# Patient Record
Sex: MALE | Race: WHITE
[De-identification: names, ages, dates, MRNs, and addresses within clinical notes are randomized per-mention and may not be internally consistent; named-entity substitution may affect disease eponyms.]

---

## 2017-02-19 ENCOUNTER — HOSPITAL ENCOUNTER (EMERGENCY)
Dept: HOSPITAL 74 - JER | Age: 82
Discharge: HOME | End: 2017-02-19
Payer: COMMERCIAL

## 2017-02-19 VITALS — TEMPERATURE: 97.6 F

## 2017-02-19 VITALS — BODY MASS INDEX: 28.8 KG/M2

## 2017-02-19 VITALS — SYSTOLIC BLOOD PRESSURE: 149 MMHG | DIASTOLIC BLOOD PRESSURE: 70 MMHG | HEART RATE: 81 BPM

## 2017-02-19 DIAGNOSIS — Z85.528: ICD-10-CM

## 2017-02-19 DIAGNOSIS — R33.8: ICD-10-CM

## 2017-02-19 DIAGNOSIS — E78.00: ICD-10-CM

## 2017-02-19 DIAGNOSIS — E78.5: ICD-10-CM

## 2017-02-19 DIAGNOSIS — N40.1: Primary | ICD-10-CM

## 2017-02-19 DIAGNOSIS — I25.10: ICD-10-CM

## 2017-02-19 DIAGNOSIS — Z95.5: ICD-10-CM

## 2017-02-19 DIAGNOSIS — I10: ICD-10-CM

## 2017-02-19 LAB
ANION GAP SERPL CALC-SCNC: 11 MMOL/L (ref 8–16)
APPEARANCE UR: CLEAR
BILIRUB UR STRIP.AUTO-MCNC: NEGATIVE MG/DL
CALCIUM SERPL-MCNC: 8.7 MG/DL (ref 8.5–10.1)
CO2 SERPL-SCNC: 23 MMOL/L (ref 21–32)
COLOR UR: YELLOW
CREAT SERPL-MCNC: 1.6 MG/DL (ref 0.7–1.3)
GLUCOSE SERPL-MCNC: 96 MG/DL (ref 74–106)
KETONES UR QL STRIP: NEGATIVE
LEUKOCYTE ESTERASE UR QL STRIP.AUTO: (no result)
MUCOUS THREADS URNS QL MICRO: (no result)
NITRITE UR QL STRIP: NEGATIVE
PH UR: 5 [PH] (ref 5–8)
PROT UR QL STRIP: NEGATIVE
PROT UR QL STRIP: NEGATIVE
RBC # BLD AUTO: 45 /HPF (ref 0–3)
RBC # UR STRIP: (no result) /UL
SP GR UR: 1.02 (ref 1–1.03)
UROBILINOGEN UR STRIP-MCNC: NEGATIVE E.U./DL (ref 0.2–1)
WBC # UR AUTO: 10 /HPF (ref 3–5)

## 2017-02-19 PROCEDURE — 0T9B70Z DRAINAGE OF BLADDER WITH DRAINAGE DEVICE, VIA NATURAL OR ARTIFICIAL OPENING: ICD-10-PCS

## 2017-02-19 NOTE — PDOC
History of Present Illness





- History of Present Illness


Initial Comments: 





02/19/17 17:46


The patient is a 87 year old male, with a significant past medical history of 

hypertension, hyperlipidemia, CAD s/p stents 12 years ago, s/p renal cancer, 

who presents to the emergency department with inability to urinate today. He 

reports his last urination was sometime last night. He reports numerous 

episodes of feeling  immense pressure when trying to urinate, but denies 

urination today. He reports having an appointment with Dr. Marshall on 2/3/

17 where he believes he was dilated, but denies catheterization. 





He denies chest pain, shortness of breath, headache and dizziness. He denies 

fever, chills, nausea, vomit, diarrhea and constipation. He denies frequency 

and hematuria. 





Allergies: Penicillins


Past surgical history: aortic heart valve replacement (June 2016)


Social history: denies toxic habits


PCP - Dr. Brantley


Urologist: Dr. Marshall








<April Amaya - Last Filed: 02/19/17 17:46>





<Betsy Handley - Last Filed: 02/20/17 03:31>





- General


Chief Complaint: Urinary Problem


Stated Complaint: URINARY PROBLEM


Time Seen by Provider: 02/19/17 16:18





Past History





<April Amaya - Last Filed: 02/19/17 17:46>





- Past Medical History


Anemia: No


Asthma: No


Cancer: Yes (R.KIDNEY)


Cardiac Disorders: Yes (valve disease)


CVA: No


COPD: Yes


CHF: No


Dementia: No


Diabetes: No


GI Disorders: No


 Disorders: Yes (URINARY RETENTION,BPH)


HTN: Yes


Hypercholesterolemia: Yes


Liver Disease: No


Seizures: No


Thyroid Disease: No





- Surgical History


Abdominal Surgery: No


Appendectomy: No


Cardiac Surgery: Yes (Angioplasty with stent,AORTIC VALVE SX)


Cholecystectomy: No


Lung Surgery: No


Neurologic Surgery: No


Orthopedic Surgery: No





- Immunization History


Immunization Up to Date: Yes





- Psycho/Social/Smoking Cessation Hx


Anxiety: No


Suicidal Ideation: No


Smoking History: Never smoked


Have you smoked in the past 12 months: No


Information on smoking cessation initiated: No


Hx Alcohol Use: Yes


Drug/Substance Use Hx: No


Substance Use Type: Alcohol


Hx Substance Use Treatment: No





<Betsy Handley - Last Filed: 02/20/17 03:31>





- Past Medical History


Allergies/Adverse Reactions: 


 Allergies











Allergy/AdvReac Type Severity Reaction Status Date / Time


 


Penicillins Allergy   Verified 02/19/17 15:59











Home Medications: 


Ambulatory Orders





Tamsulosin HCl 0.4 mg PO DAILY 10/29/13 


Aspirin Coated [Ecotrin -] 81 mg PO DAILY 01/26/15 


Clopidogrel Bisulfate [Plavix -] 75 mg PO DAILY 09/09/16 


Montelukast Na [Singulair -] 10 mg PO HS 09/09/16 


Atorvastatin Ca [Lipitor] 40 mg PO HS 02/19/17 


Ciprofloxacin [Cipro -] 500 mg PO Q12H #10 tablet 02/19/17 


Finasteride 5 mg PO DAILY 02/19/17 











**Review of Systems





- Review of Systems


Able to Perform ROS?: Yes


Comments:: 





02/19/17 17:46





CONSTITUTIONAL: 


Absent: fever, chills, diaphoresis, generalized weakness, malaise, loss of 

appetite


HEENT: 


Absent: rhinorrhea, nasal congestion, throat pain, throat swelling, difficulty 

swallowing, mouth swelling, ear pain, eye pain, visual Changes


CARDIOVASCULAR: 


Absent: chest pain, syncope, palpitations, irregular heart rate, lightheadedness

, peripheral edema


RESPIRATORY: 


Absent: cough, shortness of breath, dyspnea with exertion, orthopnea, wheezing, 

stridor, hemoptysis


GASTROINTESTINAL:


Absent: abdominal pain, abdominal distension, nausea, vomiting, diarrhea, 

constipation, melena, hematochezia


GENITOURINARY: 


(+) suprapubic pressure and inability to urinate. Absent: dysuria, frequency, 

hematuria, flank pain, genital pain


MUSCULOSKELETAL: 


Absent: myalgia, arthralgia, joint swelling


SKIN: 


Absent: rash, itching, pallor


HEMATOLOGIC/IMMUNOLOGIC: 


Absent: easy bleeding, easy bruising, lymphadenopathy, frequent infections


ENDOCRINE:


Absent: unexplained weight gain, unexplained weight loss, heat intolerance, 

cold intolerance


NEUROLOGIC: 


Absent: headache, focal weakness or paresthesias, dizziness, unsteady gait, 

seizure, mental


status changes, bladder or bowel incontinence


PSYCHIATRIC: 


Absent: anxiety, depression, suicidal or homicidal ideation, hallucinations.





<April Amaya - Last Filed: 02/19/17 17:46>





*Physical Exam





- Vital Signs


 Last Vital Signs











Temp Pulse Resp BP Pulse Ox


 


 97.6 F   73   18   153/66   98 


 


 02/19/17 15:55  02/19/17 15:55  02/19/17 15:55  02/19/17 15:55  02/19/17 15:55














- Physical Exam


Comments: 





02/19/17 17:47





GENERAL:


Well developed, well nourished. Awake and alert. No acute distress.


HEENT:


Normocephalic, atraumatic. PERRLA, EOMI. No conjunctival pallor. Sclera are non-

icteric. Moist mucous membranes. Oropharynx is clear.


NECK: 


Supple. Full ROM. No JVD. Carotid pulses 2+ and symmetric, without bruits. No 

thyromegaly. No lymphadenopathy.


CARDIOVASCULAR:


(+) Holistic murmur. Regular rate and rhythm. No rubs, or gallops. Distal 

pulses are 2+ and symmetric. 


PULMONARY: 


No evidence of respiratory distress. Lungs clear to auscultation bilaterally. 

No wheezing, rales or rhonchi.


ABDOMINAL:


Soft. Non-tender. Non-distended. No rebound or guarding. No organomegaly. 

Normoactive bowel sounds. 


MUSCULOSKELETAL 


Normal range of motion at all joints. No bony deformities or tenderness. No CVA 

tenderness.


EXTREMITIES: 


(+) +1 bilateral lower extremity edema. No cyanosis. No clubbing. No calf 

tenderness.


SKIN: 


Warm and dry. Normal capillary refill. No rashes. No jaundice. 


NEUROLOGICAL: 


Alert, awake, appropriate. Cranial nerves 2-12 intact. Normoreflexic in the 

upper and lower extremities. Normal speech. Toes are down-going bilaterally. 

Gait is normal without ataxia.


PSYCHIATRIC: 


Cooperative. Good eye contact. Appropriate mood and affect.








<April Amaya - Last Filed: 02/19/17 17:46>





- Vital Signs


 Last Vital Signs











Temp Pulse Resp BP Pulse Ox


 


 97.6 F   73   18   153/66   98 


 


 02/19/17 15:55  02/19/17 15:55  02/19/17 15:55  02/19/17 15:55  02/19/17 15:55














<Betsy Handley - Last Filed: 02/20/17 03:31>





ED Treatment Course





- LABORATORY


CBC & Chemistry Diagram: 


 02/19/17 16:44





- ADDITIONAL ORDERS


Additional order review: 


 Laboratory  Results











  02/19/17 02/19/17





  16:44 16:40


 


Sodium  143 


 


Potassium  3.8 


 


Chloride  109 H 


 


Carbon Dioxide  23 


 


Anion Gap  11 


 


BUN  26 H 


 


Creatinine  1.6 H 


 


Random Glucose  96 


 


Calcium  8.7 


 


Urine Color   Yellow


 


Urine Appearance   Clear


 


Urine pH   5.0


 


Ur Specific Gravity   1.019


 


Urine Protein   Negative


 


Urine Glucose (UA)   Negative


 


Urine Ketones   Negative


 


Urine Blood   2+ H


 


Urine Nitrite   Negative


 


Urine Bilirubin   Negative


 


Urine Urobilinogen   Negative


 


Ur Leukocyte Esterase   1+ H


 


Urine RBC   45


 


Urine WBC   10


 


Ur Epithelial Cells   Rare


 


Urine Mucus   Rare














<April Amaya - Last Filed: 02/19/17 17:46>





- LABORATORY


CBC & Chemistry Diagram: 


 02/19/17 16:44





- ADDITIONAL ORDERS


Additional order review: 


 Laboratory  Results











  02/19/17 02/19/17





  16:44 16:40


 


Sodium  143 


 


Potassium  3.8 


 


Chloride  109 H 


 


Carbon Dioxide  23 


 


Anion Gap  11 


 


BUN  26 H 


 


Creatinine  1.6 H 


 


Random Glucose  96 


 


Calcium  8.7 


 


Urine Color   Yellow


 


Urine Appearance   Clear


 


Urine pH   5.0


 


Ur Specific Gravity   1.019


 


Urine Protein   Negative


 


Urine Glucose (UA)   Negative


 


Urine Ketones   Negative


 


Urine Blood   2+ H


 


Urine Nitrite   Negative


 


Urine Bilirubin   Negative


 


Urine Urobilinogen   Negative


 


Ur Leukocyte Esterase   1+ H


 


Urine RBC   45


 


Urine WBC   10


 


Ur Epithelial Cells   Rare


 


Urine Mucus   Rare














<Betsy Handley - Last Filed: 02/20/17 03:31>





Medical Decision Making





- Medical Decision Making





02/20/17 03:28


86 yo male w h/o BPH has had  urinary  retention today. He has had this problem 

before and his urologist is Dr Prado


-ware catheter  was placed and pt felt much better


-greater 400cc  of urine obtained 


-pt placed on cipro /discharged and will see urologist this week








<Betsy Handley - Last Filed: 02/20/17 03:31>





*DC/Admit/Observation/Transfer





- Attestations


Scribe Attestion: 





02/19/17 17:47





Documentation prepared by April Amaya, acting as medical scribe for Betsy Handley MD





<April Amaya - Last Filed: 02/19/17 17:46>





<Betsy Handley - Last Filed: 02/20/17 03:31>


Diagnosis at time of Disposition: 


 Urinary retention





- Discharge Dispostion


Disposition: HOME


Condition at time of disposition: Stable





- Prescriptions


Prescriptions: 


Ciprofloxacin [Cipro -] 500 mg PO Q12H #10 tablet





- Referrals


Referrals: 


Brandyn Mckay MD [Primary Care Provider] - 


Ender Marshall MD [Staff Physician] - 





- Patient Instructions


Printed Discharge Instructions:  [facility] for Urinary Retention in Men


Additional Instructions: 


please see your urologist as soon as possible


 the prescription for your antibiotics at your pharmacy

## 2017-05-15 ENCOUNTER — HOSPITAL ENCOUNTER (INPATIENT)
Dept: HOSPITAL 74 - JER | Age: 82
LOS: 29 days | Discharge: NURSING, REHAB OR LONG-TERM CARE | DRG: 417 | End: 2017-06-13
Attending: NURSE PRACTITIONER | Admitting: INTERNAL MEDICINE
Payer: COMMERCIAL

## 2017-05-15 VITALS — BODY MASS INDEX: 27.8 KG/M2

## 2017-05-15 DIAGNOSIS — I95.9: ICD-10-CM

## 2017-05-15 DIAGNOSIS — R74.0: ICD-10-CM

## 2017-05-15 DIAGNOSIS — I12.9: ICD-10-CM

## 2017-05-15 DIAGNOSIS — E87.2: ICD-10-CM

## 2017-05-15 DIAGNOSIS — N18.3: ICD-10-CM

## 2017-05-15 DIAGNOSIS — Z98.61: ICD-10-CM

## 2017-05-15 DIAGNOSIS — D72.829: ICD-10-CM

## 2017-05-15 DIAGNOSIS — K80.46: Primary | ICD-10-CM

## 2017-05-15 DIAGNOSIS — I25.10: ICD-10-CM

## 2017-05-15 DIAGNOSIS — J96.01: ICD-10-CM

## 2017-05-15 DIAGNOSIS — D69.6: ICD-10-CM

## 2017-05-15 DIAGNOSIS — I35.0: ICD-10-CM

## 2017-05-15 DIAGNOSIS — D62: ICD-10-CM

## 2017-05-15 DIAGNOSIS — N17.0: ICD-10-CM

## 2017-05-15 DIAGNOSIS — I21.4: ICD-10-CM

## 2017-05-15 DIAGNOSIS — I44.7: ICD-10-CM

## 2017-05-15 DIAGNOSIS — R65.21: ICD-10-CM

## 2017-05-15 DIAGNOSIS — A41.9: ICD-10-CM

## 2017-05-15 DIAGNOSIS — C64.1: ICD-10-CM

## 2017-05-15 DIAGNOSIS — L03.316: ICD-10-CM

## 2017-05-15 LAB
ALBUMIN SERPL-MCNC: 2.6 G/DL (ref 3.4–5)
ALP SERPL-CCNC: 1048 U/L (ref 45–117)
ALT SERPL-CCNC: 87 U/L (ref 12–78)
ANION GAP SERPL CALC-SCNC: 16 MMOL/L (ref 8–16)
AST SERPL-CCNC: 114 U/L (ref 15–37)
BASOPHILS # BLD: 0.5 % (ref 0–2)
BILIRUB SERPL-MCNC: 9.7 MG/DL (ref 0.2–1)
CALCIUM SERPL-MCNC: 8.5 MG/DL (ref 8.5–10.1)
CO2 SERPL-SCNC: 21 MMOL/L (ref 21–32)
COCKROFT - GAULT: 19.02
CREAT SERPL-MCNC: 3.3 MG/DL (ref 0.7–1.3)
DEPRECATED RDW RBC AUTO: 17.4 % (ref 11.9–15.9)
EOSINOPHIL # BLD: 0.8 % (ref 0–4.5)
GLUCOSE SERPL-MCNC: 94 MG/DL (ref 74–106)
MCH RBC QN AUTO: 29.2 PG (ref 25.7–33.7)
MCHC RBC AUTO-ENTMCNC: 33.2 G/DL (ref 32–35.9)
MCV RBC: 87.9 FL (ref 80–96)
NEUTROPHILS # BLD: 85.8 % (ref 42.8–82.8)
PLATELET # BLD AUTO: 135 K/MM3 (ref 134–434)
PMV BLD: 9.8 FL (ref 7.5–11.1)
PROT SERPL-MCNC: 5.9 G/DL (ref 6.4–8.2)
WBC # BLD AUTO: 11.9 K/MM3 (ref 4–10)

## 2017-05-15 PROCEDURE — G0480 DRUG TEST DEF 1-7 CLASSES: HCPCS

## 2017-05-15 PROCEDURE — P9034 PLATELETS, PHERESIS: HCPCS

## 2017-05-15 PROCEDURE — P9047 ALBUMIN (HUMAN), 25%, 50ML: HCPCS

## 2017-05-15 PROCEDURE — P9038 RBC IRRADIATED: HCPCS

## 2017-05-15 PROCEDURE — P9058 RBC, L/R, CMV-NEG, IRRAD: HCPCS

## 2017-05-15 NOTE — HP
CHIEF COMPLAINT: Abdominal Pain, Nausea/Vomiting, Periumbilical Discharge





PCP: Dr. Mckay





HISTORY OF PRESENT ILLNESS:


This is a 86 y/o male with a PMHx of: HTN, Aortic Stenosis, Porcine Valve 

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

, Urethral Strictures s/p Dilation. Who presents to the emergency department 

sent in by his PMD for an abnormal MRI. Patient underwent an MRI on 4/28 as 

part of a workup for pancreatic head density. An obstructing CBD stone 

measuring at least 1 cm with marked proximal CBD dilatation up to 1.4 cm and 

intrahepatic biliary ductal dilatation was incidentally noted.


Patient reports having chills, N/V, abdominal pain x 3-4 days, periumbilical 

malodorous discharge x 1 week. Patient denies fever, cough, SOB, CP, diarrhea, 

constipation, dysuria.


 


ER course was notable for:


(1) Gallbladder US showed- mild distended GB, no cholelithiasis, no acute 

cholecystitis. Moderate dilatation of biliary tree


(2) WBC 11.9


(3) T Bili 9.7, D Bili 8.0


(4) AST/ALT/Alk Phos 114/87/1048





Recent Travel: None





PAST MEDICAL HISTORY:


See HPI





PAST SURGICAL HISTORY:


See HPI





Social History:


Smoking: Never


Alcohol:  None


Drugs:    None


 lives with spouse, employed- 





Family History:


Non-Contributory





Allergies





Penicillins Allergy (Verified 05/15/17 15:20)


 








HOME MEDICATIONS:


 Home Medications











 Medication  Instructions  Recorded


 


Aspirin Coated [Ecotrin -] 81 mg PO DAILY 01/26/15


 


Montelukast Na [Singulair -] 10 mg PO HS 09/09/16


 


Atorvastatin Ca [Lipitor] 40 mg PO HS 02/19/17


 


Finasteride 5 mg PO DAILY 02/19/17








REVIEW OF SYSTEMS


CONSTITUTIONAL: chills


Absent:  fever, diaphoresis, generalized weakness, malaise, loss of appetite, 

weight change


HEENT: 


Absent:  rhinorrhea, nasal congestion, throat pain, throat swelling, difficulty 

swallowing, mouth swelling, ear pain, eye pain, visual changes


CARDIOVASCULAR: 


Absent: chest pain, syncope, palpitations, irregular heart rate, lightheadedness

, peripheral edema


RESPIRATORY: 


Absent: cough, shortness of breath, dyspnea with exertion, orthopnea, wheezing, 

stridor, hemoptysis


GASTROINTESTINAL:abdominal pain, nausea, vomiting


Absent: abdominal distension, diarrhea, constipation, melena, hematochezia


GENITOURINARY: 


Absent: dysuria, frequency, urgency, hesitancy, hematuria, flank pain, genital 

pain


MUSCULOSKELETAL: back pain


Absent: myalgia, arthralgia, joint swelling, neck pain


SKIN: erythema to periumbilical


Absent: rash, itching, pallor


HEMATOLOGIC/IMMUNOLOGIC: 


Absent: easy bleeding, easy bruising, lymphadenopathy, frequent infections


ENDOCRINE:


Absent: unexplained weight gain, unexplained weight loss, heat intolerance, 

cold intolerance


NEUROLOGIC: 


Absent: headache, focal weakness or paresthesias, dizziness, unsteady gait, 

seizure, mental status changes, bladder or bowel incontinence


PSYCHIATRIC: 


Absent: anxiety, depression, suicidal or homicidal ideation, hallucinations.








PHYSICAL EXAMINATION


 Vital Signs - 24 hr











  05/15/17





  15:18


 


Temperature 97.4 F L


 


Pulse Rate 87


 


Respiratory 17





Rate 


 


Blood Pressure 140/59


 


O2 Sat by Pulse 97





Oximetry (%) 











GENERAL: Awake, alert, and fully oriented, in no acute distress.


HEAD: Normal with no signs of trauma.


EYES: Sclera icteric Pupils equal, round and reactive to light, extraocular 

movements intact, conjunctiva clear. No lid lag.


EARS, NOSE, THROAT: Ears normal, nares patent, oropharynx clear without 

exudates. Moist mucous membranes.


NECK: Normal range of motion, supple without lymphadenopathy, JVD, or masses.


LUNGS: Breath sounds equal, clear to auscultation bilaterally. No wheezes, and 

no crackles. No accessory muscle use.


HEART: 2/6 Systolic murmur, Regular rate and rhythm, normal S1 and S2, rub or 

gallop.


ABDOMEN: Erythema, malodorous clear discharge to periumbilical region. Soft, 

nontender, not distended, normoactive bowel sounds, no guarding, no rebound, no 

masses.  No hepatomegaly or  splenomegaly. 


MUSCULOSKELETAL: Mid lumbar tenderness upon palpation. Normal range of motion 

at all joints. No bony deformities. No CVA tenderness.


UPPER EXTREMITIES: 2+ pulses, warm, well-perfused. No cyanosis. No clubbing. No 

peripheral edema.


LOWER EXTREMITIES:+1 bilateral pitting peripheral edema. 2+ pulses, warm, well-

perfused. No calf tenderness.


NEUROLOGICAL:  Cranial nerves II-XII intact. Normal speech. Gait not observed.


PSYCHIATRIC: Cooperative. Good eye contact. Appropriate mood and affect.


SKIN: Jaundice, warm, dry, normal turgor, no rashes or lesions noted, normal 

capillary refill. 








Laboratory Results - last 24 hr





 3





  05/15/17 05/15/17 05/15/17





  16:51 16:51 16:51


 


WBC  11.9 H D  


 


RBC  3.66 L  


 


Hgb  10.7 L  


 


Hct  32.1 L  


 


MCV  87.9  


 


MCHC  33.2  


 


RDW  17.4 H D  


 


Plt Count  135  


 


MPV  9.8  D  


 


Neutrophils %  85.8 H  


 


Lymphocytes %  5.8 L D  


 


Monocytes %  7.1  


 


Eosinophils %  0.8  


 


Basophils %  0.5  


 


Sodium   139 


 


Potassium   4.0 


 


Chloride   102 


 


Carbon Dioxide   21 


 


Anion Gap   16 


 


BUN   49 H D 


 


Creatinine   3.3 H D 


 


Creat Clearance w eGFR   17.82 


 


Random Glucose   94 


 


Calcium   8.5 


 


Total Bilirubin   9.7 H D 


 


Direct Bilirubin    8.0 H D


 


AST   114 H D 


 


ALT   87 H D 


 


Alkaline Phosphatase   1048 H D 


 


Total Protein   5.9 L 


 


Albumin   2.6 L 


 


Lipase   479 H 

















ASSESSMENT/PLAN:


This is a 86 y/o male with a PMHx of: HTN, CAD s/p stent, Aortic Stenosis, 

Porcine Valve Replacement (on Asa, Plavix, 6/2016), Urtheral Strictures s/p 

Dilatation, R- Nephrectomy ( secondary Kidney Ca). Admitted for 

Choledocholithiasis, Acute Transaminitis, Periumbilical Cellulitis, Abdominal 

Pain, Leukocytosis, for further evaluation of their emergent condition.





Plan:





1. Choledocholithiasis


- Likely secondary to calculus in CBD


- US gallbladder reviewed


- MRI of abdomen 4/28/17- reviewed


- GI following


- WBC 11.9 with Left shift


- T bili 9.7 Direct Bili 8.0


- Started on Levofloxacin in ED


- Will continue ABX


- Probable ERCP in am


- Clear Liquids tonight, then NPO after midnight


- Monitor CBC, BMP


- Monitor vitals





2. Abdominal Pain


- See Above





3. Acute Transaminitis


- Likely secondary to calculus in CBD


- Trend LFTs





4. Periumbilical Cellulitis


- Wound Culture-pending


- Appreciate ID Consult


- Levofloxacin started in ED


- Monitor vitals





5. Leukocytosis


- Blood Cultures-pending


- Continue ABX





6. HTN


- Controlled


- Monitor BP


- Continue home meds





7. FEN


- D5 1/2 NS@60cc/hr


- Replete lytes


- Clear Tonight, NPO after midnight





8. DVT/PPI


- OOB


- SCDs


- Heparin SQ





Code Status: Full Code














Problem List





- Problem


(1) Calculus of common duct with obstruction


Code(s): K80.51 - CALCULUS OF BILE DUCT W/O CHOLANGITIS OR CHOLECYST W OBST





(2) Choledocholithiasis


Code(s): K80.50 - CALCULUS OF BILE DUCT W/O CHOLANGITIS OR CHOLECYST W/O OBST





(3) Cellulitis, umbilical


Code(s): L03.316 - CELLULITIS OF UMBILICUS





(4) Hypertension


Code(s): I10 - ESSENTIAL (PRIMARY) HYPERTENSION   





(5) DVT prophylaxis


Code(s): VRM1846 - 








Visit type





- Emergency Visit


Emergency Visit: Yes


ED Registration Date: 05/15/17


Care time: The patient presented to the Emergency Department on the above date 

and was hospitalized for further evaluation of their emergent condition.





- New Patient


This patient is new to me today: Yes


Date on this admission: 05/15/17





- Critical Care


Critical Care patient: No

## 2017-05-15 NOTE — CON.GI
Consult


Consult Specialty:: GI


Referred by:: Dr. Brandyn Mckay


Reason for Consultation:: Choledocholithoasis





- History of Present Illness


Chief Complaint: Nausea, vomiting, chills


History of Present Illness: 


87M admitted through [facility] ER for eval of chills, nausea/vomiting.  The N/V 

occurred after meals on thursday and this past .  He had an MRI of the 

abdomen performed 17 revealing a large distal CBD stone with dilated CBD 

and intrahepatics as well as a ? 1.4cm density in the head of the pancreas of 

unclear etiology.  he also was admitted to [facility] in  for 

cholecystitis.  He was evaluated by Dr. Foster who advised conservative 

management.  Cholecystectomy was never performed.  He currently denies 

abdominal pain.  He also complains that his belly button has been leaking





- History Source


History Provided By: Patient, Family Member


Limitations to Obtaining History: No Limitations





- Past Medical History


Cardio/Vascular: Yes: Aortic Stenosis (with ? porcine AVR ), CAD (s/p 

cardiac stent ), HTN, Other (valvular heart disease: echo 9/15: NL LV 

function, severely dilated Left Atrium, Mild MR, Mod Aortic Stenosis, Mild AR)


Gastrointestinal: No: Irritable Bowel Disease


Renal/: Yes: Cancer (Renal CA ), Renal Calculi, Other (urethral stricture s/p 

cystoscopy)





- Past Surgical History


Past Surgical History: Yes: Nephrectomy, Stent





- Alcohol/Substance Use


Hx Alcohol Use: No





- Smoking History


Smoking history: Never smoked


Have you smoked in the past 12 months: No





- Social History


Usual Living Arrangement: With Spouse


ADL: Independent


Occupation: CPA: still working


History of Recent Travel: No





Home Medications





- Allergies


Allergies/Adverse Reactions: 


 Allergies











Allergy/AdvReac Type Severity Reaction Status Date / Time


 


Penicillins Allergy   Verified 05/15/17 15:20














- Home Medications


Home Medications: 


Ambulatory Orders





Aspirin Coated [Ecotrin -] 81 mg PO DAILY 01/26/15 


Montelukast Na [Singulair -] 10 mg PO HS 16 


Atorvastatin Ca [Lipitor] 40 mg PO HS 17 


Finasteride 5 mg PO DAILY 17 











Family Disease History





- Family Disease History


Family Disease History: Other: Father ( 80's unclear cause), Mother ( 80

's CVA/MI), Brother (1 brother  CHF, 1 brother  stomach cancer)





Review of Systems





- Review of Systems


Constitutional: reports: Chills


Cardiovascular: reports: Edema.  denies: Chest Pain


Respiratory: denies: SOB





Physical Exam-GI


Vital Signs: 


               Vital Signs











Temperature  97.4 F L  05/15/17 1800


 


Pulse Rate  76   05/15/17 1800


 


Respiratory Rate  17   05/15/17 1800


 


Blood Pressure  130/69   05/15/17 1800


 


O2 Sat by Pulse Oximetry (%)  97   05/15/17 1800











Constitutional: Yes: Calm


Eyes: Yes: Sclera Icterus


Cardiovascular: Yes: Regular Rate and Rhythm, Murmur (+ 2/6 systolic murmur)


Respiratory: Yes: CTA Bilaterally


Gastrointestinal Inspection: Yes: Other (erythema around umbilicus with foul 

smelling purulent fluid in umbilicus).  No: Distention


...Auscultate: Yes: Normoactive Bowel Sounds


...Palpate: No: Tenderness


...Percussion: No: Tympanitic


Edema: Yes


Edema: LLE: 1+, RLE: 1+


Neurological: Yes: Alert, Oriented


Labs: 


 CBC, BMP





 05/15/17 16:51 











Imaging





- Results


Ultrasound: Pending





Problem List





- Problems


(1) Choledocholithiasis


Assessment/Plan: 


Also with possible mass at the head of the pancreas.  Duscussed findings with 

patient, his wife and his nephew who were present bedside.  Discussed 

possibility of ERCP. Discussed risks of the procedure like but not limited to 

bleeding, perforation requiring surgery to repair, infection, sedation 

medication effects, pancreatitis, all of which could be potentially life 

threatening.  He has agreed to the procedure


Awaiting LFTS 


No flagyl available and patient with pcn allergy documented. ID consult for Abx 

management and eval of umbilical purulence


AM labs including coags


NPO after midnight, clear liquid diet tonight


Clarification of whether patient is on Plavix or not.  he does not recall being 

on it however it is on the medication list his wife provided.  His cardiologist 

is Dr. Sanabria @ [facility]. 








Code(s): K80.50 - CALCULUS OF BILE DUCT W/O CHOLANGITIS OR CHOLECYST W/O OBST

## 2017-05-16 LAB
ALBUMIN SERPL-MCNC: 2 G/DL (ref 3.4–5)
ALBUMIN SERPL-MCNC: 2 G/DL (ref 3.4–5)
ALP SERPL-CCNC: 1007 U/L (ref 45–117)
ALP SERPL-CCNC: 957 U/L (ref 45–117)
ALT SERPL-CCNC: 66 U/L (ref 12–78)
ALT SERPL-CCNC: 74 U/L (ref 12–78)
AMYLASE SERPL-CCNC: 76 U/L (ref 25–115)
ANION GAP SERPL CALC-SCNC: 16 MMOL/L (ref 8–16)
ANION GAP SERPL CALC-SCNC: 17 MMOL/L (ref 8–16)
ART PUNCT SITE: (no result)
ARTERIAL PATENCY WRIST A: POSITIVE
AST SERPL-CCNC: 131 U/L (ref 15–37)
AST SERPL-CCNC: 95 U/L (ref 15–37)
BASE EXCESS BLDA CALC-SCNC: -8.6 MEQ/L (ref -2–2)
BASOPHILS # BLD: 0.2 % (ref 0–2)
BASOPHILS # BLD: 1 % (ref 0–2)
BILIRUB CONJ SERPL-MCNC: 8.2 MG/DL (ref 0–0.2)
BILIRUB SERPL-MCNC: 8.9 MG/DL (ref 0.2–1)
BILIRUB SERPL-MCNC: 9.9 MG/DL (ref 0.2–1)
CALCIUM SERPL-MCNC: 7.4 MG/DL (ref 8.5–10.1)
CALCIUM SERPL-MCNC: 7.5 MG/DL (ref 8.5–10.1)
CO2 SERPL-SCNC: 18 MMOL/L (ref 21–32)
CO2 SERPL-SCNC: 19 MMOL/L (ref 21–32)
COCKROFT - GAULT: 14.28
COCKROFT - GAULT: 16.11
CREAT SERPL-MCNC: 3.9 MG/DL (ref 0.7–1.3)
CREAT SERPL-MCNC: 4.4 MG/DL (ref 0.7–1.3)
DEPRECATED RDW RBC AUTO: 16.9 % (ref 11.9–15.9)
DEPRECATED RDW RBC AUTO: 16.9 % (ref 11.9–15.9)
DEPRECATED RDW RBC AUTO: 17 % (ref 11.9–15.9)
EOSINOPHIL # BLD: 0.5 % (ref 0–4.5)
FIBRINOGEN PPP-MCNC: 336 MG/DL (ref 238–498)
GLUCOSE SERPL-MCNC: 122 MG/DL (ref 74–106)
GLUCOSE SERPL-MCNC: 54 MG/DL (ref 74–106)
HCO3 BLDA-SCNC: 16.8 MEQ/L (ref 22–26)
INR BLD: 1.11 (ref 0.82–1.09)
INR BLD: 1.19 (ref 0.82–1.09)
INR BLD: 1.26 (ref 0.82–1.09)
LPM/O2%: (no result)
MAGNESIUM SERPL-MCNC: 1.9 MG/DL (ref 1.8–2.4)
MCH RBC QN AUTO: 28.9 PG (ref 25.7–33.7)
MCH RBC QN AUTO: 29.4 PG (ref 25.7–33.7)
MCH RBC QN AUTO: 29.7 PG (ref 25.7–33.7)
MCHC RBC AUTO-ENTMCNC: 32.5 G/DL (ref 32–35.9)
MCHC RBC AUTO-ENTMCNC: 33.1 G/DL (ref 32–35.9)
MCHC RBC AUTO-ENTMCNC: 33.7 G/DL (ref 32–35.9)
MCV RBC: 88.1 FL (ref 80–96)
MCV RBC: 88.8 FL (ref 80–96)
MCV RBC: 89 FL (ref 80–96)
MECH. VENT.: YES
METAMYELOCYTES NFR BLD: 5 % (ref 0–2)
METAMYELOCYTES NFR BLD: 8 % (ref 0–2)
NEUTROPHILS # BLD: 43 % (ref 42.8–82.8)
NEUTROPHILS # BLD: 82 % (ref 42.8–82.8)
NEUTROPHILS # BLD: 97.3 % (ref 42.8–82.8)
PEEP SETTING VENT: 5 CMH2O
PHOSPHATE SERPL-MCNC: 3.3 MG/DL (ref 2.5–4.9)
PLATELET # BLD AUTO: 56 K/MM3 (ref 134–434)
PLATELET # BLD AUTO: 60 K/MM3 (ref 134–434)
PLATELET # BLD AUTO: 61 K/MM3 (ref 134–434)
PLATELET # BLD EST: (no result) 10*3/UL
PMV BLD: 10.9 FL (ref 7.5–11.1)
PMV BLD: 9.7 FL (ref 7.5–11.1)
PMV BLD: 9.9 FL (ref 7.5–11.1)
PO2 BLDA: 151 MMHG (ref 68–100)
PROT SERPL-MCNC: 4.6 G/DL (ref 6.4–8.2)
PROT SERPL-MCNC: 4.9 G/DL (ref 6.4–8.2)
PT PNL PPP: 12.2 SEC (ref 9.98–11.88)
PT PNL PPP: 13.1 SEC (ref 9.98–11.88)
PT PNL PPP: 13.9 SEC (ref 9.98–11.88)
PT. ON O2?: YES
SAO2 % BLDA: 99.4 % (ref 90–98.9)
TROPONIN I SERPL-MCNC: 0.52 NG/ML (ref 0–0.05)
TYPE OF O2: (no result)
VENT RATE: 12
VT/PRESS: 500
WBC # BLD AUTO: 29.3 K/MM3 (ref 4–10)
WBC # BLD AUTO: 5.7 K/MM3 (ref 4–10)
WBC # BLD AUTO: 8.3 K/MM3 (ref 4–10)

## 2017-05-16 RX ADMIN — DOPAMINE HYDROCHLORIDE SCH MLS/HR: 160 INJECTION, SOLUTION INTRAVENOUS at 17:02

## 2017-05-16 RX ADMIN — SODIUM CHLORIDE, SODIUM LACTATE, POTASSIUM CHLORIDE, CALCIUM CHLORIDE AND DEXTROSE MONOHYDRATE SCH MLS/HR: 5; 600; 310; 30; 20 INJECTION, SOLUTION INTRAVENOUS at 20:53

## 2017-05-16 RX ADMIN — MEROPENEM SCH MLS/HR: 500 INJECTION, POWDER, FOR SOLUTION INTRAVENOUS at 22:54

## 2017-05-16 RX ADMIN — FAMOTIDINE SCH MLS/HR: 20 INJECTION, SOLUTION INTRAVENOUS at 22:55

## 2017-05-16 NOTE — CON.CARD
Consult


Consult Specialty:: Cardiology


Referred by:: Hospitalist


Reason for Consultation:: Cardiac evaluation





- History of Present Illness


Chief Complaint: Abormal MRI of abdomen showing choledocholithiasis


History of Present Illness: 


Patient is an 87 year old male with underlying history of renal CA S/P right 

nephrectomy, HTN, CAD S/P PCI/stent () and S/P TAVR (at [facility] in  - followed by Dr. Clint Sanabria) presents with an abnormal MRI of 

abdomen showing density in the head of pancrease, large distal CBD stone and 

dilated CBD.  He complained of chills and vague abdominal discomfort earlier, 

but now discomfort appears to be absent.  No fever or chills at the moment. ID 

and GI was consulted and started on broad spectrum antibiotics and GI plans for 

possible ERCP.  He denies chest pain, shortness of breath or palpitations.  He 

denies paroxysmal nocturnal dyspnea or orthopnea.  He denies fever at the 

moment. Denies headache or lightheadedness.  Cardiology consultation was called 

for further evaluation. 





- History Source


History Provided By: Patient, Medical Record


Limitations to Obtaining History: No Limitations





- Past Medical History


Cardio/Vascular: Yes: Aortic Stenosis (Post TAVR at [facility] in ), CAD (s/p 

cardiac stent/ PCI ), HTN, Other


Hepatobiliary: Yes: Cholelithiasis


Renal/: Yes: Cancer (Renal CA ), Renal Calculi, Other (urethral stricture s/p 

cystoscopy)





- Past Surgical History


Past Surgical History: Yes: Nephrectomy, Stent, Valve Replacement (TAVR)





- Alcohol/Substance Use


Hx Alcohol Use: No


History of Substance Use: reports: None





- Smoking History


Smoking history: Never smoked


Have you smoked in the past 12 months: No





- Social History


Usual Living Arrangement: With Spouse


ADL: Independent


Occupation: CPA: still working


History of Recent Travel: No





Home Medications





- Allergies


Allergies/Adverse Reactions: 


 Allergies











Allergy/AdvReac Type Severity Reaction Status Date / Time


 


Penicillins Allergy   Verified 05/15/17 15:20














- Home Medications


Home Medications: 


Ambulatory Orders





Aspirin Coated [Ecotrin -] 81 mg PO DAILY 01/26/15 


Montelukast Na [Singulair -] 10 mg PO HS 16 


Atorvastatin Ca [Lipitor] 40 mg PO HS 17 


Finasteride 5 mg PO DAILY 17 


Omega-3 Fatty Acids [Omega-3] 1,500 mg PO 05/15/17 











Family Disease History





- Family Disease History


Family Disease History: Other: Father ( 80's unclear cause), Mother ( 80

's CVA/MI), Brother (1 brother  CHF, 1 brother  stomach cancer)





Review of Systems





- Review of Systems


Constitutional: reports: Chills.  denies: Fever


Cardiovascular: denies: Chest Pain, Palpitations, Shortness of Breath


Respiratory: denies: Cough, Hemoptysis, Orthopnea, PND


Gastrointestinal: denies: Abdominal Pain, Constipation, Diarrhea, Melena, Nausea

, Rectal Bleeding, Vomiting


Genitourinary: denies: Dysuria


Musculoskeletal: denies: Back Pain, Joint Pain


Neurological: denies: Dizziness, Headache, Seizure, Syncope


Vital Signs: 


 Vital Signs











Temperature  98.5 F   17 10:00


 


Pulse Rate  82   17 10:00


 


Respiratory Rate  20   17 10:00


 


Blood Pressure  101/52   17 10:00


 


O2 Sat by Pulse Oximetry (%)  95   17 01:34











Neck: Yes: Supple


Respiratory: Yes: Diminished


Gastrointestinal: Yes: Normal Bowel Sounds, Soft.  No: Tenderness


Cardiovascular: Yes: Regular Rate and Rhythm


JVD: No


Carotid Bruit: No


PMI: Non-Displaced


Heart Sounds: Yes: S1, S2





- Other Data


Labs, Other Data: 


 CBC, BMP





 17 09:00 





 17 06:00 





 INR, PTT











INR  1.19  (0.82-1.09)  H  17  06:00    


 


Fibrinogen  336.0 mg/dL (238-498)   17  11:20    














Imaging





- Results


Chest X-ray: Report Reviewed


Ultrasound: Report Reviewed


MRI: Report Reviewed (Abdominal as noted on HPI)





Problem List





- Problems


(1) Cellulitis, umbilical


Code(s): L03.316 - CELLULITIS OF UMBILICUS





(2) Choledocholithiasis


Code(s): K80.50 - CALCULUS OF BILE DUCT W/O CHOLANGITIS OR CHOLECYST W/O OBST





(3) Hypertension


Code(s): I10 - ESSENTIAL (PRIMARY) HYPERTENSION   





(4) CAD (coronary artery disease)


Code(s): I25.10 - ATHSCL HEART DISEASE OF NATIVE CORONARY ARTERY W/O ANG PCTRS 

  Qualifiers: 


        Qualified Code(s): I25.10 - Atherosclerotic heart disease of native 

coronary artery without angina pectoris  





(5) History of percutaneous coronary intervention


Code(s): Z98.890 - OTHER SPECIFIED POSTPROCEDURAL STATES





(6) Renal cancer


Code(s): C64.9 - MALIGNANT NEOPLASM OF UNSP KIDNEY, EXCEPT RENAL PELVIS   

Qualifiers: 


        Qualified Code(s): C64.1 - Malignant neoplasm of right kidney, except 

renal pelvis  








Assessment/Plan


1. Choledocholithiasis with CBD dilatation and stone


2. Post TAVR for severe AS


3. CAD, S/P PCI/stent


4. Hypertension


5. Renal CA S/P right nephrectomy


6. CKD


7. Thrombocytopenia





PLAN:


1. Patient may proceed with GI work up (ERCP) as planned by GI service.  There 

appears to be no absolute contraindication in proceeding with ERCP in view of 

absence of ischemic symptoms.  Patient currently is on Plavix unclear whether 

he took it yesterday, but certainly held in the hospital.  ERCP timing would be 

at the discretion of GI service.  ASA also held.


2. Continue antibiotics coverage as per ID. Blood cultures pending


3. Follow BP response 


4. Follow renal function and electrolytes


5. Follow CBC.





Cardiologist: Clint Sanabria MD ([facility])





Gordon Botello MD

## 2017-05-16 NOTE — CONSULT
Consult


Consult Specialty:: Surgery


Reason for Consultation:: Drainage from umbilicus





- History of Present Illness


History of Present Illness: 


87 male presents for shaking and chills


Found to have elevated bilirubin and large stone in CBD


S/P ERCP with stent by GI


Currently intubated


Consulted for drainage from umbilical wound that has been present for some time





- History Source


History Provided By: Medical Record


Limitations to Obtaining History: Intubated





- Past Medical History


Cardio/Vascular: Yes: Aortic Stenosis (Post TAVR at [facility] in ), CAD (s/p 

cardiac stent/ PCI ), HTN, Hyperlipdemia, Other


Gastrointestinal: No: Irritable Bowel Disease


Hepatobiliary: Yes: Cholelithiasis


Renal/: Yes: Renal Inusuff, Cancer (Renal CA ), Renal Calculi, Other (

urethral stricture s/p cystoscopy, nephrectomy for RCC)





- Past Surgical History


Past Surgical History: Yes: Nephrectomy, Stent, Valve Replacement (TAVR)





- Alcohol/Substance Use


Hx Alcohol Use: No


History of Substance Use: reports: None





- Smoking History


Smoking history: Never smoked


Have you smoked in the past 12 months: No





- Social History


Usual Living Arrangement: With Spouse


ADL: Independent


Occupation: CPA: still working


History of Recent Travel: No





Home Medications





- Allergies


Allergies/Adverse Reactions: 


 Allergies











Allergy/AdvReac Type Severity Reaction Status Date / Time


 


Penicillins Allergy   Verified 05/15/17 15:20














- Home Medications


Home Medications: 


Ambulatory Orders





Aspirin Coated [Ecotrin -] 81 mg PO DAILY 01/26/15 


Montelukast Na [Singulair -] 10 mg PO HS 16 


Atorvastatin Ca [Lipitor] 40 mg PO HS 17 


Finasteride 5 mg PO DAILY 17 


Omega-3 Fatty Acids [Omega-3] 1,500 mg PO 05/15/17 











Family Disease History





- Family Disease History


Family Disease History: Other: Father ( 80's unclear cause), Mother ( 80

's CVA/MI), Brother (1 brother  CHF, 1 brother  stomach cancer)





Review of Systems


Unable to obtain ROS, reason: Intubated





Physical Exam


Vital Signs: 


 Vital Signs











Temperature  98.5 F   17 10:00


 


Pulse Rate  82   17 10:00


 


Respiratory Rate  20   17 10:00


 


Blood Pressure  101/52   17 10:00


 


O2 Sat by Pulse Oximetry (%)  95   17 09:00











Cardiovascular: Yes: Regular Rate and Rhythm


Respiratory: Yes: Diminished


Gastrointestinal: Yes: Soft, Other (clear drainage from umbilicus with + foul 

odor. mild erythema and local skin excoriation)


Labs: 


 CBC, BMP





 17 09:00 





 17 06:00 











Problem List





- Problems


(1) Umbilical discharge


Code(s): R19.8 - OTH SYMPTOMS AND SIGNS INVOLVING THE DGSTV SYS AND ABDOMEN








Assessment/Plan


87 male with chronic umbilical discharge


Will likely need drainage and exploration at some point


However, currently septic appearing from likely cholangitis


Will need supportive care


Possible IR drainage of gallbladder


Antibiotics

## 2017-05-16 NOTE — EKG
Test Reason : 

Blood Pressure : ***/*** mmHG

Vent. Rate : 073 BPM     Atrial Rate : 073 BPM

   P-R Int : 160 ms          QRS Dur : 160 ms

    QT Int : 446 ms       P-R-T Axes : 003 053 202 degrees

   QTc Int : 491 ms

 

NORMAL SINUS RHYTHM

LEFT BUNDLE BRANCH BLOCK

ABNORMAL ECG

WHEN COMPARED WITH ECG OF 13-JAN-2015 03:04,

PREMATURE VENTRICULAR COMPLEXES ARE NO LONGER PRESENT

LEFT BUNDLE BRANCH BLOCK IS NOW PRESENT

Confirmed by DAVID ABRAHAM, MONET ([id number]) on 5/16/2017 5:11:42 PM

 

Referred By:             Confirmed By:MONET HERNANDEZ MD

## 2017-05-16 NOTE — CONS
DATE OF CONSULTATION:

 

REQUESTING PHYSICIAN:  Hospitalist service.

 

HISTORY:  This is an 87-year-old man who was sent to the emergency room yesterday for

follow up of an abnormal MRI.  He had an MRI as workup for a pancreatic head density

that showed an obstructive common CBD stone.  The patient received that he has had

abdominal pain intermittently twice in the past week was vomiting twice clear fluid. 

He has had chills though, and he reported having chills in the emergency room.  He

did not take his temperature at home.  He presents with these complaints.  In the

emergency room, he was noted to have periumbilical erythema and some purulent,

malodorous drainage from his umbilicus.  He as well was found to have a white count

of 11.9.  He had a BUN 49, creatinine 3.3 with a total bilirubin of 9.7 and an

alkaline phosphatase of 1048.  He was given Levaquin and Flagyl in the emergency

room.  I am asked to see him in follow up as the hospital has no parenteral Flagyl. 

He is currently n.p.o.  He was evaluated by GI who is recommending ERCP.  This

morning the patient is awake and alert.  He has severe arthritis pain and is

complaining of severe back pain, which he has had for years.

 

PAST MEDICAL HISTORY:  Notable for a history of aortic stenosis.  He had a TAVR in

2016.  He has a history of cardiac stent in .  He has a history of irritable

bowel disease.  He had an admission in  for cholecystitis that resolved with

medical management.  He has a history of renal cell cancer and is status post right

nephrectomy.  As well, he has a history of urethral stricture and gets dilated by Dr. Marshall.  He reports the last time he had a dilatation was in the office 2-3

weeks ago.

 

FAMILY HISTORY:  Notable for a brother who had heart failure, one with stomach

cancer, and a mother who  of a CVA and a heart attack.  

 

SOCIAL HISTORY:  There is no history of any cigarette use or substance use.  He is an

.  He lives with his spouse.  There has been no recent travel.  

 

ALLERGIES:  The chart reports an allergy to PENICILLIN, which the patient denies. 

Looking back through the computer, in  when he had cholecystitis he was given

ceftriaxone, which he tolerated.

 

HOME MEDICATIONS:  Include Ecotrin, Singulair, Lipitor, and finasteride.

 

REVIEW OF SYSTEMS:  Notable for drainage from his umbilicus, which he reports he has

had for 2 months.  He was seen by Dr. Swann for this.  He denies currently any

abdominal pain and currently any chills.

 

PHYSICAL EXAMINATION: 

General:  He is awake and alert.

Vital Signs:  Temperature 97.8, pulse 83, blood pressure 97/50.

HEENT:  He is normocephalic.  His eyes are icteric.

Neck:  Supple.

Lungs:  Diminished breath sounds at the bases.  

Heart:  Regular rate and rhythm.

Abdomen:  Soft.  Currently he has no abdominal tenderness.  He has minimal drainage

but very foul smelling from around his umbilicus.

Extremities:  Have 1+ edema.

 

LABORATORY DATA:  Notable for a white count of 8.3, white count on admission 11.9,

hemoglobin 10.7, platelets 60,000, INR 1.19.  BUN 49, creatinine 3.9 with a glucose

of 54, total bilirubin 8.9 with alkaline phosphatase 1007.  Urinalysis was not done

and will be ordered.  Wound culture has been sent.

 

In summary, this is an 87-year-old man who now has hyperbilirubinemia, alkaline

phosphatase 1048, and thrombocytopenia as well.  Concern would be for possible

biliary sepsis.  Would send blood cultures STAT.  Adjust his ertapenem for renal

failure.  Umbilical cellulitis.  Would cover broadly with vancomycin and ertapenem. 

Cultures have been sent.  CBD obstruction per GI.  The possibility of an ERCP is

being entertained.  Acute kidney injury.  Would obtain a renal sonogram and bladder

scan.  Has a history of urethral stricture.  Thrombocytopenia.  Would cover with

antibiotics broadly for sepsis.  Recent TAVR and a history of nephrectomy.  The case

was discussed with the hospitalist.

 

 

 

MARCEL VANCE M.D.

 

[id number]

DD: 2017 10:50

DT: 2017 11:14

Job #:  [id number]

## 2017-05-16 NOTE — PN
Physical Exam: 


SUBJECTIVE: Patient seen and examined.  


Verbalizes pain at the umbilicus.


Denies any chest pain or shortness of breath.








OBJECTIVE:


Generalized jaundice


manual BP taken by me: 90/55, platelets 130>60s


likely sepsis, ID consulted, blood culture ordered by ID, started on ertapenem


umbilicus with foul odor/sero sang drainage


 Vital Signs











 Period  Temp  Pulse  Resp  BP Sys/[place]  Pulse Ox


 


 Last 24 Hr  97.8 F-98.9 F  70-87  16-20  /44-55  95-97








GENERAL: Awake, alert, and fully oriented, very fatigued, lethargic


HEAD: Normal with no signs of trauma.


EYES: Sclera icteric Pupils equal, round and reactive to light


ENT: Ears normal, nares patent, oropharynx clear without exudates. Moist mucous 

membranes.


NECK: Normal range of motion, supple without lymphadenopathy, JVD, or masses.


LUNGS: Breath sounds equal, clear to auscultation bilaterally. No wheezes, and 

no crackles


HEART: Regular rate and rhythm, normal S1 and S2, rub or gallop.


ABDOMEN: +erythema, sero/sang malodorous drainage to periumbilical region. 


MUSCULOSKELETAL:  Normal range of motion at all joints. No bony deformities. No 

CVA tenderness.


UPPER EXTREMITIES: 2+ pulses, warm, well-perfused. No cyanosis. No clubbing. No 

peripheral edema.


LOWER EXTREMITIES:2+ pulses, warm, well-perfused


NEUROLOGICAL:   Normal speech. fall risk, gait unsteady


PSYCHIATRIC: Cooperative. Good eye contact. Appropriate mood and affect.


SKIN: generalized jaundice





                  Laboratory Results - last 24 hr











  05/15/17 05/15/17 05/16/17





  23:50 23:50 06:00


 


WBC   


 


RBC   


 


Hgb   


 


Hct   


 


MCV   


 


MCHC   


 


RDW   


 


Plt Count   


 


MPV   


 


Neutrophils %   


 


Lymphocytes %   


 


Monocytes %   


 


Eosinophils %   


 


Basophils %   


 


INR   1.11  1.19 H


 


PTT (Actin FS)  32.0  D  


 


Sodium   


 


Potassium   


 


Chloride   


 


Carbon Dioxide   


 


Anion Gap   


 


BUN   


 


Creatinine   


 


Creat Clearance w eGFR   


 


Random Glucose   


 


Calcium   


 


Total Bilirubin   


 


AST   


 


ALT   


 


Alkaline Phosphatase   


 


Total Protein   


 


Albumin   














  05/16/17 05/16/17 05/16/17





  06:00 06:00 09:00


 


WBC  5.7  D   8.3  D


 


RBC  3.45 L   3.65 L


 


Hgb  10.2 L   10.7 L


 


Hct  30.4 L   32.4 L


 


MCV  88.1   88.8


 


MCHC  33.7   33.1


 


RDW  16.9 H   16.9 H


 


Plt Count  61 L D   60 L


 


MPV  9.7   9.9


 


Neutrophils %  Y   97.3 H


 


Lymphocytes %  Y   1.1 L D


 


Monocytes %    0.9 L D


 


Eosinophils %    0.5


 


Basophils %    0.2


 


INR   


 


PTT (Actin FS)   


 


Sodium   140 


 


Potassium   4.0 


 


Chloride   105 


 


Carbon Dioxide   18 L 


 


Anion Gap   17 H 


 


BUN   49 H 


 


Creatinine   3.9 H 


 


Creat Clearance w eGFR   14.70 


 


Random Glucose   54 L D 


 


Calcium   7.4 L 


 


Total Bilirubin   8.9 H 


 


AST   95 H 


 


ALT   66  D 


 


Alkaline Phosphatase   1007 H 


 


Total Protein   4.6 L D 


 


Albumin   2.0 L D 








Active Medications











Generic Name Dose Route Start Last Admin





  Trade Name Freq  PRN Reason Stop Dose Admin


 


Dextrose/Sodium Chloride  1,000 mls @ 100 mls/hr 05/16/17 09:55  





  D5-1/2ns -  IV   





  ASDIR ALCIDES   


 


Ertapenem 0.5 gm/ Sodium  50 mls @ 100 mls/hr 05/16/17 11:00  





  Chloride  IVPB   





  DAILY [facility]   





  Protocol   


 


Vancomycin HCl  250 mls @ 166.667 mls/hr 05/16/17 11:00  





  Vancomycin (Pre-Docked)  IVPB 05/16/17 12:29  





  ONCE ONE   





  Protocol   


 


Morphine Sulfate  1 mg 05/16/17 08:45 05/16/17 09:43





  Morphine Injection -  IVPUSH   1 mg





  Q4H PRN   Administration





  PAIN   











ASSESSMENT/PLAN:





Patient is a 87 year old male with a significant past medical history of 

hypertension, CAD s/p stent placement, aortic stenosis, porcine valve 

replacement (takes both ASA 81mg and Plavix 75mg), urtheral strictures s/p 

dilatation and right nephrectomy. 





He was admitted on 5/15/2017 for abdominal pain, acute choledocholithiasis, 

acute transaminitis, jaundice and leukocytosis and shaking chills.  





GI:


Acute Choledocholithiasis/abdominal pain/generalized jaundice/acute 

transaminitis


Assessment/Plan: Abdominal pain likely secondary to calculus on common bile duct


Found to have elevated bilirubin and large stone in CBD


In ED started on Levofloxacin


Now on Enterpenem per ID, given one dose of Vancomycin


Blood cultures ordered 


Had ERCP done today with duct balloon spincteroplasty and stent


now intubated, on dopamine





:


Chronic Kidney Disease - acute on chronic


Renal cancer s/p right nephrectomy


Assessment/Plan: Creatinine 3.9, baseline ~ 1.7


Renal consulted


On IVF of D5 ns @100/cc/hr


Trend bun/creat.





ID:


Sepsis - likely secondary to periumbilicus cellulitis vs. cholangitis 


Periumbilical cellulitis - chronic


Assessment: Wound culture pending


Likely will need drainage from this site


foul odor with some sero sang/clear discharge


On Enterpenem


Surgical consult for likely drainage of abscess once pt more stable


blood cultures pending, ID following





Cardiology:


Hypertension - chronic


Assessment/Plan: hypotensive now, monitor BP





CAD s/p stent placement


On ASA 81mg and Plavix 75mg - on hold





Hematology:


Thrombocytopenia


Assessment/Plan:  likely secondary to sepsis


Monitor





F.E.N.


Fluids: d5 1/2 NS @100cc/hr


Electrolytes: monitor bmp


Nutrition: NPO for ERCP





Prophylaxis:


DVT: SCDs, Lovenox


GI: Protonix





Code Status:  Requires inpatient hospitalization.   Full Code














Visit type





- Emergency Visit


Emergency Visit: Yes


ED Registration Date: 05/15/17


Care time: The patient presented to the Emergency Department on the above date 

and was hospitalized for further evaluation of their emergent condition.





- New Patient


This patient is new to me today: Yes


Date on this admission: 05/16/17





- Critical Care


Critical Care patient: No





- Discharge Referral


Referred to [facility] No

## 2017-05-16 NOTE — CONSULT
Consult


Consult Specialty:: Nephrology


Reason for Consultation:: BO





- History of Present Illness


Chief Complaint: abdominal pain and vomiting


History of Present Illness: 


Pt is an 87 year old male with pmhx of CKD, CAD, RCC s/p right nephrectomy, and 

HTN who presents to the ER with abdominal pain. He is getting a workup for a 

pancreatic head density. He was found to have an obstructing CBD stone. I was 

called to evaluate him for BO. He has a baseline creatinine of about 1.7. He 

complains of decreased appetite. Pt also has developed jaundice. He has has 

several episodes of vomiting. He denies dysuria or hematuria. 





- History Source


History Provided By: Patient, Medical Record





- Past Medical History


Cardio/Vascular: Yes: Aortic Stenosis (Post TAVR at [facility] in ), CAD (s/p 

cardiac stent/ PCI ), HTN, Hyperlipdemia, Other


Gastrointestinal: No: Irritable Bowel Disease


Hepatobiliary: Yes: Cholelithiasis


Renal/: Yes: Renal Inusuff, Cancer (Renal CA ), Renal Calculi, Other (

urethral stricture s/p cystoscopy, nephrectomy for RCC)


Heme/Onc: Yes: Other (renal cell cancer)





- Past Surgical History


Past Surgical History: Yes: Nephrectomy, Stent, Valve Replacement (TAVR)





- Alcohol/Substance Use


Hx Alcohol Use: No


History of Substance Use: reports: None





- Smoking History


Smoking history: Never smoked


Have you smoked in the past 12 months: No





- Social History


Usual Living Arrangement: With Spouse


ADL: Independent


Occupation: CPA: still working


History of Recent Travel: No





Home Medications





- Allergies


Allergies/Adverse Reactions: 


 Allergies











Allergy/AdvReac Type Severity Reaction Status Date / Time


 


Penicillins Allergy   Verified 05/15/17 15:20














- Home Medications


Home Medications: 


Ambulatory Orders





Aspirin Coated [Ecotrin -] 81 mg PO DAILY 01/26/15 


Montelukast Na [Singulair -] 10 mg PO HS 16 


Atorvastatin Ca [Lipitor] 40 mg PO HS 17 


Finasteride 5 mg PO DAILY 17 


Omega-3 Fatty Acids [Omega-3] 1,500 mg PO 05/15/17 











Family Disease History





- Family Disease History


Family Disease History: Other: Father ( 80's unclear cause), Mother ( 80

's CVA/MI), Brother (1 brother  CHF, 1 brother  stomach cancer)





Review of Systems





- Review of Systems


Constitutional: reports: Malaise


Eyes: reports: No Symptoms


HENT: reports: No Symptoms


Neck: reports: No Symptoms


Cardiovascular: reports: No Symptoms


Respiratory: reports: SOB on Exertion


Gastrointestinal: reports: Abdominal Pain, Vomiting


Genitourinary: reports: No Symptoms


Musculoskeletal: reports: No Symptoms


Integumentary: reports: Change in Color


Neurological: reports: No Symptoms


Endocrine: reports: No Symptoms


Hematology/Lymphatic: reports: No Symptoms


Psychiatric: reports: No Symptoms





Physical Exam


Vital Signs: 


 Vital Signs











Temperature  98.5 F   17 10:00


 


Pulse Rate  82   17 10:00


 


Respiratory Rate  20   17 10:00


 


Blood Pressure  101/52   17 10:00


 


O2 Sat by Pulse Oximetry (%)  95   17 01:34











Constitutional: Yes: Calm


Eyes: Yes: Sclera Icterus


Neck: Yes: Supple


Cardiovascular: Yes: S1, S2


Respiratory: Yes: Diminished


Gastrointestinal: Yes: Soft


Renal/: Yes: WNL


Musculoskeletal: Yes: WNL


Edema: No


Integumentary: Yes: Jaundice


Neurological: Yes: Oriented


Psychiatric: Yes: Oriented


Labs: 


 CBC, BMP





 17 09:00 





 17 06:00 





 Laboratory Tests











  09/16/15 06/11/16 08/31/16





  07:30 08:01 07:00


 


WBC   


 


Hgb   


 


Plt Count   


 


Sodium   


 


Potassium   


 


Chloride   


 


Carbon Dioxide   


 


Anion Gap   


 


BUN   


 


Creatinine  1.6 H  1.5 H  1.7 H


 


Creat Clearance w eGFR   


 


Total Bilirubin   


 


AST   


 


ALT   


 


Alkaline Phosphatase   














  16





  09:48 16:44 16:50


 


WBC   


 


Hgb   


 


Plt Count   


 


Sodium   


 


Potassium   


 


Chloride   


 


Carbon Dioxide   


 


Anion Gap   


 


BUN   


 


Creatinine  1.6 H  1.6 H  1.6 H


 


Creat Clearance w eGFR   


 


Total Bilirubin   


 


AST   


 


ALT   


 


Alkaline Phosphatase   














  05/15/17 05/15/17 05/16/17





  16:51 16:51 06:00


 


WBC  11.9 H D   5.7  D


 


Hgb  10.7 L   10.2 L


 


Plt Count  135   61 L D


 


Sodium   


 


Potassium   


 


Chloride   


 


Carbon Dioxide   


 


Anion Gap   


 


BUN   


 


Creatinine   3.3 H D 


 


Creat Clearance w eGFR   


 


Total Bilirubin   9.7 H D 


 


AST   114 H D 


 


ALT   87 H D 


 


Alkaline Phosphatase   1048 H D 














  17





  06:00 09:00


 


WBC   8.3  D


 


Hgb   10.7 L


 


Plt Count   60 L


 


Sodium  140 


 


Potassium  4.0 


 


Chloride  105 


 


Carbon Dioxide  18 L 


 


Anion Gap  17 H 


 


BUN  49 H 


 


Creatinine  3.9 H 


 


Creat Clearance w eGFR  14.70 


 


Total Bilirubin  8.9 H 


 


AST  95 H 


 


ALT  66  D 


 


Alkaline Phosphatase  1007 H 














Imaging





- Results


Chest X-ray: Report Reviewed


Ultrasound: Report Reviewed





Problem List





- Problems


(1) CAD (coronary artery disease)


Code(s): I25.10 - ATHSCL HEART DISEASE OF NATIVE CORONARY ARTERY W/O ANG PCTRS 

  Qualifiers: 


     Coronary Disease-Associated Artery/Lesion type: unspecified vessel or 

lesion type     Native vs. transplanted heart: native heart     Associated 

angina: without angina        Qualified Code(s): I25.10 - Atherosclerotic heart 

disease of native coronary artery without angina pectoris  





(2) Calculus of common duct with obstruction


Code(s): K80.51 - CALCULUS OF BILE DUCT W/O CHOLANGITIS OR CHOLECYST W OBST





(3) Choledocholithiasis


Code(s): K80.50 - CALCULUS OF BILE DUCT W/O CHOLANGITIS OR CHOLECYST W/O OBST





(4) Renal cancer


Code(s): C64.9 - MALIGNANT NEOPLASM OF UNSP KIDNEY, EXCEPT RENAL PELVIS   

Qualifiers: 


     Laterality: right        Qualified Code(s): C64.1 - Malignant neoplasm of 

right kidney, except renal pelvis  





(5) BO (acute kidney injury)


Code(s): N17.9 - ACUTE KIDNEY FAILURE, UNSPECIFIED





(6) CKD (chronic kidney disease)


Code(s): N18.9 - CHRONIC KIDNEY DISEASE, UNSPECIFIED   








Assessment/Plan


 Current Medications











Generic Name Dose Route Start Last Admin





  Trade Name Freq  PRN Reason Stop Dose Admin


 


Dextrose/Sodium Chloride  1,000 mls @ 100 mls/hr 17 09:55 17 11:29





  D5-1/2ns -  IV   100 mls/hr





  ASDIR ALCIDES   Administration


 


Ertapenem 0.5 gm/ Sodium  50 mls @ 100 mls/hr 17 11:00 17 11:29





  Chloride  IVPB   100 mls/hr





  DAILY ALCIDES   Administration





  Protocol   


 


Morphine Sulfate  1 mg 17 08:45 17 09:43





  Morphine Injection -  IVPUSH   1 mg





  Q4H PRN   Administration





  PAIN   











Impression


1. CKD stage 3


2. BO


3. choledocholithiasis


4. CAD


5. hx of Renal Cell Cancer s/p nephroectomy


6. aortic stenosis





Plan


- cont with fluids


- repeat labs in am


- will order renal ultrasound to r/o obstruction


- send ua, urine lytes and urine creatinine


- pt going today for ERCP


Dr Rodriguez

## 2017-05-16 NOTE — CONSULT
Consult


Consult Specialty:: Pulmonary Critical Care


Reason for Consultation:: S/p ERCP with Failed Intra-op Extubation





- History of Present Illness


Chief Complaint: S/p ERCP with balloon sphincterotomy and stent placement with 

failed post-procedure extubation necessitating re-intubation for apnea


History of Present Illness: 


This is an 86 y/o male with pmhx significant for renal CA S/P right nephrectomy

, HTN, CAD S/P PCI/stent () and S/P TAVR (at [facility] in 

 - followed by Dr. Clint Sanabria) presents with an abnormal MRI of abdomen 

showing density in the head of pancreas, large distal CBD stone and dilated CBD 

and labs c/w cholangitis. Pt taken to the Endoscopy Suite today and had an ERCP 

with balloon sphinctertomy and stent placement.  Pt tolerated procedure well 

and was extubated however pt was apneic and re-intubated.  Pt then transferred 

to ICU for further medical care.





In ICU pt was given NS bolus of 500cc given he was requiring dopamine for 

pressor support and tachycardic upon arrival.  Labs sent and drawn. ABG 

obtained and revealed metabolic acidosis and severe A-a gradient calculated to 

be ~500. Peep increased to 5 and RR increased to 20 to help acidosis.





- History Source


History Provided By: Medical Record


Limitations to Obtaining History: Clinical Condition





- Past Medical History


Cardio/Vascular: Yes: Aortic Stenosis (Post TAVR at [facility] in ), CAD (s/p 

cardiac stent/ PCI ), HTN, Hyperlipdemia, Other


Gastrointestinal: No: Irritable Bowel Disease


Hepatobiliary: Yes: Cholelithiasis


Renal/: Yes: Renal Inusuff, Cancer (Renal CA ), Renal Calculi, Other (

urethral stricture s/p cystoscopy, nephrectomy for RCC)





- Past Surgical History


Past Surgical History: Yes: Nephrectomy, Stent, Valve Replacement (TAVR)





- Alcohol/Substance Use


Hx Alcohol Use: No


History of Substance Use: reports: None





- Smoking History


Smoking history: Never smoked


Have you smoked in the past 12 months: No





- Social History


Usual Living Arrangement: With Spouse


ADL: Independent


Occupation: CPA: still working


History of Recent Travel: No





Home Medications





- Allergies


Allergies/Adverse Reactions: 


 Allergies











Allergy/AdvReac Type Severity Reaction Status Date / Time


 


Penicillins Allergy   Verified 05/15/17 15:20














- Home Medications


Home Medications: 


Ambulatory Orders





Aspirin Coated [Ecotrin -] 81 mg PO DAILY 01/26/15 


Montelukast Na [Singulair -] 10 mg PO HS 16 


Atorvastatin Ca [Lipitor] 40 mg PO HS 17 


Finasteride 5 mg PO DAILY 17 


Omega-3 Fatty Acids [Omega-3] 1,500 mg PO 05/15/17 











Family Disease History





- Family Disease History


Family Disease History: Other: Father ( 80's unclear cause), Mother ( 80

's CVA/MI), Brother (1 brother  CHF, 1 brother  stomach cancer)





Physical Exam


Vital Signs: 


 Vital Signs











Temperature  99.4 F   17 19:44


 


Pulse Rate  112 H  17 19:44


 


Respiratory Rate  25 H  17 19:59


 


Blood Pressure  99/50   17 19:44


 


O2 Sat by Pulse Oximetry (%)  100   17 19:35











Constitutional: Yes: Well Nourished, No Distress


Eyes: Yes: Conjunctiva Clear, PERRL


HENT: Yes: WNL, Atraumatic, Normocephalic


Neck: Yes: Supple, Trachea Midline


Cardiovascular: Yes: Tachycardia, S1, S2


Respiratory: Yes: Diminished, Mechanically Ventilated


Gastrointestinal: Yes: Soft, Hypoactive Bowel Sounds, Other (erythema to 

umbilicus)


...Rectal Exam: Yes: Deferred


Renal/: Yes: Ware Present, Other (retracted penis)


Extremities: Yes: WNL


Edema: No


Peripheral Pulses WNL: Yes


Integumentary: Yes: Petechiae (B/L upper eye lids), Pressure Ulcer (Stage 1 to 

sacrum), Skin Tear (Sacrum)


Neurological: Yes: Other (Sedated)


Labs: 


 CBC, BMP





 17 09:00 





 17 06:00 





 CBCD











WBC  8.3 K/mm3 (4.0-10.0)  D 17  09:00    


 


RBC  3.65 M/mm3 (4.00-5.60)  L  17  09:00    


 


Hgb  10.7 GM/dL (11.7-16.9)  L  17  09:00    


 


Hct  32.4 % (35.4-49)  L  17  09:00    


 


MCV  88.8 fl (80-96)   17  09:00    


 


MCHC  33.1 g/dl (32.0-35.9)   17  09:00    


 


RDW  16.9 % (11.9-15.9)  H  17  09:00    


 


Plt Count  60 K/MM3 (134-434)  L  17  09:00    


 


MPV  9.9 fl (7.5-11.1)   17  09:00    








 CMP











Sodium  140 mmol/L (136-145)   17  06:00    


 


Potassium  4.0 mmol/L (3.5-5.1)   17  06:00    


 


 


Chloride  105 mmol/L ()   17  06:00    


 


Carbon Dioxide  18 mmol/L (21-32)  L  17  06:00    


 


Anion Gap  17  (8-16)  H  17  06:00    


 


BUN  49 mg/dL (7-18)  H  17  06:00    


 


Creatinine  3.9 mg/dL (0.7-1.3)  H  17  06:00    


 


Creat Clearance w eGFR  14.70  (>60)   17  06:00    


 


Random Glucose  54 mg/dL ()  L D 17  06:00    


 


Calcium  7.4 mg/dL (8.5-10.1)  L  17  06:00    


 


Total Bilirubin  8.9 mg/dL (0.2-1.0)  H  17  06:00    


 


AST  95 U/L (15-37)  H  17  06:00    


 


ALT  66 U/L (12-78)  D 17  06:00    


 


Alkaline Phosphatase  1007 U/L ()  H  17  06:00    


 


Total Protein  4.6 g/dl (6.4-8.2)  L D 17  06:00    


 


Albumin  2.0 g/dl (3.4-5.0)  L D 17  06:00    














Imaging





- Results


Chest X-ray: Image Reviewed (Poor inspiratory result; Mediastinal shift to right

; air bronchogram to L lingula; silhouette sign to RML)





Problem List





- Problems


(1) BO (acute kidney injury)


Code(s): N17.9 - ACUTE KIDNEY FAILURE, UNSPECIFIED





(2) CAD (coronary artery disease)


Code(s): I25.10 - ATHSCL HEART DISEASE OF NATIVE CORONARY ARTERY W/O ANG PCTRS 

  Qualifiers: 


     Coronary Disease-Associated Artery/Lesion type: unspecified vessel or 

lesion type     Native vs. transplanted heart: native heart     Associated 

angina: without angina        Qualified Code(s): I25.10 - Atherosclerotic heart 

disease of native coronary artery without angina pectoris  





(3) CKD (chronic kidney disease)


Code(s): N18.9 - CHRONIC KIDNEY DISEASE, UNSPECIFIED   





(4) Renal cancer


Code(s): C64.9 - MALIGNANT NEOPLASM OF UNSP KIDNEY, EXCEPT RENAL PELVIS   

Qualifiers: 


     Laterality: right        Qualified Code(s): C64.1 - Malignant neoplasm of 

right kidney, except renal pelvis  





(5) S/P ERCP


Code(s): Z98.890 - OTHER SPECIFIED POSTPROCEDURAL STATES





(6) Biliary sepsis


Code(s): K83.0 - CHOLANGITIS








Assessment/Plan


A: This is an 86 y/o male with a past medical history significant for renal CA S

/P right nephrectomy, HTN, CAD S/P PCI/stent () and S/P TAVR (at 

[facility] in 2016 - followed by Dr. Clint Sanabria) presents 

with an abnormal MRI of abdomen showing density in the head of pancrease, large 

distal CBD stone and dilated CBD. Pt underwent an ERCP with balloon 

sphincterotomy and stent placement with post-procedure failed extubation 

necessitating reintubation and transfer to ICU for further medical care.





P:


-Continue mechanical ventilation


-Repeat ABGx1 to assess for response to recent vent changes and adjust vent as 

necessary


-Vent bundle


-Sedation for goal RASS (-)2-(-)3


-Sedation holiday in am for Spontaneous Breathing trial to assess for extubation


-D5LR @ 150cc/hr


-NS 500cc bolus x1 and assess response; may need additional fluid; will trend 

serial lactate


-Cont dopa for pressor support; if increasing dosage despite fluid resuscitation

, will most likely need central line and norepi for biliary septic shock


-Cont antibiotics as per ID


-Cont to trend cbc as pt with thrombocytopenia


-Trend bun/creat


-ware to BSD and trend hourly urine output


-Pepcid for prophylaxis


-SCD for dvt prophylaxis








Thank you for this interesting consult.


Pt is critically ill.





CCT 45 mins not including procedures

## 2017-05-16 NOTE — PN
Progress Note (short form)





- Note


Progress Note: 


ID consult dictated





imp/reccd





87 year old man with TAVR June 2016, right nephrectomy 2011, urethral stricture-

last dilatation 2-3 weeks ago


sent to ED by PCP for abnormal MRI with dilated CBD with obstructing stone


patient reports chills


labs notable for wbc of 11K in ED with Bilirubin of 9.7, alk phos of 1048


he received levaquin and flagyl in ED





also he noted umbilical drainage for last 2 months - saw Dr Swann who did a 

culture- SCN, Diphtheroids, prevotella 4/24/17





no iv flagyl available





pen allergy per chart- patient is not aware, he received ceftriaxone in 2015 no 

s/e noted








now with thrombocytopenia as well as hypotension





r/o biliary sepsis- blood cultures stat, ertapenem adjusted for renal failure


umbilical cellulitis- cover broadly- vanco/ertapenem, culture sent





CBD obstruction- per GI


BO- renal sono, bladder scan, has history of urethral stricture- followed by 

Dr Marshall


thrombocytopenia- acute, cover for sepsis, further management per hospitalist 

service


recent TAVR


history of nephrectomy





d/w hospitilist 


cardiology consult


consider IVF

## 2017-05-16 NOTE — PN
GI Progress Note


Subjective: 


No acute events


Denies abdominal pain


Had discussion w/ Dr. Mckay today. After reviewing his records he confirmed 

that Mr. Marshall is definitely on plavix   





- Objective


Vital Signs: 


 Vital Signs











Temperature  98.5 F   05/16/17 10:00


 


Pulse Rate  82   05/16/17 10:00


 


Respiratory Rate  20   05/16/17 10:00


 


Blood Pressure  101/52   05/16/17 10:00


 


O2 Sat by Pulse Oximetry (%)  95   05/16/17 01:34











Constitutional: Calm


Eyes: Yes: Sclera Icterus


Cardiovascular: Yes: Regular Rate and Rhythm, Murmur


Respiratory: Yes: CTA Bilaterally


Gastrointestinal Inspection: No: Distention


...Auscultate: Yes: Normoactive Bowel Sounds


...Percussion: No: Tympanitic


Edema: No


Edema: LUE: Trace, RUE: Trace, LLE: 1+, RLE: 1+


Neurological: Yes: Alert, Oriented


Labs: 


 CBC, BMP





 05/16/17 09:00 





 05/16/17 06:00 





 INR, PTT











INR  1.19  (0.82-1.09)  H  05/16/17  06:00    


 


Fibrinogen  336.0 mg/dL (238-498)   05/16/17  11:20    








 Hepatic Panel











Total Bilirubin  8.9 mg/dL (0.2-1.0)  H  05/16/17  06:00    


 


Direct Bilirubin  8.0 mg/dL (0.0-0.2)  H D 05/15/17  16:51    


 


AST  95 U/L (15-37)  H  05/16/17  06:00    


 


ALT  66 U/L (12-78)  D 05/16/17  06:00    


 


Alkaline Phosphatase  1007 U/L ()  H  05/16/17  06:00    


 


Albumin  2.0 g/dl (3.4-5.0)  L D 05/16/17  06:00    














Problem List





- Problems


(1) Choledocholithiasis


Assessment/Plan: 


Jaundiced and symptomatic with post prandial vomiting. Dwindling platelet count 

could also reflect developing sepsis from cholangitis 


Given the above, plan today for emergent ERCP with balloon sphincteroplasty and 

stenting for decompression.  Given that it was confimred that he is on plavix, 

he would be high risk for bleed with an attempted sphincterotomy.  Also 

discussed with Dr. Burns re: percutaneous drainage. He would prefer attempt 

at endoscopic internalized drainage first as the patient is on dual 

antiplatelet therapy and is thrombocytopenic.





Abx have been adjusted per ID


Reviewed the procedure again with Carolann Rolando including risks and benefits as 

outlined in yesterday evening's consultation and consent was obtained


Discussed current clinical situation and plan with Dr. Mckay.





      


Code(s): K80.50 - CALCULUS OF BILE DUCT W/O CHOLANGITIS OR CHOLECYST W/O OBST

## 2017-05-17 LAB
ALBUMIN SERPL-MCNC: 1.8 G/DL (ref 3.4–5)
ALP SERPL-CCNC: 803 U/L (ref 45–117)
ALT SERPL-CCNC: 67 U/L (ref 12–78)
AMYLASE SERPL-CCNC: 49 U/L (ref 25–115)
ANION GAP SERPL CALC-SCNC: 18 MMOL/L (ref 8–16)
APPEARANCE UR: (no result)
ART PUNCT SITE: (no result)
ARTERIAL PATENCY WRIST A: POSITIVE
AST SERPL-CCNC: 121 U/L (ref 15–37)
BACTERIA #/AREA URNS HPF: (no result) /HPF
BASE EXCESS BLDA CALC-SCNC: -9.2 MEQ/L (ref -2–2)
BILIRUB CONJ SERPL-MCNC: 7.7 MG/DL (ref 0–0.2)
BILIRUB SERPL-MCNC: 9.3 MG/DL (ref 0.2–1)
BILIRUB UR STRIP.AUTO-MCNC: NEGATIVE MG/DL
CALCIUM SERPL-MCNC: 7 MG/DL (ref 8.5–10.1)
CHLORIDE,RANDOM URINE: 129 MMOL/L
CO2 SERPL-SCNC: 16 MMOL/L (ref 21–32)
COCKROFT - GAULT: 15
COLOR UR: (no result)
CREAT SERPL-MCNC: 4.4 MG/DL (ref 0.7–1.3)
CRP SERPL-MCNC: 21.5 MG/DL (ref 0–0.3)
DEPRECATED RDW RBC AUTO: 16.9 % (ref 11.9–15.9)
DOHLE BOD BLD QL SMEAR: (no result)
GLUCOSE SERPL-MCNC: 128 MG/DL (ref 74–106)
HCO3 BLDA-SCNC: 15.7 MEQ/L (ref 22–26)
HYPOCHROMIA BLD QL SMEAR: (no result)
INR BLD: 1.34 (ref 0.82–1.09)
KETONES UR QL STRIP: NEGATIVE
LEUKOCYTE ESTERASE UR QL STRIP.AUTO: (no result)
LPM/O2%: (no result)
MCH RBC QN AUTO: 28.9 PG (ref 25.7–33.7)
MCHC RBC AUTO-ENTMCNC: 32.4 G/DL (ref 32–35.9)
MCV RBC: 89 FL (ref 80–96)
MECH. VENT.: YES
NEUTROPHILS # BLD: 85 % (ref 42.8–82.8)
NITRITE UR QL STRIP: NEGATIVE
PEEP SETTING VENT: 5 CMH2O
PH UR: 5 [PH] (ref 5–8)
PLATELET # BLD AUTO: 48 K/MM3 (ref 134–434)
PLATELET # BLD EST: (no result) 10*3/UL
PMV BLD: 11.2 FL (ref 7.5–11.1)
PO2 BLDA: 178 MMHG (ref 68–100)
POLYCHROMASIA BLD QL SMEAR: (no result)
PROT SERPL-MCNC: 4.8 G/DL (ref 6.4–8.2)
PROT UR QL STRIP: (no result)
PROT UR QL STRIP: NEGATIVE
PT PNL PPP: 14.8 SEC (ref 9.98–11.88)
PT. ON O2?: YES
RBC # BLD AUTO: 594 /HPF (ref 0–3)
RBC # UR STRIP: (no result) /UL
SAO2 % BLDA: 99.4 % (ref 90–98.9)
SODIUM UR-SCNC: 127 MMOL/L
SP GR UR: 1.01 (ref 1–1.02)
TROPONIN I SERPL-MCNC: 1.41 NG/ML (ref 0–0.05)
TROPONIN I SERPL-MCNC: 2.46 NG/ML (ref 0–0.05)
TROPONIN I SERPL-MCNC: 6.31 NG/ML (ref 0–0.05)
TYPE OF O2: (no result)
UROBILINOGEN UR STRIP-MCNC: NEGATIVE E.U./DL (ref 0.2–1)
VENT RATE: 20
VT/PRESS: 500
WBC # BLD AUTO: 31.1 K/MM3 (ref 4–10)
WBC # UR AUTO: 20 /HPF (ref 3–5)

## 2017-05-17 PROCEDURE — 05HM33Z INSERTION OF INFUSION DEVICE INTO RIGHT INTERNAL JUGULAR VEIN, PERCUTANEOUS APPROACH: ICD-10-PCS

## 2017-05-17 RX ADMIN — MEROPENEM SCH MLS/HR: 500 INJECTION, POWDER, FOR SOLUTION INTRAVENOUS at 21:17

## 2017-05-17 RX ADMIN — SODIUM CHLORIDE, SODIUM LACTATE, POTASSIUM CHLORIDE, CALCIUM CHLORIDE AND DEXTROSE MONOHYDRATE SCH: 5; 600; 310; 30; 20 INJECTION, SOLUTION INTRAVENOUS at 01:37

## 2017-05-17 RX ADMIN — DEXTROSE AND SODIUM CHLORIDE SCH MLS/HR: 5; 900 INJECTION, SOLUTION INTRAVENOUS at 12:00

## 2017-05-17 RX ADMIN — FAMOTIDINE SCH MLS/HR: 20 INJECTION, SOLUTION INTRAVENOUS at 09:53

## 2017-05-17 RX ADMIN — NYSTATIN SCH APPLIC: 100000 POWDER TOPICAL at 21:11

## 2017-05-17 RX ADMIN — HEPARIN SODIUM SCH UNIT: 5000 INJECTION, SOLUTION INTRAVENOUS; SUBCUTANEOUS at 21:17

## 2017-05-17 RX ADMIN — DEXTROSE MONOHYDRATE SCH MLS/HR: 50 INJECTION, SOLUTION INTRAVENOUS at 07:07

## 2017-05-17 RX ADMIN — MEROPENEM SCH MLS/HR: 500 INJECTION, POWDER, FOR SOLUTION INTRAVENOUS at 09:53

## 2017-05-17 RX ADMIN — DOPAMINE HYDROCHLORIDE SCH: 160 INJECTION, SOLUTION INTRAVENOUS at 18:27

## 2017-05-17 NOTE — EKG
Test Reason : 

Blood Pressure : ***/*** mmHG

Vent. Rate : 106 BPM     Atrial Rate : 106 BPM

   P-R Int : 174 ms          QRS Dur : 154 ms

    QT Int : 388 ms       P-R-T Axes : 004 026 -13 degrees

   QTc Int : 515 ms

 

SINUS TACHYCARDIA

LEFT BUNDLE BRANCH BLOCK

ABNORMAL ECG

WHEN COMPARED WITH ECG OF 16-MAY-2017 20:03,

NONSPECIFIC T WAVE ABNORMALITY HAS REPLACED INVERTED T WAVES IN 

INFERIOR LEADS

Confirmed by RAIN ABRAHAM, STANISLAV ([id number]) on 5/17/2017 2:39:34 PM

 

Referred By:             Confirmed By:STANISLAV RODRIGEZ MD

## 2017-05-17 NOTE — PN
Progress Note (short form)





- Note


Progress Note: 


Intubated


In ICU


 Vital Signs











 Period  Temp  Pulse  Resp  BP Sys/Badillo  Pulse Ox


 


 Last 24 Hr  98 F-99.4 F    8-25  /34-65  








S/P ERCP with stent





CBC,CMP











WBC  31.1 K/mm3 (4.0-10.0)  H*  05/17/17  05:15    


 


RBC  3.56 M/mm3 (4.00-5.60)  L  05/17/17  05:15    


 


Hgb  10.3 GM/dL (11.7-16.9)  L  05/17/17  05:15    


 


Hct  31.7 % (35.4-49)  L  05/17/17  05:15    


 


MCV  89.0 fl (80-96)   05/17/17  05:15    


 


MCHC  32.4 g/dl (32.0-35.9)   05/17/17  05:15    


 


RDW  16.9 % (11.9-15.9)  H  05/17/17  05:15    


 


Plt Count  48 K/MM3 (134-434)  L  05/17/17  05:15    


 


MPV  11.2 fl (7.5-11.1)  H  05/17/17  05:15    


 


Neutrophils %  Y   05/17/17  05:15    


 


Lymphocytes %  Y   05/17/17  05:15    


 


Monocytes %  6.0 % (3.8-10.2)  D 05/16/17  20:30    


 


Eosinophils %  0.5 % (0-4.5)   05/16/17  09:00    


 


Basophils %  0.2 % (0-2.0)   05/16/17  09:00    


 


Band Neutrophils  33.0 % (0-10)  H D 05/16/17  20:30    


 


Metamyelocytes  5 % (0-2)  H D 05/16/17  20:30    


 


Myelocytes  9 % (0-2)  H D 05/16/17  20:30    


 


Differential Comment  Manual diff done   05/16/17  06:00    


 


Platelet Estimate  Decreased  (NORMAL)   05/16/17  06:00    


 


Sodium  137 mmol/L (136-145)   05/17/17  05:15    


 


Potassium  4.9 mmol/L (3.5-5.1)   05/17/17  05:15    


 


Chloride  103 mmol/L ()   05/17/17  05:15    


 


Carbon Dioxide  16 mmol/L (21-32)  L  05/17/17  05:15    


 


Anion Gap  18  (8-16)  H  05/17/17  05:15    


 


BUN  52 mg/dL (7-18)  H  05/17/17  05:15    


 


Creatinine  4.4 mg/dL (0.7-1.3)  H  05/17/17  05:15    


 


Creat Clearance w eGFR  12.79  (>60)   05/17/17  05:15    


 


Random Glucose  128 mg/dL ()  H  05/17/17  05:15    


 


Lactic Acid  2.275 mmol/L (0.4-2.0)  H*  05/17/17  06:00    


 


Calcium  7.0 mg/dL (8.5-10.1)  L  05/17/17  05:15    


 


Phosphorus  3.3 mg/dL (2.5-4.9)   05/16/17  20:30    


 


Magnesium  1.9 mg/dL (1.8-2.4)   05/16/17  20:30    


 


Total Bilirubin  9.3 mg/dL (0.2-1.0)  H  05/17/17  05:15    


 


Direct Bilirubin  7.7 mg/dL (0.0-0.2)  H  05/17/17  05:15    


 


AST  121 U/L (15-37)  H  05/17/17  05:15    


 


ALT  67 U/L (12-78)   05/17/17  05:15    


 


Alkaline Phosphatase  803 U/L ()  H  05/17/17  05:15    


 


Creatine Kinase  120 IU/L ()   05/17/17  05:15    


 


Troponin I  1.41 ng/ml (0.00-0.05)  H* D 05/17/17  02:00    


 


C-Reactive Protein  21.5 MG/DL (0.00-0.3)  H  05/17/17  05:15    


 


Total Protein  4.8 g/dl (6.4-8.2)  L  05/17/17  05:15    


 


Albumin  1.8 g/dl (3.4-5.0)  L  05/17/17  05:15    


 


Total Amylase  49 U/L ()  D 05/17/17  05:15    


 


Lipase  116 U/L ()   05/17/17  05:15    








In septic shock


Antibiotics


Pressors as needed 


ICU care


May need Interventional radiology for percutaneous cholecystostomy for proximal 

decompression


Would consult Dr Burns


Too high risk/sick for any operative intervention





Problem List





- Problems


(1) Umbilical discharge


Code(s): R19.8 - OTH SYMPTOMS AND SIGNS INVOLVING THE DGSTV SYS AND ABDOMEN

## 2017-05-17 NOTE — PN
Progress Note, Physician


History of Present Illness: 


Pt seen and examined at bedside. He is now in the ICU. Pt is intubated and on 

pressors. 





- Current Medication List


Current Medications: 


Active Medications





Aspirin (Asa -)  162 mg PO DAILY ALCIDES


   Last Admin: 05/17/17 09:54 Dose:  Not Given


Fentanyl (Sublimaze Injection -)  25 mcg IVPUSH [id number] PRN


   PRN Reason: PAIN


   Stop: 05/19/17 16:20


   Last Admin: 05/16/17 17:38 Dose:  25 mcg


Heparin Sodium (Porcine) (Heparin -)  1,000 unit IVPUSH PRN PRN


   PRN Reason: Heparin


Heparin Sodium (Porcine) (Heparin -)  5,000 unit IVPUSH PRN PRN


   PRN Reason: Heparin


Dextrose/Lactated Ringer's (D5-Lr -)  1,000 mls @ 125 mls/hr IV ASDIR ALCIDES


   Last Admin: 05/17/17 07:08 Dose:  125 mls/hr


Midazolam HCl 100 mg/ Sodium (Chloride)  100 mls @ 3 mls/hr IVPB TITR ALCIDES; 3 MG/

HR


   PRN Reason: Protocol


   Stop: 05/17/17 16:44


   Last Admin: 05/16/17 17:05 Dose:  3 mls/hr


Dopamine HCl/Dextrose (Dopamine 400 Mg/D5w -)  250 mls @ 16.01 mls/hr IVPB TITR 

ALCIDES; 5 MCG/KG/MIN


   PRN Reason: Protocol


   Last Titration: 05/17/17 04:40 Dose:  7 mcg/kg/min


Meropenem 500 mg/ Dextrose  100 mls @ 200 mls/hr IVPB BID ALCIDES


   Last Admin: 05/17/17 09:53 Dose:  200 mls/hr


Famotidine/Sodium Chloride (Pepcid 20 Mg Premixed Ivpb -)  50 mls @ 100 mls/hr 

IVPB DAILY ALCIDES


   Last Admin: 05/17/17 09:53 Dose:  100 mls/hr


Heparin Sodium (Porcine) 25, (000 unit/ Sodium Chloride)  500 mls @ 20 mls/hr 

IV TITR ALCIDES; 1,000 UNIT/HR


   PRN Reason: Protocol


   Last Admin: 05/17/17 04:45 Dose:  20 mls/hr


Amiodarone HCl 450 mg/ (Dextrose)  250 mls @ 16.66 mls/hr IVPB TITR ALCIDES; 0.5 MG/

MIN


   PRN Reason: Protocol


Norepinephrine Bitartrate 8, (000 mcg/ Dextrose)  500 mls @ 18.75 mls/hr IV 

TITR ALCIDES; 5 MCG/MIN


   PRN Reason: Protocol


   Last Admin: 05/17/17 07:07 Dose:  37.5 mls/hr


Morphine Sulfate (Morphine Injection -)  1 mg IVPUSH Q4H PRN


   PRN Reason: PAIN


   Last Admin: 05/16/17 09:43 Dose:  1 mg











- Objective


Vital Signs: 


 Vital Signs











Temperature  99.7 F H  05/17/17 10:00


 


Pulse Rate  91 H  05/17/17 10:00


 


Respiratory Rate  23   05/17/17 10:00


 


Blood Pressure  118/60   05/17/17 10:00


 


O2 Sat by Pulse Oximetry (%)  100   05/17/17 10:00











Constitutional: Yes: Calm


Eyes: Yes: Sclera Icterus


Cardiovascular: Yes: S1, S2


Respiratory: Yes: Mechanically Ventilated


Gastrointestinal: Yes: Soft


Genitourinary: Yes: [place] Present, Oliguria


Musculoskeletal: Yes: Muscle Weakness


Edema: No


Integumentary: Yes: Jaundice


Neurological: Yes: Lethargy


Labs: 


 CBC, BMP





 05/17/17 05:15 





 05/17/17 05:15 





 INR, PTT











INR  1.34  (0.82-1.09)  H  05/17/17  05:15    


 


Fibrinogen  336.0 mg/dL (238-498)   05/16/17  11:20    














- ....Imaging


Chest X-ray: Report Reviewed


Ultrasound: Report Reviewed (left kidney appears unremarkable)





Problem List





- Problems


(1) CAD (coronary artery disease)


Code(s): I25.10 - ATHSCL HEART DISEASE OF NATIVE CORONARY ARTERY W/O ANG PCTRS 

  Qualifiers: 


     Coronary Disease-Associated Artery/Lesion type: unspecified vessel or 

lesion type     Native vs. transplanted heart: native heart     Associated 

angina: without angina        Qualified Code(s): I25.10 - Atherosclerotic heart 

disease of native coronary artery without angina pectoris  





(2) Calculus of common duct with obstruction


Code(s): K80.51 - CALCULUS OF BILE DUCT W/O CHOLANGITIS OR CHOLECYST W OBST





(3) Choledocholithiasis


Code(s): K80.50 - CALCULUS OF BILE DUCT W/O CHOLANGITIS OR CHOLECYST W/O OBST





(4) Renal cancer


Code(s): C64.9 - MALIGNANT NEOPLASM OF UNSP KIDNEY, EXCEPT RENAL PELVIS   

Qualifiers: 


     Laterality: right        Qualified Code(s): C64.1 - Malignant neoplasm of 

right kidney, except renal pelvis  





(5) BO (acute kidney injury)


Code(s): N17.9 - ACUTE KIDNEY FAILURE, UNSPECIFIED





(6) CKD (chronic kidney disease)


Code(s): N18.9 - CHRONIC KIDNEY DISEASE, UNSPECIFIED   








Assessment/Plan


 Current Medications











Generic Name Dose Route Start Last Admin





  Trade Name Freq  PRN Reason Stop Dose Admin


 


Aspirin  162 mg 05/17/17 10:00 05/17/17 09:54





  Asa -  PO   Not Given





  DAILY ALCIDES   


 


Fentanyl  25 mcg 05/16/17 16:19 05/16/17 17:38





  Sublimaze Injection -  IVPUSH 05/19/17 16:20  25 mcg





  [id number] PRN   Administration





  PAIN   


 


Heparin Sodium (Porcine)  1,000 unit 05/17/17 04:19  





  Heparin -  IVPUSH   





  PRN PRN   





  Heparin   


 


Heparin Sodium (Porcine)  5,000 unit 05/17/17 04:19  





  Heparin -  IVPUSH   





  PRN PRN   





  Heparin   


 


Dextrose/Lactated Ringer's  1,000 mls @ 125 mls/hr 05/17/17 06:00 05/17/17 07:08





  D5-Lr -  IV   125 mls/hr





  ASDIR ALCIDES   Administration


 


Midazolam HCl 100 mg/ Sodium  100 mls @ 3 mls/hr 05/16/17 16:45 05/16/17 17:05





  Chloride  IVPB 05/17/17 16:44  3 mls/hr





  TITR ALCIDES   Administration





  Protocol   





  3 MG/HR   


 


Dopamine HCl/Dextrose  250 mls @ 16.01 mls/hr 05/16/17 17:15 05/17/17 04:40





  Dopamine 400 Mg/D5w -  IVPB   7 mcg/kg/min





  TITR ALCIDES   Titration





  Protocol   





  5 MCG/KG/MIN   


 


Meropenem 500 mg/ Dextrose  100 mls @ 200 mls/hr 05/16/17 22:00 05/17/17 09:53





  IVPB   200 mls/hr





  BID ALCIDES   Administration


 


Famotidine/Sodium Chloride  50 mls @ 100 mls/hr 05/16/17 22:30 05/17/17 09:53





  Pepcid 20 Mg Premixed Ivpb -  IVPB   100 mls/hr





  DAILY ALCIDES   Administration


 


Heparin Sodium (Porcine) 25,  500 mls @ 20 mls/hr 05/17/17 04:30 05/17/17 04:45





  000 unit/ Sodium Chloride  IV   20 mls/hr





  TITR ALCIDES   Administration





  Protocol   





  1,000 UNIT/HR   


 


Amiodarone HCl 450 mg/  250 mls @ 16.66 mls/hr 05/17/17 10:30  





  Dextrose  IVPB   





  TITR ALCIDES   





  Protocol   





  0.5 MG/MIN   


 


Norepinephrine Bitartrate 8,  500 mls @ 18.75 mls/hr 05/17/17 06:45 05/17/17 07:

07





  000 mcg/ Dextrose  IV   37.5 mls/hr





  TITR ALCIDES   Administration





  Protocol   





  5 MCG/MIN   


 


Morphine Sulfate  1 mg 05/16/17 08:45 05/16/17 09:43





  Morphine Injection -  IVPUSH   1 mg





  Q4H PRN   Administration





  PAIN   











Impression


1. CKD stage 3


2. BO


3. choledocholithiasis


4. CAD


5. hx of Renal Cell Cancer s/p nephroectomy


6. aortic stenosis


7. sepsis


8. acute respiratory failure requiring intubation


9. NSTEMI


10. bacteremia





Plan


- cont with pressors to a MAP of 65


- pt remains in renal failure and is now oliguric


- cont with fluids


- renal ultrasound reviewed, negative hydro


- follow up blood cultures and sensitivities


- renal dose meds


 surgery input appreciated, IR pending


- discussed with ICU team


- cont vent support


- likely BO is from ATN


- monitor in ICU








Dr Rodriguez

## 2017-05-17 NOTE — PN
Physical Exam: 


SUBJECTIVE: Patient seen and examined





sedated and intubated.


s/p ERCP.





amiodarone dc'd, heparin gtt dc'd - were started overnight due to possible Vtach





OBJECTIVE:





 Vital Signs











 Period  Temp  Pulse  Resp  BP Sys/Badillo  Pulse Ox


 


 Last 24 Hr  98 F-99.4 F    8-25  /34-65  








HEAD: Normal with no signs of trauma.


EYES: constricted pupils, sclera anicteric, conjunctiva clear. No ptosis. 


ENT:  nares patent, oropharynx with endotracheal tube and OG tube, moist mucous 

membranes.


NECK: Trachea midline


LUNGS: Breath sounds equal, clear to auscultation bilaterally, no wheezes, no 

crackles, no accessory muscle use.  


VENT: Fio2 70%


HEART: Regular rate and rhythm, S1, S2 without murmur, rub or gallop.


ABDOMEN: Soft, nondistended, normoactive bowel sounds, periumbilical erythema, 

demuted skin laterally along skin fold, scant serous discharge


EXTREMITIES: 2+ pulses in b/l radial and DP, warm, well-perfused, no edema. 


SKIN: Warm, jaundiced. 














 Laboratory Results - last 24 hr











  05/16/17 05/16/17 05/16/17





  06:00 06:00 06:00


 


WBC    5.7  D


 


RBC    3.45 L


 


Hgb    10.2 L


 


Hct    30.4 L


 


MCV    88.1


 


MCHC    33.7


 


RDW    16.9 H


 


Plt Count    61 L D


 


MPV    9.7


 


Neutrophils %    82.0


 


Lymphocytes %    2.0 L D


 


Monocytes %    2.0 L


 


Eosinophils %   


 


Basophils %    1.0


 


Band Neutrophils    4.0  D


 


Metamyelocytes    8 H


 


Myelocytes    1


 


Differential Comment    Manual diff done


 


Platelet Estimate    Decreased


 


INR  1.19 H  


 


PTT (Actin FS)   33.3 


 


Fibrinogen   


 


Fibrin Degrad Products   


 


Puncture Site   


 


ABG pH   


 


ABG pCO2 at Pt Temp   


 


ABG pO2 at Pt Temp   


 


ABG HCO3   


 


ABG O2 Sat (Measured)   


 


ABG O2 Content   


 


ABG Base Excess   


 


Shyam Test   


 


O2 Delivery Device   


 


Oxygen Flow Rate   


 


Vent Mode   


 


Vent Rate   


 


Mechanical Rate   


 


PEEP   


 


Pressure Support Vent   


 


Sodium   


 


Potassium   


 


Chloride   


 


Carbon Dioxide   


 


Anion Gap   


 


BUN   


 


Creatinine   


 


Creat Clearance w eGFR   


 


Random Glucose   


 


Lactic Acid   


 


Calcium   


 


Phosphorus   


 


Magnesium   


 


Total Bilirubin   


 


Direct Bilirubin   


 


AST   


 


ALT   


 


Alkaline Phosphatase   


 


Creatine Kinase   


 


Troponin I   


 


C-Reactive Protein   


 


Total Protein   


 


Albumin   


 


Total Amylase   


 


Lipase   


 


Urine Color   


 


Urine Appearance   


 


Urine pH   


 


Urine Protein   


 


Urine Glucose (UA)   


 


Urine Ketones   


 


Urine Blood   


 


Urine Nitrite   


 


Urine Bilirubin   


 


Urine Urobilinogen   


 


Ur Leukocyte Esterase   


 


Urine RBC   


 


Urine WBC   


 


Ur Epithelial Cells   


 


Urine Bacteria   


 


Ur Random Sodium   


 


Ur Random Potassium   


 


Ur Random Chloride   


 


Urine Creatinine   


 


Random Vancomycin   


 


Blood Type   


 


Antibody Screen   














  05/16/17 05/16/17 05/16/17





  06:00 09:00 11:20


 


WBC   8.3  D 


 


RBC   3.65 L 


 


Hgb   10.7 L 


 


Hct   32.4 L 


 


MCV   88.8 


 


MCHC   33.1 


 


RDW   16.9 H 


 


Plt Count   60 L 


 


MPV   9.9 


 


Neutrophils %   97.3 H 


 


Lymphocytes %   1.1 L D 


 


Monocytes %   0.9 L 


 


Eosinophils %   0.5 


 


Basophils %   0.2 


 


Band Neutrophils   


 


Metamyelocytes   


 


Myelocytes   


 


Differential Comment   


 


Platelet Estimate   


 


INR   


 


PTT (Actin FS)   


 


Fibrinogen    336.0


 


Fibrin Degrad Products    >10 & <40


 


Puncture Site   


 


ABG pH   


 


ABG pCO2 at Pt Temp   


 


ABG pO2 at Pt Temp   


 


ABG HCO3   


 


ABG O2 Sat (Measured)   


 


ABG O2 Content   


 


ABG Base Excess   


 


Shyam Test   


 


O2 Delivery Device   


 


Oxygen Flow Rate   


 


Vent Mode   


 


Vent Rate   


 


Mechanical Rate   


 


PEEP   


 


Pressure Support Vent   


 


Sodium  140  


 


Potassium  4.0  


 


Chloride  105  


 


Carbon Dioxide  18 L  


 


Anion Gap  17 H  


 


BUN  49 H  


 


Creatinine  3.9 H  


 


Creat Clearance w eGFR  14.70  


 


Random Glucose  54 L D  


 


Lactic Acid   


 


Calcium  7.4 L  


 


Phosphorus   


 


Magnesium   


 


Total Bilirubin  8.9 H  


 


Direct Bilirubin   


 


AST  95 H  


 


ALT  66  D  


 


Alkaline Phosphatase  1007 H  


 


Creatine Kinase   


 


Troponin I   


 


C-Reactive Protein   


 


Total Protein  4.6 L D  


 


Albumin  2.0 L D  


 


Total Amylase   


 


Lipase   


 


Urine Color   


 


Urine Appearance   


 


Urine pH   


 


Urine Protein   


 


Urine Glucose (UA)   


 


Urine Ketones   


 


Urine Blood   


 


Urine Nitrite   


 


Urine Bilirubin   


 


Urine Urobilinogen   


 


Ur Leukocyte Esterase   


 


Urine RBC   


 


Urine WBC   


 


Ur Epithelial Cells   


 


Urine Bacteria   


 


Ur Random Sodium   


 


Ur Random Potassium   


 


Ur Random Chloride   


 


Urine Creatinine   


 


Random Vancomycin   


 


Blood Type   


 


Antibody Screen   














  05/16/17 05/16/17 05/16/17





  20:00 20:20 20:30


 


WBC    29.3 H D


 


RBC    3.62 L


 


Hgb    10.5 L


 


Hct    32.2 L


 


MCV    89.0


 


MCHC    32.5


 


RDW    17.0 H


 


Plt Count    56 L


 


MPV    10.9  D


 


Neutrophils %    43.0  D


 


Lymphocytes %    4.0 L D


 


Monocytes %    6.0  D


 


Eosinophils %   


 


Basophils %   


 


Band Neutrophils    33.0 H D


 


Metamyelocytes    5 H D


 


Myelocytes    9 H D


 


Differential Comment   


 


Platelet Estimate   


 


INR  1.26 H  


 


PTT (Actin FS)   


 


Fibrinogen   


 


Fibrin Degrad Products   


 


Puncture Site   Left radial 


 


ABG pH   7.29 L 


 


ABG pCO2 at Pt Temp   36.2 


 


ABG pO2 at Pt Temp   151.0 H* 


 


ABG HCO3   16.8 L 


 


ABG O2 Sat (Measured)   99.4 H 


 


ABG O2 Content   14.3 L 


 


ABG Base Excess   -8.6 L 


 


Shyam Test   Positive 


 


O2 Delivery Device   Mech vent 


 


Oxygen Flow Rate   100% 


 


Vent Mode   A/c 


 


Vent Rate   12 


 


Mechanical Rate   Yes 


 


PEEP   5.0 


 


Pressure Support Vent   500 


 


Sodium   


 


Potassium   


 


Chloride   


 


Carbon Dioxide   


 


Anion Gap   


 


BUN   


 


Creatinine   


 


Creat Clearance w eGFR   


 


Random Glucose   


 


Lactic Acid   


 


Calcium   


 


Phosphorus   


 


Magnesium   


 


Total Bilirubin   


 


Direct Bilirubin   


 


AST   


 


ALT   


 


Alkaline Phosphatase   


 


Creatine Kinase   


 


Troponin I   


 


C-Reactive Protein   


 


Total Protein   


 


Albumin   


 


Total Amylase   


 


Lipase   


 


Urine Color   


 


Urine Appearance   


 


Urine pH   


 


Urine Protein   


 


Urine Glucose (UA)   


 


Urine Ketones   


 


Urine Blood   


 


Urine Nitrite   


 


Urine Bilirubin   


 


Urine Urobilinogen   


 


Ur Leukocyte Esterase   


 


Urine RBC   


 


Urine WBC   


 


Ur Epithelial Cells   


 


Urine Bacteria   


 


Ur Random Sodium   


 


Ur Random Potassium   


 


Ur Random Chloride   


 


Urine Creatinine   


 


Random Vancomycin   


 


Blood Type   


 


Antibody Screen   














  05/16/17 05/16/17 05/16/17





  20:30 20:30 20:30


 


WBC   


 


RBC   


 


Hgb   


 


Hct   


 


MCV   


 


MCHC   


 


RDW   


 


Plt Count   


 


MPV   


 


Neutrophils %   


 


Lymphocytes %   


 


Monocytes %   


 


Eosinophils %   


 


Basophils %   


 


Band Neutrophils   


 


Metamyelocytes   


 


Myelocytes   


 


Differential Comment   


 


Platelet Estimate   


 


INR   


 


PTT (Actin FS)   


 


Fibrinogen   


 


Fibrin Degrad Products   


 


Puncture Site   


 


ABG pH   


 


ABG pCO2 at Pt Temp   


 


ABG pO2 at Pt Temp   


 


ABG HCO3   


 


ABG O2 Sat (Measured)   


 


ABG O2 Content   


 


ABG Base Excess   


 


Shyam Test   


 


O2 Delivery Device   


 


Oxygen Flow Rate   


 


Vent Mode   


 


Vent Rate   


 


Mechanical Rate   


 


PEEP   


 


Pressure Support Vent   


 


Sodium  138  


 


Potassium  4.3  


 


Chloride  103  


 


Carbon Dioxide  19 L  


 


Anion Gap  16  


 


BUN  55 H  


 


Creatinine  4.4 H  


 


Creat Clearance w eGFR   


 


Random Glucose  122 H D  


 


Lactic Acid   2.779 H* 


 


Calcium  7.5 L  


 


Phosphorus  3.3  


 


Magnesium  1.9  


 


Total Bilirubin  9.9 H  


 


Direct Bilirubin  8.2 H  


 


AST  131 H D  


 


ALT  74  


 


Alkaline Phosphatase  957 H  


 


Creatine Kinase  95  


 


Troponin I  0.52 H D  


 


C-Reactive Protein   


 


Total Protein  4.9 L  


 


Albumin  2.0 L  


 


Total Amylase  76  


 


Lipase  354  


 


Urine Color   


 


Urine Appearance   


 


Urine pH   


 


Urine Protein   


 


Urine Glucose (UA)   


 


Urine Ketones   


 


Urine Blood   


 


Urine Nitrite   


 


Urine Bilirubin   


 


Urine Urobilinogen   


 


Ur Leukocyte Esterase   


 


Urine RBC   


 


Urine WBC   


 


Ur Epithelial Cells   


 


Urine Bacteria   


 


Ur Random Sodium   


 


Ur Random Potassium   


 


Ur Random Chloride   


 


Urine Creatinine   


 


Random Vancomycin   


 


Blood Type    A POSITIVE


 


Antibody Screen    Negative














  05/17/17 05/17/17 05/17/17





  00:22 02:00 02:00


 


WBC   


 


RBC   


 


Hgb   


 


Hct   


 


MCV   


 


MCHC   


 


RDW   


 


Plt Count   


 


MPV   


 


Neutrophils %   


 


Lymphocytes %   


 


Monocytes %   


 


Eosinophils %   


 


Basophils %   


 


Band Neutrophils   


 


Metamyelocytes   


 


Myelocytes   


 


Differential Comment   


 


Platelet Estimate   


 


INR   


 


PTT (Actin FS)   


 


Fibrinogen   


 


Fibrin Degrad Products   


 


Puncture Site  Left radial  


 


ABG pH  7.31 L  


 


ABG pCO2 at Pt Temp  32.4 L  


 


ABG pO2 at Pt Temp  178.0 H* D  


 


ABG HCO3  15.7 L  


 


ABG O2 Sat (Measured)  99.4 H  


 


ABG O2 Content  15.9  


 


ABG Base Excess  -9.2 L  


 


Shyam Test  Positive  


 


O2 Delivery Device  Mech vent  


 


Oxygen Flow Rate  70%  


 


Vent Mode  A/c  


 


Vent Rate  20  


 


Mechanical Rate  Yes  


 


PEEP  5.0  


 


Pressure Support Vent  500  


 


Sodium   


 


Potassium   


 


Chloride   


 


Carbon Dioxide   


 


Anion Gap   


 


BUN   


 


Creatinine   


 


Creat Clearance w eGFR   


 


Random Glucose   


 


Lactic Acid   


 


Calcium   


 


Phosphorus   


 


Magnesium   


 


Total Bilirubin   


 


Direct Bilirubin   


 


AST   


 


ALT   


 


Alkaline Phosphatase   


 


Creatine Kinase   102 


 


Troponin I   1.41 H* D 


 


C-Reactive Protein   


 


Total Protein   


 


Albumin   


 


Total Amylase   


 


Lipase   


 


Urine Color   


 


Urine Appearance   


 


Urine pH   


 


Urine Protein   


 


Urine Glucose (UA)   


 


Urine Ketones   


 


Urine Blood   


 


Urine Nitrite   


 


Urine Bilirubin   


 


Urine Urobilinogen   


 


Ur Leukocyte Esterase   


 


Urine RBC   


 


Urine WBC   


 


Ur Epithelial Cells   


 


Urine Bacteria   


 


Ur Random Sodium    127


 


Ur Random Potassium    12.4


 


Ur Random Chloride    129


 


Urine Creatinine   


 


Random Vancomycin   


 


Blood Type   


 


Antibody Screen   














  05/17/17 05/17/17 05/17/17





  02:00 02:00 02:00


 


WBC   


 


RBC   


 


Hgb   


 


Hct   


 


MCV   


 


MCHC   


 


RDW   


 


Plt Count   


 


MPV   


 


Neutrophils %   


 


Lymphocytes %   


 


Monocytes %   


 


Eosinophils %   


 


Basophils %   


 


Band Neutrophils   


 


Metamyelocytes   


 


Myelocytes   


 


Differential Comment   


 


Platelet Estimate   


 


INR   


 


PTT (Actin FS)   


 


Fibrinogen   


 


Fibrin Degrad Products   


 


Puncture Site   


 


ABG pH   


 


ABG pCO2 at Pt Temp   


 


ABG pO2 at Pt Temp   


 


ABG HCO3   


 


ABG O2 Sat (Measured)   


 


ABG O2 Content   


 


ABG Base Excess   


 


Shyam Test   


 


O2 Delivery Device   


 


Oxygen Flow Rate   


 


Vent Mode   


 


Vent Rate   


 


Mechanical Rate   


 


PEEP   


 


Pressure Support Vent   


 


Sodium   


 


Potassium   


 


Chloride   


 


Carbon Dioxide   


 


Anion Gap   


 


BUN   


 


Creatinine   


 


Creat Clearance w eGFR   


 


Random Glucose   


 


Lactic Acid    2.323 H*


 


Calcium   


 


Phosphorus   


 


Magnesium   


 


Total Bilirubin   


 


Direct Bilirubin   


 


AST   


 


ALT   


 


Alkaline Phosphatase   


 


Creatine Kinase   


 


Troponin I   


 


C-Reactive Protein   


 


Total Protein   


 


Albumin   


 


Total Amylase   


 


Lipase   


 


Urine Color  Tanya  


 


Urine Appearance  Cloudy  


 


Urine pH  5.0  


 


Urine Protein  1+ H  


 


Urine Glucose (UA)  Negative  


 


Urine Ketones  Negative  


 


Urine Blood  3+ H  


 


Urine Nitrite  Negative  


 


Urine Bilirubin  Negative  


 


Urine Urobilinogen  Negative  


 


Ur Leukocyte Esterase  Trace H  


 


Urine RBC  594  


 


Urine WBC  20  


 


Ur Epithelial Cells  Rare  


 


Urine Bacteria  Many  


 


Ur Random Sodium   


 


Ur Random Potassium   


 


Ur Random Chloride   


 


Urine Creatinine   30.4 


 


Random Vancomycin   


 


Blood Type   


 


Antibody Screen   














  05/17/17 05/17/17 05/17/17





  05:15 05:15 05:15


 


WBC   


 


RBC   


 


Hgb   


 


Hct   


 


MCV   


 


MCHC   


 


RDW   


 


Plt Count   


 


MPV   


 


Neutrophils %   


 


Lymphocytes %   


 


Monocytes %   


 


Eosinophils %   


 


Basophils %   


 


Band Neutrophils   


 


Metamyelocytes   


 


Myelocytes   


 


Differential Comment   


 


Platelet Estimate   


 


INR    1.34 H


 


PTT (Actin FS)   


 


Fibrinogen   


 


Fibrin Degrad Products   


 


Puncture Site   


 


ABG pH   


 


ABG pCO2 at Pt Temp   


 


ABG pO2 at Pt Temp   


 


ABG HCO3   


 


ABG O2 Sat (Measured)   


 


ABG O2 Content   


 


ABG Base Excess   


 


Shyam Test   


 


O2 Delivery Device   


 


Oxygen Flow Rate   


 


Vent Mode   


 


Vent Rate   


 


Mechanical Rate   


 


PEEP   


 


Pressure Support Vent   


 


Sodium  137  


 


Potassium  4.9  


 


Chloride  103  


 


Carbon Dioxide  16 L  


 


Anion Gap  18 H  


 


BUN  52 H  


 


Creatinine  4.4 H  


 


Creat Clearance w eGFR  12.79  


 


Random Glucose  128 H  


 


Lactic Acid   


 


Calcium  7.0 L  


 


Phosphorus   


 


Magnesium   


 


Total Bilirubin  9.3 H  


 


Direct Bilirubin   7.7 H 


 


AST  121 H  


 


ALT  67  


 


Alkaline Phosphatase  803 H  


 


Creatine Kinase   


 


Troponin I   


 


C-Reactive Protein   21.5 H 


 


Total Protein  4.8 L  


 


Albumin  1.8 L  


 


Total Amylase   49  D 


 


Lipase   116 


 


Urine Color   


 


Urine Appearance   


 


Urine pH   


 


Urine Protein   


 


Urine Glucose (UA)   


 


Urine Ketones   


 


Urine Blood   


 


Urine Nitrite   


 


Urine Bilirubin   


 


Urine Urobilinogen   


 


Ur Leukocyte Esterase   


 


Urine RBC   


 


Urine WBC   


 


Ur Epithelial Cells   


 


Urine Bacteria   


 


Ur Random Sodium   


 


Ur Random Potassium   


 


Ur Random Chloride   


 


Urine Creatinine   


 


Random Vancomycin  8.994  


 


Blood Type   


 


Antibody Screen   








Active Medications





Aspirin (Asa -)  162 mg PO DAILY ALCIDES


   Last Admin: 05/17/17 09:54 Dose:  Not Given


Fentanyl (Sublimaze Injection -)  25 mcg IVPUSH [id number] PRN


   PRN Reason: PAIN


   Stop: 05/19/17 16:20


   Last Admin: 05/16/17 17:38 Dose:  25 mcg


Heparin Sodium (Porcine) (Heparin -)  1,000 unit IVPUSH PRN PRN


   PRN Reason: Heparin


Midazolam HCl 100 mg/ Sodium (Chloride)  100 mls @ 3 mls/hr IVPB TITR ALCIDES; 3 MG/

HR


   PRN Reason: Protocol


   Stop: 05/17/17 16:44


   Last Admin: 05/16/17 17:05 Dose:  3 mls/hr


Dopamine HCl/Dextrose (Dopamine 400 Mg/D5w -)  250 mls @ 16.01 mls/hr IVPB TITR 

ALCIDES; 5 MCG/KG/MIN


   PRN Reason: Protocol


   Last Titration: 05/17/17 04:40 Dose:  7 mcg/kg/min


Meropenem 500 mg/ Dextrose  100 mls @ 200 mls/hr IVPB BID ALCIDES


   Last Admin: 05/17/17 09:53 Dose:  200 mls/hr


Famotidine/Sodium Chloride (Pepcid 20 Mg Premixed Ivpb -)  50 mls @ 100 mls/hr 

IVPB DAILY ALCIDES


   Last Admin: 05/17/17 09:53 Dose:  100 mls/hr


Norepinephrine Bitartrate 8, (000 mcg/ Dextrose)  500 mls @ 18.75 mls/hr IV 

TITR ALCIDES; 5 MCG/MIN


   PRN Reason: Protocol


   Last Admin: 05/17/17 07:07 Dose:  37.5 mls/hr


Dextrose/Sodium Chloride (D5-Ns -)  1,000 mls @ 75 mls/hr IV ASDIR ALCIDES


Morphine Sulfate (Morphine Injection -)  1 mg IVPUSH Q4H PRN


   PRN Reason: PAIN


   Last Admin: 05/16/17 09:43 Dose:  1 mg








ASSESSMENT/PLAN:


87 yr old man with HTN, aortic stenosis, CAD s/p stents, r-nephroctomy(renal ca)

, pancreatic head density found to have choleluithaisis with obstructing CBD, s/

p ERCP with stent placement(unable to undergo sphincterectomy due to 

anticoagulation) complicated by septic shock requring ICU admission.





Infectious Disease


Septic shock with multi-organ failure likely secondary to cholangitis and gram 

neg bactermia(likley gallbladder as primary source)


Abx tx: 


 meropenem 500mg IVPB BID


- Day 2


- topical antifungal cream to periumbical area


Fluids: avoid fluid overload


- D5-NS @75mls/hr


Pressure suport: weaned off dopamine, will attempt to wean off norepi


Goal MAP>65, CVP 8-12, current CVP 12.


Labs:


trend lactic acid, cbc


- bld cx pending


Consult: Dr. Dejesus





Cardiovascular


Hypotensive, troponins trending up, with echo showing hypokensis and decreased 

LV function - likely secondary to demand ischemia and septic shock induced 

cardiomyopathy


- trend troponins to establish peak, no indication for ASA or heparin drip as 

this is unlikely due to be ACS


- maintain Goal MAP>65, CVP 8-12


consult: dr. sandy





Renal


oliguric BO/ATN due to sepsis induced hypoperfusion, on CKD stage 3 


- IVF D5-NS @75ml/hr


- monitor I&O, ware in place


- renal dosing of medications


- metabolic acidosis with anion gap, base deficit likely due to lactic acidosis


consult: Dr. Rodriguez





Pulmonary


acute hypoxic respiratory failure


- on ventilatory support


taper FiO2 to keep Spo2 >90%





Gastrointestinal


Cholangitis s/p ERCP with biliary stent placement


trend LFT's


consult: Dr. Celestin





Hematological


normocytic anemia - chronic, trend h/h, monitor for bleeding


leucocytosis secondary to sepsis


thrombocytopenia - likely due to sepsis, r./o DIC; trend fibrinogen and fibrin 

degradation products


-- pt was on two anti-platelet agents, plavix and asa - currently held


maintain platelet >20,000





Dermatological


periumbical drainage


- topical antifungal


Dr Swann consulted for surgical eval; patient is high risk for operative 

intervention, recommend IR intervention for percutaneous drainage


Dr. Oleary consulted





Neurological


sedated





DVT: heparin 5000 units SQ BID


Diet: NPO












































Visit type





- Emergency Visit


Emergency Visit: No





- New Patient


This patient is new to me today: Yes


Date on this admission: 05/17/17





- Critical Care


Critical Care patient: Yes


Total Critical Care Time (in minutes): 45


Critical Care Statement: The care of this patient involved high complexity 

decision making to prevent further life threatening deterioration of the patient

's condition and/or to evalute & treat vital organ system(s) failure or risk of 

failure.

## 2017-05-17 NOTE — PN
Teaching Attending Note


Name of Resident: Devan Rodriguez


ATTENDING PHYSICIAN STATEMENT





I saw and evaluated the patient.


I reviewed the resident's note and discussed the case with the resident.


I agree with the resident's findings and plan as documented.








SUBJECTIVE:





Pt seen and examined in the ICU. Remains intubated on levophed and dopamine 

gtts. No fevers recorded but blood cultures growing gram negative bacilli.





OBJECTIVE:


 Last Vital Signs











Temp Pulse Resp BP Pulse Ox


 


 99.7 F H  91 H  23   118/60   100 


 


 05/17/17 10:00  05/17/17 10:00  05/17/17 10:00  05/17/17 10:00  05/17/17 10:00








 Intake & Output











 05/14/17 05/15/17 05/16/17 05/17/17





 23:59 23:59 23:59 23:59


 


Intake Total   4050 2533


 


Output Total   300 460


 


Balance   3750 2073


 


Weight  188 lb 188 lb 4 oz 203 lb 7.787 oz








Gen:  intubated, sedated, jaundiced


Heart:  RRR


Lung: decreased breath sounds at the bases


Abd: soft, nontender


Ext: no edema





 CBC, BMP





 05/17/17 05:15 





 05/17/17 05:15 





Active Medications





Aspirin (Asa -)  162 mg PO DAILY ALCIDES


   Last Admin: 05/17/17 09:54 Dose:  Not Given


Fentanyl (Sublimaze Injection -)  25 mcg IVPUSH [id number] PRN


   PRN Reason: PAIN


   Stop: 05/19/17 16:20


   Last Admin: 05/16/17 17:38 Dose:  25 mcg


Heparin Sodium (Porcine) (Heparin -)  1,000 unit IVPUSH PRN PRN


   PRN Reason: Heparin


Midazolam HCl 100 mg/ Sodium (Chloride)  100 mls @ 3 mls/hr IVPB TITR ALCIDES; 3 MG/

HR


   PRN Reason: Protocol


   Stop: 05/17/17 16:44


   Last Admin: 05/16/17 17:05 Dose:  3 mls/hr


Dopamine HCl/Dextrose (Dopamine 400 Mg/D5w -)  250 mls @ 16.01 mls/hr IVPB TITR 

ALCIDES; 5 MCG/KG/MIN


   PRN Reason: Protocol


   Last Titration: 05/17/17 04:40 Dose:  7 mcg/kg/min


Meropenem 500 mg/ Dextrose  100 mls @ 200 mls/hr IVPB BID ALCIDES


   Last Admin: 05/17/17 09:53 Dose:  200 mls/hr


Famotidine/Sodium Chloride (Pepcid 20 Mg Premixed Ivpb -)  50 mls @ 100 mls/hr 

IVPB DAILY ALCIDES


   Last Admin: 05/17/17 09:53 Dose:  100 mls/hr


Norepinephrine Bitartrate 8, (000 mcg/ Dextrose)  500 mls @ 18.75 mls/hr IV 

TITR ALCIDES; 5 MCG/MIN


   PRN Reason: Protocol


   Last Admin: 05/17/17 07:07 Dose:  37.5 mls/hr


Dextrose/Sodium Chloride (D5-Ns -)  1,000 mls @ 75 mls/hr IV ASDIR ALCIDES


Morphine Sulfate (Morphine Injection -)  1 mg IVPUSH Q4H PRN


   PRN Reason: PAIN


   Last Admin: 05/16/17 09:43 Dose:  1 mg











ASSESSMENT AND PLAN:


Acute Cholangitis


Gram Negative Bacteremia


s/p ERCP/sphincteroplasty/stent placement


Acute Hypoxic Respiratory Failure


Septic Shock


Acute on Chronic Renal Failure


Lactic Acidosis


CAD


+Troponins likely Demand Ischemia


s/p TAVR for severe Aortic Stenosis


Thrombocytopenia





-  continue antibiotics


-  f/u cultures


-  IVF to keep CVP 8-12


-  titrate pressors to maintain MAP >65


-  taper FiO2 to keep SpO2 >90%


-  d/c heparin and amiodarone gtts


-  monitor platelets/coags/fibrinogen levels


-  trend troponins to document peak


-  echocardiogram


-  monitor urine output, creatinine


-  trend lactate


-  NPO


-  DVT/GI prophylaxis


-  continue ICU monitoring











critical care time spent in reviewing chart, evaluating patient and formulating 

plan 40 min

## 2017-05-17 NOTE — PN
Progress Note, Physician


Chief Complaint: 


ID








87 year old admitted with obstructive jaundice and cholangitis.  Went to OR for 

ERCP yesterday. Operative note reviewed finding of biliary stone in CBD with 

stent placement.  At present he is intubated with the requirement for levophed 

and dopamine.  Meropenem added with the overnight the preliminary report from 

nursing staff that lab says gram negative rods in the blood.





- Current Medication List


Current Medications: 


Active Medications





Aspirin (Asa -)  162 mg PO DAILY ALCIDES


Fentanyl (Sublimaze Injection -)  25 mcg IVPUSH [id number] PRN


   PRN Reason: PAIN


   Stop: 05/19/17 16:20


   Last Admin: 05/16/17 17:38 Dose:  25 mcg


Heparin Sodium (Porcine) (Heparin -)  1,000 unit IVPUSH PRN PRN


   PRN Reason: Heparin


Heparin Sodium (Porcine) (Heparin -)  5,000 unit IVPUSH PRN PRN


   PRN Reason: Heparin


Dextrose/Lactated Ringer's (D5-Lr -)  1,000 mls @ 125 mls/hr IV ASDIR ALCIDES


   Last Admin: 05/17/17 07:08 Dose:  125 mls/hr


Midazolam HCl 100 mg/ Sodium (Chloride)  100 mls @ 3 mls/hr IVPB TITR ALCIDES; 3 MG/

HR


   PRN Reason: Protocol


   Stop: 05/17/17 16:44


   Last Admin: 05/16/17 17:05 Dose:  3 mls/hr


Dopamine HCl/Dextrose (Dopamine 400 Mg/D5w -)  250 mls @ 16.01 mls/hr IVPB TITR 

ALCIDES; 5 MCG/KG/MIN


   PRN Reason: Protocol


   Last Titration: 05/17/17 04:40 Dose:  7 mcg/kg/min


Meropenem 500 mg/ Dextrose  100 mls @ 200 mls/hr IVPB BID ALCIDES


   Last Admin: 05/16/17 22:54 Dose:  200 mls/hr


Famotidine/Sodium Chloride (Pepcid 20 Mg Premixed Ivpb -)  50 mls @ 100 mls/hr 

IVPB DAILY ALCIDES


   Last Admin: 05/16/17 22:55 Dose:  100 mls/hr


Amiodarone HCl 450 mg/ (Dextrose)  250 mls @ 33.33 mls/hr IVPB TITR ALCIDES; 1 MG/

MIN


   PRN Reason: Protocol


   Stop: 05/17/17 10:29


   Last Admin: 05/17/17 04:40 Dose:  33.33 mls/hr


Heparin Sodium (Porcine) 25, (000 unit/ Sodium Chloride)  500 mls @ 20 mls/hr 

IV TITR ALCIDES; 1,000 UNIT/HR


   PRN Reason: Protocol


   Last Admin: 05/17/17 04:45 Dose:  20 mls/hr


Amiodarone HCl 450 mg/ (Dextrose)  250 mls @ 16.66 mls/hr IVPB TITR ALCIDES; 0.5 MG/

MIN


   PRN Reason: Protocol


Norepinephrine Bitartrate 8, (000 mcg/ Dextrose)  500 mls @ 18.75 mls/hr IV 

TITR ALCIDES; 5 MCG/MIN


   PRN Reason: Protocol


   Last Admin: 05/17/17 07:07 Dose:  37.5 mls/hr


Morphine Sulfate (Morphine Injection -)  1 mg IVPUSH Q4H PRN


   PRN Reason: PAIN


   Last Admin: 05/16/17 09:43 Dose:  1 mg











- Objective


Vital Signs: 


 Vital Signs











Temperature  99.4 F   05/17/17 02:06


 


Pulse Rate  108 H  05/17/17 04:12


 


Respiratory Rate  24   05/17/17 06:54


 


Blood Pressure  91/63   05/17/17 04:12


 


O2 Sat by Pulse Oximetry (%)  100   05/16/17 21:00











Constitutional: Yes: Other (INtubated)


Neck: Yes: WNL, Supple


Cardiovascular: Yes: Regular Rate and Rhythm, S1, S2.  No: Murmur


Respiratory: Yes: WNL, Regular, CTA Bilaterally.  No: Rales, Rhonchi


Gastrointestinal: Yes: WNL, Normal Bowel Sounds, Soft.  No: Hepatomegaly, 

Splenomegaly, Tenderness, Tenderness, Rebound


Extremities: No: Cold, Cool, Cyanosis


Edema: No


Integumentary: Yes: Jaundice


Labs: 


 CBC, BMP





 05/17/17 05:15 





 INR, PTT











INR  1.34  (0.82-1.09)  H  05/17/17  05:15    


 


Fibrinogen  336.0 mg/dL (238-498)   05/16/17  11:20    














Problem List





- Problems


(1) Biliary sepsis


Code(s): K83.0 - CHOLANGITIS





(2) Bacteremia due to Gram-negative bacteria


Code(s): R78.81 - BACTEREMIA





(3) Thrombocytopenia


Code(s): D69.6 - THROMBOCYTOPENIA, UNSPECIFIED








Assessment/Plan


Microbiology





05/15/17 18:30   Abdomen   Gram Stain - Final





 Laboratory Tests











  05/16/17 05/16/17 05/17/17





  20:30 20:30 02:00


 


WBC  29.3 H D  


 


Hgb  10.5 L  


 


Plt Count  56 L  


 


Band Neutrophils  33.0 H D  


 


Metamyelocytes  5 H D  


 


Myelocytes  9 H D  


 


BUN   


 


Creatinine   


 


Creat Clearance w eGFR   


 


Lactic Acid   2.779 H* 


 


Total Bilirubin   


 


AST   


 


ALT   


 


Alkaline Phosphatase   


 


Troponin I    1.41 H* D


 


C-Reactive Protein   


 


Albumin   


 


Random Vancomycin   














  05/17/17 05/17/17 05/17/17





  02:00 05:15 05:15


 


WBC   


 


Hgb   


 


Plt Count   


 


Band Neutrophils   


 


Metamyelocytes   


 


Myelocytes   


 


BUN   52 H 


 


Creatinine   4.4 H 


 


Creat Clearance w eGFR   12.79 


 


Lactic Acid  2.323 H*  


 


Total Bilirubin   9.3 H 


 


AST   121 H 


 


ALT   67 


 


Alkaline Phosphatase   803 H 


 


Troponin I   


 


C-Reactive Protein    21.5 H


 


Albumin   1.8 L 


 


Random Vancomycin   8.994 














  05/17/17





  05:15


 


WBC  Pending


 


Hgb  Pending


 


Plt Count  Pending


 


Band Neutrophils 


 


Metamyelocytes 


 


Myelocytes 


 


BUN 


 


Creatinine 


 


Creat Clearance w eGFR 


 


Lactic Acid 


 


Total Bilirubin 


 


AST 


 


ALT 


 


Alkaline Phosphatase 


 


Troponin I 


 


C-Reactive Protein 


 


Albumin 


 


Random Vancomycin 








Assessment


Severe sepsis syndrome


Cholangitis


Preliminary report of GNB in blood


Respiratory failure


TNI elevation ?sepsis related


Low platlets from sepsis


Acute on chronic kidney failure


Fungal dermatitis of the umbilicus


Plan For now continue Meropenem pending final cultures





         Apply topical antifungal cream to umbilicus





         38 minutes spent providing critical care supervision








Deejay ABRAHAM

## 2017-05-17 NOTE — PN
Progress Note, Physician


History of Present Illness: 


Post ERCP, stone retrieval and stent placement, currently in gram negative 

septic shock on pressors. Sedated and maintained on mechanical ventilation.





- Current Medication List


Current Medications: 


Active Medications





Aspirin (Asa -)  162 mg PO DAILY ALCIDES


Fentanyl (Sublimaze Injection -)  25 mcg IVPUSH [id number] PRN


   PRN Reason: PAIN


   Stop: 05/19/17 16:20


   Last Admin: 05/16/17 17:38 Dose:  25 mcg


Heparin Sodium (Porcine) (Heparin -)  1,000 unit IVPUSH PRN PRN


   PRN Reason: Heparin


Heparin Sodium (Porcine) (Heparin -)  5,000 unit IVPUSH PRN PRN


   PRN Reason: Heparin


Dextrose/Lactated Ringer's (D5-Lr -)  1,000 mls @ 125 mls/hr IV ASDIR ALCIDES


   Last Admin: 05/17/17 07:08 Dose:  125 mls/hr


Midazolam HCl 100 mg/ Sodium (Chloride)  100 mls @ 3 mls/hr IVPB TITR ALCIDES; 3 MG/

HR


   PRN Reason: Protocol


   Stop: 05/17/17 16:44


   Last Admin: 05/16/17 17:05 Dose:  3 mls/hr


Dopamine HCl/Dextrose (Dopamine 400 Mg/D5w -)  250 mls @ 16.01 mls/hr IVPB TITR 

ALCIDES; 5 MCG/KG/MIN


   PRN Reason: Protocol


   Last Titration: 05/17/17 04:40 Dose:  7 mcg/kg/min


Meropenem 500 mg/ Dextrose  100 mls @ 200 mls/hr IVPB BID ALCIDES


   Last Admin: 05/16/17 22:54 Dose:  200 mls/hr


Famotidine/Sodium Chloride (Pepcid 20 Mg Premixed Ivpb -)  50 mls @ 100 mls/hr 

IVPB DAILY ALCIDES


   Last Admin: 05/16/17 22:55 Dose:  100 mls/hr


Amiodarone HCl 450 mg/ (Dextrose)  250 mls @ 33.33 mls/hr IVPB TITR ALCIDES; 1 MG/

MIN


   PRN Reason: Protocol


   Stop: 05/17/17 10:29


   Last Admin: 05/17/17 04:40 Dose:  33.33 mls/hr


Heparin Sodium (Porcine) 25, (000 unit/ Sodium Chloride)  500 mls @ 20 mls/hr 

IV TITR ALCIDES; 1,000 UNIT/HR


   PRN Reason: Protocol


   Last Admin: 05/17/17 04:45 Dose:  20 mls/hr


Amiodarone HCl 450 mg/ (Dextrose)  250 mls @ 16.66 mls/hr IVPB TITR ALCIDES; 0.5 MG/

MIN


   PRN Reason: Protocol


Norepinephrine Bitartrate 8, (000 mcg/ Dextrose)  500 mls @ 18.75 mls/hr IV 

TITR ALCIDES; 5 MCG/MIN


   PRN Reason: Protocol


   Last Admin: 05/17/17 07:07 Dose:  37.5 mls/hr


Morphine Sulfate (Morphine Injection -)  1 mg IVPUSH Q4H PRN


   PRN Reason: PAIN


   Last Admin: 05/16/17 09:43 Dose:  1 mg











- Objective


Vital Signs: 


 Vital Signs











Temperature  99.4 F   05/17/17 02:06


 


Pulse Rate  108 H  05/17/17 04:12


 


Respiratory Rate  24   05/17/17 06:54


 


Blood Pressure  91/63   05/17/17 04:12


 


O2 Sat by Pulse Oximetry (%)  100   05/16/17 21:00











Cardiovascular: Yes: Regular Rate and Rhythm


Respiratory: Yes: Intubated, Mechanically Ventilated, Rhonchi


Gastrointestinal: Yes: Soft, Hypoactive Bowel Sounds


Extremities: Yes: Other (Warm)


Edema: No


Labs: 


 CBC, BMP





 05/17/17 05:15 





 05/17/17 05:15 





 INR, PTT











INR  1.34  (0.82-1.09)  H  05/17/17  05:15    


 


Fibrinogen  336.0 mg/dL (238-498)   05/16/17  11:20    














- ....Imaging


Chest X-ray: Report Reviewed (Basilar ATX)


EKG: Report Reviewed (EKG: ST @ 106 LBBB)





Problem List





- Problems


(1) BO (acute kidney injury)


Code(s): N17.9 - ACUTE KIDNEY FAILURE, UNSPECIFIED





(2) Bacteremia due to Gram-negative bacteria


Code(s): R78.81 - BACTEREMIA





(3) Biliary sepsis


Code(s): K83.0 - CHOLANGITIS





(4) CAD (coronary artery disease)


Code(s): I25.10 - ATHSCL HEART DISEASE OF NATIVE CORONARY ARTERY W/O ANG PCTRS 

  Qualifiers: 


     Coronary Disease-Associated Artery/Lesion type: unspecified vessel or 

lesion type     Native vs. transplanted heart: native heart     Associated 

angina: without angina        Qualified Code(s): I25.10 - Atherosclerotic heart 

disease of native coronary artery without angina pectoris  





(5) CKD (chronic kidney disease)


Code(s): N18.9 - CHRONIC KIDNEY DISEASE, UNSPECIFIED   





(6) Calculus of common duct with obstruction


Code(s): K80.51 - CALCULUS OF BILE DUCT W/O CHOLANGITIS OR CHOLECYST W OBST





(7) History of percutaneous coronary intervention


Code(s): Z98.890 - OTHER SPECIFIED POSTPROCEDURAL STATES





(8) S/P ERCP


Code(s): Z98.890 - OTHER SPECIFIED POSTPROCEDURAL STATES





(9) Thrombocytopenia


Code(s): D69.6 - THROMBOCYTOPENIA, UNSPECIFIED





(10) Acute hypoxemic respiratory failure


Code(s): J96.01 - ACUTE RESPIRATORY FAILURE WITH HYPOXIA





(11) Gram negative septic shock


Code(s): A41.50 - GRAM-NEGATIVE SEPSIS, UNSPECIFIED


R65.21 - SEVERE SEPSIS WITH SEPTIC SHOCK





(12) Left bundle branch block


Code(s): I44.7 - LEFT BUNDLE-BRANCH BLOCK, UNSPECIFIED





(13) Demand ischemia


Code(s): I24.8 - OTHER FORMS OF ACUTE ISCHEMIC HEART DISEASE








Assessment/Plan


87 year old admitted with obstructive jaundice and cholangitis.  Went to OR for 

ERCP yesterday. Operative note reviewed finding of biliary stone in CBD with 

stent placement.  At present he is intubated with the requirement for levophed 

and dopamine.  Meropenem added with the overnight the preliminary report from 

nursing staff that lab says gram negative rods in the blood.





1. Acute hypoxemic respiratory failure on mechanical ventilation


2. Biliary septic shock, cholangitis post ERCP, choledocholithiasis retrieval 

and stent placement with leukocytosis and lactic acidosis


3. Post TAVR for severe AS


4. CAD, S/P PCI/stent with demand ischemic injury


5. H/o Hypertension currently hypotensive requiring pressors


6. Renal CA S/P right nephrectomy


7. Acute on CKD referable to sepsis


8. Thrombocytopenia due to sepsis





PLAN:


1. Wean Levophed then dopamine gtt to maintain MAP>65 mmHg. D/c amio gtt


2. Continue antibiotics coverage as per ID. Blood cultures pending


3. Trend trops, monitor renal recovery, wean vent per ABG


4. Heparin gtt with caution given thrombocytopenia, GI prophylaxis





Cardiologist: Clint Sanabria MD ([facility])

## 2017-05-17 NOTE — PROC
Procedure Note


Procedure: 


Right IJ TLC Placement





Central Line Insertion


Indication: Sepsis, Vasopressor


Risks and Benefits Explained: Yes


Consent on Chart: Yes (Consent obtained via telephone)


Central Line: Triple Lumen Catheter


Anesthesia: 1% Lidocaine


Sterile Technique: Yes


Ultrasound Guided Assistance: Yes


Position: Right Internal Jugular


Post Insertion: Yes: Bilateral  Breath Sounds, Bilateral Chest Expansion, Chest 

X-Ray Ordered


Sterile Dressing Applied: Yes


Remarks: 


TLC inserted to 16cm.

## 2017-05-17 NOTE — PROC
Procedure Note


Procedure: 


patient is S/P ERCP.  His is intubated and staff cannot pass a ware.  He has a 

history of urethral stricture.





under sterile conditions, an 18 Fr Jamul tip cath was placed over a wire and 

he was dilated with it.

## 2017-05-17 NOTE — PN
Progress Note (short form)





- Note


Progress Note: 


Subjective: The patient was seen and examined at the bedside, he remains 

sedated and intubated. 


Tmax 99.7


WBC 31.1


Blood culture prelim lactose fermenting neg bacilli





 Current Medications











Generic Name Dose Route Start Last Admin





  Trade Name Freq  PRN Reason Stop Dose Admin


 


Acetaminophen  1,000 mg 05/17/17 17:16  





  Ofirmev Injection -  IVPB 05/18/17 11:17  





  Q6H PRN   





  FEVER OR PAIN   


 


Fentanyl  25 mcg 05/16/17 16:19 05/16/17 17:38





  Sublimaze Injection -  IVPUSH 05/19/17 16:20  25 mcg





  [id number] PRN   Administration





  PAIN   


 


Heparin Sodium (Porcine)  1,000 unit 05/17/17 04:19  





  Heparin -  IVPUSH   





  PRN PRN   





  Heparin   


 


Dopamine HCl/Dextrose  250 mls @ 16.01 mls/hr 05/16/17 17:15 05/17/17 04:40





  Dopamine 400 Mg/D5w -  IVPB   7 mcg/kg/min





  TITR ALCIDES   Titration





  Protocol   





  5 MCG/KG/MIN   


 


Meropenem 500 mg/ Dextrose  100 mls @ 200 mls/hr 05/16/17 22:00 05/17/17 09:53





  IVPB   200 mls/hr





  BID ALCIDES   Administration


 


Famotidine/Sodium Chloride  50 mls @ 100 mls/hr 05/16/17 22:30 05/17/17 09:53





  Pepcid 20 Mg Premixed Ivpb -  IVPB   100 mls/hr





  DAILY ALCIDES   Administration


 


Norepinephrine Bitartrate 8,  500 mls @ 18.75 mls/hr 05/17/17 06:45 05/17/17 07:

07





  000 mcg/ Dextrose  IV   37.5 mls/hr





  TITR ALCIDES   Administration





  Protocol   





  5 MCG/MIN   


 


Dextrose/Sodium Chloride  1,000 mls @ 75 mls/hr 05/17/17 11:30  





  D5-Ns -  IV   





  ASDIR ALCIDES   


 


Morphine Sulfate  1 mg 05/16/17 08:45 05/16/17 09:43





  Morphine Injection -  IVPUSH   1 mg





  Q4H PRN   Administration





  PAIN   








Objective: 


 Vital Signs











 Period  Temp  Pulse  Resp  BP Sys/[place]illo  Pulse Ox


 


 Last 24 Hr  98 F-99.7 F    8-29  /43-84  








Physical Exam: 


General: Intubated, sedated


Heart: RRR, S1S2


Lungs: Rhonchi throughout


Abd: Soft, normoactive bowel sounds


Ext: Warm, no edema





 CBCD











WBC  31.1 K/mm3 (4.0-10.0)  H*  05/17/17  05:15    


 


RBC  3.56 M/mm3 (4.00-5.60)  L  05/17/17  05:15    


 


Hgb  10.3 GM/dL (11.7-16.9)  L  05/17/17  05:15    


 


Hct  31.7 % (35.4-49)  L  05/17/17  05:15    


 


MCV  89.0 fl (80-96)   05/17/17  05:15    


 


MCHC  32.4 g/dl (32.0-35.9)   05/17/17  05:15    


 


RDW  16.9 % (11.9-15.9)  H  05/17/17  05:15    


 


Plt Count  48 K/MM3 (134-434)  L  05/17/17  05:15    


 


MPV  11.2 fl (7.5-11.1)  H  05/17/17  05:15    








 CMP











Sodium  137 mmol/L (136-145)   05/17/17  05:15    


 


Potassium  4.9 mmol/L (3.5-5.1)   05/17/17  05:15    


 


Chloride  103 mmol/L ()   05/17/17  05:15    


 


Carbon Dioxide  16 mmol/L (21-32)  L  05/17/17  05:15    


 


Anion Gap  18  (8-16)  H  05/17/17  05:15    


 


BUN  52 mg/dL (7-18)  H  05/17/17  05:15    


 


Creatinine  4.4 mg/dL (0.7-1.3)  H  05/17/17  05:15    


 


Creat Clearance w eGFR  12.79  (>60)   05/17/17  05:15    


 


Random Glucose  128 mg/dL ()  H  05/17/17  05:15    


 


Calcium  7.0 mg/dL (8.5-10.1)  L  05/17/17  05:15    


 


Total Bilirubin  9.3 mg/dL (0.2-1.0)  H  05/17/17  05:15    


 


AST  121 U/L (15-37)  H  05/17/17  05:15    


 


ALT  67 U/L (12-78)   05/17/17  05:15    


 


Alkaline Phosphatase  803 U/L ()  H  05/17/17  05:15    


 


Total Protein  4.8 g/dl (6.4-8.2)  L  05/17/17  05:15    


 


Albumin  1.8 g/dl (3.4-5.0)  L  05/17/17  05:15    








 CARDIAC ENZYMES











Creatine Kinase  156 IU/L ()  D 05/17/17  13:20    


 


Troponin I  6.31 ng/ml (0.00-0.05)  H* D 05/17/17  13:20    











 Microbiology





05/15/17 18:30   Abdomen   Gram Stain - Final


05/15/17 18:30   Abdomen   Wound Culture - Preliminary


                            Staphylococcus Coagulase Neg


                            Diphtheroid/Corynebacterium


                            Streptococcus Viridans


05/16/17 10:40   Blood - Peripheral Venous   Blood Culture - Preliminary


                            Pending Organism


05/16/17 10:42   Blood - Peripheral Venous   Blood Culture - Preliminary


                            Lactose Fermenting Neg Bacilli








Assessment: This is an 87 year old male with PMHx of HTN, severe aortic 

stenosis s/p TAVR, CAD s/p PCI/stent, HTN, renal cancer s/p right nephrectomy, 

CKD who presented to the ED after being sent from his pcp with an MRI showing 

an obstructing CBD stone with marked proximal CBD dilatation. 





Plan:


1) Septic shock with multiorgan failure 2/2 acute cholangitis vs. gram negative 

bacteremia


- S/p ERCP with baloon sphincteropasty and stenting. No stone extraction 

performed as sphincterotomy high risk for bleeding due to dual antiplatelet 

therapy


- Remains on pressors


- Blood cultures with lactose fermenting negative bacilli


- Lactic acidosis resolves


- Continue IV fluids


- Continue Meropenem (5/16- )


- F/u IR consult for possible percutaneous cholecystostomy for proximal 

decompression


- Appreciate GI consult





2) Acute hypoxic respiratory failure


- Remains intubated


- Continue sedation


- Continue pressors


- IVF to keep CVP 8-12





3) Positive troponins, hx of CAD s/p PCI/stent


- Continues to trend up. Trend for peak


- Per cardiology and CC medicine, likely demand ischemia 2/2 shock


- ECHO: severely decreased LV fxn, mod-severe RV fxn, mod MR, mild TR, pleural 

effusion significantly worsened from previous study


- Was on Heparin gtt, however discontinued 2/2 thrombocytopenia


- Appreciate cardiology consult





4) BO on CKD stage 3


- Likely 2/2 hypoperfusion 2/2 septic shock


- Continue IV fluids


- Renal ultrasound negative for hydro


- Renally dose all medications





5) Hypotension 2/2 septic shock


- Continue pressors





6) Severe aortic stenosis s/p TAVR





7) F/E/N:


- NPO


- Monitor electrolytes





8) Prophylaxis: 


- Hold all anticogaulation 2/2 thrombocytopenia





9) Dispo: 


- Requires continued inpatient care





CODE STATUS: FULL CODE





Visit type





- Emergency Visit


Emergency Visit: Yes


ED Registration Date: 05/15/17


Care time: The patient presented to the Emergency Department on the above date 

and was hospitalized for further evaluation of their emergent condition.





- New Patient


This patient is new to me today: Yes


Date on this admission: 05/17/17





- Critical Care


Critical Care patient: Yes


Total Critical Care Time (in minutes): 55


Critical Care Statement: The care of this patient involved high complexity 

decision making to prevent further life threatening deterioration of the patient

's condition and/or to evalute & treat vital organ system(s) failure or risk of 

failure.

## 2017-05-17 NOTE — PN
GI Progress Note


Subjective: 


Remains intubated on pressors


g - bacteremia, oliguric renal failure


Made tropoinins, ? episode of NSVT 


S/P ERCP with balloon sphicteroplasty and stenting.  Stone extrction not 

performed as sphincterotomy high risk for bleeding in the setting of dual 

antiplatelet therapy 














- Objective


Vital Signs: 


 Vital Signs











Temperature  99.7 F H  05/17/17 10:00


 


Pulse Rate  87   05/17/17 12:00


 


Respiratory Rate  23   05/17/17 12:34


 


Blood Pressure  107/57   05/17/17 12:00


 


O2 Sat by Pulse Oximetry (%)  100   05/17/17 10:20











Constitutional: No Distress, Calm


Eyes: Yes: Sclera Icterus


Cardiovascular: Yes: Regular Rate and Rhythm


Respiratory: Yes: Diminished (at bases with poor insp effort)


Gastrointestinal Inspection: No: Distention


...Auscultate: Yes: Normoactive Bowel Sounds


...Palpate: No: Tenderness ( no grimacing upon palpation)


...Percussion: No: Tympanitic


Edema: Yes


Edema: LLE: 1+, RLE: 1+


Neurological: Yes: Other (Sedated)


Labs: 


 CBC, BMP





 05/17/17 05:15 





 05/17/17 05:15 





 INR, PTT











INR  1.34  (0.82-1.09)  H  05/17/17  05:15    


 


Fibrinogen  336.0 mg/dL (238-498)   05/16/17  11:20    








 Hepatic Panel











Total Bilirubin  9.3 mg/dL (0.2-1.0)  H  05/17/17  05:15    


 


Direct Bilirubin  7.7 mg/dL (0.0-0.2)  H  05/17/17  05:15    


 


AST  121 U/L (15-37)  H  05/17/17  05:15    


 


ALT  67 U/L (12-78)   05/17/17  05:15    


 


Alkaline Phosphatase  803 U/L ()  H  05/17/17  05:15    


 


Albumin  1.8 g/dl (3.4-5.0)  L  05/17/17  05:15    














Problem List





- Problems


(1) Choledocholithiasis


Assessment/Plan: 


With cholangitis s/p ERCP with biliary stent placement


+ Bacteremia multiorgan failure


Continuing supportive measures


IV abx per ID


Monitor LFTs





Patient's wife and niece present at bedside. Aware of the grave clinical 

situation





 


Code(s): K80.50 - CALCULUS OF BILE DUCT W/O CHOLANGITIS OR CHOLECYST W/O OBST

## 2017-05-18 LAB
ALBUMIN SERPL-MCNC: 1.7 G/DL (ref 3.4–5)
ALP SERPL-CCNC: 686 U/L (ref 45–117)
ALT SERPL-CCNC: 58 U/L (ref 12–78)
ANION GAP SERPL CALC-SCNC: 16 MMOL/L (ref 8–16)
ART PUNCT SITE: (no result)
ARTERIAL PATENCY WRIST A: POSITIVE
AST SERPL-CCNC: 107 U/L (ref 15–37)
BASE EXCESS BLDA CALC-SCNC: -10.5 MEQ/L (ref -2–2)
BILIRUB CONJ SERPL-MCNC: 6.1 MG/DL (ref 0–0.2)
BILIRUB SERPL-MCNC: 7.2 MG/DL (ref 0.2–1)
CALCIUM SERPL-MCNC: 7.6 MG/DL (ref 8.5–10.1)
CO2 SERPL-SCNC: 16 MMOL/L (ref 21–32)
COCKROFT - GAULT: 13
CREAT SERPL-MCNC: 5.2 MG/DL (ref 0.7–1.3)
DEPRECATED RDW RBC AUTO: 16.6 % (ref 11.9–15.9)
FIBRINOGEN PPP-MCNC: 372 MG/DL (ref 238–498)
GLUCOSE SERPL-MCNC: 90 MG/DL (ref 74–106)
HCO3 BLDA-SCNC: 13.8 MEQ/L (ref 22–26)
LPM/O2%: (no result)
MAGNESIUM SERPL-MCNC: 2.3 MG/DL (ref 1.8–2.4)
MCH RBC QN AUTO: 28.8 PG (ref 25.7–33.7)
MCHC RBC AUTO-ENTMCNC: 33 G/DL (ref 32–35.9)
MCV RBC: 87.4 FL (ref 80–96)
MECH. VENT.: (no result)
NEUTROPHILS # BLD: 96 % (ref 42.8–82.8)
PEEP SETTING VENT: 5 CMH2O
PHOSPHATE SERPL-MCNC: 3.4 MG/DL (ref 2.5–4.9)
PLATELET # BLD AUTO: 43 K/MM3 (ref 134–434)
PLATELET # BLD EST: (no result) 10*3/UL
PMV BLD: 10.8 FL (ref 7.5–11.1)
PO2 BLDA: 183 MMHG (ref 68–100)
PROT SERPL-MCNC: 4.4 G/DL (ref 6.4–8.2)
PT. ON O2?: YES
SAO2 % BLDA: 99.6 % (ref 90–98.9)
TROPONIN I SERPL-MCNC: 6.99 NG/ML (ref 0–0.05)
TYPE OF O2: (no result)
VENT RATE: 23
VT/PRESS: 500
WBC # BLD AUTO: 35.9 K/MM3 (ref 4–10)

## 2017-05-18 RX ADMIN — DEXTROSE MONOHYDRATE SCH MLS/HR: 50 INJECTION, SOLUTION INTRAVENOUS at 08:50

## 2017-05-18 RX ADMIN — HEPARIN SODIUM SCH UNIT: 5000 INJECTION, SOLUTION INTRAVENOUS; SUBCUTANEOUS at 23:34

## 2017-05-18 RX ADMIN — HEPARIN SODIUM SCH UNIT: 5000 INJECTION, SOLUTION INTRAVENOUS; SUBCUTANEOUS at 09:01

## 2017-05-18 RX ADMIN — DEXTROSE AND SODIUM CHLORIDE SCH MLS/HR: 5; 900 INJECTION, SOLUTION INTRAVENOUS at 13:34

## 2017-05-18 RX ADMIN — MEROPENEM SCH MLS/HR: 500 INJECTION, POWDER, FOR SOLUTION INTRAVENOUS at 23:34

## 2017-05-18 RX ADMIN — NYSTATIN SCH APPLIC: 100000 POWDER TOPICAL at 23:35

## 2017-05-18 RX ADMIN — FAMOTIDINE SCH MLS/HR: 20 INJECTION, SOLUTION INTRAVENOUS at 09:01

## 2017-05-18 RX ADMIN — MEROPENEM SCH MLS/HR: 500 INJECTION, POWDER, FOR SOLUTION INTRAVENOUS at 09:01

## 2017-05-18 RX ADMIN — NYSTATIN SCH APPLIC: 100000 POWDER TOPICAL at 09:01

## 2017-05-18 NOTE — PN
Progress Note, Physician


History of Present Illness: 


Pt seen and examined at bedside. He remains in the ICU. Pt remains intubated. 

Wife and niece are at bedside and care was discussed with them. 





- Current Medication List


Current Medications: 


Active Medications





Fentanyl (Sublimaze Injection -)  25 mcg IVPUSH [id number] PRN


   PRN Reason: PAIN


   Stop: 05/19/17 16:20


   Last Admin: 05/16/17 17:38 Dose:  25 mcg


Heparin Sodium (Porcine) (Heparin -)  5,000 unit SQ BID ALCIDES


   Last Admin: 05/18/17 09:01 Dose:  5,000 unit


Meropenem 500 mg/ Dextrose  100 mls @ 200 mls/hr IVPB BID ALCIDES


   Last Admin: 05/18/17 09:01 Dose:  200 mls/hr


Famotidine/Sodium Chloride (Pepcid 20 Mg Premixed Ivpb -)  50 mls @ 100 mls/hr 

IVPB DAILY [facility]


   Last Admin: 05/18/17 09:01 Dose:  100 mls/hr


Norepinephrine Bitartrate 8, (000 mcg/ Dextrose)  500 mls @ 18.75 mls/hr IV 

TITR ALCIDES; 5 MCG/MIN


   PRN Reason: Protocol


   Last Admin: 05/18/17 08:50 Dose:  37.5 mls/hr


Dextrose/Sodium Chloride (D5-Ns -)  1,000 mls @ 75 mls/hr IV ASDIR ALCIDES


   Last Admin: 05/18/17 13:34 Dose:  75 mls/hr


Morphine Sulfate (Morphine Injection -)  1 mg IVPUSH Q4H PRN


   PRN Reason: PAIN


   Last Admin: 05/16/17 09:43 Dose:  1 mg


Nystatin (Nystop Powder -)  1 applic TP BID [facility]


   Last Admin: 05/18/17 09:01 Dose:  1 applic











- Objective


Vital Signs: 


 Vital Signs











Temperature  99 F   05/18/17 15:03


 


Pulse Rate  72   05/18/17 15:03


 


Respiratory Rate  19   05/18/17 15:07


 


Blood Pressure  121/61   05/18/17 15:03


 


O2 Sat by Pulse Oximetry (%)  100   05/18/17 10:35











Constitutional: Yes: Calm


Eyes: Yes: Sclera Icterus


Cardiovascular: Yes: S1, S2


Respiratory: Yes: Mechanically Ventilated


Gastrointestinal: Yes: Soft


Genitourinary: Yes: Chadwick Present, Oliguria


Musculoskeletal: Yes: Muscle Weakness


Edema: Yes


Edema: LUE: 1+, RUE: 1+, LLE: 1+, RLE: 1+


Neurological: Yes: Lethargy


Labs: 


 CBC, BMP





 05/18/17 08:30 





 05/18/17 05:45 





 INR, PTT











INR  1.34  (0.82-1.09)  H  05/17/17  05:15    


 


Fibrinogen  372.0 mg/dL (238-498)   05/18/17  05:45    














- ....Imaging


Chest X-ray: Report Reviewed





Problem List





- Problems


(1) CAD (coronary artery disease)


Code(s): I25.10 - ATHSCL HEART DISEASE OF NATIVE CORONARY ARTERY W/O ANG PCTRS 

  Qualifiers: 


     Coronary Disease-Associated Artery/Lesion type: unspecified vessel or 

lesion type     Native vs. transplanted heart: native heart     Associated 

angina: without angina        Qualified Code(s): I25.10 - Atherosclerotic heart 

disease of native coronary artery without angina pectoris  





(2) Calculus of common duct with obstruction


Code(s): K80.51 - CALCULUS OF BILE DUCT W/O CHOLANGITIS OR CHOLECYST W OBST





(3) Choledocholithiasis


Code(s): K80.50 - CALCULUS OF BILE DUCT W/O CHOLANGITIS OR CHOLECYST W/O OBST





(4) Renal cancer


Code(s): C64.9 - MALIGNANT NEOPLASM OF UNSP KIDNEY, EXCEPT RENAL PELVIS   

Qualifiers: 


     Laterality: right        Qualified Code(s): C64.1 - Malignant neoplasm of 

right kidney, except renal pelvis  





(5) BO (acute kidney injury)


Code(s): N17.9 - ACUTE KIDNEY FAILURE, UNSPECIFIED





(6) CKD (chronic kidney disease)


Code(s): N18.9 - CHRONIC KIDNEY DISEASE, UNSPECIFIED   








Assessment/Plan


 Current Medications











Generic Name Dose Route Start Last Admin





  Trade Name Freq  PRN Reason Stop Dose Admin


 


Fentanyl  25 mcg 05/16/17 16:19 05/16/17 17:38





  Sublimaze Injection -  IVPUSH 05/19/17 16:20  25 mcg





  [id number] PRN   Administration





  PAIN   


 


Heparin Sodium (Porcine)  5,000 unit 05/17/17 22:00 05/18/17 09:01





  Heparin -  SQ   5,000 unit





  BID ALCIDES   Administration


 


Meropenem 500 mg/ Dextrose  100 mls @ 200 mls/hr 05/16/17 22:00 05/18/17 09:01





  IVPB   200 mls/hr





  BID ALCIDES   Administration


 


Famotidine/Sodium Chloride  50 mls @ 100 mls/hr 05/16/17 22:30 05/18/17 09:01





  Pepcid 20 Mg Premixed Ivpb -  IVPB   100 mls/hr





  DAILY ALCIDES   Administration


 


Norepinephrine Bitartrate 8,  500 mls @ 18.75 mls/hr 05/17/17 06:45 05/18/17 08:

50





  000 mcg/ Dextrose  IV   37.5 mls/hr





  TITR ALCIDES   Administration





  Protocol   





  5 MCG/MIN   


 


Dextrose/Sodium Chloride  1,000 mls @ 75 mls/hr 05/17/17 11:30 05/18/17 13:34





  D5-Ns -  IV   75 mls/hr





  ASDIR ALCIDES   Administration


 


Morphine Sulfate  1 mg 05/16/17 08:45 05/16/17 09:43





  Morphine Injection -  IVPUSH   1 mg





  Q4H PRN   Administration





  PAIN   


 


Nystatin  1 applic 05/17/17 22:00 05/18/17 09:01





  Nystop Powder -  TP   1 applic





  BID ALCIDES   Administration














Impression


1. CKD stage 3


2. BO


3. choledocholithiasis


4. CAD


5. hx of Renal Cell Cancer s/p nephroectomy


6. aortic stenosis


7. sepsis


8. acute respiratory failure requiring intubation


9. NSTEMI


10. bacteremia





Plan


- monitor urine output


- cont pressors


- likely atn from hypotension


- monitor blood pressure


- vent support


- discussed possibility of HD with family, will re-evaluate in am


- discussed with ICU team


- monitor in ICU








Dr Rodriguez

## 2017-05-18 NOTE — PN
GI Progress Note


Subjective: 


Remains intubated


Continued pressor support


Rising WBC


No acute events overnight








- Objective


Vital Signs: 


 Vital Signs











Temperature  98.9 F   05/18/17 10:00


 


Pulse Rate  76   05/18/17 10:00


 


Respiratory Rate  14   05/18/17 10:00


 


Blood Pressure  95/52   05/18/17 10:00


 


O2 Sat by Pulse Oximetry (%)  100   05/18/17 09:00











Constitutional: Calm


Eyes: Yes: Sclera Icterus


Cardiovascular: Yes: Regular Rate and Rhythm


Gastrointestinal Inspection: No: Distention


...Auscultate: Yes: Normoactive Bowel Sounds


...Palpate: No: Tenderness (No grimacing upon palpation)


...Percussion: No: Tympanitic


Edema: LUE: 1+, RUE: 1+, LLE: 2+, RLE: 2+


Neurological: Yes: Other (sedated)


Labs: 


               CBC, BMP





 05/18/17 08:30 





 05/18/17 05:45 





 INR, PTT











INR  1.34  (0.82-1.09)  H  05/17/17  05:15    


 


Fibrinogen  372.0 mg/dL (238-498)   05/18/17  05:45    








 Hepatic Panel











Total Bilirubin  7.2 mg/dL (0.2-1.0)  H D 05/18/17  05:45    


 


Direct Bilirubin  6.1 mg/dL (0.0-0.2)  H D 05/18/17  05:45    


 


AST  107 U/L (15-37)  H  05/18/17  05:45    


 


ALT  58 U/L (12-78)   05/18/17  05:45    


 


Alkaline Phosphatase  686 U/L ()  H  05/18/17  05:45    


 


Albumin  1.7 g/dl (3.4-5.0)  L  05/18/17  05:45    








 Laboratory Tests











  05/17/17 05/17/17 05/18/17





  05:15 05:15 05:45


 


Total Bilirubin  9.3 H   7.2 H D


 


Direct Bilirubin   7.7 H  6.1 H D


 


AST  121 H   107 H


 


ALT  67   58


 


Alkaline Phosphatase  803 H   686 H














Problem List





- Problems


(1) Choledocholithiasis


Assessment/Plan: 


Clinically 


Bcateremis, septick shoock


Slow imporvement of liver chemistries s/p ERCP with stent placement


Ordered repeat CT scan of the abdomen and pelvis with PO contrast to reassess 

given rising WBC





Poor prognosis





Code(s): K80.50 - CALCULUS OF BILE DUCT W/O CHOLANGITIS OR CHOLECYST W/O OBST

## 2017-05-18 NOTE — PN
Physical Exam: 


SUBJECTIVE: Patient seen and examined. 


intubated. 








OBJECTIVE:





 Vital Signs











 Period  Temp  Pulse  Resp  BP Sys/Badillo  Pulse Ox


 


 Last 24 Hr  98.6 F-100 F  72-91  20-29  /54-84  











EYES: constricted pupils, sclera anicteric, conjunctiva clear. No ptosis. 


ENT:  nares patent, oropharynx with endotracheal tube and OG tube, moist mucous 

membranes.


NECK: Trachea midline


LUNGS: Breath sounds equal, clear to auscultation bilaterally, no wheezes, no 

crackles, no accessory muscle use.  


HEART: Regular rate and rhythm, S1, S2 without murmur, rub or gallop.


ABDOMEN: Soft, nondistended, multiple diffuse palpable soft mobile nodularities

,  normoactive bowel sounds, periumbilical erythema with demuted skin laterally 

along skin fold, scant serous discharge covered with nystatin power


EXTREMITIES: 2+ pulses in b/l radial and DP, warm, well-perfused, no edema. scd'

s in place


SKIN: Warm, jaundiced. 


Ware in place











 Laboratory Results - last 24 hr











  05/17/17 05/17/17 05/17/17





  02:00 05:15 05:15


 


WBC   


 


RBC   


 


Hgb   


 


Hct   


 


MCV   


 


MCHC   


 


RDW   


 


Plt Count   


 


MPV   


 


Neutrophils %    85.0 H D


 


Lymphocytes %    2.0 L D


 


Monocytes %    2.0 L


 


Band Neutrophils    11.0 H D


 


Dohle Bodies    1+


 


Platelet Estimate    Decreased


 


Platelet Comment    No clumping noted


 


Polychromasia    Few


 


Hypochromic-Microcytic    Few


 


PTT (Actin FS)   


 


Sodium   


 


Potassium   


 


Chloride   


 


Carbon Dioxide   


 


Anion Gap   


 


BUN   


 


Creatinine   


 


Random Glucose   


 


Lactic Acid   


 


Calcium   


 


Phosphorus   


 


Magnesium   


 


Total Bilirubin   


 


Direct Bilirubin   


 


AST   


 


ALT   


 


Alkaline Phosphatase   


 


Creatine Kinase   120 


 


Creatine Kinase Index   


 


CK-MB (CK-2)   


 


CK-MB (CK-2) Rel Index   


 


Troponin I   2.46 H* D 


 


Total Protein   


 


Albumin   


 


Ur Specific Gravity  1.010  














  05/17/17 05/17/17 05/17/17





  05:15 13:20 13:20


 


WBC   


 


RBC   


 


Hgb   


 


Hct   


 


MCV   


 


MCHC   


 


RDW   


 


Plt Count   


 


MPV   


 


Neutrophils %   


 


Lymphocytes %   


 


Monocytes %   


 


Band Neutrophils   


 


Dohle Bodies   


 


Platelet Estimate   


 


Platelet Comment   


 


Polychromasia   


 


Hypochromic-Microcytic   


 


PTT (Actin FS)   


 


Sodium   


 


Potassium   


 


Chloride   


 


Carbon Dioxide   


 


Anion Gap   


 


BUN   


 


Creatinine   


 


Random Glucose   


 


Lactic Acid    1.684


 


Calcium   


 


Phosphorus   


 


Magnesium   


 


Total Bilirubin   


 


Direct Bilirubin   


 


AST   


 


ALT   


 


Alkaline Phosphatase   


 


Creatine Kinase  Cancelled  156  D 


 


Creatine Kinase Index   11.8 H* 


 


CK-MB (CK-2)   18.446 H 


 


CK-MB (CK-2) Rel Index   


 


Troponin I  Cancelled  6.31 H* D 


 


Total Protein   


 


Albumin   


 


Ur Specific Gravity   














  05/17/17 05/17/17 05/17/17





  13:20 13:20 18:30


 


WBC   


 


RBC   


 


Hgb   


 


Hct   


 


MCV   


 


MCHC   


 


RDW   


 


Plt Count   


 


MPV   


 


Neutrophils %   


 


Lymphocytes %   


 


Monocytes %   


 


Band Neutrophils   


 


Dohle Bodies   


 


Platelet Estimate   


 


Platelet Comment   


 


Polychromasia   


 


Hypochromic-Microcytic   


 


PTT (Actin FS)  80.3 H D  


 


Sodium   


 


Potassium   


 


Chloride   


 


Carbon Dioxide   


 


Anion Gap   


 


BUN   


 


Creatinine   


 


Random Glucose   


 


Lactic Acid   


 


Calcium   


 


Phosphorus   


 


Magnesium   


 


Total Bilirubin   


 


Direct Bilirubin   


 


AST   


 


ALT   


 


Alkaline Phosphatase   


 


Creatine Kinase   


 


Creatine Kinase Index   


 


CK-MB (CK-2)   


 


CK-MB (CK-2) Rel Index   Cancelled 


 


Troponin I    7.41 H*


 


Total Protein   


 


Albumin   


 


Ur Specific Gravity   














  05/18/17 05/18/17 05/18/17





  05:45 05:45 08:30


 


WBC    35.9 H*


 


RBC    3.48 L


 


Hgb    10.0 L


 


Hct    30.4 L


 


MCV    87.4


 


MCHC    33.0


 


RDW    16.6 H


 


Plt Count    43 L


 


MPV    10.8


 


Neutrophils %    Y


 


Lymphocytes %    Y


 


Monocytes %   


 


Band Neutrophils   


 


Dohle Bodies   


 


Platelet Estimate   


 


Platelet Comment   


 


Polychromasia   


 


Hypochromic-Microcytic   


 


PTT (Actin FS)   


 


Sodium  135 L  


 


Potassium  4.4  


 


Chloride  103  


 


Carbon Dioxide  16 L  


 


Anion Gap  16  


 


BUN  65 H D  


 


Creatinine  5.2 H  


 


Random Glucose  90  D  


 


Lactic Acid   


 


Calcium  7.6 L  


 


Phosphorus  3.4  


 


Magnesium  2.3  D  


 


Total Bilirubin  7.2 H D  


 


Direct Bilirubin  6.1 H D  


 


AST  107 H  


 


ALT  58  


 


Alkaline Phosphatase  686 H  


 


Creatine Kinase   158 


 


Creatine Kinase Index   


 


CK-MB (CK-2)   


 


CK-MB (CK-2) Rel Index   


 


Troponin I   6.99 H* 


 


Total Protein  4.4 L  


 


Albumin  1.7 L  


 


Ur Specific Gravity   








Active Medications











Generic Name Dose Route Start Last Admin





  Trade Name Freq  PRN Reason Stop Dose Admin


 


Acetaminophen  1,000 mg 05/17/17 17:16  





  Ofirmev Injection -  IVPB 05/18/17 11:17  





  Q6H PRN   





  FEVER OR PAIN   


 


Fentanyl  25 mcg 05/16/17 16:19 05/16/17 17:38





  Sublimaze Injection -  IVPUSH 05/19/17 16:20  25 mcg





  [id number] PRN   Administration





  PAIN   


 


Heparin Sodium (Porcine)  5,000 unit 05/17/17 22:00 05/18/17 09:01





  Heparin -  SQ   5,000 unit





  BID ALCIDES   Administration


 


Dopamine HCl/Dextrose  250 mls @ 16.01 mls/hr 05/16/17 17:15 05/17/17 18:27





  Dopamine 400 Mg/D5w -  IVPB   Not Given





  TITR ALCIDES   





  Protocol   





  5 MCG/KG/MIN   


 


Meropenem 500 mg/ Dextrose  100 mls @ 200 mls/hr 05/16/17 22:00 05/18/17 09:01





  IVPB   200 mls/hr





  BID ALCIDES   Administration


 


Famotidine/Sodium Chloride  50 mls @ 100 mls/hr 05/16/17 22:30 05/18/17 09:01





  Pepcid 20 Mg Premixed Ivpb -  IVPB   100 mls/hr





  DAILY ALCIDES   Administration


 


Norepinephrine Bitartrate 8,  500 mls @ 18.75 mls/hr 05/17/17 06:45 05/18/17 08:

50





  000 mcg/ Dextrose  IV   37.5 mls/hr





  TITR ALCIDES   Administration





  Protocol   





  5 MCG/MIN   


 


Dextrose/Sodium Chloride  1,000 mls @ 75 mls/hr 05/17/17 11:30 05/17/17 12:00





  D5-Ns -  IV   75 mls/hr





  ASDIR ALCIDES   Administration


 


Midazolam HCl 100 mg/ Sodium  100 mls @ 1 mls/hr 05/17/17 21:00 05/17/17 21:15





  Chloride  IVPB   3 mls/hr





  TITR ALCIDES   Administration





  Protocol   





  1 MG/HR   


 


Morphine Sulfate  1 mg 05/16/17 08:45 05/16/17 09:43





  Morphine Injection -  IVPUSH   1 mg





  Q4H PRN   Administration





  PAIN   


 


Nystatin  1 applic 05/17/17 22:00 05/18/17 09:01





  Nystop Powder -  TP   1 applic





  BID ALCIDES   Administration











ASSESSMENT/PLAN:


87 yr old man with HTN, aortic stenosis, CAD s/p stents, r-nephroctomy(renal ca)

, pancreatic head density found to have choleluithaisis with obstructing CBD, s/

p ERCP with stent placement(unable to undergo sphincterectomy due to 

anticoagulation) complicated by septic shock requiring ICU admission.


- abdominal CT with oral contrast pending today for further evaluation to 

reassess uptrending wbc


- palliative consulted, GOC to be discussed with family (wife and wife's nieces)

, prognosis is poor.





Infectious Disease


Septic shock with multi-organ failure likely secondary to cholangitis and gram 

neg bactermia(likley gallbladder as primary source)


- ofirmev  1gm IVpb q6hr for fever


Abx tx: 


 meropenem 500mg IVPB BID


- Day 3


- topical antifungal cream to periumbical area


Fluids: avoid fluid overload


- D5-NS @75mls/hr


Pressure suport: weaned off dopamine, will attempt to wean off norepi


Goal MAP>65, CVP 8-12, current CVP 12.


Labs:


- bld cx pending


Consult: Dr. Dejesus





Cardiovascular


Hypotensive, troponins trending up, with echo showing hypokensis and decreased 

LV function 


- trend troponins to establish peak, no indication for ASA or heparin drip as 

this is unlikely due to be ACS


- maintain Goal MAP>65, CVP 8-12


consult: dr. sandy





Renal


oliguric BO/ATN due to sepsis induced hypoperfusion, on CKD stage 3 


- IVF D5-NS @75ml/hr


- monitor I&O, ware in place, urine output 10cc/24hr, to consider HD with 

family as pt had only one kidney


- renal dosing of medications


- metabolic acidosis with anion gap, base deficit likely due to lactic acidosis


consult: Dr. Rodriguez





Pulmonary


acute hypoxic respiratory failure


- on ventilatory support


taper FiO2 to keep Spo2 >90%





Gastrointestinal


Cholangitis s/p ERCP with biliary stent placement


trend LFT's, trending down


Abdominal CT scan


consult: Dr. Celestin





Hematological


normocytic anemia - chronic, trend h/h, monitor for bleeding


leucocytosis secondary to sepsis


thrombocytopenia - likely due to sepsis, r./o DIC; trend fibrinogen and fibrin 

degradation products


maintain platelet >20,000





Dermatological


periumbical drainage


- topical antifungal


Dr Swann consulted for surgical eval; patient is high risk for operative 

intervention, recommend IR intervention for percutaneous drainage


Dr. Oleary consulted





Neurological


sedated, plan to dc sedation to assess mental status





DVT: heparin 5000 units SQ BID


Diet: NPO











Visit type





- Emergency Visit


Emergency Visit: No





- New Patient


This patient is new to me today: No





- Critical Care


Critical Care patient: Yes


Total Critical Care Time (in minutes): 45


Critical Care Statement: The care of this patient involved high complexity 

decision making to prevent further life threatening deterioration of the patient

's condition and/or to evalute & treat vital organ system(s) failure or risk of 

failure.

## 2017-05-18 NOTE — PN
Progress Note, Physician


Chief Complaint: 


ID








Icu follow up for this 87 year old with sepsis syndrome secondary to biliary 

source.  Presently remains intubated





Meropenem pending sensitivity





- Current Medication List


Current Medications: 


Active Medications





Acetaminophen (Ofirmev Injection -)  1,000 mg IVPB Q6H PRN


   PRN Reason: FEVER OR PAIN


   Stop: 05/18/17 11:17


Fentanyl (Sublimaze Injection -)  25 mcg IVPUSH [id number] PRN


   PRN Reason: PAIN


   Stop: 05/19/17 16:20


   Last Admin: 05/16/17 17:38 Dose:  25 mcg


Heparin Sodium (Porcine) (Heparin -)  5,000 unit SQ BID ALCIDES


   Last Admin: 05/17/17 21:17 Dose:  5,000 unit


Dopamine HCl/Dextrose (Dopamine 400 Mg/D5w -)  250 mls @ 16.01 mls/hr IVPB TITR 

ALCIDES; 5 MCG/KG/MIN


   PRN Reason: Protocol


   Last Admin: 05/17/17 18:27 Dose:  Not Given


Meropenem 500 mg/ Dextrose  100 mls @ 200 mls/hr IVPB BID ALCIDES


   Last Admin: 05/17/17 21:17 Dose:  200 mls/hr


Famotidine/Sodium Chloride (Pepcid 20 Mg Premixed Ivpb -)  50 mls @ 100 mls/hr 

IVPB DAILY ALCIDES


   Last Admin: 05/17/17 09:53 Dose:  100 mls/hr


Norepinephrine Bitartrate 8, (000 mcg/ Dextrose)  500 mls @ 18.75 mls/hr IV 

TITR ALCIDES; 5 MCG/MIN


   PRN Reason: Protocol


   Last Admin: 05/17/17 07:07 Dose:  37.5 mls/hr


Dextrose/Sodium Chloride (D5-Ns -)  1,000 mls @ 75 mls/hr IV ASDIR ALCIDES


   Last Admin: 05/17/17 12:00 Dose:  75 mls/hr


Midazolam HCl 100 mg/ Sodium (Chloride)  100 mls @ 1 mls/hr IVPB TITR ALCIDES; 1 MG/

HR


   PRN Reason: Protocol


   Last Admin: 05/17/17 21:15 Dose:  3 mls/hr


Morphine Sulfate (Morphine Injection -)  1 mg IVPUSH Q4H PRN


   PRN Reason: PAIN


   Last Admin: 05/16/17 09:43 Dose:  1 mg


Nystatin (Nystop Powder -)  1 applic TP BID ALCIDES


   Last Admin: 05/17/17 21:11 Dose:  1 applic











- Objective


Vital Signs: 


 Vital Signs











Temperature  98.7 F   05/18/17 06:00


 


Pulse Rate  77   05/18/17 06:00


 


Respiratory Rate  23   05/18/17 06:34


 


Blood Pressure  113/58   05/18/17 06:00


 


O2 Sat by Pulse Oximetry (%)  100   05/17/17 20:12











Constitutional: Yes: Well Nourished, No Distress, Other (INtubated\)


HENT: Yes: Other (Et tube)


Cardiovascular: Yes: Regular Rate and Rhythm, S1, S2


Respiratory: Yes: WNL, Regular, CTA Bilaterally


Gastrointestinal: Yes: WNL, Normal Bowel Sounds, Soft.  No: Tenderness, 

Tenderness, Rebound


Extremities: No: Cold, Cool, Cyanosis


Labs: 


 CBC, BMP





 05/17/17 05:15 





 05/18/17 05:45 





 INR, PTT











INR  1.34  (0.82-1.09)  H  05/17/17  05:15    


 


Fibrinogen  336.0 mg/dL (238-498)   05/16/17  11:20    














Problem List





- Problems


(1) Biliary sepsis


Code(s): K83.0 - CHOLANGITIS





(2) Bacteremia due to Gram-negative bacteria


Code(s): R78.81 - BACTEREMIA





(3) Thrombocytopenia


Code(s): D69.6 - THROMBOCYTOPENIA, UNSPECIFIED








Assessment/Plan


Microbiology





05/16/17 10:42   Blood - Peripheral Venous   Blood Culture - Preliminary


                              Lactose Fermenting Neg Bacilli


05/16/17 10:40   Blood - Peripheral Venous   Blood Culture - Preliminary


                              Pending Organism





 Laboratory Tests











  05/17/17 05/17/17 05/17/17





  05:15 05:15 18:30


 


WBC  31.1 H*  


 


Hgb  10.3 L  


 


Plt Count  48 L  


 


Neutrophils %  85.0 H D  


 


Lymphocytes %  2.0 L D  


 


Monocytes %  2.0 L  


 


Band Neutrophils  11.0 H D  


 


INR   1.34 H 


 


BUN   


 


Total Bilirubin   


 


Direct Bilirubin   


 


AST   


 


ALT   


 


Alkaline Phosphatase   


 


Troponin I    7.41 H*














  05/18/17 05/18/17





  05:45 05:45


 


WBC  


 


Hgb  


 


Plt Count  


 


Neutrophils %  


 


Lymphocytes %  


 


Monocytes %  


 


Band Neutrophils  


 


INR  


 


BUN  65 H D 


 


Total Bilirubin  7.2 H D 


 


Direct Bilirubin  6.1 H D 


 


AST  107 H 


 


ALT  58 


 


Alkaline Phosphatase  686 H 


 


Troponin I   6.99 H*








Microbiology





05/15/17 18:30   Abdomen   Gram Stain - Final


05/16/17 10:42   Blood - Peripheral Venous   Blood Culture - Preliminary


                              Lactose Fermenting Neg Bacilli


05/16/17 10:40   Blood - Peripheral Venous   Blood Culture - Preliminary


                              Pending Organism


05/15/17 18:30   Abdomen   Wound Culture - Preliminary


                              Staphylococcus Coagulase Neg


                              Diphtheroid/Corynebacterium


                              Streptococcus Viridans





Assessment  Gram negative reggie sepsis lactose 


                        Respiratory  failure


                        Post ERCP


                        Low platelets


                        Acute renal failure











Plan


Meropenem pending final culture report


Deejay ABRAHAM





Plan                Continue Me

## 2017-05-18 NOTE — PN
Progress Note (short form)





- Note


Progress Note: 


In ICU


Intubated


Hypotensive on pressors


In septic shock


 Vital Signs











 Period  Temp  Pulse  Resp  BP Sys/Badillo  Pulse Ox


 


 Last 24 Hr  98.6 F-100 F  72-87  14-29  /52-84  100-100








Abd soft





CBC,CMP











WBC  35.9 K/mm3 (4.0-10.0)  H*  05/18/17  08:30    


 


RBC  3.48 M/mm3 (4.00-5.60)  L  05/18/17  08:30    


 


Hgb  10.0 GM/dL (11.7-16.9)  L  05/18/17  08:30    


 


Hct  30.4 % (35.4-49)  L  05/18/17  08:30    


 


MCV  87.4 fl (80-96)   05/18/17  08:30    


 


MCHC  33.0 g/dl (32.0-35.9)   05/18/17  08:30    


 


RDW  16.6 % (11.9-15.9)  H  05/18/17  08:30    


 


Plt Count  43 K/MM3 (134-434)  L  05/18/17  08:30    


 


MPV  10.8 fl (7.5-11.1)   05/18/17  08:30    


 


Neutrophils %  Y   05/18/17  08:30    


 


Lymphocytes %  Y   05/18/17  08:30    


 


Monocytes %  2.0 % (3.8-10.2)  L  05/17/17  05:15    


 


Eosinophils %  0.5 % (0-4.5)   05/16/17  09:00    


 


Basophils %  0.2 % (0-2.0)   05/16/17  09:00    


 


Band Neutrophils  11.0 % (0-10)  H D 05/17/17  05:15    


 


Metamyelocytes  5 % (0-2)  H D 05/16/17  20:30    


 


Myelocytes  9 % (0-2)  H D 05/16/17  20:30    


 


Differential Comment  Manual diff done   05/16/17  06:00    


 


Dohle Bodies  1+   05/17/17  05:15    


 


Platelet Estimate  Decreased  (NORMAL)   05/17/17  05:15    


 


Platelet Comment  No clumping noted   05/17/17  05:15    


 


Polychromasia  Few   05/17/17  05:15    


 


Hypochromic-Microcytic  Few   05/17/17  05:15    


 


Sodium  135 mmol/L (136-145)  L  05/18/17  05:45    


 


Potassium  4.4 mmol/L (3.5-5.1)   05/18/17  05:45    


 


Chloride  103 mmol/L ()   05/18/17  05:45    


 


Carbon Dioxide  16 mmol/L (21-32)  L  05/18/17  05:45    


 


Anion Gap  16  (8-16)   05/18/17  05:45    


 


BUN  65 mg/dL (7-18)  H D 05/18/17  05:45    


 


Creatinine  5.2 mg/dL (0.7-1.3)  H  05/18/17  05:45    


 


Creat Clearance w eGFR  12.79  (>60)   05/17/17  05:15    


 


Random Glucose  90 mg/dL ()  D 05/18/17  05:45    


 


Lactic Acid  1.684 mmol/L (0.4-2.0)   05/17/17  13:20    


 


Calcium  7.6 mg/dL (8.5-10.1)  L  05/18/17  05:45    


 


Phosphorus  3.4 mg/dL (2.5-4.9)   05/18/17  05:45    


 


Magnesium  2.3 mg/dL (1.8-2.4)  D 05/18/17  05:45    


 


Total Bilirubin  7.2 mg/dL (0.2-1.0)  H D 05/18/17  05:45    


 


Direct Bilirubin  6.1 mg/dL (0.0-0.2)  H D 05/18/17  05:45    


 


AST  107 U/L (15-37)  H  05/18/17  05:45    


 


ALT  58 U/L (12-78)   05/18/17  05:45    


 


Alkaline Phosphatase  686 U/L ()  H  05/18/17  05:45    


 


Creatine Kinase  158 IU/L ()   05/18/17  05:45    


 


Creatine Kinase Index  11.8 % (0.0-5.0)  H*  05/17/17  13:20    


 


CK-MB (CK-2)  18.446 ng/ml (0.5-3.6)  H  05/17/17  13:20    


 


CK-MB (CK-2) Rel Index  Cancelled   05/17/17  13:20    


 


Troponin I  6.99 ng/ml (0.00-0.05)  H*  05/18/17  05:45    


 


C-Reactive Protein  21.5 MG/DL (0.00-0.3)  H  05/17/17  05:15    


 


Total Protein  4.4 g/dl (6.4-8.2)  L  05/18/17  05:45    


 


Albumin  1.7 g/dl (3.4-5.0)  L  05/18/17  05:45    


 


Total Amylase  49 U/L ()  D 05/17/17  05:15    


 


Lipase  116 U/L ()   05/17/17  05:15    














Continue ICU care


Antibiotics


Serial LFTs


If sepsis does not resolve, consider IR for percutaneous cholecystostomy


Discussed with IR yesterday- did not feel that he needed it at that time





Problem List





- Problems


(1) Umbilical discharge


Code(s): R19.8 - OTH SYMPTOMS AND SIGNS INVOLVING THE DGSTV SYS AND ABDOMEN

## 2017-05-18 NOTE — PN
Physical Exam: 


SUBJECTIVE: Patient seen and examined.  


He is intubated/sedated.








OBJECTIVE:


Worsening leukocytosis


s/p ERCP on 5/16/2017


Now in septic shock


 Vital Signs











 Period  Temp  Pulse  Resp  BP Sys/Badillo  Pulse Ox


 


 Last 24 Hr  98.6 F-99.7 F  72-80  14-24  /52-62  100-100











GENERAL: Intubated/sedated


HEAD: Normal with no signs of trauma.


NECK: Normal range of motion, supple without lymphadenopathy, JVD, or masses.


LUNGS: Breath sounds equal, clear to auscultation bilaterally. No wheezes, and 

no crackles


HEART: Regular rate and rhythm, normal S1 and S2, rub or gallop.


ABDOMEN: +erythema, sero/sang malodorous drainage to periumbilical region. 


MUSCULOSKELETAL:  Normal range of motion at all joints. No bony deformities. No 

CVA tenderness.


UPPER EXTREMITIES: 2+ pulses, warm, well-perfused. No cyanosis. No clubbing. No 

peripheral edema.


NEUROLOGICAL:   sedated


PSYCHIATRIC: sedated


SKIN: generalized jaundice

















 Laboratory Results - last 24 hr











  05/17/17 05/17/17 05/18/17





  05:15 18:30 05:45


 


WBC   


 


RBC   


 


Hgb   


 


Hct   


 


MCV   


 


MCHC   


 


RDW   


 


Plt Count   


 


MPV   


 


Neutrophils %  85.0 H D  


 


Lymphocytes %  2.0 L D  


 


Monocytes %  2.0 L  


 


Band Neutrophils  11.0 H D  


 


Differential Comment   


 


Dohle Bodies  1+  


 


Platelet Estimate  Decreased  


 


Platelet Comment  No clumping noted  


 


Polychromasia  Few  


 


Hypochromic-Microcytic  Few  


 


Fibrinogen   


 


Fibrin Degrad Products   


 


Puncture Site   


 


ABG pH   


 


ABG pCO2 at Pt Temp   


 


ABG pO2 at Pt Temp   


 


ABG HCO3   


 


ABG O2 Sat (Measured)   


 


ABG O2 Content   


 


ABG Base Excess   


 


Shyam Test   


 


O2 Delivery Device   


 


Oxygen Flow Rate   


 


Vent Mode   


 


Vent Rate   


 


Mechanical Rate   


 


PEEP   


 


Pressure Support Vent   


 


Sodium    135 L


 


Potassium    4.4


 


Chloride    103


 


Carbon Dioxide    16 L


 


Anion Gap    16


 


BUN    65 H D


 


Creatinine    5.2 H


 


Random Glucose    90  D


 


Calcium    7.6 L


 


Phosphorus    3.4


 


Magnesium    2.3  D


 


Total Bilirubin    7.2 H D


 


Direct Bilirubin    6.1 H D


 


AST    107 H


 


ALT    58


 


Alkaline Phosphatase    686 H


 


Creatine Kinase   


 


Troponin I   7.41 H* 


 


Total Protein    4.4 L


 


Albumin    1.7 L














  05/18/17 05/18/17 05/18/17





  05:45 05:45 08:30


 


WBC    35.9 H*


 


RBC    3.48 L


 


Hgb    10.0 L


 


Hct    30.4 L


 


MCV    87.4


 


MCHC    33.0


 


RDW    16.6 H


 


Plt Count    43 L


 


MPV    10.8


 


Neutrophils %    96.0 H


 


Lymphocytes %    3.0 L D


 


Monocytes %    1.0 L


 


Band Neutrophils   


 


Differential Comment    Manual diff done


 


Dohle Bodies   


 


Platelet Estimate    Decreased


 


Platelet Comment   


 


Polychromasia   


 


Hypochromic-Microcytic   


 


Fibrinogen  372.0  


 


Fibrin Degrad Products  >10 & <40  


 


Puncture Site   


 


ABG pH   


 


ABG pCO2 at Pt Temp   


 


ABG pO2 at Pt Temp   


 


ABG HCO3   


 


ABG O2 Sat (Measured)   


 


ABG O2 Content   


 


ABG Base Excess   


 


Shyam Test   


 


O2 Delivery Device   


 


Oxygen Flow Rate   


 


Vent Mode   


 


Vent Rate   


 


Mechanical Rate   


 


PEEP   


 


Pressure Support Vent   


 


Sodium   


 


Potassium   


 


Chloride   


 


Carbon Dioxide   


 


Anion Gap   


 


BUN   


 


Creatinine   


 


Random Glucose   


 


Calcium   


 


Phosphorus   


 


Magnesium   


 


Total Bilirubin   


 


Direct Bilirubin   


 


AST   


 


ALT   


 


Alkaline Phosphatase   


 


Creatine Kinase   158 


 


Troponin I   6.99 H* 


 


Total Protein   


 


Albumin   














  05/18/17 05/18/17





  11:45 13:30


 


WBC  


 


RBC  


 


Hgb  


 


Hct  


 


MCV  


 


MCHC  


 


RDW  


 


Plt Count  


 


MPV  


 


Neutrophils %  


 


Lymphocytes %  


 


Monocytes %  


 


Band Neutrophils  


 


Differential Comment  


 


Dohle Bodies  


 


Platelet Estimate  


 


Platelet Comment  


 


Polychromasia  


 


Hypochromic-Microcytic  


 


Fibrinogen  


 


Fibrin Degrad Products  


 


Puncture Site  Left radial 


 


ABG pH  7.34 L 


 


ABG pCO2 at Pt Temp  26.5 L 


 


ABG pO2 at Pt Temp  183.0 H* 


 


ABG HCO3  13.8 L* 


 


ABG O2 Sat (Measured)  99.6 H* 


 


ABG O2 Content  14.6 L 


 


ABG Base Excess  -10.5 L* 


 


Shyam Test  Positive 


 


O2 Delivery Device  Mec.vent 


 


Oxygen Flow Rate  50% 


 


Vent Mode  A/c 


 


Vent Rate  23 


 


Mechanical Rate  Esprit 


 


PEEP  5.0 


 


Pressure Support Vent  500 


 


Sodium  


 


Potassium  


 


Chloride  


 


Carbon Dioxide  


 


Anion Gap  


 


BUN  


 


Creatinine  


 


Random Glucose  


 


Calcium  


 


Phosphorus  


 


Magnesium  


 


Total Bilirubin  


 


Direct Bilirubin  


 


AST  


 


ALT  


 


Alkaline Phosphatase  


 


Creatine Kinase  


 


Troponin I   4.89 H* D


 


Total Protein  


 


Albumin  








Active Medications











Generic Name Dose Route Start Last Admin





  Trade Name Freq  PRN Reason Stop Dose Admin


 


Fentanyl  25 mcg 05/16/17 16:19 05/16/17 17:38





  Sublimaze Injection -  IVPUSH 05/19/17 16:20  25 mcg





  [id number] PRN   Administration





  PAIN   


 


Heparin Sodium (Porcine)  5,000 unit 05/17/17 22:00 05/18/17 09:01





  Heparin -  SQ   5,000 unit





  BID ALCIDES   Administration


 


Meropenem 500 mg/ Dextrose  100 mls @ 200 mls/hr 05/16/17 22:00 05/18/17 09:01





  IVPB   200 mls/hr





  BID ALCIDES   Administration


 


Famotidine/Sodium Chloride  50 mls @ 100 mls/hr 05/16/17 22:30 05/18/17 09:01





  Pepcid 20 Mg Premixed Ivpb -  IVPB   100 mls/hr





  DAILY ALCIDES   Administration


 


Norepinephrine Bitartrate 8,  500 mls @ 18.75 mls/hr 05/17/17 06:45 05/18/17 17:

00





  000 mcg/ Dextrose  IV   6 mcg/min





  TITR ALCIDES   Titration





  Protocol   





  5 MCG/MIN   


 


Dextrose/Sodium Chloride  1,000 mls @ 75 mls/hr 05/17/17 11:30 05/18/17 13:34





  D5-Ns -  IV   75 mls/hr





  ASDIR ALCIDES   Administration


 


Morphine Sulfate  1 mg 05/16/17 08:45 05/16/17 09:43





  Morphine Injection -  IVPUSH   1 mg





  Q4H PRN   Administration





  PAIN   


 


Nystatin  1 applic 05/17/17 22:00 05/18/17 09:01





  Nystop Powder -  TP   1 applic





  BID ALCIDES   Administration











ASSESSMENT/PLAN:





Patient is a 87 year old male with a significant past medical history of 

hypertension, CAD s/p stent placement, aortic stenosis, porcine valve 

replacement (takes both ASA 81mg and Plavix 75mg), urtheral strictures s/p 

dilatation and right nephrectomy. 





He was admitted on 5/15/2017 for abdominal pain, acute choledocholithiasis, 

acute transaminitis, jaundice and leukocytosis and shaking chills.  





ID:


Septic Shock likely secondary to periumbilicus cellulitis vs. cholangitis 


Periumbilical cellulitis - chronic


Assessment: Wound culture pending


Likely will need drainage from this site


foul odor with some sero sang/clear discharge


On Enterpenem since 5/16


Surgical consult for likely drainage of abscess once pt more stable


blood cultures pending, ID following


Now intubated/sedated/on pressors





GI:


Acute Choledocholithiasis/abdominal pain/generalized jaundice/acute 

transaminitis


Assessment/Plan: Cholangitis s/p ERCP with biliary stent placement on 5/16


Trend LFT's


On Enterpenem per ID since 5/16


Blood cultures ordered 


Intubated after procedure, on pressorrs





:


Chronic Kidney Disease - acute renal failure


Renal cancer s/p right nephrectomy


Assessment/Plan: Creatinine 5.2, baseline ~ 1.7


Renal consulted and following, pt may need dialysis if continues to trend up as 

per renal


On IVF, Trend bun/creat.





Cardiology:


Hypertension - chronic


Assessment/Plan: hypotensive now, on pressors





Elevated troponins - acute


Assessment/Plan: Troponins continue to trend up, peaked at 7.4


Likely secondary demand ischemia and sepsis


Continue to trend troponins


Cardiologist following





CAD s/p stent placement


On ASA 81mg and Plavix 75mg - on hold





Hematology:


Thrombocytopenia


Assessment/Plan:  likely secondary to sepsis


Monitor





F.E.N.


Fluids: d5 NS @75cc/hr


Electrolytes: monitor bmp


Nutrition: intubated/npo





Prophylaxis:


DVT: SCDs, Heparin BID


GI: Protonix





Code Status:  Requires inpatient hospitalization.   Full Code











Visit type





- Emergency Visit


Emergency Visit: Yes


ED Registration Date: 05/15/17


Care time: The patient presented to the Emergency Department on the above date 

and was hospitalized for further evaluation of their emergent condition.





- New Patient


This patient is new to me today: No





- Critical Care


Critical Care patient: Yes


Total Critical Care Time (in minutes): 45


Critical Care Statement: The care of this patient involved high complexity 

decision making to prevent further life threatening deterioration of the patient

's condition and/or to evalute & treat vital organ system(s) failure or risk of 

failure.





- Discharge Referral


Referred to [facility] No

## 2017-05-18 NOTE — PN
Progress Note, Physician


History of Present Illness: 


Post ERCP balloon sphincteroplasty and stenting, stone extrction not performed 

as sphincterotomy high risk for bleeding in the setting of dual antiplatelet 

therapy, currently in E. coli septic shock on pressors. Sedated and maintained 

on mechanical ventilation.











- Current Medication List


Current Medications: 


Active Medications





Fentanyl (Sublimaze Injection -)  25 mcg IVPUSH [id number] PRN


   PRN Reason: PAIN


   Stop: 05/19/17 16:20


   Last Admin: 05/16/17 17:38 Dose:  25 mcg


Heparin Sodium (Porcine) (Heparin -)  5,000 unit SQ BID [facility]


   Last Admin: 05/18/17 09:01 Dose:  5,000 unit


Dopamine HCl/Dextrose (Dopamine 400 Mg/D5w -)  250 mls @ 16.01 mls/hr IVPB TITR 

ALCIDES; 5 MCG/KG/MIN


   PRN Reason: Protocol


   Last Admin: 05/17/17 18:27 Dose:  Not Given


Meropenem 500 mg/ Dextrose  100 mls @ 200 mls/hr IVPB BID [facility]


   Last Admin: 05/18/17 09:01 Dose:  200 mls/hr


Famotidine/Sodium Chloride (Pepcid 20 Mg Premixed Ivpb -)  50 mls @ 100 mls/hr 

IVPB DAILY [facility]CID[facility]


   Last Admin: 05/18/17 09:01 Dose:  100 mls/hr


Norepinephrine Bitartrate 8, (000 mcg/ Dextrose)  500 mls @ 18.75 mls/hr IV 

TITR ALCIDES; 5 MCG/MIN


   PRN Reason: Protocol


   Last Admin: 05/18/17 08:50 Dose:  37.5 mls/hr


Dextrose/Sodium Chloride (D5-Ns -)  1,000 mls @ 75 mls/hr IV ASDIR ALCIDES


   Last Admin: 05/17/17 12:00 Dose:  75 mls/hr


Midazolam HCl 100 mg/ Sodium (Chloride)  100 mls @ 1 mls/hr IVPB TITR ALCIDES; 1 MG/

HR


   PRN Reason: Protocol


   Last Admin: 05/17/17 21:15 Dose:  3 mls/hr


Morphine Sulfate (Morphine Injection -)  1 mg IVPUSH Q4H PRN


   PRN Reason: PAIN


   Last Admin: 05/16/17 09:43 Dose:  1 mg


Nystatin (Nystop Powder -)  1 applic TP BID [facility]


   Last Admin: 05/18/17 09:01 Dose:  1 applic











- Objective


Vital Signs: 


 Vital Signs











Temperature  98.9 F   05/18/17 10:00


 


Pulse Rate  72   05/18/17 12:00


 


Respiratory Rate  23   05/18/17 12:00


 


Blood Pressure  112/55   05/18/17 12:00


 


O2 Sat by Pulse Oximetry (%)  100   05/18/17 09:00











Constitutional: Yes: No Distress, Calm


Neck: Yes: Supple


Cardiovascular: Yes: Regular Rate and Rhythm


Respiratory: Yes: Intubated, Mechanically Ventilated, Rhonchi


Gastrointestinal: Yes: Soft, Hypoactive Bowel Sounds


Edema: Yes


Edema: LLE: Trace, RLE: Trace


Labs: 


 CBC, BMP





 05/18/17 08:30 





 05/18/17 05:45 





 INR, PTT











INR  1.34  (0.82-1.09)  H  05/17/17  05:15    


 


Fibrinogen  372.0 mg/dL (238-498)   05/18/17  05:45    














Problem List





- Problems


(1) BO (acute kidney injury)


Code(s): N17.9 - ACUTE KIDNEY FAILURE, UNSPECIFIED





(2) Bacteremia due to Gram-negative bacteria


Code(s): R78.81 - BACTEREMIA





(3) Biliary sepsis


Code(s): K83.0 - CHOLANGITIS





(4) CAD (coronary artery disease)


Code(s): I25.10 - ATHSCL HEART DISEASE OF NATIVE CORONARY ARTERY W/O ANG PCTRS 

  Qualifiers: 


     Coronary Disease-Associated Artery/Lesion type: unspecified vessel or 

lesion type     Native vs. transplanted heart: native heart     Associated 

angina: without angina        Qualified Code(s): I25.10 - Atherosclerotic heart 

disease of native coronary artery without angina pectoris  





(5) CKD (chronic kidney disease)


Code(s): N18.9 - CHRONIC KIDNEY DISEASE, UNSPECIFIED   





(6) Calculus of common duct with obstruction


Code(s): K80.51 - CALCULUS OF BILE DUCT W/O CHOLANGITIS OR CHOLECYST W OBST





(7) History of percutaneous coronary intervention


Code(s): Z98.890 - OTHER SPECIFIED POSTPROCEDURAL STATES





(8) S/P ERCP


Code(s): Z98.890 - OTHER SPECIFIED POSTPROCEDURAL STATES





(9) Thrombocytopenia


Code(s): D69.6 - THROMBOCYTOPENIA, UNSPECIFIED





(10) Acute hypoxemic respiratory failure


Code(s): J96.01 - ACUTE RESPIRATORY FAILURE WITH HYPOXIA





(11) Gram negative septic shock


Code(s): A41.50 - GRAM-NEGATIVE SEPSIS, UNSPECIFIED


R65.21 - SEVERE SEPSIS WITH SEPTIC SHOCK





(12) Left bundle branch block


Code(s): I44.7 - LEFT BUNDLE-BRANCH BLOCK, UNSPECIFIED





(13) Demand ischemia


Code(s): I24.8 - OTHER FORMS OF ACUTE ISCHEMIC HEART DISEASE








Assessment/Plan


5/17/2017 Echo: Severely decreased LV fxn, mod-severe RV fxn, mod MR, mild TR, 

pleural effusion significantly worsened from previous study likely referable to 

severe sepsis





1. Acute hypoxemic respiratory failure on mechanical ventilation


2. Biliary septic shock (E. coli), cholangitis post ERCP, balloon 

sphincteroplasty and stent placement with leukocytosis and lactic acidosis


3. Post TAVR for severe AS


4. CAD, S/P PCI/stent with demand ischemic injury


5. Severe biventricular dysfunction referable to sepsis


6. H/o Hypertension currently hypotensive requiring pressors


7. Renal CA S/P right nephrectomy


8. Anuric acute on CKD referable to sepsis


9. Thrombocytopenia due to sepsis





PLAN:


1. Wean Levophed gtt to maintain MAP>65 mmHg.


2. Continue antibiotics coverage as per ID and C&S


3. Trops have peaked, monitor renal trajectory, may require sustained low 

efficiency dialysis, wean vent per ABG


4. DVT and GI prophylaxis





Cardiologist: Clint Sanabria MD ([facility])

## 2017-05-18 NOTE — PN
Teaching Attending Note


Name of Resident: Devan Rodriguez


ATTENDING PHYSICIAN STATEMENT





I saw and evaluated the patient.


I reviewed the resident's note and discussed the case with the resident.


I agree with the resident's findings and plan as documented.








SUBJECTIVE:





Pt seen and examined in the ICU. Remains intubated, sedated. On levophed gtt, 

off dopamine gtt.





OBJECTIVE:





 Last Vital Signs











Temp Pulse Resp BP Pulse Ox


 


 98.9 F   72   23   112/55   100 


 


 05/18/17 10:00  05/18/17 12:00  05/18/17 12:18  05/18/17 12:00  05/18/17 09:00








 Intake & Output











 05/15/17 05/16/17 05/17/17 05/18/17





 23:59 23:59 23:59 23:59


 


Intake Total  4050 4459 2486


 


Output Total  300 510 0


 


Balance  3750 3949 2486


 


Weight 188 lb 188 lb 4 oz 203 lb 7.787 oz 211 lb 6.773 oz








Gen:  intubated, sedated, jaundiced


Heart:  RRR


Lung: decreased breath sounds at the bases


Abd: soft, nontender


Ext: no edema





 CBC, BMP





 05/18/17 08:30 





 05/18/17 05:45 





 INR, PTT











INR  1.34  (0.82-1.09)  H  05/17/17  05:15    


 


Fibrinogen  372.0 mg/dL (238-498)   05/18/17  05:45    











Active Medications





Fentanyl (Sublimaze Injection -)  25 mcg IVPUSH [id number] PRN


   PRN Reason: PAIN


   Stop: 05/19/17 16:20


   Last Admin: 05/16/17 17:38 Dose:  25 mcg


Heparin Sodium (Porcine) (Heparin -)  5,000 unit SQ BID [facility]ID[facility]


   Last Admin: 05/18/17 09:01 Dose:  5,000 unit


Dopamine HCl/Dextrose (Dopamine 400 Mg/D5w -)  250 mls @ 16.01 mls/hr IVPB TITR 

ALCIDES; 5 MCG/KG/MIN


   PRN Reason: Protocol


   Last Admin: 05/17/17 18:27 Dose:  Not Given


Meropenem 500 mg/ Dextrose  100 mls @ 200 mls/hr IVPB BID [facility]


   Last Admin: 05/18/17 09:01 Dose:  200 mls/hr


Famotidine/Sodium Chloride (Pepcid 20 Mg Premixed Ivpb -)  50 mls @ 100 mls/hr 

IVPB DAILY [facility]


   Last Admin: 05/18/17 09:01 Dose:  100 mls/hr


Norepinephrine Bitartrate 8, (000 mcg/ Dextrose)  500 mls @ 18.75 mls/hr IV 

TITR ALCIDES; 5 MCG/MIN


   PRN Reason: Protocol


   Last Admin: 05/18/17 08:50 Dose:  37.5 mls/hr


Dextrose/Sodium Chloride (D5-Ns -)  1,000 mls @ 75 mls/hr IV ASDIR [facility]


   Last Admin: 05/18/17 13:34 Dose:  75 mls/hr


Morphine Sulfate (Morphine Injection -)  1 mg IVPUSH Q4H PRN


   PRN Reason: PAIN


   Last Admin: 05/16/17 09:43 Dose:  1 mg


Nystatin (Nystop Powder -)  1 applic TP BID [facility]


   Last Admin: 05/18/17 09:01 Dose:  1 applic








ASSESSMENT AND PLAN:


Acute Cholangitis


Gram Negative Bacteremia


s/p ERCP/sphincteroplasty/stent placement


Acute Hypoxic Respiratory Failure


Septic Shock


Acute on Chronic Renal Failure


Lactic Acidosis


CAD


+Troponins likely Demand Ischemia


s/p TAVR for severe Aortic Stenosis


Thrombocytopenia





-  continue antibiotics


-  f/u final cultures


-  repeat ERCP with sphincterotomy when plavix effect diminished


-  IVF to keep CVP 8-12


-  titrate pressors to maintain MAP >65


-  taper FiO2 to keep SpO2 >90%


-  monitor platelets/coags/fibrinogen levels


-  trend troponins to document peak


-  monitor urine output, creatinine


-  trend lactate


-  NPO


-  lighten sedation to assess mental status


-  spontaneous breathing trials when mental status improved


-  DVT/GI prophylaxis


-  continue ICU monitoring











critical care time spent in reviewing chart, evaluating patient and formulating 

plan 40 min

## 2017-05-19 LAB
ALBUMIN SERPL-MCNC: 1.4 G/DL (ref 3.4–5)
ALP SERPL-CCNC: 619 U/L (ref 45–117)
ALT SERPL-CCNC: 49 U/L (ref 12–78)
ANION GAP SERPL CALC-SCNC: 16 MMOL/L (ref 8–16)
ART PUNCT SITE: (no result)
ARTERIAL PATENCY WRIST A: POSITIVE
AST SERPL-CCNC: 93 U/L (ref 15–37)
BASE EXCESS BLDA CALC-SCNC: -11 MEQ/L (ref -2–2)
BASOPHILS # BLD: 0.2 % (ref 0–2)
BILIRUB SERPL-MCNC: 5.8 MG/DL (ref 0.2–1)
CALCIUM SERPL-MCNC: 7.4 MG/DL (ref 8.5–10.1)
CO2 SERPL-SCNC: 15 MMOL/L (ref 21–32)
COCKROFT - GAULT: 12
CREAT SERPL-MCNC: 5.8 MG/DL (ref 0.7–1.3)
DEPRECATED RDW RBC AUTO: 17 % (ref 11.9–15.9)
DEPRECATED RDW RBC AUTO: 17.2 % (ref 11.9–15.9)
EOSINOPHIL # BLD: 1.8 % (ref 0–4.5)
GLUCOSE SERPL-MCNC: 86 MG/DL (ref 74–106)
HCO3 BLDA-SCNC: 13.6 MEQ/L (ref 22–26)
LPM/O2%: (no result)
MAGNESIUM SERPL-MCNC: 2.1 MG/DL (ref 1.8–2.4)
MCH RBC QN AUTO: 29 PG (ref 25.7–33.7)
MCH RBC QN AUTO: 29 PG (ref 25.7–33.7)
MCHC RBC AUTO-ENTMCNC: 33.3 G/DL (ref 32–35.9)
MCHC RBC AUTO-ENTMCNC: 33.5 G/DL (ref 32–35.9)
MCV RBC: 86.8 FL (ref 80–96)
MCV RBC: 87.1 FL (ref 80–96)
MECH. VENT.: YES
NEUTROPHILS # BLD: 91.5 % (ref 42.8–82.8)
PEEP SETTING VENT: 5 CMH2O
PHOSPHATE SERPL-MCNC: 3.8 MG/DL (ref 2.5–4.9)
PLATELET # BLD AUTO: 38 K/MM3 (ref 134–434)
PLATELET # BLD AUTO: 55 K/MM3 (ref 134–434)
PMV BLD: 10.2 FL (ref 7.5–11.1)
PMV BLD: 10.5 FL (ref 7.5–11.1)
PO2 BLDA: 158 MMHG (ref 68–100)
PROT SERPL-MCNC: 3.8 G/DL (ref 6.4–8.2)
PT. ON O2?: YES
SAO2 % BLDA: 99.3 % (ref 90–98.9)
TROPONIN I SERPL-MCNC: 3.1 NG/ML (ref 0–0.05)
TYPE OF O2: (no result)
VENT RATE: 14
VT/PRESS: 500
WBC # BLD AUTO: 17.7 K/MM3 (ref 4–10)
WBC # BLD AUTO: 19 K/MM3 (ref 4–10)

## 2017-05-19 PROCEDURE — 30233R1 TRANSFUSION OF NONAUTOLOGOUS PLATELETS INTO PERIPHERAL VEIN, PERCUTANEOUS APPROACH: ICD-10-PCS | Performed by: NURSE PRACTITIONER

## 2017-05-19 RX ADMIN — DEXTROSE MONOHYDRATE SCH: 50 INJECTION, SOLUTION INTRAVENOUS at 06:45

## 2017-05-19 RX ADMIN — CHLORHEXIDINE GLUCONATE SCH APPLIC: 213 SOLUTION TOPICAL at 21:16

## 2017-05-19 RX ADMIN — HEPARIN SODIUM SCH UNIT: 5000 INJECTION, SOLUTION INTRAVENOUS; SUBCUTANEOUS at 11:53

## 2017-05-19 RX ADMIN — DEXTROSE AND SODIUM CHLORIDE SCH MLS/HR: 5; 900 INJECTION, SOLUTION INTRAVENOUS at 11:30

## 2017-05-19 RX ADMIN — HEPARIN SODIUM SCH UNIT: 5000 INJECTION, SOLUTION INTRAVENOUS; SUBCUTANEOUS at 21:15

## 2017-05-19 RX ADMIN — SODIUM BICARBONATE SCH MLS/HR: 84 INJECTION, SOLUTION INTRAVENOUS at 15:30

## 2017-05-19 RX ADMIN — DEXTROSE MONOHYDRATE SCH MLS/HR: 50 INJECTION, SOLUTION INTRAVENOUS at 23:35

## 2017-05-19 RX ADMIN — NYSTATIN SCH APPLIC: 100000 POWDER TOPICAL at 09:00

## 2017-05-19 RX ADMIN — DEXTROSE AND SODIUM CHLORIDE SCH: 5; 900 INJECTION, SOLUTION INTRAVENOUS at 11:30

## 2017-05-19 RX ADMIN — NYSTATIN SCH APPLIC: 100000 POWDER TOPICAL at 21:18

## 2017-05-19 RX ADMIN — CEFAZOLIN SCH MLS/HR: 1 INJECTION, POWDER, FOR SOLUTION INTRAVENOUS at 13:09

## 2017-05-19 RX ADMIN — FAMOTIDINE SCH MLS/HR: 20 INJECTION, SOLUTION INTRAVENOUS at 09:00

## 2017-05-19 RX ADMIN — URSODIOL SCH MG: 300 CAPSULE ORAL at 21:15

## 2017-05-19 RX ADMIN — CEFAZOLIN SCH MLS/HR: 1 INJECTION, POWDER, FOR SOLUTION INTRAVENOUS at 21:15

## 2017-05-19 RX ADMIN — CHLORHEXIDINE GLUCONATE 0.12% ORAL RINSE SCH ML: 1.2 LIQUID ORAL at 21:16

## 2017-05-19 NOTE — PN
Progress Note, Physician


History of Present Illness: 


Pt seen and examined at bedside. He remain in the ICU. He remains oliguric and 

on pressors. 





- Current Medication List


Current Medications: 


Active Medications





Fentanyl (Sublimaze Injection -)  25 mcg IVPUSH [id number] PRN


   PRN Reason: PAIN


   Stop: 05/19/17 16:20


   Last Admin: 05/16/17 17:38 Dose:  25 mcg


Heparin Sodium (Porcine) (Heparin -)  5,000 unit SQ BID [facility]


   Last Admin: 05/19/17 11:53 Dose:  5,000 unit


Famotidine/Sodium Chloride (Pepcid 20 Mg Premixed Ivpb -)  50 mls @ 100 mls/hr 

IVPB DAILY [facility]


   Last Admin: 05/19/17 09:00 Dose:  100 mls/hr


Norepinephrine Bitartrate 8, (000 mcg/ Dextrose)  500 mls @ 18.75 mls/hr IV 

TITR ALCIDES; 5 MCG/MIN


   PRN Reason: Protocol


   Last Admin: 05/19/17 06:45 Dose:  Not Given


Dextrose/Sodium Chloride (D5-Ns -)  1,000 mls @ 75 mls/hr IV ASDIR [facility]


   Last Admin: 05/19/17 11:30 Dose:  75 mls/hr


Cefazolin Sodium 0.5 gm/ (Dextrose)  50 mls @ 100 mls/hr IVPB BID [facility]


   Last Admin: 05/19/17 13:09 Dose:  100 mls/hr


Nystatin (Nystop Powder -)  1 applic TP BID [facility]


   Last Admin: 05/19/17 09:00 Dose:  1 applic


Ursodiol (Actigal -)  300 mg PO BID [facility]











- Objective


Vital Signs: 


 Vital Signs











Temperature  97.9 F   05/19/17 14:00


 


Pulse Rate  78   05/19/17 14:00


 


Respiratory Rate  20   05/19/17 14:15


 


Blood Pressure  101/61   05/19/17 14:00


 


O2 Sat by Pulse Oximetry (%)  100   05/19/17 08:00











Constitutional: Yes: Calm


Eyes: Yes: Sclera Icterus


HENT: Yes: Atraumatic


Neck: Yes: Supple


Cardiovascular: Yes: S1, S2


Respiratory: Yes: Mechanically Ventilated


Gastrointestinal: Yes: Soft


Genitourinary: Yes: Chadwick Present, Oliguria


Musculoskeletal: Yes: Muscle Weakness


Edema: Yes


Edema: LUE: 1+, RUE: 1+, LLE: 1+, RLE: 1+


Neurological: Yes: Lethargy


Labs: 


 CBC, BMP





 05/19/17 05:35 





 05/19/17 05:35 





 INR, PTT











INR  1.34  (0.82-1.09)  H  05/17/17  05:15    


 


Fibrinogen  372.0 mg/dL (238-498)   05/18/17  05:45    














- ....Imaging


Chest X-ray: Report Reviewed





Problem List





- Problems


(1) CAD (coronary artery disease)


Code(s): I25.10 - ATHSCL HEART DISEASE OF NATIVE CORONARY ARTERY W/O ANG PCTRS 

  Qualifiers: 


     Coronary Disease-Associated Artery/Lesion type: unspecified vessel or 

lesion type     Native vs. transplanted heart: native heart     Associated 

angina: without angina        Qualified Code(s): I25.10 - Atherosclerotic heart 

disease of native coronary artery without angina pectoris  





(2) Calculus of common duct with obstruction


Code(s): K80.51 - CALCULUS OF BILE DUCT W/O CHOLANGITIS OR CHOLECYST W OBST





(3) Choledocholithiasis


Code(s): K80.50 - CALCULUS OF BILE DUCT W/O CHOLANGITIS OR CHOLECYST W/O OBST





(4) Renal cancer


Code(s): C64.9 - MALIGNANT NEOPLASM OF UNSP KIDNEY, EXCEPT RENAL PELVIS   

Qualifiers: 


     Laterality: right        Qualified Code(s): C64.1 - Malignant neoplasm of 

right kidney, except renal pelvis  





(5) BO (acute kidney injury)


Code(s): N17.9 - ACUTE KIDNEY FAILURE, UNSPECIFIED





(6) CKD (chronic kidney disease)


Code(s): N18.9 - CHRONIC KIDNEY DISEASE, UNSPECIFIED   








Assessment/Plan


 Current Medications











Generic Name Dose Route Start Last Admin





  Trade Name Freq  PRN Reason Stop Dose Admin


 


Fentanyl  25 mcg 05/16/17 16:19 05/16/17 17:38





  Sublimaze Injection -  IVPUSH 05/19/17 16:20  25 mcg





  [id number] PRN   Administration





  PAIN   


 


Heparin Sodium (Porcine)  5,000 unit 05/17/17 22:00 05/19/17 11:53





  Heparin -  SQ   5,000 unit





  BID ALCIDES   Administration


 


Famotidine/Sodium Chloride  50 mls @ 100 mls/hr 05/16/17 22:30 05/19/17 09:00





  Pepcid 20 Mg Premixed Ivpb -  IVPB   100 mls/hr





  DAILY ALCIDES   Administration


 


Norepinephrine Bitartrate 8,  500 mls @ 18.75 mls/hr 05/17/17 06:45 05/19/17 06:

45





  000 mcg/ Dextrose  IV   Not Given





  TITR ALCIDES   





  Protocol   





  5 MCG/MIN   


 


Dextrose/Sodium Chloride  1,000 mls @ 75 mls/hr 05/17/17 11:30 05/19/17 11:30





  D5-Ns -  IV   75 mls/hr





  ASDIR ALCIDES   Administration


 


Cefazolin Sodium 0.5 gm/  50 mls @ 100 mls/hr 05/19/17 10:00 05/19/17 13:09





  Dextrose  IVPB   100 mls/hr





  BID ALCIDES   Administration


 


Nystatin  1 applic 05/17/17 22:00 05/19/17 09:00





  Nystop Powder -  TP   1 applic





  BID ALCIDES   Administration


 


Ursodiol  300 mg 05/19/17 22:00  





  Actigal -  PO   





  BID ALCIDES   














Impression


1. CKD stage 3


2. BO


3. choledocholithiasis


4. CAD


5. hx of Renal Cell Cancer s/p nephroectomy


6. aortic stenosis


7. sepsis


8. acute respiratory failure requiring intubation


9. NSTEMI


10. bacteremia





Plan


- urine output has picked up today, however he remains oliguic


- unable to reach family by phone


- discussed possibility of HD with family last night however they are not 

decided


- repeat labs in am


- cont pressor support


- will add bicarb to fluids


- feeds as per GI


- vent support


- discussed with ICU team


- monitor in ICU








Dr Rodriguez

## 2017-05-19 NOTE — PN
Teaching Attending Note


Name of Resident: Devan Rodriguez


ATTENDING PHYSICIAN STATEMENT





I saw and evaluated the patient.


I reviewed the resident's note and discussed the case with the resident.


I agree with the resident's findings and plan as documented.








SUBJECTIVE:


Patient seen and examined in the ICU.  Remains intubated on AC mode of vent.  

Poorly responsive. 


Remains on Levophed drip for hemodynamic support. 





CXR: Some mild improvement in basilar airspace disease. 





OBJECTIVE:





 Last Vital Signs











Temp Pulse Resp BP Pulse Ox


 


 98.9 F   72   23   112/55   100 


 


 05/18/17 10:00  05/18/17 12:00  05/18/17 12:18  05/18/17 12:00  05/18/17 09:00








 Intake & Output











 05/15/17 05/16/17 05/17/17 05/18/17





 23:59 23:59 23:59 23:59


 


Intake Total  4050 4459 2486


 


Output Total  300 510 0


 


Balance  3750 3949 2486


 


Weight 188 lb 188 lb 4 oz 203 lb 7.787 oz 211 lb 6.773 oz











Gen:  intubated, poorly responsive 


Heart:  RRR


Lung: decreased breath sounds at the bases


Abd: soft, nontender


Ext: no edema








 Laboratory Results - last 24 hr











  05/18/17 05/18/17 05/18/17





  05:45 08:30 11:45


 


WBC   


 


RBC   


 


Hgb   


 


Hct   


 


MCV   


 


MCHC   


 


RDW   


 


Plt Count   


 


MPV   


 


Neutrophils %   96.0 H 


 


Lymphocytes %   3.0 L D 


 


Monocytes %   1.0 L 


 


Differential Comment   Manual diff done 


 


Platelet Estimate   Decreased 


 


PTT (Actin FS)   


 


Puncture Site    Left radial


 


ABG pH    7.34 L


 


ABG pCO2 at Pt Temp    26.5 L


 


ABG pO2 at Pt Temp    183.0 H*


 


ABG HCO3    13.8 L*


 


ABG O2 Sat (Measured)    99.6 H*


 


ABG O2 Content    14.6 L


 


ABG Base Excess    -10.5 L*


 


Shyam Test    Positive


 


O2 Delivery Device    Mec.vent


 


Oxygen Flow Rate    50%


 


Vent Mode    A/c


 


Vent Rate    23


 


Mechanical Rate    Esprit


 


PEEP    5.0


 


Pressure Support Vent    500


 


Sodium   


 


Potassium   


 


Chloride   


 


Carbon Dioxide   


 


Anion Gap   


 


BUN   


 


Creatinine   


 


Creat Clearance w eGFR   


 


Random Glucose   


 


Calcium   


 


Phosphorus   


 


Magnesium   


 


Total Bilirubin   


 


AST   


 


ALT   


 


Alkaline Phosphatase   


 


CK-MB (CK-2) Rel Index  Cancelled  


 


Troponin I   


 


Total Protein   


 


Albumin   














  05/18/17 05/19/17 05/19/17





  13:30 05:35 05:35


 


WBC   19.0 H D 


 


RBC   3.09 L 


 


Hgb   9.0 L 


 


Hct   26.8 L 


 


MCV   86.8 


 


MCHC   33.5 


 


RDW   17.2 H 


 


Plt Count   38 L 


 


MPV   10.5 


 


Neutrophils %   


 


Lymphocytes %   


 


Monocytes %   


 


Differential Comment   


 


Platelet Estimate   


 


PTT (Actin FS)    33.6  D


 


Puncture Site   


 


ABG pH   


 


ABG pCO2 at Pt Temp   


 


ABG pO2 at Pt Temp   


 


ABG HCO3   


 


ABG O2 Sat (Measured)   


 


ABG O2 Content   


 


ABG Base Excess   


 


Shyam Test   


 


O2 Delivery Device   


 


Oxygen Flow Rate   


 


Vent Mode   


 


Vent Rate   


 


Mechanical Rate   


 


PEEP   


 


Pressure Support Vent   


 


Sodium   


 


Potassium   


 


Chloride   


 


Carbon Dioxide   


 


Anion Gap   


 


BUN   


 


Creatinine   


 


Creat Clearance w eGFR   


 


Random Glucose   


 


Calcium   


 


Phosphorus   


 


Magnesium   


 


Total Bilirubin   


 


AST   


 


ALT   


 


Alkaline Phosphatase   


 


CK-MB (CK-2) Rel Index   


 


Troponin I  4.89 H* D  


 


Total Protein   


 


Albumin   














  05/19/17 05/19/17 05/19/17





  05:35 05:35 07:15


 


WBC   


 


RBC   


 


Hgb   


 


Hct   


 


MCV   


 


MCHC   


 


RDW   


 


Plt Count   


 


MPV   


 


Neutrophils %   


 


Lymphocytes %   


 


Monocytes %   


 


Differential Comment   


 


Platelet Estimate   


 


PTT (Actin FS)   


 


Puncture Site    Right radial


 


ABG pH    7.32 L


 


ABG pCO2 at Pt Temp    27.4 L


 


ABG pO2 at Pt Temp    158.0 H* D


 


ABG HCO3    13.6 L*


 


ABG O2 Sat (Measured)    99.3 H


 


ABG O2 Content    15.3


 


ABG Base Excess    -11.0 L*


 


Shyam Test    Positive


 


O2 Delivery Device    Vent


 


Oxygen Flow Rate    40%


 


Vent Mode    A/c


 


Vent Rate    14


 


Mechanical Rate    Yes


 


PEEP    5.0


 


Pressure Support Vent    500


 


Sodium  134 L  


 


Potassium  4.4  


 


Chloride  103  


 


Carbon Dioxide  15 L  


 


Anion Gap  16  


 


BUN  67 H  


 


Creatinine  5.8 H  


 


Creat Clearance w eGFR  9.30  


 


Random Glucose  86  


 


Calcium  7.4 L  


 


Phosphorus   3.8 


 


Magnesium   2.1 


 


Total Bilirubin  5.8 H  


 


AST  93 H  


 


ALT  49  


 


Alkaline Phosphatase  619 H  


 


CK-MB (CK-2) Rel Index   


 


Troponin I   3.10 H* D 


 


Total Protein  3.8 L  


 


Albumin  1.4 L  











ASSESSMENT AND PLAN:


Acute Cholangitis


Gram Negative Bacteremia


s/p ERCP/sphincteroplasty/stent placement


Acute Hypoxic Respiratory Failure


Septic Shock


Acute on Chronic Renal Failure


Lactic Acidosis


CAD


+Troponins likely Demand Ischemia


s/p TAVR for severe Aortic Stenosis


Thrombocytopenia











-  ABX per ID 


-  Repeat ERCP with sphincterotomy when plavix effect diminished


-  IVF to keep CVP 8-12


-  titrate pressors to maintain MAP >65


-  taper FiO2 to keep SpO2 >90%


-  monitor platelets/coags/fibrinogen levels


-  monitor urine output, creatinine


-  Monitor off sedation to assess mental status


-  spontaneous breathing trials when mental status improved


-  DVT/GI prophylaxis


-  continue ICU monitoring








Dr Sidhu 


critical care time spent in reviewing chart, evaluating patient and formulating 

plan 40 min

## 2017-05-19 NOTE — PN
Physical Exam: 


SUBJECTIVE: Patient seen and examined





pt off sedation. no response to verbal or noxious stimuli





family meeting to discuss GOC





OBJECTIVE:





 Vital Signs











 Period  Temp  Pulse  Resp  BP Sys/[place]illo  Pulse Ox


 


 Last 24 Hr  97.5 F-99.2 F  64-78  18-23  /53-70  100-100











EYES: sluggish pupilary response, conjunctiva clear. No ptosis. 


ENT:  oropharynx with endotracheal tube and OG tube, moist mucous membranes.


LUNGS: Breath sounds equal, clear to auscultation bilaterally, dminished breath 

sounds at bases 


HEART: Regular rate and rhythm, S1, S2 without murmur, rub or gallop.


ABDOMEN: Soft, nondistended, multiple diffuse palpable soft mobile nodularities

,  normoactive bowel sounds, periumbilical erythema with demuted skin laterally 

along skin fold, scant serous discharge covered with nystatin power


EXTREMITIES: 2+ pulses in b/l radial and DP, warm, well-perfused, no edema. scd'

s in place


SKIN: Warm, jaundiced. 


Ware in place














 Laboratory Results - last 24 hr











  05/18/17 05/19/17 05/19/17





  05:45 05:35 05:35


 


WBC   19.0 H D 


 


RBC   3.09 L 


 


Hgb   9.0 L 


 


Hct   26.8 L 


 


MCV   86.8 


 


MCHC   33.5 


 


RDW   17.2 H 


 


Plt Count   38 L 


 


MPV   10.5 


 


PTT (Actin FS)    33.6  D


 


Puncture Site   


 


ABG pH   


 


ABG pCO2 at Pt Temp   


 


ABG pO2 at Pt Temp   


 


ABG HCO3   


 


ABG O2 Sat (Measured)   


 


ABG O2 Content   


 


ABG Base Excess   


 


Shyam Test   


 


O2 Delivery Device   


 


Oxygen Flow Rate   


 


Vent Mode   


 


Vent Rate   


 


Mechanical Rate   


 


PEEP   


 


Pressure Support Vent   


 


Sodium   


 


Potassium   


 


Chloride   


 


Carbon Dioxide   


 


Anion Gap   


 


BUN   


 


Creatinine   


 


Creat Clearance w eGFR   


 


Random Glucose   


 


Calcium   


 


Phosphorus   


 


Magnesium   


 


Total Bilirubin   


 


AST   


 


ALT   


 


Alkaline Phosphatase   


 


CK-MB (CK-2) Rel Index  Cancelled  


 


Troponin I   


 


Total Protein   


 


Albumin   














  05/19/17 05/19/17 05/19/17





  05:35 05:35 07:15


 


WBC   


 


RBC   


 


Hgb   


 


Hct   


 


MCV   


 


MCHC   


 


RDW   


 


Plt Count   


 


MPV   


 


PTT (Actin FS)   


 


Puncture Site    Right radial


 


ABG pH    7.32 L


 


ABG pCO2 at Pt Temp    27.4 L


 


ABG pO2 at Pt Temp    158.0 H* D


 


ABG HCO3    13.6 L*


 


ABG O2 Sat (Measured)    99.3 H


 


ABG O2 Content    15.3


 


ABG Base Excess    -11.0 L*


 


Shyam Test    Positive


 


O2 Delivery Device    Vent


 


Oxygen Flow Rate    40%


 


Vent Mode    A/c


 


Vent Rate    14


 


Mechanical Rate    Yes


 


PEEP    5.0


 


Pressure Support Vent    500


 


Sodium  134 L  


 


Potassium  4.4  


 


Chloride  103  


 


Carbon Dioxide  15 L  


 


Anion Gap  16  


 


BUN  67 H  


 


Creatinine  5.8 H  


 


Creat Clearance w eGFR  9.30  


 


Random Glucose  86  


 


Calcium  7.4 L  


 


Phosphorus   3.8 


 


Magnesium   2.1 


 


Total Bilirubin  5.8 H  


 


AST  93 H  


 


ALT  49  


 


Alkaline Phosphatase  619 H  


 


CK-MB (CK-2) Rel Index   


 


Troponin I   3.10 H* D 


 


Total Protein  3.8 L  


 


Albumin  1.4 L  








Active Medications











Generic Name Dose Route Start Last Admin





  Trade Name Freq  PRN Reason Stop Dose Admin


 


Fentanyl  25 mcg 05/16/17 16:19 05/16/17 17:38





  Sublimaze Injection -  IVPUSH 05/19/17 16:20  25 mcg





  [id number] PRN   Administration





  PAIN   


 


Heparin Sodium (Porcine)  5,000 unit 05/17/17 22:00 05/19/17 11:53





  Heparin -  SQ   5,000 unit





  BID ALCIDES   Administration


 


Famotidine/Sodium Chloride  50 mls @ 100 mls/hr 05/16/17 22:30 05/19/17 09:00





  Pepcid 20 Mg Premixed Ivpb -  IVPB   100 mls/hr





  DAILY ALCIDES   Administration


 


Norepinephrine Bitartrate 8,  500 mls @ 18.75 mls/hr 05/17/17 06:45 05/19/17 06:

45





  000 mcg/ Dextrose  IV   Not Given





  TITR ALCIDES   





  Protocol   





  5 MCG/MIN   


 


Cefazolin Sodium 0.5 gm/  50 mls @ 100 mls/hr 05/19/17 10:00 05/19/17 13:09





  Dextrose  IVPB   100 mls/hr





  BID ALCIDES   Administration


 


Sodium Bicarbonate 75 meq/  1,075 mls @ 75 mls/hr 05/19/17 15:30  





  Dextrose/Sodium Chloride  IVPB   





  Q14H ALCIDES   


 


Nystatin  1 applic 05/17/17 22:00 05/19/17 09:00





  Nystop Powder -  TP   1 applic





  BID ALCIDES   Administration


 


Ursodiol  300 mg 05/19/17 22:00  





  Actigal -  PO   





  BID ALCIDES   











ASSESSMENT/PLAN:


87 yr old man with HTN, aortic stenosis, CAD s/p stents, r-nephroctomy(renal ca)

, pancreatic head density found to have choleluithaisis with obstructing CBD, s/

p ERCP with stent placement(unable to undergo sphincterectomy due to 

anticoagulation) complicated by septic shock requiring ICU admission.


GOC discussed with family (wife(health care proxy) and wife's nieces), pt has 

advanced directives in setting of terminal condition. DNR decided as per his 

wishes, signed by wife. 


they will think about dialysis, uncertain at the moment. aware that prognosis 

is tenuous.





Infectious Disease


Septic shock with multi-organ failure likely secondary to cholangitis and 

e.coli bactermia(likley gallbladder as primary source)


- ofirmev  1gm IVpb q6hr for fever


Abx tx: 


Cefazolin (renally dosed) start 5/19


- day 1


 meropenem 500mg IVPB BID  Day 3, dc'd to initiate cefazolin


- topical antifungal cream to periumbical area


Fluids: avoid fluid overload


- D5-NS @75mls/hr + bicarbonate


Pressure suport: attempt to wean off norepi


Goal MAP>65, CVP 8-12, current CVP 12.


Labs:


- bld cx pending


Consult: Dr. Dejesus





Cardiovascular


Hypotensive, troponins trending up, with echo showing hypokensis and decreased 

LV function 


- trend troponins to establish peak, no indication for ASA or heparin drip as 

this is unlikely due to be ACS


- tropinins now trending down, continue to trend


- maintain Goal MAP>65, CVP 8-12


consult: dr. sandy





Renal


oliguric BO/ATN due to sepsis induced hypoperfusion, on CKD stage 3 


- add bicarb as abg with worsening acidosis


- monitor I&O, ware in place, urine output 190cc/24hr, which is improved from 

yesterday, to consider HD with family as pt had only one kidney


- renal dosing of medications


- metabolic acidosis with anion gap


consult: Dr. Rodriguez





Pulmonary


acute hypoxic respiratory failure


- on ventilatory support


taper FiO2 to keep Spo2 >90%





Gastrointestinal


Cholangitis s/p ERCP with biliary stent placement


initiate tube feedings with Jevity


actigal to soften gall stones


trend LFT's, trending down


Abdominal CT scan without abscess or free abdominal air, no SBO. stranding of 

mesentery in lower abdomen suggestive of edema, diverticulosis coli with mild 

stranding in jxn of the distal descending and poximal sigmoid colon, small 

amout of free fluid in pelvis


consult: Dr. Celestin





Hematological


normocytic anemia - chronic, trend h/h, monitor for bleeding


leucocytosis secondary to sepsis


thrombocytopenia - likely due to sepsis, r./o DIC; trend fibrinogen and fibrin 

degradation products


maintain platelet >20,000





Dermatological


periumbical drainage


- topical antifungal


Dr Swann consulted for surgical eval; patient is high risk for operative 

intervention, recommend IR intervention for percutaneous drainage


Dr. Oleary consulted





Neurological


sedation dc'ed however pt is not following commands with no response to verbal 

or noxious stimuli





DVT: heparin 5000 units SQ BID


Diet: initiate tube feeding with Jevity volume based








Visit type





- Emergency Visit


Emergency Visit: No





- New Patient


This patient is new to me today: No





- Critical Care


Critical Care patient: Yes


Total Critical Care Time (in minutes): 45


Critical Care Statement: The care of this patient involved high complexity 

decision making to prevent further life threatening deterioration of the patient

's condition and/or to evalute & treat vital organ system(s) failure or risk of 

failure.

## 2017-05-19 NOTE — PN
Progress Note, Physician


Chief Complaint: 


ID








Remains intubated in the ICU   Requiring Levophed though dose coming down.








He is on Meropenem this given pending the result of final culture now 

identitifed as a sensitive


E Coli.  Source is the biliary tract





- Current Medication List


Current Medications: 


Active Medications





Fentanyl (Sublimaze Injection -)  25 mcg IVPUSH [id number] PRN


   PRN Reason: PAIN


   Stop: 05/19/17 16:20


   Last Admin: 05/16/17 17:38 Dose:  25 mcg


Heparin Sodium (Porcine) (Heparin -)  5,000 unit SQ BID Replaced by [facility]


   Last Admin: 05/18/17 23:34 Dose:  5,000 unit


Meropenem 500 mg/ Dextrose  100 mls @ 200 mls/hr IVPB BID Replaced by [facility]


   Last Admin: 05/18/17 23:34 Dose:  200 mls/hr


Famotidine/Sodium Chloride (Pepcid 20 Mg Premixed Ivpb -)  50 mls @ 100 mls/hr 

IVPB DAILY Replaced by [facility]


   Last Admin: 05/18/17 09:01 Dose:  100 mls/hr


Norepinephrine Bitartrate 8, (000 mcg/ Dextrose)  500 mls @ 18.75 mls/hr IV 

TITR ALCIDES; 5 MCG/MIN


   PRN Reason: Protocol


   Last Titration: 05/19/17 02:30 Dose:  4 mcg/min


Dextrose/Sodium Chloride (D5-Ns -)  1,000 mls @ 75 mls/hr IV ASDIR Replaced by [facility]


   Last Admin: 05/18/17 13:34 Dose:  75 mls/hr


Morphine Sulfate (Morphine Injection -)  1 mg IVPUSH Q4H PRN


   PRN Reason: PAIN


   Last Admin: 05/16/17 09:43 Dose:  1 mg


Nystatin (Nystop Powder -)  1 applic TP BID Replaced by [facility]


   Last Admin: 05/18/17 23:35 Dose:  1 applic











- Objective


Vital Signs: 


 Vital Signs











Temperature  98.3 F   05/19/17 06:00


 


Pulse Rate  64   05/19/17 06:00


 


Respiratory Rate  18   05/19/17 06:07


 


Blood Pressure  98/56   05/19/17 06:00


 


O2 Sat by Pulse Oximetry (%)  100   05/18/17 10:35











Constitutional: Yes: Other (Intubated)


HENT: Yes: Other (Et tube)


Neck: Yes: WNL, Supple


Cardiovascular: Yes: Regular Rate and Rhythm, S1, S2.  No: Murmur


Respiratory: Yes: WNL, Regular, CTA Bilaterally.  No: Rales, Rhonchi


Gastrointestinal: Yes: WNL, Normal Bowel Sounds, Soft, Other (Umbilicus has a 

fungal rash evident  improving).  No: Tenderness, Epigastrium, Tenderness, 

Rebound


Extremities: No: Cold, Cool, Cyanosis


Edema: No


Labs: 


 INR, PTT











INR  1.34  (0.82-1.09)  H  05/17/17  05:15    


 


Fibrinogen  372.0 mg/dL (238-498)   05/18/17  05:45    














Problem List





- Problems


(1) Biliary sepsis


Code(s): K83.0 - CHOLANGITIS





(2) Bacteremia due to Gram-negative bacteria


Code(s): R78.81 - BACTEREMIA





(3) Thrombocytopenia


Code(s): D69.6 - THROMBOCYTOPENIA, UNSPECIFIED








Assessment/Plan


Microbiology





05/16/17 10:42   Blood - Peripheral Venous   Blood Culture - Final


                              Escherichia Coli


05/16/17 10:40   Blood - Peripheral Venous   Blood Culture - Final


                              Escherichia Coli


05/15/17 18:30   Abdomen   Gram Stain - Final


05/15/17 18:30   Abdomen   Wound Culture - Final


                              Staphylococcus Coagulase Neg


                              Diphtheroid/Corynebacterium


                              Streptococcus Viridans





 Laboratory Tests











  05/17/17 05/18/17 05/18/17





  05:15 05:45 05:45


 


WBC  31.1 H*  


 


Hgb   


 


Hct   


 


Plt Count   


 


ABG pH   


 


Oxygen Flow Rate   


 


BUN   65 H D 


 


Creatinine   5.2 H 


 


ALT   58 


 


Alkaline Phosphatase   686 H 


 


Troponin I    6.99 H*














  05/18/17 05/18/17 05/18/17





  08:30 11:45 13:30


 


WBC  35.9 H*  


 


Hgb  10.0 L  


 


Hct  30.4 L  


 


Plt Count  43 L  


 


ABG pH   7.34 L 


 


Oxygen Flow Rate   50% 


 


BUN   


 


Creatinine   


 


ALT   


 


Alkaline Phosphatase   


 


Troponin I    4.89 H* D














  05/19/17





  05:35


 


WBC 


 


Hgb 


 


Hct 


 


Plt Count 


 


ABG pH 


 


Oxygen Flow Rate 


 


BUN  Pending


 


Creatinine  Pending


 


ALT 


 


Alkaline Phosphatase 


 


Troponin I 








Assessment


Severe sepsis syndrome biliary source


Post ERCP with stent placement


Elevated TNI secondary demand ischemia sepsis


Respiratory failure


E Coli bacteremia


Leukomoid response


History TAVR


Acute renal failure





Plan


Do not need carbepenem at this point


Cefazolin adjusted for renal failure


CT scan ordered report pending doubt abscess but reasonable to check in light 

of WBC elevation


ESR CRP


GI follow up


Critical time spent 38 minutes





Deejay ABRAHAM

## 2017-05-19 NOTE — PN
Progress Note (short form)





- Note


Progress Note: 


Intubated


In ICU


 Vital Signs











 Period  Temp  Pulse  Resp  BP Sys/Badillo  Pulse Ox


 


 Last 24 Hr  97.5 F-99.2 F  64-78  18-23  /53-70  100-100








Abd soft





CBC,CMP











WBC  19.0 K/mm3 (4.0-10.0)  H D 05/19/17  05:35    


 


RBC  3.09 M/mm3 (4.00-5.60)  L  05/19/17  05:35    


 


Hgb  9.0 GM/dL (11.7-16.9)  L  05/19/17  05:35    


 


Hct  26.8 % (35.4-49)  L  05/19/17  05:35    


 


MCV  86.8 fl (80-96)   05/19/17  05:35    


 


MCHC  33.5 g/dl (32.0-35.9)   05/19/17  05:35    


 


RDW  17.2 % (11.9-15.9)  H  05/19/17  05:35    


 


Plt Count  38 K/MM3 (134-434)  L  05/19/17  05:35    


 


MPV  10.5 fl (7.5-11.1)   05/19/17  05:35    


 


Neutrophils %  96.0 % (42.8-82.8)  H  05/18/17  08:30    


 


Lymphocytes %  3.0 % (8-40)  L D 05/18/17  08:30    


 


Monocytes %  1.0 % (3.8-10.2)  L  05/18/17  08:30    


 


Eosinophils %  0.5 % (0-4.5)   05/16/17  09:00    


 


Basophils %  0.2 % (0-2.0)   05/16/17  09:00    


 


Band Neutrophils  11.0 % (0-10)  H D 05/17/17  05:15    


 


Metamyelocytes  5 % (0-2)  H D 05/16/17  20:30    


 


Myelocytes  9 % (0-2)  H D 05/16/17  20:30    


 


Differential Comment  Manual diff done   05/18/17  08:30    


 


Dohle Bodies  1+   05/17/17  05:15    


 


Platelet Estimate  Decreased  (NORMAL)   05/18/17  08:30    


 


Platelet Comment  No clumping noted   05/17/17  05:15    


 


Polychromasia  Few   05/17/17  05:15    


 


Hypochromic-Microcytic  Few   05/17/17  05:15    


 


Sodium  134 mmol/L (136-145)  L  05/19/17  05:35    


 


Potassium  4.4 mmol/L (3.5-5.1)   05/19/17  05:35    


 


Chloride  103 mmol/L ()   05/19/17  05:35    


 


Carbon Dioxide  15 mmol/L (21-32)  L  05/19/17  05:35    


 


Anion Gap  16  (8-16)   05/19/17  05:35    


 


BUN  67 mg/dL (7-18)  H  05/19/17  05:35    


 


Creatinine  5.8 mg/dL (0.7-1.3)  H  05/19/17  05:35    


 


Creat Clearance w eGFR  9.30  (>60)   05/19/17  05:35    


 


Random Glucose  86 mg/dL ()   05/19/17  05:35    


 


Lactic Acid  1.684 mmol/L (0.4-2.0)   05/17/17  13:20    


 


Calcium  7.4 mg/dL (8.5-10.1)  L  05/19/17  05:35    


 


Phosphorus  3.8 mg/dL (2.5-4.9)   05/19/17  05:35    


 


Magnesium  2.1 mg/dL (1.8-2.4)   05/19/17  05:35    


 


Total Bilirubin  5.8 mg/dL (0.2-1.0)  H  05/19/17  05:35    


 


Direct Bilirubin  6.1 mg/dL (0.0-0.2)  H D 05/18/17  05:45    


 


AST  93 U/L (15-37)  H  05/19/17  05:35    


 


ALT  49 U/L (12-78)   05/19/17  05:35    


 


Alkaline Phosphatase  619 U/L ()  H  05/19/17  05:35    


 


Creatine Kinase  158 IU/L ()   05/18/17  05:45    


 


Creatine Kinase Index  11.8 % (0.0-5.0)  H*  05/17/17  13:20    


 


CK-MB (CK-2)  18.446 ng/ml (0.5-3.6)  H  05/17/17  13:20    


 


CK-MB (CK-2) Rel Index  Cancelled   05/17/17  13:20    


 


Troponin I  3.10 ng/ml (0.00-0.05)  H* D 05/19/17  05:35    


 


C-Reactive Protein  21.5 MG/DL (0.00-0.3)  H  05/17/17  05:15    


 


Total Protein  3.8 g/dl (6.4-8.2)  L  05/19/17  05:35    


 


Albumin  1.4 g/dl (3.4-5.0)  L  05/19/17  05:35    


 


Total Amylase  49 U/L ()  D 05/17/17  05:15    


 


Lipase  116 U/L ()   05/17/17  05:15    














WBC and bilirubin decreasing


Continue antibiotics and ICU care


IV fluids





Problem List





- Problems


(1) Umbilical discharge


Code(s): R19.8 - OT SYMPTOMS AND SIGNS INVOLVING THE DGSTV SYS AND ABDOMEN

## 2017-05-19 NOTE — PN
GI Progress Note


Subjective: 


GI NOte: Remains sedated although sedation was stopped this AM. His drug 

metabolism is inhibited by combined renal and hepatic malfunctions. Bilirubin 

is dropping and indicates that the stent is draining his bile duct





- Objective


Vital Signs: 


 Vital Signs











Temperature  97.9 F   05/19/17 14:00


 


Pulse Rate  78   05/19/17 14:00


 


Respiratory Rate  20   05/19/17 14:15


 


Blood Pressure  101/61   05/19/17 14:00


 


O2 Sat by Pulse Oximetry (%)  100   05/19/17 08:00











Constitutional: Other (Sedated)


...Auscultate: Yes: Hypoactive Bowel Sounds


...Palpate: Yes: Soft, Other (nontender)


Labs: 


 CBC, BMP





 05/19/17 05:35 





 05/19/17 05:35 





 INR, PTT











INR  1.34  (0.82-1.09)  H  05/17/17  05:15    


 


Fibrinogen  372.0 mg/dL (238-498)   05/18/17  05:45    








 Laboratory Tests











  05/15/17 05/16/17 05/18/17





  16:51 20:30 05:45


 


WBC   


 


Hgb   


 


Total Bilirubin   9.9 H  7.2 H D


 


AST    107 H


 


ALT    58


 


Alkaline Phosphatase  1048 H D   686 H














  05/18/17 05/19/17 05/19/17





  08:30 05:35 05:35


 


WBC  35.9 H*  19.0 H D 


 


Hgb   9.0 L 


 


Total Bilirubin    5.8 H


 


AST   


 


ALT   


 


Alkaline Phosphatase   














Assessment/Plan


Slowly resolving cholangitis. Remains ventilator dependent but will hopefully 

awaken soon. Checked for gastric residual and there is none so will start 

feedings and actigall.

## 2017-05-19 NOTE — PN
Physical Exam: 


SUBJECTIVE: Patient seen and examined.  Remains intubated.








OBJECTIVE:


Platelets 38, ordered 1 unit of platelets


Platelets now 55


 Vital Signs











 Period  Temp  Pulse  Resp  BP Sys/Badillo  Pulse Ox


 


 Last 24 Hr  97.5 F-98.9 F  64-78  18-23  /53-70  100-100











GENERAL: Intubated/lethargic/wrist restraints to protect airway


HEAD: Normal with no signs of trauma.


NECK: Normal range of motion, supple without lymphadenopathy, JVD, or masses.


LUNGS: Breath sounds equal, clear to auscultation bilaterally. No wheezes, and 

no crackles


HEART: Regular rate and rhythm, normal S1 and S2, rub or gallop.


ABDOMEN: +erythema, sero/sang malodorous drainage to periumbilical region. 


UPPER EXTREMITIES: Non pitting edema of bilateral hands


NEUROLOGICAL:  lethargic


SKIN: generalized jaundice

















 Laboratory Results - last 24 hr











  05/18/17 05/19/17 05/19/17





  05:45 05:35 05:35


 


WBC   19.0 H D 


 


RBC   3.09 L 


 


Hgb   9.0 L 


 


Hct   26.8 L 


 


MCV   86.8 


 


MCHC   33.5 


 


RDW   17.2 H 


 


Plt Count   38 L 


 


MPV   10.5 


 


Neutrophils %   


 


Lymphocytes %   


 


Monocytes %   


 


Eosinophils %   


 


Basophils %   


 


PTT (Actin FS)    33.6  D


 


Puncture Site   


 


ABG pH   


 


ABG pCO2 at Pt Temp   


 


ABG pO2 at Pt Temp   


 


ABG HCO3   


 


ABG O2 Sat (Measured)   


 


ABG O2 Content   


 


ABG Base Excess   


 


Shyam Test   


 


O2 Delivery Device   


 


Oxygen Flow Rate   


 


Vent Mode   


 


Vent Rate   


 


Mechanical Rate   


 


PEEP   


 


Pressure Support Vent   


 


Sodium   


 


Potassium   


 


Chloride   


 


Carbon Dioxide   


 


Anion Gap   


 


BUN   


 


Creatinine   


 


Creat Clearance w eGFR   


 


Random Glucose   


 


Calcium   


 


Phosphorus   


 


Magnesium   


 


Total Bilirubin   


 


AST   


 


ALT   


 


Alkaline Phosphatase   


 


CK-MB (CK-2) Rel Index  Cancelled  


 


Troponin I   


 


Total Protein   


 


Albumin   














  05/19/17 05/19/17 05/19/17





  05:35 05:35 07:15


 


WBC   


 


RBC   


 


Hgb   


 


Hct   


 


MCV   


 


MCHC   


 


RDW   


 


Plt Count   


 


MPV   


 


Neutrophils %   


 


Lymphocytes %   


 


Monocytes %   


 


Eosinophils %   


 


Basophils %   


 


PTT (Actin FS)   


 


Puncture Site    Right radial


 


ABG pH    7.32 L


 


ABG pCO2 at Pt Temp    27.4 L


 


ABG pO2 at Pt Temp    158.0 H* D


 


ABG HCO3    13.6 L*


 


ABG O2 Sat (Measured)    99.3 H


 


ABG O2 Content    15.3


 


ABG Base Excess    -11.0 L*


 


Shyam Test    Positive


 


O2 Delivery Device    Vent


 


Oxygen Flow Rate    40%


 


Vent Mode    A/c


 


Vent Rate    14


 


Mechanical Rate    Yes


 


PEEP    5.0


 


Pressure Support Vent    500


 


Sodium  134 L  


 


Potassium  4.4  


 


Chloride  103  


 


Carbon Dioxide  15 L  


 


Anion Gap  16  


 


BUN  67 H  


 


Creatinine  5.8 H  


 


Creat Clearance w eGFR  9.30  


 


Random Glucose  86  


 


Calcium  7.4 L  


 


Phosphorus   3.8 


 


Magnesium   2.1 


 


Total Bilirubin  5.8 H  


 


AST  93 H  


 


ALT  49  


 


Alkaline Phosphatase  619 H  


 


CK-MB (CK-2) Rel Index   


 


Troponin I   3.10 H* D 


 


Total Protein  3.8 L  


 


Albumin  1.4 L  














  05/19/17





  17:30


 


WBC  17.7 H


 


RBC  3.32 L


 


Hgb  9.6 L


 


Hct  28.9 L


 


MCV  87.1


 


MCHC  33.3


 


RDW  17.0 H


 


Plt Count  55 L D


 


MPV  10.2


 


Neutrophils %  91.5 H


 


Lymphocytes %  4.4 L D


 


Monocytes %  2.1 L D


 


Eosinophils %  1.8  D


 


Basophils %  0.2


 


PTT (Actin FS) 


 


Puncture Site 


 


ABG pH 


 


ABG pCO2 at Pt Temp 


 


ABG pO2 at Pt Temp 


 


ABG HCO3 


 


ABG O2 Sat (Measured) 


 


ABG O2 Content 


 


ABG Base Excess 


 


Shyam Test 


 


O2 Delivery Device 


 


Oxygen Flow Rate 


 


Vent Mode 


 


Vent Rate 


 


Mechanical Rate 


 


PEEP 


 


Pressure Support Vent 


 


Sodium 


 


Potassium 


 


Chloride 


 


Carbon Dioxide 


 


Anion Gap 


 


BUN 


 


Creatinine 


 


Creat Clearance w eGFR 


 


Random Glucose 


 


Calcium 


 


Phosphorus 


 


Magnesium 


 


Total Bilirubin 


 


AST 


 


ALT 


 


Alkaline Phosphatase 


 


CK-MB (CK-2) Rel Index 


 


Troponin I 


 


Total Protein 


 


Albumin 








Active Medications











Generic Name Dose Route Start Last Admin





  Trade Name Freq  PRN Reason Stop Dose Admin


 


Heparin Sodium (Porcine)  5,000 unit 05/17/17 22:00 05/19/17 11:53





  Heparin -  SQ   5,000 unit





  BID ALCIDES   Administration


 


Famotidine/Sodium Chloride  50 mls @ 100 mls/hr 05/16/17 22:30 05/19/17 09:00





  Pepcid 20 Mg Premixed Ivpb -  IVPB   100 mls/hr





  DAILY ALCIDES   Administration


 


Norepinephrine Bitartrate 8,  500 mls @ 18.75 mls/hr 05/17/17 06:45 05/19/17 06:

45





  000 mcg/ Dextrose  IV   Not Given





  TITR ALCIDES   





  Protocol   





  5 MCG/MIN   


 


Cefazolin Sodium 0.5 gm/  50 mls @ 100 mls/hr 05/19/17 10:00 05/19/17 13:09





  Dextrose  IVPB   100 mls/hr





  BID ALCIDES   Administration


 


Sodium Bicarbonate 75 meq/  1,075 mls @ 75 mls/hr 05/19/17 15:30 05/19/17 15:30





  Dextrose/Sodium Chloride  IVPB   75 mls/hr





  Q14H ALCIDES   Administration


 


Nystatin  1 applic 05/17/17 22:00 05/19/17 09:00





  Nystop Powder -  TP   1 applic





  BID ALCIDES   Administration


 


Ursodiol  300 mg 05/19/17 22:00  





  Actigal -  PO   





  BID ALCIDES   











ASSESSMENT/PLAN:





Patient is a 87 year old male with a significant past medical history of 

hypertension, CAD s/p stent placement, aortic stenosis, porcine valve 

replacement (takes both ASA 81mg and Plavix 75mg), urtheral strictures s/p 

dilatation and right nephrectomy. 





He was admitted on 5/15/2017 for abdominal pain, acute choledocholithiasis, 

acute transaminitis, jaundice and leukocytosis and shaking chills.  





ID:


Septic Shock likely secondary to periumbilicus cellulitis vs. cholangitis 


Periumbilical cellulitis - chronic


Assessment: Wound culture +staphylococcus coag, +blood cultures with ecoli


Will repeat blood cultures


Now on Cefazolin


Surgical consult for likely drainage of abscess once pt more stable


blood cultures pending, ID following


Now intubated/sedated/on pressors





GI:


Acute Choledocholithiasis/abdominal pain/generalized jaundice/acute 

transaminitis


Assessment/Plan: Cholangitis s/p ERCP with biliary stent placement on 5/16


Trend LFT's


On Enterpenem per ID since 5/16


Blood cultures ordered 


Intubated after procedure, on pressorrs





:


Chronic Kidney Disease - acute renal failure


Renal cancer s/p right nephrectomy


Assessment/Plan: Creatinine 5.2, baseline ~ 1.7


Renal consulted and following, pt may need dialysis if continues to trend up as 

per renal


On IVF, Trend bun/creat.





Cardiology:


Hypertension - chronic


Assessment/Plan: hypotensive now, on pressors





Elevated troponins - acute


Assessment/Plan: Troponins continue to trend up, peaked at 7.4


Likely secondary demand ischemia and sepsis


Continue to trend troponins


Cardiologist following





CAD s/p stent placement


On ASA 81mg and Plavix 75mg - on hold





Hematology:


Thrombocytopenia


Assessment/Plan:  likely secondary to sepsis


Monitor





F.E.N.


Fluids: d5 NS @75cc/hr


Electrolytes: monitor bmp


Nutrition: intubated/npo





Prophylaxis:


DVT: SCDs, Heparin BID


GI: Protonix





Code Status:  Requires inpatient hospitalization.   DNR

## 2017-05-19 NOTE — PN
Progress Note, Physician


History of Present Illness: 


Post ERCP balloon sphincteroplasty and stenting, stone extraction not performed 

as sphincterotomy high risk for bleeding in the setting of dual antiplatelet 

therapy, currently in E. coli septic shock on pressors. Sedated and maintained 

on mechanical ventilation.











- Current Medication List


Current Medications: 


Active Medications





Fentanyl (Sublimaze Injection -)  25 mcg IVPUSH [id number] PRN


   PRN Reason: PAIN


   Stop: 05/19/17 16:20


   Last Admin: 05/16/17 17:38 Dose:  25 mcg


Heparin Sodium (Porcine) (Heparin -)  5,000 unit SQ BID [facility]


   Last Admin: 05/19/17 11:53 Dose:  5,000 unit


Famotidine/Sodium Chloride (Pepcid 20 Mg Premixed Ivpb -)  50 mls @ 100 mls/hr 

IVPB DAILY [facility]


   Last Admin: 05/19/17 09:00 Dose:  100 mls/hr


Norepinephrine Bitartrate 8, (000 mcg/ Dextrose)  500 mls @ 18.75 mls/hr IV 

TITR ALCIDES; 5 MCG/MIN


   PRN Reason: Protocol


   Last Admin: 05/19/17 06:45 Dose:  Not Given


Dextrose/Sodium Chloride (D5-Ns -)  1,000 mls @ 75 mls/hr IV ASDIR [facility]


   Last Admin: 05/19/17 11:30 Dose:  75 mls/hr


Cefazolin Sodium 0.5 gm/ (Dextrose)  50 mls @ 100 mls/hr IVPB BID [facility]


   Last Admin: 05/19/17 13:09 Dose:  100 mls/hr


Nystatin (Nystop Powder -)  1 applic TP BID [facility]


   Last Admin: 05/19/17 09:00 Dose:  1 applic











- Objective


Vital Signs: 


 Vital Signs











Temperature  97.9 F   05/19/17 14:00


 


Pulse Rate  78   05/19/17 14:00


 


Respiratory Rate  20   05/19/17 14:00


 


Blood Pressure  101/61   05/19/17 14:00


 


O2 Sat by Pulse Oximetry (%)  100   05/19/17 08:00











Constitutional: Yes: No Distress, Calm


Neck: Yes: Supple


Cardiovascular: Yes: Regular Rate and Rhythm


Respiratory: Yes: Intubated, Mechanically Ventilated, Rhonchi


Gastrointestinal: Yes: Soft, Hypoactive Bowel Sounds


Edema: Yes


Edema: LLE: 1+, RLE: 1+


Labs: 


 CBC, BMP





 05/19/17 05:35 





 05/19/17 05:35 





 INR, PTT











INR  1.34  (0.82-1.09)  H  05/17/17  05:15    


 


Fibrinogen  372.0 mg/dL (238-498)   05/18/17  05:45    














- ....Imaging


Chest X-ray: Report Reviewed (Improved aeration at bases)


Cat Scan: Report Reviewed (5/18/2017 Abd/pelvic CT-No abscess)





Problem List





- Problems


(1) BO (acute kidney injury)


Code(s): N17.9 - ACUTE KIDNEY FAILURE, UNSPECIFIED





(2) Bacteremia due to Gram-negative bacteria


Code(s): R78.81 - BACTEREMIA





(3) Biliary sepsis


Code(s): K83.0 - CHOLANGITIS





(4) CAD (coronary artery disease)


Code(s): I25.10 - ATHSCL HEART DISEASE OF NATIVE CORONARY ARTERY W/O ANG PCTRS 

  Qualifiers: 


     Coronary Disease-Associated Artery/Lesion type: unspecified vessel or 

lesion type     Native vs. transplanted heart: native heart     Associated 

angina: without angina        Qualified Code(s): I25.10 - Atherosclerotic heart 

disease of native coronary artery without angina pectoris  





(5) CKD (chronic kidney disease)


Code(s): N18.9 - CHRONIC KIDNEY DISEASE, UNSPECIFIED   





(6) Calculus of common duct with obstruction


Code(s): K80.51 - CALCULUS OF BILE DUCT W/O CHOLANGITIS OR CHOLECYST W OBST





(7) History of percutaneous coronary intervention


Code(s): Z98.890 - OTHER SPECIFIED POSTPROCEDURAL STATES





(8) S/P ERCP


Code(s): Z98.890 - OTHER SPECIFIED POSTPROCEDURAL STATES





(9) Thrombocytopenia


Code(s): D69.6 - THROMBOCYTOPENIA, UNSPECIFIED





(10) Acute hypoxemic respiratory failure


Code(s): J96.01 - ACUTE RESPIRATORY FAILURE WITH HYPOXIA





(11) Gram negative septic shock


Code(s): A41.50 - GRAM-NEGATIVE SEPSIS, UNSPECIFIED


R65.21 - SEVERE SEPSIS WITH SEPTIC SHOCK





(12) Left bundle branch block


Code(s): I44.7 - LEFT BUNDLE-BRANCH BLOCK, UNSPECIFIED





(13) Demand ischemia


Code(s): I24.8 - OTHER FORMS OF ACUTE ISCHEMIC HEART DISEASE








Assessment/Plan


5/17/2017 Echo: Severely decreased LV fxn, mod-severe RV fxn, mod MR, mild TR, 

pleural effusion significantly worsened from previous study likely referable to 

severe sepsis





1. Acute hypoxemic respiratory failure on mechanical ventilation


2. Biliary septic shock (E. coli), acute cholangitis post ERCP, balloon 

sphincteroplasty and stent placement with leukocytosis and lactic acidosis


3. Post TAVR for severe AS


4. CAD, S/P PCI/stent with demand ischemic injury


5. Severe biventricular dysfunction referable to sepsis


6. H/o Hypertension currently hypotensive requiring pressors


7. Renal CA S/P right nephrectomy


8. Anuric acute on CKD referable to septic shock and ATN


9. Thrombocytopenia due to sepsis





PLAN:


1. Wean Levophed gtt to maintain MAP>65 mmHg.


2. Antibiotics coverage changed to Ancef as per ID and C&S


3. Trops have peaked, monitor renal trajectory, may require sustained low 

efficiency dialysis, wean vent per ABG, wean sedation


4. DVT and GI prophylaxis





Cardiologist: Clint Sanabria MD ([facility])

## 2017-05-20 LAB
ALBUMIN SERPL-MCNC: 1.3 G/DL (ref 3.4–5)
ALP SERPL-CCNC: 602 U/L (ref 45–117)
ALT SERPL-CCNC: 37 U/L (ref 12–78)
ANION GAP SERPL CALC-SCNC: 15 MMOL/L (ref 8–16)
ART PUNCT SITE: (no result)
ARTERIAL PATENCY WRIST A: POSITIVE
AST SERPL-CCNC: 68 U/L (ref 15–37)
BASE EXCESS BLDA CALC-SCNC: -9.6 MEQ/L (ref -2–2)
BASOPHILS # BLD: 0.3 % (ref 0–2)
BILIRUB SERPL-MCNC: 4 MG/DL (ref 0.2–1)
CALCIUM SERPL-MCNC: 7.2 MG/DL (ref 8.5–10.1)
CO2 SERPL-SCNC: 17 MMOL/L (ref 21–32)
COCKROFT - GAULT: 11
CREAT SERPL-MCNC: 6.3 MG/DL (ref 0.7–1.3)
DEPRECATED RDW RBC AUTO: 16.9 % (ref 11.9–15.9)
EOSINOPHIL # BLD: 4.3 % (ref 0–4.5)
GLUCOSE SERPL-MCNC: 100 MG/DL (ref 74–106)
HCO3 BLDA-SCNC: 14.8 MEQ/L (ref 22–26)
LPM/O2%: (no result)
MAGNESIUM SERPL-MCNC: 2.2 MG/DL (ref 1.8–2.4)
MCH RBC QN AUTO: 29.7 PG (ref 25.7–33.7)
MCHC RBC AUTO-ENTMCNC: 34.2 G/DL (ref 32–35.9)
MCV RBC: 86.9 FL (ref 80–96)
MECH. VENT.: YES
NEUTROPHILS # BLD: 87.3 % (ref 42.8–82.8)
PEEP SETTING VENT: 5 CMH2O
PHOSPHATE SERPL-MCNC: 4.6 MG/DL (ref 2.5–4.9)
PLATELET # BLD AUTO: 43 K/MM3 (ref 134–434)
PMV BLD: 10.3 FL (ref 7.5–11.1)
PO2 BLDA: 120 MMHG (ref 68–100)
PROT SERPL-MCNC: 3.8 G/DL (ref 6.4–8.2)
PT. ON O2?: YES
SAO2 % BLDA: 98.7 % (ref 90–98.9)
TROPONIN I SERPL-MCNC: 2.2 NG/ML (ref 0–0.05)
TYPE OF O2: (no result)
VENT RATE: 14
VT/PRESS: 500
WBC # BLD AUTO: 11.5 K/MM3 (ref 4–10)

## 2017-05-20 RX ADMIN — Medication SCH MG: at 12:30

## 2017-05-20 RX ADMIN — HEPARIN SODIUM SCH UNIT: 5000 INJECTION, SOLUTION INTRAVENOUS; SUBCUTANEOUS at 09:47

## 2017-05-20 RX ADMIN — NYSTATIN SCH APPLIC: 100000 POWDER TOPICAL at 23:49

## 2017-05-20 RX ADMIN — CHLORHEXIDINE GLUCONATE 0.12% ORAL RINSE SCH ML: 1.2 LIQUID ORAL at 22:00

## 2017-05-20 RX ADMIN — Medication SCH MG: at 22:00

## 2017-05-20 RX ADMIN — CHLORHEXIDINE GLUCONATE SCH APPLIC: 213 SOLUTION TOPICAL at 22:00

## 2017-05-20 RX ADMIN — URSODIOL SCH MG: 300 CAPSULE ORAL at 22:00

## 2017-05-20 RX ADMIN — FAMOTIDINE SCH MLS/HR: 20 INJECTION, SOLUTION INTRAVENOUS at 09:13

## 2017-05-20 RX ADMIN — SODIUM BICARBONATE SCH MLS/HR: 84 INJECTION, SOLUTION INTRAVENOUS at 07:22

## 2017-05-20 RX ADMIN — CEFAZOLIN SCH MLS/HR: 1 INJECTION, POWDER, FOR SOLUTION INTRAVENOUS at 22:00

## 2017-05-20 RX ADMIN — HEPARIN SODIUM SCH UNIT: 5000 INJECTION, SOLUTION INTRAVENOUS; SUBCUTANEOUS at 22:00

## 2017-05-20 RX ADMIN — CHLORHEXIDINE GLUCONATE 0.12% ORAL RINSE SCH ML: 1.2 LIQUID ORAL at 09:13

## 2017-05-20 RX ADMIN — DEXTROSE MONOHYDRATE SCH MLS/HR: 50 INJECTION, SOLUTION INTRAVENOUS at 07:23

## 2017-05-20 RX ADMIN — NYSTATIN SCH APPLIC: 100000 POWDER TOPICAL at 09:13

## 2017-05-20 RX ADMIN — URSODIOL SCH MG: 300 CAPSULE ORAL at 09:13

## 2017-05-20 RX ADMIN — CEFAZOLIN SCH MLS/HR: 1 INJECTION, POWDER, FOR SOLUTION INTRAVENOUS at 09:44

## 2017-05-20 NOTE — PN
Progress Note (short form)





- Note


Progress Note: 


PULM/CCM


SUBJECTIVE:


Patient seen and examined in the ICU.  





-off levophed


-more awake


-bilis,transaminitis, and alk phos all down trending slowly, trops downtrending


-remains oliguric and volume overloaded


-nephrology following disucssing RRT








CXR: worsening airspace disease, effusion. overloaded. ETT and TLC ok position





OBJECTIVE:


 Vital Signs











Temp  97.2 F L  05/20/17 10:00


 


Pulse  75   05/20/17 11:00


 


Resp  19   05/20/17 10:30


 


BP  127/59   05/20/17 11:00


 


Pulse Ox  91 L  05/20/17 10:30








 Intake & Output











 05/19/17 05/20/17 05/20/17





 23:59 11:59 23:59


 


Intake Total 2241.0 1141 


 


Output Total 110 140 


 


Balance 2131.0 1001 


 


Weight  99.881 kg 


 


Intake:   


 


  IV 1651.0 621 


 


    Levophed - 8,000 Mcg In 451 96 





    D5w - 492 ml @ 5 MCG/MIN   





    18.75 mls/hr IV TITR ALCIDES   





    Rx#:[id number]   


 


    D5-Ns - 1,000 ml @ 75 mls 637.5  





    /hr IV ASDIR ALCIDES Rx#:   





    [id number]   


 


    D5-1/2Ns - 1,000 ml @ 75 562.5 525 





    mls/hr IVPB Q14H ALCIDES with   





    Sodium Bicarbonate 8.4%   





    - 75 Meq Rx#:[id number]   


 


  IVPB 200 100 


 


  Oral 0  


 


  Tube Feeding 180 210 


 


  Tube Irrigant 210 210 


 


Output:   


 


  Urine 110 140 


 


    [place] 110 140 


 


Other:   


 


  Voiding Method Indwelling Catheter Indwelling Catheter 


 


  Bowel Movement  No 


 


  Weight Measurement Method  [facility] 














Gen:  intubated, poorly responsive but improved since yesterda


Heart:  RRR


Lung: decreased breath sounds bilaterally, crackles. 


Abd: soft, nontender


Ext: dependent edema


Neuro: awakens to painful stimuli





 CBCD











WBC  11.5 K/mm3 (4.0-10.0)  H D 05/20/17  05:15    


 


RBC  3.07 M/mm3 (4.00-5.60)  L  05/20/17  05:15    


 


Hgb  9.1 GM/dL (11.7-16.9)  L  05/20/17  05:15    


 


Hct  26.7 % (35.4-49)  L  05/20/17  05:15    


 


MCV  86.9 fl (80-96)   05/20/17  05:15    


 


MCHC  34.2 g/dl (32.0-35.9)   05/20/17  05:15    


 


RDW  16.9 % (11.9-15.9)  H  05/20/17  05:15    


 


Plt Count  43 K/MM3 (134-434)  L D 05/20/17  05:15    


 


MPV  10.3 fl (7.5-11.1)   05/20/17  05:15    








 CMP











Sodium  134 mmol/L (136-145)  L  05/20/17  05:15    


 


Potassium  4.5 mmol/L (3.5-5.1)   05/20/17  05:15    


 


Chloride  102 mmol/L ()   05/20/17  05:15    


 


Carbon Dioxide  17 mmol/L (21-32)  L  05/20/17  05:15    


 


Anion Gap  15  (8-16)   05/20/17  05:15    


 


BUN  74 mg/dL (7-18)  H  05/20/17  05:15    


 


Creatinine  6.3 mg/dL (0.7-1.3)  H  05/20/17  05:15    


 


Creat Clearance w eGFR  8.45  (>60)   05/20/17  05:15    


 


Calcium  7.2 mg/dL (8.5-10.1)  L  05/20/17  05:15    


 


Total Bilirubin  4.0 mg/dL (0.2-1.0)  H D 05/20/17  05:15    


 


AST  68 U/L (15-37)  H D 05/20/17  05:15    


 


ALT  37 U/L (12-78)  D 05/20/17  05:15    


 


Alkaline Phosphatase  602 U/L ()  H  05/20/17  05:15    


 


Total Protein  3.8 g/dl (6.4-8.2)  L  05/20/17  05:15    


 


Albumin  1.3 g/dl (3.4-5.0)  L  05/20/17  05:15    














ASSESSMENT AND PLAN:


Acute Cholangitis


Gram Negative Bacteremia


s/p ERCP/sphincteroplasty/stent placement


Acute Hypoxic Respiratory Failure


Septic Shock


Acute on Chronic Renal Failure


Lactic Acidosis


CAD


+Troponins likely Demand Ischemia


s/p TAVR for severe Aortic Stenosis


Thrombocytopenia








-  ABX per ID 


-  Repeat ERCP with sphincterotomy when plavix effect diminished


-  decrease IVF


-  taper FiO2 to keep SpO2 >90%


-  monitor platelets/coags/fibrinogen levels


-  monitor urine output, creatinine. May need RRT, but as now off pressors 

would try dose of lasix. 


-  Monitor off sedation to assess mental status


-  spontaneous breathing trials when mental status improved


-  DVT/GI prophylaxis


-  continue ICU monitoring








Rhys Cintron ACNP


[id number]


[id number]

## 2017-05-20 NOTE — PN
GI Progress Note


Subjective: 


GI NOte: Opens eyes. Weaning pressors. Bilirubin decreasing.





- Objective


Vital Signs: 


 Vital Signs











Temperature  97.2 F L  05/20/17 10:00


 


Pulse Rate  71   05/20/17 10:00


 


Respiratory Rate  19   05/20/17 10:00


 


Blood Pressure  111/62   05/20/17 10:00


 


O2 Sat by Pulse Oximetry (%)  100   05/20/17 08:00











Constitutional: Other (Still deeply lethargic but opens eyes to verbal command)


...Auscultate: Yes: Hypoactive Bowel Sounds


...Palpate: Yes: Soft


Labs: 


 CBC, BMP





 05/20/17 05:15 





 05/20/17 05:15 





 INR, PTT











INR  1.34  (0.82-1.09)  H  05/17/17  05:15    


 


Fibrinogen  372.0 mg/dL (238-498)   05/18/17  05:45    














Assessment/Plan


Resolving e. coli sepsis due to ascending cholangitis. Tolerating feedings 

which have been advanced. Will ultimately need ERCP with sphincterotomy and 

stone extractions .

## 2017-05-20 NOTE — PN
Progress Note, Physician


Chief Complaint: 


Events noted


Remains in ICU on mechanical ventilation


History of Present Illness: 


Patient was seen and examined in ICU. Intubated. Chart was reviewed


Off pressor.





- Current Medication List


Current Medications: 


Active Medications





Chlorhexidine Gluconate (Peridex -)  15 ml MM BID [facility]


   Last Admin: 05/20/17 09:13 Dose:  15 ml


Chlorhexidine Gluconate (Hibiclens For Decolonization -)  1 applic TP HS [facility]


   Last Admin: 05/19/17 21:16 Dose:  1 applic


Heparin Sodium (Porcine) (Heparin -)  5,000 unit SQ BID [facility]


   Last Admin: 05/20/17 09:47 Dose:  5,000 unit


Norepinephrine Bitartrate 8, (000 mcg/ Dextrose)  500 mls @ 18.75 mls/hr IV 

TITR ALCIDES; 5 MCG/MIN


   PRN Reason: Protocol


   Last Titration: 05/20/17 11:00 Dose:  0 mcg/min


Cefazolin Sodium 0.5 gm/ (Dextrose)  50 mls @ 100 mls/hr IVPB BID [facility]


   Last Admin: 05/20/17 09:44 Dose:  100 mls/hr


Nystatin (Nystop Powder -)  1 applic TP BID [facility]


   Last Admin: 05/20/17 09:13 Dose:  1 applic


Pantoprazole Sodium (Protonix Packets For Oral Suspension -)  40 mg NGT DAILY 

[facility]


Sodium Bicarbonate (Sodium Bicarbonate -)  1,300 mg NGT BID [facility]


   Last Admin: 05/20/17 12:30 Dose:  1,300 mg


Ursodiol (Actigal -)  300 mg PO BID [facility]


   Last Admin: 05/20/17 09:13 Dose:  300 mg











- Objective


Vital Signs: 


 Vital Signs











Temperature  97.9 F   05/20/17 16:00


 


Pulse Rate  71   05/20/17 16:00


 


Respiratory Rate  18   05/20/17 17:09


 


Blood Pressure  97/50   05/20/17 16:00


 


O2 Sat by Pulse Oximetry (%)  91 L  05/20/17 10:30











Cardiovascular: Yes: Regular Rate and Rhythm, S1, S2


Respiratory: Yes: Intubated, Mechanically Ventilated


Gastrointestinal: Yes: Normal Bowel Sounds, Soft.  No: Tenderness


Edema: Yes


Edema: LLE: Trace, RLE: Trace


Labs: 


 CBC, BMP





 05/20/17 05:15 





 05/20/17 05:15 





 INR, PTT











INR  1.34  (0.82-1.09)  H  05/17/17  05:15    


 


Fibrinogen  372.0 mg/dL (238-498)   05/18/17  05:45    














Problem List





- Problems


(1) Cellulitis, umbilical


Code(s): L03.316 - CELLULITIS OF UMBILICUS





(2) Choledocholithiasis


Code(s): K80.50 - CALCULUS OF BILE DUCT W/O CHOLANGITIS OR CHOLECYST W/O OBST





(3) Hypertension


Code(s): I10 - ESSENTIAL (PRIMARY) HYPERTENSION   Qualifiers: 


     Hypertension type: essential hypertension        Qualified Code(s): I10 - 

Essential (primary) hypertension  





(4) CAD (coronary artery disease)


Code(s): I25.10 - ATHSCL HEART DISEASE OF NATIVE CORONARY ARTERY W/O ANG PCTRS 

  Qualifiers: 


     Coronary Disease-Associated Artery/Lesion type: unspecified vessel or 

lesion type     Native vs. transplanted heart: native heart     Associated 

angina: without angina        Qualified Code(s): I25.10 - Atherosclerotic heart 

disease of native coronary artery without angina pectoris  





(5) History of percutaneous coronary intervention


Code(s): Z98.890 - OTHER SPECIFIED POSTPROCEDURAL STATES





(6) Renal cancer


Code(s): C64.9 - MALIGNANT NEOPLASM OF UNSP KIDNEY, EXCEPT RENAL PELVIS   

Qualifiers: 


     Laterality: right        Qualified Code(s): C64.1 - Malignant neoplasm of 

right kidney, except renal pelvis  








Assessment/Plan


1. Acute hypoxemic respiratory failure on mechanical ventilation


2. Biliary septic shock (E. coli), acute cholangitis post ERCP, balloon 

sphincteroplasty and stent placement with leukocytosis and lactic acidosis


3. Post TAVR for severe AS


4. CAD, S/P PCI/stent with demand ischemic injury


5. Severe biventricular dysfunction referable to sepsis


6. H/O Hypertension, but hypotensive requiring pressors - now taken off


7. Renal CA S/P right nephrectomy


8. Anuric acute on CKD referable to septic shock and ATN


9. Thrombocytopenia due to sepsis





PLAN:


1. Continue vent management


2. Monitor renal function - check urine output with diuretic. HD if clinically 

indicated and if there is no improvementas 


3. Antibiotics coverage 


4. DVT and GI prophylaxis





Prognosis: guarded


Gordon Botello MD

## 2017-05-20 NOTE — PN
Physical Exam: 


SUBJECTIVE: Patient seen and examined.  Remains intubated, lethargic but 

responds minimally to verbal and tactile stimuli.








OBJECTIVE:


Briefly opened his eyes


Responding to verbal and tactile stimuli


Remains intubated/titrating off pressors


WBC 35>11.5


Troponins trending down


Creatinine rising


Platelets 43 - s/p 1 unit of platelets  yesterday


Patient is a DNR only Vital Signs











 Period  Temp  Pulse  Resp  BP Sys/Badillo  Pulse Ox


 


 Last 24 Hr  96.9 F-99 F  64-78  14-24  100-144/53-70  100-100














GENERAL: Intubated/lethargic/wrist restraints to protect airway


HEAD: Normal with no signs of trauma.


NECK: Normal range of motion, supple without lymphadenopathy, JVD, or masses.


LUNGS: Breath sounds equal, scattered rhonchi on posterior lungs


HEART: Regular rate and rhythm, normal S1 and S2, rub or gallop.


ABDOMEN: +erythema, sero/sang malodorous drainage to periumbilical region. 


UPPER EXTREMITIES: Non pitting edema of bilateral hands


NEUROLOGICAL:  lethargic


SKIN: generalized jaundice, periumbilicus drainage/abscess/no odor








 Laboratory Results - last 24 hr











  05/19/17 05/19/17 05/20/17





  17:30 17:30 00:00


 


WBC   17.7 H 


 


RBC   3.32 L 


 


Hgb   9.6 L 


 


Hct   28.9 L 


 


MCV   87.1 


 


MCHC   33.3 


 


RDW   17.0 H 


 


Plt Count   55 L D 


 


MPV   10.2 


 


Neutrophils %   91.5 H 


 


Lymphocytes %   4.4 L D 


 


Monocytes %   2.1 L D 


 


Eosinophils %   1.8  D 


 


Basophils %   0.2 


 


PTT (Actin FS)   


 


Puncture Site   


 


ABG pH   


 


ABG pCO2 at Pt Temp   


 


ABG pO2 at Pt Temp   


 


ABG HCO3   


 


ABG O2 Sat (Measured)   


 


ABG O2 Content   


 


ABG Base Excess   


 


Shyam Test   


 


O2 Delivery Device   


 


Oxygen Flow Rate   


 


Vent Mode   


 


Vent Rate   


 


Mechanical Rate   


 


PEEP   


 


Pressure Support Vent   


 


Sodium   


 


Potassium   


 


Chloride   


 


Carbon Dioxide   


 


Anion Gap   


 


BUN   


 


Creatinine   


 


Creat Clearance w eGFR   


 


Random Glucose   


 


Calcium   


 


Phosphorus   


 


Magnesium   


 


Total Bilirubin   


 


AST   


 


ALT   


 


Alkaline Phosphatase   


 


Troponin I  2.35 H*   2.20 H*


 


Total Protein   


 


Albumin   














  05/20/17 05/20/17 05/20/17





  05:15 05:15 05:15


 


WBC    11.5 H D


 


RBC    3.07 L


 


Hgb    9.1 L


 


Hct    26.7 L


 


MCV    86.9


 


MCHC    34.2


 


RDW    16.9 H


 


Plt Count    43 L D


 


MPV    10.3


 


Neutrophils %    87.3 H


 


Lymphocytes %    5.2 L


 


Monocytes %    2.9 L


 


Eosinophils %    4.3  D


 


Basophils %    0.3


 


PTT (Actin FS)  33.9  


 


Puncture Site   


 


ABG pH   


 


ABG pCO2 at Pt Temp   


 


ABG pO2 at Pt Temp   


 


ABG HCO3   


 


ABG O2 Sat (Measured)   


 


ABG O2 Content   


 


ABG Base Excess   


 


Shyam Test   


 


O2 Delivery Device   


 


Oxygen Flow Rate   


 


Vent Mode   


 


Vent Rate   


 


Mechanical Rate   


 


PEEP   


 


Pressure Support Vent   


 


Sodium   134 L 


 


Potassium   4.5 


 


Chloride   102 


 


Carbon Dioxide   17 L 


 


Anion Gap   15 


 


BUN   74 H 


 


Creatinine   6.3 H 


 


Creat Clearance w eGFR   8.45 


 


Random Glucose   100 


 


Calcium   7.2 L 


 


Phosphorus   4.6  D 


 


Magnesium   2.2 


 


Total Bilirubin   4.0 H D 


 


AST   68 H D 


 


ALT   37  D 


 


Alkaline Phosphatase   602 H 


 


Troponin I   


 


Total Protein   3.8 L 


 


Albumin   1.3 L 














  05/20/17 05/20/17





  05:15 07:10


 


WBC  


 


RBC  


 


Hgb  


 


Hct  


 


MCV  


 


MCHC  


 


RDW  


 


Plt Count  


 


MPV  


 


Neutrophils %  


 


Lymphocytes %  


 


Monocytes %  


 


Eosinophils %  


 


Basophils %  


 


PTT (Actin FS)  


 


Puncture Site   Right radial


 


ABG pH   7.33 L


 


ABG pCO2 at Pt Temp   28.6 L


 


ABG pO2 at Pt Temp   120.0 H D


 


ABG HCO3   14.8 L*


 


ABG O2 Sat (Measured)   98.7


 


ABG O2 Content   13.7 L


 


ABG Base Excess   -9.6 L


 


Shyam Test   Positive


 


O2 Delivery Device   Vent


 


Oxygen Flow Rate   40%


 


Vent Mode   A/c


 


Vent Rate   14


 


Mechanical Rate   Yes


 


PEEP   5.0


 


Pressure Support Vent   500


 


Sodium  


 


Potassium  


 


Chloride  


 


Carbon Dioxide  


 


Anion Gap  


 


BUN  


 


Creatinine  


 


Creat Clearance w eGFR  


 


Random Glucose  


 


Calcium  


 


Phosphorus  


 


Magnesium  


 


Total Bilirubin  


 


AST  


 


ALT  


 


Alkaline Phosphatase  


 


Troponin I  1.73 H* 


 


Total Protein  


 


Albumin  








Active Medications











Generic Name Dose Route Start Last Admin





  Trade Name Freq  PRN Reason Stop Dose Admin


 


Chlorhexidine Gluconate  15 ml 05/19/17 22:00 05/20/17 09:13





  Peridex -  MM   15 ml





  BID ALCIDES   Administration


 


Chlorhexidine Gluconate  1 applic 05/19/17 22:00 05/19/17 21:16





  Hibiclens For Decolonization -  TP   1 applic





  HS ALCIDES   Administration


 


Heparin Sodium (Porcine)  5,000 unit 05/17/17 22:00 05/19/17 21:15





  Heparin -  SQ   5,000 unit





  BID [facility]   Administration


 


Famotidine/Sodium Chloride  50 mls @ 100 mls/hr 05/16/17 22:30 05/20/17 09:13





  Pepcid 20 Mg Premixed Ivpb -  IVPB   100 mls/hr





  DAILY ALCIDES   Administration


 


Norepinephrine Bitartrate 8,  500 mls @ 18.75 mls/hr 05/17/17 06:45 05/20/17 07:

23





  000 mcg/ Dextrose  IV   7.5 mls/hr





  TITR ALCIDES   Administration





  Protocol   





  5 MCG/MIN   


 


Cefazolin Sodium 0.5 gm/  50 mls @ 100 mls/hr 05/19/17 10:00 05/19/17 21:15





  Dextrose  IVPB   100 mls/hr





  BID [facility]CIDES   Administration


 


Sodium Bicarbonate 75 meq/  1,075 mls @ 75 mls/hr 05/19/17 15:30 05/20/17 07:22





  Dextrose/Sodium Chloride  IVPB   75 mls/hr





  Q14H ALCIDES   Administration


 


Nystatin  1 applic 05/17/17 22:00 05/20/17 09:13





  Nystop Powder -  TP   1 applic





  BID [facility]ES   Administration


 


Ursodiol  300 mg 05/19/17 22:00 05/20/17 09:13





  Actigal -  PO   300 mg





  BID [facility]IDES   Administration











ASSESSMENT/PLAN:





Patient is a 87 year old male with a significant past medical history of 

hypertension, CAD s/p stent placement, aortic stenosis, porcine valve 

replacement (takes both ASA 81mg and Plavix 75mg), urtheral strictures s/p 

dilatation and right nephrectomy. 





He was admitted on 5/15/2017 for abdominal pain, acute choledocholithiasis, 

acute transaminitis, jaundice and leukocytosis with shaking chills.  





ID:


Septic Shock likely secondary to periumbilicus cellulitis vs. cholangitis - 

improving


Periumbilical cellulitis - chronic


Assessment: Wound culture +staphylococcus coag, +blood cultures with ecoli


Repeat blood cultures pending


Now on Cefazolin as per ID


Surgical consult for likely drainage of abscess once pt more stable


Still intubated, titration off pressors as tolerated





GI:


Acute Choledocholithiasis/abdominal pain/generalized jaundice/acute 

transaminitis


Assessment/Plan: Cholangitis s/p ERCP with biliary stent placement on 5/16


Trend LFT's


On Enterpenem per ID since 5/16


Blood cultures ordered 


Intubated after procedure, weaning off pressorrs


On Ursodiol





:


Chronic Kidney Disease - acute renal failure


Renal cancer s/p right nephrectomy


Assessment/Plan: Creatinine 6.3, baseline ~ 1.7


Renal consulted and following, pt may need dialysis if continues to trend up as 

per renal


On IVF, Trend bun/creat.





Cardiology:


Hypertension - chronic


Assessment/Plan: hypotension improving, but still on pressors





Elevated troponins - acute


Assessment/Plan: Troponins continue to trend up, peaked at 7.4


Likely secondary demand ischemia and sepsis


Continue to trend troponins


Cardiologist following





CAD s/p stent placement


On ASA 81mg and Plavix 75mg - on hold





Hematology:


Thrombocytopenia


Assessment/Plan:  s/p 1 unit of platelets yesterday


platelets @ 43, monitor for now


HIT panel ordered





F.E.N.


Fluids: sodium bicarb drip


Electrolytes: monitor bmp


Nutrition: on tube feeds





Prophylaxis:


DVT: SCDs, Heparin BID


GI: Protonix





Code Status:  Requires inpatient hospitalization.   DNR








Visit type





- Emergency Visit


Emergency Visit: Yes


ED Registration Date: 05/15/17


Care time: The patient presented to the Emergency Department on the above date 

and was hospitalized for further evaluation of their emergent condition.





- New Patient


This patient is new to me today: No





- Critical Care


Critical Care patient: Yes


Total Critical Care Time (in minutes): 45


Critical Care Statement: The care of this patient involved high complexity 

decision making to prevent further life threatening deterioration of the patient

's condition and/or to evalute & treat vital organ system(s) failure or risk of 

failure.

## 2017-05-20 NOTE — PN
Progress Note, Physician


History of Present Illness: 


Pt seen and examined at bedside. He remains in the ICU intubated. 





- Current Medication List


Current Medications: 


Active Medications





Chlorhexidine Gluconate (Peridex -)  15 ml MM BID [facility]


   Last Admin: 05/20/17 09:13 Dose:  15 ml


Chlorhexidine Gluconate (Hibiclens For Decolonization -)  1 applic TP HS [facility]


   Last Admin: 05/19/17 21:16 Dose:  1 applic


Heparin Sodium (Porcine) (Heparin -)  5,000 unit SQ BID [facility]


   Last Admin: 05/20/17 09:47 Dose:  5,000 unit


Norepinephrine Bitartrate 8, (000 mcg/ Dextrose)  500 mls @ 18.75 mls/hr IV 

TITR ALCIDES; 5 MCG/MIN


   PRN Reason: Protocol


   Last Titration: 05/20/17 11:00 Dose:  0 mcg/min


Cefazolin Sodium 0.5 gm/ (Dextrose)  50 mls @ 100 mls/hr IVPB BID [facility]


   Last Admin: 05/20/17 09:44 Dose:  100 mls/hr


Nystatin (Nystop Powder -)  1 applic TP BID [facility]


   Last Admin: 05/20/17 09:13 Dose:  1 applic


Pantoprazole Sodium (Protonix Packets For Oral Suspension -)  40 mg NGT DAILY 

[facility]


Sodium Bicarbonate (Sodium Bicarbonate -)  1,300 mg NGT BID [facility]


   Last Admin: 05/20/17 12:30 Dose:  1,300 mg


Ursodiol (Actigal -)  300 mg PO BID [facility]


   Last Admin: 05/20/17 09:13 Dose:  300 mg











- Objective


Vital Signs: 


 Vital Signs











Temperature  97.9 F   05/20/17 16:00


 


Pulse Rate  71   05/20/17 16:00


 


Respiratory Rate  18   05/20/17 17:09


 


Blood Pressure  97/50   05/20/17 16:00


 


O2 Sat by Pulse Oximetry (%)  91 L  05/20/17 10:30











Constitutional: Yes: Calm


Eyes: Yes: Conjunctiva Clear


Cardiovascular: Yes: S1, S2


Respiratory: Yes: Mechanically Ventilated


Genitourinary: Yes: Chadwick Present, Oliguria


Musculoskeletal: Yes: Muscle Weakness


Edema: Yes


Edema: LUE: 1+, RUE: 1+, LLE: 2+, RLE: 2+


Neurological: Yes: Lethargy


Labs: 


 CBC, BMP





 05/20/17 05:15 





 05/20/17 05:15 





 INR, PTT











INR  1.34  (0.82-1.09)  H  05/17/17  05:15    


 


Fibrinogen  372.0 mg/dL (238-498)   05/18/17  05:45    














- ....Imaging


Chest X-ray: Report Reviewed (worsening)





Problem List





- Problems


(1) CAD (coronary artery disease)


Code(s): I25.10 - ATHSCL HEART DISEASE OF NATIVE CORONARY ARTERY W/O ANG PCTRS 

  Qualifiers: 


     Coronary Disease-Associated Artery/Lesion type: unspecified vessel or 

lesion type     Native vs. transplanted heart: native heart     Associated 

angina: without angina        Qualified Code(s): I25.10 - Atherosclerotic heart 

disease of native coronary artery without angina pectoris  





(2) Calculus of common duct with obstruction


Code(s): K80.51 - CALCULUS OF BILE DUCT W/O CHOLANGITIS OR CHOLECYST W OBST





(3) Choledocholithiasis


Code(s): K80.50 - CALCULUS OF BILE DUCT W/O CHOLANGITIS OR CHOLECYST W/O OBST





(4) Renal cancer


Code(s): C64.9 - MALIGNANT NEOPLASM OF UNSP KIDNEY, EXCEPT RENAL PELVIS   

Qualifiers: 


     Laterality: right        Qualified Code(s): C64.1 - Malignant neoplasm of 

right kidney, except renal pelvis  





(5) BO (acute kidney injury)


Code(s): N17.9 - ACUTE KIDNEY FAILURE, UNSPECIFIED





(6) CKD (chronic kidney disease)


Code(s): N18.9 - CHRONIC KIDNEY DISEASE, UNSPECIFIED   








Assessment/Plan


 Current Medications











Generic Name Dose Route Start Last Admin





  Trade Name Mohan  PRN Reason Stop Dose Admin


 


Chlorhexidine Gluconate  15 ml 05/19/17 22:00 05/20/17 09:13





  Peridex -  MM   15 ml





  BID [facility]   Administration


 


Chlorhexidine Gluconate  1 applic 05/19/17 22:00 05/19/17 21:16





  Hibiclens For Decolonization -  TP   1 applic





  HS ALCIDES   Administration


 


Heparin Sodium (Porcine)  5,000 unit 05/17/17 22:00 05/20/17 09:47





  Heparin -  SQ   5,000 unit





  BID [facility]IDES   Administration


 


Norepinephrine Bitartrate 8,  500 mls @ 18.75 mls/hr 05/17/17 06:45 05/20/17 11:

00





  000 mcg/ Dextrose  IV   0 mcg/min





  TITR ALCIDES   Titration





  Protocol   





  5 MCG/MIN   


 


Cefazolin Sodium 0.5 gm/  50 mls @ 100 mls/hr 05/19/17 10:00 05/20/17 09:44





  Dextrose  IVPB   100 mls/hr





  BID [facility]IDES   Administration


 


Nystatin  1 applic 05/17/17 22:00 05/20/17 09:13





  Nystop Powder -  TP   1 applic





  BID ALCIDES   Administration


 


Pantoprazole Sodium  40 mg 05/21/17 10:00  





  Protonix Packets For Oral Suspension -  NGT   





  DAILY ALCIDES   


 


Sodium Bicarbonate  1,300 mg 05/20/17 12:30 05/20/17 12:30





  Sodium Bicarbonate -  NGT   1,300 mg





  BID ALCIDES   Administration


 


Ursodiol  300 mg 05/19/17 22:00 05/20/17 09:13





  Actigal -  PO   300 mg





  BID AL[facility]IDES   Administration














Impression


1. CKD stage 3


2. BO


3. choledocholithiasis


4. CAD


5. hx of Renal Cell Cancer s/p nephroectomy


6. aortic stenosis


7. sepsis


8. acute respiratory failure requiring intubation


9. NSTEMI


10. bacteremia





Plan


- hold fluids


- discussed with ICU team, will give dose of lasix and monitor response


- may need HD if renal function does not improve and if family agrees


- monitor urine output


- can give PO bicarb


- pt now is off of pressors


- maintain a MAP of 65


- change feeds to nepro


- vent support


- monitor in ICU








Dr Rodriguez

## 2017-05-20 NOTE — PN
Progress Note (short form)





- Note


Progress Note: 


more awake


opens eyes to voice





levophed being tapered





 Vital Signs











 Period  Temp  Pulse  Resp  BP Sys/Badillo  Pulse Ox


 


 Last 24 Hr  96.9 F-99 F  64-78  14-24  100-144/53-70  100-100








cor-rrr


lungs decreased bs at bases


abd soft,nt


ext no edema


ware 


 CBC, BMP





 05/20/17 05:15 





 05/20/17 05:15 





 Microbiology





05/15/17 18:30   Abdomen   Gram Stain - Final


05/15/17 18:30   Abdomen   Wound Culture - Final


                            Staphylococcus Coagulase Neg


                            Diphtheroid/Corynebacterium


                            Streptococcus Viridans


05/16/17 10:42   Blood - Peripheral Venous   Blood Culture - Final


                            Escherichia Coli


05/16/17 10:40   Blood - Peripheral Venous   Blood Culture - Final


                            Escherichia Coli











imp/reccd





ecoli bacteremia


biliary sepsis/resp failure/anupama (one kidney)


s/p cbd stent 


hx TAVR


+troponins








continue cefazolin


clinically improving

## 2017-05-21 LAB
ALBUMIN SERPL-MCNC: 1.4 G/DL (ref 3.4–5)
ALP SERPL-CCNC: 601 U/L (ref 45–117)
ALT SERPL-CCNC: 28 U/L (ref 12–78)
ANION GAP SERPL CALC-SCNC: 16 MMOL/L (ref 8–16)
ART PUNCT SITE: (no result)
ARTERIAL PATENCY WRIST A: POSITIVE
AST SERPL-CCNC: 52 U/L (ref 15–37)
BASE EXCESS BLDA CALC-SCNC: -8.7 MEQ/L (ref -2–2)
BASOPHILS # BLD: 0.3 % (ref 0–2)
BILIRUB SERPL-MCNC: 2.9 MG/DL (ref 0.2–1)
CALCIUM SERPL-MCNC: 7.5 MG/DL (ref 8.5–10.1)
CO2 SERPL-SCNC: 17 MMOL/L (ref 21–32)
COCKROFT - GAULT: 11.21
CREAT SERPL-MCNC: 6.7 MG/DL (ref 0.7–1.3)
DEPRECATED RDW RBC AUTO: 16.9 % (ref 11.9–15.9)
EOSINOPHIL # BLD: 4.2 % (ref 0–4.5)
GLUCOSE SERPL-MCNC: 94 MG/DL (ref 74–106)
HCO3 BLDA-SCNC: 15.1 MEQ/L (ref 22–26)
LPM/O2%: (no result)
MAGNESIUM SERPL-MCNC: 2.1 MG/DL (ref 1.8–2.4)
MCH RBC QN AUTO: 29.5 PG (ref 25.7–33.7)
MCHC RBC AUTO-ENTMCNC: 34 G/DL (ref 32–35.9)
MCV RBC: 86.9 FL (ref 80–96)
MECH. VENT.: YES
NEUTROPHILS # BLD: 82.3 % (ref 42.8–82.8)
PEEP SETTING VENT: 5 CMH2O
PHOSPHATE SERPL-MCNC: 5.3 MG/DL (ref 2.5–4.9)
PLATELET # BLD AUTO: 42 K/MM3 (ref 134–434)
PMV BLD: 11.5 FL (ref 7.5–11.1)
PO2 BLDA: 93.6 MMHG (ref 68–100)
PROT SERPL-MCNC: 4 G/DL (ref 6.4–8.2)
PT. ON O2?: YES
SAO2 % BLDA: 97.3 % (ref 90–98.9)
TYPE OF O2: (no result)
VENT RATE: 14
VT/PRESS: 500
WBC # BLD AUTO: 9.7 K/MM3 (ref 4–10)

## 2017-05-21 RX ADMIN — Medication SCH MG: at 10:37

## 2017-05-21 RX ADMIN — FUROSEMIDE SCH MG: 10 INJECTION, SOLUTION INTRAVENOUS at 15:11

## 2017-05-21 RX ADMIN — CHLORHEXIDINE GLUCONATE 0.12% ORAL RINSE SCH ML: 1.2 LIQUID ORAL at 10:37

## 2017-05-21 RX ADMIN — CHLORHEXIDINE GLUCONATE SCH APPLIC: 213 SOLUTION TOPICAL at 22:17

## 2017-05-21 RX ADMIN — CEFAZOLIN SCH MLS/HR: 1 INJECTION, POWDER, FOR SOLUTION INTRAVENOUS at 10:37

## 2017-05-21 RX ADMIN — Medication SCH MG: at 22:21

## 2017-05-21 RX ADMIN — CEFAZOLIN SCH MLS/HR: 1 INJECTION, POWDER, FOR SOLUTION INTRAVENOUS at 22:17

## 2017-05-21 RX ADMIN — CHLORHEXIDINE GLUCONATE 0.12% ORAL RINSE SCH ML: 1.2 LIQUID ORAL at 22:17

## 2017-05-21 RX ADMIN — URSODIOL SCH MG: 300 CAPSULE ORAL at 22:16

## 2017-05-21 RX ADMIN — URSODIOL SCH MG: 300 CAPSULE ORAL at 10:36

## 2017-05-21 RX ADMIN — DEXTROSE MONOHYDRATE SCH: 50 INJECTION, SOLUTION INTRAVENOUS at 06:51

## 2017-05-21 RX ADMIN — PANTOPRAZOLE SODIUM SCH MG: 40 GRANULE, DELAYED RELEASE ORAL at 10:37

## 2017-05-21 RX ADMIN — FUROSEMIDE SCH MG: 10 INJECTION, SOLUTION INTRAVENOUS at 06:24

## 2017-05-21 RX ADMIN — NYSTATIN SCH APPLIC: 100000 POWDER TOPICAL at 10:38

## 2017-05-21 RX ADMIN — NYSTATIN SCH APPLIC: 100000 POWDER TOPICAL at 22:17

## 2017-05-21 NOTE — PN
Progress Note, Physician


Chief Complaint: 


Events noted


Remains in ICU on mechanical ventilation


History of Present Illness: 


Patient was seen and examined in ICU. Intubated. Chart was reviewed


Still off pressor. Renal function remains with elevated creatinine and reduced 

urine output





- Current Medication List


Current Medications: 


Active Medications





Chlorhexidine Gluconate (Peridex -)  15 ml MM BID [facility]


   Last Admin: 05/20/17 22:00 Dose:  15 ml


Chlorhexidine Gluconate (Hibiclens For Decolonization -)  1 applic TP HS [facility]


   Last Admin: 05/20/17 22:00 Dose:  1 applic


Furosemide (Lasix Injection -)  100 mg IVPB BID@0600,1400 [facility]


   Last Admin: 05/21/17 06:24 Dose:  100 mg


Norepinephrine Bitartrate 8, (000 mcg/ Dextrose)  500 mls @ 18.75 mls/hr IV 

TITR ALCIDES; 5 MCG/MIN


   PRN Reason: Protocol


   Last Admin: 05/21/17 06:51 Dose:  Not Given


Cefazolin Sodium 0.5 gm/ (Dextrose)  50 mls @ 100 mls/hr IVPB BID [facility]


   Last Admin: 05/20/17 22:00 Dose:  100 mls/hr


Nystatin (Nystop Powder -)  1 applic TP BID [facility]


   Last Admin: 05/20/17 23:49 Dose:  1 applic


Pantoprazole Sodium (Protonix Packets For Oral Suspension -)  40 mg NGT DAILY 

[facility]


Sodium Bicarbonate (Sodium Bicarbonate -)  1,300 mg NGT BID [facility]


   Last Admin: 05/20/17 22:00 Dose:  1,300 mg


Ursodiol (Actigal -)  300 mg PO BID [facility]


   Last Admin: 05/20/17 22:00 Dose:  300 mg











- Objective


Vital Signs: 


 Vital Signs











Temperature  97.2 F L  05/21/17 08:00


 


Pulse Rate  82   05/21/17 08:00


 


Respiratory Rate  24   05/21/17 08:40


 


Blood Pressure  96/53   05/21/17 08:00


 


O2 Sat by Pulse Oximetry (%)  99   05/20/17 22:00











Cardiovascular: Yes: Regular Rate and Rhythm, Murmur (Soft SM), S1, S2


Respiratory: Yes: Diminished, Intubated, Mechanically Ventilated


Edema: Yes


Edema: LLE: Trace, RLE: Trace


Labs: 


 CBC, BMP





 05/21/17 05:40 





 05/21/17 05:40 





 








Problem List





- Problems


(1) Cellulitis, umbilical


Code(s): L03.316 - CELLULITIS OF UMBILICUS





(2) Choledocholithiasis


Code(s): K80.50 - CALCULUS OF BILE DUCT W/O CHOLANGITIS OR CHOLECYST W/O OBST





(3) Hypertension


Code(s): I10 - ESSENTIAL (PRIMARY) HYPERTENSION   Qualifiers: 


     Hypertension type: essential hypertension        Qualified Code(s): I10 - 

Essential (primary) hypertension  





(4) CAD (coronary artery disease)


Code(s): I25.10 - ATHSCL HEART DISEASE OF NATIVE CORONARY ARTERY W/O ANG PCTRS 

  Qualifiers: 


     Coronary Disease-Associated Artery/Lesion type: unspecified vessel or 

lesion type     Native vs. transplanted heart: native heart     Associated 

angina: without angina        Qualified Code(s): I25.10 - Atherosclerotic heart 

disease of native coronary artery without angina pectoris  





(5) History of percutaneous coronary intervention


Code(s): Z98.890 - OTHER SPECIFIED POSTPROCEDURAL STATES





(6) Renal cancer


Code(s): C64.9 - MALIGNANT NEOPLASM OF UNSP KIDNEY, EXCEPT RENAL PELVIS   

Qualifiers: 


     Laterality: right        Qualified Code(s): C64.1 - Malignant neoplasm of 

right kidney, except renal pelvis  








Assessment/Plan


1. Acute hypoxemic respiratory failure on mechanical ventilation


2. Biliary septic shock (E. coli), acute cholangitis post ERCP, balloon 

sphincteroplasty and stent placement with leukocytosis and lactic acidosis


3. Post TAVR for severe AS


4. CAD, S/P PCI/stent with demand ischemic injury


5. Severe biventricular dysfunction referable to sepsis


6. H/O Hypertension, but hypotensive requiring pressors - now taken off


7. Renal CA S/P right nephrectomy


8. Anuric acute on CKD referable to septic shock and ATN


9. Thrombocytopenia due to sepsis





PLAN:


1. Continue vent management


2. Monitor renal function - follow urine output with diuretic. HD if clinically 

indicated and if there is no improvement


3. Antibiotics coverage 


4. DVT and GI prophylaxis





Prognosis: guarded


Gordon Botello MD

## 2017-05-21 NOTE — PN
Physical Exam: 


SUBJECTIVE: Patient seen and examined. 


Remains intubated, off pressors





OBJECTIVE:


WBC trending down


Creatinine continues to rise


Platelets are @ 42, will stop heparin, HIT panel 0.5


Hematology consulted for thrombocytopenia and anticoagulation


SCDs to bilateral lower ext.


Patient opens eyes, not following commands


 Vital Signs











 Period  Temp  Pulse  Resp  BP Sys/Badillo  Pulse Ox


 


 Last 24 Hr  97.0 F-98.1 F  70-83  18-24  /45-62  91-99








GENERAL: Intubated/lethargic/wrist restraints to protect airway


HEAD: Normal with no signs of trauma.


NECK: Normal range of motion, supple without lymphadenopathy, JVD, or masses.


LUNGS: Breath sounds equal, scattered rhonchi on posterior lungs


HEART: Regular rate and rhythm


ABDOMEN: +erythema, redness to periumbilical region. 


UPPER EXTREMITIES: Non pitting edema of bilateral hands


NEUROLOGICAL:  lethargic, intubated, minimally responsive


SKIN: generalized jaundice, periumbilicus drainage/abscess/no odor





 Laboratory Results - last 24 hr











  05/19/17 05/20/17 05/21/17





  09:30 00:00 05:40


 


WBC   


 


RBC   


 


Hgb   


 


Hct   


 


MCV   


 


MCHC   


 


RDW   


 


Plt Count   


 


MPV   


 


Neutrophils %   


 


Lymphocytes %   


 


Monocytes %   


 


Eosinophils %   


 


Basophils %   


 


PTT (Actin FS)    34.4


 


Puncture Site   


 


ABG pH   


 


ABG pCO2 at Pt Temp   


 


ABG pO2 at Pt Temp   


 


ABG HCO3   


 


ABG O2 Sat (Measured)   


 


ABG O2 Content   


 


ABG Base Excess   


 


Shyam Test   


 


O2 Delivery Device   


 


Oxygen Flow Rate   


 


Vent Mode   


 


Vent Rate   


 


Mechanical Rate   


 


PEEP   


 


Pressure Support Vent   


 


Sodium   


 


Potassium   


 


Chloride   


 


Carbon Dioxide   


 


Anion Gap   


 


BUN   


 


Creatinine   


 


Creat Clearance w eGFR   


 


Random Glucose   


 


Calcium   


 


Phosphorus   


 


Magnesium   


 


Total Bilirubin   


 


AST   


 


ALT   


 


Alkaline Phosphatase   


 


Creatine Kinase   60 


 


Total Protein   


 


Albumin   


 


Hep-Induced Plt Ab Rapid  0.545 H  














  05/21/17 05/21/17 05/21/17





  05:40 05:40 07:10


 


WBC   9.7 


 


RBC   3.31 L 


 


Hgb   9.8 L 


 


Hct   28.7 L 


 


MCV   86.9 


 


MCHC   34.0 


 


RDW   16.9 H 


 


Plt Count   42 L 


 


MPV   11.5 H D 


 


Neutrophils %   82.3 


 


Lymphocytes %   6.7 L D 


 


Monocytes %   6.5  D 


 


Eosinophils %   4.2 


 


Basophils %   0.3 


 


PTT (Actin FS)   


 


Puncture Site    Right radial


 


ABG pH    7.36


 


ABG pCO2 at Pt Temp    27.2 L


 


ABG pO2 at Pt Temp    93.6  D


 


ABG HCO3    15.1 L


 


ABG O2 Sat (Measured)    97.3


 


ABG O2 Content    13.6 L


 


ABG Base Excess    -8.7 L


 


Shyam Test    Positive


 


O2 Delivery Device    Vent


 


Oxygen Flow Rate    40%


 


Vent Mode    A/c


 


Vent Rate    14


 


Mechanical Rate    Yes


 


PEEP    5.0


 


Pressure Support Vent    500


 


Sodium  133 L  


 


Potassium  4.6  


 


Chloride  100  


 


Carbon Dioxide  17 L  


 


Anion Gap  16  


 


BUN  86 H  


 


Creatinine  6.7 H  


 


Creat Clearance w eGFR  7.87  


 


Random Glucose  94  


 


Calcium  7.5 L  


 


Phosphorus  5.3 H  


 


Magnesium  2.1  


 


Total Bilirubin  2.9 H D  


 


AST  52 H D  


 


ALT  28  D  


 


Alkaline Phosphatase  601 H  


 


Creatine Kinase   


 


Total Protein  4.0 L  


 


Albumin  1.4 L  


 


Hep-Induced Plt Ab Rapid   








Active Medications











Generic Name Dose Route Start Last Admin





  Trade Name Freq  PRN Reason Stop Dose Admin


 


Chlorhexidine Gluconate  15 ml 05/19/17 22:00 05/20/17 22:00





  Peridex -  MM   15 ml





  BID ALCIDES   Administration


 


Chlorhexidine Gluconate  1 applic 05/19/17 22:00 05/20/17 22:00





  Hibiclens For Decolonization -  TP   1 applic





  HS ALCIDES   Administration


 


Furosemide  100 mg 05/21/17 06:00 05/21/17 06:24





  Lasix Injection -  IVPB   100 mg





  BID@0600,1400 ALCIDES   Administration


 


Norepinephrine Bitartrate 8,  500 mls @ 18.75 mls/hr 05/17/17 06:45 05/21/17 06:

51





  000 mcg/ Dextrose  IV   Not Given





  TITR ALCIDES   





  Protocol   





  5 MCG/MIN   


 


Cefazolin Sodium 0.5 gm/  50 mls @ 100 mls/hr 05/19/17 10:00 05/20/17 22:00





  Dextrose  IVPB   100 mls/hr





  BID ALCIDES   Administration


 


Nystatin  1 applic 05/17/17 22:00 05/20/17 23:49





  Nystop Powder -  TP   1 applic





  BID ALCIDES   Administration


 


Pantoprazole Sodium  40 mg 05/21/17 10:00  





  Protonix Packets For Oral Suspension -  NGT   





  DAILY ALCIDES   


 


Sodium Bicarbonate  1,300 mg 05/20/17 12:30 05/20/17 22:00





  Sodium Bicarbonate -  NGT   1,300 mg





  BID ALCIDES   Administration


 


Ursodiol  300 mg 05/19/17 22:00 05/20/17 22:00





  Actigal -  PO   300 mg





  BID ALCIDES   Administration











ASSESSMENT/PLAN:





Patient is a 87 year old male with a significant past medical history of 

hypertension, CAD s/p stent placement, aortic stenosis, porcine valve 

replacement (takes both ASA 81mg and Plavix 75mg), urtheral strictures s/p 

dilatation and right nephrectomy. 





He was admitted on 5/15/2017 for abdominal pain, acute choledocholithiasis, 

acute transaminitis, jaundice and leukocytosis with shaking chills.  





ID:


Septic Shock likely secondary to periumbilicus cellulitis vs. cholangitis - 

improving


Periumbilical cellulitis - chronic


Assessment: Wound culture +staphylococcus coag, +blood cultures with ecoli


Repeat blood cultures pending


WBC trending down


Now on Cefazolin as per ID


Surgical consult for likely drainage of abscess once pt more stable


Still intubated, off pressors 





GI:


Acute Choledocholithiasis/abdominal pain/generalized jaundice/acute 

transaminitis


Assessment/Plan: Cholangitis s/p ERCP with biliary stent placement on 5/16


Trend LFT's


On Ursodiol





:


Chronic Kidney Disease - acute renal failure


Renal cancer s/p right nephrectomy


Assessment/Plan: Creatinine 6.7, baseline ~ 1.7


Renal consulted and following, pt may need dialysis if continues to trend up as 

per renal


On IVF, Trend bun/creat.





Cardiology:


Hypertension - chronic


Assessment/Plan: hypotension improving, off pressors





Elevated troponins - acute


Assessment/Plan: Troponins continue to trend up, peaked at 7.4, now trending 

down


Likely secondary demand ischemia and sepsis


Cardiologist following





CAD s/p stent placement


On ASA 81mg and Plavix 75mg - on hold





Hematology:


Thrombocytopenia


Assessment/Plan:  s/p 1 unit of platelets


platelets @ 42 continue to drop, stopped heparin


HIT panel, hematology consulted 





F.E.N.


Fluids: sodium bicarb drip


Electrolytes: monitor bmp


Nutrition: on tube feeds





Prophylaxis:


DVT:  SCDs


GI: Protonix





Code Status:  Requires inpatient hospitalization.   DNR





Visit type





- Emergency Visit


Emergency Visit: Yes


ED Registration Date: 05/15/17


Care time: The patient presented to the Emergency Department on the above date 

and was hospitalized for further evaluation of their emergent condition.





- New Patient


This patient is new to me today: No





- Critical Care


Critical Care patient: Yes


Total Critical Care Time (in minutes): 45


Critical Care Statement: The care of this patient involved high complexity 

decision making to prevent further life threatening deterioration of the patient

's condition and/or to evalute & treat vital organ system(s) failure or risk of 

failure.





- Discharge Referral


Referred to [facility] No

## 2017-05-21 NOTE — PN
Progress Note (short form)





- Note


Progress Note: 


PULM/CCM


SUBJECTIVE:


Patient seen and examined in the ICU.  





-Remains off pressors


-Awakens and tracks, not following commands yet


-Does well on SBT 5/5 50%, has a cough and gag, but mental status precludes 

extubation today 














OBJECTIVE:


 Current Medications





Chlorhexidine Gluconate (Peridex -)  15 ml MM BID [facility]


   Last Admin: 05/20/17 22:00 Dose:  15 ml


Chlorhexidine Gluconate (Hibiclens For Decolonization -)  1 applic TP HS [facility]


   Last Admin: 05/20/17 22:00 Dose:  1 applic


Furosemide (Lasix Injection -)  100 mg IVPB BID@0600,1400 [facility]


   Last Admin: 05/21/17 06:24 Dose:  100 mg


Norepinephrine Bitartrate 8, (000 mcg/ Dextrose)  500 mls @ 18.75 mls/hr IV 

TITR ALCIDES; 5 MCG/MIN


   PRN Reason: Protocol


   Last Admin: 05/21/17 06:51 Dose:  Not Given


Cefazolin Sodium 0.5 gm/ (Dextrose)  50 mls @ 100 mls/hr IVPB BID [facility]


   Last Admin: 05/20/17 22:00 Dose:  100 mls/hr


Nystatin (Nystop Powder -)  1 applic TP BID [facility]


   Last Admin: 05/20/17 23:49 Dose:  1 applic


Pantoprazole Sodium (Protonix Packets For Oral Suspension -)  40 mg NGT DAILY 

[facility]


Sodium Bicarbonate (Sodium Bicarbonate -)  1,300 mg NGT BID [facility]


   Last Admin: 05/20/17 22:00 Dose:  1,300 mg


Ursodiol (Actigal -)  300 mg PO BID [facility]


   Last Admin: 05/20/17 22:00 Dose:  300 mg





 Vital Signs











Temp  97.2 F L  05/21/17 08:00


 


Pulse  82   05/21/17 08:00


 


Resp  24   05/21/17 08:40


 


BP  96/53   05/21/17 08:00


 


Pulse Ox  99   05/20/17 22:00








 Intake & Output











 05/20/17 05/21/17 05/21/17





 18:59 06:59 18:59


 


Intake Total 1397.7 1040 


 


Output Total 350 700 


 


Balance 1047.7 340 


 


Weight  102.058 kg 


 


Intake:   


 


  .7 0 


 


    Levophed - 8,000 Mcg In 15.2 0 





    D5w - 492 ml @ 5 MCG/MIN   





    18.75 mls/hr IV TITR ALCIDES   





    Rx#:[id number]   


 


    D5-1/2Ns - 1,000 ml @ 75 412.5  





    mls/hr IVPB Q14H ALCIDES with   





    Sodium Bicarbonate 8.4%   





    - 75 Meq Rx#:[id number]   


 


  IVPB 150 150 


 


  Oral 0  


 


  Tube Feeding 460 440 


 


  Tube Irrigant 360 450 


 


Output:   


 


  Urine 350 700 


 


    [place] 350 700 


 


Other:   


 


  Voiding Method Indwelling Catheter Indwelling Catheter Indwelling Catheter


 


  Weight Measurement Method  Built in [facility] 











EXAM:


neuro: awake, tracks, does not follow


HEENT: icteric sclera, PERRL


Lungs: clear


Heart: RRR


Abd: soft


Ext: edema, hot


Skin: hot, dry








 CBC, BMP





 05/21/17 05:40 





 05/21/17 05:40 











ASSESSMENT AND PLAN:


Acute Cholangitis


Gram Negative Bacteremia


s/p ERCP/sphincteroplasty/stent placement


Acute Hypoxic Respiratory Failure


Septic Shock


Acute on Chronic Renal Failure


Lactic Acidosis


CAD


+Troponins likely Demand Ischemia


s/p TAVR for severe Aortic Stenosis


Thrombocytopenia








-  ABX per ID 


-  Repeat ERCP with sphincterotomy when plavix effect diminished


-  decrease IVF


-  taper FiO2 to keep SpO2 >90%


-  monitor platelets/coags/fibrinogen levels


-  monitor urine output, creatinine. May need RRT, more lasix.


-  Monitor off sedation to assess mental status - he may be uremic


-  Hold feeds tomorrow morning for possible extubation 


-  DVT/GI prophylaxis


-  continue ICU monitoring








Bhavesh Guerrero


Pulm/Critical Care NP


[id number]


[id number]

## 2017-05-21 NOTE — PN
Progress Note, Physician


History of Present Illness: 


Pt seen and examined at bedside. He responded to lasix and has better urine 

output. He remains intubated. 





- Current Medication List


Current Medications: 


Active Medications





Chlorhexidine Gluconate (Peridex -)  15 ml MM BID [facility]


   Last Admin: 05/21/17 10:37 Dose:  15 ml


Chlorhexidine Gluconate (Hibiclens For Decolonization -)  1 applic TP HS [facility]


   Last Admin: 05/20/17 22:00 Dose:  1 applic


Furosemide (Lasix Injection -)  100 mg IVPB BID@0600,1400 [facility]


   Last Admin: 05/21/17 15:11 Dose:  100 mg


Cefazolin Sodium 0.5 gm/ (Dextrose)  50 mls @ 100 mls/hr IVPB BID [facility]


   Last Admin: 05/21/17 10:37 Dose:  100 mls/hr


Nystatin (Nystop Powder -)  1 applic TP BID [facility]


   Last Admin: 05/21/17 10:38 Dose:  1 applic


Pantoprazole Sodium (Protonix Packets For Oral Suspension -)  40 mg NGT DAILY 

[facility]


   Last Admin: 05/21/17 10:37 Dose:  40 mg


Sodium Bicarbonate (Sodium Bicarbonate -)  1,300 mg NGT BID [facility]


   Last Admin: 05/21/17 10:37 Dose:  1,300 mg


Ursodiol (Actigal -)  300 mg PO BID [facility]


   Last Admin: 05/21/17 10:36 Dose:  300 mg











- Objective


Vital Signs: 


 Vital Signs











Temperature  97.6 F   05/21/17 12:00


 


Pulse Rate  97 H  05/21/17 14:00


 


Respiratory Rate  25 H  05/21/17 14:28


 


Blood Pressure  97/57   05/21/17 14:00


 


O2 Sat by Pulse Oximetry (%)  99   05/21/17 10:33











Constitutional: Yes: Calm


Eyes: Yes: Conjunctiva Clear


HENT: Yes: Atraumatic


Cardiovascular: Yes: S1, S2


Respiratory: Yes: Mechanically Ventilated


Gastrointestinal: Yes: Soft


Genitourinary: Yes: Chadwick Present


Musculoskeletal: Yes: Muscle Weakness


Edema: Yes


Edema: LUE: 1+, RUE: 1+, LLE: 1+, RLE: 1+


Neurological: Yes: Lethargy


Labs: 


 CBC, BMP





 05/21/17 05:40 





 05/21/17 05:40 





 INR, PTT











INR  1.34  (0.82-1.09)  H  05/17/17  05:15    


 


Fibrinogen  372.0 mg/dL (238-498)   05/18/17  05:45    














- ....Imaging


Chest X-ray: Report Reviewed





Problem List





- Problems


(1) CAD (coronary artery disease)


Code(s): I25.10 - ATHSCL HEART DISEASE OF NATIVE CORONARY ARTERY W/O ANG PCTRS 

  Qualifiers: 


     Coronary Disease-Associated Artery/Lesion type: unspecified vessel or 

lesion type     Native vs. transplanted heart: native heart     Associated 

angina: without angina        Qualified Code(s): I25.10 - Atherosclerotic heart 

disease of native coronary artery without angina pectoris  





(2) Calculus of common duct with obstruction


Code(s): K80.51 - CALCULUS OF BILE DUCT W/O CHOLANGITIS OR CHOLECYST W OBST





(3) Choledocholithiasis


Code(s): K80.50 - CALCULUS OF BILE DUCT W/O CHOLANGITIS OR CHOLECYST W/O OBST





(4) Renal cancer


Code(s): C64.9 - MALIGNANT NEOPLASM OF UNSP KIDNEY, EXCEPT RENAL PELVIS   

Qualifiers: 


     Laterality: right        Qualified Code(s): C64.1 - Malignant neoplasm of 

right kidney, except renal pelvis  





(5) BO (acute kidney injury)


Code(s): N17.9 - ACUTE KIDNEY FAILURE, UNSPECIFIED





(6) CKD (chronic kidney disease)


Code(s): N18.9 - CHRONIC KIDNEY DISEASE, UNSPECIFIED   








Assessment/Plan


 Current Medications











Generic Name Dose Route Start Last Admin





  Trade Name Freq  PRN Reason Stop Dose Admin


 


Chlorhexidine Gluconate  15 ml 05/19/17 22:00 05/21/17 10:37





  Peridex -  MM   15 ml





  BID ALCIDES   Administration


 


Chlorhexidine Gluconate  1 applic 05/19/17 22:00 05/20/17 22:00





  Hibiclens For Decolonization -  TP   1 applic





  HS ALCIDES   Administration


 


Furosemide  100 mg 05/21/17 06:00 05/21/17 15:11





  Lasix Injection -  IVPB   100 mg





  BID@0600,1400 ALCIDES   Administration


 


Cefazolin Sodium 0.5 gm/  50 mls @ 100 mls/hr 05/19/17 10:00 05/21/17 10:37





  Dextrose  IVPB   100 mls/hr





  BID ALCIDES   Administration


 


Nystatin  1 applic 05/17/17 22:00 05/21/17 10:38





  Nystop Powder -  TP   1 applic





  BID ALCIDES   Administration


 


Pantoprazole Sodium  40 mg 05/21/17 10:00 05/21/17 10:37





  Protonix Packets For Oral Suspension -  NGT   40 mg





  DAILY ALCIDES   Administration


 


Sodium Bicarbonate  1,300 mg 05/20/17 12:30 05/21/17 10:37





  Sodium Bicarbonate -  NGT   1,300 mg





  BID ALCIDES   Administration


 


Ursodiol  300 mg 05/19/17 22:00 05/21/17 10:36





  Actigal -  PO   300 mg





  BID ALCIDES   Administration











Impression


1. CKD stage 3


2. BO


3. choledocholithiasis


4. CAD


5. hx of Renal Cell Cancer s/p nephroectomy


6. aortic stenosis


7. sepsis


8. acute respiratory failure requiring intubation


9. NSTEMI


10. bacteremia





Plan


- will give another dose of lasix today


- spoke to family at length, including wife and both of his nieces. I explained 

the risks of dialysis. Pts renal function is worsening. They agree to dialysis


if does not show improvement by tomorrow. Explained risks of vascular access 

and of HD


- repeat labs in am


- monitor urine output


- pulmonary follow up


- GI input appreciated


- pt is maintaining his blood pressure off of pressors


- do not restart fluids


- maintain a MAP of 65


- vent support


- monitor in ICU








Dr Rodriguez

## 2017-05-21 NOTE — CONSULT
Consult - text type





- Consultation


Consultation Note: 


This is an 86 y/o male with pmhx significant for renal cell CA S/P right 

nephrectomy, HTN, CAD S/P PCI/stent () and S/P TAVR (at [facility] in  - followed by Dr. Clint Sanabria) presents with an abnormal MRI of 

abdomen showing prominent head of pancreas, large distal CBD stone and dilated 

CBD and labs c/w cholangitis. Pt had an ERCP with balloon sphinctertomy and 

stent placement.  Pt tolerated procedure well and was extubated however pt was 

apneic and re-intubated.  


In ICU pt was given NS bolus of 500cc given he was requiring dopamine for 

pressor support and tachycardic upon arrival.  


He is being treated for sepsis/cholangitis





Patient currently intubated/sedated





- History Source


History Provided By: Medical Record


Limitations to Obtaining History: Clinical Condition





- Past Medical History


Cardio/Vascular: Yes: Aortic Stenosis (Post TAVR at [facility] in ), CAD (s/p 

cardiac stent/ PCI ), HTN, Hyperlipdemia, Other


Gastrointestinal: No: Irritable Bowel Disease


Hepatobiliary: Yes: Cholelithiasis


Renal/: Yes: Renal Inusuff, Cancer (Renal CA ), Renal Calculi, Other (

urethral stricture s/p cystoscopy, nephrectomy for RCC)





- Past Surgical History


Past Surgical History: Yes: Nephrectomy, Stent, Valve Replacement (TAVR)





- Smoking History


Smoking history: Never smoked





- Social History


Usual Living Arrangement: With Spouse








- Allergies


Allergies/Adverse Reactions: 


 Allergies











Allergy/AdvReac Type Severity Reaction Status Date / Time


 


Penicillins Allergy   Verified 05/15/17 15:20














- Home Medications


Home Medications: 


Ambulatory Orders





Aspirin Coated [Ecotrin -] 81 mg PO DAILY 01/26/15 


Montelukast Na [Singulair -] 10 mg PO HS 16 


Atorvastatin Ca [Lipitor] 40 mg PO HS 17 


Finasteride 5 mg PO DAILY 17 


Omega-3 Fatty Acids [Omega-3] 1,500 mg PO 05/15/17 





Active Medications





Chlorhexidine Gluconate (Peridex -)  15 ml MM BID [facility]


   Last Admin: 17 10:37 Dose:  15 ml


Chlorhexidine Gluconate (Hibiclens For Decolonization -)  1 applic TP HS [facility]


   Last Admin: 17 22:00 Dose:  1 applic


Furosemide (Lasix Injection -)  100 mg IVPB BID@0600,1400 [facility]


   Last Admin: 17 15:11 Dose:  100 mg


Cefazolin Sodium 0.5 gm/ (Dextrose)  50 mls @ 100 mls/hr IVPB BID [facility]


   Last Admin: 17 10:37 Dose:  100 mls/hr


Nystatin (Nystop Powder -)  1 applic TP BID [facility]


   Last Admin: 17 10:38 Dose:  1 applic


Pantoprazole Sodium (Protonix Packets For Oral Suspension -)  40 mg NGT DAILY 

[facility]


   Last Admin: 17 10:37 Dose:  40 mg


Sodium Bicarbonate (Sodium Bicarbonate -)  1,300 mg NGT BID [facility]


   Last Admin: 17 10:37 Dose:  1,300 mg


Ursodiol (Actigal -)  300 mg PO BID [facility]


   Last Admin: 17 10:36 Dose:  300 mg











Family Disease History





- Family Disease History


Family Disease History: Other: Father ( 80's unclear cause), Mother ( 80

's CVA/MI), Brother (1 brother  CHF, 1 brother  stomach cancer)





Physical Exam


Vital Signs: 


 


 Last Vital Signs











Temp Pulse Resp BP Pulse Ox


 


 98.6 F   92 H  24   102/51   99 


 


 17 20:00  17 20:00  17 20:00  17 20:00  17 10:33








intubated


Cor: RSR, No murmurs, No gallops


Lungs: Clear to P&A


Abd: Soft, Normal bowel sounds, No organomegaly


Ext:No significant edema


Skin: No rashes, Integument intact








Assessment/Plan


This is an 86 y/o male with a past medical history significant for renal CA S/P 

right nephrectomy, HTN, CAD S/P PCI/stent () and S/P TAVR (at [facility] in  - followed by Dr. Clint Sanabria) presents with an 

abnormal MRI of abdomen showing prominent head of pancrease, large distal CBD 

stone and dilated CBD. Pt underwent an ERCP with balloon sphincterotomy and 

stent placement with post-procedure failed extubation necessitating 

reintubation and transfer to ICU for further medical care.





biliary sepsis/BO/resp. failure





thrombocytopenia---presumable due to bacteremia/cholangitits/sepsis


expect it to improve


timing not c/ww hit


borderline hit ab most likely false +


check Serotonin release assy


monitor coags/CBC

## 2017-05-21 NOTE — PN
GI Progress Note


Subjective: 


GI Note: Surjit opens his eyes briefly and intermittently. Bili is down to 2.9 

but renal function is not improving. Wife and nieces are at the bedside. I 

again reviewed the ERCP findings and the need for a repeat ERCP to make a 

sphincterotomy and attempt to remove the stones. I explained that the stent is 

temporarily, would need to be changed every 4-6 months and could fall out 

leaving Surjit at risk of recurrent cholangitis and sepsis. I explained that 

the risks of ERCP include perforation and hemorrhage and also ERCP induced 

pancreatitis that could lead to multiorgan failure and death. I explained that 

it would also involve repeat GET. They recognize that Surjit cannot make a 

decision at this point. They are anticipating the need for dialysis. 





- Objective


Vital Signs: 


 Vital Signs











Temperature  97.6 F   05/21/17 12:00


 


Pulse Rate  83   05/21/17 10:33


 


Respiratory Rate  25 H  05/21/17 14:28


 


Blood Pressure  106/58   05/21/17 12:00


 


O2 Sat by Pulse Oximetry (%)  99   05/21/17 10:33








CBC,CMP











WBC  9.7 K/mm3 (4.0-10.0)   05/21/17  05:40    


 


RBC  3.31 M/mm3 (4.00-5.60)  L  05/21/17  05:40    


 


Hgb  9.8 GM/dL (11.7-16.9)  L  05/21/17  05:40    


 


Hct  28.7 % (35.4-49)  L  05/21/17  05:40    


 


MCV  86.9 fl (80-96)   05/21/17  05:40    


 


MCHC  34.0 g/dl (32.0-35.9)   05/21/17  05:40    


 


RDW  16.9 % (11.9-15.9)  H  05/21/17  05:40    


 


Plt Count  42 K/MM3 (134-434)  L  05/21/17  05:40    


 


MPV  11.5 fl (7.5-11.1)  H D 05/21/17  05:40    


 


Neutrophils %  82.3 % (42.8-82.8)   05/21/17  05:40    


 


Lymphocytes %  6.7 % (8-40)  L D 05/21/17  05:40    


 


Monocytes %  6.5 % (3.8-10.2)  D 05/21/17  05:40    


 


Eosinophils %  4.2 % (0-4.5)   05/21/17  05:40    


 


Basophils %  0.3 % (0-2.0)   05/21/17  05:40    


 


Band Neutrophils  11.0 % (0-10)  H D 05/17/17  05:15    


 


Metamyelocytes  5 % (0-2)  H D 05/16/17  20:30    


 


Myelocytes  9 % (0-2)  H D 05/16/17  20:30    


 


Differential Comment  Manual diff done   05/18/17  08:30    


 


Dohle Bodies  1+   05/17/17  05:15    


 


Platelet Estimate  Decreased  (NORMAL)   05/18/17  08:30    


 


Platelet Comment  No clumping noted   05/17/17  05:15    


 


Polychromasia  Few   05/17/17  05:15    


 


Hypochromic-Microcytic  Few   05/17/17  05:15    


 


Sodium  133 mmol/L (136-145)  L  05/21/17  05:40    


 


Potassium  4.6 mmol/L (3.5-5.1)   05/21/17  05:40    


 


Chloride  100 mmol/L ()   05/21/17  05:40    


 


Carbon Dioxide  17 mmol/L (21-32)  L  05/21/17  05:40    


 


Anion Gap  16  (8-16)   05/21/17  05:40    


 


BUN  86 mg/dL (7-18)  H  05/21/17  05:40    


 


Creatinine  6.7 mg/dL (0.7-1.3)  H  05/21/17  05:40    


 


Creat Clearance w eGFR  7.87  (>60)   05/21/17  05:40    


 


Random Glucose  94 mg/dL ()   05/21/17  05:40    


 


Lactic Acid  1.684 mmol/L (0.4-2.0)   05/17/17  13:20    


 


Calcium  7.5 mg/dL (8.5-10.1)  L  05/21/17  05:40    


 


Phosphorus  5.3 mg/dL (2.5-4.9)  H  05/21/17  05:40    


 


Magnesium  2.1 mg/dL (1.8-2.4)   05/21/17  05:40    


 


Total Bilirubin  2.9 mg/dL (0.2-1.0)  H D 05/21/17  05:40    


 


Direct Bilirubin  6.1 mg/dL (0.0-0.2)  H D 05/18/17  05:45    


 


AST  52 U/L (15-37)  H D 05/21/17  05:40    


 


ALT  28 U/L (12-78)  D 05/21/17  05:40    


 


Alkaline Phosphatase  601 U/L ()  H  05/21/17  05:40    


 


Creatine Kinase  60 IU/L ()   05/20/17  00:00    


 


Creatine Kinase Index  11.8 % (0.0-5.0)  H*  05/17/17  13:20    


 


CK-MB (CK-2)  18.446 ng/ml (0.5-3.6)  H  05/17/17  13:20    


 


CK-MB (CK-2) Rel Index  Cancelled   05/17/17  13:20    


 


Troponin I  1.73 ng/ml (0.00-0.05)  H*  05/20/17  05:15    


 


C-Reactive Protein  21.5 MG/DL (0.00-0.3)  H  05/17/17  05:15    


 


Total Protein  4.0 g/dl (6.4-8.2)  L  05/21/17  05:40    


 


Albumin  1.4 g/dl (3.4-5.0)  L  05/21/17  05:40    


 


Total Amylase  49 U/L ()  D 05/17/17  05:15    


 


Lipase  116 U/L ()   05/17/17  05:15    











Constitutional: Other (Intubated but opening eyes slightly)


...Auscultate: Yes: Normoactive Bowel Sounds


...Palpate: Yes: Soft, Other (nontender)


Labs: 


 CBC, BMP





 05/21/17 05:40 





 05/21/17 05:40 





 INR, PTT











INR  1.34  (0.82-1.09)  H  05/17/17  05:15    


 


Fibrinogen  372.0 mg/dL (238-498)   05/18/17  05:45    














Assessment/Plan


Resolving e. coli sepsis due to ascending cholangitis. Tolerating feedings. 

Will ultimately need ERCP with sphincterotomy and stone extractions when 

condition permits. Family is aware of the the risks with and without the 

procedure.

## 2017-05-21 NOTE — PN
Progress Note (short form)





- Note


Progress Note: 


more awake


opens eyes to voice





off pressors





 Vital Signs











 Period  Temp  Pulse  Resp  BP Sys/Badillo  Pulse Ox


 


 Last 24 Hr  97.0 F-98.1 F  70-83  18-24  /45-59  98-99





remains intubated


cor-rrr


lungs decreased bs at bases


abd soft,nt


ext trace edema


+ware





 CBC, BMP





 05/21/17 05:40 





 05/21/17 05:40 





 Microbiology





05/19/17 19:20   Blood - Peripheral Venous   Blood Culture - Preliminary


                            NO GROWTH OBTAINED AFTER 24 HOURS, INCUBATION TO 

CONTINUE


                            FOR 4 DAYS.


05/19/17 18:50   Blood - Peripheral Venous   Blood Culture - Preliminary


                            NO GROWTH OBTAINED AFTER 24 HOURS, INCUBATION TO 

CONTINUE


                            FOR 4 DAYS.


05/15/17 18:30   Abdomen   Gram Stain - Final


05/15/17 18:30   Abdomen   Wound Culture - Final


                            Staphylococcus Coagulase Neg


                            Diphtheroid/Corynebacterium


                            Streptococcus Viridans


05/16/17 10:42   Blood - Peripheral Venous   Blood Culture - Final


                            Escherichia Coli


05/16/17 10:40   Blood - Peripheral Venous   Blood Culture - Final


                            Escherichia Coli








 Current Medications





Chlorhexidine Gluconate (Peridex -)  15 ml MM BID [facility]


   Last Admin: 05/21/17 10:37 Dose:  15 ml


Chlorhexidine Gluconate (Hibiclens For Decolonization -)  1 applic TP HS [facility]


   Last Admin: 05/20/17 22:00 Dose:  1 applic


Furosemide (Lasix Injection -)  100 mg IVPB BID@0600,1400 [facility]


   Last Admin: 05/21/17 06:24 Dose:  100 mg





Cefazolin Sodium 0.5 gm/ (Dextrose)  50 mls @ 100 mls/hr IVPB BID [facility]


   Last Admin: 05/21/17 10:37 Dose:  100 mls/hr


Nystatin (Nystop Powder -)  1 applic TP BID [facility]


   Last Admin: 05/21/17 10:38 Dose:  1 applic


Pantoprazole Sodium (Protonix Packets For Oral Suspension -)  40 mg NGT DAILY 

[facility]


   Last Admin: 05/21/17 10:37 Dose:  40 mg


Sodium Bicarbonate (Sodium Bicarbonate -)  1,300 mg NGT BID [facility]


   Last Admin: 05/21/17 10:37 Dose:  1,300 mg


Ursodiol (Actigal -)  300 mg PO BID [facility]


   Last Admin: 05/21/17 10:36 Dose:  300 mg











imp/reccd





ecoli bacteremia-day #5 antibiotics 


biliary sepsis/resp failure/bo (one kidney)


s/p cbd stent 


hx TAVR


+troponins


BO





continue cefazolin


renal f/u worsening azotemia

## 2017-05-22 LAB
ALBUMIN SERPL-MCNC: 1.4 G/DL (ref 3.4–5)
ALP SERPL-CCNC: 548 U/L (ref 45–117)
ALT SERPL-CCNC: 18 U/L (ref 12–78)
ANION GAP SERPL CALC-SCNC: 17 MMOL/L (ref 8–16)
ART PUNCT SITE: (no result)
ARTERIAL PATENCY WRIST A: POSITIVE
AST SERPL-CCNC: 45 U/L (ref 15–37)
BASE EXCESS BLDA CALC-SCNC: -9.2 MEQ/L (ref -2–2)
BASOPHILS # BLD: 0.3 % (ref 0–2)
BILIRUB SERPL-MCNC: 2.2 MG/DL (ref 0.2–1)
CALCIUM SERPL-MCNC: 7.4 MG/DL (ref 8.5–10.1)
CO2 SERPL-SCNC: 17 MMOL/L (ref 21–32)
COCKROFT - GAULT: 10.42
CREAT SERPL-MCNC: 7.2 MG/DL (ref 0.7–1.3)
DEPRECATED RDW RBC AUTO: 16.3 % (ref 11.9–15.9)
EOSINOPHIL # BLD: 3.9 % (ref 0–4.5)
FIBRINOGEN PPP-MCNC: 463 MG/DL (ref 238–498)
GLUCOSE SERPL-MCNC: 91 MG/DL (ref 74–106)
HCO3 BLDA-SCNC: 15.1 MEQ/L (ref 22–26)
INR BLD: 1.07 (ref 0.82–1.09)
LPM/O2%: (no result)
MAGNESIUM SERPL-MCNC: 2.2 MG/DL (ref 1.8–2.4)
MCH RBC QN AUTO: 29.5 PG (ref 25.7–33.7)
MCHC RBC AUTO-ENTMCNC: 33.9 G/DL (ref 32–35.9)
MCV RBC: 86.9 FL (ref 80–96)
MECH. VENT.: (no result)
NEUTROPHILS # BLD: 77.6 % (ref 42.8–82.8)
PEEP SETTING VENT: 7 CMH2O
PHOSPHATE SERPL-MCNC: 6.3 MG/DL (ref 2.5–4.9)
PLATELET # BLD AUTO: 45 K/MM3 (ref 134–434)
PMV BLD: 11.5 FL (ref 7.5–11.1)
PO2 BLDA: 141 MMHG (ref 68–100)
PROT SERPL-MCNC: 4.2 G/DL (ref 6.4–8.2)
PT PNL PPP: 11.8 SEC (ref 9.98–11.88)
PT. ON O2?: YES
SAO2 % BLDA: 99.2 % (ref 90–98.9)
TYPE OF O2: (no result)
VENT RATE: 14
VT/PRESS: 500
WBC # BLD AUTO: 8.4 K/MM3 (ref 4–10)

## 2017-05-22 PROCEDURE — 05HN33Z INSERTION OF INFUSION DEVICE INTO LEFT INTERNAL JUGULAR VEIN, PERCUTANEOUS APPROACH: ICD-10-PCS | Performed by: INTERNAL MEDICINE

## 2017-05-22 RX ADMIN — PROPOFOL SCH MLS/HR: 10 INJECTION, EMULSION INTRAVENOUS at 16:50

## 2017-05-22 RX ADMIN — ALBUMIN (HUMAN) SCH: 0.25 INJECTION, SOLUTION INTRAVENOUS at 19:00

## 2017-05-22 RX ADMIN — Medication SCH MG: at 22:24

## 2017-05-22 RX ADMIN — ALBUMIN (HUMAN) SCH GM: 0.25 INJECTION, SOLUTION INTRAVENOUS at 18:30

## 2017-05-22 RX ADMIN — PANTOPRAZOLE SODIUM SCH MG: 40 GRANULE, DELAYED RELEASE ORAL at 09:37

## 2017-05-22 RX ADMIN — CEFAZOLIN SCH MLS/HR: 1 INJECTION, POWDER, FOR SOLUTION INTRAVENOUS at 09:37

## 2017-05-22 RX ADMIN — FUROSEMIDE SCH: 10 INJECTION, SOLUTION INTRAVENOUS at 17:57

## 2017-05-22 RX ADMIN — CHLORHEXIDINE GLUCONATE 0.12% ORAL RINSE SCH ML: 1.2 LIQUID ORAL at 09:37

## 2017-05-22 RX ADMIN — FUROSEMIDE SCH MG: 10 INJECTION, SOLUTION INTRAVENOUS at 05:09

## 2017-05-22 RX ADMIN — URSODIOL SCH MG: 300 CAPSULE ORAL at 09:37

## 2017-05-22 RX ADMIN — NYSTATIN SCH APPLIC: 100000 POWDER TOPICAL at 09:38

## 2017-05-22 RX ADMIN — CHLORHEXIDINE GLUCONATE 0.12% ORAL RINSE SCH ML: 1.2 LIQUID ORAL at 22:25

## 2017-05-22 RX ADMIN — NYSTATIN SCH APPLIC: 100000 POWDER TOPICAL at 22:25

## 2017-05-22 RX ADMIN — CHLORHEXIDINE GLUCONATE SCH APPLIC: 213 SOLUTION TOPICAL at 22:25

## 2017-05-22 RX ADMIN — Medication SCH MG: at 09:36

## 2017-05-22 RX ADMIN — URSODIOL SCH MG: 300 CAPSULE ORAL at 22:24

## 2017-05-22 RX ADMIN — PROPOFOL SCH: 10 INJECTION, EMULSION INTRAVENOUS at 17:33

## 2017-05-22 RX ADMIN — ALBUMIN (HUMAN) SCH GM: 0.25 INJECTION, SOLUTION INTRAVENOUS at 16:00

## 2017-05-22 RX ADMIN — CEFAZOLIN SCH MLS/HR: 1 INJECTION, POWDER, FOR SOLUTION INTRAVENOUS at 22:24

## 2017-05-22 RX ADMIN — ALBUMIN (HUMAN) SCH GM: 0.25 INJECTION, SOLUTION INTRAVENOUS at 16:30

## 2017-05-22 NOTE — PN
GI Progress Note


Subjective: 


GI NOte: Having catheter inserted for dialysis. Remains on respirator. 

Discussed case with Dr Sidhu. 





- Objective


Vital Signs: 


 Vital Signs











Temperature  97.8 F   05/22/17 09:59


 


Pulse Rate  81   05/22/17 10:08


 


Respiratory Rate  21   05/22/17 11:48


 


Blood Pressure  103/57   05/22/17 09:59


 


O2 Sat by Pulse Oximetry (%)  97   05/22/17 10:08








 Laboratory Tests











  05/18/17 05/21/17 05/22/17





  08:30 05:40 05:35


 


WBC  35.9 H*  


 


Hgb   


 


Plt Count   


 


INR   


 


BUN   86 H  94 H


 


Creatinine   6.7 H  7.2 H


 


Total Bilirubin   2.9 H D  2.2 H D


 


AST    45 H


 


ALT    18  D


 


Alkaline Phosphatase   601 H  548 H


 


Albumin   1.4 L 














  05/22/17 05/22/17





  05:35 05:35


 


WBC  8.4 


 


Hgb  9.0 L 


 


Plt Count  45 L 


 


INR   1.07


 


BUN  


 


Creatinine  


 


Total Bilirubin  


 


AST  


 


ALT  


 


Alkaline Phosphatase  


 


Albumin  











Labs: 


 CBC, BMP





 05/22/17 05:35 





 05/22/17 05:35 





 INR, PTT











INR  1.07  (0.82-1.09)   05/22/17  05:35    


 


Fibrinogen  463.0 mg/dL (238-498)  D 05/22/17  05:35    














Assessment/Plan


Resolving e. coli sepsis due to ascending cholangitis. Tolerating feedings. 

Hoping to schedule ERCP with sphincterotomy and stone extractions at the end of 

the week if condition permits. Family is aware of the the risks with and 

without the procedure. His thrombocytopenia precludes sphincterotomy at this 

time. DIC was felt to be etiology so anticipate recovery now that sepsis is 

controlled. Hematology input appreciated.

## 2017-05-22 NOTE — PROC
Central Line Insertion


Indication: Other (HD)


Risks and Benefits Explained: Yes


Consent on Chart: Yes


Central Line: Dialysis Cath, Dual Lumen


Anesthesia: 1% Lidocaine


Sterile Technique: Yes


Ultrasound Guided Assistance: Yes


Position: Left Internal Jugular


Post Insertion: Yes: Bilateral  Breath Sounds, Bilateral Chest Expansion, Chest 

X-Ray Ordered


Sterile Dressing Applied: Yes

## 2017-05-22 NOTE — PN
Progress Note (short form)





- Note


Progress Note: 


OAtient seen and examined





No change in clinical status











 Last Vital Signs











Temp Pulse Resp BP Pulse Ox


 


 97.9 F   79   19   100/46   100 


 


 05/22/17 20:00  05/22/17 22:00  05/22/17 22:00  05/22/17 22:00  05/22/17 19:54








Cor: RSR, No murmurs, No gallops


Lungs: Clear to P&A


Abd: Soft, Normal bowel sounds, No organomegaly


Ext:1+ edema





 Abnormal Lab Results











  05/22/17 05/22/17 05/22/17





  05:35 05:35 07:15


 


RBC   3.06 L 


 


Hgb   9.0 L 


 


Hct   26.6 L 


 


RDW   16.3 H 


 


Plt Count   45 L 


 


MPV   11.5 H 


 


ABG pCO2 at Pt Temp    28.2 L


 


ABG pO2 at Pt Temp    141.0 H D


 


ABG HCO3    15.1 L


 


ABG O2 Sat (Measured)    99.2 H


 


ABG O2 Content    12.9 L


 


ABG Base Excess    -9.2 L


 


Sodium  134 L  


 


Carbon Dioxide  17 L  


 


Anion Gap  17 H  


 


BUN  94 H  


 


Creatinine  7.2 H  


 


Calcium  7.4 L  


 


Phosphorus  6.3 H  


 


Total Bilirubin  2.2 H D  


 


AST  45 H  


 


Alkaline Phosphatase  548 H  


 


Total Protein  4.2 L  


 


Albumin  1.4 L  








Active Medications





Chlorhexidine Gluconate (Peridex -)  15 ml MM BID [facility]


   Last Admin: 05/22/17 22:25 Dose:  15 ml


Chlorhexidine Gluconate (Hibiclens For Decolonization -)  1 applic TP HS [facility]


   Last Admin: 05/22/17 22:25 Dose:  1 applic


Cefazolin Sodium 0.5 gm/ (Dextrose)  50 mls @ 100 mls/hr IVPB BID [facility]


   Last Admin: 05/22/17 22:24 Dose:  100 mls/hr


Propofol (Diprivan -)  100 mls @ 3.06 mls/hr IVPB TITR [facility]; 5 MCG/KG/MIN


   PRN Reason: Protocol


   Last Titration: 05/22/17 17:59 Dose:  2.5 mcg/kg/min


Nystatin (Nystop Powder -)  1 applic TP BID [facility]


   Last Admin: 05/22/17 22:25 Dose:  1 applic


Ranitidine HCl (Zantac Oral Solution -)  150 mg PO DAILY [facility]


Sodium Bicarbonate (Sodium Bicarbonate -)  1,300 mg NGT BID [facility]


   Last Admin: 05/22/17 22:24 Dose:  1,300 mg


Ursodiol (Actigal -)  300 mg PO BID [facility]


   Last Admin: 05/22/17 22:24 Dose:  300 mg





A/P


This is an 88 y/o male with a past medical history significant for renal CA S/P 

right nephrectomy, HTN, CAD S/P PCI/stent (1996) and S/P TAVR (at [facility] in 2016 - followed by Dr. Clint Sanabria) presents with an 

abnormal MRI of abdomen showing prominent head of pancrease, large distal CBD 

stone and dilated CBD. Pt underwent an ERCP with balloon sphincterotomy and 

stent placement with post-procedure failed extubation necessitating 

reintubation and transfer to ICU for further medical care.





biliary sepsis/BO/resp. failure





thrombocytopenia---presumable due to bacteremia/cholangitits/sepsis


suspect ongoing cholangitis


timing not c/w hit


borderline hit ab most likely false +


f/u Serotonin release assy


monitor coags/CBC





DVT prophy when platelets above 50,000

## 2017-05-22 NOTE — PN
Progress Note, Physician


History of Present Illness: 


Pt seen and examined at bedside. He remains in the ICU intubated. His renal 

function continues to worsen. Family are at bedside. They have signed consent 

for HD yesterday. 





- Current Medication List


Current Medications: 


Active Medications





Chlorhexidine Gluconate (Peridex -)  15 ml MM BID [facility]


   Last Admin: 05/22/17 09:37 Dose:  15 ml


Chlorhexidine Gluconate (Hibiclens For Decolonization -)  1 applic TP HS [facility]


   Last Admin: 05/21/17 22:17 Dose:  1 applic


Furosemide (Lasix Injection -)  100 mg IVPB BID@0600,1400 [facility]


   Last Admin: 05/22/17 05:09 Dose:  100 mg


Cefazolin Sodium 0.5 gm/ (Dextrose)  50 mls @ 100 mls/hr IVPB BID [facility]


   Last Admin: 05/22/17 09:37 Dose:  100 mls/hr


Nystatin (Nystop Powder -)  1 applic TP BID [facility]


   Last Admin: 05/22/17 09:38 Dose:  1 applic


Pantoprazole Sodium (Protonix Packets For Oral Suspension -)  40 mg NGT DAILY 

[facility]


   Last Admin: 05/22/17 09:37 Dose:  40 mg


Sodium Bicarbonate (Sodium Bicarbonate -)  1,300 mg NGT BID [facility]


   Last Admin: 05/22/17 09:36 Dose:  1,300 mg


Ursodiol (Actigal -)  300 mg PO BID [facility]


   Last Admin: 05/22/17 09:37 Dose:  300 mg











- Objective


Vital Signs: 


 Vital Signs











Temperature  97.8 F   05/22/17 09:59


 


Pulse Rate  81   05/22/17 10:08


 


Respiratory Rate  21   05/22/17 11:48


 


Blood Pressure  103/57   05/22/17 09:59


 


O2 Sat by Pulse Oximetry (%)  97   05/22/17 10:08











Constitutional: Yes: Anxious


Cardiovascular: Yes: S1, S2


Respiratory: Yes: Mechanically Ventilated


Gastrointestinal: Yes: Soft


Genitourinary: Yes: [place] Present


Musculoskeletal: Yes: Muscle Weakness


Edema: Yes


Edema: LUE: 1+, RUE: 1+, LLE: 2+, RLE: 2+


Neurological: Yes: Lethargy


Labs: 


 CBC, BMP





 05/22/17 05:35 





 05/22/17 05:35 





 INR, PTT











INR  1.07  (0.82-1.09)   05/22/17  05:35    


 


Fibrinogen  463.0 mg/dL (238-498)  D 05/22/17  05:35    














- ....Imaging


Chest X-ray: Report Reviewed





Problem List





- Problems


(1) CAD (coronary artery disease)


Code(s): I25.10 - ATHSCL HEART DISEASE OF NATIVE CORONARY ARTERY W/O ANG PCTRS 

  Qualifiers: 


     Coronary Disease-Associated Artery/Lesion type: unspecified vessel or 

lesion type     Native vs. transplanted heart: native heart     Associated 

angina: without angina        Qualified Code(s): I25.10 - Atherosclerotic heart 

disease of native coronary artery without angina pectoris  





(2) Calculus of common duct with obstruction


Code(s): K80.51 - CALCULUS OF BILE DUCT W/O CHOLANGITIS OR CHOLECYST W OBST





(3) Choledocholithiasis


Code(s): K80.50 - CALCULUS OF BILE DUCT W/O CHOLANGITIS OR CHOLECYST W/O OBST





(4) Renal cancer


Code(s): C64.9 - MALIGNANT NEOPLASM OF UNSP KIDNEY, EXCEPT RENAL PELVIS   

Qualifiers: 


     Laterality: right        Qualified Code(s): C64.1 - Malignant neoplasm of 

right kidney, except renal pelvis  





(5) BO (acute kidney injury)


Code(s): N17.9 - ACUTE KIDNEY FAILURE, UNSPECIFIED





(6) CKD (chronic kidney disease)


Code(s): N18.9 - CHRONIC KIDNEY DISEASE, UNSPECIFIED   








Assessment/Plan


 Current Medications











Generic Name Dose Route Start Last Admin





  Trade Name Freq  PRN Reason Stop Dose Admin


 


Chlorhexidine Gluconate  15 ml 05/19/17 22:00 05/21/17 10:37





  Peridex -  MM   15 ml





  BID [facility]   Administration


 


Chlorhexidine Gluconate  1 applic 05/19/17 22:00 05/20/17 22:00





  Hibiclens For Decolonization -  TP   1 applic





  HS [facility]   Administration


 


Furosemide  100 mg 05/21/17 06:00 05/21/17 15:11





  Lasix Injection -  IVPB   100 mg





  BID@0600,1400 [facility]   Administration


 


Cefazolin Sodium 0.5 gm/  50 mls @ 100 mls/hr 05/19/17 10:00 05/21/17 10:37





  Dextrose  IVPB   100 mls/hr





  BID [facility]   Administration


 


Nystatin  1 applic 05/17/17 22:00 05/21/17 10:38





  Nystop Powder -  TP   1 applic





  BID [facility]   Administration


 


Pantoprazole Sodium  40 mg 05/21/17 10:00 05/21/17 10:37





  Protonix Packets For Oral Suspension -  NGT   40 mg





  DAILY [facility]   Administration


 


Sodium Bicarbonate  1,300 mg 05/20/17 12:30 05/21/17 10:37





  Sodium Bicarbonate -  NGT   1,300 mg





  BID [facility]   Administration


 


Ursodiol  300 mg 05/19/17 22:00 05/21/17 10:36





  Actigal -  PO   300 mg





  BID [facility]   Administration











Impression


1. CKD stage 3


2. BO


3. choledocholithiasis


4. CAD


5. hx of Renal Cell Cancer s/p nephroectomy


6. aortic stenosis


7. sepsis


8. acute respiratory failure requiring intubation


9. NSTEMI


10. bacteremia





Plan


- will arrange for HD today


- will likely dialyze again tomorrow


- hold off lasix for now


- repeat labs in am


- access is in place


- pulmonary follow up


- GI input appreciated


- maintain a MAP of 65


- vent support


- monitor in ICU








Dr Rodriguez

## 2017-05-22 NOTE — PN
Progress Note (short form)





- Note


Progress Note: 


lethargic


opens eyes to voice





off pressors





 


 Vital Signs











 Period  Temp  Pulse  Resp  BP Sys/Badillo  Pulse Ox


 


 Last 24 Hr  97.4 F-99.5 F  70-98  18-26  /48-57  96-98








cor-rrr


lungs decreased bs at bases


abd soft, nt


ext no edema





 CBC, BMP





 05/22/17 05:35 





 05/22/17 05:35 





 Microbiology





05/19/17 19:20   Blood - Peripheral Venous   Blood Culture - Preliminary


                            NO GROWTH OBTAINED AFTER 48 HOURS, INCUBATION TO 

CONTINUE


                            FOR 3 DAYS.


05/19/17 18:50   Blood - Peripheral Venous   Blood Culture - Preliminary


                            NO GROWTH OBTAINED AFTER 48 HOURS, INCUBATION TO 

CONTINUE


                            FOR 3 DAYS.


05/15/17 18:30   Abdomen   Gram Stain - Final


05/15/17 18:30   Abdomen   Wound Culture - Final


                            Staphylococcus Coagulase Neg


                            Diphtheroid/Corynebacterium


                            Streptococcus Viridans


05/16/17 10:42   Blood - Peripheral Venous   Blood Culture - Final


                            Escherichia Coli


05/16/17 10:40   Blood - Peripheral Venous   Blood Culture - Final


                            Escherichia Coli








 Current Medications





Albumin Human (Albumin Human 25%)  12.5 gm IVPB Q30M [facility]


   Stop: 05/22/17 15:31


Chlorhexidine Gluconate (Peridex -)  15 ml MM BID [facility]


   Last Admin: 05/22/17 09:37 Dose:  15 ml


Chlorhexidine Gluconate (Hibiclens For Decolonization -)  1 applic TP HS [facility]


   Last Admin: 05/21/17 22:17 Dose:  1 applic


Furosemide (Lasix Injection -)  100 mg IVPB BID@0600,1400 [facility]


   Last Admin: 05/22/17 05:09 Dose:  100 mg


Cefazolin Sodium 0.5 gm/ (Dextrose)  50 mls @ 100 mls/hr IVPB BID [facility]


   Last Admin: 05/22/17 09:37 Dose:  100 mls/hr


Nystatin (Nystop Powder -)  1 applic TP BID [facility]


   Last Admin: 05/22/17 09:38 Dose:  1 applic


Pantoprazole Sodium (Protonix Packets For Oral Suspension -)  40 mg NGT DAILY 

[facility]


   Last Admin: 05/22/17 09:37 Dose:  40 mg


Sodium Bicarbonate (Sodium Bicarbonate -)  1,300 mg NGT BID [facility]


   Last Admin: 05/22/17 09:36 Dose:  1,300 mg


Ursodiol (Actigal -)  300 mg PO BID [facility]


   Last Admin: 05/22/17 09:37 Dose:  300 mg








imp/reccd





ecoli bacteremia-day #6 antibiotics 


biliary sepsis/resp failure/bo (one kidney)


s/p cbd stent 


hx TAVR


+troponins


BO





continue cefazolin


to start HD today

## 2017-05-22 NOTE — PN
Progress Note, Physician


History of Present Illness: 


Arousable on mechanical ventilation, weaned off pressors.











- Current Medication List


Current Medications: 


Active Medications





Chlorhexidine Gluconate (Peridex -)  15 ml MM BID [facility]


   Last Admin: 05/22/17 09:37 Dose:  15 ml


Chlorhexidine Gluconate (Hibiclens For Decolonization -)  1 applic TP HS [facility]


   Last Admin: 05/21/17 22:17 Dose:  1 applic


Furosemide (Lasix Injection -)  100 mg IVPB BID@0600,1400 [facility]


   Last Admin: 05/22/17 05:09 Dose:  100 mg


Cefazolin Sodium 0.5 gm/ (Dextrose)  50 mls @ 100 mls/hr IVPB BID [facility]


   Last Admin: 05/22/17 09:37 Dose:  100 mls/hr


Nystatin (Nystop Powder -)  1 applic TP BID [facility]


   Last Admin: 05/22/17 09:38 Dose:  1 applic


Pantoprazole Sodium (Protonix Packets For Oral Suspension -)  40 mg NGT DAILY 

[facility]


   Last Admin: 05/22/17 09:37 Dose:  40 mg


Sodium Bicarbonate (Sodium Bicarbonate -)  1,300 mg NGT BID [facility]


   Last Admin: 05/22/17 09:36 Dose:  1,300 mg


Ursodiol (Actigal -)  300 mg PO BID [facility]


   Last Admin: 05/22/17 09:37 Dose:  300 mg











- Objective


Vital Signs: 


 Vital Signs











Temperature  97.8 F   05/22/17 09:59


 


Pulse Rate  81   05/22/17 10:08


 


Respiratory Rate  21   05/22/17 09:59


 


Blood Pressure  103/57   05/22/17 09:59


 


O2 Sat by Pulse Oximetry (%)  97   05/22/17 10:08











Constitutional: Yes: No Distress, Calm


Neck: Yes: Supple


Cardiovascular: Yes: Regular Rate and Rhythm, Murmur (2/6 SM)


Respiratory: Yes: Intubated, Mechanically Ventilated, Rhonchi


Gastrointestinal: Yes: Normal Bowel Sounds, Soft


Edema: Yes


Edema: LLE: Trace, RLE: Trace


Labs: 


 CBC, BMP





 05/22/17 05:35 





 05/22/17 05:35 





 INR, PTT











INR  1.07  (0.82-1.09)   05/22/17  05:35    


 


Fibrinogen  463.0 mg/dL (238-498)  D 05/22/17  05:35    














- ....Imaging


Chest X-ray: Report Reviewed (Left essnecu3v, left ATX)





Problem List





- Problems


(1) BO (acute kidney injury)


Code(s): N17.9 - ACUTE KIDNEY FAILURE, UNSPECIFIED





(2) Bacteremia due to Gram-negative bacteria


Code(s): R78.81 - BACTEREMIA





(3) Biliary sepsis


Code(s): K83.0 - CHOLANGITIS





(4) CAD (coronary artery disease)


Code(s): I25.10 - ATHSCL HEART DISEASE OF NATIVE CORONARY ARTERY W/O ANG PCTRS 

  Qualifiers: 


     Coronary Disease-Associated Artery/Lesion type: unspecified vessel or 

lesion type     Native vs. transplanted heart: native heart     Associated 

angina: without angina        Qualified Code(s): I25.10 - Atherosclerotic heart 

disease of native coronary artery without angina pectoris  





(5) CKD (chronic kidney disease)


Code(s): N18.9 - CHRONIC KIDNEY DISEASE, UNSPECIFIED   





(6) Calculus of common duct with obstruction


Code(s): K80.51 - CALCULUS OF BILE DUCT W/O CHOLANGITIS OR CHOLECYST W OBST





(7) History of percutaneous coronary intervention


Code(s): Z98.890 - OTHER SPECIFIED POSTPROCEDURAL STATES





(8) S/P ERCP


Code(s): Z98.890 - OTHER SPECIFIED POSTPROCEDURAL STATES





(9) Thrombocytopenia


Code(s): D69.6 - THROMBOCYTOPENIA, UNSPECIFIED





(10) Acute hypoxemic respiratory failure


Code(s): J96.01 - ACUTE RESPIRATORY FAILURE WITH HYPOXIA





(11) Gram negative septic shock


Code(s): A41.50 - GRAM-NEGATIVE SEPSIS, UNSPECIFIED


R65.21 - SEVERE SEPSIS WITH SEPTIC SHOCK





(12) Left bundle branch block


Code(s): I44.7 - LEFT BUNDLE-BRANCH BLOCK, UNSPECIFIED





(13) Demand ischemia


Code(s): I24.8 - OTHER FORMS OF ACUTE ISCHEMIC HEART DISEASE








Assessment/Plan


1. Acute hypoxemic respiratory failure on mechanical ventilation


2. Post biliary septic shock (E. coli), acute cholangitis post ERCP, balloon 

sphincteroplasty and stent placement with leukocytosis and lactic acidosis 

resolving


3. Post TAVR for severe AS


4. CAD, S/P PCI/stent with demand ischemic injury


5. Severe biventricular dysfunction referable to sepsis


6. H/O Hypertension, but hypotensive requiring pressors - now weaned off


7. Renal CA S/P right nephrectomy


8. Oliguric acute on CKD referable to septic shock and ATN


9. Thrombocytopenia due to sepsis





PLAN:


1. Continue vent management with wean per ABG


2. Monitor renal function - follow urine output with Lasix 100 IV bid. Likely 

plan for HD after vascular access


3. Antibiotics coverage Ancef per C&S


4. DVT and GI prophylaxis

## 2017-05-22 NOTE — PN
Physical Exam: 


SUBJECTIVE: Patient seen and examined


off sedation/off pressors. opens eyes but otherwise does not follow commands.





wife consented over the weekend for catheter to be place for dialysis.





OBJECTIVE:





 Vital Signs











 Period  Temp  Pulse  Resp  BP Sys/Badillo  Pulse Ox


 


 Last 24 Hr  97.4 F-99.5 F  70-98  18-28  /48-58  96-98











EYES: sluggish pupilary response, conjunctiva clear. No ptosis. icteric sclera, 

scleral edema in lower part of eye.


ENT:  oropharynx with endotracheal tube and OG tube, dry mucous membranes.


LUNGS: Breath sounds equal, clear at apices, diminished breath sounds at bases


HEART: Regular rate and rhythm, S1, S2 without murmur, rub or gallop.


ABDOMEN: Soft, nondistended, multiple diffuse palpable soft mobile nodularities

,  normoactive bowel sounds, periumbilical erythema with demuted skin laterally 

along skin fold, scant serous discharge in umbilicus, covered with nystatin 

power


EXTREMITIES: 2+ pulses in b/l radial and DP, warm, well-perfused, no edema. scd'

s in place.


SKIN: Warm, improved jaundiced


Ware in place.


left IJ site CDI











 Laboratory Results - last 24 hr











  05/22/17 05/22/17 05/22/17





  05:35 05:35 05:35


 


WBC    8.4


 


RBC    3.06 L


 


Hgb    9.0 L


 


Hct    26.6 L


 


MCV    86.9


 


MCHC    33.9


 


RDW    16.3 H


 


Plt Count    45 L


 


MPV    11.5 H


 


Neutrophils %    77.6


 


Lymphocytes %    8.6  D


 


Monocytes %    9.6


 


Eosinophils %    3.9


 


Basophils %    0.3


 


INR   


 


PTT (Actin FS)  32.6  


 


Fibrinogen   


 


Puncture Site   


 


ABG pH   


 


ABG pCO2 at Pt Temp   


 


ABG pO2 at Pt Temp   


 


ABG HCO3   


 


ABG O2 Sat (Measured)   


 


ABG O2 Content   


 


ABG Base Excess   


 


Shyam Test   


 


O2 Delivery Device   


 


Oxygen Flow Rate   


 


Vent Mode   


 


Vent Rate   


 


Mechanical Rate   


 


PEEP   


 


Pressure Support Vent   


 


Sodium   134 L 


 


Potassium   4.6 


 


Chloride   100 


 


Carbon Dioxide   17 L 


 


Anion Gap   17 H 


 


BUN   94 H 


 


Creatinine   7.2 H 


 


Creat Clearance w eGFR   7.24 


 


Random Glucose   91 


 


Calcium   7.4 L 


 


Phosphorus   6.3 H 


 


Magnesium   2.2 


 


Total Bilirubin   2.2 H D 


 


AST   45 H 


 


ALT   18  D 


 


Alkaline Phosphatase   548 H 


 


Total Protein   4.2 L 


 


Albumin   1.4 L 














  05/22/17 05/22/17





  05:35 07:15


 


WBC  


 


RBC  


 


Hgb  


 


Hct  


 


MCV  


 


MCHC  


 


RDW  


 


Plt Count  


 


MPV  


 


Neutrophils %  


 


Lymphocytes %  


 


Monocytes %  


 


Eosinophils %  


 


Basophils %  


 


INR  1.07 


 


PTT (Actin FS)  


 


Fibrinogen  463.0  D 


 


Puncture Site   Right radial


 


ABG pH   7.35


 


ABG pCO2 at Pt Temp   28.2 L


 


ABG pO2 at Pt Temp   141.0 H D


 


ABG HCO3   15.1 L


 


ABG O2 Sat (Measured)   99.2 H


 


ABG O2 Content   12.9 L


 


ABG Base Excess   -9.2 L


 


Shyam Test   Positive


 


O2 Delivery Device   Mec.vent


 


Oxygen Flow Rate   40%


 


Vent Mode   A/c


 


Vent Rate   14


 


Mechanical Rate   Esprit


 


PEEP   7.0


 


Pressure Support Vent   500


 


Sodium  


 


Potassium  


 


Chloride  


 


Carbon Dioxide  


 


Anion Gap  


 


BUN  


 


Creatinine  


 


Creat Clearance w eGFR  


 


Random Glucose  


 


Calcium  


 


Phosphorus  


 


Magnesium  


 


Total Bilirubin  


 


AST  


 


ALT  


 


Alkaline Phosphatase  


 


Total Protein  


 


Albumin  








Active Medications











Generic Name Dose Route Start Last Admin





  Trade Name Freq  PRN Reason Stop Dose Admin


 


Chlorhexidine Gluconate  15 ml 05/19/17 22:00 05/22/17 09:37





  Peridex -  MM   15 ml





  BID ALCIDES   Administration


 


Chlorhexidine Gluconate  1 applic 05/19/17 22:00 05/21/17 22:17





  Hibiclens For Decolonization -  TP   1 applic





  HS ALCIDES   Administration


 


Furosemide  100 mg 05/21/17 06:00 05/22/17 05:09





  Lasix Injection -  IVPB   100 mg





  BID@0600,1400 ALCIDES   Administration


 


Cefazolin Sodium 0.5 gm/  50 mls @ 100 mls/hr 05/19/17 10:00 05/22/17 09:37





  Dextrose  IVPB   100 mls/hr





  BID ALCIDES   Administration


 


Nystatin  1 applic 05/17/17 22:00 05/22/17 09:38





  Nystop Powder -  TP   1 applic





  BID ALCIDES   Administration


 


Pantoprazole Sodium  40 mg 05/21/17 10:00 05/22/17 09:37





  Protonix Packets For Oral Suspension -  NGT   40 mg





  DAILY ALCIDES   Administration


 


Sodium Bicarbonate  1,300 mg 05/20/17 12:30 05/22/17 09:36





  Sodium Bicarbonate -  NGT   1,300 mg





  BID ALCIDES   Administration


 


Ursodiol  300 mg 05/19/17 22:00 05/22/17 09:37





  Actigal -  PO   300 mg





  BID ALCIDES   Administration








 Intake & Output











 05/19/17 05/20/17 05/21/17 05/22/17





 23:59 23:59 23:59 23:59


 


Intake Total 3455.0 2848.7 1710 460


 


Output Total  800


 


Balance 3265.0 2058.7 660 -340


 


Weight 218 lb 14.704 oz 220 lb 3.2 oz 225 lb 224 lb 14.4 oz











ASSESSMENT/PLAN:


87 yr old man with HTN, aortic stenosis, CAD s/p stents, r-nephroctomy(renal ca)

, pancreatic head density found to have choleluithaisis with obstructing CBD, s/

p ERCP with stent placement(unable to undergo sphincterectomy due to 

anticoagulation) complicated by septic shock requiring ICU admission.





Renal


oliguric BO/ATN due to sepsis induced hypoperfusion, on CKD stage 3 


- add bicarb as abg with acidosis, limited fluids(changed free water with feeds 

from 30cc to 10cc)


- monitor I&O, ware in place, urine output 1050cc/24hr, 


- Left IJ dialsyis cather placed today 5/22


-  HD today


- renal dosing of medications


consult: Dr. Rodriguez





Infectious Disease


Septic shock with multi-organ failure likely secondary to cholangitis and 

e.coli bactermia(likely gallbladder as primary source)


- ofirmev  1gm IVpb q6hr for fever


Abx tx: 


Cefazolin (renally dosed) start 5/19


- day 4


- topical antifungal cream to periumbical area


Fluids: avoid fluid overload


- sodium bicarb 1300 NGT BID


Pressure suport: off pressors today


Goal MAP>65, CVP 8-12, current CVP 5


Consult: Dr. Dejesus





Cardiovascular


Normotensive, CVP 5


- tropinins trended down


- maintain Goal: MAP>65, CVP 8-12


consult: dr. sandy





Pulmonary


acute hypoxic respiratory failure


- on ventilatory support


taper FiO2 to keep Spo2 >90%





Gastrointestinal


Cholangitis s/p ERCP with biliary stent placement


 tube feedings with Jevity


actigal to soften gall stones


trend LFT's, trending down


Abdominal CT scan without abscess or free abdominal air, no SBO. stranding of 

mesentery in lower abdomen suggestive of edema, diverticulosis coli with mild 

stranding in jxn of the distal descending and poximal sigmoid colon, small 

amount of free fluid in pelvis


prophylaxis with zantac daily


consult: Dr. Celestin





Hematological


normocytic anemia - chronic, trend h/h, monitor for bleeding


leucocytosis improved


thrombocytopenia - likely due to sepsis, r./o DIC; trend fibrinogen and fibrin 

degradation products


maintain platelet >20,000





Dermatological


periumbical discharge


- topical antifungal


Dr Swann consulted for surgical eval; patient is high risk for operative 

intervention, recommend IR intervention for percutaneous drainage - no 

collection noted on CT scan.


Dr. Oleary consulted





Neurological


improved mentation, opening eyes, makes eye contact, does not track, does not 

follow commands.





DVT: heparin 5000 units SQ BID


Diet: initiate tube feeding with Jevity volume based





Visit type





- Emergency Visit


Emergency Visit: No





- New Patient


This patient is new to me today: No





- Critical Care


Critical Care patient: Yes


Total Critical Care Time (in minutes): 39


Critical Care Statement: The care of this patient involved high complexity 

decision making to prevent further life threatening deterioration of the patient

's condition and/or to evalute & treat vital organ system(s) failure or risk of 

failure.

## 2017-05-22 NOTE — PN
Physical Exam: 


SUBJECTIVE: Patient seen and examined in the ICU.


Remains intubated.





OBJECTIVE:


WBC continues to trend down


Creatinine continues to rise


Platelets are @ 45, Heparin on hold until platelets recover


SCDs to bilateral lower ext.


Patient opens eyes, not following commands, remains intubated.





 Vital Signs











 Period  Temp  Pulse  Resp  BP Sys/Badillo  Pulse Ox


 


 Last 24 Hr  97.4 F-99.5 F  70-98  18-26  /48-57  96-98








GENERAL: Intubated/lethargic/wrist restraints to protect airway


HEAD: Normal with no signs of trauma.


NECK: Normal range of motion, supple without lymphadenopathy, JVD, or masses.


LUNGS: Breath sounds equal, scattered rhonchi on posterior lungs


HEART: Regular rate and rhythm


ABDOMEN: +erythema, redness to periumbilical region. 


UPPER EXTREMITIES: Non pitting edema of bilateral hands


NEUROLOGICAL:  lethargic, intubated, minimally responsive


SKIN: generalized jaundice, periumbilicus drainage/abscess/no odor





 Laboratory Results - last 24 hr











  05/22/17 05/22/17 05/22/17





  05:35 05:35 05:35


 


WBC    8.4


 


RBC    3.06 L


 


Hgb    9.0 L


 


Hct    26.6 L


 


MCV    86.9


 


MCHC    33.9


 


RDW    16.3 H


 


Plt Count    45 L


 


MPV    11.5 H


 


Neutrophils %    77.6


 


Lymphocytes %    8.6  D


 


Monocytes %    9.6


 


Eosinophils %    3.9


 


Basophils %    0.3


 


INR   


 


PTT (Actin FS)  32.6  


 


Fibrinogen   


 


Puncture Site   


 


ABG pH   


 


ABG pCO2 at Pt Temp   


 


ABG pO2 at Pt Temp   


 


ABG HCO3   


 


ABG O2 Sat (Measured)   


 


ABG O2 Content   


 


ABG Base Excess   


 


Shyam Test   


 


O2 Delivery Device   


 


Oxygen Flow Rate   


 


Vent Mode   


 


Vent Rate   


 


Mechanical Rate   


 


PEEP   


 


Pressure Support Vent   


 


Sodium   134 L 


 


Potassium   4.6 


 


Chloride   100 


 


Carbon Dioxide   17 L 


 


Anion Gap   17 H 


 


BUN   94 H 


 


Creatinine   7.2 H 


 


Creat Clearance w eGFR   7.24 


 


Random Glucose   91 


 


Calcium   7.4 L 


 


Phosphorus   6.3 H 


 


Magnesium   2.2 


 


Total Bilirubin   2.2 H D 


 


AST   45 H 


 


ALT   18  D 


 


Alkaline Phosphatase   548 H 


 


Total Protein   4.2 L 


 


Albumin   1.4 L 














  05/22/17 05/22/17





  05:35 07:15


 


WBC  


 


RBC  


 


Hgb  


 


Hct  


 


MCV  


 


MCHC  


 


RDW  


 


Plt Count  


 


MPV  


 


Neutrophils %  


 


Lymphocytes %  


 


Monocytes %  


 


Eosinophils %  


 


Basophils %  


 


INR  1.07 


 


PTT (Actin FS)  


 


Fibrinogen  463.0  D 


 


Puncture Site   Right radial


 


ABG pH   7.35


 


ABG pCO2 at Pt Temp   28.2 L


 


ABG pO2 at Pt Temp   141.0 H D


 


ABG HCO3   15.1 L


 


ABG O2 Sat (Measured)   99.2 H


 


ABG O2 Content   12.9 L


 


ABG Base Excess   -9.2 L


 


Shyam Test   Positive


 


O2 Delivery Device   Mec.vent


 


Oxygen Flow Rate   40%


 


Vent Mode   A/c


 


Vent Rate   14


 


Mechanical Rate   Esprit


 


PEEP   7.0


 


Pressure Support Vent   500


 


Sodium  


 


Potassium  


 


Chloride  


 


Carbon Dioxide  


 


Anion Gap  


 


BUN  


 


Creatinine  


 


Creat Clearance w eGFR  


 


Random Glucose  


 


Calcium  


 


Phosphorus  


 


Magnesium  


 


Total Bilirubin  


 


AST  


 


ALT  


 


Alkaline Phosphatase  


 


Total Protein  


 


Albumin  








Active Medications











Generic Name Dose Route Start Last Admin





  Trade Name Freq  PRN Reason Stop Dose Admin


 


Albumin Human  12.5 gm 05/22/17 14:00  





  Albumin Human 25%  IVPB 05/22/17 15:31  





  Q30M ALCIDES   


 


Chlorhexidine Gluconate  15 ml 05/19/17 22:00 05/22/17 09:37





  Peridex -  MM   15 ml





  BID ALCIDES   Administration


 


Chlorhexidine Gluconate  1 applic 05/19/17 22:00 05/21/17 22:17





  Hibiclens For Decolonization -  TP   1 applic





  HS ALCIDES   Administration


 


Furosemide  100 mg 05/21/17 06:00 05/22/17 05:09





  Lasix Injection -  IVPB   100 mg





  BID@0600,1400 ALCIDES   Administration


 


Cefazolin Sodium 0.5 gm/  50 mls @ 100 mls/hr 05/19/17 10:00 05/22/17 09:37





  Dextrose  IVPB   100 mls/hr





  BID ALCIDES   Administration


 


Nystatin  1 applic 05/17/17 22:00 05/22/17 09:38





  Nystop Powder -  TP   1 applic





  BID ALCIDES   Administration


 


Pantoprazole Sodium  40 mg 05/21/17 10:00 05/22/17 09:37





  Protonix Packets For Oral Suspension -  NGT   40 mg





  DAILY ALCIDES   Administration


 


Sodium Bicarbonate  1,300 mg 05/20/17 12:30 05/22/17 09:36





  Sodium Bicarbonate -  NGT   1,300 mg





  BID ALCIDES   Administration


 


Ursodiol  300 mg 05/19/17 22:00 05/22/17 09:37





  Actigal -  PO   300 mg





  BID ALCIDES   Administration











ASSESSMENT/PLAN:





Patient is a 87 year old male with a significant past medical history of 

hypertension, CAD s/p stent placement, aortic stenosis, porcine valve 

replacement (takes both ASA 81mg and Plavix 75mg), urtheral strictures s/p 

dilatation and right nephrectomy. 





He was admitted on 5/15/2017 for abdominal pain, acute choledocholithiasis, 

acute transaminitis, jaundice and leukocytosis with shaking chills.  





ID:


Septic Shock likely secondary to periumbilicus cellulitis vs. cholangitis 


Periumbilical cellulitis - chronic


Assessment: Wound culture +staphylococcus coag, +blood cultures with ecoli


Repeat blood cultures with no growth x 48 hours


WBC trending down


Now on Cefazolin as per ID


Surgical consult for likely drainage of abscess once pt more stable


Still intubated, off pressors 





GI:


Acute Choledocholithiasis/abdominal pain/generalized jaundice/acute 

transaminitis


Assessment/Plan: Cholangitis s/p ERCP with biliary stent placement on 5/16


Trend LFT's


On Ursodiol





:


Chronic Kidney Disease - acute renal failure


Renal cancer s/p right nephrectomy


Assessment/Plan: Creatinine 7.2, baseline ~ 1.7


Dialysis as per renal





Cardiology:


Hypertension - chronic


Assessment/Plan: hypotension improving, off pressors





Elevated troponins - acute


Assessment/Plan: Troponins continue to trend up, peaked at 7.4


Likely secondary demand ischemia and sepsis


Cardiologist following





CAD s/p stent placement


On ASA 81mg and Plavix 75mg - on hold





Hematology:


Thrombocytopenia - acute


Assessment/Plan:  s/p 1 unit of platelets


Thrombocytopenia likely secondary to sepsis


Platelets @ 45, Heparin on hold until platelets recover





F.E.N.


Fluids: sodium bicarb drip


Electrolytes: monitor bmp


Nutrition: on tube feeds





Prophylaxis:


DVT:  SCDs


GI: Protonix





Code Status:  Requires inpatient hospitalization.   DNR





Visit type





- Emergency Visit


Emergency Visit: Yes


ED Registration Date: 05/15/17


Care time: The patient presented to the Emergency Department on the above date 

and was hospitalized for further evaluation of their emergent condition.





- New Patient


This patient is new to me today: No





- Critical Care


Critical Care patient: Yes


Total Critical Care Time (in minutes): 45


Critical Care Statement: The care of this patient involved high complexity 

decision making to prevent further life threatening deterioration of the patient

's condition and/or to evalute & treat vital organ system(s) failure or risk of 

failure.





- Discharge Referral


Referred to [facility] No

## 2017-05-22 NOTE — PN
Teaching Attending Note


Name of Resident: Devan Rodriguez


ATTENDING PHYSICIAN STATEMENT





I saw and evaluated the patient.


I reviewed the resident's note and discussed the case with the resident.


I agree with the resident's findings and plan as documented.








SUBJECTIVE:


Patient seen and examined in the ICU.  Remains intubated on AC mode of vent.  

Sedated. 


Off pressors.  








CXR: No acute changes.  





OBJECTIVE:








Gen:  intubated, sedated  


Heart:  RRR


Lung: decreased breath sounds at the bases


Abd: soft, nontender


Ext: no edema


 Intake & Output











 05/19/17 05/20/17 05/21/17 05/22/17





 23:59 23:59 23:59 23:59


 


Intake Total 3455.0 2848.7 1710 460


 


Output Total  300


 


Balance 3265.0 2058.7 660 160


 


Weight 218 lb 14.704 oz 220 lb 3.2 oz 225 lb 224 lb 14.4 oz








 Last Vital Signs











Temp Pulse Resp BP Pulse Ox


 


 97.8 F   81   23   103/57   97 


 


 05/22/17 09:59  05/22/17 10:08  05/22/17 14:02  05/22/17 09:59  05/22/17 10:08








Active Medications





Albumin Human (Albumin Human 25%)  12.5 gm IVPB Q30M ALCIDES


   Stop: 05/22/17 15:31


Chlorhexidine Gluconate (Peridex -)  15 ml MM BID [facility]


   Last Admin: 05/22/17 09:37 Dose:  15 ml


Chlorhexidine Gluconate (Hibiclens For Decolonization -)  1 applic TP HS [facility]


   Last Admin: 05/21/17 22:17 Dose:  1 applic


Furosemide (Lasix Injection -)  100 mg IVPB BID@0600,1400 [facility]


   Last Admin: 05/22/17 05:09 Dose:  100 mg


Cefazolin Sodium 0.5 gm/ (Dextrose)  50 mls @ 100 mls/hr IVPB BID [facility]


   Last Admin: 05/22/17 09:37 Dose:  100 mls/hr


Nystatin (Nystop Powder -)  1 applic TP BID [facility]


   Last Admin: 05/22/17 09:38 Dose:  1 applic


Pantoprazole Sodium (Protonix Packets For Oral Suspension -)  40 mg NGT DAILY 

[facility]


   Last Admin: 05/22/17 09:37 Dose:  40 mg


Sodium Bicarbonate (Sodium Bicarbonate -)  1,300 mg NGT BID [facility]


   Last Admin: 05/22/17 09:36 Dose:  1,300 mg


Ursodiol (Actigal -)  300 mg PO BID [facility]


   Last Admin: 05/22/17 09:37 Dose:  300 mg











ASSESSMENT AND PLAN:


Acute Cholangitis


Gram Negative Bacteremia


s/p ERCP/sphincteroplasty/stent placement


Acute Hypoxic Respiratory Failure


Septic Shock


Acute on Chronic Renal Failure


Lactic Acidosis


CAD


+Troponins likely Demand Ischemia


s/p TAVR for severe Aortic Stenosis


Thrombocytopenia





 Laboratory Results - last 24 hr











  05/22/17 05/22/17 05/22/17





  05:35 05:35 05:35


 


WBC    8.4


 


RBC    3.06 L


 


Hgb    9.0 L


 


Hct    26.6 L


 


MCV    86.9


 


MCHC    33.9


 


RDW    16.3 H


 


Plt Count    45 L


 


MPV    11.5 H


 


Neutrophils %    77.6


 


Lymphocytes %    8.6  D


 


Monocytes %    9.6


 


Eosinophils %    3.9


 


Basophils %    0.3


 


INR   


 


PTT (Actin FS)  32.6  


 


Fibrinogen   


 


Puncture Site   


 


ABG pH   


 


ABG pCO2 at Pt Temp   


 


ABG pO2 at Pt Temp   


 


ABG HCO3   


 


ABG O2 Sat (Measured)   


 


ABG O2 Content   


 


ABG Base Excess   


 


Shyam Test   


 


O2 Delivery Device   


 


Oxygen Flow Rate   


 


Vent Mode   


 


Vent Rate   


 


Mechanical Rate   


 


PEEP   


 


Pressure Support Vent   


 


Sodium   134 L 


 


Potassium   4.6 


 


Chloride   100 


 


Carbon Dioxide   17 L 


 


Anion Gap   17 H 


 


BUN   94 H 


 


Creatinine   7.2 H 


 


Creat Clearance w eGFR   7.24 


 


Random Glucose   91 


 


Calcium   7.4 L 


 


Phosphorus   6.3 H 


 


Magnesium   2.2 


 


Total Bilirubin   2.2 H D 


 


AST   45 H 


 


ALT   18  D 


 


Alkaline Phosphatase   548 H 


 


Total Protein   4.2 L 


 


Albumin   1.4 L 














  05/22/17 05/22/17





  05:35 07:15


 


WBC  


 


RBC  


 


Hgb  


 


Hct  


 


MCV  


 


MCHC  


 


RDW  


 


Plt Count  


 


MPV  


 


Neutrophils %  


 


Lymphocytes %  


 


Monocytes %  


 


Eosinophils %  


 


Basophils %  


 


INR  1.07 


 


PTT (Actin FS)  


 


Fibrinogen  463.0  D 


 


Puncture Site   Right radial


 


ABG pH   7.35


 


ABG pCO2 at Pt Temp   28.2 L


 


ABG pO2 at Pt Temp   141.0 H D


 


ABG HCO3   15.1 L


 


ABG O2 Sat (Measured)   99.2 H


 


ABG O2 Content   12.9 L


 


ABG Base Excess   -9.2 L


 


Shyam Test   Positive


 


O2 Delivery Device   Mec.vent


 


Oxygen Flow Rate   40%


 


Vent Mode   A/c


 


Vent Rate   14


 


Mechanical Rate   Esprit


 


PEEP   7.0


 


Pressure Support Vent   500


 


Sodium  


 


Potassium  


 


Chloride  


 


Carbon Dioxide  


 


Anion Gap  


 


BUN  


 


Creatinine  


 


Creat Clearance w eGFR  


 


Random Glucose  


 


Calcium  


 


Phosphorus  


 


Magnesium  


 


Total Bilirubin  


 


AST  


 


ALT  


 


Alkaline Phosphatase  


 


Total Protein  


 


Albumin  














-  ABX per ID 


-  IVF 


-  Will be started on HD 


-  taper FiO2 to keep SpO2 >90%


-  monitor platelets/coags/fibrinogen levels


-  monitor urine output, creatinine


-  Monitor off sedation to assess mental status


-  spontaneous breathing trials when mental status improved


-  DVT/GI prophylaxis


-  continue ICU monitoring








Dr Sidhu 


critical care time spent in reviewing chart, evaluating patient and formulating 

plan 40 min

## 2017-05-23 LAB
ALBUMIN SERPL-MCNC: 1.9 G/DL (ref 3.4–5)
ALP SERPL-CCNC: 452 U/L (ref 45–117)
ALT SERPL-CCNC: 15 U/L (ref 12–78)
ANION GAP SERPL CALC-SCNC: 11 MMOL/L (ref 8–16)
ART PUNCT SITE: (no result)
ARTERIAL PATENCY WRIST A: POSITIVE
AST SERPL-CCNC: 45 U/L (ref 15–37)
BASE EXCESS BLDA CALC-SCNC: -2.5 MEQ/L (ref -2–2)
BASOPHILS # BLD: 0.5 % (ref 0–2)
BILIRUB CONJ SERPL-MCNC: 1.4 MG/DL (ref 0–0.2)
BILIRUB SERPL-MCNC: 1.9 MG/DL (ref 0.2–1)
CALCIUM SERPL-MCNC: 7.6 MG/DL (ref 8.5–10.1)
CO2 SERPL-SCNC: 23 MMOL/L (ref 21–32)
COCKROFT - GAULT: 12.88
CREAT SERPL-MCNC: 5.8 MG/DL (ref 0.7–1.3)
DEPRECATED RDW RBC AUTO: 15.9 % (ref 11.9–15.9)
EOSINOPHIL # BLD: 8.2 % (ref 0–4.5)
GLUCOSE SERPL-MCNC: 95 MG/DL (ref 74–106)
HCO3 BLDA-SCNC: 21 MEQ/L (ref 22–26)
LPM/O2%: 40
MAGNESIUM SERPL-MCNC: 2 MG/DL (ref 1.8–2.4)
MCH RBC QN AUTO: 29.3 PG (ref 25.7–33.7)
MCHC RBC AUTO-ENTMCNC: 33.9 G/DL (ref 32–35.9)
MCV RBC: 86.6 FL (ref 80–96)
MECH. VENT.: YES
NEUTROPHILS # BLD: 75.9 % (ref 42.8–82.8)
PEEP SETTING VENT: 5 CMH2O
PHOSPHATE SERPL-MCNC: 4.4 MG/DL (ref 2.5–4.9)
PLATELET # BLD AUTO: 40 K/MM3 (ref 134–434)
PMV BLD: 11.4 FL (ref 7.5–11.1)
PO2 BLDA: 169 MMHG (ref 68–100)
PROT SERPL-MCNC: 4.3 G/DL (ref 6.4–8.2)
PT. ON O2?: YES
SAO2 % BLDA: 99.8 % (ref 90–98.9)
TYPE OF O2: (no result)
VENT RATE: 14
VT/PRESS: 500
WBC # BLD AUTO: 8.1 K/MM3 (ref 4–10)

## 2017-05-23 RX ADMIN — CHLORHEXIDINE GLUCONATE 0.12% ORAL RINSE SCH ML: 1.2 LIQUID ORAL at 21:39

## 2017-05-23 RX ADMIN — CHLORHEXIDINE GLUCONATE SCH APPLIC: 213 SOLUTION TOPICAL at 21:39

## 2017-05-23 RX ADMIN — NYSTATIN SCH APPLIC: 100000 POWDER TOPICAL at 09:06

## 2017-05-23 RX ADMIN — CEFAZOLIN SCH MLS/HR: 1 INJECTION, POWDER, FOR SOLUTION INTRAVENOUS at 21:38

## 2017-05-23 RX ADMIN — Medication SCH MG: at 09:06

## 2017-05-23 RX ADMIN — NYSTATIN SCH APPLIC: 100000 POWDER TOPICAL at 21:39

## 2017-05-23 RX ADMIN — CHLORHEXIDINE GLUCONATE 0.12% ORAL RINSE SCH ML: 1.2 LIQUID ORAL at 09:06

## 2017-05-23 RX ADMIN — CEFAZOLIN SCH MLS/HR: 1 INJECTION, POWDER, FOR SOLUTION INTRAVENOUS at 09:05

## 2017-05-23 RX ADMIN — URSODIOL SCH: 300 CAPSULE ORAL at 21:38

## 2017-05-23 RX ADMIN — RANITIDINE HYDROCHLORIDE SCH MG: 150 SOLUTION ORAL at 09:12

## 2017-05-23 RX ADMIN — Medication SCH: at 21:38

## 2017-05-23 RX ADMIN — URSODIOL SCH MG: 300 CAPSULE ORAL at 09:12

## 2017-05-23 NOTE — PN
Progress Note, Physician


History of Present Illness: 


Pt seen and examined at bedside. He tolerated HD last night. He is now 

extubated. 





- Current Medication List


Current Medications: 


Active Medications





Albuterol/Ipratropium (Duoneb -)  1 amp NEB Q6H PRN


   PRN Reason: SHORTNESS OF BREATH


Chlorhexidine Gluconate (Peridex -)  15 ml MM BID [facility]


   Last Admin: 05/23/17 09:06 Dose:  15 ml


Chlorhexidine Gluconate (Hibiclens For Decolonization -)  1 applic TP HS [facility]


   Last Admin: 05/22/17 22:25 Dose:  1 applic


Cefazolin Sodium 0.5 gm/ (Dextrose)  50 mls @ 100 mls/hr IVPB BID [facility]


   Last Admin: 05/23/17 09:05 Dose:  100 mls/hr


Nystatin (Nystop Powder -)  1 applic TP BID [facility]


   Last Admin: 05/23/17 09:06 Dose:  1 applic


Ranitidine HCl (Zantac Oral Solution -)  150 mg PO DAILY [facility]


   Last Admin: 05/23/17 09:12 Dose:  150 mg


Sodium Bicarbonate (Sodium Bicarbonate -)  1,300 mg NGT BID [facility]


   Last Admin: 05/23/17 09:06 Dose:  1,300 mg


Ursodiol (Actigal -)  300 mg PO BID [facility]


   Last Admin: 05/23/17 09:12 Dose:  300 mg











- Objective


Vital Signs: 


 Vital Signs











Temperature  98.5 F   05/23/17 14:00


 


Pulse Rate  86   05/23/17 14:00


 


Respiratory Rate  22   05/23/17 14:00


 


Blood Pressure  98/46   05/23/17 14:00


 


O2 Sat by Pulse Oximetry (%)  98   05/23/17 09:57











Constitutional: Yes: Calm


Eyes: Yes: Conjunctiva Clear


HENT: Yes: Atraumatic


Cardiovascular: Yes: S1, S2


Respiratory: Yes: Rhonchi


Gastrointestinal: Yes: Soft


Genitourinary: Yes: Chadwick Present


Musculoskeletal: Yes: Muscle Weakness


Edema: Yes


Edema: LUE: 1+, RUE: 1+, LLE: 1+, RLE: 1+


Neurological: Yes: Other (awake)


Labs: 


 CBC, BMP





 05/23/17 05:30 





 05/23/17 05:30 





 INR, PTT











INR  1.07  (0.82-1.09)   05/22/17  05:35    


 


Fibrinogen  463.0 mg/dL (238-498)  D 05/22/17  05:35    














- ....Imaging


Chest X-ray: Report Reviewed





Problem List





- Problems


(1) CAD (coronary artery disease)


Code(s): I25.10 - ATHSCL HEART DISEASE OF NATIVE CORONARY ARTERY W/O ANG PCTRS 

  Qualifiers: 


     Coronary Disease-Associated Artery/Lesion type: unspecified vessel or 

lesion type     Native vs. transplanted heart: native heart     Associated 

angina: without angina        Qualified Code(s): I25.10 - Atherosclerotic heart 

disease of native coronary artery without angina pectoris  





(2) Calculus of common duct with obstruction


Code(s): K80.51 - CALCULUS OF BILE DUCT W/O CHOLANGITIS OR CHOLECYST W OBST





(3) Choledocholithiasis


Code(s): K80.50 - CALCULUS OF BILE DUCT W/O CHOLANGITIS OR CHOLECYST W/O OBST





(4) Renal cancer


Code(s): C64.9 - MALIGNANT NEOPLASM OF UNSP KIDNEY, EXCEPT RENAL PELVIS   

Qualifiers: 


     Laterality: right        Qualified Code(s): C64.1 - Malignant neoplasm of 

right kidney, except renal pelvis  





(5) BO (acute kidney injury)


Code(s): N17.9 - ACUTE KIDNEY FAILURE, UNSPECIFIED





(6) CKD (chronic kidney disease)


Code(s): N18.9 - CHRONIC KIDNEY DISEASE, UNSPECIFIED   








Assessment/Plan


 Current Medications











Generic Name Dose Route Start Last Admin





  Trade Name Freq  PRN Reason Stop Dose Admin


 


Albuterol/Ipratropium  1 amp 05/23/17 14:30  





  Duoneb -  NEB   





  Q6H PRN   





  SHORTNESS OF BREATH   


 


Chlorhexidine Gluconate  15 ml 05/19/17 22:00 05/23/17 09:06





  Peridex -  MM   15 ml





  BID ALCIDES   Administration


 


Chlorhexidine Gluconate  1 applic 05/19/17 22:00 05/22/17 22:25





  Hibiclens For Decolonization -  TP   1 applic





  HS ALCIDES   Administration


 


Cefazolin Sodium 0.5 gm/  50 mls @ 100 mls/hr 05/19/17 10:00 05/23/17 09:05





  Dextrose  IVPB   100 mls/hr





  BID ALCIDES   Administration


 


Nystatin  1 applic 05/17/17 22:00 05/23/17 09:06





  Nystop Powder -  TP   1 applic





  BID ALCIDES   Administration


 


Ranitidine HCl  150 mg 05/23/17 10:00 05/23/17 09:12





  Zantac Oral Solution -  PO   150 mg





  DAILY ALCIDES   Administration


 


Sodium Bicarbonate  1,300 mg 05/20/17 12:30 05/23/17 09:06





  Sodium Bicarbonate -  NGT   1,300 mg





  BID ALCIDES   Administration


 


Ursodiol  300 mg 05/19/17 22:00 05/23/17 09:12





  Actigal -  PO   300 mg





  BID ALCIDES   Administration











Impression


1. CKD stage 3


2. BO


3. choledocholithiasis


4. CAD


5. hx of Renal Cell Cancer s/p nephroectomy


6. aortic stenosis


7. sepsis


8. acute respiratory failure requiring intubation


9. NSTEMI


10. bacteremia





Plan


- pt tolerated HD yesterday


- he is making urine


- will give a dose of lasix


- repeat labs in am


- will assess for HD in am


- decrease dose of bicarb 


- discussed with ICU team


- monitor in ICU








Dr Rodriguez

## 2017-05-23 NOTE — PN
Progress Note (short form)





- Note


Progress Note: 


Patient seen and examined 


Extubated


Weak 


Not very communicative 


 Last Vital Signs











Temp Pulse Resp BP Pulse Ox


 


 98.5 F   77   22   95/43   98 


 


 05/23/17 16:00  05/23/17 16:00  05/23/17 16:00  05/23/17 16:00  05/23/17 09:57








No icterus


Poor inspiratory effort 


Cor- RSR


Abd- soft 


LE edema -SCD


 CBC, BMP





 05/23/17 05:30 





 05/23/17 05:30 








 INR, PTT











INR  1.07  (0.82-1.09)   05/22/17  05:35    


 


Fibrinogen  463.0 mg/dL (238-498)  D 05/22/17  05:35    








 Current Medications











Generic Name Dose Route Start Last Admin





  Trade Name Freq  PRN Reason Stop Dose Admin


 


Albuterol/Ipratropium  1 amp 05/23/17 14:30  





  Duoneb -  NEB   





  Q6H PRN   





  SHORTNESS OF BREATH   


 


Chlorhexidine Gluconate  15 ml 05/19/17 22:00 05/23/17 09:06





  Peridex -  MM   15 ml





  BID ALCIDES   Administration


 


Chlorhexidine Gluconate  1 applic 05/19/17 22:00 05/22/17 22:25





  Hibiclens For Decolonization -  TP   1 applic





  HS ALCIDES   Administration


 


Cefazolin Sodium 0.5 gm/  50 mls @ 100 mls/hr 05/19/17 10:00 05/23/17 09:05





  Dextrose  IVPB   100 mls/hr





  BID ALCIDES   Administration


 


Nystatin  1 applic 05/17/17 22:00 05/23/17 09:06





  Nystop Powder -  TP   1 applic





  BID ALCIDES   Administration


 


Ranitidine HCl  150 mg 05/23/17 10:00 05/23/17 09:12





  Zantac Oral Solution -  PO   150 mg





  DAILY ALCIDES   Administration


 


Sodium Bicarbonate  650 mg 05/23/17 15:24  





  Sodium Bicarbonate -  NGT   





  BID ALCIDES   


 


Ursodiol  300 mg 05/19/17 22:00 05/23/17 09:12





  Actigal -  PO   300 mg





  BID ALCIDES   Administration








Impression:


S/P ERCP 


CBD stone


S/P intubation/extubation


Sepsis 


Renal Insufficiency 


Anemia- chronic disease


Thrombocytopenia 


+ HIT Ab





Plan: Continue  with antibiotics per I.D. 


Would transfuse one unit of Packed cells at dialysis


Check Serotonin releasing  Antibody.

## 2017-05-23 NOTE — PN
Teaching Attending Note


Name of Resident: Devan Rodriguez


ATTENDING PHYSICIAN STATEMENT





I saw and evaluated the patient.


I reviewed the resident's note and discussed the case with the resident.


I agree with the resident's findings and plan as documented.








SUBJECTIVE:


Patient seen and examined in the ICU.  Remains intubated on CPAP mode of vent.  

Awake and able to follow commands.  


RSBI 70.  


No pressors.  





CXR: No acute changes.  





OBJECTIVE:


 Intake & Output











 05/20/17 05/21/17 05/22/17 05/23/17





 23:59 23:59 23:59 23:59


 


Intake Total 2848.7 1710 830 760


 


Output Total 790 1050 1300 350


 


Balance 2058.7 660 -470 410


 


Weight 220 lb 3.2 oz 225 lb 224 lb 14.4 oz 223 lb 14.4 oz








 Last Vital Signs











Temp Pulse Resp BP Pulse Ox


 


 98.5 F   74   17   93/52   98 


 


 05/23/17 10:00  05/23/17 10:00  05/23/17 10:00  05/23/17 10:00  05/23/17 09:57








Active Medications





Chlorhexidine Gluconate (Peridex -)  15 ml MM BID [facility]


   Last Admin: 05/23/17 09:06 Dose:  15 ml


Chlorhexidine Gluconate (Hibiclens For Decolonization -)  1 applic TP HS [facility]


   Last Admin: 05/22/17 22:25 Dose:  1 applic


Cefazolin Sodium 0.5 gm/ (Dextrose)  50 mls @ 100 mls/hr IVPB BID [facility]


   Last Admin: 05/23/17 09:05 Dose:  100 mls/hr


Propofol (Diprivan -)  100 mls @ 3.06 mls/hr IVPB TITR ALCIDES; 5 MCG/KG/MIN


   PRN Reason: Protocol


   Last Titration: 05/22/17 17:59 Dose:  2.5 mcg/kg/min


Nystatin (Nystop Powder -)  1 applic TP BID [facility]


   Last Admin: 05/23/17 09:06 Dose:  1 applic


Ranitidine HCl (Zantac Oral Solution -)  150 mg PO DAILY [facility]


   Last Admin: 05/23/17 09:12 Dose:  150 mg


Sodium Bicarbonate (Sodium Bicarbonate -)  1,300 mg NGT BID [facility]


   Last Admin: 05/23/17 09:06 Dose:  1,300 mg


Ursodiol (Actigal -)  300 mg PO BID [facility]


   Last Admin: 05/23/17 09:12 Dose:  300 mg











Gen:  intubated, awake and able to follow commands  


Heart:  RRR


Lung: decreased breath sounds at the bases


Abd: soft, nontender


Ext: no edema


 


 Laboratory Results - last 24 hr











  05/23/17 05/23/17 05/23/17





  05:30 05:30 05:30


 


WBC   


 


RBC   


 


Hgb   


 


Hct   


 


MCV   


 


MCHC   


 


RDW   


 


Plt Count   


 


MPV   


 


Neutrophils %   


 


Lymphocytes %   


 


Monocytes %   


 


Eosinophils %   


 


Basophils %   


 


PTT (Actin FS)  32.8  


 


Puncture Site   


 


ABG pH   


 


ABG pCO2 at Pt Temp   


 


ABG pO2 at Pt Temp   


 


ABG HCO3   


 


ABG O2 Sat (Measured)   


 


ABG O2 Content   


 


ABG Base Excess   


 


Shyam Test   


 


O2 Delivery Device   


 


Oxygen Flow Rate   


 


Vent Mode   


 


Vent Rate   


 


Mechanical Rate   


 


PEEP   


 


Pressure Support Vent   


 


Sodium    134 L


 


Potassium    3.8


 


Chloride    100


 


Carbon Dioxide    23  D


 


Anion Gap    11


 


BUN    82 H


 


Creatinine    5.8 H


 


Creat Clearance w eGFR    9.30


 


Random Glucose    95


 


Calcium    7.6 L


 


Phosphorus   4.4  D 


 


Magnesium   2.0 


 


Total Bilirubin    1.9 H


 


Direct Bilirubin    1.4 H D


 


AST    45 H


 


ALT    15


 


Alkaline Phosphatase    452 H


 


Total Protein    4.3 L


 


Albumin    1.9 L D














  05/23/17 05/23/17





  05:30 07:15


 


WBC  8.1 


 


RBC  2.58 L 


 


Hgb  7.6 L D 


 


Hct  22.4 L D 


 


MCV  86.6 


 


MCHC  33.9 


 


RDW  15.9 


 


Plt Count  40 L 


 


MPV  11.4 H 


 


Neutrophils %  75.9 


 


Lymphocytes %  7.3 L 


 


Monocytes %  8.1 


 


Eosinophils %  8.2 H D 


 


Basophils %  0.5 


 


PTT (Actin FS)  


 


Puncture Site   Right radial


 


ABG pH   7.43


 


ABG pCO2 at Pt Temp   32.6 L


 


ABG pO2 at Pt Temp   169.0 H* D


 


ABG HCO3   21.0 L


 


ABG O2 Sat (Measured)   99.8 H*


 


ABG O2 Content   10.7 L


 


ABG Base Excess   -2.5 L


 


Shyam Test   Positive


 


O2 Delivery Device   Vent


 


Oxygen Flow Rate   40


 


Vent Mode   A/c


 


Vent Rate   14


 


Mechanical Rate   Yes


 


PEEP   5.0


 


Pressure Support Vent   500


 


Sodium  


 


Potassium  


 


Chloride  


 


Carbon Dioxide  


 


Anion Gap  


 


BUN  


 


Creatinine  


 


Creat Clearance w eGFR  


 


Random Glucose  


 


Calcium  


 


Phosphorus  


 


Magnesium  


 


Total Bilirubin  


 


Direct Bilirubin  


 


AST  


 


ALT  


 


Alkaline Phosphatase  


 


Total Protein  


 


Albumin  











ASSESSMENT AND PLAN:


Acute Cholangitis


Gram Negative Bacteremia


s/p ERCP/sphincteroplasty/stent placement


Acute Hypoxic Respiratory Failure


Septic Shock


Acute on Chronic Renal Failure


Lactic Acidosis


CAD


+Troponins likely Demand Ischemia


s/p TAVR for severe Aortic Stenosis


Thrombocytopenia


 





-  ABX per ID 


-  HD per Renal 


-  SBTs as tolerated with hopes of extubation 


-  monitor platelets/coags 


-  monitor urine output, creatinine


-  DVT/GI prophylaxis


-  Noted drop in H&H.  No obvious site of bleeding/blood loss.  Currently 

stable.   Normal transfusion thresholds. 


-  continue ICU monitoring








Dr Sidhu 


critical care time spent in reviewing chart, evaluating patient and formulating 

plan 40 min

## 2017-05-23 NOTE — PN
Physical Exam: 


SUBJECTIVE: Patient seen and examined


 extubated today, OG tube removed on venti mask 8L. tolerating well.





OBJECTIVE:





 Vital Signs











 Period  Temp  Pulse  Resp  BP Sys/Badillo  Pulse Ox


 


 Last 24 Hr  97.8 F-99.5 F  67-82  16-23  /42-57  











EYES: sluggish pupilary response with PERRLA and EOMI, conjunctiva clear. No 

ptosis. anicteric sclera,


ENT:  oropharynx with endotracheal tube and OG tube, moist, mucous membranes. 

lateral line of demuted skin on top lip from ET strap


LUNGS: Breath sounds equal, clear at apices, diminished breath sounds at bases


HEART: Regular rate and rhythm, S1, S2 without murmur, rub or gallop.


ABDOMEN: Soft, nondistended, multiple diffuse palpable soft mobile nodularities

,  normoactive bowel sounds, periumbilical erythema with demuted skin laterally 

along skin fold and within umbilicus, scant serous discharge in umbilicus, 

covered with nystatin power


EXTREMITIES: 2+ pulses in b/l radial and DP, warm, 2+ edema in b/l upper 

extremities well-perfused, trace edema in b/l lower extremities upto knees. scd'

s in place.


Neuro: facial symmetry, 2/5 strength at shoulder extension and elbow extension, 

3/5 hand  b/l. 4/5 dorsi/plantaer flexion, 3/5 hip extension b/l.


SKIN: Warm, improved jaundice


Ware in place.


left IJ site CDI














 Laboratory Results - last 24 hr











  05/22/17 05/23/17 05/23/17





  05:35 05:30 05:30


 


WBC   


 


RBC   


 


Hgb   


 


Hct   


 


MCV   


 


MCHC   


 


RDW   


 


Plt Count   


 


MPV   


 


Neutrophils %   


 


Lymphocytes %   


 


Monocytes %   


 


Eosinophils %   


 


Basophils %   


 


PTT (Actin FS)   32.8 


 


Fibrinogen  463.0  D  


 


Puncture Site   


 


ABG pH   


 


ABG pCO2 at Pt Temp   


 


ABG pO2 at Pt Temp   


 


ABG HCO3   


 


ABG O2 Sat (Measured)   


 


ABG O2 Content   


 


ABG Base Excess   


 


Shyam Test   


 


O2 Delivery Device   


 


Oxygen Flow Rate   


 


Vent Mode   


 


Vent Rate   


 


Mechanical Rate   


 


PEEP   


 


Pressure Support Vent   


 


Sodium   


 


Potassium   


 


Chloride   


 


Carbon Dioxide   


 


Anion Gap   


 


BUN   


 


Creatinine   


 


Creat Clearance w eGFR   


 


Random Glucose   


 


Calcium   


 


Phosphorus    4.4  D


 


Magnesium    2.0


 


Total Bilirubin   


 


Direct Bilirubin   


 


AST   


 


ALT   


 


Alkaline Phosphatase   


 


Total Protein   


 


Albumin   














  05/23/17 05/23/17 05/23/17





  05:30 05:30 07:15


 


WBC   8.1 


 


RBC   2.58 L 


 


Hgb   7.6 L D 


 


Hct   22.4 L D 


 


MCV   86.6 


 


MCHC   33.9 


 


RDW   15.9 


 


Plt Count   40 L 


 


MPV   11.4 H 


 


Neutrophils %   75.9 


 


Lymphocytes %   7.3 L 


 


Monocytes %   8.1 


 


Eosinophils %   8.2 H D 


 


Basophils %   0.5 


 


PTT (Actin FS)   


 


Fibrinogen   


 


Puncture Site    Right radial


 


ABG pH    7.43


 


ABG pCO2 at Pt Temp    32.6 L


 


ABG pO2 at Pt Temp    169.0 H* D


 


ABG HCO3    21.0 L


 


ABG O2 Sat (Measured)    99.8 H*


 


ABG O2 Content    10.7 L


 


ABG Base Excess    -2.5 L


 


Shyam Test    Positive


 


O2 Delivery Device    Vent


 


Oxygen Flow Rate    40


 


Vent Mode    A/c


 


Vent Rate    14


 


Mechanical Rate    Yes


 


PEEP    5.0


 


Pressure Support Vent    500


 


Sodium  134 L  


 


Potassium  3.8  


 


Chloride  100  


 


Carbon Dioxide  23  D  


 


Anion Gap  11  


 


BUN  82 H  


 


Creatinine  5.8 H  


 


Creat Clearance w eGFR  9.30  


 


Random Glucose  95  


 


Calcium  7.6 L  


 


Phosphorus   


 


Magnesium   


 


Total Bilirubin  1.9 H  


 


Direct Bilirubin  1.4 H D  


 


AST  45 H  


 


ALT  15  


 


Alkaline Phosphatase  452 H  


 


Total Protein  4.3 L  


 


Albumin  1.9 L D  








Active Medications











Generic Name Dose Route Start Last Admin





  Trade Name Freq  PRN Reason Stop Dose Admin


 


Chlorhexidine Gluconate  15 ml 05/19/17 22:00 05/22/17 22:25





  Peridex -  MM   15 ml





  BID [facility]IDES   Administration


 


Chlorhexidine Gluconate  1 applic 05/19/17 22:00 05/22/17 22:25





  Hibiclens For Decolonization -  TP   1 applic





  HS ALCIDES   Administration


 


Cefazolin Sodium 0.5 gm/  50 mls @ 100 mls/hr 05/19/17 10:00 05/22/17 22:24





  Dextrose  IVPB   100 mls/hr





  BID ALCIDES   Administration


 


Propofol  100 mls @ 3.06 mls/hr 05/22/17 17:45 05/22/17 17:59





  Diprivan -  IVPB   2.5 mcg/kg/min





  TITR ALCIDES   Titration





  Protocol   





  5 MCG/KG/MIN   


 


Nystatin  1 applic 05/17/17 22:00 05/22/17 22:25





  Nystop Powder -  TP   1 applic





  BID [facility]CIDES   Administration


 


Ranitidine HCl  150 mg 05/23/17 10:00  





  Zantac Oral Solution -  PO   





  DAILY ALCIDES   


 


Sodium Bicarbonate  1,300 mg 05/20/17 12:30 05/22/17 22:24





  Sodium Bicarbonate -  NGT   1,300 mg





  BID ALCIDES   Administration


 


Ursodiol  300 mg 05/19/17 22:00 05/22/17 22:24





  Actigal -  PO   300 mg





  BID ALCIDES   Administration











ASSESSMENT/PLAN:


87 yr old man with HTN, aortic stenosis, CAD s/p stents, r-nephroctomy(renal ca)

, pancreatic head density found to have choleluithaisis with obstructing CBD, s/

p ERCP with stent placement(unable to undergo sphincterectomy due to 

anticoagulation) complicated by septic shock requiring ICU admission.


- speech and swallow consult for further assesment, on my bedside ICU 

assessment he held the water in his mouth but did not swallow, possibly needs 

more time to recover from extubation, will start with clear liquids.





Renal


oliguric BO/ATN due to sepsis induced hypoperfusion, on CKD stage 3 


- dialyzed yesterday with improvement of bun/Cr, to be re-assessed tomorrow for 

dialysis needs. 


- monitor I&O, ware in place, urine output 1300cc/24hr, 


- Left IJ dialsyis cather placed 5/22


- renal dosing of medications


consult: Dr. Rodriguez





Infectious Disease


Septic shock with multi-organ failure likely secondary to cholangitis and 

e.coli bactermia(likely gallbladder as primary source) - improving


- ofirmev  1gm IVpb q6hr for fever


Abx tx: 


Cefazolin (renally dosed) start 5/19


- day 5


- topical antifungal cream to periumbical area


Fluids: avoid fluid overload


- sodium bicarb 1300 NGT BID


Consult: Dr. Dejesus





Cardiovascular


Normotensive


- maintain Goal: MAP>65, CVP 8-12


consult: dr. sandy





Pulmonary


acute hypoxic respiratory failure - resolved, extubated today on venti mask, 

sat 100%





Gastrointestinal


Cholangitis s/p ERCP with biliary stent placement


actigal to soften gall stones


trend LFT's, trending down


Abdominal CT scan without abscess or free abdominal air, no SBO. stranding of 

mesentery in lower abdomen suggestive of edema, diverticulosis coli with mild 

stranding in jxn of the distal descending and poximal sigmoid colon, small 

amount of free fluid in pelvis


prophylaxis with zantac daily


consult: Dr. Celestin





Hematological


normocytic anemia - chronic, trend h/h, monitor for bleeding. plan to transfuse 

1 unit of prbc's during dialysis if dialysing tomorrow. 


leucocytosis resolved


thrombocytopenia - likely due to sepsis, r./o DIC; trend fibrinogen and fibrin 

degradation products: HIT AB POSITIVE, serotonin realeasing assay for 

confirmation pending. continue heparin for now.


maintain platelet >20,000





Dermatological


periumbical discharge


- topical antifungal


Dr Swann consulted for surgical eval; patient is high risk for operative 

intervention, recommend IR intervention for percutaneous drainage - no 

collection noted on CT scan.


Dr. Oleary consulted





Neurological


improved mentation, opening eyes, makes eye contact, follows commands, has a 

hoarse throat with quiet speech post-intubation, improved throughout the day.





DVT: heparin 5000 units SQ BID


Diet: clear liquid diet








Visit type





- Emergency Visit


Emergency Visit: No





- New Patient


This patient is new to me today: No





- Critical Care


Critical Care patient: Yes


Total Critical Care Time (in minutes): 40


Critical Care Statement: The care of this patient involved high complexity 

decision making to prevent further life threatening deterioration of the patient

's condition and/or to evalute & treat vital organ system(s) failure or risk of 

failure.

## 2017-05-23 NOTE — PN
Progress Note, Physician


Chief Complaint: 


Events noted


Remains in ICU on mechanical ventilation - await extubation


History of Present Illness: 


Patient was seen and examined in ICU. Intubated. Chart was reviewed


Await weaning off vent and post HD





- Current Medication List


Current Medications: 


Active Medications





Chlorhexidine Gluconate (Peridex -)  15 ml MM BID [facility]


   Last Admin: 05/23/17 09:06 Dose:  15 ml


Chlorhexidine Gluconate (Hibiclens For Decolonization -)  1 applic TP HS [facility]


   Last Admin: 05/22/17 22:25 Dose:  1 applic


Cefazolin Sodium 0.5 gm/ (Dextrose)  50 mls @ 100 mls/hr IVPB BID [facility]


   Last Admin: 05/23/17 09:05 Dose:  100 mls/hr


Nystatin (Nystop Powder -)  1 applic TP BID [facility]


   Last Admin: 05/23/17 09:06 Dose:  1 applic


Ranitidine HCl (Zantac Oral Solution -)  150 mg PO DAILY [facility]


   Last Admin: 05/23/17 09:12 Dose:  150 mg


Sodium Bicarbonate (Sodium Bicarbonate -)  1,300 mg NGT BID [facility]


   Last Admin: 05/23/17 09:06 Dose:  1,300 mg


Ursodiol (Actigal -)  300 mg PO BID [facility]


   Last Admin: 05/23/17 09:12 Dose:  300 mg











- Objective


Vital Signs: 


 Vital Signs











Temperature  98.5 F   05/23/17 10:00


 


Pulse Rate  86   05/23/17 12:00


 


Respiratory Rate  25 H  05/23/17 12:00


 


Blood Pressure  106/50   05/23/17 12:00


 


O2 Sat by Pulse Oximetry (%)  98   05/23/17 09:57











Cardiovascular: Yes: Regular Rate and Rhythm, Murmur (Soft SM), S1, S2


Respiratory: Yes: Diminished, Mechanically Ventilated


Gastrointestinal: Yes: Normal Bowel Sounds, Soft.  No: Tenderness


Edema: Yes


Edema: LLE: 1+, RLE: 1+


Labs: 


 CBC, BMP





 05/23/17 05:30 





 05/23/17 05:30 





 INR, PTT











INR  1.07  (0.82-1.09)   05/22/17  05:35    


 


Fibrinogen  463.0 mg/dL (238-498)  D 05/22/17  05:35    














Problem List





- Problems


(1) Cellulitis, umbilical


Code(s): L03.316 - CELLULITIS OF UMBILICUS





(2) Choledocholithiasis


Code(s): K80.50 - CALCULUS OF BILE DUCT W/O CHOLANGITIS OR CHOLECYST W/O OBST





(3) Hypertension


Code(s): I10 - ESSENTIAL (PRIMARY) HYPERTENSION   Qualifiers: 


     Hypertension type: essential hypertension        Qualified Code(s): I10 - 

Essential (primary) hypertension  





(4) CAD (coronary artery disease)


Code(s): I25.10 - ATHSCL HEART DISEASE OF NATIVE CORONARY ARTERY W/O ANG PCTRS 

  Qualifiers: 


     Coronary Disease-Associated Artery/Lesion type: unspecified vessel or 

lesion type     Native vs. transplanted heart: native heart     Associated 

angina: without angina        Qualified Code(s): I25.10 - Atherosclerotic heart 

disease of native coronary artery without angina pectoris  





(5) History of percutaneous coronary intervention


Code(s): Z98.890 - OTHER SPECIFIED POSTPROCEDURAL STATES





(6) Renal cancer


Code(s): C64.9 - MALIGNANT NEOPLASM OF UNSP KIDNEY, EXCEPT RENAL PELVIS   

Qualifiers: 


     Laterality: right        Qualified Code(s): C64.1 - Malignant neoplasm of 

right kidney, except renal pelvis  








Assessment/Plan


1. Acute hypoxemic respiratory failure on mechanical ventilation


2. Biliary septic shock (E. coli), acute cholangitis post ERCP, balloon 

sphincteroplasty and stent placement with leukocytosis and lactic acidosis


3. Post TAVR for severe AS


4. CAD, S/P PCI/stent with demand ischemic injury


5. Severe biventricular dysfunction referable to sepsis


6. H/O Hypertension, but hypotensive requiring pressors - now taken off


7. Renal CA S/P right nephrectomy


8. Anuric acute on CKD referable to septic shock and ATN - currently on HD


9. Thrombocytopenia due to sepsis





PLAN:


1. Continue vent management - wean off vent


2. Monitor renal function - HD as per Renal


3. Antibiotics coverage 


4. DVT and GI prophylaxis





Further plans are to follow


Gordon Botello MD

## 2017-05-23 NOTE — PN
GI Progress Note


Subjective: 


No acute events


Remains off pressors


Plan for possible extubation today


No melena








- Objective


Vital Signs: 


 Vital Signs











Temperature  98.7 F   05/23/17 08:00


 


Pulse Rate  79   05/23/17 08:00


 


Respiratory Rate  19   05/23/17 08:00


 


Blood Pressure  124/57   05/23/17 08:00


 


O2 Sat by Pulse Oximetry (%)  100   05/23/17 08:00











Constitutional: Calm


Eyes: Yes: Sclera Icterus


Cardiovascular: Yes: Regular Rate and Rhythm


Respiratory: Yes: Diminished (at bases b/l)


Gastrointestinal Inspection: Yes: Distention


...Auscultate: Yes: Normoactive Bowel Sounds


...Palpate: No: Tenderness


...Percussion: No: Tympanitic


Edema: Yes


Edema: LLE: Trace, RLE: Trace


Neurological: Yes: Other (Remains intubated)


Labs: 


 CBC, BMP





 05/23/17 05:30 





 05/23/17 05:30 





 INR, PTT











INR  1.07  (0.82-1.09)   05/22/17  05:35    


 


Fibrinogen  463.0 mg/dL (238-498)  D 05/22/17  05:35    














Problem List





- Problems


(1) Choledocholithiasis


Assessment/Plan: 


Bilirubin continues to trend down


Normalized WBC


Continue to monitor for now


Monitor H/H and for signs of over GI bleeding


GI prophylaxis





Code(s): K80.50 - CALCULUS OF BILE DUCT W/O CHOLANGITIS OR CHOLECYST W/O OBST

## 2017-05-23 NOTE — PN
Progress Note (short form)





- Note


Progress Note: 


s/p HD yesterday, more alert


opens eyes to voice











 


 


 Vital Signs











 Period  Temp  Pulse  Resp  BP Sys/Badillo  Pulse Ox


 


 Last 24 Hr  97.8 F-98.7 F  67-82  16-23  /42-57  








cor-rrr


lungs decreased bs at bases


abd soft,nt


ext trace edema





 CBC, BMP





 05/23/17 05:30 





 05/23/17 05:30 





 


 Microbiology





05/19/17 19:20   Blood - Peripheral Venous   Blood Culture - Preliminary


                            NO GROWTH OBTAINED AFTER 72 HOURS, INCUBATION TO 

CONTINUE


                            FOR 2 DAYS.


05/19/17 18:50   Blood - Peripheral Venous   Blood Culture - Preliminary


                            NO GROWTH OBTAINED AFTER 72 HOURS, INCUBATION TO 

CONTINUE


                            FOR 2 DAYS.


05/15/17 18:30   Abdomen   Gram Stain - Final


05/15/17 18:30   Abdomen   Wound Culture - Final


                            Staphylococcus Coagulase Neg


                            Diphtheroid/Corynebacterium


                            Streptococcus Viridans


05/16/17 10:42   Blood - Peripheral Venous   Blood Culture - Final


                            Escherichia Coli


05/16/17 10:40   Blood - Peripheral Venous   Blood Culture - Final


                            Escherichia Coli








 


 Current Medications





Chlorhexidine Gluconate (Peridex -)  15 ml MM BID [facility]


   Last Admin: 05/23/17 09:06 Dose:  15 ml


Chlorhexidine Gluconate (Hibiclens For Decolonization -)  1 applic TP HS [facility]


   Last Admin: 05/22/17 22:25 Dose:  1 applic


Cefazolin Sodium 0.5 gm/ (Dextrose)  50 mls @ 100 mls/hr IVPB BID [facility]


   Last Admin: 05/23/17 09:05 Dose:  100 mls/hr


Propofol (Diprivan -)  100 mls @ 3.06 mls/hr IVPB TITR [facility]; 5 MCG/KG/MIN


   PRN Reason: Protocol


   Last Titration: 05/22/17 17:59 Dose:  2.5 mcg/kg/min


Nystatin (Nystop Powder -)  1 applic TP BID [facility]


   Last Admin: 05/23/17 09:06 Dose:  1 applic


Ranitidine HCl (Zantac Oral Solution -)  150 mg PO DAILY [facility]


   Last Admin: 05/23/17 09:12 Dose:  150 mg


Sodium Bicarbonate (Sodium Bicarbonate -)  1,300 mg NGT BID [facility]


   Last Admin: 05/23/17 09:06 Dose:  1,300 mg


Ursodiol (Actigal -)  300 mg PO BID [facility]


   Last Admin: 05/23/17 09:12 Dose:  300 mg














imp/reccd





ecoli bacteremia-day #7 antibiotics 


biliary sepsis/resp failure/bo (one kidney)


s/p cbd stent 


hx TAVR


+troponins


BO





continue cefazolin


now on dialysis

## 2017-05-23 NOTE — PN
Physical Exam: 


SUBJECTIVE: Patient seen and examined in the ICU.  Extubated, appears 

comfortable.





OBJECTIVE:


Now extubated, off pressors


Wheezing on anterior lung fields


Neb treatments ordered 


Swallow evaluation ordered


                  Vital Signs











 Period  Temp  Pulse  Resp  BP Sys/Badillo  Pulse Ox


 


 Last 24 Hr  97.9 F-98.7 F  67-86  14-25  /42-57  








GENERAL: Intubated/lethargic/wrist restraints to protect airway


HEAD: Normal with no signs of trauma.


NECK: Normal range of motion, supple without lymphadenopathy, JVD, or masses.


LUNGS: expiratory wheezing on bilateral anterior lobes


HEART: Regular rate and rhythm


ABDOMEN: +erythema, redness to periumbilical region. 


UPPER EXTREMITIES: Non pitting edema of bilateral hands


NEUROLOGICAL:  lethargic, intubated, minimally responsive


SKIN: generalized jaundice, periumbilicus drainage/abscess/no odor





 Laboratory Results - last 24 hr











  05/22/17 05/23/17 05/23/17





  16:56 05:30 05:30


 


WBC   


 


RBC   


 


Hgb   


 


Hct   


 


MCV   


 


MCHC   


 


RDW   


 


Plt Count   


 


MPV   


 


Neutrophils %   


 


Lymphocytes %   


 


Monocytes %   


 


Eosinophils %   


 


Basophils %   


 


PTT (Actin FS)   32.8 


 


Puncture Site   


 


ABG pH   


 


ABG pCO2 at Pt Temp   


 


ABG pO2 at Pt Temp   


 


ABG HCO3   


 


ABG O2 Sat (Measured)   


 


ABG O2 Content   


 


ABG Base Excess   


 


Shyam Test   


 


O2 Delivery Device   


 


Oxygen Flow Rate   


 


Vent Mode   


 


Vent Rate   


 


Mechanical Rate   


 


PEEP   


 


Pressure Support Vent   


 


Sodium   


 


Potassium   


 


Chloride   


 


Carbon Dioxide   


 


Anion Gap   


 


BUN   


 


Creatinine   


 


Creat Clearance w eGFR   


 


Random Glucose   


 


Calcium   


 


Phosphorus    4.4  D


 


Magnesium    2.0


 


Total Bilirubin   


 


Direct Bilirubin   


 


AST   


 


ALT   


 


Alkaline Phosphatase   


 


Total Protein   


 


Albumin   


 


Hepatitis C Antibody  Cancelled  














  05/23/17 05/23/17 05/23/17





  05:30 05:30 07:15


 


WBC   8.1 


 


RBC   2.58 L 


 


Hgb   7.6 L D 


 


Hct   22.4 L D 


 


MCV   86.6 


 


MCHC   33.9 


 


RDW   15.9 


 


Plt Count   40 L 


 


MPV   11.4 H 


 


Neutrophils %   75.9 


 


Lymphocytes %   7.3 L 


 


Monocytes %   8.1 


 


Eosinophils %   8.2 H D 


 


Basophils %   0.5 


 


PTT (Actin FS)   


 


Puncture Site    Right radial


 


ABG pH    7.43


 


ABG pCO2 at Pt Temp    32.6 L


 


ABG pO2 at Pt Temp    169.0 H* D


 


ABG HCO3    21.0 L


 


ABG O2 Sat (Measured)    99.8 H*


 


ABG O2 Content    10.7 L


 


ABG Base Excess    -2.5 L


 


Shyam Test    Positive


 


O2 Delivery Device    Vent


 


Oxygen Flow Rate    40


 


Vent Mode    A/c


 


Vent Rate    14


 


Mechanical Rate    Yes


 


PEEP    5.0


 


Pressure Support Vent    500


 


Sodium  134 L  


 


Potassium  3.8  


 


Chloride  100  


 


Carbon Dioxide  23  D  


 


Anion Gap  11  


 


BUN  82 H  


 


Creatinine  5.8 H  


 


Creat Clearance w eGFR  9.30  


 


Random Glucose  95  


 


Calcium  7.6 L  


 


Phosphorus   


 


Magnesium   


 


Total Bilirubin  1.9 H  


 


Direct Bilirubin  1.4 H D  


 


AST  45 H  


 


ALT  15  


 


Alkaline Phosphatase  452 H  


 


Total Protein  4.3 L  


 


Albumin  1.9 L D  


 


Hepatitis C Antibody   








Active Medications











Generic Name Dose Route Start Last Admin





  Trade Name Freq  PRN Reason Stop Dose Admin


 


Albuterol/Ipratropium  1 amp 05/23/17 14:30  





  Duoneb -  NEB   





  Q6H PRN   





  SHORTNESS OF BREATH   


 


Chlorhexidine Gluconate  15 ml 05/19/17 22:00 05/23/17 09:06





  Peridex -  MM   15 ml





  BID [facility]   Administration


 


Chlorhexidine Gluconate  1 applic 05/19/17 22:00 05/22/17 22:25





  Hibiclens For Decolonization -  TP   1 applic





  HS [facility]   Administration


 


Cefazolin Sodium 0.5 gm/  50 mls @ 100 mls/hr 05/19/17 10:00 05/23/17 09:05





  Dextrose  IVPB   100 mls/hr





  BID [facility]   Administration


 


Nystatin  1 applic 05/17/17 22:00 05/23/17 09:06





  Nystop Powder -  TP   1 applic





  BID [facility]   Administration


 


Ranitidine HCl  150 mg 05/23/17 10:00 05/23/17 09:12





  Zantac Oral Solution -  PO   150 mg





  DAILY [facility]   Administration


 


Sodium Bicarbonate  1,300 mg 05/20/17 12:30 05/23/17 09:06





  Sodium Bicarbonate -  NGT   1,300 mg





  BID [facility]   Administration


 


Ursodiol  300 mg 05/19/17 22:00 05/23/17 09:12





  Actigal -  PO   300 mg





  BID [facility]   Administration











ASSESSMENT/PLAN:


Patient is a 87 year old male with a significant past medical history of 

hypertension, CAD s/p stent placement, aortic stenosis, porcine valve 

replacement (takes both ASA 81mg and Plavix 75mg), urtheral strictures s/p 

dilatation and right nephrectomy. 





He was admitted on 5/15/2017 for abdominal pain, acute choledocholithiasis, 

acute transaminitis, jaundice and leukocytosis with shaking chills.  





ID:


Septic Shock likely secondary to cholangitis - acute


Assessment/Plan: Also has periumbilical cellulitis which is chronic


Wound culture +staphylococcus coag, +blood cultures with ecoli


Repeat blood cultures with no growth x 48 hours


WBC trending down, Now on Cefazolin since 5/19


Surgical consult for likely drainage of abscess once pt more stable





GI:


Acute Choledocholithiasis/abdominal pain/generalized jaundice/acute 

transaminitis


Assessment/Plan: Cholangitis s/p ERCP with biliary stent placement on 5/16


Trend LFT's


On Ursodiol





:


Chronic Kidney Disease - acute renal failure/has solitary kidney


due to renal cancer with s/p right nephrectomy


Assessment/Plan: Received dialysis yesterday


Renal following





Cardiology:


Hypertension - chronic


Assessment/Plan: hypotension improving, monitor off pressors





Elevated troponins - acute


Assessment/Plan: Troponins continue to trend up, peaked at 7.4


Likely secondary demand ischemia and sepsis


Cardiologist following





CAD s/p stent placement


On ASA 81mg and Plavix 75mg - on hold





Hematology:


Thrombocytopenia - acute


Assessment/Plan:  s/p 1 unit of platelets


Thrombocytopenia likely secondary to sepsis


HIT panel slightly elevated


Heparin on hold until platelets recover (goal >50)


Hematology following





F.E.N.


Fluids: sodium bicarb drip


Electrolytes: monitor bmp


Nutrition:  swallow evaluation ordered





Prophylaxis:


DVT:  SCDs


GI: Protonix





Code Status:  Requires inpatient hospitalization.   DNR only














Visit type





- Emergency Visit


Emergency Visit: Yes


ED Registration Date: 05/15/17


Care time: The patient presented to the Emergency Department on the above date 

and was hospitalized for further evaluation of their emergent condition.





- New Patient


This patient is new to me today: No





- Critical Care


Critical Care patient: Yes


Total Critical Care Time (in minutes): 45


Critical Care Statement: The care of this patient involved high complexity 

decision making to prevent further life threatening deterioration of the patient

's condition and/or to evalute & treat vital organ system(s) failure or risk of 

failure.

## 2017-05-24 LAB
ALBUMIN SERPL-MCNC: 1.9 G/DL (ref 3.4–5)
ALP SERPL-CCNC: 422 U/L (ref 45–117)
ALT SERPL-CCNC: 11 U/L (ref 12–78)
ANION GAP SERPL CALC-SCNC: 11 MMOL/L (ref 8–16)
AST SERPL-CCNC: 47 U/L (ref 15–37)
BASOPHILS # BLD: 0.5 % (ref 0–2)
BILIRUB SERPL-MCNC: 2 MG/DL (ref 0.2–1)
CALCIUM SERPL-MCNC: 8.1 MG/DL (ref 8.5–10.1)
CO2 SERPL-SCNC: 24 MMOL/L (ref 21–32)
COCKROFT - GAULT: 12.25
CREAT SERPL-MCNC: 6.1 MG/DL (ref 0.7–1.3)
DEPRECATED RDW RBC AUTO: 15.6 % (ref 11.9–15.9)
EOSINOPHIL # BLD: 6.7 % (ref 0–4.5)
GLUCOSE SERPL-MCNC: 79 MG/DL (ref 74–106)
MCH RBC QN AUTO: 29.3 PG (ref 25.7–33.7)
MCHC RBC AUTO-ENTMCNC: 33.8 G/DL (ref 32–35.9)
MCV RBC: 86.5 FL (ref 80–96)
NEUTROPHILS # BLD: 78.8 % (ref 42.8–82.8)
PLATELET # BLD AUTO: 69 K/MM3 (ref 134–434)
PMV BLD: 11.6 FL (ref 7.5–11.1)
PROT SERPL-MCNC: 4.3 G/DL (ref 6.4–8.2)
WBC # BLD AUTO: 10.5 K/MM3 (ref 4–10)

## 2017-05-24 PROCEDURE — 30233N1 TRANSFUSION OF NONAUTOLOGOUS RED BLOOD CELLS INTO PERIPHERAL VEIN, PERCUTANEOUS APPROACH: ICD-10-PCS | Performed by: NURSE PRACTITIONER

## 2017-05-24 RX ADMIN — URSODIOL SCH: 300 CAPSULE ORAL at 21:55

## 2017-05-24 RX ADMIN — RANITIDINE HYDROCHLORIDE SCH: 150 SOLUTION ORAL at 10:00

## 2017-05-24 RX ADMIN — FAMOTIDINE SCH MLS/HR: 20 INJECTION, SOLUTION INTRAVENOUS at 23:13

## 2017-05-24 RX ADMIN — CEFAZOLIN SCH MLS/HR: 1 INJECTION, POWDER, FOR SOLUTION INTRAVENOUS at 23:13

## 2017-05-24 RX ADMIN — CEFAZOLIN SCH MLS/HR: 1 INJECTION, POWDER, FOR SOLUTION INTRAVENOUS at 10:00

## 2017-05-24 RX ADMIN — NYSTATIN SCH APPLIC: 100000 POWDER TOPICAL at 10:00

## 2017-05-24 RX ADMIN — URSODIOL SCH: 300 CAPSULE ORAL at 10:00

## 2017-05-24 RX ADMIN — Medication SCH: at 10:00

## 2017-05-24 RX ADMIN — CHLORHEXIDINE GLUCONATE 0.12% ORAL RINSE SCH ML: 1.2 LIQUID ORAL at 10:00

## 2017-05-24 NOTE — PN
GI Progress Note


Subjective: 


GI NOte: Extubated and now requiring dialysis. Surjit is sleepy but arousable. 

I explained the situation of his stones in the bile duct and the need for a 

repeat ERCP to extract them.  I explained that he is still too weak to consider 

it and needs an improvement in platelet count





- Objective


Vital Signs: 


 Vital Signs











Temperature  97.6 F   05/24/17 10:00


 


Pulse Rate  68   05/24/17 10:00


 


Respiratory Rate  21   05/24/17 10:00


 


Blood Pressure  107/50   05/24/17 10:00


 


O2 Sat by Pulse Oximetry (%)  100   05/24/17 08:00











Constitutional: Other (lethargic but conversant)


...Auscultate: Yes: Normoactive Bowel Sounds


...Palpate: Yes: Soft, Other (nontender)


Labs: 


 CBC, BMP





 05/24/17 05:25 





 05/24/17 05:25 





 INR, PTT











INR  1.07  (0.82-1.09)   05/22/17  05:35    


 


Fibrinogen  463.0 mg/dL (238-498)  D 05/22/17  05:35    








 Laboratory Tests











  05/24/17 05/24/17





  05:25 05:25


 


Hgb  7.6 L 


 


Hct  22.4 L 


 


Plt Count  69 L D 


 


Total Bilirubin   2.0 H


 


AST   47 H


 


ALT   11 L D


 


Alkaline Phosphatase   422 H


 


Albumin   1.9 L














Assessment/Plan


Recovering slowly from cholangitis. Will need ERCP to extract residual stones 

when his clinical condition permits.

## 2017-05-24 NOTE — CONSULT
Admitting History and Physical





- Primary Care Physician


PCP: Floresita Ball





- Admission


History of Present Illness: 


This is an 86 y/o male with pmhx significant for renal cell CA S/P right 

nephrectomy, HTN, CAD S/P PCI/stent (1996) and S/P TAVR .


Abnormal MRI of abdomen showing prominent head of pancreas, large distal CBD 

stone and dilated CBD and labs c/w cholangitis. 


 ERCP with balloon sphinctertomy and stent placement.  


Pt tolerated procedure well and was extubated however pt was apneic and re-

intubated.  


In ICU, extubated yesterday. Swallowing evaluation and clear liquids ordered 

but not given.


History Source: Medical Record


Limitations to Obtaining History: Clinical Condition





- Past Medical History


Cardiovascular: Yes: Aortic Stenosis (Post TAVR at [facility] in 2016), CAD (s/p 

cardiac stent/ PCI 1996), HTN, Hyperlipdemia, Other


Gastrointestinal: No: Irritable Bowel Disease


Hepatobiliary: Yes: Cholelithiasis


Renal/: Yes: Renal Inusuff, Cancer (Renal CA ), Renal Calculi, Other (

urethral stricture s/p cystoscopy, nephrectomy for RCC)


Heme/Onc: Yes: Other (renal cell cancer)





- Past Surgical History


Past Surgical History: Yes: Nephrectomy, Stent, Valve Replacement (TAVR)





- Smoking History


Smoking history: Never smoked


Have you smoked in the past 12 months: No





- Alcohol/Substance Use


Hx Alcohol Use: No


History of Substance Use: reports: None





- Social History


ADL: Independent


Occupation: CPA: still working


History of Recent Travel: No





History





- Admission


Reason For Visit: CALCULUS OF COMMON DUCT WITH OBSTRUCTION





- General


Mental Status: Awake and Alert, Able to Follow Commands, Lethargic (arousable)


Ability to Follow Directions: Fair





- Hearing


Hearing: Impaired, Both


Hearing Aide: Yes


With Patient: Yes





Speech Evaluation





- Communication


Primary Language: ENGLISH


Communication: Yes: Simple Responses


Oral Expression Ability: Yes: Moderate Impairment





- Speech Production


Able to Make Needs Known: Yes: Moderately Impaired


Intelligibility: Yes: Moderately Impaired





- Speech Characteristics


Voice Loudness: Normal


Voice Pitch: Yes: Pitch Breaks


Voice Phonatory-based Quality: Yes: Dysphonia


Nasal Resonance: Hypernasal


Articulation: Yes: Imprecise





- Swallow Evaluation/Bedside Assessment


Facial Symmetry at Rest: Symmetrical (Lips/tongue swollen secondary to 

intubation. Extubated yesterday.)


Facial Symmetry on Retraction: Symmetrical (bilaterally restricted due to 

swelling)


Jaw Position: Open at Rest


Lingual Movement: Symmetric (swollen), Reduced Tip Depression, Reduced Tip 

Elevation, Reduced Lt Lateralization, Reduced Protrusion


Lingual Speed of Movement: Reduced


Lingual Movement Strgth Against Opposition: Reduced


Velopharyngeal Movement: Hypernasality (likely sec swelling from intubation)


Laryngeal Movement: Able to Palpate


Timing of Swallow: Delayed


Coughing/Throat Clear: No





Recommendations





- Speech Evaluation, Impression/Plan


Impression: Lips/tongue swollen secondary to intubation. Extubated yesterday. 

Swallow seems brisk. Presently high risk of aspiration sec lethargy and oral 

swelling sec intubation. Likely to improve significantly in a day or two.





- Dysphagia Impressions/Plan


Dysphagia Impressions: Risk of Aspiration


*Silent aspiration: cannot be R/O at bedside


Recommendations: Other (to reassess as swelling improves.)





- Recommendations


Diet Consistency: NPO


Liquids: NPO

## 2017-05-24 NOTE — PN
Teaching Attending Note


Name of Resident: Devan Rodriguez


ATTENDING PHYSICIAN STATEMENT





I saw and evaluated the patient.


I reviewed the resident's note and discussed the case with the resident.


I agree with the resident's findings and plan as documented.








SUBJECTIVE:





Pt seen and examined in the ICU. Remains extubated but somnolent. No fevers 

recorded.





OBJECTIVE:





 Last Vital Signs











Temp Pulse Resp BP Pulse Ox


 


 97.6 F   68   21   107/50   100 


 


 05/24/17 10:00  05/24/17 10:00  05/24/17 10:00  05/24/17 10:00  05/24/17 08:00








 Intake & Output











 05/21/17 05/22/17 05/23/17 05/24/17





 23:59 23:59 23:59 23:59


 


Intake Total 1509 330 5021 


 


Output Total 1050 1300 1350 1100


 


Balance 660 -470 -190 -1100


 


Weight 225 lb 224 lb 14.4 oz 223 lb 14.4 oz 215 lb 2.738 oz








Gen:  breathing nonlabored


Heart:  RRR


Lung: decreased breath sounds at the bases


Abd: soft, nontender


Ext: no edema





 CBC, BMP





 05/24/17 05:25 





 05/24/17 05:25 





Active Medications





Albuterol/Ipratropium (Duoneb -)  1 amp NEB Q6H PRN


   PRN Reason: SHORTNESS OF BREATH


Chlorhexidine Gluconate (Peridex -)  15 ml MM BID [facility]


   Last Admin: 05/23/17 21:39 Dose:  15 ml


Chlorhexidine Gluconate (Hibiclens For Decolonization -)  1 applic TP HS [facility]


   Last Admin: 05/23/17 21:39 Dose:  1 applic


Cefazolin Sodium 0.5 gm/ (Dextrose)  50 mls @ 100 mls/hr IVPB BID [facility]


   Last Admin: 05/24/17 10:00 Dose:  100 mls/hr


Nystatin (Nystop Powder -)  1 applic TP BID [facility]


   Last Admin: 05/24/17 10:00 Dose:  1 applic


Ranitidine HCl (Zantac Oral Solution -)  150 mg PO DAILY [facility]


   Last Admin: 05/24/17 10:00 Dose:  Not Given


Sodium Bicarbonate (Sodium Bicarbonate -)  650 mg NGT BID [facility]


   Last Admin: 05/24/17 10:00 Dose:  Not Given


Ursodiol (Actigal -)  300 mg PO BID [facility]


   Last Admin: 05/24/17 10:00 Dose:  Not Given








ASSESSMENT AND PLAN:


Acute Cholangitis


Gram Negative Bacteremia


s/p ERCP/sphincteroplasty/stent placement


s/p Acute Hypoxic Respiratory Failure


Septic Shock resolving


Acute on Chronic Renal Failure requiring HD


Lactic Acidosis resolved


CAD


+Troponins likely Demand Ischemia


s/p TAVR for severe Aortic Stenosis


Thrombocytopenia





-  continue antibiotics


-  monitor CBC


-  HD per renal


-  monitor urine output, creatinine


-  PO per GI


-  DVT/GI prophylaxis


-  can monitor on floor











critical care time spent in reviewing chart, evaluating patient and formulating 

plan 35 min

## 2017-05-24 NOTE — PN
Progress Note (short form)





- Note


Progress Note: 


In ICU


Extubated


Awake


 Vital Signs











 Period  Temp  Pulse  Resp  BP Sys/Badillo  Pulse Ox


 


 Last 24 Hr  96.9 F-98.1 F  63-80  16-22  /  








Abd soft, ND





CBC,CMP











WBC  10.5 K/mm3 (4.0-10.0)  H  05/24/17  05:25    


 


RBC  2.59 M/mm3 (4.00-5.60)  L  05/24/17  05:25    


 


Hgb  7.6 GM/dL (11.7-16.9)  L  05/24/17  05:25    


 


Hct  22.4 % (35.4-49)  L  05/24/17  05:25    


 


MCV  86.5 fl (80-96)   05/24/17  05:25    


 


MCHC  33.8 g/dl (32.0-35.9)   05/24/17  05:25    


 


RDW  15.6 % (11.9-15.9)   05/24/17  05:25    


 


Plt Count  69 K/MM3 (134-434)  L D 05/24/17  05:25    


 


MPV  11.6 fl (7.5-11.1)  H  05/24/17  05:25    


 


Neutrophils %  78.8 % (42.8-82.8)   05/24/17  05:25    


 


Lymphocytes %  6.6 % (8-40)  L  05/24/17  05:25    


 


Monocytes %  7.4 % (3.8-10.2)   05/24/17  05:25    


 


Eosinophils %  6.7 % (0-4.5)  H  05/24/17  05:25    


 


Basophils %  0.5 % (0-2.0)   05/24/17  05:25    


 


Band Neutrophils  11.0 % (0-10)  H D 05/17/17  05:15    


 


Metamyelocytes  5 % (0-2)  H D 05/16/17  20:30    


 


Myelocytes  9 % (0-2)  H D 05/16/17  20:30    


 


Differential Comment  Manual diff done   05/18/17  08:30    


 


Dohle Bodies  1+   05/17/17  05:15    


 


Platelet Estimate  Decreased  (NORMAL)   05/18/17  08:30    


 


Platelet Comment  No clumping noted   05/17/17  05:15    


 


Polychromasia  Few   05/17/17  05:15    


 


Hypochromic-Microcytic  Few   05/17/17  05:15    


 


Sodium  136 mmol/L (136-145)   05/24/17  05:25    


 


Potassium  3.9 mmol/L (3.5-5.1)   05/24/17  05:25    


 


Chloride  101 mmol/L ()   05/24/17  05:25    


 


Carbon Dioxide  24 mmol/L (21-32)   05/24/17  05:25    


 


Anion Gap  11  (8-16)   05/24/17  05:25    


 


BUN  93 mg/dL (7-18)  H  05/24/17  05:25    


 


Creatinine  6.1 mg/dL (0.7-1.3)  H  05/24/17  05:25    


 


Creat Clearance w eGFR  8.77  (>60)   05/24/17  05:25    


 


Random Glucose  79 mg/dL ()   05/24/17  05:25    


 


Lactic Acid  1.684 mmol/L (0.4-2.0)   05/17/17  13:20    


 


Calcium  8.1 mg/dL (8.5-10.1)  L  05/24/17  05:25    


 


Phosphorus  4.4 mg/dL (2.5-4.9)  D 05/23/17  05:30    


 


Magnesium  2.0 mg/dL (1.8-2.4)   05/23/17  05:30    


 


Total Bilirubin  2.0 mg/dL (0.2-1.0)  H  05/24/17  05:25    


 


Direct Bilirubin  1.4 mg/dL (0.0-0.2)  H D 05/23/17  05:30    


 


AST  47 U/L (15-37)  H  05/24/17  05:25    


 


ALT  11 U/L (12-78)  L D 05/24/17  05:25    


 


Alkaline Phosphatase  422 U/L ()  H  05/24/17  05:25    


 


Creatine Kinase  60 IU/L ()   05/20/17  00:00    


 


Creatine Kinase Index  11.8 % (0.0-5.0)  H*  05/17/17  13:20    


 


CK-MB (CK-2)  18.446 ng/ml (0.5-3.6)  H  05/17/17  13:20    


 


CK-MB (CK-2) Rel Index  Cancelled   05/17/17  13:20    


 


Troponin I  1.73 ng/ml (0.00-0.05)  H*  05/20/17  05:15    


 


C-Reactive Protein  21.5 MG/DL (0.00-0.3)  H  05/17/17  05:15    


 


Total Protein  4.3 g/dl (6.4-8.2)  L  05/24/17  05:25    


 


Albumin  1.9 g/dl (3.4-5.0)  L  05/24/17  05:25    


 


Total Amylase  49 U/L ()  D 05/17/17  05:15    


 


Lipase  116 U/L ()   05/17/17  05:15    














WBC and bilirubin improving


Antibiotics


Awaiting repeat ERCP to remove CBD stone





Problem List





- Problems


(1) Umbilical discharge


Code(s): R19.8 - OT SYMPTOMS AND SIGNS INVOLVING THE DGSTV SYS AND ABDOMEN

## 2017-05-24 NOTE — PN
Progress Note, Physician


Chief Complaint: 


ID





Now in Posey restraint.  Arousable but speech garbles Unable to answer 

questions  








Cefazolin day 8 antibiotics





- Current Medication List


Current Medications: 


Active Medications





Albuterol/Ipratropium (Duoneb -)  1 amp NEB Q6H PRN


   PRN Reason: SHORTNESS OF BREATH


Chlorhexidine Gluconate (Peridex -)  15 ml MM BID [facility]


   Last Admin: 05/23/17 21:39 Dose:  15 ml


Chlorhexidine Gluconate (Hibiclens For Decolonization -)  1 applic TP HS [facility]


   Last Admin: 05/23/17 21:39 Dose:  1 applic


Cefazolin Sodium 0.5 gm/ (Dextrose)  50 mls @ 100 mls/hr IVPB BID [facility]


   Last Admin: 05/23/17 21:38 Dose:  100 mls/hr


Nystatin (Nystop Powder -)  1 applic TP BID [facility]


   Last Admin: 05/23/17 21:39 Dose:  1 applic


Ranitidine HCl (Zantac Oral Solution -)  150 mg PO DAILY [facility]


   Last Admin: 05/23/17 09:12 Dose:  150 mg


Sodium Bicarbonate (Sodium Bicarbonate -)  650 mg NGT BID [facility]


   Last Admin: 05/23/17 21:38 Dose:  Not Given


Ursodiol (Actigal -)  300 mg PO BID [facility]


   Last Admin: 05/23/17 21:38 Dose:  Not Given











- Objective


Vital Signs: 


 Vital Signs











Temperature  97.8 F   05/24/17 06:00


 


Pulse Rate  68   05/24/17 06:00


 


Respiratory Rate  18   05/24/17 06:00


 


Blood Pressure  109/52   05/24/17 06:00


 


O2 Sat by Pulse Oximetry (%)  97   05/23/17 20:29











Constitutional: Yes: Well Nourished


Cardiovascular: Yes: S1, S2.  No: Murmur


Respiratory: Yes: WNL, Regular, CTA Bilaterally


Gastrointestinal: Yes: WNL, Normal Bowel Sounds, Soft.  No: Tenderness, 

Tenderness, Rebound


Extremities: No: Cold, Cool, Cyanosis


Edema: Yes


Labs: 


 CBC, BMP





 05/24/17 05:25 





 05/24/17 05:25 





 INR, PTT











INR  1.07  (0.82-1.09)   05/22/17  05:35    


 


Fibrinogen  463.0 mg/dL (238-498)  D 05/22/17  05:35    














Problem List





- Problems


(1) Biliary sepsis


Code(s): K83.0 - CHOLANGITIS





(2) Bacteremia due to Gram-negative bacteria


Code(s): R78.81 - BACTEREMIA





(3) Thrombocytopenia


Code(s): D69.6 - THROMBOCYTOPENIA, UNSPECIFIED








Assessment/Plan


Microbiology





05/16/17 10:42   Blood - Peripheral Venous   Blood Culture - Final


                              Escherichia Coli


05/16/17 10:40   Blood - Peripheral Venous   Blood Culture - Final


                              Escherichia Coli





 Laboratory Tests











  05/24/17 05/24/17





  05:25 05:25


 


WBC  10.5 H 


 


Hgb  7.6 L 


 


Hct  22.4 L 


 


Plt Count  69 L D 


 


Creatinine   6.1 H


 


Creat Clearance w eGFR   8.77


 


AST   47 H


 


ALT   11 L D


 


Alkaline Phosphatase   422 H








Assessment


Sepsis syndrome resolved


E Coli bacteremia day 8 antibiotics


Post ERCP with stent placement


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


Acute kidney failure





Plan Continue Cefazolin as ordered to complete total 10 days now day 8





35 minutes spent providing ICU critical care








Deejay ABRAHAM

## 2017-05-24 NOTE — PN
Physical Exam: 


SUBJECTIVE: Patient seen and examined


sleeping comfortable in bed, NAD. 





on reassessment, speaking clearly, asking to go home, attempting to swing legs 

off bed, but remains calm and cooperative when I oriented him, not combative.


OBJECTIVE:





 Vital Signs











 Period  Temp  Pulse  Resp  BP Sys/Badillo  Pulse Ox


 


 Last 24 Hr  97.2 F-98.5 F  63-86  14-25  /43-57  











EYES: sluggish pupilary response with PERRLA and EOMI, conjunctiva clear. No 

ptosis. anicteric sclera,


ENT:  oropharynx scant dried dark mucous, dry mucous membranes, no erythema,  

lateral line of demuted skin without discharge on top lip from ET strap - 

improving. edematous upper lip 


LUNGS: Breath sounds equal, clear at apices, diminished breath sounds at bases


HEART: Regular rate and rhythm, S1, S2 without murmur, rub or gallop.


ABDOMEN: Soft, nondistended, multiple diffuse palpable soft mobile nodularities

,  normoactive bowel sounds, periumbilical erythema with demuted skin laterally 

along skin fold and within umbilicus, scant serous discharge in umbilicus, 

covered with nystatin power


EXTREMITIES: 2+ pulses in b/l radial and DP, warm, 1+ edema in b/l forearms and 

arms, trace edema in b/l hands, well-perfused, trace edema in left foot. scd's 

in place.


Neuro: facial symmetry.


SKIN: Warm, improved jaundice


Ware in place.


left IJ site CDI, dialysis access on left.














 Laboratory Results - last 24 hr











  05/22/17 05/22/17 05/24/17





  16:56 16:56 05:25


 


WBC    10.5 H


 


RBC    2.59 L


 


Hgb    7.6 L


 


Hct    22.4 L


 


MCV    86.5


 


MCHC    33.8


 


RDW    15.6


 


Plt Count    69 L D


 


MPV    11.6 H


 


Neutrophils %    78.8


 


Lymphocytes %    6.6 L


 


Monocytes %    7.4


 


Eosinophils %    6.7 H


 


Basophils %    0.5


 


Sodium   


 


Potassium   


 


Chloride   


 


Carbon Dioxide   


 


Anion Gap   


 


BUN   


 


Creatinine   


 


Creat Clearance w eGFR   


 


Random Glucose   


 


Calcium   


 


Total Bilirubin   


 


AST   


 


ALT   


 


Alkaline Phosphatase   


 


Total Protein   


 


Albumin   


 


Hepatitis C Antibody  <0.1  Cancelled 














  05/24/17





  05:25


 


WBC 


 


RBC 


 


Hgb 


 


Hct 


 


MCV 


 


MCHC 


 


RDW 


 


Plt Count 


 


MPV 


 


Neutrophils % 


 


Lymphocytes % 


 


Monocytes % 


 


Eosinophils % 


 


Basophils % 


 


Sodium  136


 


Potassium  3.9


 


Chloride  101


 


Carbon Dioxide  24


 


Anion Gap  11


 


BUN  93 H


 


Creatinine  6.1 H


 


Creat Clearance w eGFR  8.77


 


Random Glucose  79


 


Calcium  8.1 L


 


Total Bilirubin  2.0 H


 


AST  47 H


 


ALT  11 L D


 


Alkaline Phosphatase  422 H


 


Total Protein  4.3 L


 


Albumin  1.9 L


 


Hepatitis C Antibody 








Active Medications











Generic Name Dose Route Start Last Admin





  Trade Name Freq  PRN Reason Stop Dose Admin


 


Albuterol/Ipratropium  1 amp 05/23/17 14:30  





  Duoneb -  NEB   





  Q6H PRN   





  SHORTNESS OF BREATH   


 


Chlorhexidine Gluconate  15 ml 05/19/17 22:00 05/23/17 21:39





  Peridex -  MM   15 ml





  BID ALCIDES   Administration


 


Chlorhexidine Gluconate  1 applic 05/19/17 22:00 05/23/17 21:39





  Hibiclens For Decolonization -  TP   1 applic





  HS ALCIDES   Administration


 


Cefazolin Sodium 0.5 gm/  50 mls @ 100 mls/hr 05/19/17 10:00 05/23/17 21:38





  Dextrose  IVPB   100 mls/hr





  BID ALCIDES   Administration


 


Nystatin  1 applic 05/17/17 22:00 05/23/17 21:39





  Nystop Powder -  TP   1 applic





  BID ALCIDES   Administration


 


Ranitidine HCl  150 mg 05/23/17 10:00 05/23/17 09:12





  Zantac Oral Solution -  PO   150 mg





  DAILY ALCIDES   Administration


 


Sodium Bicarbonate  650 mg 05/23/17 15:24 05/23/17 21:38





  Sodium Bicarbonate -  NGT   Not Given





  BID ALCIDES   


 


Ursodiol  300 mg 05/19/17 22:00 05/23/17 21:38





  Actigal -  PO   Not Given





  BID ALCIDES   











ASSESSMENT/PLAN:


87 yr old man with HTN, aortic stenosis, CAD s/p stents, r-nephroctomy(renal ca)

, pancreatic head density found to have choleluithaisis with obstructing CBD, s/

p ERCP with stent placement(unable to undergo sphincterectomy due to 

anticoagulation) complicated by septic shock requiring ICU admission.


- speech and swallow consult for further assesment, on my bedside ICU 

assessment he held the water in his mouth but did not swallow, possibly needs 

more time to recover from extubation, will start with clear liquids.





Renal


oliguric BO/ATN due to sepsis induced hypoperfusion, on CKD stage 3 


- dialysis today  


- monitor I&O, ware in place, urine output 1350cc/24hr, given 1 dose lasix 

yesterday


- Left IJ dialsyis cather placed 5/22


- renal dosing of medications


consult: Dr. Rodriguez





Infectious Disease


Septic shock with multi-organ failure likely secondary to cholangitis and 

e.coli bactermia(likely gallbladder as primary source) - improving


- ofirmev  1gm IVpb q6hr for fever


Abx tx: 


Cefazolin (renally dosed) start 5/19


- day 6


- topical antifungal cream to periumbical area


Consult: Dr. Dejesus





Cardiovascular


Normotensive


- maintain Goal: MAP>65


consult: dr. sandy





Pulmonary


breathing comfortably, sat 100%





Gastrointestinal


Cholangitis s/p ERCP with biliary stent placement


actigal to soften gall stones


trend LFT's, trending down


as per GI will need ERCP for stone extraction 


prophylaxis with zantac daily


Speech and Swallow eval - likely to improve in 1-2 days post-extubation


when patient is more alert will start with clear liquids, reassess and advance 

as tolerated.


consult: Dr. Celestin





Hematological


normocytic anemia - chronic, trend h/h, monitor for bleeding. 


- transfuse 2 units of prbc's during dialysis today 


leucocytosis mildly elevated after normalizing yesterday- possibly reactive to 

extubation trauma, pt has been afebrile


thrombocytopenia - likely due to sepsis, r./o DIC; trend fibrinogen and fibrin 

degradation products: HIT AB POSITIVE, serotonin realeasing assay for 

confirmation pending. continue heparin for now.


maintain platelet >20,000


consult: Dr. Patiño





Dermatological


periumbical discharge


- topical antifungal


Dr Swann consulted for surgical eval; patient is high risk for operative 

intervention, recommend IR intervention for percutaneous drainage - no 

collection noted on CT scan.


Dr. Oleary consulted





Neurological


improved mentation. calmly sleeping.





DVT: heparin 5000 units SQ BID


Diet: NPO, aspiration risk, pending speech and swallow re-evaluation


Dispo: stable to be monitored on Med-Surg floor





Visit type





- Emergency Visit


Emergency Visit: No





- New Patient


This patient is new to me today: No





- Critical Care


Critical Care patient: Yes


Total Critical Care Time (in minutes): 45


Critical Care Statement: The care of this patient involved high complexity 

decision making to prevent further life threatening deterioration of the patient

's condition and/or to evalute & treat vital organ system(s) failure or risk of 

failure.

## 2017-05-24 NOTE — PN
Progress Note, Physician


History of Present Illness: 


Extubated on 100% NRB.











- Current Medication List


Current Medications: 


Active Medications





Albuterol/Ipratropium (Duoneb -)  1 amp NEB Q6H PRN


   PRN Reason: SHORTNESS OF BREATH


Chlorhexidine Gluconate (Peridex -)  15 ml MM BID [facility]


   Last Admin: 05/23/17 21:39 Dose:  15 ml


Chlorhexidine Gluconate (Hibiclens For Decolonization -)  1 applic TP HS [facility]


   Last Admin: 05/23/17 21:39 Dose:  1 applic


Cefazolin Sodium 0.5 gm/ (Dextrose)  50 mls @ 100 mls/hr IVPB BID [facility]


   Last Admin: 05/24/17 10:00 Dose:  100 mls/hr


Nystatin (Nystop Powder -)  1 applic TP BID [facility]


   Last Admin: 05/24/17 10:00 Dose:  1 applic


Ranitidine HCl (Zantac Oral Solution -)  150 mg PO DAILY [facility]


   Last Admin: 05/24/17 10:00 Dose:  Not Given


Sodium Bicarbonate (Sodium Bicarbonate -)  650 mg NGT BID [facility]


   Last Admin: 05/24/17 10:00 Dose:  Not Given


Ursodiol (Actigal -)  300 mg PO BID [facility]


   Last Admin: 05/24/17 10:00 Dose:  Not Given











- Objective


Vital Signs: 


 Vital Signs











Temperature  97.6 F   05/24/17 10:00


 


Pulse Rate  68   05/24/17 10:00


 


Respiratory Rate  21   05/24/17 10:00


 


Blood Pressure  107/50   05/24/17 10:00


 


O2 Sat by Pulse Oximetry (%)  100   05/24/17 08:00











Constitutional: Yes: No Distress


Neck: Yes: Supple


Cardiovascular: Yes: Regular Rate and Rhythm


Respiratory: Yes: Regular, Diminished, On Venti-Mask, Rhonchi


Gastrointestinal: Yes: Normal Bowel Sounds, Soft


Edema: No


Labs: 


 CBC, BMP





 05/24/17 05:25 





 05/24/17 05:25 





 INR, PTT











INR  1.07  (0.82-1.09)   05/22/17  05:35    


 


Fibrinogen  463.0 mg/dL (238-498)  D 05/22/17  05:35    














Problem List





- Problems


(1) BO (acute kidney injury)


Code(s): N17.9 - ACUTE KIDNEY FAILURE, UNSPECIFIED





(2) Bacteremia due to Gram-negative bacteria


Code(s): R78.81 - BACTEREMIA





(3) Biliary sepsis


Code(s): K83.0 - CHOLANGITIS





(4) CAD (coronary artery disease)


Code(s): I25.10 - ATHSCL HEART DISEASE OF NATIVE CORONARY ARTERY W/O ANG PCTRS 

  Qualifiers: 


     Coronary Disease-Associated Artery/Lesion type: unspecified vessel or 

lesion type     Native vs. transplanted heart: native heart     Associated 

angina: without angina        Qualified Code(s): I25.10 - Atherosclerotic heart 

disease of native coronary artery without angina pectoris  





(5) CKD (chronic kidney disease)


Code(s): N18.9 - CHRONIC KIDNEY DISEASE, UNSPECIFIED   





(6) Calculus of common duct with obstruction


Code(s): K80.51 - CALCULUS OF BILE DUCT W/O CHOLANGITIS OR CHOLECYST W OBST





(7) History of percutaneous coronary intervention


Code(s): Z98.890 - OTHER SPECIFIED POSTPROCEDURAL STATES





(8) S/P ERCP


Code(s): Z98.890 - OTHER SPECIFIED POSTPROCEDURAL STATES





(9) Thrombocytopenia


Code(s): D69.6 - THROMBOCYTOPENIA, UNSPECIFIED





(10) Acute hypoxemic respiratory failure


Code(s): J96.01 - ACUTE RESPIRATORY FAILURE WITH HYPOXIA





(11) Gram negative septic shock


Code(s): A41.50 - GRAM-NEGATIVE SEPSIS, UNSPECIFIED


R65.21 - SEVERE SEPSIS WITH SEPTIC SHOCK





(12) Left bundle branch block


Code(s): I44.7 - LEFT BUNDLE-BRANCH BLOCK, UNSPECIFIED





(13) Demand ischemia


Code(s): I24.8 - OTHER FORMS OF ACUTE ISCHEMIC HEART DISEASE





(14) Anemia


Code(s): D64.9 - ANEMIA, UNSPECIFIED   Qualifiers: 


     Anemia type: unspecified type        Qualified Code(s): D64.9 - Anemia, 

unspecified  








Assessment/Plan


1. s/p hypoxemic respiratory failure


2. Biliary septic shock (E. coli), acute cholangitis post ERCP, balloon 

sphincteroplasty and stent placement with leukocytosis and lactic acidosis 

resolved


3. Post TAVR for severe AS


4. CAD, S/P PCI/stent with demand ischemic injury


5. Severe biventricular dysfunction referable to sepsis


6. H/O Hypertension, but hypotensive requiring pressors - since weaned off


7. Renal CA S/P right nephrectomy


8. Acute on CKD referable to septic shock and ATN - currently on HD


9. Thrombocytopenia due to sepsis


10. Anemia of chronic disease





PLAN:


1. Wean FIO2 to maintain saO2


2. Monitor renal function - Diuretics and HD as per Renal


3. Complete antibiotics coverage, plan for eventual stone removal once more 

stable 


4. DVT and GI prophylaxis

## 2017-05-24 NOTE — PN
Progress Note, Physician


History of Present Illness: 


Pt seen and examined at bedside. He remains in the ICU. Pt is extubated. 





- Current Medication List


Current Medications: 


Active Medications





Albuterol/Ipratropium (Duoneb -)  1 amp NEB Q6H PRN


   PRN Reason: SHORTNESS OF BREATH


Chlorhexidine Gluconate (Peridex -)  15 ml MM BID [facility]


   Last Admin: 05/23/17 21:39 Dose:  15 ml


Chlorhexidine Gluconate (Hibiclens For Decolonization -)  1 applic TP HS [facility]


   Last Admin: 05/23/17 21:39 Dose:  1 applic


Cefazolin Sodium 0.5 gm/ (Dextrose)  50 mls @ 100 mls/hr IVPB BID [facility]


   Last Admin: 05/24/17 10:00 Dose:  100 mls/hr


Nystatin (Nystop Powder -)  1 applic TP BID [facility]


   Last Admin: 05/24/17 10:00 Dose:  1 applic


Ranitidine HCl (Zantac Oral Solution -)  150 mg PO DAILY [facility]


   Last Admin: 05/24/17 10:00 Dose:  Not Given


Sodium Bicarbonate (Sodium Bicarbonate -)  650 mg NGT BID [facility]


   Last Admin: 05/24/17 10:00 Dose:  Not Given


Ursodiol (Actigal -)  300 mg PO BID [facility]


   Last Admin: 05/24/17 10:00 Dose:  Not Given











- Objective


Vital Signs: 


 Vital Signs











Temperature  97.0 F L  05/24/17 12:00


 


Pulse Rate  71   05/24/17 12:00


 


Respiratory Rate  18   05/24/17 12:00


 


Blood Pressure  93/43   05/24/17 12:00


 


O2 Sat by Pulse Oximetry (%)  100   05/24/17 08:00











Constitutional: Yes: Calm


Neck: Yes: Supple


Cardiovascular: Yes: S1, S2


Respiratory: Yes: On Venti-Mask, Rhonchi


Gastrointestinal: Yes: Soft


Genitourinary: Yes: [place] Present


Edema: Yes


Edema: LUE: 1+, RUE: 1+, LLE: 1+, RLE: 1+


Neurological: Yes: Other (awake)


Labs: 


 CBC, BMP





 05/24/17 05:25 





 05/24/17 05:25 





 INR, PTT











INR  1.07  (0.82-1.09)   05/22/17  05:35    


 


Fibrinogen  463.0 mg/dL (238-498)  D 05/22/17  05:35    














Problem List





- Problems


(1) CAD (coronary artery disease)


Code(s): I25.10 - ATHSCL HEART DISEASE OF NATIVE CORONARY ARTERY W/O ANG PCTRS 

  Qualifiers: 


     Coronary Disease-Associated Artery/Lesion type: unspecified vessel or 

lesion type     Native vs. transplanted heart: native heart     Associated 

angina: without angina        Qualified Code(s): I25.10 - Atherosclerotic heart 

disease of native coronary artery without angina pectoris  





(2) Calculus of common duct with obstruction


Code(s): K80.51 - CALCULUS OF BILE DUCT W/O CHOLANGITIS OR CHOLECYST W OBST





(3) Choledocholithiasis


Code(s): K80.50 - CALCULUS OF BILE DUCT W/O CHOLANGITIS OR CHOLECYST W/O OBST





(4) Renal cancer


Code(s): C64.9 - MALIGNANT NEOPLASM OF UNSP KIDNEY, EXCEPT RENAL PELVIS   

Qualifiers: 


     Laterality: right        Qualified Code(s): C64.1 - Malignant neoplasm of 

right kidney, except renal pelvis  





(5) BO (acute kidney injury)


Code(s): N17.9 - ACUTE KIDNEY FAILURE, UNSPECIFIED





(6) CKD (chronic kidney disease)


Code(s): N18.9 - CHRONIC KIDNEY DISEASE, UNSPECIFIED   








Assessment/Plan


 Current Medications











Generic Name Dose Route Start Last Admin





  Trade Name Freq  PRN Reason Stop Dose Admin


 


Albuterol/Ipratropium  1 amp 05/23/17 14:30  





  Duoneb -  NEB   





  Q6H PRN   





  SHORTNESS OF BREATH   


 


Chlorhexidine Gluconate  15 ml 05/19/17 22:00 05/23/17 21:39





  Peridex -  MM   15 ml





  BID ALCIDES   Administration


 


Chlorhexidine Gluconate  1 applic 05/19/17 22:00 05/23/17 21:39





  Hibiclens For Decolonization -  TP   1 applic





  HS ALCIDES   Administration


 


Cefazolin Sodium 0.5 gm/  50 mls @ 100 mls/hr 05/19/17 10:00 05/24/17 10:00





  Dextrose  IVPB   100 mls/hr





  BID ALCIDES   Administration


 


Nystatin  1 applic 05/17/17 22:00 05/24/17 10:00





  Nystop Powder -  TP   1 applic





  BID ALCIDES   Administration


 


Ranitidine HCl  150 mg 05/23/17 10:00 05/24/17 10:00





  Zantac Oral Solution -  PO   Not Given





  DAILY ALCIDES   


 


Sodium Bicarbonate  650 mg 05/23/17 15:24 05/24/17 10:00





  Sodium Bicarbonate -  NGT   Not Given





  BID ALCIDES   


 


Ursodiol  300 mg 05/19/17 22:00 05/24/17 10:00





  Actigal -  PO   Not Given





  BID ALCIDES   











Impression


1. CKD stage 3


2. BO


3. choledocholithiasis


4. CAD


5. hx of Renal Cell Cancer s/p nephroectomy


6. aortic stenosis


7. sepsis


8. acute respiratory failure requiring intubation


9. NSTEMI


10. bacteremia





Plan


- will arrange for HD today


- discussed with medical team, can receive PRBC on HD


- hold off lasix


- monitor urine output, which is improving


- can stop PO bicarb for now


- repeat labs in am


- discussed with ICU team


- monitor in ICU








Dr Rodriguez

## 2017-05-25 LAB
ALBUMIN SERPL-MCNC: 1.9 G/DL (ref 3.4–5)
ALP SERPL-CCNC: 427 U/L (ref 45–117)
ALT SERPL-CCNC: 15 U/L (ref 12–78)
ANION GAP SERPL CALC-SCNC: 11 MMOL/L (ref 8–16)
AST SERPL-CCNC: 60 U/L (ref 15–37)
BASOPHILS # BLD: 0.8 % (ref 0–2)
BILIRUB SERPL-MCNC: 2.1 MG/DL (ref 0.2–1)
CALCIUM SERPL-MCNC: 8.1 MG/DL (ref 8.5–10.1)
CO2 SERPL-SCNC: 27 MMOL/L (ref 21–32)
COCKROFT - GAULT: 16
CREAT SERPL-MCNC: 4.3 MG/DL (ref 0.7–1.3)
DEPRECATED RDW RBC AUTO: 16.7 % (ref 11.9–15.9)
EOSINOPHIL # BLD: 5.7 % (ref 0–4.5)
GLUCOSE SERPL-MCNC: 62 MG/DL (ref 74–106)
Lab: 1 % (ref 0–20)
MCH RBC QN AUTO: 29.8 PG (ref 25.7–33.7)
MCHC RBC AUTO-ENTMCNC: 34.4 G/DL (ref 32–35.9)
MCV RBC: 86.8 FL (ref 80–96)
NEUTROPHILS # BLD: 79.4 % (ref 42.8–82.8)
PLATELET # BLD AUTO: 72 K/MM3 (ref 134–434)
PMV BLD: 10.4 FL (ref 7.5–11.1)
PROT SERPL-MCNC: 4.5 G/DL (ref 6.4–8.2)
SRA .2 IU/ML UFH SER-ACNC: < 1 % (ref 0–20)
WBC # BLD AUTO: 9.1 K/MM3 (ref 4–10)

## 2017-05-25 RX ADMIN — URSODIOL SCH MG: 300 CAPSULE ORAL at 10:44

## 2017-05-25 RX ADMIN — URSODIOL SCH MG: 300 CAPSULE ORAL at 22:05

## 2017-05-25 RX ADMIN — CARVEDILOL SCH MG: 3.12 TABLET, FILM COATED ORAL at 22:04

## 2017-05-25 RX ADMIN — CEFAZOLIN SCH MLS/HR: 1 INJECTION, POWDER, FOR SOLUTION INTRAVENOUS at 22:04

## 2017-05-25 RX ADMIN — FAMOTIDINE SCH MLS/HR: 20 INJECTION, SOLUTION INTRAVENOUS at 10:44

## 2017-05-25 RX ADMIN — ATORVASTATIN CALCIUM SCH MG: 20 TABLET, FILM COATED ORAL at 22:04

## 2017-05-25 RX ADMIN — FAMOTIDINE SCH MLS/HR: 20 INJECTION, SOLUTION INTRAVENOUS at 22:02

## 2017-05-25 RX ADMIN — CEFAZOLIN SCH MLS/HR: 1 INJECTION, POWDER, FOR SOLUTION INTRAVENOUS at 10:00

## 2017-05-25 RX ADMIN — CARVEDILOL SCH: 3.12 TABLET, FILM COATED ORAL at 14:40

## 2017-05-25 NOTE — PN
Physical Exam: 


SUBJECTIVE: Patient seen and examined. He has no acute complaints, per RN PT 

unable to stand pt, weak, pt however stated they didn't try. 








OBJECTIVE:





 Vital Signs











 Period  Temp  Pulse  Resp  BP Sys/Badillo  Pulse Ox


 


 Last 24 Hr  96.9 F-98.7 F  61-75  16-20  /43-63  100








PE


Neuro: lethargy improved, responds to voice, able to ask who PT is, oriented to 

person 


Pulm: course bases, + NC


CV: s1 s2 3/6 systolic murmur 


Abd: RUQ soft mass, non tender, non distended 


Ext: + 1 edema, warm, RLE redness








 CBCD











WBC  9.1 K/mm3 (4.0-10.0)   05/25/17  07:00    


 


RBC  3.07 M/mm3 (4.00-5.60)  L  05/25/17  07:00    


 


Hgb  9.2 GM/dL (11.7-16.9)  L D 05/25/17  07:00    


 


Hct  26.7 % (35.4-49)  L D 05/25/17  07:00    


 


MCV  86.8 fl (80-96)   05/25/17  07:00    


 


MCHC  34.4 g/dl (32.0-35.9)   05/25/17  07:00    


 


RDW  16.7 % (11.9-15.9)  H  05/25/17  07:00    


 


Plt Count  72 K/MM3 (134-434)  L  05/25/17  07:00    


 


MPV  10.4 fl (7.5-11.1)  D 05/25/17  07:00    








 CMP











Sodium  140 mmol/L (136-145)   05/25/17  07:00    


 


Potassium  3.7 mmol/L (3.5-5.1)   05/25/17  07:00    


 


Chloride  102 mmol/L ()   05/25/17  07:00    


 


Carbon Dioxide  27 mmol/L (21-32)   05/25/17  07:00    


 


Anion Gap  11  (8-16)   05/25/17  07:00    


 


BUN  62 mg/dL (7-18)  H D 05/25/17  07:00    


 


Creatinine  4.3 mg/dL (0.7-1.3)  H D 05/25/17  07:00    


 


Creat Clearance w eGFR  13.13  (>60)   05/25/17  07:00    


 


Calcium  8.1 mg/dL (8.5-10.1)  L  05/25/17  07:00    


 


Total Bilirubin  2.1 mg/dL (0.2-1.0)  H  05/25/17  07:00    


 


AST  60 U/L (15-37)  H D 05/25/17  07:00    


 


ALT  15 U/L (12-78)  D 05/25/17  07:00    


 


Alkaline Phosphatase  427 U/L ()  H  05/25/17  07:00    


 


Total Protein  4.5 g/dl (6.4-8.2)  L  05/25/17  07:00    


 


Albumin  1.9 g/dl (3.4-5.0)  L  05/25/17  07:00    











Active Medications











Generic Name Dose Route Start Last Admin





  Trade Name Freq  PRN Reason Stop Dose Admin


 


Albuterol/Ipratropium  1 amp 05/24/17 17:51  





  Duoneb -  NEB   





  Q6H PRN   





  SHORTNESS OF BREATH   


 


Famotidine/Sodium Chloride  50 mls @ 100 mls/hr 05/24/17 22:00 05/25/17 10:44





  Pepcid 20 Mg Premixed Ivpb -  IVPB   100 mls/hr





  BID ALCIDES   Administration


 


Cefazolin Sodium 0.5 gm/  50 mls @ 100 mls/hr 05/24/17 22:00 05/25/17 10:00





  Dextrose  IVPB   100 mls/hr





  BID ALCIDES   Administration


 


Ursodiol  300 mg 05/24/17 22:00 05/25/17 10:44





  Actigal -  PO   300 mg





  BID ALCIDES   Administration








Assessment: 87 year old male with HTN, CAD s/p stent placement, aortic stenosis

, porcine valve replacement (ASA 81mg and Plavix 75mg), urtheral strictures s/p 

dilatation and right nephrectomy admitted with abdominal pain, acute 

transaminitis, jaundice, found to have sepsis due to acute cholangitis. 





Plan: 


1. Septic shock 2/2 acute cholangitis 


- ERCP s/p balloon sphincteroplasty and biliary stent placement 5/16


- continue cefazolin 1 more days (9/10)





2. Acute Choledocholithiasis with acute cholangitis 


- s/p ERCP w/ biliary stent 


- Will need repeat ERCP to remove remaining stones once stable 


- Continue Ursodiol BID 


- Surgery following 





3. 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 


- HD yesterday


- Will hold on HD for today, if improved will pull shiley tomorrow 


- Stop bicarb


- Discussed with renal 





4. Thrombocytopenia 


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


- Platelets improving 


- 1Uplts 5/23


- Hold chemical AC





5. CAD s/p stent placement and demand ischemia, s/p TAVR for AS 


- Hold ASA/plavix for above 





6. Acute respiratory failure 


- s/p extubation 5/23 


- Stable on NC 





7. Lactic acidosis 


- Resolved 





8. Hypotension 


- BP controlled 


- Hold antihypertensives for now 





9. Nutrition 


- Per speech and swallow advance to dysphagia pureed with thickened liquids 





10. Acute blood loss anemia


- Hgb stable, appropriate rise  


- Transfuse 2UPRBC 5/24


- Trend hgb





Visit type





- Emergency Visit


Emergency Visit: Yes


ED Registration Date: 05/15/17


Care time: The patient presented to the Emergency Department on the above date 

and was hospitalized for further evaluation of their emergent condition.





- New Patient


This patient is new to me today: No





- Critical Care


Critical Care patient: No

## 2017-05-25 NOTE — PN
Progress Note, Physician


History of Present Illness: 


Pt seen and examined at bedside. He is more awake and alert today. He is now in 

the medical [facility]el. 





- Current Medication List


Current Medications: 


Active Medications





Albuterol/Ipratropium (Duoneb -)  1 amp NEB Q6H PRN


   PRN Reason: SHORTNESS OF BREATH


Atorvastatin Calcium (Lipitor -)  20 mg PO HS [facility]


Carvedilol (Coreg -)  3.125 mg PO BID [facility]


Famotidine/Sodium Chloride (Pepcid 20 Mg Premixed Ivpb -)  50 mls @ 100 mls/hr 

IVPB BID [facility]


   Last Admin: 05/25/17 10:44 Dose:  100 mls/hr


Cefazolin Sodium 0.5 gm/ (Dextrose)  50 mls @ 100 mls/hr IVPB BID [facility]


   Stop: 05/26/17 10:30


   Last Admin: 05/25/17 10:00 Dose:  100 mls/hr


Ursodiol (Actigal -)  300 mg PO BID [facility]


   Last Admin: 05/25/17 10:44 Dose:  300 mg











- Objective


Vital Signs: 


 Vital Signs











Temperature  98.4 F   05/25/17 14:31


 


Pulse Rate  69   05/25/17 14:31


 


Respiratory Rate  16   05/25/17 14:31


 


Blood Pressure  119/60   05/25/17 14:31


 


O2 Sat by Pulse Oximetry (%)  100   05/24/17 21:00











Constitutional: Yes: Calm


Eyes: Yes: Conjunctiva Clear


HENT: Yes: Atraumatic


Neck: Yes: Supple


Cardiovascular: Yes: S1, S2


Respiratory: Yes: CTA Bilaterally


Gastrointestinal: Yes: Normal Bowel Sounds, Soft


Genitourinary: Yes: Chadwick Present


Musculoskeletal: Yes: WNL


Edema: No


Neurological: Yes: Oriented


Labs: 


 CBC, BMP





 05/25/17 07:00 





 05/25/17 07:00 





 INR, PTT











INR  1.07  (0.82-1.09)   05/22/17  05:35    


 


Fibrinogen  463.0 mg/dL (238-498)  D 05/22/17  05:35    














Problem List





- Problems


(1) CAD (coronary artery disease)


Code(s): I25.10 - ATHSCL HEART DISEASE OF NATIVE CORONARY ARTERY W/O ANG PCTRS 

  Qualifiers: 


     Coronary Disease-Associated Artery/Lesion type: unspecified vessel or 

lesion type     Native vs. transplanted heart: native heart     Associated 

angina: without angina        Qualified Code(s): I25.10 - Atherosclerotic heart 

disease of native coronary artery without angina pectoris  





(2) Calculus of common duct with obstruction


Code(s): K80.51 - CALCULUS OF BILE DUCT W/O CHOLANGITIS OR CHOLECYST W OBST





(3) Choledocholithiasis


Code(s): K80.50 - CALCULUS OF BILE DUCT W/O CHOLANGITIS OR CHOLECYST W/O OBST





(4) Renal cancer


Code(s): C64.9 - MALIGNANT NEOPLASM OF UNSP KIDNEY, EXCEPT RENAL PELVIS   

Qualifiers: 


     Laterality: right        Qualified Code(s): C64.1 - Malignant neoplasm of 

right kidney, except renal pelvis  





(5) BO (acute kidney injury)


Code(s): N17.9 - ACUTE KIDNEY FAILURE, UNSPECIFIED





(6) CKD (chronic kidney disease)


Code(s): N18.9 - CHRONIC KIDNEY DISEASE, UNSPECIFIED   








Assessment/Plan


 Current Medications











Generic Name Dose Route Start Last Admin





  Trade Name Freq  PRN Reason Stop Dose Admin


 


Albuterol/Ipratropium  1 amp 05/24/17 17:51  





  Duoneb -  NEB   





  Q6H PRN   





  SHORTNESS OF BREATH   


 


Atorvastatin Calcium  20 mg 05/25/17 22:00  





  Lipitor -  PO   





  HS ALCIDES   


 


Carvedilol  3.125 mg 05/25/17 14:00  





  Coreg -  PO   





  BID [facility]   


 


Famotidine/Sodium Chloride  50 mls @ 100 mls/hr 05/24/17 22:00 05/25/17 10:44





  Pepcid 20 Mg Premixed Ivpb -  IVPB   100 mls/hr





  BID [facility]IDES   Administration


 


Cefazolin Sodium 0.5 gm/  50 mls @ 100 mls/hr 05/24/17 22:00 05/25/17 10:00





  Dextrose  IVPB 05/26/17 10:30  100 mls/hr





  BID ALCIDES   Administration


 


Ursodiol  300 mg 05/24/17 22:00 05/25/17 10:44





  Actigal -  PO   300 mg





  BID [facility]   Administration











Impression


1. CKD stage 3


2. BO


3. choledocholithiasis


4. CAD


5. hx of Renal Cell Cancer s/p nephroectomy


6. aortic stenosis


7. sepsis


8. acute respiratory failure requiring intubation


9. NSTEMI


10. bacteremia





Plan


- pt tolerated HD yesterday


- monitor urine output


- hg is improved


- repeat bmp in am


- will evaluate for HD in am


- hold of lasix today


- discussed with medical team








Dr Rodriguez

## 2017-05-25 NOTE — PN
Progress Note (short form)





- Note


Progress Note: 


PULMONARY





More alert today. Denies shortness of breath or chest pain. No fevers or 

chills. No abdominal pain.





 Last Vital Signs











Temp Pulse Resp BP Pulse Ox


 


 97.6 F   61   16   118/52   100 


 


 05/25/17 09:00  05/25/17 09:00  05/25/17 09:00  05/25/17 09:00  05/24/17 21:00








Gen:  NAD at rest


Heart:  RRR


Lung: decreased breath sounds at the bases


Abd: soft, nontender


Ext: no edema





 CBC, BMP





 05/25/17 07:00 





 05/25/17 07:00 





Active Medications





Albuterol/Ipratropium (Duoneb -)  1 amp NEB Q6H PRN


   PRN Reason: SHORTNESS OF BREATH


Famotidine/Sodium Chloride (Pepcid 20 Mg Premixed Ivpb -)  50 mls @ 100 mls/hr 

IVPB BID [facility]


   Last Admin: 05/24/17 23:13 Dose:  100 mls/hr


Cefazolin Sodium 0.5 gm/ (Dextrose)  50 mls @ 100 mls/hr IVPB BID [facility]


   Last Admin: 05/24/17 23:13 Dose:  100 mls/hr


Ursodiol (Actigal -)  300 mg PO BID [facility]


   Last Admin: 05/24/17 21:55 Dose:  Not Given





A/P


Acute Cholangitis


Gram Negative Bacteremia


s/p ERCP/sphincteroplasty/stent placement


s/p Acute Hypoxic Respiratory Failure


Septic Shock resolving


Acute on Chronic Renal Failure requiring HD


Lactic Acidosis resolved


CAD


+Troponins likely Demand Ischemia


s/p TAVR for severe Aortic Stenosis


Thrombocytopenia





-  continue antibiotics


-  monitor CBC


-  HD per renal


-  monitor urine output, creatinine


-  PO per GI


-  DVT/GI prophylaxis

## 2017-05-25 NOTE — PN
Progress Note, Physician


History of Present Illness: 


No abd pain, afebrile.











- Current Medication List


Current Medications: 


Active Medications





Albuterol/Ipratropium (Duoneb -)  1 amp NEB Q6H PRN


   PRN Reason: SHORTNESS OF BREATH


Famotidine/Sodium Chloride (Pepcid 20 Mg Premixed Ivpb -)  50 mls @ 100 mls/hr 

IVPB BID [facility]


   Last Admin: 05/25/17 10:44 Dose:  100 mls/hr


Cefazolin Sodium 0.5 gm/ (Dextrose)  50 mls @ 100 mls/hr IVPB BID [facility]


   Stop: 05/26/17 10:30


   Last Admin: 05/25/17 10:00 Dose:  100 mls/hr


Ursodiol (Actigal -)  300 mg PO BID [facility]


   Last Admin: 05/25/17 10:44 Dose:  300 mg











- Objective


Vital Signs: 


 Vital Signs











Temperature  97.6 F   05/25/17 09:00


 


Pulse Rate  61   05/25/17 09:00


 


Respiratory Rate  16   05/25/17 09:00


 


Blood Pressure  118/52   05/25/17 09:00


 


O2 Sat by Pulse Oximetry (%)  100   05/24/17 21:00











Constitutional: Yes: No Distress, Calm


Neck: Yes: Supple


Cardiovascular: Yes: Regular Rate and Rhythm


Respiratory: Yes: Regular, Diminished


Gastrointestinal: Yes: Normal Bowel Sounds, Soft


Edema: Yes


Edema: LLE: Trace, RLE: Trace


Labs: 


 CBC, BMP





 05/25/17 07:00 





 05/25/17 07:00 





 INR, PTT











INR  1.07  (0.82-1.09)   05/22/17  05:35    


 


Fibrinogen  463.0 mg/dL (238-498)  D 05/22/17  05:35    














Problem List





- Problems


(1) BO (acute kidney injury)


Code(s): N17.9 - ACUTE KIDNEY FAILURE, UNSPECIFIED





(2) Bacteremia due to Gram-negative bacteria


Code(s): R78.81 - BACTEREMIA





(3) Biliary sepsis


Code(s): K83.0 - CHOLANGITIS





(4) CAD (coronary artery disease)


Code(s): I25.10 - ATHSCL HEART DISEASE OF NATIVE CORONARY ARTERY W/O ANG PCTRS 

  Qualifiers: 


     Coronary Disease-Associated Artery/Lesion type: unspecified vessel or 

lesion type     Native vs. transplanted heart: native heart     Associated 

angina: without angina        Qualified Code(s): I25.10 - Atherosclerotic heart 

disease of native coronary artery without angina pectoris  





(5) CKD (chronic kidney disease)


Code(s): N18.9 - CHRONIC KIDNEY DISEASE, UNSPECIFIED   





(6) Calculus of common duct with obstruction


Code(s): K80.51 - CALCULUS OF BILE DUCT W/O CHOLANGITIS OR CHOLECYST W OBST





(7) History of percutaneous coronary intervention


Code(s): Z98.890 - OTHER SPECIFIED POSTPROCEDURAL STATES





(8) S/P ERCP


Code(s): Z98.890 - OTHER SPECIFIED POSTPROCEDURAL STATES





(9) Thrombocytopenia


Code(s): D69.6 - THROMBOCYTOPENIA, UNSPECIFIED





(10) Acute hypoxemic respiratory failure


Code(s): J96.01 - ACUTE RESPIRATORY FAILURE WITH HYPOXIA





(11) Gram negative septic shock


Code(s): A41.50 - GRAM-NEGATIVE SEPSIS, UNSPECIFIED


R65.21 - SEVERE SEPSIS WITH SEPTIC SHOCK





(12) Left bundle branch block


Code(s): I44.7 - LEFT BUNDLE-BRANCH BLOCK, UNSPECIFIED





(13) Demand ischemia


Code(s): I24.8 - OTHER FORMS OF ACUTE ISCHEMIC HEART DISEASE





(14) Anemia


Code(s): D64.9 - ANEMIA, UNSPECIFIED   Qualifiers: 


     Anemia type: unspecified type        Qualified Code(s): D64.9 - Anemia, 

unspecified  








Assessment/Plan


1. s/p hypoxemic respiratory failure


2. Biliary septic shock (E. coli), acute cholangitis post ERCP, balloon 

sphincteroplasty and stent placement with leukocytosis and lactic acidosis 

resolved


3. Post TAVR for severe AS


4. CAD, S/P PCI/stent with demand ischemic injury


5. Severe biventricular dysfunction referable to sepsis


6. H/O Hypertension, but hypotensive requiring pressors - since weaned off


7. Renal CA S/P right nephrectomy


8. Acute on CKD referable to septic shock and ATN - currently on HD


9. Thrombocytopenia due to sepsis


10. Anemia of chronic disease





PLAN:


1. Wean FIO2 to maintain saO2


2. Monitor renal function - Diuretics and HD as per Renal


3. Complete antibiotics course, plan for eventual stone removal once more 

stable 


4. Resume carvedilol 3.125 bid and Lipitor 20 qhs, resume ASA once Plt count 

recovers


5. DVT and GI prophylaxis

## 2017-05-25 NOTE — PN
Progress Note, SLP





- Note


Progress Note: 


Pt now on 8 west. Facial swelling improving, more so on left side of lip than 

right.


Cheesh-Na.Oriented to time, different hospital. reoriented and able to retain [facility].





Swallow is delayed in onset. Pt needed cues with each pureed trial to swallow. 

No overt cough. Slight drooling on right side, sec swelling.





Rec: trial of Dys puree, yogurt,pudding, soups, Nectar thixck liquid, Ensure 

compact, Magic cup.

## 2017-05-25 NOTE — PN
Progress Note (short form)





- Note


Progress Note: 


No acute events


Awake


 Vital Signs











 Period  Temp  Pulse  Resp  BP Sys/Badillo  Pulse Ox


 


 Last 24 Hr  96.9 F-98.7 F  61-75  16-20  101-129/43-63  100








Abd soft





CBC,CMP











WBC  9.1 K/mm3 (4.0-10.0)   05/25/17  07:00    


 


RBC  3.07 M/mm3 (4.00-5.60)  L  05/25/17  07:00    


 


Hgb  9.2 GM/dL (11.7-16.9)  L D 05/25/17  07:00    


 


Hct  26.7 % (35.4-49)  L D 05/25/17  07:00    


 


MCV  86.8 fl (80-96)   05/25/17  07:00    


 


MCHC  34.4 g/dl (32.0-35.9)   05/25/17  07:00    


 


RDW  16.7 % (11.9-15.9)  H  05/25/17  07:00    


 


Plt Count  72 K/MM3 (134-434)  L  05/25/17  07:00    


 


MPV  10.4 fl (7.5-11.1)  D 05/25/17  07:00    


 


Neutrophils %  79.4 % (42.8-82.8)   05/25/17  07:00    


 


Lymphocytes %  7.2 % (8-40)  L  05/25/17  07:00    


 


Monocytes %  6.9 % (3.8-10.2)   05/25/17  07:00    


 


Eosinophils %  5.7 % (0-4.5)  H  05/25/17  07:00    


 


Basophils %  0.8 % (0-2.0)   05/25/17  07:00    


 


Band Neutrophils  11.0 % (0-10)  H D 05/17/17  05:15    


 


Metamyelocytes  5 % (0-2)  H D 05/16/17  20:30    


 


Myelocytes  9 % (0-2)  H D 05/16/17  20:30    


 


Differential Comment  Manual diff done   05/18/17  08:30    


 


Dohle Bodies  1+   05/17/17  05:15    


 


Platelet Estimate  Decreased  (NORMAL)   05/18/17  08:30    


 


Platelet Comment  No clumping noted   05/17/17  05:15    


 


Polychromasia  Few   05/17/17  05:15    


 


Hypochromic-Microcytic  Few   05/17/17  05:15    


 


Sodium  140 mmol/L (136-145)   05/25/17  07:00    


 


Potassium  3.7 mmol/L (3.5-5.1)   05/25/17  07:00    


 


Chloride  102 mmol/L ()   05/25/17  07:00    


 


Carbon Dioxide  27 mmol/L (21-32)   05/25/17  07:00    


 


Anion Gap  11  (8-16)   05/25/17  07:00    


 


BUN  62 mg/dL (7-18)  H D 05/25/17  07:00    


 


Creatinine  4.3 mg/dL (0.7-1.3)  H D 05/25/17  07:00    


 


Creat Clearance w eGFR  13.13  (>60)   05/25/17  07:00    


 


Random Glucose  62 mg/dL ()  L D 05/25/17  07:00    


 


Lactic Acid  1.684 mmol/L (0.4-2.0)   05/17/17  13:20    


 


Calcium  8.1 mg/dL (8.5-10.1)  L  05/25/17  07:00    


 


Phosphorus  4.4 mg/dL (2.5-4.9)  D 05/23/17  05:30    


 


Magnesium  2.0 mg/dL (1.8-2.4)   05/23/17  05:30    


 


Total Bilirubin  2.1 mg/dL (0.2-1.0)  H  05/25/17  07:00    


 


Direct Bilirubin  1.4 mg/dL (0.0-0.2)  H D 05/23/17  05:30    


 


AST  60 U/L (15-37)  H D 05/25/17  07:00    


 


ALT  15 U/L (12-78)  D 05/25/17  07:00    


 


Alkaline Phosphatase  427 U/L ()  H  05/25/17  07:00    


 


Creatine Kinase  60 IU/L ()   05/20/17  00:00    


 


Creatine Kinase Index  11.8 % (0.0-5.0)  H*  05/17/17  13:20    


 


CK-MB (CK-2)  18.446 ng/ml (0.5-3.6)  H  05/17/17  13:20    


 


CK-MB (CK-2) Rel Index  Cancelled   05/17/17  13:20    


 


Troponin I  1.73 ng/ml (0.00-0.05)  H*  05/20/17  05:15    


 


C-Reactive Protein  21.5 MG/DL (0.00-0.3)  H  05/17/17  05:15    


 


Total Protein  4.5 g/dl (6.4-8.2)  L  05/25/17  07:00    


 


Albumin  1.9 g/dl (3.4-5.0)  L  05/25/17  07:00    


 


Total Amylase  49 U/L ()  D 05/17/17  05:15    


 


Lipase  116 U/L ()   05/17/17  05:15    














Antibiotics


Await repeat ERCP





Problem List





- Problems


(1) Umbilical discharge


Code(s): R19.8 - [facility] SYMPTOMS AND SIGNS INVOLVING THE DGSTV SYS AND ABDOMEN

## 2017-05-25 NOTE — PN
Progress Note, Physician


Chief Complaint: 


ID





Day 10 antibiotics





Cefazolin





Alert in NAD and offers no complaints





- Current Medication List


Current Medications: 


Active Medications





Albuterol/Ipratropium (Duoneb -)  1 amp NEB Q6H PRN


   PRN Reason: SHORTNESS OF BREATH


Famotidine/Sodium Chloride (Pepcid 20 Mg Premixed Ivpb -)  50 mls @ 100 mls/hr 

IVPB BID [facility]


   Last Admin: 05/25/17 10:44 Dose:  100 mls/hr


Cefazolin Sodium 0.5 gm/ (Dextrose)  50 mls @ 100 mls/hr IVPB BID [facility]


   Last Admin: 05/25/17 10:00 Dose:  100 mls/hr


Ursodiol (Actigal -)  300 mg PO BID [facility]


   Last Admin: 05/25/17 10:44 Dose:  300 mg











- Objective


Vital Signs: 


 Vital Signs











Temperature  97.6 F   05/25/17 09:00


 


Pulse Rate  61   05/25/17 09:00


 


Respiratory Rate  16   05/25/17 09:00


 


Blood Pressure  118/52   05/25/17 09:00


 


O2 Sat by Pulse Oximetry (%)  100   05/24/17 21:00











Constitutional: Yes: Well Nourished, No Distress


Eyes: Yes: WNL, Conjunctiva Clear


HENT: Yes: WNL, Atraumatic


Neck: Yes: WNL, Supple


Cardiovascular: Yes: Regular Rate and Rhythm, S1, S2


Respiratory: Yes: WNL, Regular, CTA Bilaterally


Gastrointestinal: Yes: WNL, Normal Bowel Sounds, Soft.  No: Tenderness


Edema: Yes


Labs: 


 CBC, BMP





 05/25/17 07:00 





 05/25/17 07:00 





 INR, PTT











INR  1.07  (0.82-1.09)   05/22/17  05:35    


 


Fibrinogen  463.0 mg/dL (238-498)  D 05/22/17  05:35    














Problem List





- Problems


(1) Biliary sepsis


Code(s): K83.0 - CHOLANGITIS





(2) Bacteremia due to Gram-negative bacteria


Code(s): R78.81 - BACTEREMIA





(3) Thrombocytopenia


Code(s): D69.6 - THROMBOCYTOPENIA, UNSPECIFIED








Assessment/Plan


Microbiology





05/19/17 19:20   Blood - Peripheral Venous   Blood Culture - Final


                              NO GROWTH AFTER 5 DAYS INCUBATION


05/19/17 18:50   Blood - Peripheral Venous   Blood Culture - Final


                              NO GROWTH AFTER 5 DAYS INCUBATION


05/16/17 10:42   Blood - Peripheral Venous   Blood Culture - Final


                              Escherichia Coli


05/16/17 10:40   Blood - Peripheral Venous   Blood Culture - Final


                              Escherichia Coli


05/15/17 18:30   Abdomen   Gram Stain - Final


05/15/17 18:30   Abdomen   Wound Culture - Final


                              Staphylococcus Coagulase Neg


                              Diphtheroid/Corynebacterium


                              Streptococcus Viridans





 Laboratory Tests











  05/25/17 05/25/17





  07:00 07:00


 


WBC  9.1 


 


Hgb  9.2 L D 


 


Hct  26.7 L D 


 


Plt Count  72 L 


 


BUN   62 H D


 


Creat Clearance w eGFR   13.13








ASSESSMENT  GRAM NEGATIVE BACTEREMIA BILIARY SOURCE DAY 10








PLAN STOP ANTIBIOTIC TOMORROW


EB ABRAHAM

## 2017-05-26 LAB
ANION GAP SERPL CALC-SCNC: 10 MMOL/L (ref 8–16)
BASOPHILS # BLD: 1.3 % (ref 0–2)
CALCIUM SERPL-MCNC: 8.2 MG/DL (ref 8.5–10.1)
CO2 SERPL-SCNC: 28 MMOL/L (ref 21–32)
COCKROFT - GAULT: 15.35
CREAT SERPL-MCNC: 4.5 MG/DL (ref 0.7–1.3)
DEPRECATED RDW RBC AUTO: 16.8 % (ref 11.9–15.9)
EOSINOPHIL # BLD: 6.2 % (ref 0–4.5)
GLUCOSE SERPL-MCNC: 72 MG/DL (ref 74–106)
MCH RBC QN AUTO: 29.6 PG (ref 25.7–33.7)
MCHC RBC AUTO-ENTMCNC: 33.7 G/DL (ref 32–35.9)
MCV RBC: 87.7 FL (ref 80–96)
NEUTROPHILS # BLD: 75.6 % (ref 42.8–82.8)
PLATELET # BLD AUTO: 91 K/MM3 (ref 134–434)
PMV BLD: 9.9 FL (ref 7.5–11.1)
WBC # BLD AUTO: 8 K/MM3 (ref 4–10)

## 2017-05-26 RX ADMIN — URSODIOL SCH MG: 300 CAPSULE ORAL at 10:01

## 2017-05-26 RX ADMIN — ATORVASTATIN CALCIUM SCH MG: 20 TABLET, FILM COATED ORAL at 22:25

## 2017-05-26 RX ADMIN — CARVEDILOL SCH MG: 3.12 TABLET, FILM COATED ORAL at 10:01

## 2017-05-26 RX ADMIN — IPRATROPIUM BROMIDE AND ALBUTEROL SULFATE PRN AMP: .5; 3 SOLUTION RESPIRATORY (INHALATION) at 08:40

## 2017-05-26 RX ADMIN — FAMOTIDINE SCH MLS/HR: 20 INJECTION, SOLUTION INTRAVENOUS at 10:01

## 2017-05-26 RX ADMIN — CEFAZOLIN SCH MLS/HR: 1 INJECTION, POWDER, FOR SOLUTION INTRAVENOUS at 10:01

## 2017-05-26 RX ADMIN — URSODIOL SCH MG: 300 CAPSULE ORAL at 22:25

## 2017-05-26 RX ADMIN — CARVEDILOL SCH MG: 6.25 TABLET, FILM COATED ORAL at 22:25

## 2017-05-26 RX ADMIN — FAMOTIDINE SCH MLS/HR: 20 INJECTION, SOLUTION INTRAVENOUS at 22:19

## 2017-05-26 NOTE — PN
Progress Note, Physician


History of Present Illness: 


Pt seen and examined at bedside. He is more awake and alert today. He complains 

of weakness. 





- Current Medication List


Current Medications: 


Active Medications





Albuterol/Ipratropium (Duoneb -)  1 amp NEB Q6H PRN


   PRN Reason: SHORTNESS OF BREATH


   Last Admin: 05/26/17 08:40 Dose:  1 amp


Atorvastatin Calcium (Lipitor -)  20 mg PO HS [facility]


   Last Admin: 05/25/17 22:04 Dose:  20 mg


Carvedilol (Coreg -)  3.125 mg PO BID [facility]


   Last Admin: 05/26/17 10:01 Dose:  3.125 mg


Famotidine/Sodium Chloride (Pepcid 20 Mg Premixed Ivpb -)  50 mls @ 100 mls/hr 

IVPB BID [facility]


   Last Admin: 05/26/17 10:01 Dose:  100 mls/hr


Ursodiol (Actigal -)  300 mg PO BID [facility]


   Last Admin: 05/26/17 10:01 Dose:  300 mg











- Objective


Vital Signs: 


 Vital Signs











Temperature  97.7 F   05/26/17 10:00


 


Pulse Rate  57 L  05/26/17 10:00


 


Respiratory Rate  18   05/26/17 10:00


 


Blood Pressure  123/47   05/26/17 10:00


 


O2 Sat by Pulse Oximetry (%)  100   05/25/17 21:00











Constitutional: Yes: Calm


Eyes: Yes: Conjunctiva Clear


HENT: Yes: Atraumatic


Neck: Yes: Supple


Cardiovascular: Yes: S1, S2


Respiratory: Yes: On Nasal O2


Gastrointestinal: Yes: Soft


Genitourinary: Yes: Chadwick Present


Musculoskeletal: Yes: Muscle Weakness


Edema: Yes


Edema: LUE: 1+, RUE: 1+, LLE: 1+, RLE: 1+


Neurological: Yes: Oriented


Labs: 


 CBC, BMP





 05/26/17 06:30 





 05/26/17 06:30 





 INR, PTT











INR  1.07  (0.82-1.09)   05/22/17  05:35    


 


Fibrinogen  463.0 mg/dL (238-498)  D 05/22/17  05:35    














Problem List





- Problems


(1) CAD (coronary artery disease)


Code(s): I25.10 - ATHSCL HEART DISEASE OF NATIVE CORONARY ARTERY W/O ANG PCTRS 

  Qualifiers: 


     Coronary Disease-Associated Artery/Lesion type: unspecified vessel or 

lesion type     Native vs. transplanted heart: native heart     Associated 

angina: without angina        Qualified Code(s): I25.10 - Atherosclerotic heart 

disease of native coronary artery without angina pectoris  





(2) Calculus of common duct with obstruction


Code(s): K80.51 - CALCULUS OF BILE DUCT W/O CHOLANGITIS OR CHOLECYST W OBST





(3) Choledocholithiasis


Code(s): K80.50 - CALCULUS OF BILE DUCT W/O CHOLANGITIS OR CHOLECYST W/O OBST





(4) Renal cancer


Code(s): C64.9 - MALIGNANT NEOPLASM OF UNSP KIDNEY, EXCEPT RENAL PELVIS   

Qualifiers: 


     Laterality: right        Qualified Code(s): C64.1 - Malignant neoplasm of 

right kidney, except renal pelvis  





(5) BO (acute kidney injury)


Code(s): N17.9 - ACUTE KIDNEY FAILURE, UNSPECIFIED





(6) CKD (chronic kidney disease)


Code(s): N18.9 - CHRONIC KIDNEY DISEASE, UNSPECIFIED   








Assessment/Plan


 Current Medications











Generic Name Dose Route Start Last Admin





  Trade Name Freq  PRN Reason Stop Dose Admin


 


Albuterol/Ipratropium  1 amp 05/24/17 17:51 05/26/17 08:40





  Duoneb -  NEB   1 amp





  Q6H PRN   Administration





  SHORTNESS OF BREATH   


 


Atorvastatin Calcium  20 mg 05/25/17 22:00 05/25/17 22:04





  Lipitor -  PO   20 mg





  HS ALCIDES   Administration


 


Carvedilol  3.125 mg 05/25/17 14:00 05/26/17 10:01





  Coreg -  PO   3.125 mg





  BID ALCIDES   Administration


 


Famotidine/Sodium Chloride  50 mls @ 100 mls/hr 05/24/17 22:00 05/26/17 10:01





  Pepcid 20 Mg Premixed Ivpb -  IVPB   100 mls/hr





  BID ALCIDES   Administration


 


Ursodiol  300 mg 05/24/17 22:00 05/26/17 10:01





  Actigal -  PO   300 mg





  BID ALCIDES   Administration











Impression


1. CKD stage 3


2. BO


3. choledocholithiasis


4. CAD


5. hx of Renal Cell Cancer s/p nephroectomy


6. aortic stenosis


7. sepsis


8. acute respiratory failure requiring intubation


9. NSTEMI


10. bacteremia





Plan


- will arrange for HD today


- can remove shiley after HD


- observe renal function over the next few day=


- monitor urine output


- repeat bmp in am


- hold of lasix today





Dr Rodriguez

## 2017-05-26 NOTE — PN
Progress Note, Physician


History of Present Illness: 


No abd pain, afebrile, feels debilitated.











- Current Medication List


Current Medications: 


Active Medications





Albuterol/Ipratropium (Duoneb -)  1 amp NEB Q6H PRN


   PRN Reason: SHORTNESS OF BREATH


   Last Admin: 05/26/17 08:40 Dose:  1 amp


Atorvastatin Calcium (Lipitor -)  20 mg PO HS [facility]


   Last Admin: 05/25/17 22:04 Dose:  20 mg


Carvedilol (Coreg -)  3.125 mg PO BID [facility]


   Last Admin: 05/26/17 10:01 Dose:  3.125 mg


Famotidine/Sodium Chloride (Pepcid 20 Mg Premixed Ivpb -)  50 mls @ 100 mls/hr 

IVPB BID [facility]


   Last Admin: 05/26/17 10:01 Dose:  100 mls/hr


Ursodiol (Actigal -)  300 mg PO BID [facility]


   Last Admin: 05/26/17 10:01 Dose:  300 mg











- Objective


Vital Signs: 


 Vital Signs











Temperature  97.7 F   05/26/17 10:00


 


Pulse Rate  57 L  05/26/17 10:00


 


Respiratory Rate  18   05/26/17 10:00


 


Blood Pressure  123/47   05/26/17 10:00


 


O2 Sat by Pulse Oximetry (%)  100   05/25/17 21:00











Constitutional: Yes: No Distress, Calm


Neck: Yes: Supple


Cardiovascular: Yes: Regular Rate and Rhythm


Respiratory: Yes: Regular, Diminished, On Nasal O2


Gastrointestinal: Yes: Normal Bowel Sounds, Soft


Edema: No


Labs: 


 CBC, BMP





 05/26/17 06:30 





 05/26/17 06:30 





 INR, PTT











INR  1.07  (0.82-1.09)   05/22/17  05:35    


 


Fibrinogen  463.0 mg/dL (238-498)  D 05/22/17  05:35    














Problem List





- Problems


(1) BO (acute kidney injury)


Code(s): N17.9 - ACUTE KIDNEY FAILURE, UNSPECIFIED





(2) Bacteremia due to Gram-negative bacteria


Code(s): R78.81 - BACTEREMIA





(3) Biliary sepsis


Code(s): K83.0 - CHOLANGITIS





(4) CAD (coronary artery disease)


Code(s): I25.10 - ATHSCL HEART DISEASE OF NATIVE CORONARY ARTERY W/O ANG PCTRS 

  Qualifiers: 


        Qualified Code(s): I25.10 - Atherosclerotic heart disease of native 

coronary artery without angina pectoris  





(5) CKD (chronic kidney disease)


Code(s): N18.9 - CHRONIC KIDNEY DISEASE, UNSPECIFIED   





(6) Calculus of common duct with obstruction


Code(s): K80.51 - CALCULUS OF BILE DUCT W/O CHOLANGITIS OR CHOLECYST W OBST





(7) History of percutaneous coronary intervention


Code(s): Z98.890 - OTHER SPECIFIED POSTPROCEDURAL STATES





(8) S/P ERCP


Code(s): Z98.890 - OTHER SPECIFIED POSTPROCEDURAL STATES





(9) Thrombocytopenia


Code(s): D69.6 - THROMBOCYTOPENIA, UNSPECIFIED





(10) Acute hypoxemic respiratory failure


Code(s): J96.01 - ACUTE RESPIRATORY FAILURE WITH HYPOXIA





(11) Gram negative septic shock


Code(s): A41.50 - GRAM-NEGATIVE SEPSIS, UNSPECIFIED


R65.21 - SEVERE SEPSIS WITH SEPTIC SHOCK





(12) Left bundle branch block


Code(s): I44.7 - LEFT BUNDLE-BRANCH BLOCK, UNSPECIFIED





(13) Demand ischemia


Code(s): I24.8 - OTHER FORMS OF ACUTE ISCHEMIC HEART DISEASE





(14) Anemia


Code(s): D64.9 - ANEMIA, UNSPECIFIED   Qualifiers: 


        Qualified Code(s): D64.9 - Anemia, unspecified  








Assessment/Plan


1. s/p hypoxemic respiratory failure


2. Biliary septic shock (E. coli), acute cholangitis post ERCP, balloon 

sphincteroplasty and stent placement with leukocytosis and lactic acidosis 

resolved


3. Post TAVR for severe AS


4. CAD, S/P PCI/stent with demand ischemic injury


5. Severe biventricular dysfunction referable to sepsis


6. H/O Hypertension, but hypotensive requiring pressors - since weaned off


7. Renal CA S/P right nephrectomy


8. Acute on CKD referable to septic shock and ATN - currently on HD


9. 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


10. Anemia of chronic disease





PLAN:


1. Wean FIO2 to maintain saO2, BD as needed


2. Monitor renal function - Diuretics and HD as per Renal


3. Completed antibiotics course, plan for eventual stone removal once more 

stable 


4. Increase carvedilol 6.25 bid and Lipitor 20 qhs, resume ASA once Plt count 

recovers


5. DVT and GI prophylaxis

## 2017-05-26 NOTE — PN
Physical Exam: 


SUBJECTIVE: Patient seen and examined. He wishes he could go home. He has no 

acute complaints. 








OBJECTIVE:





 Vital Signs











 Period  Temp  Pulse  Resp  BP Sys/Badillo  Pulse Ox


 


 Last 24 Hr  97.7 F-98.4 F  57-69  16-20  119-140/47-61  100








PE


Neuro:alert, awake, response time improving  


Pulm: diminished scattered rhonchi, + NC


CV: s1 s2 3/6 systolic murmur 


Abd: RUQ soft mass, non tender, non distended 


: ware


Ext: + 1 edema, warm 








 Laboratory Results - last 24 hr











  05/26/17 05/26/17





  06:30 06:30


 


WBC   8.0


 


RBC   3.07 L


 


Hgb   9.1 L


 


Hct   27.0 L


 


MCV   87.7


 


MCHC   33.7


 


RDW   16.8 H


 


Plt Count   91 L D


 


MPV   9.9


 


Neutrophils %   75.6


 


Lymphocytes %   9.1  D


 


Monocytes %   7.8


 


Eosinophils %   6.2 H


 


Basophils %   1.3


 


Sodium  141 


 


Potassium  4.0 


 


Chloride  103 


 


Carbon Dioxide  28 


 


Anion Gap  10 


 


BUN  67 H 


 


Creatinine  4.5 H 


 


Random Glucose  72 L 


 


Calcium  8.2 L 








Active Medications











Generic Name Dose Route Start Last Admin





  Trade Name Freq  PRN Reason Stop Dose Admin


 


Albuterol/Ipratropium  1 amp 05/24/17 17:51 05/26/17 08:40





  Duoneb -  NEB   1 amp





  Q6H PRN   Administration





  SHORTNESS OF BREATH   


 


Atorvastatin Calcium  20 mg 05/25/17 22:00 05/25/17 22:04





  Lipitor -  PO   20 mg





  HS ALCIDES   Administration


 


Carvedilol  3.125 mg 05/25/17 14:00 05/26/17 10:01





  Coreg -  PO   3.125 mg





  BID ALCIDES   Administration


 


Famotidine/Sodium Chloride  50 mls @ 100 mls/hr 05/24/17 22:00 05/26/17 10:01





  Pepcid 20 Mg Premixed Ivpb -  IVPB   100 mls/hr





  BID ALCIDES   Administration


 


Ursodiol  300 mg 05/24/17 22:00 05/26/17 10:01





  Actigal -  PO   300 mg





  BID ALCIDES   Administration











Assessment: 87 year old male with HTN, CAD s/p stent placement, aortic stenosis

, porcine valve replacement (ASA 81mg and Plavix 75mg), urtheral strictures s/p 

dilatation and right nephrectomy admitted with abdominal pain, acute 

transaminitis, jaundice, found to have sepsis due to acute cholangitis. 





Plan: 


1. Septic shock 2/2 acute cholangitis 


- ERCP s/p balloon sphincteroplasty and biliary stent placement 5/16


- Completed cefazolin 10 days today





2. Acute Choledocholithiasis with acute cholangitis 


- s/p ERCP w/ biliary stent 


- Will need repeat ERCP to remove remaining stones once stable 


- Continue Ursodiol BID 


- Surgery following 





3. 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 


- HD today


- Remove shiley following HD 


- Hold lasix 





4. Thrombocytopenia 


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


- Platelets improving 


- 1Uplts 5/23


- Hold chemical AC





5. CAD s/p stent placement and demand ischemia, s/p TAVR for AS 


- Will restart once plates stable ASA/plavix





6. Acute respiratory failure 


- s/p extubation 5/23 


- Stable on NC 





7. Lactic acidosis 


- Resolved 





8. Hypotension 


- Coreg 6.25mg BID 





9. Nutrition 


- Per speech and swallow advance to dysphagia pureed with thickened liquids 





10. Acute blood loss anemia


- Hgb stable, appropriate rise  


- Transfuse 2UPRBC 5/24


- Trend hgb





Visit type





- Emergency Visit


Emergency Visit: Yes


ED Registration Date: 05/15/17


Care time: The patient presented to the Emergency Department on the above date 

and was hospitalized for further evaluation of their emergent condition.





- New Patient


This patient is new to me today: No





- Critical Care


Critical Care patient: No

## 2017-05-27 LAB
ALBUMIN SERPL-MCNC: 2 G/DL (ref 3.4–5)
ALP SERPL-CCNC: 404 U/L (ref 45–117)
ALT SERPL-CCNC: 11 U/L (ref 12–78)
ANION GAP SERPL CALC-SCNC: 8 MMOL/L (ref 8–16)
AST SERPL-CCNC: 51 U/L (ref 15–37)
BASOPHILS # BLD: 0.7 % (ref 0–2)
BILIRUB SERPL-MCNC: 2 MG/DL (ref 0.2–1)
CALCIUM SERPL-MCNC: 8.1 MG/DL (ref 8.5–10.1)
CO2 SERPL-SCNC: 29 MMOL/L (ref 21–32)
COCKROFT - GAULT: 22.41
CREAT SERPL-MCNC: 3.1 MG/DL (ref 0.7–1.3)
DEPRECATED RDW RBC AUTO: 17 % (ref 11.9–15.9)
EOSINOPHIL # BLD: 5.5 % (ref 0–4.5)
GLUCOSE SERPL-MCNC: 80 MG/DL (ref 74–106)
MCH RBC QN AUTO: 29.7 PG (ref 25.7–33.7)
MCHC RBC AUTO-ENTMCNC: 33.7 G/DL (ref 32–35.9)
MCV RBC: 88.1 FL (ref 80–96)
NEUTROPHILS # BLD: 71.7 % (ref 42.8–82.8)
PLATELET # BLD AUTO: 99 K/MM3 (ref 134–434)
PMV BLD: 10 FL (ref 7.5–11.1)
PROT SERPL-MCNC: 4.8 G/DL (ref 6.4–8.2)
WBC # BLD AUTO: 6.2 K/MM3 (ref 4–10)

## 2017-05-27 PROCEDURE — 0BH17EZ INSERTION OF ENDOTRACHEAL AIRWAY INTO TRACHEA, VIA NATURAL OR ARTIFICIAL OPENING: ICD-10-PCS | Performed by: NURSE PRACTITIONER

## 2017-05-27 PROCEDURE — 5A1955Z RESPIRATORY VENTILATION, GREATER THAN 96 CONSECUTIVE HOURS: ICD-10-PCS | Performed by: NURSE PRACTITIONER

## 2017-05-27 RX ADMIN — FAMOTIDINE SCH MLS/HR: 20 INJECTION, SOLUTION INTRAVENOUS at 21:45

## 2017-05-27 RX ADMIN — URSODIOL SCH MG: 300 CAPSULE ORAL at 21:45

## 2017-05-27 RX ADMIN — ATORVASTATIN CALCIUM SCH MG: 20 TABLET, FILM COATED ORAL at 21:45

## 2017-05-27 RX ADMIN — URSODIOL SCH MG: 300 CAPSULE ORAL at 10:14

## 2017-05-27 RX ADMIN — CARVEDILOL SCH MG: 6.25 TABLET, FILM COATED ORAL at 21:45

## 2017-05-27 RX ADMIN — FAMOTIDINE SCH MLS/HR: 20 INJECTION, SOLUTION INTRAVENOUS at 10:13

## 2017-05-27 RX ADMIN — CARVEDILOL SCH MG: 6.25 TABLET, FILM COATED ORAL at 10:13

## 2017-05-27 NOTE — PN
Physical Exam: 


SUBJECTIVE: Patient seen and examined. His mental status is improving he is 

following direction. Family at bedside, he is tolerating PO





Events: 


- Shiley removed 





OBJECTIVE:





 Vital Signs











 Period  Temp  Pulse  Resp  BP Sys/Badillo  Pulse Ox


 


 Last 24 Hr  97.9 F-98.3 F  58-74  18-20  122-153/50-73  98











PE


Neuro: alert, awake, response time improving, however still confused at times   


HEENT: Left shiley site dressing CDI 


Pulm: diminished scattered rhonchi, + NC


CV: s1 s2 rrr 


Abd: s nt nd + bs 


: ware


Ext: warm, no le edema 








 Laboratory Results - last 24 hr











  05/24/17 05/27/17 05/27/17





  12:25 06:00 06:00


 


WBC   6.2 


 


RBC   3.03 L 


 


Hgb   9.0 L 


 


Hct   26.7 L 


 


MCV   88.1 


 


MCHC   33.7 


 


RDW   17.0 H 


 


Plt Count   99 L 


 


MPV   10.0 


 


Neutrophils %   71.7 


 


Lymphocytes %   13.1  D 


 


Monocytes %   9.0 


 


Eosinophils %   5.5 H 


 


Basophils %   0.7 


 


Sodium    144


 


Potassium    3.8


 


Chloride    107


 


Carbon Dioxide    29


 


Anion Gap    8


 


BUN    38 H D


 


Creatinine    3.1 H D


 


Creat Clearance w eGFR    19.15


 


Random Glucose    80


 


Calcium    8.1 L


 


Total Bilirubin    2.0 H


 


AST    51 H


 


ALT    11 L D


 


Alkaline Phosphatase    404 H


 


Total Protein    4.8 L


 


Albumin    2.0 L


 


Blood Type  A POSITIVE  


 


Antibody Screen  Negative  


 


Crossmatch  See Detail  








Active Medications











Generic Name Dose Route Start Last Admin





  Trade Name Freq  PRN Reason Stop Dose Admin


 


Albuterol/Ipratropium  1 amp 05/24/17 17:51 05/26/17 08:40





  Duoneb -  NEB   1 amp





  Q6H PRN   Administration





  SHORTNESS OF BREATH   


 


Atorvastatin Calcium  20 mg 05/25/17 22:00 05/26/17 22:25





  Lipitor -  PO   20 mg





  HS ALCIDES   Administration


 


Carvedilol  6.25 mg 05/26/17 12:21 05/27/17 10:13





  Coreg -  PO   6.25 mg





  BID ALCIDES   Administration


 


Famotidine/Sodium Chloride  50 mls @ 100 mls/hr 05/24/17 22:00 05/27/17 10:13





  Pepcid 20 Mg Premixed Ivpb -  IVPB   100 mls/hr





  BID ALCIDES   Administration


 


Ursodiol  300 mg 05/24/17 22:00 05/27/17 10:14





  Actigal -  PO   300 mg





  BID ALCIDES   Administration











Assessment: 87 year old male with HTN, CAD s/p stent placement, aortic stenosis

, porcine valve replacement (ASA 81mg and Plavix 75mg), urtheral strictures s/p 

dilatation and right nephrectomy admitted with abdominal pain, acute 

transaminitis, jaundice, found to have sepsis due to acute cholangitis. 





Plan: 


1. Septic shock 2/2 acute cholangitis 


- ERCP s/p balloon sphincteroplasty and biliary stent placement 5/16


- Completed x10 days Cefazolin





2. Acute Choledocholithiasis with acute cholangitis 


- s/p ERCP w/ biliary stent 


- Will need repeat ERCP to remove remaining stones once stable 


- Continue Ursodiol BID 


- Surgery following 





3. 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 


- HD yesterday 


- Shiley removed today


- Monitor renal fxn 





4. Thrombocytopenia 


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


- Platelets up trending 


- 1Uplts 5/23


- Hold chemical AC





5. CAD s/p stent placement and demand ischemia, s/p TAVR for AS 


- Will restart once plates stable ASA/plavix





6. Acute respiratory failure 


- s/p extubation 5/23 


- Stable on NC 





7. Lactic acidosis 


- Resolved 





8. Hypotension 


- Coreg 6.25mg BID 





9. Nutrition 


- Per speech and swallow advance to dysphagia pureed with thickened liquids 





10. Acute blood loss anemia  


- Transfused 2UPRBC 5/24 


- Hgb stable  





Visit type





- Emergency Visit


Emergency Visit: Yes


ED Registration Date: 05/15/17


Care time: The patient presented to the Emergency Department on the above date 

and was hospitalized for further evaluation of their emergent condition.





- New Patient


This patient is new to me today: No





- Critical Care


Critical Care patient: No

## 2017-05-27 NOTE — PN
Progress Note (short form)





- Note


Progress Note: 


RENAL


Pt is awake and alert


appears comfortable


confused


 Last Vital Signs











Temp Pulse Resp BP Pulse Ox


 


 97.9 F   61   20   122/50   98 


 


 05/27/17 06:18  05/27/17 06:18  05/27/17 06:18  05/27/17 06:18  05/26/17 21:00





HEENT has a shiley catheter on left side of neck


lungs clear anteriorly


cvs s1s2 rr +ERNESTO


abd soft


ext +edema


neuro awake





 CBC, BMP





 05/27/17 06:00 





 05/27/17 06:00 





 Current Medications











Generic Name Dose Route Start Last Admin





  Trade Name Freq  PRN Reason Stop Dose Admin


 


Albuterol/Ipratropium  1 amp 05/24/17 17:51 05/26/17 08:40





  Duoneb -  NEB   1 amp





  Q6H PRN   Administration





  SHORTNESS OF BREATH   


 


Atorvastatin Calcium  20 mg 05/25/17 22:00 05/26/17 22:25





  Lipitor -  PO   20 mg





  HS ALCIDES   Administration


 


Carvedilol  6.25 mg 05/26/17 12:21 05/26/17 22:25





  Coreg -  PO   6.25 mg





  BID ALCIDES   Administration


 


Famotidine/Sodium Chloride  50 mls @ 100 mls/hr 05/24/17 22:00 05/26/17 22:19





  Pepcid 20 Mg Premixed Ivpb -  IVPB   100 mls/hr





  BID ALCIDES   Administration


 


Ursodiol  300 mg 05/24/17 22:00 05/26/17 22:25





  Actigal -  PO   300 mg





  BID ALCIDES   Administration








Impression


1. CKD stage 3


2. BO- with normal appearing solitary kidney


3. choledocholithiasis


4. CAD


5. hx of Renal Cell Cancer s/p nephroectomy


6. aortic stenosis


7. sepsis


8. acute respiratory failure requiring intubation


9. NSTEMI


10. bacteremia





Plan


- 


- please remove shiley 


- observe renal function over the next few day=


- monitor urine output


- repeat bmp in am


- repeat chest xray





MV

## 2017-05-27 NOTE — PN
Progress Note (short form)





- Note


Progress Note: 


Resting in NAD.  No acute events overnight. 


No CP or SOB.


Mildly confused. 


 Intake & Output











 05/24/17 05/25/17 05/26/17 05/27/17





 23:59 23:59 23:59 23:59


 


Intake Total 150 568 100 218


 


Output Total 1950 1300 800 800


 


Balance -1800 -732 -700 -582


 


Weight 215 lb 2.738 oz  207 lb 208 lb 2 oz








 Last Vital Signs











Temp Pulse Resp BP Pulse Ox


 


 97.9 F   61   20   122/50   98 


 


 05/27/17 06:18  05/27/17 06:18  05/27/17 06:18  05/27/17 06:18  05/26/17 21:00








Active Medications





Albuterol/Ipratropium (Duoneb -)  1 amp NEB Q6H PRN


   PRN Reason: SHORTNESS OF BREATH


   Last Admin: 05/26/17 08:40 Dose:  1 amp


Atorvastatin Calcium (Lipitor -)  20 mg PO HS [facility]


   Last Admin: 05/26/17 22:25 Dose:  20 mg


Carvedilol (Coreg -)  6.25 mg PO BID [facility]


   Last Admin: 05/27/17 10:13 Dose:  6.25 mg


Famotidine/Sodium Chloride (Pepcid 20 Mg Premixed Ivpb -)  50 mls @ 100 mls/hr 

IVPB BID [facility]


   Last Admin: 05/27/17 10:13 Dose:  100 mls/hr


Ursodiol (Actigal -)  300 mg PO BID [facility]


   Last Admin: 05/27/17 10:14 Dose:  300 mg











Gen: awake and interactive, NAD   


Heart:  RRR


Lung: decreased breath sounds at the bases


Abd: soft, nontender


Ext: no edema


 


 


 Laboratory Results - last 24 hr











  05/27/17 05/27/17





  06:00 06:00


 


WBC  6.2 


 


RBC  3.03 L 


 


Hgb  9.0 L 


 


Hct  26.7 L 


 


MCV  88.1 


 


MCHC  33.7 


 


RDW  17.0 H 


 


Plt Count  99 L 


 


MPV  10.0 


 


Neutrophils %  71.7 


 


Lymphocytes %  13.1  D 


 


Monocytes %  9.0 


 


Eosinophils %  5.5 H 


 


Basophils %  0.7 


 


Sodium   144


 


Potassium   3.8


 


Chloride   107


 


Carbon Dioxide   29


 


Anion Gap   8


 


BUN   38 H D


 


Creatinine   3.1 H D


 


Creat Clearance w eGFR   19.15


 


Random Glucose   80


 


Calcium   8.1 L


 


Total Bilirubin   2.0 H


 


AST   51 H


 


ALT   11 L D


 


Alkaline Phosphatase   404 H


 


Total Protein   4.8 L


 


Albumin   2.0 L














ASSESSMENT AND PLAN:


Acute Cholangitis


Gram Negative Bacteremia


s/p ERCP/sphincteroplasty/stent placement


Acute Hypoxic Respiratory Failure


Septic Shock


Acute on Chronic Renal Failure


Lactic Acidosis


CAD


+Troponins likely Demand Ischemia


s/p TAVR for severe Aortic Stenosis


Thrombocytopenia


 





-  Left IJ access was removed without incident 


-  ABX per ID 


-  DVT/GI prophylaxis


-  Normal transfusion thresholds. 


-  Aspiration precautions 








Dr Sidhu

## 2017-05-28 RX ADMIN — FUROSEMIDE SCH MG: 40 TABLET ORAL at 15:40

## 2017-05-28 RX ADMIN — FAMOTIDINE SCH MLS/HR: 20 INJECTION, SOLUTION INTRAVENOUS at 09:35

## 2017-05-28 RX ADMIN — CARVEDILOL SCH MG: 6.25 TABLET, FILM COATED ORAL at 21:29

## 2017-05-28 RX ADMIN — CARVEDILOL SCH MG: 6.25 TABLET, FILM COATED ORAL at 09:35

## 2017-05-28 RX ADMIN — IPRATROPIUM BROMIDE AND ALBUTEROL SULFATE PRN AMP: .5; 3 SOLUTION RESPIRATORY (INHALATION) at 06:00

## 2017-05-28 RX ADMIN — URSODIOL SCH MG: 300 CAPSULE ORAL at 09:35

## 2017-05-28 RX ADMIN — URSODIOL SCH MG: 300 CAPSULE ORAL at 21:29

## 2017-05-28 RX ADMIN — FAMOTIDINE SCH MLS/HR: 20 INJECTION, SOLUTION INTRAVENOUS at 21:29

## 2017-05-28 RX ADMIN — ATORVASTATIN CALCIUM SCH MG: 20 TABLET, FILM COATED ORAL at 21:29

## 2017-05-28 NOTE — PN
Progress Note (short form)





- Note


Progress Note: 


No acute events


Awake


Pain controlled


 Vital Signs











 Period  Temp  Pulse  Resp  BP Sys/Badillo  Pulse Ox


 


 Last 24 Hr  98.0 F-98.9 F  53-59  18-20  116-140/56-60  97








Abd soft, NT





CBC,CMP











WBC  6.2 K/mm3 (4.0-10.0)   05/27/17  06:00    


 


RBC  3.03 M/mm3 (4.00-5.60)  L  05/27/17  06:00    


 


Hgb  9.0 GM/dL (11.7-16.9)  L  05/27/17  06:00    


 


Hct  26.7 % (35.4-49)  L  05/27/17  06:00    


 


MCV  88.1 fl (80-96)   05/27/17  06:00    


 


MCHC  33.7 g/dl (32.0-35.9)   05/27/17  06:00    


 


RDW  17.0 % (11.9-15.9)  H  05/27/17  06:00    


 


Plt Count  99 K/MM3 (134-434)  L  05/27/17  06:00    


 


MPV  10.0 fl (7.5-11.1)   05/27/17  06:00    


 


Neutrophils %  71.7 % (42.8-82.8)   05/27/17  06:00    


 


Lymphocytes %  13.1 % (8-40)  D 05/27/17  06:00    


 


Monocytes %  9.0 % (3.8-10.2)   05/27/17  06:00    


 


Eosinophils %  5.5 % (0-4.5)  H  05/27/17  06:00    


 


Basophils %  0.7 % (0-2.0)   05/27/17  06:00    


 


Band Neutrophils  11.0 % (0-10)  H D 05/17/17  05:15    


 


Metamyelocytes  5 % (0-2)  H D 05/16/17  20:30    


 


Myelocytes  9 % (0-2)  H D 05/16/17  20:30    


 


Differential Comment  Manual diff done   05/18/17  08:30    


 


Dohle Bodies  1+   05/17/17  05:15    


 


Platelet Estimate  Decreased  (NORMAL)   05/18/17  08:30    


 


Platelet Comment  No clumping noted   05/17/17  05:15    


 


Polychromasia  Few   05/17/17  05:15    


 


Hypochromic-Microcytic  Few   05/17/17  05:15    


 


Sodium  144 mmol/L (136-145)   05/27/17  06:00    


 


Potassium  3.8 mmol/L (3.5-5.1)   05/27/17  06:00    


 


Chloride  107 mmol/L ()   05/27/17  06:00    


 


Carbon Dioxide  29 mmol/L (21-32)   05/27/17  06:00    


 


Anion Gap  8  (8-16)   05/27/17  06:00    


 


BUN  38 mg/dL (7-18)  H D 05/27/17  06:00    


 


Creatinine  3.1 mg/dL (0.7-1.3)  H D 05/27/17  06:00    


 


Creat Clearance w eGFR  19.15  (>60)   05/27/17  06:00    


 


Random Glucose  80 mg/dL ()   05/27/17  06:00    


 


Lactic Acid  1.684 mmol/L (0.4-2.0)   05/17/17  13:20    


 


Calcium  8.1 mg/dL (8.5-10.1)  L  05/27/17  06:00    


 


Phosphorus  4.4 mg/dL (2.5-4.9)  D 05/23/17  05:30    


 


Magnesium  2.0 mg/dL (1.8-2.4)   05/23/17  05:30    


 


Total Bilirubin  2.0 mg/dL (0.2-1.0)  H  05/27/17  06:00    


 


Direct Bilirubin  1.4 mg/dL (0.0-0.2)  H D 05/23/17  05:30    


 


AST  51 U/L (15-37)  H  05/27/17  06:00    


 


ALT  11 U/L (12-78)  L D 05/27/17  06:00    


 


Alkaline Phosphatase  404 U/L ()  H  05/27/17  06:00    


 


Creatine Kinase  60 IU/L ()   05/20/17  00:00    


 


Creatine Kinase Index  11.8 % (0.0-5.0)  H*  05/17/17  13:20    


 


CK-MB (CK-2)  18.446 ng/ml (0.5-3.6)  H  05/17/17  13:20    


 


CK-MB (CK-2) Rel Index  Cancelled   05/17/17  13:20    


 


Troponin I  1.73 ng/ml (0.00-0.05)  H*  05/20/17  05:15    


 


C-Reactive Protein  21.5 MG/DL (0.00-0.3)  H  05/17/17  05:15    


 


Total Protein  4.8 g/dl (6.4-8.2)  L  05/27/17  06:00    


 


Albumin  2.0 g/dl (3.4-5.0)  L  05/27/17  06:00    


 


Total Amylase  49 U/L ()  D 05/17/17  05:15    


 


Lipase  116 U/L ()   05/17/17  05:15    














Antibiotics


Await repeat ERCP for CBD stone removal





Problem List





- Problems


(1) Umbilical discharge


Code(s): R19.8 - [facility] SYMPTOMS AND SIGNS INVOLVING THE DGSTV SYS AND ABDOMEN

## 2017-05-28 NOTE — PN
Physical Exam: 


SUBJECTIVE: Patient seen and examined. He is improving, ingratiating 

conversation more. His wife and him have been together for 65 years, he says








OBJECTIVE:





 Vital Signs











 Period  Temp  Pulse  Resp  BP Sys/Badillo  Pulse Ox


 


 Last 24 Hr  98.0 F-98.9 F  53-59  18-20  116-140/56-60  97








PE


Neuro: alert, awake, cn 2-12 intact  


HEENT: Left shiley site dressing CDI 


Pulm: clear anteriorly, +nc 


CV: s1 s2 rrr 


Abd: s nt nd + bs 


: ware


Ext: warm, trace r pedal edema, + DP pulses 








 Laboratory Results - last 24 hr











  05/24/17





  12:25


 


Blood Type  A POSITIVE


 


Antibody Screen  Negative


 


Crossmatch  See Detail








Active Medications











Generic Name Dose Route Start Last Admin





  Trade Name Freq  PRN Reason Stop Dose Admin


 


Albuterol/Ipratropium  1 amp 05/24/17 17:51 05/28/17 06:00





  Duoneb -  NEB   1 amp





  Q6H PRN   Administration





  SHORTNESS OF BREATH   


 


Atorvastatin Calcium  20 mg 05/25/17 22:00 05/27/17 21:45





  Lipitor -  PO   20 mg





  HS ALCIDES   Administration


 


Carvedilol  6.25 mg 05/26/17 12:21 05/28/17 09:35





  Coreg -  PO   6.25 mg





  BID ALCIDES   Administration


 


Famotidine/Sodium Chloride  50 mls @ 100 mls/hr 05/24/17 22:00 05/28/17 09:35





  Pepcid 20 Mg Premixed Ivpb -  IVPB   100 mls/hr





  BID ALCIDES   Administration


 


Ursodiol  300 mg 05/24/17 22:00 05/28/17 09:35





  Actigal -  PO   300 mg





  BID ALCIDES   Administration





Antibiotics


- Cefazolin 10 days 





Assessment: 87 year old male with HTN, CAD s/p stent placement, aortic stenosis

, porcine valve replacement (ASA 81mg and Plavix 75mg), urtheral strictures s/p 

dilatation and right nephrectomy admitted with abdominal pain, acute 

transaminitis, jaundice, found to have sepsis due to acute cholangitis. 





Plan: 


1. Septic shock 2/2 acute cholangitis 


- ERCP s/p balloon sphincteroplasty and biliary stent placement 5/16


- Stable off antibiotics





2. Acute Choledocholithiasis with acute cholangitis 


- ERCP w/ biliary stent 5/15


- Will need repeat ERCP to remove remaining stones once stable per GI 


- Continue Ursodiol BID 





3. 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 


- Shiley removed 5/27


- Maintain ware 





4. Thrombocytopenia 


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


- 1Uplts 5/23


- Hold chemical AC





5. CAD s/p stent placement and demand ischemia, s/p TAVR for AS 


- Will restart once plates stable ASA/plavix


- Coreg 6.25 BID 





6. Acute respiratory failure 


- Resolved; extubated 5/23 


- Stable on NC 





7. Lactic acidosis 


- Resolved 





8. Acute blood loss anemia  


- Transfused 2UPRBC 5/24 





9. Hypotension 


- Resolved 





10. Physical Therapy 


- Daily OOB to chair 





Visit type





- Emergency Visit


Emergency Visit: Yes


ED Registration Date: 05/15/17


Care time: The patient presented to the Emergency Department on the above date 

and was hospitalized for further evaluation of their emergent condition.





- New Patient


This patient is new to me today: No





- Critical Care


Critical Care patient: No

## 2017-05-28 NOTE — PN
Progress Note (short form)





- Note


Progress Note: 


RENAL


Pt is awake and alert


appears comfortable


confused


 


 Last Vital Signs











Temp Pulse Resp BP Pulse Ox


 


 98.0 F   58 L  18   116/56   97 


 


 05/28/17 06:23  05/28/17 06:23  05/28/17 06:23  05/28/17 06:23  05/27/17 21:00














HEENT has a shiley catheter on left side of neck


lungs clear anteriorly


cvs s1s2 rr +ERNESTO


abd soft


ext +edema


neuro awake, hearing impaired





 


 Current Medications











Generic Name Dose Route Start Last Admin





  Trade Name Freq  PRN Reason Stop Dose Admin


 


Albuterol/Ipratropium  1 amp 05/24/17 17:51 05/26/17 08:40





  Duoneb -  NEB   1 amp





  Q6H PRN   Administration





  SHORTNESS OF BREATH   


 


Atorvastatin Calcium  20 mg 05/25/17 22:00 05/26/17 22:25





  Lipitor -  PO   20 mg








   HS ALCIDES   Administration


 


Carvedilol  6.25 mg 05/26/17 12:21 05/26/17 22:25





  Coreg -  PO   6.25 mg





  BID ALCIDES   Administration


 


Famotidine/Sodium Chloride  50 mls @ 100 mls/hr 05/24/17 22:00 05/26/17 22:19





  Pepcid 20 Mg Premixed Ivpb -  IVPB   100 mls/hr





  BID ALCIDES   Administration


 


Ursodiol  300 mg 05/24/17 22:00 05/26/17 22:25





  Actigal -  PO   300 mg





  BID ALCIDES   Administration








 CBC, BMP





 05/27/17 06:00 





 05/27/17 06:00 








Impression


1. CKD stage 3


2. BO- with normal appearing solitary kidney


3. choledocholithiasis


4. CAD, new congestive changes


5. hx of Renal Cell Cancer s/p nephrectomy


6. aortic stenosis


7. sepsis


8. acute respiratory failure requiring intubation


9. NSTEMI


10. bacteremia





Plan


- oob to chair


- monitor urine output


- repeat bmp in am


- repeat chest xray





MV

## 2017-05-28 NOTE — PN
Progress Note, Physician


History of Present Illness: 


No abd pain, afebrile, tolerating diet.











- Current Medication List


Current Medications: 


Active Medications





Albuterol/Ipratropium (Duoneb -)  1 amp NEB Q6H PRN


   PRN Reason: SHORTNESS OF BREATH


   Last Admin: 05/28/17 06:00 Dose:  1 amp


Atorvastatin Calcium (Lipitor -)  20 mg PO HS [facility]


   Last Admin: 05/27/17 21:45 Dose:  20 mg


Carvedilol (Coreg -)  6.25 mg PO BID [facility]


   Last Admin: 05/28/17 09:35 Dose:  6.25 mg


Furosemide (Lasix -)  60 mg PO DAILY [facility]


Famotidine/Sodium Chloride (Pepcid 20 Mg Premixed Ivpb -)  50 mls @ 100 mls/hr 

IVPB BID [facility]


   Last Admin: 05/28/17 09:35 Dose:  100 mls/hr


Ursodiol (Actigal -)  300 mg PO BID [facility]


   Last Admin: 05/28/17 09:35 Dose:  300 mg











- Objective


Vital Signs: 


 Vital Signs











Temperature  98.0 F   05/28/17 06:23


 


Pulse Rate  58 L  05/28/17 06:23


 


Respiratory Rate  18   05/28/17 06:23


 


Blood Pressure  116/56   05/28/17 06:23


 


O2 Sat by Pulse Oximetry (%)  97   05/27/17 21:00











Constitutional: Yes: No Distress, Calm


Neck: Yes: Supple


Cardiovascular: Yes: Regular Rate and Rhythm


Respiratory: Yes: Regular, Diminished, On Nasal O2


Gastrointestinal: Yes: Normal Bowel Sounds, Soft


Edema: No


Labs: 


 CBC, BMP





 05/27/17 06:00 





 05/27/17 06:00 





 INR, PTT











INR  1.07  (0.82-1.09)   05/22/17  05:35    


 


Fibrinogen  463.0 mg/dL (238-498)  D 05/22/17  05:35    














- ....Imaging


Chest X-ray: Report Reviewed (New congestion, bilateral effusion)





Problem List





- Problems


(1) BO (acute kidney injury)


Code(s): N17.9 - ACUTE KIDNEY FAILURE, UNSPECIFIED





(2) Bacteremia due to Gram-negative bacteria


Code(s): R78.81 - BACTEREMIA





(3) Biliary sepsis


Code(s): K83.0 - CHOLANGITIS





(4) CAD (coronary artery disease)


Code(s): I25.10 - ATHSCL HEART DISEASE OF NATIVE CORONARY ARTERY W/O ANG PCTRS 

  Qualifiers: 


     Coronary Disease-Associated Artery/Lesion type: unspecified vessel or 

lesion type     Native vs. transplanted heart: native heart     Associated 

angina: without angina        Qualified Code(s): I25.10 - Atherosclerotic heart 

disease of native coronary artery without angina pectoris  





(5) CKD (chronic kidney disease)


Code(s): N18.9 - CHRONIC KIDNEY DISEASE, UNSPECIFIED   





(6) Calculus of common duct with obstruction


Code(s): K80.51 - CALCULUS OF BILE DUCT W/O CHOLANGITIS OR CHOLECYST W OBST





(7) History of percutaneous coronary intervention


Code(s): Z98.890 - OTHER SPECIFIED POSTPROCEDURAL STATES





(8) S/P ERCP


Code(s): Z98.890 - OTHER SPECIFIED POSTPROCEDURAL STATES





(9) Thrombocytopenia


Code(s): D69.6 - THROMBOCYTOPENIA, UNSPECIFIED





(10) Acute hypoxemic respiratory failure


Code(s): J96.01 - ACUTE RESPIRATORY FAILURE WITH HYPOXIA





(11) Gram negative septic shock


Code(s): A41.50 - GRAM-NEGATIVE SEPSIS, UNSPECIFIED


R65.21 - SEVERE SEPSIS WITH SEPTIC SHOCK





(12) Left bundle branch block


Code(s): I44.7 - LEFT BUNDLE-BRANCH BLOCK, UNSPECIFIED





(13) Demand ischemia


Code(s): I24.8 - OTHER FORMS OF ACUTE ISCHEMIC HEART DISEASE





(14) Anemia


Code(s): D64.9 - ANEMIA, UNSPECIFIED   Qualifiers: 


     Anemia type: unspecified type        Qualified Code(s): D64.9 - Anemia, 

unspecified  








Assessment/Plan


1. s/p hypoxemic respiratory failure


2. Biliary septic shock (E. coli), acute cholangitis post ERCP, balloon 

sphincteroplasty and stent placement with leukocytosis and lactic acidosis 

resolved


3. Post TAVR for severe AS


4. CAD, S/P PCI/stent with demand ischemic injury


5. Severe biventricular failure with pleural effusions


6. Hypertension


7. Renal CA S/P right nephrectomy


8. Acute on CKD referable to septic shock and ATN - recovering off HD


9. 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


10. Anemia of chronic disease





PLAN:


1. Wean FIO2 to maintain saO2, BD as needed


2. Monitor renal recovery - Diuretic regimen and d/sheila HD as per Renal


3. Completed antibiotics course, plan for eventual stone removal once more 

stable 


4. Continue carvedilol 6.25 bid and Lipitor 20 qhs, resume ASA once Plt count 

recovers


5. DVT and GI prophylaxis

## 2017-05-28 NOTE — PN
Progress Note (short form)





- Note


Progress Note: 


Resting in NAD.  No acute events overnight. 


No CP or SOB.


Mildly confused. 


 


CXR : increasing vascular congestion 





 Intake & Output











 05/25/17 05/26/17 05/27/17 05/28/17





 23:59 23:59 23:59 23:59


 


Intake Total 568 100 438 


 


Output Total 8502 655 9376 300


 


Balance -732 -700 -1062 -300


 


Weight  207 lb 208 lb 2 oz 209 lb 4.8 oz








 Last Vital Signs











Temp Pulse Resp BP Pulse Ox


 


 98.0 F   58 L  18   116/56   97 


 


 05/28/17 06:23  05/28/17 06:23  05/28/17 06:23  05/28/17 06:23  05/27/17 21:00








Active Medications





Albuterol/Ipratropium (Duoneb -)  1 amp NEB Q6H PRN


   PRN Reason: SHORTNESS OF BREATH


   Last Admin: 05/28/17 06:00 Dose:  1 amp


Atorvastatin Calcium (Lipitor -)  20 mg PO [facility]


   Last Admin: 05/27/17 21:45 Dose:  20 mg


Carvedilol (Coreg -)  6.25 mg PO BID [facility]


   Last Admin: 05/28/17 09:35 Dose:  6.25 mg


Famotidine/Sodium Chloride (Pepcid 20 Mg Premixed Ivpb -)  50 mls @ 100 mls/hr 

IVPB BID [facility]


   Last Admin: 05/28/17 09:35 Dose:  100 mls/hr


Ursodiol (Actigal -)  300 mg PO BID [facility]


   Last Admin: 05/28/17 09:35 Dose:  300 mg














Gen: awake and interactive, NAD   


Heart:  RRR


Lung: decreased breath sounds at the bases


Abd: soft, nontender


Ext: no edema


 


 


 


 Laboratory Results - last 24 hr











  05/24/17





  12:25


 


Blood Type  A POSITIVE


 


Antibody Screen  Negative


 


Crossmatch  See Detail











ASSESSMENT AND PLAN:


Acute Cholangitis


Gram Negative Bacteremia


s/p ERCP/sphincteroplasty/stent placement


Acute Hypoxic Respiratory Failure


Septic Shock


Acute on Chronic Renal Failure


Lactic Acidosis


CAD


+Troponins likely Demand Ischemia


s/p TAVR for severe Aortic Stenosis


Thrombocytopenia


 





-  Start PO ABX 


-  ABX per ID 


-  DVT/GI prophylaxis


-  Normal transfusion thresholds. 


-  Aspiration precautions 








Dr Sidhu

## 2017-05-29 LAB
ALBUMIN SERPL-MCNC: 2.1 G/DL (ref 3.4–5)
ALP SERPL-CCNC: 370 U/L (ref 45–117)
ALT SERPL-CCNC: 11 U/L (ref 12–78)
ANION GAP SERPL CALC-SCNC: 10 MMOL/L (ref 8–16)
AST SERPL-CCNC: 33 U/L (ref 15–37)
BILIRUB SERPL-MCNC: 1.8 MG/DL (ref 0.2–1)
CALCIUM SERPL-MCNC: 8.2 MG/DL (ref 8.5–10.1)
CO2 SERPL-SCNC: 29 MMOL/L (ref 21–32)
COCKROFT - GAULT: 19.78
CREAT SERPL-MCNC: 3.6 MG/DL (ref 0.7–1.3)
DEPRECATED RDW RBC AUTO: 16.7 % (ref 11.9–15.9)
GLUCOSE SERPL-MCNC: 89 MG/DL (ref 74–106)
MCH RBC QN AUTO: 29.8 PG (ref 25.7–33.7)
MCHC RBC AUTO-ENTMCNC: 33.3 G/DL (ref 32–35.9)
MCV RBC: 89.5 FL (ref 80–96)
PLATELET # BLD AUTO: 121 K/MM3 (ref 134–434)
PMV BLD: 9.9 FL (ref 7.5–11.1)
PROT SERPL-MCNC: 4.9 G/DL (ref 6.4–8.2)
WBC # BLD AUTO: 5.3 K/MM3 (ref 4–10)

## 2017-05-29 RX ADMIN — ATORVASTATIN CALCIUM SCH MG: 20 TABLET, FILM COATED ORAL at 21:20

## 2017-05-29 RX ADMIN — FUROSEMIDE SCH MG: 40 TABLET ORAL at 10:47

## 2017-05-29 RX ADMIN — FAMOTIDINE SCH MLS/HR: 20 INJECTION, SOLUTION INTRAVENOUS at 21:20

## 2017-05-29 RX ADMIN — CARVEDILOL SCH MG: 6.25 TABLET, FILM COATED ORAL at 21:20

## 2017-05-29 RX ADMIN — URSODIOL SCH MG: 300 CAPSULE ORAL at 21:20

## 2017-05-29 RX ADMIN — CARVEDILOL SCH MG: 6.25 TABLET, FILM COATED ORAL at 10:44

## 2017-05-29 RX ADMIN — URSODIOL SCH MG: 300 CAPSULE ORAL at 10:43

## 2017-05-29 RX ADMIN — FAMOTIDINE SCH MLS/HR: 20 INJECTION, SOLUTION INTRAVENOUS at 10:44

## 2017-05-29 NOTE — PN
Progress Note (short form)





- Note


Progress Note: 


PULMONARY





RESTING COMFORTABLY





NO OVERALL CLINICAL CHANGE IN EXAM





LABS/MEDS/NOTES/MICRO/IMAGING REVIEWED








Acute Cholangitis


Gram Negative Bacteremia


s/p ERCP/sphincteroplasty/stent placement


Acute Hypoxic Respiratory Failure


Septic Shock


Acute on Chronic Renal Failure


Lactic Acidosis


CAD


+Troponins likely Demand Ischemia


s/p TAVR for severe Aortic Stenosis


Thrombocytopenia


 





-  ABX per ID 


-  DVT/GI prophylaxis


-  Normal transfusion thresholds. 


-  Aspiration precautions 





CASTRO BLANC MD

## 2017-05-29 NOTE — PN
Physical Exam: 


SUBJECTIVE: Patient seen and examined. He is oob to chair. No acute issues. 








OBJECTIVE:





 Vital Signs











 Period  Temp  Pulse  Resp  BP Sys/Badillo  Pulse Ox


 


 Last 24 Hr  98.1 F-98.6 F  58-64  16-20  116-142/47-65  93











PE


Neuro: alert, awake, cn 2-12 intact  


HEENT: Left shiley site dressing CDI 


Pulm: r base crackles, +nc  


CV: s1 s2 rrr 


Abd: s nt nd + bs 


: ware


Ext: no le edema, warm





 Laboratory Results - last 24 hr











  05/29/17 05/29/17





  06:30 06:30


 


WBC  5.3 


 


RBC  3.15 L 


 


Hgb  9.4 L 


 


Hct  28.2 L 


 


MCV  89.5 


 


MCHC  33.3 


 


RDW  16.7 H 


 


Plt Count  121 L D 


 


MPV  9.9 


 


Sodium   147 H


 


Potassium   3.5


 


Chloride   108 H


 


Carbon Dioxide   29


 


Anion Gap   10


 


BUN   39 H


 


Creatinine   3.6 H


 


Creat Clearance w eGFR   16.12


 


Random Glucose   89


 


Calcium   8.2 L


 


Total Bilirubin   1.8 H


 


AST   33  D


 


ALT   11 L


 


Alkaline Phosphatase   370 H


 


Total Protein   4.9 L


 


Albumin   2.1 L








Active Medications











Generic Name Dose Route Start Last Admin





  Trade Name Freq  PRN Reason Stop Dose Admin


 


Albuterol/Ipratropium  1 amp 05/24/17 17:51 05/28/17 06:00





  Duoneb -  NEB   1 amp





  Q6H PRN   Administration





  SHORTNESS OF BREATH   


 


Atorvastatin Calcium  20 mg 05/25/17 22:00 05/28/17 21:29





  Lipitor -  PO   20 mg





  HS ALCIDES   Administration


 


Carvedilol  6.25 mg 05/26/17 12:21 05/29/17 10:44





  Coreg -  PO   6.25 mg





  BID ALCIDES   Administration


 


Furosemide  40 mg 05/29/17 10:25  





  Lasix -  PO   





  DAILY ALCIDES   


 


Famotidine/Sodium Chloride  50 mls @ 100 mls/hr 05/24/17 22:00 05/29/17 10:44





  Pepcid 20 Mg Premixed Ivpb -  IVPB   100 mls/hr





  BID ALCIDES   Administration


 


Ursodiol  300 mg 05/24/17 22:00 05/29/17 10:43





  Actigal -  PO   300 mg





  BID ALCIDES   Administration











Antibiotics


- Cefazolin 10 days 





Assessment: 87 year old male with HTN, CAD s/p stent placement, aortic stenosis

, porcine valve replacement (ASA 81mg and Plavix 75mg), urtheral strictures s/p 

dilatation and right nephrectomy admitted with abdominal pain, acute 

transaminitis, jaundice, found to have sepsis due to acute cholangitis. 





Plan: 





1. Acute Choledocholithiasis with acute cholangitis, ERCP w/ biliary stent 5/15


- Will need repeat ERCP to remove remaining stones once stable per GI 


- Continue Ursodiol BID


- Surgery/GI to decide day 





2. 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  


- Shiley removed 5/27


- Maintain ware 


- Lasix 40mg po day 





3. Septic shock 2/2 acute cholangitis 


- Resolving 


- ERCP s/p balloon sphincteroplasty and biliary stent placement 5/16


- Surgery and GI to decide date





4. Thrombocytopenia 


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


- 1Uplts 5/23


- Hold chemical AC





5. CAD s/p stent placement and demand ischemia, s/p TAVR for AS 


- Will restart once plates stable ASA/plavix


- Coreg 6.25 BID 





6. Acute respiratory failure 


- Resolved; extubated 5/23 


- Stable on NC 





7. Lactic acidosis 


- Resolved 





8. Acute blood loss anemia  


- Transfused 2UPRBC 5/24 





9. Hypotension 


- Resolved 





10. Physical Therapy 


- Daily OOB to chair 





Visit type





- Emergency Visit


Emergency Visit: Yes


ED Registration Date: 05/15/17


Care time: The patient presented to the Emergency Department on the above date 

and was hospitalized for further evaluation of their emergent condition.





- New Patient


This patient is new to me today: No





- Critical Care


Critical Care patient: No

## 2017-05-29 NOTE — PN
Progress Note, Physician


History of Present Illness: 


No abd pain, afebrile, tolerating diet.











- Current Medication List


Current Medications: 


Active Medications





Albuterol/Ipratropium (Duoneb -)  1 amp NEB Q6H PRN


   PRN Reason: SHORTNESS OF BREATH


   Last Admin: 05/28/17 06:00 Dose:  1 amp


Atorvastatin Calcium (Lipitor -)  20 mg PO [facility]


   Last Admin: 05/28/17 21:29 Dose:  20 mg


Carvedilol (Coreg -)  6.25 mg PO BID [facility]


   Last Admin: 05/29/17 10:44 Dose:  6.25 mg


Furosemide (Lasix -)  40 mg PO DAILY [facility]


Famotidine/Sodium Chloride (Pepcid 20 Mg Premixed Ivpb -)  50 mls @ 100 mls/hr 

IVPB BID [facility]


   Last Admin: 05/29/17 10:44 Dose:  100 mls/hr


Ursodiol (Actigal -)  300 mg PO BID [facility]


   Last Admin: 05/29/17 10:43 Dose:  300 mg











- Objective


Vital Signs: 


 Vital Signs











Temperature  97.6 F   05/29/17 10:00


 


Pulse Rate  59 L  05/29/17 10:00


 


Respiratory Rate  18   05/29/17 10:00


 


Blood Pressure  118/52   05/29/17 10:00


 


O2 Sat by Pulse Oximetry (%)  93 L  05/28/17 13:25











Constitutional: Yes: No Distress, Calm


Neck: Yes: Supple


Cardiovascular: Yes: Regular Rate and Rhythm


Respiratory: Yes: Regular, Diminished, On Nasal O2


Gastrointestinal: Yes: Normal Bowel Sounds, Soft


Edema: Yes


Edema: LLE: Trace, RLE: Trace


Labs: 


 CBC, BMP





 05/29/17 06:30 





 05/29/17 06:30 





 INR, PTT











INR  1.07  (0.82-1.09)   05/22/17  05:35    


 


Fibrinogen  463.0 mg/dL (238-498)  D 05/22/17  05:35    














Problem List





- Problems


(1) BO (acute kidney injury)


Code(s): N17.9 - ACUTE KIDNEY FAILURE, UNSPECIFIED





(2) CAD (coronary artery disease)


Code(s): I25.10 - ATHSCL HEART DISEASE OF NATIVE CORONARY ARTERY W/O ANG PCTRS 

  Qualifiers: 


     Coronary Disease-Associated Artery/Lesion type: unspecified vessel or 

lesion type     Native vs. transplanted heart: native heart     Associated 

angina: without angina        Qualified Code(s): I25.10 - Atherosclerotic heart 

disease of native coronary artery without angina pectoris  





(3) CKD (chronic kidney disease)


Code(s): N18.9 - CHRONIC KIDNEY DISEASE, UNSPECIFIED   





(4) Calculus of common duct with obstruction


Code(s): K80.51 - CALCULUS OF BILE DUCT W/O CHOLANGITIS OR CHOLECYST W OBST





(5) History of percutaneous coronary intervention


Code(s): Z98.890 - OTHER SPECIFIED POSTPROCEDURAL STATES





(6) S/P ERCP


Code(s): Z98.890 - OTHER SPECIFIED POSTPROCEDURAL STATES





(7) Thrombocytopenia


Code(s): D69.6 - THROMBOCYTOPENIA, UNSPECIFIED





(8) Left bundle branch block


Code(s): I44.7 - LEFT BUNDLE-BRANCH BLOCK, UNSPECIFIED





(9) Demand ischemia


Code(s): I24.8 - OTHER FORMS OF ACUTE ISCHEMIC HEART DISEASE





(10) Anemia


Code(s): D64.9 - ANEMIA, UNSPECIFIED   Qualifiers: 


     Anemia type: unspecified type        Qualified Code(s): D64.9 - Anemia, 

unspecified  








Assessment/Plan


1. s/p hypoxemic respiratory failure


2. Biliary septic shock (E. coli), acute cholangitis post ERCP, balloon 

sphincteroplasty and stent placement with leukocytosis and lactic acidosis 

resolved


3. Post TAVR for severe AS


4. CAD, S/P PCI/stent with demand ischemic injury


5. Severe biventricular failure with pleural effusions


6. Hypertension


7. Renal CA S/P right nephrectomy


8. Acute on CKD referable to septic shock and ATN - recovering off HD


9. 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


10. Anemia of chronic disease





PLAN:


1. Wean FIO2 to maintain saO2, BD as needed


2. Monitor renal recovery - Lasix 40 qd per Renal


3. Completed antibiotics course, plan for eventual stone removal once more 

stable 


4. Continue carvedilol 6.25 bid and Lipitor 20 qhs, resume ASA once Plt count 

recovers


5. DVT and GI prophylaxis

## 2017-05-29 NOTE — PN
Progress Note (short form)





- Note


Progress Note: 


RENAL


Pt is awake and alert


appears comfortable


sitting up having breakfast


 


 


 Last Vital Signs











Temp Pulse Resp BP Pulse Ox


 


 98.1 F   61   20   135/59   93 L


 


 05/29/17 06:00  05/29/17 06:00  05/29/17 06:00  05/29/17 06:00  05/28/17 13:25























HEENT has a shiley catheter on left side of neck


lungs clear anteriorly, right basilar crackles


cvs s1s2 rr +ERNESTO


abd soft


ext +edema reduced


neuro awake, hearing impaired





 


 


 CBC, BMP





 05/29/17 06:30 





 05/29/17 06:30 

















 Current Medications











Generic Name Dose Route Start Last Admin





  Trade Name Freq  PRN Reason Stop Dose Admin


 


Albuterol/Ipratropium  1 amp 05/24/17 17:51 05/28/17 06:00





  Duoneb -  NEB   1 amp





  Q6H PRN   Administration





  SHORTNESS OF BREATH   


 


Atorvastatin Calcium  20 mg 05/25/17 22:00 05/28/17 21:29





  Lipitor -  PO   20 mg





  HS ALCIDES   Administration


 


Carvedilol  6.25 mg 05/26/17 12:21 05/28/17 21:29





  Coreg -  PO   6.25 mg





  BID ALCIDES   Administration


 


Furosemide  60 mg 05/28/17 12:00 05/28/17 15:40





  Lasix -  PO   60 mg





  DAILY ALCIDES   Administration


 


Famotidine/Sodium Chloride  50 mls @ 100 mls/hr 05/24/17 22:00 05/28/17 21:29





  Pepcid 20 Mg Premixed Ivpb -  IVPB   100 mls/hr





  BID ALCIDES   Administration


 


Ursodiol  300 mg 05/24/17 22:00 05/28/17 21:29





  Actigal -  PO   300 mg





  BID ALCIDES   Administration








 





Impression


1. CKD stage 3


2. BO- with normal appearing solitary kidney


3. choledocholithiasis


4. CAD, new congestive changes


5. hx of Renal Cell Cancer s/p nephrectomy


6. aortic stenosis


7. sepsis


8. acute respiratory failure requiring intubation


9. NSTEMI


10. bacteremia





Plan


has no acute indication  for hd


would continue to monitor


avoid nephrotoxins and hypotension


If pt will need surgery, his kidney function will likely worsen





MV

## 2017-05-30 LAB
ANION GAP SERPL CALC-SCNC: 7 MMOL/L (ref 8–16)
CALCIUM SERPL-MCNC: 8.5 MG/DL (ref 8.5–10.1)
CO2 SERPL-SCNC: 32 MMOL/L (ref 21–32)
COCKROFT - GAULT: 17.45
CREAT SERPL-MCNC: 3.7 MG/DL (ref 0.7–1.3)
GLUCOSE SERPL-MCNC: 99 MG/DL (ref 74–106)

## 2017-05-30 RX ADMIN — FUROSEMIDE SCH MG: 40 TABLET ORAL at 10:09

## 2017-05-30 RX ADMIN — URSODIOL SCH MG: 300 CAPSULE ORAL at 21:27

## 2017-05-30 RX ADMIN — CARVEDILOL SCH MG: 6.25 TABLET, FILM COATED ORAL at 10:09

## 2017-05-30 RX ADMIN — URSODIOL SCH MG: 300 CAPSULE ORAL at 10:09

## 2017-05-30 RX ADMIN — ATORVASTATIN CALCIUM SCH MG: 20 TABLET, FILM COATED ORAL at 21:26

## 2017-05-30 RX ADMIN — CARVEDILOL SCH MG: 6.25 TABLET, FILM COATED ORAL at 21:26

## 2017-05-30 NOTE — PN
Physical Exam: 


SUBJECTIVE: Patient seen and examined. He wants to eat real food. Still 

slightly confused. 








OBJECTIVE:





 Vital Signs











 Period  Temp  Pulse  Resp  BP Sys/Badillo  Pulse Ox


 


 Last 24 Hr  97.7 F-98.3 F  57-65  18-20  117-137/56-66  97








PE


Neuro: alert, awake, cn 2-12 intact  


Pulm: r base crackles, +nc  


CV: s1 s2 rrr no mrg 


Abd: s nt nd + bs 


: ware


Ext: trace le edema R>L, warm 


 Laboratory Results - last 24 hr











  05/30/17





  10:20


 


Sodium  145


 


Potassium  3.5


 


Chloride  106


 


Carbon Dioxide  32


 


Anion Gap  7 L


 


BUN  42 H


 


Creatinine  3.7 H


 


Random Glucose  99


 


Calcium  8.5








Active Medications











Generic Name Dose Route Start Last Admin





  Trade Name Freq  PRN Reason Stop Dose Admin


 


Atorvastatin Calcium  20 mg 05/25/17 22:00 05/29/17 21:20





  Lipitor -  PO   20 mg





  HS ALCIDES   Administration


 


Carvedilol  6.25 mg 05/26/17 12:21 05/30/17 10:09





  Coreg -  PO   6.25 mg





  BID ALCIDES   Administration


 


Furosemide  40 mg 05/29/17 10:25 05/30/17 10:09





  Lasix -  PO   40 mg





  DAILY ALCIDES   Administration


 


Ursodiol  300 mg 05/24/17 22:00 05/30/17 10:09





  Actigal -  PO   300 mg





  BID ALCIDES   Administration








Antibiotics


- Cefazolin 10 days 





Assessment: 87 year old male with HTN, CAD s/p stent placement, aortic stenosis

, porcine valve replacement (ASA 81mg and Plavix 75mg), urtheral strictures s/p 

dilatation and right nephrectomy admitted with abdominal pain, acute 

transaminitis, jaundice, found to have sepsis due to acute cholangitis. 





Plan: 





1. Acute Choledocholithiasis with acute cholangitis, ERCP w/ biliary stent 5/15


- D/w Dr. Terrazas will discuss with pt and family tomorrow AM re: repeat ERCP 

and consent 


- Continue Ursodiol BID





2. 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 


- Advance renal diet 


- Stop lasix 


- Will repeat CXR today 


- Shiley removed 5/27


- Maintain ware 





3. Septic shock 2/2 acute cholangitis 


- Resolving 


- ERCP s/p balloon sphincteroplasty and biliary stent placement 5/16





4. Thrombocytopenia 


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


- 1Uplts 5/23


- Hold chemical AC





5. CAD s/p stent placement and demand ischemia, s/p TAVR for AS 


- Will restart once plates stable ASA/plavix


- Coreg 6.25 BID 





6. Acute respiratory failure 


- Resolved; extubated 5/23 





7. Lactic acidosis 


- Resolved 





8. Acute blood loss anemia  


- Transfused 2UPRBC 5/24 





9. Hypotension 


- Resolved 





10. Physical Therapy 


- Daily OOB to chair 





Visit type





- Emergency Visit


Emergency Visit: Yes


ED Registration Date: 05/15/17


Care time: The patient presented to the Emergency Department on the above date 

and was hospitalized for further evaluation of their emergent condition.





- New Patient


This patient is new to me today: No





- Critical Care


Critical Care patient: No

## 2017-05-30 NOTE — PN
Progress Note, Physician


History of Present Illness: 


Pt seen and examined at bedside. He is awake and alert. He appears comfortable. 





- Current Medication List


Current Medications: 


Active Medications





Atorvastatin Calcium (Lipitor -)  20 mg PO HS [facility]


   Last Admin: 05/29/17 21:20 Dose:  20 mg


Carvedilol (Coreg -)  6.25 mg PO BID [facility]


   Last Admin: 05/30/17 10:09 Dose:  6.25 mg


Furosemide (Lasix -)  40 mg PO DAILY [facility]


   Last Admin: 05/30/17 10:09 Dose:  40 mg


Ursodiol (Actigal -)  300 mg PO BID [facility]


   Last Admin: 05/30/17 10:09 Dose:  300 mg











- Objective


Vital Signs: 


 Vital Signs











Temperature  97.7 F   05/30/17 10:00


 


Pulse Rate  61   05/30/17 10:00


 


Respiratory Rate  18   05/30/17 10:00


 


Blood Pressure  128/62   05/30/17 10:00


 


O2 Sat by Pulse Oximetry (%)  97   05/29/17 21:00











Constitutional: Yes: Calm


Eyes: Yes: Conjunctiva Clear


Neck: Yes: Supple


Cardiovascular: Yes: S1, S2


Respiratory: Yes: Diminished, On Nasal O2


Gastrointestinal: Yes: Soft


Genitourinary: Yes: Chadwick Present


Musculoskeletal: Yes: Muscle Weakness


Edema: Yes


Edema: LLE: Trace, RLE: Trace


Neurological: Yes: Oriented


Labs: 


 CBC, BMP





 05/29/17 06:30 





 05/30/17 10:20 





 INR, PTT











INR  1.07  (0.82-1.09)   05/22/17  05:35    


 


Fibrinogen  463.0 mg/dL (238-498)  D 05/22/17  05:35    














Problem List





- Problems


(1) CAD (coronary artery disease)


Code(s): I25.10 - ATHSCL HEART DISEASE OF NATIVE CORONARY ARTERY W/O ANG PCTRS 

  Qualifiers: 


        Qualified Code(s): I25.10 - Atherosclerotic heart disease of native 

coronary artery without angina pectoris  





(2) Calculus of common duct with obstruction


Code(s): K80.51 - CALCULUS OF BILE DUCT W/O CHOLANGITIS OR CHOLECYST W OBST





(3) Choledocholithiasis


Code(s): K80.50 - CALCULUS OF BILE DUCT W/O CHOLANGITIS OR CHOLECYST W/O OBST





(4) Renal cancer


Code(s): C64.9 - MALIGNANT NEOPLASM OF UNSP KIDNEY, EXCEPT RENAL PELVIS   

Qualifiers: 


        Qualified Code(s): C64.1 - Malignant neoplasm of right kidney, except 

renal pelvis  





(5) BO (acute kidney injury)


Code(s): N17.9 - ACUTE KIDNEY FAILURE, UNSPECIFIED





(6) CKD (chronic kidney disease)


Code(s): N18.9 - CHRONIC KIDNEY DISEASE, UNSPECIFIED   








Assessment/Plan


 Current Medications











Generic Name Dose Route Start Last Admin





  Trade Name Freq  PRN Reason Stop Dose Admin


 


Atorvastatin Calcium  20 mg 05/25/17 22:00 05/29/17 21:20





  Lipitor -  PO   20 mg





  HS ALCIDES   Administration


 


Carvedilol  6.25 mg 05/26/17 12:21 05/30/17 10:09





  Coreg -  PO   6.25 mg





  BID ALCIDES   Administration


 


Furosemide  40 mg 05/29/17 10:25 05/30/17 10:09





  Lasix -  PO   40 mg





  DAILY ALCIDES   Administration


 


Ursodiol  300 mg 05/24/17 22:00 05/30/17 10:09





  Actigal -  PO   300 mg





  BID ALCIDES   Administration











Impression


1. CKD stage 3


2. BO


3. choledocholithiasis


4. CAD


5. hx of Renal Cell Cancer s/p nephroectomy


6. aortic stenosis


7. sepsis


8. acute respiratory failure requiring intubation


9. NSTEMI


10. bacteremia





Plan


- HD catheter removed


- hold lasix for now


- will asses on a daily basis and give doses PRN


- GI follow up


- monitor urine output


- repeat cxr








Dr Rodriguez

## 2017-05-30 NOTE — PN
Progress Note (short form)





- Note


Progress Note: 


Resting in NAD.  No acute events overnight. 


No CP or SOB.


Mildly confused. 


 Intake & Output











 05/27/17 05/28/17 05/29/17 05/30/17





 23:59 23:59 23:59 23:59


 


Intake Total 438 540 740 150


 


Output Total 1500 1700 2400 


 


Balance -1062 1160 -1660 150


 


Weight 208 lb 2 oz 209 lb 4.8 oz 213 lb 5 oz 193 lb 7 oz








 Last Vital Signs











Temp Pulse Resp BP Pulse Ox


 


 97.7 F   61   18   128/62   97 


 


 05/30/17 10:00  05/30/17 10:00  05/30/17 10:00  05/30/17 10:00  05/29/17 21:00








Active Medications





Atorvastatin Calcium (Lipitor -)  20 mg PO [facility]


   Last Admin: 05/29/17 21:20 Dose:  20 mg


Carvedilol (Coreg -)  6.25 mg PO BID [facility]


   Last Admin: 05/30/17 10:09 Dose:  6.25 mg


Furosemide (Lasix -)  40 mg PO DAILY [facility]


   Last Admin: 05/30/17 10:09 Dose:  40 mg


Ursodiol (Actigal -)  300 mg PO BID [facility]


   Last Admin: 05/30/17 10:09 Dose:  300 mg








Gen: awake and interactive, NAD   


Heart:  RRR


Lung: decreased breath sounds at the bases


Abd: soft, nontender


Ext: no edema


 


 


 


 Laboratory Results - last 24 hr











  05/30/17





  10:20


 


Sodium  145


 


Potassium  3.5


 


Chloride  106


 


Carbon Dioxide  32


 


Anion Gap  7 L


 


BUN  42 H


 


Creatinine  3.7 H


 


Random Glucose  99


 


Calcium  8.5











ASSESSMENT AND PLAN:


Acute Cholangitis


Gram Negative Bacteremia


s/p ERCP/sphincteroplasty/stent placement


Acute Hypoxic Respiratory Failure


Septic Shock


Acute on Chronic Renal Failure


Lactic Acidosis


CAD


+Troponins likely Demand Ischemia


s/p TAVR for severe Aortic Stenosis


Thrombocytopenia


 





-  DVT/GI prophylaxis


-  O2 as needed  


-  Aspiration precautions 








Dr Sidhu

## 2017-05-31 LAB
ANION GAP SERPL CALC-SCNC: 7 MMOL/L (ref 8–16)
CALCIUM SERPL-MCNC: 8.1 MG/DL (ref 8.5–10.1)
CO2 SERPL-SCNC: 32 MMOL/L (ref 21–32)
COCKROFT - GAULT: 17.45
CREAT SERPL-MCNC: 3.7 MG/DL (ref 0.7–1.3)
DEPRECATED RDW RBC AUTO: 17.2 % (ref 11.9–15.9)
GLUCOSE SERPL-MCNC: 80 MG/DL (ref 74–106)
MCH RBC QN AUTO: 29.9 PG (ref 25.7–33.7)
MCHC RBC AUTO-ENTMCNC: 33.5 G/DL (ref 32–35.9)
MCV RBC: 89.1 FL (ref 80–96)
PLATELET # BLD AUTO: 126 K/MM3 (ref 134–434)
PMV BLD: 9.7 FL (ref 7.5–11.1)
WBC # BLD AUTO: 4.7 K/MM3 (ref 4–10)

## 2017-05-31 RX ADMIN — URSODIOL SCH MG: 300 CAPSULE ORAL at 22:02

## 2017-05-31 RX ADMIN — URSODIOL SCH MG: 300 CAPSULE ORAL at 09:45

## 2017-05-31 RX ADMIN — ATORVASTATIN CALCIUM SCH MG: 20 TABLET, FILM COATED ORAL at 22:02

## 2017-05-31 RX ADMIN — CARVEDILOL SCH MG: 6.25 TABLET, FILM COATED ORAL at 09:45

## 2017-05-31 RX ADMIN — FUROSEMIDE SCH MG: 40 TABLET ORAL at 09:45

## 2017-05-31 RX ADMIN — CARVEDILOL SCH MG: 6.25 TABLET, FILM COATED ORAL at 22:03

## 2017-05-31 NOTE — PN
Progress Note, Physician


History of Present Illness: 


No abd pain, afebrile, tolerating diet.











- Current Medication List


Current Medications: 


Active Medications





Atorvastatin Calcium (Lipitor -)  20 mg PO [facility]


   Last Admin: 05/30/17 21:26 Dose:  20 mg


Carvedilol (Coreg -)  6.25 mg PO BID [facility]


   Last Admin: 05/31/17 09:45 Dose:  6.25 mg


Ursodiol (Actigal -)  300 mg PO BID [facility]


   Last Admin: 05/31/17 09:45 Dose:  300 mg











- Objective


Vital Signs: 


 Vital Signs











Temperature  98.7 F   05/31/17 15:07


 


Pulse Rate  58 L  05/31/17 15:07


 


Respiratory Rate  18   05/31/17 15:07


 


Blood Pressure  129/56   05/31/17 15:07


 


O2 Sat by Pulse Oximetry (%)  98   05/30/17 21:00











Constitutional: Yes: No Distress, Calm


Neck: Yes: Supple


Cardiovascular: Yes: Regular Rate and Rhythm


Respiratory: Yes: Regular, Diminished, On Nasal O2


Gastrointestinal: Yes: Normal Bowel Sounds, Soft


Edema: No


Labs: 


 CBC, BMP





 05/31/17 06:30 





 05/31/17 06:30 





 INR, PTT











INR  1.07  (0.82-1.09)   05/22/17  05:35    


 


Fibrinogen  463.0 mg/dL (238-498)  D 05/22/17  05:35    














Problem List





- Problems


(1) BO (acute kidney injury)


Code(s): N17.9 - ACUTE KIDNEY FAILURE, UNSPECIFIED





(2) CAD (coronary artery disease)


Code(s): I25.10 - ATHSCL HEART DISEASE OF NATIVE CORONARY ARTERY W/O ANG PCTRS 

  Qualifiers: 


     Coronary Disease-Associated Artery/Lesion type: unspecified vessel or 

lesion type     Native vs. transplanted heart: native heart     Associated 

angina: without angina        Qualified Code(s): I25.10 - Atherosclerotic heart 

disease of native coronary artery without angina pectoris  





(3) CKD (chronic kidney disease)


Code(s): N18.9 - CHRONIC KIDNEY DISEASE, UNSPECIFIED   





(4) Calculus of common duct with obstruction


Code(s): K80.51 - CALCULUS OF BILE DUCT W/O CHOLANGITIS OR CHOLECYST W OBST





(5) History of percutaneous coronary intervention


Code(s): Z98.890 - OTHER SPECIFIED POSTPROCEDURAL STATES





(6) S/P ERCP


Code(s): Z98.890 - OTHER SPECIFIED POSTPROCEDURAL STATES





(7) Thrombocytopenia


Code(s): D69.6 - THROMBOCYTOPENIA, UNSPECIFIED





(8) Left bundle branch block


Code(s): I44.7 - LEFT BUNDLE-BRANCH BLOCK, UNSPECIFIED





(9) Demand ischemia


Code(s): I24.8 - OTHER FORMS OF ACUTE ISCHEMIC HEART DISEASE





(10) Anemia


Code(s): D64.9 - ANEMIA, UNSPECIFIED   Qualifiers: 


     Anemia type: unspecified type        Qualified Code(s): D64.9 - Anemia, 

unspecified  








Assessment/Plan


1. s/p hypoxemic respiratory failure


2. Biliary septic shock (E. coli), acute cholangitis post ERCP, balloon 

sphincteroplasty and stent placement with leukocytosis and lactic acidosis 

resolved


3. Post TAVR for severe AS


4. CAD, S/P PCI/stent with demand ischemic injury


5. Severe biventricular failure with pleural effusions


6. Hypertension


7. Renal CA S/P right nephrectomy


8. Acute on CKD referable to septic shock and ATN - recovering off HD


9. 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


10. Anemia of chronic disease





PLAN:


1. Wean FIO2 to maintain saO2, BD as needed


2. Monitor renal recovery - Lasix 40 as needed per Renal


3. Completed antibiotics course, plan for repeat 6/2 ERCP, sphincterotomy,stone 

removal and cholecystectomy now that he has stabilized 


4. Continue carvedilol 6.25 bid and Lipitor 20 qhs, resume ASA post-procedure 

now that Plt count recovers


5. DVT and GI prophylaxis

## 2017-05-31 NOTE — PN
Progress Note, Physician


History of Present Illness: 


Pt seen and examined at bedside. He is more awake and alert today. He denies 

shortness of breath. 





- Current Medication List


Current Medications: 


Active Medications





Atorvastatin Calcium (Lipitor -)  20 mg PO [facility]


   Last Admin: 05/30/17 21:26 Dose:  20 mg


Carvedilol (Coreg -)  6.25 mg PO BID [facility]


   Last Admin: 05/31/17 09:45 Dose:  6.25 mg


Ursodiol (Actigal -)  300 mg PO BID [facility]


   Last Admin: 05/31/17 09:45 Dose:  300 mg











- Objective


Vital Signs: 


 Vital Signs











Temperature  99.4 F   05/31/17 06:40


 


Pulse Rate  62   05/31/17 06:40


 


Respiratory Rate  20   05/31/17 06:40


 


Blood Pressure  155/59   05/31/17 06:40


 


O2 Sat by Pulse Oximetry (%)  98   05/30/17 21:00











Constitutional: Yes: Calm


Eyes: Yes: Conjunctiva Clear


HENT: Yes: Atraumatic


Cardiovascular: Yes: S1, S2


Respiratory: Yes: CTA Bilaterally


Gastrointestinal: Yes: Normal Bowel Sounds, Soft


Genitourinary: Yes: Chadwick Present


Musculoskeletal: Yes: WNL


Edema: Yes


Edema: LLE: Trace, RLE: Trace


Neurological: Yes: Oriented


Psychiatric: Yes: Oriented


Labs: 


 CBC, BMP





 05/31/17 06:30 





 05/31/17 06:30 





 INR, PTT











INR  1.07  (0.82-1.09)   05/22/17  05:35    


 


Fibrinogen  463.0 mg/dL (238-498)  D 05/22/17  05:35    














Problem List





- Problems


(1) CAD (coronary artery disease)


Code(s): I25.10 - ATHSCL HEART DISEASE OF NATIVE CORONARY ARTERY W/O ANG PCTRS 

  Qualifiers: 


     Coronary Disease-Associated Artery/Lesion type: unspecified vessel or 

lesion type     Native vs. transplanted heart: native heart     Associated 

angina: without angina        Qualified Code(s): I25.10 - Atherosclerotic heart 

disease of native coronary artery without angina pectoris  





(2) Calculus of common duct with obstruction


Code(s): K80.51 - CALCULUS OF BILE DUCT W/O CHOLANGITIS OR CHOLECYST W OBST





(3) Choledocholithiasis


Code(s): K80.50 - CALCULUS OF BILE DUCT W/O CHOLANGITIS OR CHOLECYST W/O OBST





(4) Renal cancer


Code(s): C64.9 - MALIGNANT NEOPLASM OF UNSP KIDNEY, EXCEPT RENAL PELVIS   

Qualifiers: 


     Laterality: right        Qualified Code(s): C64.1 - Malignant neoplasm of 

right kidney, except renal pelvis  





(5) BO (acute kidney injury)


Code(s): N17.9 - ACUTE KIDNEY FAILURE, UNSPECIFIED





(6) CKD (chronic kidney disease)


Code(s): N18.9 - CHRONIC KIDNEY DISEASE, UNSPECIFIED   








Assessment/Plan


 Current Medications











Generic Name Dose Route Start Last Admin





  Trade Name Freq  PRN Reason Stop Dose Admin


 


Atorvastatin Calcium  20 mg 05/25/17 22:00 05/30/17 21:26





  Lipitor -  PO   20 mg





  HS ALCIDES   Administration


 


Carvedilol  6.25 mg 05/26/17 12:21 05/31/17 09:45





  Coreg -  PO   6.25 mg





  BID ALCIDES   Administration


 


Ursodiol  300 mg 05/24/17 22:00 05/31/17 09:45





  Actigal -  PO   300 mg





  BID ALCIDES   Administration











Impression


1. CKD stage 3


2. BO


3. choledocholithiasis


4. CAD


5. hx of Renal Cell Cancer s/p nephroectomy


6. aortic stenosis


7. sepsis


8. acute respiratory failure requiring intubation


9. NSTEMI


10. bacteremia





Plan


- renal function is stabilizing


- discussed with GI, pt at risk for BO if he developed pancreatitis


- replace potassium


- hold standing dose of lasix and give PRN


- will follow closely 











Dr Rodriguez

## 2017-05-31 NOTE — PN
GI Progress Note


Subjective: 


GI NOte: Surjit is more alert today. I discussed the need for a repeat ERCP to 

perform and sphincterotomy to extract his residual CBD stones and for a 

cholecystectomy as well. His wife and other family members were present. I 

informed everyone of the risks of perforation, hemorrhage and ERCP associated 

pancreatitis that can lead to renal and pulmonary as well as other organ 

failures. Surjit appeared to comprehend parts of this discussion so I asked 

that his wife mutually consent with him which she did.  They understand that 

the DNR will be reversed for this procedure. Platelets are up to 126K. 





- Objective


Vital Signs: 


 Vital Signs











Temperature  99.4 F   05/31/17 06:40


 


Pulse Rate  62   05/31/17 06:40


 


Respiratory Rate  20   05/31/17 06:40


 


Blood Pressure  155/59   05/31/17 06:40


 


O2 Sat by Pulse Oximetry (%)  98   05/30/17 21:00








CBC,CMP











WBC  4.7 K/mm3 (4.0-10.0)   05/31/17  06:30    


 


RBC  2.95 M/mm3 (4.00-5.60)  L  05/31/17  06:30    


 


Hgb  8.8 GM/dL (11.7-16.9)  L  05/31/17  06:30    


 


Hct  26.2 % (35.4-49)  L  05/31/17  06:30    


 


MCV  89.1 fl (80-96)   05/31/17  06:30    


 


MCHC  33.5 g/dl (32.0-35.9)   05/31/17  06:30    


 


RDW  17.2 % (11.9-15.9)  H  05/31/17  06:30    


 


Plt Count  126 K/MM3 (134-434)  L  05/31/17  06:30    


 


MPV  9.7 fl (7.5-11.1)   05/31/17  06:30    


 


Neutrophils %  71.7 % (42.8-82.8)   05/27/17  06:00    


 


Lymphocytes %  13.1 % (8-40)  D 05/27/17  06:00    


 


Monocytes %  9.0 % (3.8-10.2)   05/27/17  06:00    


 


Eosinophils %  5.5 % (0-4.5)  H  05/27/17  06:00    


 


Basophils %  0.7 % (0-2.0)   05/27/17  06:00    


 


Band Neutrophils  11.0 % (0-10)  H D 05/17/17  05:15    


 


Metamyelocytes  5 % (0-2)  H D 05/16/17  20:30    


 


Myelocytes  9 % (0-2)  H D 05/16/17  20:30    


 


Differential Comment  Manual diff done   05/18/17  08:30    


 


Dohle Bodies  1+   05/17/17  05:15    


 


Platelet Estimate  Decreased  (NORMAL)   05/18/17  08:30    


 


Platelet Comment  No clumping noted   05/17/17  05:15    


 


Polychromasia  Few   05/17/17  05:15    


 


Hypochromic-Microcytic  Few   05/17/17  05:15    


 


Sodium  145 mmol/L (136-145)   05/31/17  06:30    


 


Potassium  3.2 mmol/L (3.5-5.1)  L  05/31/17  06:30    


 


Chloride  106 mmol/L ()   05/31/17  06:30    


 


Carbon Dioxide  32 mmol/L (21-32)   05/31/17  06:30    


 


Anion Gap  7  (8-16)  L  05/31/17  06:30    


 


BUN  38 mg/dL (7-18)  H  05/31/17  06:30    


 


Creatinine  3.7 mg/dL (0.7-1.3)  H  05/31/17  06:30    


 


Creat Clearance w eGFR  16.12  (>60)   05/29/17  06:30    


 


Random Glucose  80 mg/dL ()   05/31/17  06:30    


 


Lactic Acid  1.684 mmol/L (0.4-2.0)   05/17/17  13:20    


 


Calcium  8.1 mg/dL (8.5-10.1)  L  05/31/17  06:30    


 


Phosphorus  4.4 mg/dL (2.5-4.9)  D 05/23/17  05:30    


 


Magnesium  2.0 mg/dL (1.8-2.4)   05/23/17  05:30    


 


Total Bilirubin  1.8 mg/dL (0.2-1.0)  H  05/29/17  06:30    


 


Direct Bilirubin  1.4 mg/dL (0.0-0.2)  H D 05/23/17  05:30    


 


AST  33 U/L (15-37)  D 05/29/17  06:30    


 


ALT  11 U/L (12-78)  L  05/29/17  06:30    


 


Alkaline Phosphatase  370 U/L ()  H  05/29/17  06:30    


 


Creatine Kinase  60 IU/L ()   05/20/17  00:00    


 


Creatine Kinase Index  11.8 % (0.0-5.0)  H*  05/17/17  13:20    


 


CK-MB (CK-2)  18.446 ng/ml (0.5-3.6)  H  05/17/17  13:20    


 


CK-MB (CK-2) Rel Index  Cancelled   05/17/17  13:20    


 


Troponin I  1.73 ng/ml (0.00-0.05)  H*  05/20/17  05:15    


 


C-Reactive Protein  21.5 MG/DL (0.00-0.3)  H  05/17/17  05:15    


 


Total Protein  4.9 g/dl (6.4-8.2)  L  05/29/17  06:30    


 


Albumin  2.1 g/dl (3.4-5.0)  L  05/29/17  06:30    


 


Total Amylase  49 U/L ()  D 05/17/17  05:15    


 


Lipase  116 U/L ()   05/17/17  05:15    











Constitutional: No Distress


Eyes: Yes: Conjunctiva Clear


HENT: Yes: Atraumatic


Cardiovascular: Yes: Regular Rate and Rhythm


Respiratory: Yes: CTA Bilaterally


...Auscultate: Yes: Normoactive Bowel Sounds


...Palpate: Yes: Soft, Other (nontender)


Labs: 


 CBC, BMP





 05/31/17 06:30 





 05/31/17 06:30 





 INR, PTT











INR  1.07  (0.82-1.09)   05/22/17  05:35    


 


Fibrinogen  463.0 mg/dL (238-498)  D 05/22/17  05:35    








 Laboratory Tests











  05/22/17





  05:35


 


INR  1.07














Assessment/Plan


Recovered from cholangitis with improving renal function and rising platelet 

count. Will schedule ERCP for 6/2 to extract residual stones. Discussed with Dr Morgan, hospitalist.

## 2017-05-31 NOTE — PN
Physical Exam: 


SUBJECTIVE: Patient seen and examined at bedside.  Family present during exam. 

Patient reports eating a full breakfast this morning. Denies badominal pain, 

nausea or vomiting.








OBJECTIVE:





 Vital Signs





 3





 Period  Temp  Pulse  Resp  BP Sys/Badillo  Pulse Ox


 


 Last 24 Hr  97.9 F-99.4 F  59-62  20-20  124-155/54-62  98











GENERAL: The patient is awake, alert, and fully oriented, in no acute distress.


HEAD: Normal with no signs of trauma. Hearing aid in place.


EYES: PERRL, extraocular movements intact, sclera anicteric, conjunctiva pale. 

No ptosis. 


ENT: Ears normal, nares patent, oropharynx clear without exudates, moist mucous 


membranes.


NECK: Trachea midline, full range of motion, supple. 


LUNGS: Breath sounds equal, clear to auscultation bilaterally, no wheezes, no 

crackles, no 


accessory muscle use. 


HEART: Regular rate and rhythm, S1, S2 without murmur, rub or gallop.


ABDOMEN: Soft, nontender, nondistended, normoactive bowel sounds, no guarding, 

no 


rebound, no hepatosplenomegaly, no masses. Small ecchymotic areas present.


EXTREMITIES: 2+ pulses, warm, well-perfused, no edema. 


NEUROLOGICAL: Cranial nerves II through XII grossly intact. Normal speech, gait 

not 


observed.


PSYCH: Normal mood, normal affect.


SKIN: Warm, dry, normal turgor, no rashes or lesions noted














 Laboratory Results - last 24 hr





 3





  05/30/17 05/31/17 05/31/17





  10:20 06:30 06:30


 


WBC   4.7 


 


RBC   2.95 L 


 


Hgb   8.8 L 


 


Hct   26.2 L 


 


MCV   89.1 


 


MCHC   33.5 


 


RDW   17.2 H 


 


Plt Count   126 L 


 


MPV   9.7 


 


Sodium  145   145


 


Potassium  3.5   3.2 L


 


Chloride  106   106


 


Carbon Dioxide  32   32


 


Anion Gap  7 L   7 L


 


BUN  42 H   38 H


 


Creatinine  3.7 H   3.7 H


 


Random Glucose  99   80


 


Calcium  8.5   8.1 L








Active Medications





 3





Generic Name Dose Route Start Last Admin





  Trade Name Freq  PRN Reason Stop Dose Admin


 


Atorvastatin Calcium  20 mg 05/25/17 22:00 05/30/17 21:26





  Lipitor -  PO   20 mg





  HS ALCIDES   Administration


 


Carvedilol  6.25 mg 05/26/17 12:21 05/31/17 09:45





  Coreg -  PO   6.25 mg





  BID ALCIDES   Administration


 


Furosemide  40 mg 05/29/17 10:25 05/31/17 09:45





  Lasix -  PO   40 mg





  DAILY ALCIDES   Administration


 


Ursodiol  300 mg 05/24/17 22:00 05/31/17 09:45





  Actigal -  PO   300 mg





  BID ALCIDES   Administration











ASSESSMENT/PLAN:





A:


This is a 87 year old male with HTN, CAD s/p stent placement, aortic stenosis, 

porcine valve replacement (ASA 81mg and Plavix 75mg), urtheral strictures s/p 

dilatation and right nephrectomy admitted with abdominal pain, acute 

transaminitis, jaundice, found to have sepsis due to acute cholangitis.





P:


1. Acute Choledocholithiasis with acute cholangitis, ERCP w/ biliary stent 5/15


- case discussed with Dr. Terrazas who discussed with pt and family regarding 

repeat ERCP scheduled for 6/2


- consented for ERCP by Mk


- Continue Ursodiol BID





2. 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 


- Stop lasix 


- Shiley removed 5/27


- monitor K


- Maintain ware 





3. Septic shock 2/2 acute cholangitis 


- Resolving 


- ERCP s/p balloon sphincteroplasty and biliary stent placement 5/16


- repeat ERCP with sphincterotomy scheduled for 6/2





4. Thrombocytopenia 


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


- 1Uplts 5/23


- Hold chemical AC


- Plt trending up (38 on 5/19-> 126 today)





5. CAD s/p stent placement and demand ischemia, s/p TAVR for AS 


- Will restart once plates stable ASA/plavix


- Coreg 6.25 BID 





6. Acute respiratory failure 


- Resolved; extubated 5/23 





7. Lactic acidosis 


- Resolved 





8. Acute blood loss anemia  


- Transfused 2UPRBC 5/24 





9. Hypotension 


- Resolved 





10. F/E/N


- renal diet


- NPO after MN 6/2


- K-3.3  hold lasix and replete per Renal





11. PPX


- Daily OOB to chair 


- PT daily


- Hold AC therapy 2/2 thrombocytopenia and pending ERCP








Dispo: This patient requires continued inpatient services at this time.





Code Status: DNR/DNI





Visit type





- Emergency Visit


Emergency Visit: Yes


ED Registration Date: 05/15/17


Care time: The patient presented to the Emergency Department on the above date 

and was hospitalized for further evaluation of their emergent condition.





- New Patient


This patient is new to me today: Yes


Date on this admission: 05/31/17





- Critical Care


Critical Care patient: No

## 2017-05-31 NOTE — PN
Progress Note (short form)





- Note


Progress Note: 


No acute events


No pain


On diet


CBC,CMP











WBC  4.7 K/mm3 (4.0-10.0)   05/31/17  06:30    


 


RBC  2.95 M/mm3 (4.00-5.60)  L  05/31/17  06:30    


 


Hgb  8.8 GM/dL (11.7-16.9)  L  05/31/17  06:30    


 


Hct  26.2 % (35.4-49)  L  05/31/17  06:30    


 


MCV  89.1 fl (80-96)   05/31/17  06:30    


 


MCHC  33.5 g/dl (32.0-35.9)   05/31/17  06:30    


 


RDW  17.2 % (11.9-15.9)  H  05/31/17  06:30    


 


Plt Count  126 K/MM3 (134-434)  L  05/31/17  06:30    


 


MPV  9.7 fl (7.5-11.1)   05/31/17  06:30    


 


Neutrophils %  71.7 % (42.8-82.8)   05/27/17  06:00    


 


Lymphocytes %  13.1 % (8-40)  D 05/27/17  06:00    


 


Monocytes %  9.0 % (3.8-10.2)   05/27/17  06:00    


 


Eosinophils %  5.5 % (0-4.5)  H  05/27/17  06:00    


 


Basophils %  0.7 % (0-2.0)   05/27/17  06:00    


 


Band Neutrophils  11.0 % (0-10)  H D 05/17/17  05:15    


 


Metamyelocytes  5 % (0-2)  H D 05/16/17  20:30    


 


Myelocytes  9 % (0-2)  H D 05/16/17  20:30    


 


Differential Comment  Manual diff done   05/18/17  08:30    


 


Dohle Bodies  1+   05/17/17  05:15    


 


Platelet Estimate  Decreased  (NORMAL)   05/18/17  08:30    


 


Platelet Comment  No clumping noted   05/17/17  05:15    


 


Polychromasia  Few   05/17/17  05:15    


 


Hypochromic-Microcytic  Few   05/17/17  05:15    


 


Sodium  145 mmol/L (136-145)   05/31/17  06:30    


 


Potassium  3.2 mmol/L (3.5-5.1)  L  05/31/17  06:30    


 


Chloride  106 mmol/L ()   05/31/17  06:30    


 


Carbon Dioxide  32 mmol/L (21-32)   05/31/17  06:30    


 


Anion Gap  7  (8-16)  L  05/31/17  06:30    


 


BUN  38 mg/dL (7-18)  H  05/31/17  06:30    


 


Creatinine  3.7 mg/dL (0.7-1.3)  H  05/31/17  06:30    


 


Creat Clearance w eGFR  16.12  (>60)   05/29/17  06:30    


 


Random Glucose  80 mg/dL ()   05/31/17  06:30    


 


Lactic Acid  1.684 mmol/L (0.4-2.0)   05/17/17  13:20    


 


Calcium  8.1 mg/dL (8.5-10.1)  L  05/31/17  06:30    


 


Phosphorus  4.4 mg/dL (2.5-4.9)  D 05/23/17  05:30    


 


Magnesium  2.0 mg/dL (1.8-2.4)   05/23/17  05:30    


 


Total Bilirubin  1.8 mg/dL (0.2-1.0)  H  05/29/17  06:30    


 


Direct Bilirubin  1.4 mg/dL (0.0-0.2)  H D 05/23/17  05:30    


 


AST  33 U/L (15-37)  D 05/29/17  06:30    


 


ALT  11 U/L (12-78)  L  05/29/17  06:30    


 


Alkaline Phosphatase  370 U/L ()  H  05/29/17  06:30    


 


Creatine Kinase  60 IU/L ()   05/20/17  00:00    


 


Creatine Kinase Index  11.8 % (0.0-5.0)  H*  05/17/17  13:20    


 


CK-MB (CK-2)  18.446 ng/ml (0.5-3.6)  H  05/17/17  13:20    


 


CK-MB (CK-2) Rel Index  Cancelled   05/17/17  13:20    


 


Troponin I  1.73 ng/ml (0.00-0.05)  H*  05/20/17  05:15    


 


C-Reactive Protein  21.5 MG/DL (0.00-0.3)  H  05/17/17  05:15    


 


Total Protein  4.9 g/dl (6.4-8.2)  L  05/29/17  06:30    


 


Albumin  2.1 g/dl (3.4-5.0)  L  05/29/17  06:30    


 


Total Amylase  49 U/L ()  D 05/17/17  05:15    


 


Lipase  116 U/L ()   05/17/17  05:15    














Antibiotics


OOB


F/U with GI for repeat ERCP





Problem List





- Problems


(1) Umbilical discharge


Code(s): R19.8 - OTH SYMPTOMS AND SIGNS INVOLVING THE DGSTV SYS AND ABDOMEN

## 2017-06-01 LAB
ALBUMIN SERPL-MCNC: 2.3 G/DL (ref 3.4–5)
ALP SERPL-CCNC: 325 U/L (ref 45–117)
ALT SERPL-CCNC: 11 U/L (ref 12–78)
ANION GAP SERPL CALC-SCNC: 7 MMOL/L (ref 8–16)
AST SERPL-CCNC: 27 U/L (ref 15–37)
BASOPHILS # BLD: 2 % (ref 0–2)
BILIRUB SERPL-MCNC: 1.7 MG/DL (ref 0.2–1)
CALCIUM SERPL-MCNC: 8 MG/DL (ref 8.5–10.1)
CO2 SERPL-SCNC: 33 MMOL/L (ref 21–32)
COCKROFT - GAULT: 19.05
CREAT SERPL-MCNC: 3.4 MG/DL (ref 0.7–1.3)
DEPRECATED RDW RBC AUTO: 17 % (ref 11.9–15.9)
EOSINOPHIL # BLD: 2 % (ref 0–4.5)
GLUCOSE SERPL-MCNC: 85 MG/DL (ref 74–106)
MAGNESIUM SERPL-MCNC: 1.7 MG/DL (ref 1.8–2.4)
MCH RBC QN AUTO: 29.7 PG (ref 25.7–33.7)
MCHC RBC AUTO-ENTMCNC: 33.5 G/DL (ref 32–35.9)
MCV RBC: 88.8 FL (ref 80–96)
NEUTROPHILS # BLD: 67 % (ref 42.8–82.8)
PLATELET # BLD AUTO: 117 K/MM3 (ref 134–434)
PLATELET # BLD EST: (no result) 10*3/UL
PMV BLD: 10.1 FL (ref 7.5–11.1)
PROT SERPL-MCNC: 5.1 G/DL (ref 6.4–8.2)
WBC # BLD AUTO: 4.7 K/MM3 (ref 4–10)

## 2017-06-01 RX ADMIN — URSODIOL SCH MG: 300 CAPSULE ORAL at 09:42

## 2017-06-01 RX ADMIN — SODIUM CHLORIDE, POTASSIUM CHLORIDE, SODIUM LACTATE AND CALCIUM CHLORIDE SCH MLS/HR: 600; 310; 30; 20 INJECTION, SOLUTION INTRAVENOUS at 18:13

## 2017-06-01 RX ADMIN — CARVEDILOL SCH MG: 6.25 TABLET, FILM COATED ORAL at 21:12

## 2017-06-01 RX ADMIN — CARVEDILOL SCH MG: 6.25 TABLET, FILM COATED ORAL at 09:42

## 2017-06-01 RX ADMIN — URSODIOL SCH MG: 300 CAPSULE ORAL at 21:12

## 2017-06-01 RX ADMIN — ATORVASTATIN CALCIUM SCH MG: 20 TABLET, FILM COATED ORAL at 21:12

## 2017-06-01 NOTE — PN
Progress Note (short form)





- Note


Progress Note: 


Resting in NAD.  No acute events overnight. 


No CP or SOB.


 Intake & Output











 05/29/17 05/30/17 05/31/17 06/01/17





 23:59 23:59 23:59 23:59


 


Intake Total  250


 


Output Total 2400 600 1050 300


 


Balance -1660 90 120 -50


 


Weight 213 lb 5 oz 193 lb 7 oz  194 lb








 Last Vital Signs











Temp Pulse Resp BP Pulse Ox


 


 98.6 F   60   20   144/58   97 


 


 06/01/17 06:10  06/01/17 06:10  06/01/17 06:10  06/01/17 06:10  05/31/17 21:00








Active Medications





Atorvastatin Calcium (Lipitor -)  20 mg PO [facility]


   Last Admin: 05/31/17 22:02 Dose:  20 mg


Carvedilol (Coreg -)  6.25 mg PO BID [facility]


   Last Admin: 05/31/17 22:03 Dose:  6.25 mg


Ursodiol (Actigal -)  300 mg PO BID [facility]


   Last Admin: 05/31/17 22:02 Dose:  300 mg








Gen: awake and alert, NAD   


Heart:  RRR


Lung: decreased breath sounds at the bases


Abd: soft, nontender


Ext: no edema


 


 


 


 


 Laboratory Results - last 24 hr











  06/01/17 06/01/17 06/01/17





  06:30 06:30 07:00


 


WBC  4.7  


 


RBC  2.95 L  


 


Hgb  8.8 L  


 


Hct  26.2 L  


 


MCV  88.8  


 


MCHC  33.5  


 


RDW  17.0 H  


 


Plt Count  117 L  


 


MPV  10.1  


 


Neutrophils %  Y  


 


Lymphocytes %  Y  


 


Sodium   145 


 


Potassium   3.9  D 


 


Chloride   105 


 


Carbon Dioxide   33 H 


 


Anion Gap   7 L 


 


BUN   36 H 


 


Creatinine   3.4 H 


 


Creat Clearance w eGFR   17.22 


 


Random Glucose   85 


 


Calcium   8.0 L 


 


Magnesium   1.7 L 


 


Total Bilirubin   1.7 H 


 


AST   27 


 


ALT   11 L 


 


Alkaline Phosphatase   325 H 


 


Total Protein   5.1 L 


 


Albumin   2.3 L 


 


Blood Type    A POSITIVE


 


Antibody Screen    Negative











ASSESSMENT AND PLAN:


Acute Cholangitis


Gram Negative Bacteremia


s/p ERCP/sphincteroplasty/stent placement


Acute Hypoxic Respiratory Failure


Septic Shock


Acute on Chronic Renal Failure


Lactic Acidosis


CAD


+Troponins likely Demand Ischemia


s/p TAVR for severe Aortic Stenosis


Thrombocytopenia


 





-  DVT/GI prophylaxis


-  O2 as needed  


-  Aspiration precautions 


-  Actigal 


-  D/C planning 





Dr Sidhu

## 2017-06-01 NOTE — PN
Progress Note, Physician


History of Present Illness: 


Pt seen and examined at bedside. He is awake and alert. He denies abdominal 

pain. He does complain of some discomfort breathing. 





- Current Medication List


Current Medications: 


Active Medications





Atorvastatin Calcium (Lipitor -)  20 mg PO [facility]


   Last Admin: 05/31/17 22:02 Dose:  20 mg


Carvedilol (Coreg -)  6.25 mg PO BID [facility]


   Last Admin: 06/01/17 09:42 Dose:  6.25 mg


Ursodiol (Actigal -)  300 mg PO BID [facility]


   Last Admin: 06/01/17 09:42 Dose:  300 mg











- Objective


Vital Signs: 


 Vital Signs











Temperature  98.6 F   06/01/17 06:10


 


Pulse Rate  60   06/01/17 06:10


 


Respiratory Rate  20   06/01/17 06:10


 


Blood Pressure  144/58   06/01/17 06:10


 


O2 Sat by Pulse Oximetry (%)  97   05/31/17 21:00











Constitutional: Yes: Calm


Eyes: Yes: Conjunctiva Clear


HENT: Yes: Atraumatic


Neck: Yes: Supple


Cardiovascular: Yes: S1, S2


Respiratory: Yes: Diminished, On Nasal O2


Genitourinary: Yes: Chadwick Present


Musculoskeletal: Yes: Muscle Weakness


Edema: Yes


Edema: LLE: 1+, RLE: 1+


Neurological: Yes: Oriented


Psychiatric: Yes: Oriented


Labs: 


 CBC, BMP





 06/01/17 06:30 





 06/01/17 06:30 





 INR, PTT











INR  1.07  (0.82-1.09)   05/22/17  05:35    


 


Fibrinogen  463.0 mg/dL (238-498)  D 05/22/17  05:35    














Problem List





- Problems


(1) CAD (coronary artery disease)


Code(s): I25.10 - ATHSCL HEART DISEASE OF NATIVE CORONARY ARTERY W/O ANG PCTRS 

  Qualifiers: 


     Coronary Disease-Associated Artery/Lesion type: unspecified vessel or 

lesion type     Native vs. transplanted heart: native heart     Associated 

angina: without angina        Qualified Code(s): I25.10 - Atherosclerotic heart 

disease of native coronary artery without angina pectoris  





(2) Calculus of common duct with obstruction


Code(s): K80.51 - CALCULUS OF BILE DUCT W/O CHOLANGITIS OR CHOLECYST W OBST





(3) Choledocholithiasis


Code(s): K80.50 - CALCULUS OF BILE DUCT W/O CHOLANGITIS OR CHOLECYST W/O OBST





(4) Renal cancer


Code(s): C64.9 - MALIGNANT NEOPLASM OF UNSP KIDNEY, EXCEPT RENAL PELVIS   

Qualifiers: 


     Laterality: right        Qualified Code(s): C64.1 - Malignant neoplasm of 

right kidney, except renal pelvis  





(5) BO (acute kidney injury)


Code(s): N17.9 - ACUTE KIDNEY FAILURE, UNSPECIFIED





(6) CKD (chronic kidney disease)


Code(s): N18.9 - CHRONIC KIDNEY DISEASE, UNSPECIFIED   








Assessment/Plan


 Current Medications











Generic Name Dose Route Start Last Admin





  Trade Name Freq  PRN Reason Stop Dose Admin


 


Atorvastatin Calcium  20 mg 05/25/17 22:00 05/31/17 22:02





  Lipitor -  PO   20 mg





  [facility] ALCIDES   Administration


 


Carvedilol  6.25 mg 05/26/17 12:21 06/01/17 09:42





  Coreg -  PO   6.25 mg





  [facility] ALCIDES   Administration


 


Ursodiol  300 mg 05/24/17 22:00 06/01/17 09:42





  Actigal -  PO   300 mg





  BID ALCIDES   Administration














Impression


1. CKD stage 3


2. BO


3. choledocholithiasis


4. CAD


5. hx of Renal Cell Cancer s/p nephroectomy


6. aortic stenosis


7. sepsis


8. acute respiratory failure requiring intubation


9. NSTEMI


10. bacteremia





Plan


- will give dose of lasix today as he sound congested and has edema


- repeat labs in am


- ercp in am


- will follow pt


- ercp is risk for pancreatitis which if occurs cause BO 


- procedure planned for am








Dr Rodriguez

## 2017-06-01 NOTE — PN
Progress Note (short form)





- Note


Progress Note: 


No acute events


On diet


No abdominal pain


 Vital Signs











 Period  Temp  Pulse  Resp  BP Sys/Badillo  Pulse Ox


 


 Last 24 Hr  97.5 F-98.7 F  57-60  18-20  122-144/56-65  97








Abd soft, NT





CBC,CMP











WBC  4.7 K/mm3 (4.0-10.0)   06/01/17  06:30    


 


RBC  2.95 M/mm3 (4.00-5.60)  L  06/01/17  06:30    


 


Hgb  8.8 GM/dL (11.7-16.9)  L  06/01/17  06:30    


 


Hct  26.2 % (35.4-49)  L  06/01/17  06:30    


 


MCV  88.8 fl (80-96)   06/01/17  06:30    


 


MCHC  33.5 g/dl (32.0-35.9)   06/01/17  06:30    


 


RDW  17.0 % (11.9-15.9)  H  06/01/17  06:30    


 


Plt Count  117 K/MM3 (134-434)  L  06/01/17  06:30    


 


MPV  10.1 fl (7.5-11.1)   06/01/17  06:30    


 


Neutrophils %  67.0 % (42.8-82.8)   06/01/17  06:30    


 


Lymphocytes %  23.0 % (8-40)  D 06/01/17  06:30    


 


Monocytes %  6.0 % (3.8-10.2)   06/01/17  06:30    


 


Eosinophils %  2.0 % (0-4.5)   06/01/17  06:30    


 


Basophils %  2.0 % (0-2.0)   06/01/17  06:30    


 


Band Neutrophils  11.0 % (0-10)  H D 05/17/17  05:15    


 


Metamyelocytes  5 % (0-2)  H D 05/16/17  20:30    


 


Myelocytes  9 % (0-2)  H D 05/16/17  20:30    


 


Differential Comment  Manual diff done   06/01/17  06:30    


 


Dohle Bodies  1+   05/17/17  05:15    


 


Platelet Estimate  Decreased  (NORMAL)   06/01/17  06:30    


 


Platelet Comment  No clumping noted   05/17/17  05:15    


 


Polychromasia  Few   05/17/17  05:15    


 


Hypochromic-Microcytic  Few   05/17/17  05:15    


 


Sodium  145 mmol/L (136-145)   06/01/17  06:30    


 


Potassium  3.9 mmol/L (3.5-5.1)  D 06/01/17  06:30    


 


Chloride  105 mmol/L ()   06/01/17  06:30    


 


Carbon Dioxide  33 mmol/L (21-32)  H  06/01/17  06:30    


 


Anion Gap  7  (8-16)  L  06/01/17  06:30    


 


BUN  36 mg/dL (7-18)  H  06/01/17  06:30    


 


Creatinine  3.4 mg/dL (0.7-1.3)  H  06/01/17  06:30    


 


Creat Clearance w eGFR  17.22  (>60)   06/01/17  06:30    


 


Random Glucose  85 mg/dL ()   06/01/17  06:30    


 


Lactic Acid  1.684 mmol/L (0.4-2.0)   05/17/17  13:20    


 


Calcium  8.0 mg/dL (8.5-10.1)  L  06/01/17  06:30    


 


Phosphorus  4.4 mg/dL (2.5-4.9)  D 05/23/17  05:30    


 


Magnesium  1.7 mg/dL (1.8-2.4)  L  06/01/17  06:30    


 


Total Bilirubin  1.7 mg/dL (0.2-1.0)  H  06/01/17  06:30    


 


Direct Bilirubin  1.4 mg/dL (0.0-0.2)  H D 05/23/17  05:30    


 


AST  27 U/L (15-37)   06/01/17  06:30    


 


ALT  11 U/L (12-78)  L  06/01/17  06:30    


 


Alkaline Phosphatase  325 U/L ()  H  06/01/17  06:30    


 


Creatine Kinase  60 IU/L ()   05/20/17  00:00    


 


Creatine Kinase Index  11.8 % (0.0-5.0)  H*  05/17/17  13:20    


 


CK-MB (CK-2)  18.446 ng/ml (0.5-3.6)  H  05/17/17  13:20    


 


CK-MB (CK-2) Rel Index  Cancelled   05/17/17  13:20    


 


Troponin I  1.73 ng/ml (0.00-0.05)  H*  05/20/17  05:15    


 


C-Reactive Protein  21.5 MG/DL (0.00-0.3)  H  05/17/17  05:15    


 


Total Protein  5.1 g/dl (6.4-8.2)  L  06/01/17  06:30    


 


Albumin  2.3 g/dl (3.4-5.0)  L  06/01/17  06:30    


 


Total Amylase  49 U/L ()  D 05/17/17  05:15    


 


Lipase  116 U/L ()   05/17/17  05:15    








Awaiting repeat ERCP





Problem List





- Problems


(1) Umbilical discharge


Code(s): R19.8 - [facility] SYMPTOMS AND SIGNS INVOLVING THE DGSTV SYS AND ABDOMEN

## 2017-06-02 LAB
ALBUMIN SERPL-MCNC: 2.1 G/DL (ref 3.4–5)
ALP SERPL-CCNC: 286 U/L (ref 45–117)
ALT SERPL-CCNC: 10 U/L (ref 12–78)
ANION GAP SERPL CALC-SCNC: 6 MMOL/L (ref 8–16)
AST SERPL-CCNC: 24 U/L (ref 15–37)
BASOPHILS # BLD: 3.2 % (ref 0–2)
BILIRUB SERPL-MCNC: 1.6 MG/DL (ref 0.2–1)
CALCIUM SERPL-MCNC: 7.7 MG/DL (ref 8.5–10.1)
CO2 SERPL-SCNC: 33 MMOL/L (ref 21–32)
COCKROFT - GAULT: 20.92
CREAT SERPL-MCNC: 3.1 MG/DL (ref 0.7–1.3)
DEPRECATED RDW RBC AUTO: 17.1 % (ref 11.9–15.9)
EOSINOPHIL # BLD: 4.2 % (ref 0–4.5)
GLUCOSE SERPL-MCNC: 76 MG/DL (ref 74–106)
MCH RBC QN AUTO: 29.8 PG (ref 25.7–33.7)
MCHC RBC AUTO-ENTMCNC: 33.3 G/DL (ref 32–35.9)
MCV RBC: 89.4 FL (ref 80–96)
NEUTROPHILS # BLD: 55.6 % (ref 42.8–82.8)
PLATELET # BLD AUTO: 101 K/MM3 (ref 134–434)
PMV BLD: 10.1 FL (ref 7.5–11.1)
PROT SERPL-MCNC: 4.8 G/DL (ref 6.4–8.2)
WBC # BLD AUTO: 4.2 K/MM3 (ref 4–10)

## 2017-06-02 PROCEDURE — 0FC98ZZ EXTIRPATION OF MATTER FROM COMMON BILE DUCT, VIA NATURAL OR ARTIFICIAL OPENING ENDOSCOPIC: ICD-10-PCS | Performed by: INTERNAL MEDICINE

## 2017-06-02 PROCEDURE — 0FPD8DZ REMOVAL OF INTRALUMINAL DEVICE FROM PANCREATIC DUCT, VIA NATURAL OR ARTIFICIAL OPENING ENDOSCOPIC: ICD-10-PCS | Performed by: INTERNAL MEDICINE

## 2017-06-02 PROCEDURE — BF10YZZ FLUOROSCOPY OF BILE DUCTS USING OTHER CONTRAST: ICD-10-PCS | Performed by: INTERNAL MEDICINE

## 2017-06-02 PROCEDURE — 0F798DZ DILATION OF COMMON BILE DUCT WITH INTRALUMINAL DEVICE, VIA NATURAL OR ARTIFICIAL OPENING ENDOSCOPIC: ICD-10-PCS | Performed by: INTERNAL MEDICINE

## 2017-06-02 RX ADMIN — ATORVASTATIN CALCIUM SCH MG: 20 TABLET, FILM COATED ORAL at 23:03

## 2017-06-02 RX ADMIN — CARVEDILOL SCH: 6.25 TABLET, FILM COATED ORAL at 09:43

## 2017-06-02 RX ADMIN — SODIUM CHLORIDE, POTASSIUM CHLORIDE, SODIUM LACTATE AND CALCIUM CHLORIDE SCH: 600; 310; 30; 20 INJECTION, SOLUTION INTRAVENOUS at 17:28

## 2017-06-02 RX ADMIN — URSODIOL SCH MG: 300 CAPSULE ORAL at 23:03

## 2017-06-02 RX ADMIN — CARVEDILOL SCH MG: 6.25 TABLET, FILM COATED ORAL at 23:03

## 2017-06-02 RX ADMIN — URSODIOL SCH MG: 300 CAPSULE ORAL at 09:30

## 2017-06-02 NOTE — PN
Progress Note, Physician


History of Present Illness: 


Pt seen and examined at bedside. He is awake and alert. He just had the ERCP 

done. 





- Current Medication List


Current Medications: 


Active Medications





Atorvastatin Calcium (Lipitor -)  20 mg PO HS [facility]


   Last Admin: 06/01/17 21:12 Dose:  20 mg


Carvedilol (Coreg -)  6.25 mg PO BID [facility]


   Last Admin: 06/02/17 09:43 Dose:  Not Given


Lactated Ringer's (Lactated Ringers Solution)  1,000 mls @ 125 mls/hr IV ASDIR 

ALCIDES


   Stop: 06/02/17 21:15


Lactated Ringer's (Lactated Ringers Solution)  1,000 mls @ 100 mls/hr IV ASDIR 

ALCIDES


   Stop: 06/03/17 03:15


Lactated Ringer's (Lactated Ringers Solution)  1,000 mls @ 75 mls/hr IV ASDIR 

ALCIDES


   Stop: 06/03/17 09:15


Levofloxacin (Levaquin 500 Mg Premixed Ivpb -)  100 mls @ 100 mls/hr IVPB DAILY 

[facility]


Ursodiol (Actigal -)  300 mg PO BID [facility]


   Last Admin: 06/02/17 09:30 Dose:  300 mg











- Objective


Vital Signs: 


 Vital Signs











Temperature  97.8 F   06/02/17 15:00


 


Pulse Rate  65   06/02/17 16:02


 


Respiratory Rate  18   06/02/17 16:02


 


Blood Pressure  147/46   06/02/17 16:02


 


O2 Sat by Pulse Oximetry (%)  99   06/02/17 16:02











Constitutional: Yes: Calm


Eyes: Yes: Conjunctiva Clear


HENT: Yes: Atraumatic


Neck: Yes: Supple


Cardiovascular: Yes: S1, S2


Respiratory: Yes: CTA Bilaterally


Genitourinary: Yes: Chadwick Present


Musculoskeletal: Yes: Muscle Weakness


Neurological: Yes: Oriented


Psychiatric: Yes: Oriented


Labs: 


 CBC, BMP





 06/02/17 06:30 





 06/02/17 06:30 





 INR, PTT











INR  1.07  (0.82-1.09)   05/22/17  05:35    


 


Fibrinogen  463.0 mg/dL (238-498)  D 05/22/17  05:35    














Problem List





- Problems


(1) CAD (coronary artery disease)


Code(s): I25.10 - ATHSCL HEART DISEASE OF NATIVE CORONARY ARTERY W/O ANG PCTRS 

  Qualifiers: 


     Coronary Disease-Associated Artery/Lesion type: unspecified vessel or 

lesion type     Native vs. transplanted heart: native heart     Associated 

angina: without angina        Qualified Code(s): I25.10 - Atherosclerotic heart 

disease of native coronary artery without angina pectoris  





(2) Calculus of common duct with obstruction


Code(s): K80.51 - CALCULUS OF BILE DUCT W/O CHOLANGITIS OR CHOLECYST W OBST





(3) Choledocholithiasis


Code(s): K80.50 - CALCULUS OF BILE DUCT W/O CHOLANGITIS OR CHOLECYST W/O OBST





(4) Renal cancer


Code(s): C64.9 - MALIGNANT NEOPLASM OF UNSP KIDNEY, EXCEPT RENAL PELVIS   

Qualifiers: 


     Laterality: right        Qualified Code(s): C64.1 - Malignant neoplasm of 

right kidney, except renal pelvis  





(5) BO (acute kidney injury)


Code(s): N17.9 - ACUTE KIDNEY FAILURE, UNSPECIFIED





(6) CKD (chronic kidney disease)


Code(s): N18.9 - CHRONIC KIDNEY DISEASE, UNSPECIFIED   








Assessment/Plan


 Current Medications











Generic Name Dose Route Start Last Admin





  Trade Name Freq  PRN Reason Stop Dose Admin


 


Atorvastatin Calcium  20 mg 05/25/17 22:00 06/01/17 21:12





  Lipitor -  PO   20 mg





  HS ALCIDES   Administration


 


Carvedilol  6.25 mg 05/26/17 12:21 06/02/17 09:43





  Coreg -  PO   Not Given





  BID ALCIDES   


 


Lactated Ringer's  1,000 mls @ 125 mls/hr 06/02/17 15:27  





  Lactated Ringers Solution  IV 06/02/17 21:15  





  ASDIR ALCIDES   


 


Lactated Ringer's  1,000 mls @ 100 mls/hr 06/02/17 21:15  





  Lactated Ringers Solution  IV 06/03/17 03:15  





  ASDIR ALCIDES   


 


Lactated Ringer's  1,000 mls @ 75 mls/hr 06/03/17 03:15  





  Lactated Ringers Solution  IV 06/03/17 09:15  





  ASDIR ALCIDES   


 


Levofloxacin  100 mls @ 100 mls/hr 06/02/17 15:30  





  Levaquin 500 Mg Premixed Ivpb -  IVPB   





  DAILY ALCIDES   


 


Ursodiol  300 mg 05/24/17 22:00 06/02/17 09:30





  Actigal -  PO   300 mg





  BID ALCIDES   Administration














Impression


1. CKD stage 3


2. BO


3. choledocholithiasis


4. CAD


5. hx of Renal Cell Cancer s/p nephroectomy


6. aortic stenosis


7. sepsis


8. acute respiratory failure requiring intubation


9. NSTEMI


10. bacteremia





Plan


- hold off lasix today as he just had ERCP


- renal function is stabilizing


- repeat labs in am


- monitor closely


- GI follow up


- discussed with family at bedside








Dr Rodriguez

## 2017-06-02 NOTE — PN
Progress Note (short form)





- Note


Progress Note: 


GI Procedure NOte: Please see scanned ERCP report. A large proximal CBD stone 

was found and splintered during balloon extraction. The sphincterotomy swelled 

before they could all be extracted so a 7Fr x 4 cm stent was inserted. 

Discussed with patient and family.

## 2017-06-02 NOTE — PN
Progress Note (short form)





- Note


Progress Note: 


PULMONARY





RESTING COMFORTABLY





NO OVERALL CLINICAL CHANGE IN EXAM





LABS/MEDS/NOTES/MICRO/IMAGING REVIEWED








Acute Cholangitis


Gram Negative Bacteremia


s/p ERCP/sphincteroplasty/stent placement


Acute Hypoxic Respiratory Failure


Septic Shock


Acute on Chronic Renal Failure


Lactic Acidosis


CAD


+Troponins likely Demand Ischemia


s/p TAVR for severe Aortic Stenosis


Thrombocytopenia


 





-  ERCP TODAY


-  DVT/GI prophylaxis


-  Normal transfusion thresholds. 


-  Aspiration precautions 





CASTRO BLANC MD

## 2017-06-02 NOTE — PN
Physical Exam: 


SUBJECTIVE: Patient seen and examined at the bedside.


Denies any discomfort.








OBJECTIVE:


For ERCP today for residual CBD stone and cholecystectomy


 Vital Signs











 Period  Temp  Pulse  Resp  BP Sys/Badillo  Pulse Ox


 


 Last 24 Hr  98.1 F-98.5 F  51-61  20-20  128-143/57-60  94-96








GENERAL: Patient is awake, alert and in no acute distress - on 2 liters of 

nasal cannula


HEAD: Normal with no signs of trauma.


NECK: Normal range of motion, supple without lymphadenopathy, JVD, or masses.


LUNGS: anterior/posterior lung sounds diminished, on 2 liters of nasal cannula


HEART: Regular rate and rhythm


ABDOMEN: very mild redness to periumbilical region. 


UPPER EXTREMITIES: lower ext with non pitting edema


NEUROLOGICAL:  Awake, alert





 Laboratory Results - last 24 hr











  06/02/17 06/02/17





  06:30 06:30


 


WBC  4.2 


 


RBC  2.77 L 


 


Hgb  8.3 L 


 


Hct  24.8 L 


 


MCV  89.4 


 


MCHC  33.3 


 


RDW  17.1 H 


 


Plt Count  101 L 


 


MPV  10.1 


 


Neutrophils %  55.6 


 


Lymphocytes %  25.1 


 


Monocytes %  11.9 H D 


 


Eosinophils %  4.2  D 


 


Basophils %  3.2 H 


 


Sodium   144


 


Potassium   3.5


 


Chloride   105


 


Carbon Dioxide   33 H


 


Anion Gap   6 L


 


BUN   35 H


 


Creatinine   3.1 H


 


Creat Clearance w eGFR   19.15


 


Random Glucose   76


 


Calcium   7.7 L


 


Total Bilirubin   1.6 H


 


AST   24


 


ALT   10 L


 


Alkaline Phosphatase   286 H


 


Total Protein   4.8 L


 


Albumin   2.1 L








Active Medications











Generic Name Dose Route Start Last Admin





  Trade Name Freq  PRN Reason Stop Dose Admin


 


Atorvastatin Calcium  20 mg 05/25/17 22:00 06/01/17 21:12





  Lipitor -  PO   20 mg





  HS ALCIDES   Administration


 


Carvedilol  6.25 mg 05/26/17 12:21 06/02/17 09:43





  Coreg -  PO   Not Given





  BID ALCIDES   


 


Lactated Ringer's  1,000 mls @ 75 mls/hr 06/01/17 15:15 06/01/17 18:13





  Lactated Ringers Solution  IV   75 mls/hr





  ASDIR ALCIDES   Administration


 


Ursodiol  300 mg 05/24/17 22:00 06/02/17 09:30





  Actigal -  PO   300 mg





  BID ALCIDES   Administration











ASSESSMENT/PLAN:





Patient is a 87 year old male with a significant past medical history of 

hypertension, CAD s/p stent placement, aortic stenosis, porcine valve 

replacement (takes both ASA 81mg and Plavix 75mg), urtheral strictures s/p 

dilatation and right nephrectomy. 





He was admitted on 5/15/2017 for abdominal pain, acute choledocholithiasis, 

acute transaminitis, jaundice and leukocytosis with shaking chills.  





ID:


Septic Shock likely secondary to cholangitis with respiratory failure - resolved


Assessment/Plan: Extubated 5/23, lactic acidosis resolved


Acute Choledocholithiasis with acute cholangitis, ERCP w/ biliary stent on 5/15


Patient for a repeat ERCP today for residual CBD stone and cholecystectomy


On Ursodiol 300mg BID





GI:


Acute Choledocholithiasis/abdominal pain/generalized jaundice/acute 

transaminitis 


Assessment/Plan: Cholangitis s/p ERCP with biliary stent placement on 5/16


Trend LFT's


On Ursodiol





:


Chronic Kidney Disease - acute renal failure/has solitary kidney 


due to renal cancer with s/p right nephrectomy


Assessment/Plan: Shijanina removed on 5/27


Has ware catheter


Renal following





Cardiology:


Hypertension - chronic


Assessment/Plan: On Coreq 6.25ng BID


ASA/Plavix on hold





CAD s/p stent placement


On ASA 81mg and Plavix 75mg - on hold





Hematology:


Thrombocytopenia - resolved


Assessment/Plan:  s/p 1 unit of platelets on 5/23


Thrombocytopenia likely secondary to sepsis


HIT panel slightly elevated





F.E.N.


Fluids: Lactated ringers @ 75cc/hr


Electrolytes: within normal limits


Nutrition: Low sodium





Prophylaxis:


DVT:  SCDs


GI: Protonix





Code Status:  Requires inpatient hospitalization.   DNR/DNI.





Visit type





- Emergency Visit


Emergency Visit: Yes


ED Registration Date: 05/15/17


Care time: The patient presented to the Emergency Department on the above date 

and was hospitalized for further evaluation of their emergent condition.





- New Patient


This patient is new to me today: No





- Critical Care


Critical Care patient: No





- Discharge Referral


Referred to [facility] No

## 2017-06-03 LAB
ALBUMIN SERPL-MCNC: 2.1 G/DL (ref 3.4–5)
ALP SERPL-CCNC: 273 U/L (ref 45–117)
ALT SERPL-CCNC: 10 U/L (ref 12–78)
AMYLASE SERPL-CCNC: 83 U/L (ref 25–115)
ANION GAP SERPL CALC-SCNC: 7 MMOL/L (ref 8–16)
AST SERPL-CCNC: 21 U/L (ref 15–37)
BASOPHILS # BLD: 2.4 % (ref 0–2)
BILIRUB CONJ SERPL-MCNC: 1.1 MG/DL (ref 0–0.2)
BILIRUB SERPL-MCNC: 1.5 MG/DL (ref 0.2–1)
CALCIUM SERPL-MCNC: 7.9 MG/DL (ref 8.5–10.1)
CO2 SERPL-SCNC: 32 MMOL/L (ref 21–32)
COCKROFT - GAULT: 23.9
CREAT SERPL-MCNC: 2.8 MG/DL (ref 0.7–1.3)
CRP SERPL-MCNC: 3.1 MG/DL (ref 0–0.3)
DEPRECATED RDW RBC AUTO: 17 % (ref 11.9–15.9)
EOSINOPHIL # BLD: 4.7 % (ref 0–4.5)
GLUCOSE SERPL-MCNC: 64 MG/DL (ref 74–106)
MCH RBC QN AUTO: 30 PG (ref 25.7–33.7)
MCHC RBC AUTO-ENTMCNC: 33.5 G/DL (ref 32–35.9)
MCV RBC: 89.4 FL (ref 80–96)
NEUTROPHILS # BLD: 62.7 % (ref 42.8–82.8)
PLATELET # BLD AUTO: 94 K/MM3 (ref 134–434)
PMV BLD: 10.1 FL (ref 7.5–11.1)
PROT SERPL-MCNC: 4.8 G/DL (ref 6.4–8.2)
WBC # BLD AUTO: 5 K/MM3 (ref 4–10)

## 2017-06-03 RX ADMIN — ATORVASTATIN CALCIUM SCH MG: 20 TABLET, FILM COATED ORAL at 21:59

## 2017-06-03 RX ADMIN — CARVEDILOL SCH MG: 6.25 TABLET, FILM COATED ORAL at 21:59

## 2017-06-03 RX ADMIN — CARVEDILOL SCH MG: 6.25 TABLET, FILM COATED ORAL at 10:37

## 2017-06-03 RX ADMIN — SODIUM CHLORIDE, POTASSIUM CHLORIDE, SODIUM LACTATE AND CALCIUM CHLORIDE SCH MLS/HR: 600; 310; 30; 20 INJECTION, SOLUTION INTRAVENOUS at 02:14

## 2017-06-03 RX ADMIN — LEVOFLOXACIN SCH MLS/HR: 5 INJECTION, SOLUTION INTRAVENOUS at 10:38

## 2017-06-03 RX ADMIN — URSODIOL SCH MG: 300 CAPSULE ORAL at 10:37

## 2017-06-03 RX ADMIN — SODIUM CHLORIDE, POTASSIUM CHLORIDE, SODIUM LACTATE AND CALCIUM CHLORIDE SCH: 600; 310; 30; 20 INJECTION, SOLUTION INTRAVENOUS at 00:00

## 2017-06-03 RX ADMIN — URSODIOL SCH MG: 300 CAPSULE ORAL at 21:59

## 2017-06-03 NOTE — PN
GI Progress Note


Subjective: 


NO C/O


FEELS FINE


NO N/V/F/C/S


PO WITHOUTPROBLEMS


S/P ERCP AND STENTING YESTERDAY








- Objective


Vital Signs: 


 Vital Signs











Temperature  98.1 F   06/03/17 06:00


 


Pulse Rate  61   06/03/17 06:00


 


Respiratory Rate  20   06/03/17 06:00


 


Blood Pressure  152/90   06/03/17 06:00


 


O2 Sat by Pulse Oximetry (%)  97   06/02/17 22:00











Constitutional: Well Nourished, No Distress, Calm


Eyes: Yes: WNL (+BS/ SOFT/ NT)


Labs: 


 CBC, BMP





 06/03/17 06:30 





 06/03/17 06:30 





 INR, PTT











INR  1.07  (0.82-1.09)   05/22/17  05:35    


 


Fibrinogen  463.0 mg/dL (238-498)  D 05/22/17  05:35    














Assessment/Plan


S/P ERCP AND PARTIAL STONE EXTRACTION AND STENT PLACEMENT


NO ADVERSE SEQ. OF PROCEDURE


LABS STABLE AND IMPROVING





WOULD ADVANCE DIET AS TOLERATED


PT WILL NEED OUTPATIENT STENT REMOVAL IN A FEW WEEKS





OFC F/U WITH DR SALLY CISNEROS MD

## 2017-06-03 NOTE — PN
Physical Exam: 


SUBJECTIVE: Patient seen and examined. 


He was sitting in the chair today, asking to go home.


States he feels well.  Wants to walk with PT





OBJECTIVE:


Appears comfortable


Had ERCP yesterday, POD#1


Will order PT


Some congestion of lungs noted, Incentive spirometer ordered


OOB to chair


 Vital Signs











 Period  Temp  Pulse  Resp  BP Sys/Badillo  Pulse Ox


 


 Last 24 Hr  97.4 F-98.9 F  54-69  16-20  139-161/46-90  95-99











GENERAL: Patient is awake, alert and in no acute distress - on 2 liters of 

nasal cannula


HEAD: Normal with no signs of trauma.


NECK: Normal range of motion, supple without lymphadenopathy, JVD, or masses.


LUNGS: anterior/posterior lung sounds diminished, on 2 liters of nasal cannula


HEART: Regular rate and rhythm


ABDOMEN: very mild redness to periumbilical region. 


UPPER EXTREMITIES: lower ext with non pitting edema


NEUROLOGICAL:  Awake, alert, [place]





 Laboratory Results - last 24 hr











  06/03/17 06/03/17 06/03/17





  06:30 06:30 06:30


 


WBC  5.0  


 


RBC  2.91 L  


 


Hgb  8.7 L  


 


Hct  26.0 L  


 


MCV  89.4  


 


MCHC  33.5  


 


RDW  17.0 H  


 


Plt Count  94 L  


 


MPV  10.1  


 


Neutrophils %  62.7  


 


Lymphocytes %  20.7  


 


Monocytes %  9.5  


 


Eosinophils %  4.7 H  


 


Basophils %  2.4 H  


 


Sodium   144 


 


Potassium   3.8 


 


Chloride   105 


 


Carbon Dioxide   32 


 


Anion Gap   7 L 


 


BUN   32 H 


 


Creatinine   2.8 H 


 


Creat Clearance w eGFR   21.54 


 


Random Glucose   64 L 


 


Calcium   7.9 L 


 


Total Bilirubin   1.5 H 


 


Direct Bilirubin    1.1 H D


 


AST   21 


 


ALT   10 L 


 


Alkaline Phosphatase   273 H 


 


C-Reactive Protein    3.1 H D


 


Total Protein   4.8 L 


 


Albumin   2.1 L 


 


Total Amylase    83  D


 


Lipase    138








Active Medications











Generic Name Dose Route Start Last Admin





  Trade Name Freq  PRN Reason Stop Dose Admin


 


Atorvastatin Calcium  20 mg 05/25/17 22:00 06/02/17 23:03





  Lipitor -  PO   20 mg





  HS ALCIDES   Administration


 


Carvedilol  6.25 mg 05/26/17 12:21 06/03/17 10:37





  Coreg -  PO   6.25 mg





  BID ALCIDES   Administration


 


Levofloxacin  50 mls @ 50 mls/hr 06/03/17 10:00 06/03/17 10:38





  Levaquin 250 Mg Premixed Ivpb -  IVPB   50 mls/hr





  DAILY ALCIDES   Administration


 


Ursodiol  300 mg 05/24/17 22:00 06/03/17 10:37





  Actigal -  PO   300 mg





  BID ALCIDES   Administration











ASSESSMENT/PLAN:





Patient is a 87 year old male with a significant past medical history of 

hypertension, CAD s/p stent placement, aortic stenosis, porcine valve 

replacement (takes both ASA 81mg and Plavix 75mg), urtheral strictures s/p 

dilatation and right nephrectomy. 





He was admitted on 5/15/2017 for abdominal pain, acute choledocholithiasis, 

acute transaminitis, jaundice and leukocytosis with shaking chills.  





ID:


Septic Shock likely secondary to cholangitis with respiratory failure - resolved


Assessment/Plan: Extubated 5/23, lactic acidosis resolved


Acute Choledocholithiasis with acute cholangitis, ERCP w/ biliary stent on 5/15


S/p ERCP and partial stone extraction with stent placement on on 6/2


Tolerated procedure well


On Levaquin 250mg daily after procedure


Monitor labs


Advance diet as per GI


Note reviewed, pt will need outpatient stent removal in a few weeks


GI follow up on discharge


On Ursodiol 300mg BID





GI:


Acute Choledocholithiasis/abdominal pain/generalized jaundice/acute 

transaminitis 


Assessment/Plan: Cholangitis s/p ERCP with biliary stent placement on 5/16


He is s/p ERCP and partial stone extraction with stent placement on on 6/2


Trend LFT's





:


Chronic Kidney Disease - acute renal failure/has solitary kidney 


due to renal cancer with s/p right nephrectomy


Assessment/Plan: S/P emergent dialysis during hospitalization


Shiley removed on 5/27


Has ware catheter


Bun/Creat: 32/2.8


Renal following





Cardiology:


Hypertension - chronic


Assessment/Plan: On Coreq 6.25ng BID


ASA/Plavix on hold


BP slightly elevated, monitor and titrate meds as needed


Monitor heart rate (currently high 50s, low 60s)





CAD s/p stent placement


On ASA 81mg and Plavix 75mg - on hold


Restart ASA and Plavix as per cardiology





Hematology:


Thrombocytopenia - resolved


Assessment/Plan:  s/p 1 unit of platelets on 5/23


Thrombocytopenia likely secondary to sepsis


HIT panel slightly elevated


SCDs stockings, no heparin


Monitor platelets





F.E.N.


Fluids: tolerating PO


Electrolytes: within normal limits


Nutrition:advance per GI, currently on full liquids





Prophylaxis:


DVT:  SCDs


GI: Protonix





Code Status:  Requires inpatient hospitalization.   DNR/DNI.





Visit type





- Emergency Visit


Emergency Visit: Yes


ED Registration Date: 05/15/17


Care time: The patient presented to the Emergency Department on the above date 

and was hospitalized for further evaluation of their emergent condition.





- New Patient


This patient is new to me today: No





- Critical Care


Critical Care patient: No





- Discharge Referral


Referred to [facility] No

## 2017-06-03 NOTE — PN
Progress Note, Physician


Chief Complaint: 


Events noted


Seen on 8W. Awake and alert. Not in distress


History of Present Illness: 


Patient was seen and examined. Chart was reviewed


Post ERCP with CBD stones and stent placement


Denies chest pain, SOB or palpitations





- Current Medication List


Current Medications: 


Active Medications





Atorvastatin Calcium (Lipitor -)  20 mg PO HS [facility]


   Last Admin: 06/02/17 23:03 Dose:  20 mg


Carvedilol (Coreg -)  6.25 mg PO BID [facility]


   Last Admin: 06/03/17 10:37 Dose:  6.25 mg


Levofloxacin (Levaquin 250 Mg Premixed Ivpb -)  50 mls @ 50 mls/hr IVPB DAILY 

[facility]


   Last Admin: 06/03/17 10:38 Dose:  50 mls/hr


Ursodiol (Actigal -)  300 mg PO BID [facility]


   Last Admin: 06/03/17 10:37 Dose:  300 mg











- Objective


Vital Signs: 


 Vital Signs











Temperature  97.2 F L  06/03/17 17:08


 


Pulse Rate  56 L  06/03/17 17:08


 


Respiratory Rate  20   06/03/17 17:08


 


Blood Pressure  148/67   06/03/17 17:08


 


O2 Sat by Pulse Oximetry (%)  97   06/03/17 09:00











Neck: Yes: Supple


Cardiovascular: Yes: Regular Rate and Rhythm, S1, S2


Respiratory: Yes: Diminished


Gastrointestinal: Yes: Normal Bowel Sounds, Soft.  No: Tenderness


Edema: No


Labs: 


 CBC, BMP





 06/03/17 06:30 





 06/03/17 06:30 














Problem List





- Problems


(1) Cellulitis, umbilical


Code(s): L03.316 - CELLULITIS OF UMBILICUS





(2) Choledocholithiasis


Code(s): K80.50 - CALCULUS OF BILE DUCT W/O CHOLANGITIS OR CHOLECYST W/O OBST





(3) Hypertension


Code(s): I10 - ESSENTIAL (PRIMARY) HYPERTENSION   Qualifiers: 


     Hypertension type: essential hypertension        Qualified Code(s): I10 - 

Essential (primary) hypertension  





(4) CAD (coronary artery disease)


Code(s): I25.10 - ATHSCL HEART DISEASE OF NATIVE CORONARY ARTERY W/O ANG PCTRS 

  Qualifiers: 


     Coronary Disease-Associated Artery/Lesion type: unspecified vessel or 

lesion type     Native vs. transplanted heart: native heart     Associated 

angina: without angina        Qualified Code(s): I25.10 - Atherosclerotic heart 

disease of native coronary artery without angina pectoris  





(5) History of percutaneous coronary intervention


Code(s): Z98.890 - OTHER SPECIFIED POSTPROCEDURAL STATES





(6) Renal cancer


Code(s): C64.9 - MALIGNANT NEOPLASM OF UNSP KIDNEY, EXCEPT RENAL PELVIS   

Qualifiers: 


     Laterality: right        Qualified Code(s): C64.1 - Malignant neoplasm of 

right kidney, except renal pelvis  








Assessment/Plan


1. Post hypoxemic respiratory failure


2. Biliary septic shock (E. coli), acute cholangitis post ERCP, balloon 

sphincteroplasty and stent placement - post leukocytosis and lactic acidosis 


3. Post TAVR for severe AS


4. CAD, S/P PCI/stent with demand ischemic injury


5. Severe biventricular failure with pleural effusions


6. Hypertension


7. Renal CA S/P right nephrectomy


8. Acute on CKD referable to septic shock and ATN - recovering off HD


9. 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


10. Anemia of chronic disease





PLAN:


1. GI input noted


2. Diuretic as needed and monitor renal function and electrolytes


3. Antibiotics


4. Continue Carvedilol and Lipitor. Resume ASA when cleared


5. DVT and GI prophylaxis





Further plans are to follow


Gordon Botello MD

## 2017-06-03 NOTE — PN
Progress Note, Physician


History of Present Illness: 


Pt seen and examined at bedside. He is awake and alert. He is out of bed to 

chair. He denies shortness of breath. 





- Current Medication List


Current Medications: 


Active Medications





Atorvastatin Calcium (Lipitor -)  20 mg PO HS [facility]


   Last Admin: 06/02/17 23:03 Dose:  20 mg


Carvedilol (Coreg -)  6.25 mg PO BID [facility]


   Last Admin: 06/03/17 10:37 Dose:  6.25 mg


Levofloxacin (Levaquin 250 Mg Premixed Ivpb -)  50 mls @ 50 mls/hr IVPB DAILY 

[facility]


   Last Admin: 06/03/17 10:38 Dose:  50 mls/hr


Ursodiol (Actigal -)  300 mg PO BID [facility]


   Last Admin: 06/03/17 10:37 Dose:  300 mg











- Objective


Vital Signs: 


 Vital Signs











Temperature  98.1 F   06/03/17 06:00


 


Pulse Rate  61   06/03/17 06:00


 


Respiratory Rate  20   06/03/17 06:00


 


Blood Pressure  152/90   06/03/17 06:00


 


O2 Sat by Pulse Oximetry (%)  97   06/02/17 22:00











Constitutional: Yes: Calm


Eyes: Yes: Conjunctiva Clear


HENT: Yes: Atraumatic


Cardiovascular: Yes: S1, S2


Respiratory: Yes: On Nasal O2


Gastrointestinal: Yes: Soft.  No: Tenderness


Genitourinary: Yes: Chadwick Present


Musculoskeletal: Yes: WNL


Edema: Yes


Edema: LLE: Trace, RLE: Trace


Neurological: Yes: Oriented


Psychiatric: Yes: Oriented


Labs: 


 CBC, BMP





 06/03/17 06:30 





 06/03/17 06:30 





 INR, PTT











INR  1.07  (0.82-1.09)   05/22/17  05:35    


 


Fibrinogen  463.0 mg/dL (238-498)  D 05/22/17  05:35    














Problem List





- Problems


(1) CAD (coronary artery disease)


Code(s): I25.10 - ATHSCL HEART DISEASE OF NATIVE CORONARY ARTERY W/O ANG PCTRS 

  Qualifiers: 


     Coronary Disease-Associated Artery/Lesion type: unspecified vessel or 

lesion type     Native vs. transplanted heart: native heart     Associated 

angina: without angina        Qualified Code(s): I25.10 - Atherosclerotic heart 

disease of native coronary artery without angina pectoris  





(2) Calculus of common duct with obstruction


Code(s): K80.51 - CALCULUS OF BILE DUCT W/O CHOLANGITIS OR CHOLECYST W OBST





(3) Choledocholithiasis


Code(s): K80.50 - CALCULUS OF BILE DUCT W/O CHOLANGITIS OR CHOLECYST W/O OBST





(4) Renal cancer


Code(s): C64.9 - MALIGNANT NEOPLASM OF UNSP KIDNEY, EXCEPT RENAL PELVIS   

Qualifiers: 


     Laterality: right        Qualified Code(s): C64.1 - Malignant neoplasm of 

right kidney, except renal pelvis  





(5) BO (acute kidney injury)


Code(s): N17.9 - ACUTE KIDNEY FAILURE, UNSPECIFIED





(6) CKD (chronic kidney disease)


Code(s): N18.9 - CHRONIC KIDNEY DISEASE, UNSPECIFIED   








Assessment/Plan


 Current Medications











Generic Name Dose Route Start Last Admin





  Trade Name Freq  PRN Reason Stop Dose Admin


 


Atorvastatin Calcium  20 mg 05/25/17 22:00 06/02/17 23:03





  Lipitor -  PO   20 mg





  HS ALCIDES   Administration


 


Carvedilol  6.25 mg 05/26/17 12:21 06/03/17 10:37





  Coreg -  PO   6.25 mg





  BID ALCIDES   Administration


 


Levofloxacin  50 mls @ 50 mls/hr 06/03/17 10:00 06/03/17 10:38





  Levaquin 250 Mg Premixed Ivpb -  IVPB   50 mls/hr





  DAILY ALCIDES   Administration


 


Ursodiol  300 mg 05/24/17 22:00 06/03/17 10:37





  Actigal -  PO   300 mg





  BID ALCIDES   Administration














Impression


1. CKD stage 3


2. BO


3. choledocholithiasis


4. CAD


5. hx of Renal Cell Cancer s/p nephroectomy


6. aortic stenosis


7. sepsis


8. acute respiratory failure requiring intubation


9. NSTEMI


10. bacteremia





Plan


- renal function is stabilizing


- can stop fluids as pt is tolerating diet


- pt denies abdominal pain


- repeat labs in am


- hold off lasix today


- will evaluate volume status tomorrow


- monitor closely


- GI follow up


- PT/rehab





Dr Rodriguez

## 2017-06-03 NOTE — PN
Progress Note (short form)





- Note


Progress Note: 


Pot op day#1.S/P ERCP with sphinctrotomy,blloon swaping of CBD and stent 

placement under GA uneventful.Patient stable.No any anesthesia related 

problem.Patient DC from the anesthesia care.

## 2017-06-04 LAB
ALBUMIN SERPL-MCNC: 2 G/DL (ref 3.4–5)
ALP SERPL-CCNC: 261 U/L (ref 45–117)
ALT SERPL-CCNC: 11 U/L (ref 12–78)
AMYLASE SERPL-CCNC: 55 U/L (ref 25–115)
ANION GAP SERPL CALC-SCNC: 11 MMOL/L (ref 8–16)
AST SERPL-CCNC: 20 U/L (ref 15–37)
BASOPHILS # BLD: 1.8 % (ref 0–2)
BILIRUB SERPL-MCNC: 1.6 MG/DL (ref 0.2–1)
CALCIUM SERPL-MCNC: 7.9 MG/DL (ref 8.5–10.1)
CO2 SERPL-SCNC: 29 MMOL/L (ref 21–32)
COCKROFT - GAULT: 26.77
CREAT SERPL-MCNC: 2.5 MG/DL (ref 0.7–1.3)
DEPRECATED RDW RBC AUTO: 17.3 % (ref 11.9–15.9)
EOSINOPHIL # BLD: 6.7 % (ref 0–4.5)
GLUCOSE SERPL-MCNC: 83 MG/DL (ref 74–106)
MCH RBC QN AUTO: 30.2 PG (ref 25.7–33.7)
MCHC RBC AUTO-ENTMCNC: 33.6 G/DL (ref 32–35.9)
MCV RBC: 89.7 FL (ref 80–96)
NEUTROPHILS # BLD: 60.3 % (ref 42.8–82.8)
PLATELET # BLD AUTO: 89 K/MM3 (ref 134–434)
PMV BLD: 10.3 FL (ref 7.5–11.1)
PROT SERPL-MCNC: 4.9 G/DL (ref 6.4–8.2)
WBC # BLD AUTO: 4.6 K/MM3 (ref 4–10)

## 2017-06-04 RX ADMIN — CARVEDILOL SCH MG: 6.25 TABLET, FILM COATED ORAL at 22:11

## 2017-06-04 RX ADMIN — LEVOFLOXACIN SCH MLS/HR: 5 INJECTION, SOLUTION INTRAVENOUS at 09:25

## 2017-06-04 RX ADMIN — URSODIOL SCH MG: 300 CAPSULE ORAL at 22:11

## 2017-06-04 RX ADMIN — CARVEDILOL SCH MG: 6.25 TABLET, FILM COATED ORAL at 09:25

## 2017-06-04 RX ADMIN — URSODIOL SCH MG: 300 CAPSULE ORAL at 09:25

## 2017-06-04 RX ADMIN — ATORVASTATIN CALCIUM SCH MG: 20 TABLET, FILM COATED ORAL at 22:11

## 2017-06-04 NOTE — PN
Progress Note, Physician


History of Present Illness: 


Renal F/U


Pt tolerating PO diet 


Chadwick in place and draining urine 


No  c/o N/V or diarrhea 


RL infusing at 75 cc/hr 





- Current Medication List


Current Medications: 


Active Medications





Atorvastatin Calcium (Lipitor -)  20 mg PO HS [facility]


   Last Admin: 06/03/17 21:59 Dose:  20 mg


Carvedilol (Coreg -)  6.25 mg PO BID [facility]


   Last Admin: 06/04/17 09:25 Dose:  6.25 mg


Levofloxacin (Levaquin 250 Mg Premixed Ivpb -)  50 mls @ 50 mls/hr IVPB DAILY 

[facility]


   Last Admin: 06/04/17 09:25 Dose:  50 mls/hr


Ursodiol (Actigal -)  300 mg PO BID [facility]


   Last Admin: 06/04/17 09:25 Dose:  300 mg











- Objective


Vital Signs: 


 Vital Signs











Temperature  97.9 F   06/04/17 06:00


 


Pulse Rate  51 L  06/04/17 11:00


 


Respiratory Rate  18   06/04/17 06:00


 


Blood Pressure  153/62   06/04/17 06:00


 


O2 Sat by Pulse Oximetry (%)  94 L  06/04/17 11:00











Constitutional: Yes: No Distress


Cardiovascular: Yes: S1, S2


Respiratory: Yes: CTA Bilaterally


Gastrointestinal: Yes: Soft.  No: Tenderness, Rebound


Edema: Yes


Labs: 


 CBC, BMP





 06/04/17 06:30 





 06/04/17 06:30 





 INR, PTT











INR  1.07  (0.82-1.09)   05/22/17  05:35    


 


Fibrinogen  463.0 mg/dL (238-498)  D 05/22/17  05:35    














Assessment/Plan


Impression


1. Acute on CKD improved 


2. S/P ERCP and stenting 


3. choledocholithiasis


4. CAD


5. hx of Renal Cell Cancer s/p nephrectomy


6. aortic stenosis


7. sepsis


8. acute respiratory failure requiring intubation


9. NSTEMI


10. bacteremia





Plan


D/C the RL


Rpt labs in am 


Dr Harris

## 2017-06-04 NOTE — PN
GI Progress Note


Subjective: 


GI F/U





NO C/O 


FEELS GREAT AND READY TO GO HOME


NO N/V/F/C/S


NO PAIN


NO JAUNDICE OR PRURITUS


PO WIOTHOUT DIFFICULTY OR PAIN





- Objective


Vital Signs: 


 Vital Signs











Temperature  97.9 F   06/04/17 06:00


 


Pulse Rate  51 L  06/04/17 11:00


 


Respiratory Rate  18   06/04/17 06:00


 


Blood Pressure  153/62   06/04/17 06:00


 


O2 Sat by Pulse Oximetry (%)  94 L  06/04/17 11:00











Constitutional: Well Nourished, No Distress, Calm


Eyes: Yes: WNL (+ BS, VERY SOFT AND NT NO M/R/G)


Labs: 


 CBC, BMP





 06/04/17 06:30 





 06/04/17 06:30 





 INR, PTT











INR  1.07  (0.82-1.09)   05/22/17  05:35    


 


Fibrinogen  463.0 mg/dL (238-498)  D 05/22/17  05:35    














Assessment/Plan


S/P ERCP AND PARTIAL STONE EXTRACTION AND STENT PLACEMENT ON 6/2


TOLERATED PROCEDURE WELL


NO ADVERSE SEQ. OF PROCEDURE


LABS STABLE AND IMPROVING LFT'S





SOLID DIET


PT WILL NEED OUTPATIENT STENT REMOVAL IN A FEW WEEKS





OFC F/U WITH DR ZAVALA

## 2017-06-04 NOTE — PN
Physical Exam: 


SUBJECTIVE: Patient seen and examined. 


He denies chest pain or shortness of breath.





OBJECTIVE:


Appears comfortable, asking to go home


Had ERCP yesterday, POD#2


Will order PT for tomorrow, will also need pre and post prior to d/c


Some congestion of lungs noted, Incentive spirometer ordered


OOB to chair





 Vital Signs











 Period  Temp  Pulse  Resp  BP Sys/Badillo  Pulse Ox


 


 Last 24 Hr  97.2 F-98.4 F  51-62  18-20  124-153/51-67  94-97











GENERAL: Patient is awake, alert and in no acute distress - on 2 liters of 

nasal cannula


HEAD: Normal with no signs of trauma.


NECK: Normal range of motion, supple without lymphadenopathy, JVD, or masses.


LUNGS: anterior/posterior lung sounds diminished, on 2 liters of nasal cannula


HEART: Regular rate and rhythm


ABDOMEN: very mild redness to periumbilical region. 


UPPER EXTREMITIES: lower ext with non pitting edema


NEUROLOGICAL:  Awake, alert, [place]





 Laboratory Results - last 24 hr











  06/04/17 06/04/17 06/04/17





  06:30 06:30 06:30


 


WBC  4.6  


 


RBC  3.00 L  


 


Hgb  9.0 L  


 


Hct  26.9 L  


 


MCV  89.7  


 


MCHC  33.6  


 


RDW  17.3 H  


 


Plt Count  89 L  


 


MPV  10.3  


 


Neutrophils %  60.3  


 


Lymphocytes %  20.1  


 


Monocytes %  11.1 H  


 


Eosinophils %  6.7 H  


 


Basophils %  1.8  


 


Sodium   143 


 


Potassium   3.8 


 


Chloride   103 


 


Carbon Dioxide   29 


 


Anion Gap   11 


 


BUN   29 H 


 


Creatinine   2.5 H 


 


Creat Clearance w eGFR   24.55 


 


Random Glucose   83  D 


 


Calcium   7.9 L 


 


Total Bilirubin   1.6 H 


 


AST   20 


 


ALT   11 L 


 


Alkaline Phosphatase   261 H 


 


Total Protein   4.9 L 


 


Albumin   2.0 L 


 


Total Amylase    55  D


 


Lipase    170








Active Medications











Generic Name Dose Route Start Last Admin





  Trade Name Freq  PRN Reason Stop Dose Admin


 


Atorvastatin Calcium  20 mg 05/25/17 22:00 06/03/17 21:59





  Lipitor -  PO   20 mg





  HS ALCIDES   Administration


 


Carvedilol  6.25 mg 05/26/17 12:21 06/04/17 09:25





  Coreg -  PO   6.25 mg





  BID ALCIDES   Administration


 


Levofloxacin  50 mls @ 50 mls/hr 06/03/17 10:00 06/04/17 09:25





  Levaquin 250 Mg Premixed Ivpb -  IVPB   50 mls/hr





  DAILY ALCIDES   Administration


 


Ursodiol  300 mg 05/24/17 22:00 06/04/17 09:25





  Actigal -  PO   300 mg





  BID ALCIDES   Administration











ASSESSMENT/PLAN:





Patient is a 87 year old male with a significant past medical history of 

hypertension, CAD s/p stent placement, aortic stenosis, porcine valve 

replacement (takes both ASA 81mg and Plavix 75mg), urtheral strictures s/p 

dilatation and right nephrectomy. 





He was admitted on 5/15/2017 for abdominal pain, acute choledocholithiasis, 

acute transaminitis, jaundice and leukocytosis with shaking chills.  





ID:


Septic Shock likely secondary to cholangitis with respiratory failure - resolved


Assessment/Plan: Extubated 5/23, lactic acidosis resolved


Acute Choledocholithiasis with acute cholangitis, ERCP w/ biliary stent on 5/15


S/p ERCP and partial stone extraction with stent placement on on 6/2


Tolerated procedure well


On Levaquin 250mg daily after procedure


Monitor labs


Advance diet as per GI


Note reviewed, pt will need outpatient stent removal in a few weeks


GI follow up on discharge


On Ursodiol 300mg BID





GI:


Acute Choledocholithiasis/abdominal pain/generalized jaundice/acute 

transaminitis 


Assessment/Plan: Cholangitis s/p ERCP with biliary stent placement on 5/16


He is s/p ERCP and partial stone extraction with stent placement on on 6/2


Trend LFT's





:


Chronic Kidney Disease - acute renal failure/has solitary kidney 


due to renal cancer with s/p right nephrectomy


Assessment/Plan: S/P emergent dialysis during hospitalization


Shiley removed on 5/27


Has ware catheter


Bun/Creat: 32/2.8


Renal following





Cardiology:


Hypertension - chronic


Assessment/Plan: On Coreq 6.25ng BID


ASA/Plavix on hold


BP slightly elevated, monitor and titrate meds as needed


Monitor heart rate (currently high 50s, low 60s)





CAD s/p stent placement


On ASA 81mg and Plavix 75mg - on hold


Restart ASA and Plavix as per cardiology





Hematology:


Thrombocytopenia - resolving


Assessment/Plan:  s/p 1 unit of platelets on 5/23


Thrombocytopenia likely secondary to sepsis


HIT panel slightly elevated


SCDs stockings, no heparin


Monitor platelets





F.E.N.


Fluids: tolerating PO


Electrolytes: within normal limits


Nutrition:advance per GI, currently on full liquids





Prophylaxis:


DVT:  SCDs


GI: Protonix





Code Status:  Requires inpatient hospitalization.   DNR/DNI.





Visit type





- Emergency Visit


Emergency Visit: Yes


ED Registration Date: 05/15/17


Care time: The patient presented to the Emergency Department on the above date 

and was hospitalized for further evaluation of their emergent condition.





- New Patient


This patient is new to me today: No





- Critical Care


Critical Care patient: No





- Discharge Referral


Referred to [facility] No

## 2017-06-05 LAB
ALBUMIN SERPL-MCNC: 2.1 G/DL (ref 3.4–5)
ALP SERPL-CCNC: 260 U/L (ref 45–117)
ALT SERPL-CCNC: 10 U/L (ref 12–78)
ANION GAP SERPL CALC-SCNC: 8 MMOL/L (ref 8–16)
AST SERPL-CCNC: 20 U/L (ref 15–37)
BASOPHILS # BLD: 2.2 % (ref 0–2)
BILIRUB SERPL-MCNC: 1.3 MG/DL (ref 0.2–1)
CALCIUM SERPL-MCNC: 7.9 MG/DL (ref 8.5–10.1)
CO2 SERPL-SCNC: 31 MMOL/L (ref 21–32)
COCKROFT - GAULT: 30.05
CREAT SERPL-MCNC: 2.3 MG/DL (ref 0.7–1.3)
DEPRECATED RDW RBC AUTO: 16.9 % (ref 11.9–15.9)
EOSINOPHIL # BLD: 8 % (ref 0–4.5)
GLUCOSE SERPL-MCNC: 85 MG/DL (ref 74–106)
MCH RBC QN AUTO: 30.1 PG (ref 25.7–33.7)
MCHC RBC AUTO-ENTMCNC: 33.9 G/DL (ref 32–35.9)
MCV RBC: 88.7 FL (ref 80–96)
NEUTROPHILS # BLD: 59.1 % (ref 42.8–82.8)
PLATELET # BLD AUTO: 91 K/MM3 (ref 134–434)
PMV BLD: 10.3 FL (ref 7.5–11.1)
PROT SERPL-MCNC: 4.8 G/DL (ref 6.4–8.2)
WBC # BLD AUTO: 4.8 K/MM3 (ref 4–10)

## 2017-06-05 RX ADMIN — LEVOFLOXACIN SCH MLS/HR: 5 INJECTION, SOLUTION INTRAVENOUS at 09:54

## 2017-06-05 RX ADMIN — CARVEDILOL SCH MG: 6.25 TABLET, FILM COATED ORAL at 09:55

## 2017-06-05 RX ADMIN — CARVEDILOL SCH MG: 6.25 TABLET, FILM COATED ORAL at 21:51

## 2017-06-05 RX ADMIN — HEPARIN SODIUM SCH UNIT: 5000 INJECTION, SOLUTION INTRAVENOUS; SUBCUTANEOUS at 21:51

## 2017-06-05 RX ADMIN — ATORVASTATIN CALCIUM SCH MG: 20 TABLET, FILM COATED ORAL at 21:51

## 2017-06-05 RX ADMIN — URSODIOL SCH MG: 300 CAPSULE ORAL at 09:55

## 2017-06-05 RX ADMIN — URSODIOL SCH MG: 300 CAPSULE ORAL at 21:51

## 2017-06-05 RX ADMIN — HEPARIN SODIUM SCH: 5000 INJECTION, SOLUTION INTRAVENOUS; SUBCUTANEOUS at 13:50

## 2017-06-05 RX ADMIN — ASPIRIN SCH: 81 TABLET, COATED ORAL at 13:50

## 2017-06-05 NOTE — PN
Physical Exam: 


SUBJECTIVE: Patient seen and examined at bedside. Voices no complaints.








OBJECTIVE:





 Vital Signs











 Period  Temp  Pulse  Resp  BP Sys/Badillo  Pulse Ox


 


 Last 24 Hr  97.6 F-98.2 F  56-59  18-18  148-168/63-70  94











GENERAL: The patient is awake, alert, and fully oriented, in no acute distress.


HEAD: Normal with no signs of trauma.


EYES: PERRL, extraocular movements intact, sclera anicteric, conjunctiva clear. 

No ptosis. 


LUNGS: Breath sounds equal, clear to auscultation bilaterally, no wheezes, no 

crackles, no 


accessory muscle use. 


HEART: Regular rate and rhythm, S1, S2 without murmur, rub or gallop.


ABDOMEN: Soft, nontender, nondistended, normoactive bowel sounds, no guarding, 

no 


rebound, no hepatosplenomegaly, no masses.


EXTREMITIES: 2+ pulses, warm, well-perfused, 2+ edema on RLE,  3+ on LLE


NEUROLOGICAL: Cranial nerves II through XII grossly intact. Normal speech, gait 

not observed.


PSYCH: Normal mood, normal affect.











 Laboratory Results - last 24 hr











  06/05/17 06/05/17





  06:30 06:30


 


WBC  4.8 


 


RBC  3.02 L 


 


Hgb  9.1 L 


 


Hct  26.8 L 


 


MCV  88.7 


 


MCHC  33.9 


 


RDW  16.9 H 


 


Plt Count  91 L 


 


MPV  10.3 


 


Neutrophils %  59.1 


 


Lymphocytes %  20.0 


 


Monocytes %  10.7 H 


 


Eosinophils %  8.0 H 


 


Basophils %  2.2 H 


 


Sodium   144


 


Potassium   3.7


 


Chloride   105


 


Carbon Dioxide   31


 


Anion Gap   8


 


BUN   24 H


 


Creatinine   2.3 H


 


Creat Clearance w eGFR   27.03


 


Random Glucose   85


 


Calcium   7.9 L


 


Total Bilirubin   1.3 H


 


AST   20


 


ALT   10 L


 


Alkaline Phosphatase   260 H


 


Total Protein   4.8 L


 


Albumin   2.1 L








                  Active Medications











Generic Name Dose Route Start Last Admin





  Trade Name Freq  PRN Reason Stop Dose Admin


 


Albuterol/Ipratropium  1 amp 06/05/17 10:44  





  Duoneb -  NEB   





  Q6H PRN   





  SHORTNESS OF BREATH   


 


Aspirin  81 mg 06/05/17 12:15  





  Ecotrin -  PO   





  DAILY ALCIDES   


 


Atorvastatin Calcium  20 mg 05/25/17 22:00 06/04/17 22:11





  Lipitor -  PO   20 mg





  HS ALCIDES   Administration


 


Carvedilol  6.25 mg 05/26/17 12:21 06/05/17 09:55





  Coreg -  PO   6.25 mg





  BID ALCIDES   Administration


 


Heparin Sodium (Porcine)  5,000 unit 06/05/17 12:15  





  Heparin -  SQ   





  BID ALCIDES   


 


Levofloxacin  50 mls @ 50 mls/hr 06/03/17 10:00 06/05/17 09:54





  Levaquin 250 Mg Premixed Ivpb -  IVPB   50 mls/hr





  DAILY ALCIDES   Administration


 


Ursodiol  300 mg 05/24/17 22:00 06/05/17 09:55





  Actigal -  PO   300 mg





  BID ALCIDES   Administration











ASSESSMENT/PLAN


87 year-old male with a significant PMH of HTN, CAD s/p stent placement, aortic 

stenosis s/p TAVR, ureteral strictures s/p dilatation, and renal cell cancer s/

p right nephrectomy. Admitted for septic shock secondary to acute 

choledocholithiasis and E. coli bacteremia. 





Septic shock secondary to acute choledocholilithiasis and cholangitis


E.coli bacteremia


   --afebrile, WBC wnl


   --completed 10 day course of antibiotics 


   --s/p partial stone extraction and biliary stent placement 5/16


   --s/p ERCP and stone extraction 6/2; started on levofloxacin post-procedure; 

request ID re-consult


   --now candidate for cholecystectomy, Dr. Swann will see patient tomorrow


   --continue ursodial





Acute on chronic kidney injury


   --peak Cr 7.2, trending down now 2.3 (baseline 1.6)


   --off dialysis, shiley removed 5/27


   --maintain ware


   


Thrombocytopenia


   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


   --last transfused platelets 5/23


   --anne-marie 38k on 5/19, now 91k





Severe biventricular heart failure


   --5/17 Echo: LV severely reduced, EF 23% significantly reduced since 09/2015

; septal akinesis, apical akinesis, anterolateeral wall severe hypokinesis, 

anterior wall akinesis, moderate global hypokinesis; RV moderate to severely 

reduced; BLAE; moderate MR, mild TR; no AI; AS cannot be ruled out; trace PI; 

pleural effusion


   --6/5 CXR mild congestion unchanged from 5/30


   --Lasix PRN





CAD s/p stent


   --hold ASA pending further surgery


   --continue Lipitor





Aortic stenosis s/p tissue AVR


   --echo does not rule out aortic stenosis


   --hold Plavix pending further surgery





Renal cell cancer s/p nephrectomy


   --stable





Acute respiratory failure, resolved


   --extubated 5/23





Hypertension


   --continue carvedilol








F/E/N


   Fluids: PO intake adequate


   Electrolytes: replete as indicated


   Nutrition: low sodium; NPO after midnight for possible cholecystectomy 

tomorrow





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





Physical therapy





Dispo: continues to require inpatient care. DNR/DNI.











Visit type





- Emergency Visit


Emergency Visit: Yes


ED Registration Date: 05/15/17


Care time: The patient presented to the Emergency Department on the above date 

and was hospitalized for further evaluation of their emergent condition.





- New Patient


This patient is new to me today: Yes


Date on this admission: 06/05/17





- Critical Care


Critical Care patient: No

## 2017-06-05 NOTE — PN
GI Progress Note


Subjective: 


GI NOte: Pain free. NO adverse reactions to ERCP and stone extraction. Has 

stent in place. I have again explained to Surjit that he now needs to have a 

cholecystectomy to prevent recurrent cholangitis. I will remove the stent after 

cholecystectomy. Renal function continues to improve. 





- Objective


Vital Signs: 


 Vital Signs











Temperature  98.2 F   06/05/17 06:00


 


Pulse Rate  59 L  06/05/17 06:00


 


Respiratory Rate  18   06/05/17 06:00


 


Blood Pressure  168/67   06/05/17 06:00


 


O2 Sat by Pulse Oximetry (%)  94 L  06/04/17 21:00











Constitutional: Calm


...Auscultate: Yes: Normoactive Bowel Sounds


...Palpate: Yes: Soft, Other (nontender)


Labs: 


 CBC, BMP





 06/05/17 06:30 





 06/05/17 06:30 





 INR, PTT











INR  1.07  (0.82-1.09)   05/22/17  05:35    


 


Fibrinogen  463.0 mg/dL (238-498)  D 05/22/17  05:35    








 Laboratory Tests











  06/05/17





  06:30


 


BUN  24 H


 


Creatinine  2.3 H


 


Total Bilirubin  1.3 H


 


AST  20


 


ALT  10 L


 


Alkaline Phosphatase  260 H














Assessment/Plan


S/P ERCP and stone extraction with stent placed as had residual stone 

splinters. Discussed plan with VLAD Velásquez NP who will notify Dr. Swann to proceed 

with surgery.

## 2017-06-05 NOTE — PN
Progress Note, Physician


History of Present Illness: 


Pt seen and examined at bedside. He is awake and alert. He appears comfortable. 

He complains of shortness of breath with exertion. He does have lower extremity 

edema. 





- Current Medication List


Current Medications: 


Active Medications





Albuterol/Ipratropium (Duoneb -)  1 amp NEB Q6H PRN


   PRN Reason: SHORTNESS OF BREATH


   Last Admin: 06/05/17 11:45 Dose:  1 amp


Aspirin (Ecotrin -)  81 mg PO DAILY [facility]


   Last Admin: 06/05/17 13:50 Dose:  Not Given


Atorvastatin Calcium (Lipitor -)  20 mg PO HS [facility]


   Last Admin: 06/04/17 22:11 Dose:  20 mg


Carvedilol (Coreg -)  6.25 mg PO BID [facility]


   Last Admin: 06/05/17 09:55 Dose:  6.25 mg


Heparin Sodium (Porcine) (Heparin -)  5,000 unit SQ BID [facility]


   Last Admin: 06/05/17 13:50 Dose:  Not Given


Levofloxacin (Levaquin 250 Mg Premixed Ivpb -)  50 mls @ 50 mls/hr IVPB DAILY 

[facility]


   Last Admin: 06/05/17 09:54 Dose:  50 mls/hr


Ursodiol (Actigal -)  300 mg PO BID [facility]


   Last Admin: 06/05/17 09:55 Dose:  300 mg











- Objective


Vital Signs: 


 Vital Signs











Temperature  98.2 F   06/05/17 06:00


 


Pulse Rate  54 L  06/05/17 11:25


 


Respiratory Rate  18   06/05/17 06:00


 


Blood Pressure  168/67   06/05/17 06:00


 


O2 Sat by Pulse Oximetry (%)  98   06/05/17 11:25











Constitutional: Yes: Calm


Eyes: Yes: Conjunctiva Clear


HENT: Yes: Atraumatic


Neck: Yes: Supple


Cardiovascular: Yes: S1, S2


Respiratory: Yes: Diminished, On Nasal O2


Gastrointestinal: Yes: Soft


Genitourinary: Yes: Chadwick Present


Musculoskeletal: Yes: Muscle Weakness


Edema: Yes


Edema: LLE: 1+, RLE: 1+


Neurological: Yes: Oriented


Psychiatric: Yes: Oriented


Labs: 


 CBC, BMP





 06/05/17 06:30 





 06/05/17 06:30 





 INR, PTT











INR  1.07  (0.82-1.09)   05/22/17  05:35    


 


Fibrinogen  463.0 mg/dL (238-498)  D 05/22/17  05:35    














Problem List





- Problems


(1) CAD (coronary artery disease)


Code(s): I25.10 - ATHSCL HEART DISEASE OF NATIVE CORONARY ARTERY W/O ANG PCTRS 

  Qualifiers: 


     Coronary Disease-Associated Artery/Lesion type: unspecified vessel or 

lesion type     Native vs. transplanted heart: native heart     Associated 

angina: without angina        Qualified Code(s): I25.10 - Atherosclerotic heart 

disease of native coronary artery without angina pectoris  





(2) Calculus of common duct with obstruction


Code(s): K80.51 - CALCULUS OF BILE DUCT W/O CHOLANGITIS OR CHOLECYST W OBST





(3) Choledocholithiasis


Code(s): K80.50 - CALCULUS OF BILE DUCT W/O CHOLANGITIS OR CHOLECYST W/O OBST





(4) Renal cancer


Code(s): C64.9 - MALIGNANT NEOPLASM OF UNSP KIDNEY, EXCEPT RENAL PELVIS   

Qualifiers: 


     Laterality: right        Qualified Code(s): C64.1 - Malignant neoplasm of 

right kidney, except renal pelvis  





(5) BO (acute kidney injury)


Code(s): N17.9 - ACUTE KIDNEY FAILURE, UNSPECIFIED





(6) CKD (chronic kidney disease)


Code(s): N18.9 - CHRONIC KIDNEY DISEASE, UNSPECIFIED   








Assessment/Plan


 Current Medications











Generic Name Dose Route Start Last Admin





  Trade Name Freq  PRN Reason Stop Dose Admin


 


Albuterol/Ipratropium  1 amp 06/05/17 10:44 06/05/17 11:45





  Duoneb -  NEB   1 amp





  Q6H PRN   Administration





  SHORTNESS OF BREATH   


 


Aspirin  81 mg 06/05/17 12:15 06/05/17 13:50





  Ecotrin -  PO   Not Given





  DAILY ALCIDES   


 


Atorvastatin Calcium  20 mg 05/25/17 22:00 06/04/17 22:11





  Lipitor -  PO   20 mg





  HS ALCIDES   Administration


 


Carvedilol  6.25 mg 05/26/17 12:21 06/05/17 09:55





  Coreg -  PO   6.25 mg





  BID ALCIDES   Administration


 


Heparin Sodium (Porcine)  5,000 unit 06/05/17 12:15 06/05/17 13:50





  Heparin -  SQ   Not Given





  BID ALCIDES   


 


Levofloxacin  50 mls @ 50 mls/hr 06/03/17 10:00 06/05/17 09:54





  Levaquin 250 Mg Premixed Ivpb -  IVPB   50 mls/hr





  DAILY ALCIDES   Administration


 


Ursodiol  300 mg 05/24/17 22:00 06/05/17 09:55





  Actigal -  PO   300 mg





  BID ALCIDES   Administration














Impression


1. CKD stage 3


2. BO which required several HD sessions


3. choledocholithiasis


4. CAD


5. hx of Renal Cell Cancer s/p nephroectomy


6. aortic stenosis


7. sepsis


8. acute respiratory failure requiring intubation


9. NSTEMI


10. bacteremia





Plan


- renal function is improved today


- agree with dose of lasix


- repeat labs in am


- surgery follow up


- pt may go for cholecystectomy today, can d/c jeanie in am 


- monitor closely


- GI follow up


- PT/rehab


- discussed plan with pt and his family





Dr Rodriguez

## 2017-06-05 NOTE — PN
Progress Note, Physician


History of Present Illness: 


No abd pain, afebrile, tolerating diet, reports dyspnea, CXR shows mild 

congestion.











- Current Medication List


Current Medications: 


Active Medications





Albuterol/Ipratropium (Duoneb -)  1 amp NEB Q6H PRN


   PRN Reason: SHORTNESS OF BREATH


Atorvastatin Calcium (Lipitor -)  20 mg PO HS [facility]


   Last Admin: 06/04/17 22:11 Dose:  20 mg


Carvedilol (Coreg -)  6.25 mg PO BID [facility]


   Last Admin: 06/05/17 09:55 Dose:  6.25 mg


Levofloxacin (Levaquin 250 Mg Premixed Ivpb -)  50 mls @ 50 mls/hr IVPB DAILY 

[facility]


   Last Admin: 06/05/17 09:54 Dose:  50 mls/hr


Ursodiol (Actigal -)  300 mg PO BID [facility]


   Last Admin: 06/05/17 09:55 Dose:  300 mg











- Objective


Vital Signs: 


 Vital Signs











Temperature  98.2 F   06/05/17 06:00


 


Pulse Rate  59 L  06/05/17 06:00


 


Respiratory Rate  18   06/05/17 06:00


 


Blood Pressure  168/67   06/05/17 06:00


 


O2 Sat by Pulse Oximetry (%)  94 L  06/04/17 21:00











Constitutional: Yes: No Distress, Calm


Neck: Yes: Supple


Cardiovascular: Yes: Regular Rate and Rhythm, Murmur (2/6 SM)


Respiratory: Yes: Regular, Diminished, On Nasal O2


Gastrointestinal: Yes: Normal Bowel Sounds, Soft


Edema: Yes


Edema: LLE: Trace, RLE: Trace


Labs: 


 CBC, BMP





 06/05/17 06:30 





 06/05/17 06:30 





 INR, PTT











INR  1.07  (0.82-1.09)   05/22/17  05:35    


 


Fibrinogen  463.0 mg/dL (238-498)  D 05/22/17  05:35    














- ....Imaging


Chest X-ray: Report Reviewed (Mild congestion)





Problem List





- Problems


(1) BO (acute kidney injury)


Code(s): N17.9 - ACUTE KIDNEY FAILURE, UNSPECIFIED





(2) CAD (coronary artery disease)


Code(s): I25.10 - ATHSCL HEART DISEASE OF NATIVE CORONARY ARTERY W/O ANG PCTRS 

  Qualifiers: 


     Coronary Disease-Associated Artery/Lesion type: unspecified vessel or 

lesion type     Native vs. transplanted heart: native heart     Associated 

angina: without angina        Qualified Code(s): I25.10 - Atherosclerotic heart 

disease of native coronary artery without angina pectoris  





(3) CKD (chronic kidney disease)


Code(s): N18.9 - CHRONIC KIDNEY DISEASE, UNSPECIFIED   





(4) Calculus of common duct with obstruction


Code(s): K80.51 - CALCULUS OF BILE DUCT W/O CHOLANGITIS OR CHOLECYST W OBST





(5) History of percutaneous coronary intervention


Code(s): Z98.890 - OTHER SPECIFIED POSTPROCEDURAL STATES





(6) S/P ERCP


Code(s): Z98.890 - OTHER SPECIFIED POSTPROCEDURAL STATES





(7) Thrombocytopenia


Code(s): D69.6 - THROMBOCYTOPENIA, UNSPECIFIED





(8) Left bundle branch block


Code(s): I44.7 - LEFT BUNDLE-BRANCH BLOCK, UNSPECIFIED





(9) Demand ischemia


Code(s): I24.8 - OTHER FORMS OF ACUTE ISCHEMIC HEART DISEASE





(10) Anemia


Code(s): D64.9 - ANEMIA, UNSPECIFIED   Qualifiers: 


     Anemia type: unspecified type        Qualified Code(s): D64.9 - Anemia, 

unspecified  








Assessment/Plan


1. s/p hypoxemic respiratory failure


2. Biliary septic shock (E. coli), acute cholangitis post ERCP, partial stone 

extraction and stent placement 6/2 with leukocytosis and lactic acidosis 

resolved


3. Post TAVR for severe AS


4. CAD, S/P PCI/stent with demand ischemic injury


5. Severe biventricular failure with pleural effusions


6. Hypertension


7. Renal CA S/P right nephrectomy


8. Acute on CKD referable to septic shock and ATN - recovering off HD


9. 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


10. Anemia of chronic disease





PLAN:


1. Wean FIO2 to maintain saO2, BD as needed


2. Monitor renal recovery - Lasix 40 as needed per Renal


3. Complete antibiotics course


4. Continue carvedilol 6.25 bid and Lipitor 20 qhs, resume ASA post-procedure 

now that Plt count recovers


5. DVT and GI prophylaxis, stent removal as outpatient

## 2017-06-05 NOTE — PN
Progress Note (short form)





- Note


Progress Note: 


PULMONARY





Reports increased shortness of breath today. IVF d/c'd yesterday. No cough or 

wheezing.


 Last Vital Signs











Temp Pulse Resp BP Pulse Ox


 


 98.2 F   59 L  18   168/67   94 L


 


 06/05/17 06:00  06/05/17 06:00  06/05/17 06:00  06/05/17 06:00  06/04/17 21:00











Gen:  mildly tachypneic at rest


Heart:  RRR


Lung: scattered rhonchi


Abd: soft, nontender


Ext: trace edema





 CBC, BMP





 06/05/17 06:30 





 06/05/17 06:30 





Active Medications





Atorvastatin Calcium (Lipitor -)  20 mg PO HS [facility]


   Last Admin: 06/04/17 22:11 Dose:  20 mg


Carvedilol (Coreg -)  6.25 mg PO BID [facility]


   Last Admin: 06/05/17 09:55 Dose:  6.25 mg


Levofloxacin (Levaquin 250 Mg Premixed Ivpb -)  50 mls @ 50 mls/hr IVPB DAILY 

[facility]


   Last Admin: 06/05/17 09:54 Dose:  50 mls/hr


Ursodiol (Actigal -)  300 mg PO BID [facility]


   Last Admin: 06/05/17 09:55 Dose:  300 mg








A/P


Acute Cholangitis


Gram Negative Bacteremia


s/p ERCP/sphincteroplasty/stent placement


s/p Acute Hypoxic Respiratory Failure


Septic Shock resolving


Acute on Chronic Renal Failure requiring HD


Lactic Acidosis resolved


CAD


+Troponins likely Demand Ischemia


s/p TAVR for severe Aortic Stenosis


Thrombocytopenia





-  will order CXR, may need lasix today


-  continue antibiotics


-  monitor urine output, creatinine


-  DVT prophylaxis

## 2017-06-06 LAB
ALBUMIN SERPL-MCNC: 2.1 G/DL (ref 3.4–5)
ALP SERPL-CCNC: 243 U/L (ref 45–117)
ALT SERPL-CCNC: 9 U/L (ref 12–78)
ANION GAP SERPL CALC-SCNC: 8 MMOL/L (ref 8–16)
AST SERPL-CCNC: 18 U/L (ref 15–37)
BASOPHILS # BLD: 2.1 % (ref 0–2)
BILIRUB SERPL-MCNC: 1.3 MG/DL (ref 0.2–1)
CALCIUM SERPL-MCNC: 8.2 MG/DL (ref 8.5–10.1)
CO2 SERPL-SCNC: 31 MMOL/L (ref 21–32)
COCKROFT - GAULT: 28.79
CREAT SERPL-MCNC: 2.4 MG/DL (ref 0.7–1.3)
DEPRECATED RDW RBC AUTO: 16.8 % (ref 11.9–15.9)
EOSINOPHIL # BLD: 7.8 % (ref 0–4.5)
GLUCOSE SERPL-MCNC: 81 MG/DL (ref 74–106)
MAGNESIUM SERPL-MCNC: 1.7 MG/DL (ref 1.8–2.4)
MCH RBC QN AUTO: 30 PG (ref 25.7–33.7)
MCHC RBC AUTO-ENTMCNC: 33.7 G/DL (ref 32–35.9)
MCV RBC: 89.2 FL (ref 80–96)
NEUTROPHILS # BLD: 55.6 % (ref 42.8–82.8)
PHOSPHATE SERPL-MCNC: 3.2 MG/DL (ref 2.5–4.9)
PLATELET # BLD AUTO: 88 K/MM3 (ref 134–434)
PMV BLD: 10.2 FL (ref 7.5–11.1)
PROT SERPL-MCNC: 4.8 G/DL (ref 6.4–8.2)
WBC # BLD AUTO: 4.2 K/MM3 (ref 4–10)

## 2017-06-06 RX ADMIN — CARVEDILOL SCH MG: 6.25 TABLET, FILM COATED ORAL at 10:06

## 2017-06-06 RX ADMIN — URSODIOL SCH MG: 300 CAPSULE ORAL at 10:06

## 2017-06-06 RX ADMIN — ASPIRIN SCH MG: 81 TABLET, COATED ORAL at 10:06

## 2017-06-06 RX ADMIN — URSODIOL SCH MG: 300 CAPSULE ORAL at 21:42

## 2017-06-06 RX ADMIN — CARVEDILOL SCH MG: 6.25 TABLET, FILM COATED ORAL at 21:42

## 2017-06-06 RX ADMIN — ASPIRIN SCH: 81 TABLET, COATED ORAL at 12:32

## 2017-06-06 RX ADMIN — HEPARIN SODIUM SCH UNIT: 5000 INJECTION, SOLUTION INTRAVENOUS; SUBCUTANEOUS at 10:07

## 2017-06-06 RX ADMIN — ATORVASTATIN CALCIUM SCH MG: 20 TABLET, FILM COATED ORAL at 21:42

## 2017-06-06 RX ADMIN — LEVOFLOXACIN SCH MLS/HR: 5 INJECTION, SOLUTION INTRAVENOUS at 10:07

## 2017-06-06 NOTE — PN
Progress Note, Physician


History of Present Illness: 


Pt seen and examined at bedside. He is awake and alert. He feels that his 

breathing is improved. 





- Current Medication List


Current Medications: 


Active Medications





Albuterol/Ipratropium (Duoneb -)  1 amp NEB Q6H PRN


   PRN Reason: SHORTNESS OF BREATH


   Last Admin: 06/05/17 11:45 Dose:  1 amp


Aspirin (Ecotrin -)  81 mg PO DAILY [facility]


   Last Admin: 06/06/17 12:32 Dose:  Not Given


Atorvastatin Calcium (Lipitor -)  20 mg PO HS [facility]


   Last Admin: 06/05/17 21:51 Dose:  20 mg


Carvedilol (Coreg -)  6.25 mg PO BID [facility]


   Last Admin: 06/06/17 10:06 Dose:  6.25 mg


Heparin Sodium (Porcine) (Heparin -)  5,000 unit SQ BID [facility]


   Last Admin: 06/06/17 10:07 Dose:  5,000 unit


Ursodiol (Actigal -)  300 mg PO BID [facility]


   Last Admin: 06/06/17 10:06 Dose:  300 mg











- Objective


Vital Signs: 


 Vital Signs











Temperature  98 F   06/06/17 09:59


 


Pulse Rate  56 L  06/06/17 09:59


 


Respiratory Rate  20   06/06/17 09:59


 


Blood Pressure  152/62   06/06/17 09:59


 


O2 Sat by Pulse Oximetry (%)  96   06/06/17 13:29











Constitutional: Yes: Calm


Eyes: Yes: Conjunctiva Clear


HENT: Yes: Atraumatic


Neck: Yes: Supple


Cardiovascular: Yes: S1, S2


Respiratory: Yes: On Nasal O2


Gastrointestinal: Yes: Soft


Genitourinary: Yes: Ware Present


Musculoskeletal: Yes: Muscle Weakness


Edema: Yes


Neurological: Yes: Oriented


Psychiatric: Yes: Oriented


Labs: 


 CBC, BMP





 06/06/17 06:30 





 06/06/17 06:30 





 INR, PTT











INR  1.07  (0.82-1.09)   05/22/17  05:35    


 


Fibrinogen  463.0 mg/dL (238-498)  D 05/22/17  05:35    














Problem List





- Problems


(1) CAD (coronary artery disease)


Code(s): I25.10 - ATHSCL HEART DISEASE OF NATIVE CORONARY ARTERY W/O ANG PCTRS 

  Qualifiers: 


     Coronary Disease-Associated Artery/Lesion type: unspecified vessel or 

lesion type     Native vs. transplanted heart: native heart     Associated 

angina: without angina        Qualified Code(s): I25.10 - Atherosclerotic heart 

disease of native coronary artery without angina pectoris  





(2) Calculus of common duct with obstruction


Code(s): K80.51 - CALCULUS OF BILE DUCT W/O CHOLANGITIS OR CHOLECYST W OBST





(3) Choledocholithiasis


Code(s): K80.50 - CALCULUS OF BILE DUCT W/O CHOLANGITIS OR CHOLECYST W/O OBST





(4) Renal cancer


Code(s): C64.9 - MALIGNANT NEOPLASM OF UNSP KIDNEY, EXCEPT RENAL PELVIS   

Qualifiers: 


     Laterality: right        Qualified Code(s): C64.1 - Malignant neoplasm of 

right kidney, except renal pelvis  





(5) BO (acute kidney injury)


Code(s): N17.9 - ACUTE KIDNEY FAILURE, UNSPECIFIED





(6) CKD (chronic kidney disease)


Code(s): N18.9 - CHRONIC KIDNEY DISEASE, UNSPECIFIED   








Assessment/Plan


 Current Medications











Generic Name Dose Route Start Last Admin





  Trade Name Freq  PRN Reason Stop Dose Admin


 


Albuterol/Ipratropium  1 amp 06/05/17 10:44 06/05/17 11:45





  Duoneb -  NEB   1 amp





  Q6H PRN   Administration





  SHORTNESS OF BREATH   


 


Aspirin  81 mg 06/05/17 12:15 06/06/17 12:32





  Ecotrin -  PO   Not Given





  DAILY ALCIDES   


 


Atorvastatin Calcium  20 mg 05/25/17 22:00 06/05/17 21:51





  Lipitor -  PO   20 mg





  HS ALCIDES   Administration


 


Carvedilol  6.25 mg 05/26/17 12:21 06/06/17 10:06





  Coreg -  PO   6.25 mg





  BID ALCIDES   Administration


 


Heparin Sodium (Porcine)  5,000 unit 06/05/17 12:15 06/06/17 10:07





  Heparin -  SQ   5,000 unit





  BID ALCIDES   Administration


 


Ursodiol  300 mg 05/24/17 22:00 06/06/17 10:06





  Actigal -  PO   300 mg





  BID ALCIDES   Administration














Impression


1. CKD stage 3


2. BO which required several HD sessions


3. choledocholithiasis


4. CAD


5. hx of Renal Cell Cancer s/p nephroectomy


6. aortic stenosis


7. sepsis


8. acute respiratory failure requiring intubation


9. NSTEMI


10. bacteremia





Plan


- will give another dose of PO lasix


- repeat labs in am


- monitor urine output


- d/c ware in am with voiding trial 


- monitor closely


- PT/rehab








Dr Rodriguez

## 2017-06-06 NOTE — PN
Progress Note, Physician


Chief Complaint: 


Events noted


Awake and alert. Not in distress


Await cholecystectomy on Thursday


History of Present Illness: 


Patient was seen and examined. Chart was reviewed


Post ERCP with CBD stones and stent placement - now awaits cholecystectomy


Denies chest pain, SOB or palpitations





- Current Medication List


Current Medications: 


Active Medications





Albuterol/Ipratropium (Duoneb -)  1 amp NEB Q6H PRN


   PRN Reason: SHORTNESS OF BREATH


   Last Admin: 06/05/17 11:45 Dose:  1 amp


Aspirin (Ecotrin -)  81 mg PO DAILY [facility]


   Last Admin: 06/06/17 12:32 Dose:  Not Given


Atorvastatin Calcium (Lipitor -)  20 mg PO HS [facility]


   Last Admin: 06/05/17 21:51 Dose:  20 mg


Carvedilol (Coreg -)  6.25 mg PO BID [facility]


   Last Admin: 06/06/17 10:06 Dose:  6.25 mg


Heparin Sodium (Porcine) (Heparin -)  5,000 unit SQ BID [facility]


   Last Admin: 06/06/17 10:07 Dose:  5,000 unit


Ursodiol (Actigal -)  300 mg PO BID [facility]


   Last Admin: 06/06/17 10:06 Dose:  300 mg











- Objective


Vital Signs: 


 Vital Signs











Temperature  98.4 F   06/06/17 16:08


 


Pulse Rate  70   06/06/17 16:08


 


Respiratory Rate  16   06/06/17 16:08


 


Blood Pressure  119/56   06/06/17 16:08


 


O2 Sat by Pulse Oximetry (%)  96   06/06/17 13:29











Neck: Yes: Supple


Cardiovascular: Yes: Regular Rate and Rhythm, Murmur (Soft SM), S1, S2


Respiratory: Yes: Diminished


Gastrointestinal: Yes: Normal Bowel Sounds, Soft.  No: Tenderness


Edema: Yes


Edema: LLE: Trace, RLE: Trace


Additional Findings/Remarks: 








- Review of Systems


Constitutional: denies: Chills, Fever


Cardiovascular: denies: Shortness of Breath.  denies: Chest Pain, Palpitations


Respiratory: denies: SOB.  denies: Cough, Hemoptysis, Orthopnea, PND, SOB on 

Exertion, Wheezing


Gastrointestinal: denies: Abdominal Pain, Constipation, Diarrhea, Melena, Nausea

, Rectal Bleeding, Vomiting


Musculoskeletal: denies: Joint Pain


Neurological: denies: Dizziness, Headache, Numbness, Seizure, Syncope, Unsteady 

Gait, Weakness











Labs: 


 CBC, BMP





 06/06/17 06:30 





 06/06/17 06:30 





 








Problem List





- Problems


(1) Cellulitis, umbilical


Code(s): L03.316 - CELLULITIS OF UMBILICUS





(2) Choledocholithiasis


Code(s): K80.50 - CALCULUS OF BILE DUCT W/O CHOLANGITIS OR CHOLECYST W/O OBST





(3) Hypertension


Code(s): I10 - ESSENTIAL (PRIMARY) HYPERTENSION   Qualifiers: 


     Hypertension type: essential hypertension        Qualified Code(s): I10 - 

Essential (primary) hypertension  





(4) CAD (coronary artery disease)


Code(s): I25.10 - ATHSCL HEART DISEASE OF NATIVE CORONARY ARTERY W/O ANG PCTRS 

  Qualifiers: 


     Coronary Disease-Associated Artery/Lesion type: unspecified vessel or 

lesion type     Native vs. transplanted heart: native heart     Associated 

angina: without angina        Qualified Code(s): I25.10 - Atherosclerotic heart 

disease of native coronary artery without angina pectoris  





(5) History of percutaneous coronary intervention


Code(s): Z98.890 - OTHER SPECIFIED POSTPROCEDURAL STATES





(6) Renal cancer


Code(s): C64.9 - MALIGNANT NEOPLASM OF UNSP KIDNEY, EXCEPT RENAL PELVIS   

Qualifiers: 


     Laterality: right        Qualified Code(s): C64.1 - Malignant neoplasm of 

right kidney, except renal pelvis  








Assessment/Plan


1. Post hypoxemic respiratory failure


2. Biliary septic shock (E. coli), acute cholangitis post ERCP, balloon 

sphincteroplasty and stent placement - post leukocytosis and lactic acidosis 


3. Post TAVR for severe AS


4. CAD, S/P PCI/stent with demand ischemic injury


5. Severe biventricular failure with pleural effusions


6. Hypertension


7. Renal CA S/P right nephrectomy


8. Acute on CKD referable to septic shock and ATN - recovering off HD


9. 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


10. Anemia of chronic disease





PLAN:


1. There is no absolute contraindication in proceeding with cholecystectomy in 

view of absence of ischemic symptoms, decompensated congestive heart failure or 

malignant arrhythmias although with understanding increased cardiovascular 

risk.  Post operative ECG and cardiac enzyme recommended


2. Diuretic as needed and monitor renal function and electrolytes


3. Antibiotics


4. Continue Carvedilol and Lipitor. Consider discontinuing ASA for now and then 

restart after surgery.


5. DVT and GI prophylaxis





Further plans are to follow


Gordon Botello MD

## 2017-06-06 NOTE — PN
Progress Note (short form)





- Note


Progress Note: 


No acute events


S/P ERCP 6/2/17 with stone extraction, sphincterotomy, stent placement


No pain


On diet


 Vital Signs











 Period  Temp  Pulse  Resp  BP Sys/Badillo  Pulse Ox


 


 Last 24 Hr  98 F-98.9 F  56-70  18-20  119-152/56-65  96-96











Abd soft, NT, ND





CBC,CMP











WBC  4.2 K/mm3 (4.0-10.0)   06/06/17  06:30    


 


RBC  3.10 M/mm3 (4.00-5.60)  L  06/06/17  06:30    


 


Hgb  9.3 GM/dL (11.7-16.9)  L  06/06/17  06:30    


 


Hct  27.6 % (35.4-49)  L  06/06/17  06:30    


 


MCV  89.2 fl (80-96)   06/06/17  06:30    


 


MCHC  33.7 g/dl (32.0-35.9)   06/06/17  06:30    


 


RDW  16.8 % (11.9-15.9)  H  06/06/17  06:30    


 


Plt Count  88 K/MM3 (134-434)  L  06/06/17  06:30    


 


MPV  10.2 fl (7.5-11.1)   06/06/17  06:30    


 


Neutrophils %  55.6 % (42.8-82.8)   06/06/17  06:30    


 


Lymphocytes %  23.9 % (8-40)   06/06/17  06:30    


 


Monocytes %  10.6 % (3.8-10.2)  H  06/06/17  06:30    


 


Eosinophils %  7.8 % (0-4.5)  H  06/06/17  06:30    


 


Basophils %  2.1 % (0-2.0)  H  06/06/17  06:30    


 


Band Neutrophils  11.0 % (0-10)  H D 05/17/17  05:15    


 


Metamyelocytes  5 % (0-2)  H D 05/16/17  20:30    


 


Myelocytes  9 % (0-2)  H D 05/16/17  20:30    


 


Differential Comment  Manual diff done   06/01/17  06:30    


 


Dohle Bodies  1+   05/17/17  05:15    


 


Platelet Estimate  Decreased  (NORMAL)   06/01/17  06:30    


 


Platelet Comment  No clumping noted   05/17/17  05:15    


 


Polychromasia  Few   05/17/17  05:15    


 


Hypochromic-Microcytic  Few   05/17/17  05:15    


 


Sodium  144 mmol/L (136-145)   06/06/17  06:30    


 


Potassium  3.7 mmol/L (3.5-5.1)   06/06/17  06:30    


 


Chloride  105 mmol/L ()   06/06/17  06:30    


 


Carbon Dioxide  31 mmol/L (21-32)   06/06/17  06:30    


 


Anion Gap  8  (8-16)   06/06/17  06:30    


 


BUN  23 mg/dL (7-18)  H  06/06/17  06:30    


 


Creatinine  2.4 mg/dL (0.7-1.3)  H  06/06/17  06:30    


 


Creat Clearance w eGFR  25.74  (>60)   06/06/17  06:30    


 


Random Glucose  81 mg/dL ()   06/06/17  06:30    


 


Lactic Acid  1.684 mmol/L (0.4-2.0)   05/17/17  13:20    


 


Calcium  8.2 mg/dL (8.5-10.1)  L  06/06/17  06:30    


 


Phosphorus  3.2 mg/dL (2.5-4.9)  D 06/06/17  06:30    


 


Magnesium  1.7 mg/dL (1.8-2.4)  L  06/06/17  06:30    


 


Total Bilirubin  1.3 mg/dL (0.2-1.0)  H  06/06/17  06:30    


 


Direct Bilirubin  1.1 mg/dL (0.0-0.2)  H D 06/03/17  06:30    


 


AST  18 U/L (15-37)   06/06/17  06:30    


 


ALT  9 U/L (12-78)  L  06/06/17  06:30    


 


Alkaline Phosphatase  243 U/L ()  H  06/06/17  06:30    


 


Creatine Kinase  60 IU/L ()   05/20/17  00:00    


 


Creatine Kinase Index  11.8 % (0.0-5.0)  H*  05/17/17  13:20    


 


CK-MB (CK-2)  18.446 ng/ml (0.5-3.6)  H  05/17/17  13:20    


 


CK-MB (CK-2) Rel Index  Cancelled   05/17/17  13:20    


 


Troponin I  1.73 ng/ml (0.00-0.05)  H*  05/20/17  05:15    


 


C-Reactive Protein  3.1 MG/DL (0.00-0.3)  H D 06/03/17  06:30    


 


Total Protein  4.8 g/dl (6.4-8.2)  L  06/06/17  06:30    


 


Albumin  2.1 g/dl (3.4-5.0)  L  06/06/17  06:30    


 


Total Amylase  55 U/L ()  D 06/04/17  06:30    


 


Lipase  170 U/L ()   06/04/17  06:30    














LFTs improving


Cardiology for clearance


Hold ASA


Plan for cholecystectomy 6/8/17





Problem List





- Problems


(1) Umbilical discharge


Code(s): R19.8 - OTH SYMPTOMS AND SIGNS INVOLVING THE DGSTV SYS AND ABDOMEN

## 2017-06-06 NOTE — PN
Physical Exam: 


SUBJECTIVE: Patient seen and examined at bedside.








OBJECTIVE:





 Vital Signs











 Period  Temp  Pulse  Resp  BP Sys/Badillo  Pulse Ox


 


 Last 24 Hr  98 F-98.9 F  56-70  16-20  119-152/56-65  96-96











GENERAL: The patient is awake, alert, and fully oriented, in no acute distress.


HEAD: Normal with no signs of trauma.


EYES: PERRL, extraocular movements intact, sclera anicteric, conjunctiva clear. 

No ptosis. 


LUNGS: Breath sounds equal, clear to auscultation bilaterally, no wheezes, no 

crackles, no 


accessory muscle use. 


HEART: Regular rate and rhythm, S1, S2 without murmur, rub or gallop.


ABDOMEN: Soft, nontender, nondistended, normoactive bowel sounds, no guarding, 

no 


rebound, no hepatosplenomegaly, no masses.


EXTREMITIES: 2+ pulses, warm, well-perfused, bilateral edema improved, 1-2+ 

bilaterally


NEUROLOGICAL: Cranial nerves II through XII grossly intact. Normal speech, gait 

not observed.


PSYCH: Normal mood, normal affect.














 Laboratory Results - last 24 hr











  06/06/17 06/06/17





  06:30 06:30


 


WBC   4.2


 


RBC   3.10 L


 


Hgb   9.3 L


 


Hct   27.6 L


 


MCV   89.2


 


MCHC   33.7


 


RDW   16.8 H


 


Plt Count   88 L


 


MPV   10.2


 


Neutrophils %   55.6


 


Lymphocytes %   23.9


 


Monocytes %   10.6 H


 


Eosinophils %   7.8 H


 


Basophils %   2.1 H


 


Sodium  144 


 


Potassium  3.7 


 


Chloride  105 


 


Carbon Dioxide  31 


 


Anion Gap  8 


 


BUN  23 H 


 


Creatinine  2.4 H 


 


Creat Clearance w eGFR  25.74 


 


Random Glucose  81 


 


Calcium  8.2 L 


 


Phosphorus  3.2  D 


 


Magnesium  1.7 L 


 


Total Bilirubin  1.3 H 


 


AST  18 


 


ALT  9 L 


 


Alkaline Phosphatase  243 H 


 


Total Protein  4.8 L 


 


Albumin  2.1 L 








                  Active Medications











Generic Name Dose Route Start Last Admin





  Trade Name Freq  PRN Reason Stop Dose Admin


 


Albuterol/Ipratropium  1 amp 06/05/17 10:44 06/05/17 11:45





  Duoneb -  NEB   1 amp





  Q6H PRN   Administration





  SHORTNESS OF BREATH   


 


Aspirin  81 mg 06/05/17 12:15 06/06/17 12:32





  Ecotrin -  PO   Not Given





  DAILY ALCIDES   


 


Atorvastatin Calcium  20 mg 05/25/17 22:00 06/05/17 21:51





  Lipitor -  PO   20 mg





  HS ALCIDES   Administration


 


Carvedilol  6.25 mg 05/26/17 12:21 06/06/17 10:06





  Coreg -  PO   6.25 mg





  BID ALCIDES   Administration


 


Heparin Sodium (Porcine)  5,000 unit 06/05/17 12:15 06/06/17 10:07





  Heparin -  SQ   5,000 unit





  BID ALCIDES   Administration


 


Ursodiol  300 mg 05/24/17 22:00 06/06/17 10:06





  Actigal -  PO   300 mg





  BID ALCIDES   Administration











ASSESSMENT/PLAN


87 year-old male with a significant PMH of HTN, CAD s/p stent placement, aortic 

stenosis s/p TAVR, ureteral strictures s/p dilatation, and renal cell cancer s/

p right nephrectomy. Admitted for septic shock secondary to acute 

choledocholithiasis and E. coli bacteremia. 





Septic shock secondary to acute choledocholilithiasis and cholangitis


E.coli bacteremia


   --afebrile, WBC wnl


   --completed 10 day course of antibiotics 


   --s/p partial stone extraction and biliary stent placement 5/16


   --s/p ERCP and stone extraction 6/2; started on levofloxacin post-procedure; 

request ID re-consult


   --cholecystectomy scheduled for 6/8, cardiac screening done   


   --continue ursodial





Acute on chronic kidney injury


   --peak Cr 7.2, 2.4 today (baseline 1.6)


   --off dialysis, shiley removed 5/27


   --maintain ware


   


Thrombocytopenia


   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


   --last transfused platelets 5/23


   --anne-marie 38k on 5/19, now 88k





Severe biventricular heart failure


   --5/17 Echo: LV severely reduced, EF 23% significantly reduced since 09/2015

; septal akinesis, apical akinesis, anterolateeral wall severe hypokinesis, 

anterior wall akinesis, moderate global hypokinesis; RV moderate to severely 

reduced; BLAE; moderate MR, mild TR; no AI; AS cannot be ruled out; trace PI; 

pleural effusion


   --6/5 CXR mild congestion unchanged from 5/30


   --Lasix PRN


   --repeat CXR in am





CAD s/p stent


   --hold ASA pending further surgery


   --continue Lipitor





Aortic stenosis s/p tissue AVR


   --echo does not rule out aortic stenosis


   --hold Plavix pending further surgery





Renal cell cancer s/p nephrectomy


   --stable





Acute respiratory failure, resolved


   --extubated 5/23





Hypertension


   --continue carvedilol








F/E/N


   Fluids: PO intake adequate


   Electrolytes: replete as indicated


   Nutrition: low sodium; NPO Wednesday midnight





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





Physical therapy





Dispo: continues to require inpatient care. DNR/DNI.





Visit type





- Emergency Visit


Emergency Visit: Yes


ED Registration Date: 05/15/17


Care time: The patient presented to the Emergency Department on the above date 

and was hospitalized for further evaluation of their emergent condition.





- New Patient


This patient is new to me today: No





- Critical Care


Critical Care patient: No

## 2017-06-06 NOTE — PN
Physical Exam: 


SUBJECTIVE: Patient seen and examined








OBJECTIVE:





 Vital Signs











 Period  Temp  Pulse  Resp  BP Sys/Badillo  Pulse Ox


 


 Last 24 Hr  98 F-98.9 F  55-70  18-20  119-152/54-65  96











GENERAL: The patient is awake, alert, and fully oriented, in no acute distress.


HEAD: Normal with no signs of trauma.


EYES: PERRL, extraocular movements intact, sclera anicteric, conjunctiva clear. 

No ptosis. 


ENT: Ears normal, nares patent, oropharynx clear without exudates, moist mucous 


membranes.


NECK: Trachea midline, full range of motion, supple. 


LUNGS: Breath sounds equal, clear to auscultation bilaterally, no wheezes, no 

crackles, no 


accessory muscle use. 


HEART: Regular rate and rhythm, S1, S2 without murmur, rub or gallop.


ABDOMEN: Soft, nontender, nondistended, normoactive bowel sounds, no guarding, 

no 


rebound, no hepatosplenomegaly, no masses.


EXTREMITIES: 2+ pulses, warm, well-perfused, no edema. 


NEUROLOGICAL: Cranial nerves II through XII grossly intact. Normal speech, gait 

not 


observed.


PSYCH: Normal mood, normal affect.


SKIN: Warm, dry, normal turgor, no rashes or lesions noted














 Laboratory Results - last 24 hr











  06/06/17 06/06/17





  06:30 06:30


 


WBC   4.2


 


RBC   3.10 L


 


Hgb   9.3 L


 


Hct   27.6 L


 


MCV   89.2


 


MCHC   33.7


 


RDW   16.8 H


 


Plt Count   88 L


 


MPV   10.2


 


Neutrophils %   55.6


 


Lymphocytes %   23.9


 


Monocytes %   10.6 H


 


Eosinophils %   7.8 H


 


Basophils %   2.1 H


 


Sodium  144 


 


Potassium  3.7 


 


Chloride  105 


 


Carbon Dioxide  31 


 


Anion Gap  8 


 


BUN  23 H 


 


Creatinine  2.4 H 


 


Creat Clearance w eGFR  25.74 


 


Random Glucose  81 


 


Calcium  8.2 L 


 


Phosphorus  3.2  D 


 


Magnesium  1.7 L 


 


Total Bilirubin  1.3 H 


 


AST  18 


 


ALT  9 L 


 


Alkaline Phosphatase  243 H 


 


Total Protein  4.8 L 


 


Albumin  2.1 L 








Active Medications











Generic Name Dose Route Start Last Admin





  Trade Name Freq  PRN Reason Stop Dose Admin


 


Albuterol/Ipratropium  1 amp 06/05/17 10:44 06/05/17 11:45





  Duoneb -  NEB   1 amp





  Q6H PRN   Administration





  SHORTNESS OF BREATH   


 


Aspirin  81 mg 06/05/17 12:15 06/06/17 10:06





  Ecotrin -  PO   81 mg





  DAILY ALCIDES   Administration


 


Atorvastatin Calcium  20 mg 05/25/17 22:00 06/05/17 21:51





  Lipitor -  PO   20 mg





  HS ALCIDES   Administration


 


Carvedilol  6.25 mg 05/26/17 12:21 06/06/17 10:06





  Coreg -  PO   6.25 mg





  BID ALCIDES   Administration


 


Heparin Sodium (Porcine)  5,000 unit 06/05/17 12:15 06/06/17 10:07





  Heparin -  SQ   5,000 unit





  BID ALCIDES   Administration


 


Ursodiol  300 mg 05/24/17 22:00 06/06/17 10:06





  Actigal -  PO   300 mg





  BID ALCIDES   Administration











ASSESSMENT/PLAN:


Continue to hold ASA, Plavix





Hold subq heparin Wednesday morning





NPO Wednesday midnight

## 2017-06-06 NOTE — PN
Progress Note (short form)





- Note


Progress Note: 


PULMONARY





Diuresed well after lasix dose yesterday with improvement in dyspnea.





 Last Vital Signs











Temp Pulse Resp BP Pulse Ox


 


 98 F   56 L  20   152/62   96 


 


 06/06/17 09:59  06/06/17 09:59  06/06/17 09:59  06/06/17 09:59  06/05/17 21:00











 Intake & Output











 06/03/17 06/04/17 06/05/17 06/06/17





 23:59 23:59 23:59 23:59


 


Intake Total 2140 820 810 


 


Output Total 1000 1000 3300 900


 


Balance 1140 -180 -2490 -900


 


Weight 200 lb 7 oz 200 lb 7 oz 207 lb 











Gen:  less tachypneic 


Heart:  RRR


Lung: rare rhonchi


Abd: soft, nontender


Ext: less edema





 CBC, BMP





 06/06/17 06:30 





 06/06/17 06:30 





Active Medications





Albuterol/Ipratropium (Duoneb -)  1 amp NEB Q6H PRN


   PRN Reason: SHORTNESS OF BREATH


   Last Admin: 06/05/17 11:45 Dose:  1 amp


Aspirin (Ecotrin -)  81 mg PO DAILY [facility]


   Last Admin: 06/06/17 10:06 Dose:  81 mg


Atorvastatin Calcium (Lipitor -)  20 mg PO HS [facility]


   Last Admin: 06/05/17 21:51 Dose:  20 mg


Carvedilol (Coreg -)  6.25 mg PO BID [facility]


   Last Admin: 06/06/17 10:06 Dose:  6.25 mg


Heparin Sodium (Porcine) (Heparin -)  5,000 unit SQ BID [facility]


   Last Admin: 06/06/17 10:07 Dose:  5,000 unit


Levofloxacin (Levaquin 250 Mg Premixed Ivpb -)  50 mls @ 50 mls/hr IVPB DAILY 

[facility]


   Last Admin: 06/06/17 10:07 Dose:  50 mls/hr


Ursodiol (Actigal -)  300 mg PO BID [facility]


   Last Admin: 06/06/17 10:06 Dose:  300 mg








A/P


Acute Cholangitis


Gram Negative Bacteremia


s/p ERCP/sphincteroplasty/stent placement


s/p Acute Hypoxic Respiratory Failure


Septic Shock resolving


Acute on Chronic Renal Failure improving


Lactic Acidosis resolved


CAD


+Troponins likely Demand Ischemia


s/p TAVR for severe Aortic Stenosis


Thrombocytopenia





-  daily assessment of lasix


-  continue antibiotics


-  monitor urine output, creatinine


-  DVT prophylaxis


-  can proceed with cholecystectomy from pulmonary standpoint

## 2017-06-06 NOTE — PN
Progress Note (short form)





- Note


Progress Note: 


doing well


no fevers


no abdominal pain


s/p ercp with stent 6/2, has been on levaquin post procedure





 Vital Signs











 Period  Temp  Pulse  Resp  BP Sys/Badillo  Pulse Ox


 


 Last 24 Hr  98 F-98.9 F  55-70  18-20  119-152/54-65  96








cor-rrr


lungs clear


abd soft,nt


ext no edema





 CBC, BMP





 06/06/17 06:30 





 06/06/17 06:30 





lfts improving





 Microbiology





05/19/17 19:20   Blood - Peripheral Venous   Blood Culture - Final


                            NO GROWTH AFTER 5 DAYS INCUBATION


05/19/17 18:50   Blood - Peripheral Venous   Blood Culture - Final


                            NO GROWTH AFTER 5 DAYS INCUBATION


05/15/17 18:30   Abdomen   Gram Stain - Final


05/15/17 18:30   Abdomen   Wound Culture - Final


                            Staphylococcus Coagulase Neg


                            Diphtheroid/Corynebacterium


                            Streptococcus Viridans


05/16/17 10:42   Blood - Peripheral Venous   Blood Culture - Final


                            Escherichia Coli


05/16/17 10:40   Blood - Peripheral Venous   Blood Culture - Final


                            Escherichia Coli








 


 Current Medications





Albuterol/Ipratropium (Duoneb -)  1 amp NEB Q6H PRN


   PRN Reason: SHORTNESS OF BREATH


   Last Admin: 06/05/17 11:45 Dose:  1 amp


Aspirin (Ecotrin -)  81 mg PO DAILY [facility]


   Last Admin: 06/06/17 10:06 Dose:  81 mg


Atorvastatin Calcium (Lipitor -)  20 mg PO HS [facility]


   Last Admin: 06/05/17 21:51 Dose:  20 mg


Carvedilol (Coreg -)  6.25 mg PO BID [facility]


   Last Admin: 06/06/17 10:06 Dose:  6.25 mg


Heparin Sodium (Porcine) (Heparin -)  5,000 unit SQ BID [facility]


   Last Admin: 06/06/17 10:07 Dose:  5,000 unit


Levofloxacin (Levaquin 250 Mg Premixed Ivpb -)  50 mls @ 50 mls/hr IVPB DAILY 

[facility]


   Last Admin: 06/06/17 10:07 Dose:  50 mls/hr


Ursodiol (Actigal -)  300 mg PO BID [facility]


   Last Admin: 06/06/17 10:06 Dose:  300 mg




















imp/reccd





s/p sepsis with ecoli bacteremia-resolved





biliary sepsis/resp failure/anupama (one kidney)- resolved


s/p cbd stent 


hx TAVR


+troponins


CKD





d/c antibiotics


f/u with surgery

## 2017-06-06 NOTE — PATH
Surgical Pathology Report



Patient Name:  NAEL ALDANA

Accession #:  [id number]

Med. Rec. #:  [id number]                                                        

   /Age/Gender:  1929 (Age: 87) / M

Account:  [id number]                                                          

             Location: [facility] MED/SURG

Taken:  2017

Received:  2017

Reported:  2017

Physicians:  Raul Terrazas M.D.

  



Specimen(s) Received

 FOREIGN BODY(STENT) 





Clinical History

Cholelithiasis







Final Diagnosis

FOREIGN BODY (STENT), REMOVAL:

STENT (GROSS EXAM).





***Electronically Signed***

Alexander Finkelstein, M.D.





Gross Description

Received fresh labeled "biopsy foreign body (stent)" is a 20 cm in length blue,

coiled portion of tubing, consistent with a stent. No soft tissue is present. No

sections are submitted, gross only.





[facility]

## 2017-06-07 LAB
ALBUMIN SERPL-MCNC: 2.2 G/DL (ref 3.4–5)
ALP SERPL-CCNC: 245 U/L (ref 45–117)
ALT SERPL-CCNC: 9 U/L (ref 12–78)
ANION GAP SERPL CALC-SCNC: 7 MMOL/L (ref 8–16)
AST SERPL-CCNC: 18 U/L (ref 15–37)
BASOPHILS # BLD: 2 % (ref 0–2)
BILIRUB CONJ SERPL-MCNC: 0.9 MG/DL (ref 0–0.2)
BILIRUB SERPL-MCNC: 1.3 MG/DL (ref 0.2–1)
CALCIUM SERPL-MCNC: 8.5 MG/DL (ref 8.5–10.1)
CO2 SERPL-SCNC: 33 MMOL/L (ref 21–32)
COCKROFT - GAULT: 28.79
CREAT SERPL-MCNC: 2.4 MG/DL (ref 0.7–1.3)
DEPRECATED RDW RBC AUTO: 16.7 % (ref 11.9–15.9)
EOSINOPHIL # BLD: 8.9 % (ref 0–4.5)
GLUCOSE SERPL-MCNC: 79 MG/DL (ref 74–106)
INR BLD: 1.14 (ref 0.82–1.09)
MAGNESIUM SERPL-MCNC: 1.8 MG/DL (ref 1.8–2.4)
MCH RBC QN AUTO: 29.8 PG (ref 25.7–33.7)
MCHC RBC AUTO-ENTMCNC: 33.4 G/DL (ref 32–35.9)
MCV RBC: 89.1 FL (ref 80–96)
NEUTROPHILS # BLD: 56.9 % (ref 42.8–82.8)
PHOSPHATE SERPL-MCNC: 3.2 MG/DL (ref 2.5–4.9)
PLATELET # BLD AUTO: 94 K/MM3 (ref 134–434)
PMV BLD: 10.3 FL (ref 7.5–11.1)
PROT SERPL-MCNC: 5.1 G/DL (ref 6.4–8.2)
PT PNL PPP: 12.6 SEC (ref 9.98–11.88)
WBC # BLD AUTO: 5.2 K/MM3 (ref 4–10)

## 2017-06-07 RX ADMIN — URSODIOL SCH MG: 300 CAPSULE ORAL at 21:41

## 2017-06-07 RX ADMIN — ATORVASTATIN CALCIUM SCH MG: 20 TABLET, FILM COATED ORAL at 21:41

## 2017-06-07 RX ADMIN — URSODIOL SCH MG: 300 CAPSULE ORAL at 10:18

## 2017-06-07 RX ADMIN — CARVEDILOL SCH MG: 6.25 TABLET, FILM COATED ORAL at 21:41

## 2017-06-07 RX ADMIN — CARVEDILOL SCH MG: 6.25 TABLET, FILM COATED ORAL at 10:18

## 2017-06-07 NOTE — PN
Physical Exam: 


SUBJECTIVE: Patient seen and examined at bedside.








OBJECTIVE:





 Vital Signs











 Period  Temp  Pulse  Resp  BP Sys/Badillo  Pulse Ox


 


 Last 24 Hr  97.9 F-99.5 F  61-73  16-20  119-160/56-96  96-96











GENERAL: The patient is awake, alert, and fully oriented, in no acute distress.


HEAD: Normal with no signs of trauma.


EYES: PERRL, extraocular movements intact, sclera anicteric, conjunctiva clear. 

No ptosis. 


LUNGS: Breath sounds equal, clear to auscultation bilaterally, no wheezes, no 

crackles, no 


accessory muscle use. 


HEART: Regular rate and rhythm, S1, S2 without murmur, rub or gallop.


ABDOMEN: Soft, nontender, nondistended, normoactive bowel sounds, no guarding, 

no 


rebound, no hepatosplenomegaly, no masses.


EXTREMITIES: 2+ pulses, warm, well-perfused, bilateral edema improved, 1-2+ 

bilaterally


NEUROLOGICAL: Cranial nerves II through XII grossly intact. Normal speech, gait 

not observed.


PSYCH: Normal mood, normal affect.











 Laboratory Results - last 24 hr











  06/07/17 06/07/17 06/07/17





  07:00 07:00 07:00


 


WBC   5.2 


 


RBC   3.33 L 


 


Hgb   9.9 L 


 


Hct   29.7 L 


 


MCV   89.1 


 


MCHC   33.4 


 


RDW   16.7 H 


 


Plt Count   94 L 


 


MPV   10.3 


 


Neutrophils %   56.9 


 


Lymphocytes %   21.8 


 


Monocytes %   10.4 H 


 


Eosinophils %   8.9 H 


 


Basophils %   2.0 


 


INR    1.14


 


Sodium  143  


 


Potassium  3.8  


 


Chloride  103  


 


Carbon Dioxide  33 H  


 


Anion Gap  7 L  


 


BUN  22 H  


 


Creatinine  2.4 H  


 


Random Glucose  79  


 


Calcium  8.5  


 


Phosphorus  3.2  


 


Magnesium  1.8  


 


Total Bilirubin   


 


Direct Bilirubin   


 


AST   


 


ALT   


 


Alkaline Phosphatase   


 


Total Protein   


 


Albumin   














  06/07/17





  07:00


 


WBC 


 


RBC 


 


Hgb 


 


Hct 


 


MCV 


 


MCHC 


 


RDW 


 


Plt Count 


 


MPV 


 


Neutrophils % 


 


Lymphocytes % 


 


Monocytes % 


 


Eosinophils % 


 


Basophils % 


 


INR 


 


Sodium 


 


Potassium 


 


Chloride 


 


Carbon Dioxide 


 


Anion Gap 


 


BUN 


 


Creatinine 


 


Random Glucose 


 


Calcium 


 


Phosphorus 


 


Magnesium 


 


Total Bilirubin  1.3 H


 


Direct Bilirubin  0.9 H


 


AST  18


 


ALT  9 L


 


Alkaline Phosphatase  245 H


 


Total Protein  5.1 L


 


Albumin  2.2 L








                  Active Medications











Generic Name Dose Route Start Last Admin





  Trade Name Freq  PRN Reason Stop Dose Admin


 


Albuterol/Ipratropium  1 amp 06/05/17 10:44 06/05/17 11:45





  Duoneb -  NEB   1 amp





  Q6H PRN   Administration





  SHORTNESS OF BREATH   


 


Atorvastatin Calcium  20 mg 05/25/17 22:00 06/06/17 21:42





  Lipitor -  PO   20 mg





  HS ALCIDES   Administration


 


Carvedilol  6.25 mg 05/26/17 12:21 06/07/17 10:18





  Coreg -  PO   6.25 mg





  BID ALCIDES   Administration


 


Ursodiol  300 mg 05/24/17 22:00 06/07/17 10:18





  Actigal -  PO   300 mg





  BID ALCIDES   Administration














ASSESSMENT/PLAN


87 year-old male with a significant PMH of HTN, CAD s/p stent placement, aortic 

stenosis s/p TAVR, ureteral strictures s/p dilatation, and renal cell cancer s/

p right nephrectomy. Admitted for septic shock secondary to acute 

choledocholithiasis and E. coli bacteremia. 





Septic shock secondary to acute choledocholilithiasis and cholangitis


E.coli bacteremia


   --afebrile, WBC wnl


   --completed 10-day course of antibiotics 


   --s/p partial stone extraction and biliary stent placement 5/16


   --s/p ERCP and stone extraction 6/2; started on levofloxacin post-procedure; 

request ID re-consult


   --cholecystectomy scheduled for 6/8, cardiac screening done; per ID should 

get rocephin/flagyl or ertapenem pre-operatively, surgery aware  


   --continue ursodial





Acute on chronic kidney injury


   --peak Cr 7.2, 2.4 today (baseline 1.6)


   --off dialysis, shiley removed 5/27


   --maintain ware


   


Thrombocytopenia


   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


   --last transfused platelets 5/23


   --anne-marie 38k on 5/19, now 94k





Severe biventricular heart failure


   --5/17 Echo: LV severely reduced, EF 23% significantly reduced since 09/2015

; septal akinesis, apical akinesis, anterolateeral wall severe hypokinesis, 

anterior wall akinesis, moderate global hypokinesis; RV moderate to severely 

reduced; BLAE; moderate MR, mild TR; no AI; AS cannot be ruled out; trace PI; 

pleural effusion


   --6/7 CXR: no vascular congestion, resolving pleural effusions 


   --Lasix PRN


   


CAD s/p stent


   --hold ASA for surgery


   --continue Lipitor





Aortic stenosis s/p tissue AVR


   --echo does not rule out aortic stenosis


   --hold Plavix pending surgery





Renal cell cancer s/p nephrectomy


   --stable





Acute respiratory failure, resolved


   --extubated 5/23





Hypertension


   --continue carvedilol








F/E/N


   Fluids: PO intake adequate


   Electrolytes: replete as indicated


   Nutrition: low sodium; NPO Wednesday midnight





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





Physical therapy





Dispo: Cholecystectomy tomorrow. Continues to require inpatient care. DNR/DNI.











Visit type





- Emergency Visit


Emergency Visit: Yes


ED Registration Date: 05/15/17


Care time: The patient presented to the Emergency Department on the above date 

and was hospitalized for further evaluation of their emergent condition.





- New Patient


This patient is new to me today: No





- Critical Care


Critical Care patient: No

## 2017-06-07 NOTE — PN
Progress Note, Physician


History of Present Illness: 


No abd pain, afebrile, tolerating diet, denies dyspnea, CXR shows resolving 

effusions without congestion.











- Current Medication List


Current Medications: 


Active Medications





Albuterol/Ipratropium (Duoneb -)  1 amp NEB Q6H PRN


   PRN Reason: SHORTNESS OF BREATH


   Last Admin: 06/05/17 11:45 Dose:  1 amp


Atorvastatin Calcium (Lipitor -)  20 mg PO HS [facility]


   Last Admin: 06/06/17 21:42 Dose:  20 mg


Carvedilol (Coreg -)  6.25 mg PO BID [facility]


   Last Admin: 06/07/17 10:18 Dose:  6.25 mg


Ursodiol (Actigal -)  300 mg PO BID [facility]


   Last Admin: 06/07/17 10:18 Dose:  300 mg











- Objective


Vital Signs: 


 Vital Signs











Temperature  99 F   06/07/17 08:40


 


Pulse Rate  63   06/07/17 08:40


 


Respiratory Rate  20   06/07/17 08:40


 


Blood Pressure  150/63   06/07/17 08:40


 


O2 Sat by Pulse Oximetry (%)  96   06/06/17 21:00











Constitutional: Yes: No Distress, Calm


Neck: Yes: Supple


Cardiovascular: Yes: Regular Rate and Rhythm


Respiratory: Yes: Regular, Diminished, On Nasal O2


Gastrointestinal: Yes: Soft, Hypoactive Bowel Sounds


Edema: No


Labs: 


 CBC, BMP





 06/07/17 07:00 





 06/07/17 07:00 





 INR, PTT











INR  1.14  (0.82-1.09)   06/07/17  07:00    


 


Fibrinogen  463.0 mg/dL (238-498)  D 05/22/17  05:35    














Problem List





- Problems


(1) CAD (coronary artery disease)


Code(s): I25.10 - ATHSCL HEART DISEASE OF NATIVE CORONARY ARTERY W/O ANG PCTRS 

  Qualifiers: 


     Coronary Disease-Associated Artery/Lesion type: unspecified vessel or 

lesion type     Native vs. transplanted heart: native heart     Associated 

angina: without angina        Qualified Code(s): I25.10 - Atherosclerotic heart 

disease of native coronary artery without angina pectoris  





(2) CKD (chronic kidney disease)


Code(s): N18.9 - CHRONIC KIDNEY DISEASE, UNSPECIFIED   





(3) Calculus of common duct with obstruction


Code(s): K80.51 - CALCULUS OF BILE DUCT W/O CHOLANGITIS OR CHOLECYST W OBST





(4) History of percutaneous coronary intervention


Code(s): Z98.890 - OTHER SPECIFIED POSTPROCEDURAL STATES





(5) S/P ERCP


Code(s): Z98.890 - OTHER SPECIFIED POSTPROCEDURAL STATES





(6) Thrombocytopenia


Code(s): D69.6 - THROMBOCYTOPENIA, UNSPECIFIED





(7) Left bundle branch block


Code(s): I44.7 - LEFT BUNDLE-BRANCH BLOCK, UNSPECIFIED





(8) Demand ischemia


Code(s): I24.8 - OTHER FORMS OF ACUTE ISCHEMIC HEART DISEASE





(9) Anemia


Code(s): D64.9 - ANEMIA, UNSPECIFIED   Qualifiers: 


     Anemia type: unspecified type        Qualified Code(s): D64.9 - Anemia, 

unspecified  





(10) Pre-operative cardiovascular examination


Code(s): Z01.810 - ENCOUNTER FOR PREPROCEDURAL CARDIOVASCULAR EXAMINATION








Assessment/Plan


1. Pre-operative cardiovascular evaluation pre-lap +/- open cholecystectomy


2. s/p hypoxemic respiratory failure and biliary septic shock (E. coli), acute 

cholangitis post ERCP, balloon sphincteroplasty and stent placement - post 

leukocytosis and lactic acidosis 


3. Post TAVR for severe AS


4. CAD, S/P PCI/stent with demand ischemic injury


5. Severe biventricular failure with pleural effusions


6. Hypertension


7. Renal CA S/P right nephrectomy


8. CKD - recovered off HD


9. 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


10. Anemia of chronic disease





PLAN:


1. There is no absolute contraindication in proceeding with cholecystectomy in 

view of absence of ischemic symptoms, decompensated congestive heart failure or 

malignant arrhythmias although with understanding increased cardiovascular 

risk.  Post operative ECG and cardiac enzyme recommended


2. Diuretic as needed and monitor renal function and electrolytes


3. Empiric Pre-op abx course per ID


4. Continue Carvedilol 6.25 bid and Lipitor 20 qhs, resume ASA once post-op 

hemostasis has been achieved


5. DVT and GI prophylaxis

## 2017-06-07 NOTE — PN
Progress Note (short form)





- Note


Progress Note: 


No acute events


No pain


On diet


 Vital Signs











 Period  Temp  Pulse  Resp  BP Sys/Badillo  Pulse Ox


 


 Last 24 Hr  98 F-99.5 F  56-73  16-20  119-160/56-96  96-96








Abd soft, NT





CBC,CMP











WBC  5.2 K/mm3 (4.0-10.0)   06/07/17  07:00    


 


RBC  3.33 M/mm3 (4.00-5.60)  L  06/07/17  07:00    


 


Hgb  9.9 GM/dL (11.7-16.9)  L  06/07/17  07:00    


 


Hct  29.7 % (35.4-49)  L  06/07/17  07:00    


 


MCV  89.1 fl (80-96)   06/07/17  07:00    


 


MCHC  33.4 g/dl (32.0-35.9)   06/07/17  07:00    


 


RDW  16.7 % (11.9-15.9)  H  06/07/17  07:00    


 


Plt Count  94 K/MM3 (134-434)  L  06/07/17  07:00    


 


MPV  10.3 fl (7.5-11.1)   06/07/17  07:00    


 


Neutrophils %  56.9 % (42.8-82.8)   06/07/17  07:00    


 


Lymphocytes %  21.8 % (8-40)   06/07/17  07:00    


 


Monocytes %  10.4 % (3.8-10.2)  H  06/07/17  07:00    


 


Eosinophils %  8.9 % (0-4.5)  H  06/07/17  07:00    


 


Basophils %  2.0 % (0-2.0)   06/07/17  07:00    


 


Band Neutrophils  11.0 % (0-10)  H D 05/17/17  05:15    


 


Metamyelocytes  5 % (0-2)  H D 05/16/17  20:30    


 


Myelocytes  9 % (0-2)  H D 05/16/17  20:30    


 


Differential Comment  Manual diff done   06/01/17  06:30    


 


Dohle Bodies  1+   05/17/17  05:15    


 


Platelet Estimate  Decreased  (NORMAL)   06/01/17  06:30    


 


Platelet Comment  No clumping noted   05/17/17  05:15    


 


Polychromasia  Few   05/17/17  05:15    


 


Hypochromic-Microcytic  Few   05/17/17  05:15    


 


Sodium  143 mmol/L (136-145)   06/07/17  07:00    


 


Potassium  3.8 mmol/L (3.5-5.1)   06/07/17  07:00    


 


Chloride  103 mmol/L ()   06/07/17  07:00    


 


Carbon Dioxide  33 mmol/L (21-32)  H  06/07/17  07:00    


 


Anion Gap  7  (8-16)  L  06/07/17  07:00    


 


BUN  22 mg/dL (7-18)  H  06/07/17  07:00    


 


Creatinine  2.4 mg/dL (0.7-1.3)  H  06/07/17  07:00    


 


Creat Clearance w eGFR  25.74  (>60)   06/06/17  06:30    


 


Random Glucose  79 mg/dL ()   06/07/17  07:00    


 


Lactic Acid  1.684 mmol/L (0.4-2.0)   05/17/17  13:20    


 


Calcium  8.5 mg/dL (8.5-10.1)   06/07/17  07:00    


 


Phosphorus  3.2 mg/dL (2.5-4.9)   06/07/17  07:00    


 


Magnesium  1.8 mg/dL (1.8-2.4)   06/07/17  07:00    


 


Total Bilirubin  1.3 mg/dL (0.2-1.0)  H  06/07/17  07:00    


 


Direct Bilirubin  0.9 mg/dL (0.0-0.2)  H  06/07/17  07:00    


 


AST  18 U/L (15-37)   06/07/17  07:00    


 


ALT  9 U/L (12-78)  L  06/07/17  07:00    


 


Alkaline Phosphatase  245 U/L ()  H  06/07/17  07:00    


 


Creatine Kinase  60 IU/L ()   05/20/17  00:00    


 


Creatine Kinase Index  11.8 % (0.0-5.0)  H*  05/17/17  13:20    


 


CK-MB (CK-2)  18.446 ng/ml (0.5-3.6)  H  05/17/17  13:20    


 


CK-MB (CK-2) Rel Index  Cancelled   05/17/17  13:20    


 


Troponin I  1.73 ng/ml (0.00-0.05)  H*  05/20/17  05:15    


 


C-Reactive Protein  3.1 MG/DL (0.00-0.3)  H D 06/03/17  06:30    


 


Total Protein  5.1 g/dl (6.4-8.2)  L  06/07/17  07:00    


 


Albumin  2.2 g/dl (3.4-5.0)  L  06/07/17  07:00    


 


Total Amylase  55 U/L ()  D 06/04/17  06:30    


 


Lipase  170 U/L ()   06/04/17  06:30    














Hold ASA


Plan for laparoscopic possible open cholecystectomy tomorrow





Problem List





- Problems


(1) Umbilical discharge


Code(s): R19.8 - OTH SYMPTOMS AND SIGNS INVOLVING THE DGSTV SYS AND ABDOMEN

## 2017-06-07 NOTE — PN
Progress Note, Physician


History of Present Illness: 


Pt seen and examined at bedside. He is awake and alert. He denies shortness of 

breath. 





- Current Medication List


Current Medications: 


Active Medications





Albuterol/Ipratropium (Duoneb -)  1 amp NEB Q6H PRN


   PRN Reason: SHORTNESS OF BREATH


   Last Admin: 06/05/17 11:45 Dose:  1 amp


Atorvastatin Calcium (Lipitor -)  20 mg PO HS [facility]


   Last Admin: 06/06/17 21:42 Dose:  20 mg


Carvedilol (Coreg -)  6.25 mg PO BID [facility]


   Last Admin: 06/07/17 10:18 Dose:  6.25 mg


Ursodiol (Actigal -)  300 mg PO BID [facility]


   Last Admin: 06/07/17 10:18 Dose:  300 mg











- Objective


Vital Signs: 


 Vital Signs











Temperature  99 F   06/07/17 08:40


 


Pulse Rate  63   06/07/17 08:40


 


Respiratory Rate  20   06/07/17 08:40


 


Blood Pressure  150/63   06/07/17 08:40


 


O2 Sat by Pulse Oximetry (%)  96   06/06/17 21:00











Constitutional: Yes: Calm


Eyes: Yes: Conjunctiva Clear


HENT: Yes: Atraumatic


Cardiovascular: Yes: S1, S2


Respiratory: Yes: On Nasal O2


Gastrointestinal: Yes: Soft


Genitourinary: Yes: Ware Present


Musculoskeletal: Yes: WNL


Edema: Yes


Edema: LLE: Trace, RLE: Trace


Neurological: Yes: Oriented


Psychiatric: Yes: Oriented


Labs: 


 CBC, BMP





 06/07/17 07:00 





 06/07/17 07:00 





 INR, PTT











INR  1.14  (0.82-1.09)   06/07/17  07:00    


 


Fibrinogen  463.0 mg/dL (238-498)  D 05/22/17  05:35    














Problem List





- Problems


(1) CAD (coronary artery disease)


Code(s): I25.10 - ATHSCL HEART DISEASE OF NATIVE CORONARY ARTERY W/O ANG PCTRS 

  Qualifiers: 


     Coronary Disease-Associated Artery/Lesion type: unspecified vessel or 

lesion type     Native vs. transplanted heart: native heart     Associated 

angina: without angina        Qualified Code(s): I25.10 - Atherosclerotic heart 

disease of native coronary artery without angina pectoris  





(2) Calculus of common duct with obstruction


Code(s): K80.51 - CALCULUS OF BILE DUCT W/O CHOLANGITIS OR CHOLECYST W OBST





(3) Choledocholithiasis


Code(s): K80.50 - CALCULUS OF BILE DUCT W/O CHOLANGITIS OR CHOLECYST W/O OBST





(4) Renal cancer


Code(s): C64.9 - MALIGNANT NEOPLASM OF UNSP KIDNEY, EXCEPT RENAL PELVIS   

Qualifiers: 


     Laterality: right        Qualified Code(s): C64.1 - Malignant neoplasm of 

right kidney, except renal pelvis  





(5) BO (acute kidney injury)


Code(s): N17.9 - ACUTE KIDNEY FAILURE, UNSPECIFIED





(6) CKD (chronic kidney disease)


Code(s): N18.9 - CHRONIC KIDNEY DISEASE, UNSPECIFIED   








Assessment/Plan


 Current Medications











Generic Name Dose Route Start Last Admin





  Trade Name Freq  PRN Reason Stop Dose Admin


 


Albuterol/Ipratropium  1 amp 06/05/17 10:44 06/05/17 11:45





  Duoneb -  NEB   1 amp





  Q6H PRN   Administration





  SHORTNESS OF BREATH   


 


Atorvastatin Calcium  20 mg 05/25/17 22:00 06/06/17 21:42





  Lipitor -  PO   20 mg





  HS ALCIDES   Administration


 


Carvedilol  6.25 mg 05/26/17 12:21 06/07/17 10:18





  Coreg -  PO   6.25 mg





  BID ALCIDES   Administration


 


Ursodiol  300 mg 05/24/17 22:00 06/07/17 10:18





  Actigal -  PO   300 mg





  BID ALCIDES   Administration














Impression


1. CKD stage 3


2. BO which required several HD sessions


3. choledocholithiasis


4. CAD


5. hx of Renal Cell Cancer s/p nephroectomy


6. aortic stenosis


7. sepsis


8. acute respiratory failure requiring intubation


9. NSTEMI


10. bacteremia





Plan


- renal function is stabilizing


- repeat labs in am


- pt going to OR tomorrow


- will keep ware in for now as he is going to the OR


- monitor urine output


- PT/rehab


- cardio input appreciated





Dr Rodriguez

## 2017-06-07 NOTE — SPA.PREOP
- PRE-OP NOTE


Dx: Acute cholecystits / choledocholithiasis (s/p ERCP with stone extraction 

and stenting) / cholangitis





Planned Procedure: Laparoscopic cholecystectomy possible open





Surgeon: 





Consent: To be obtained by surgeon after risks, benefits and alternatives 

explained to patient. 





 Last Vital Signs











Temp Pulse Resp BP Pulse Ox


 


 99 F   63   20   150/63   96 


 


 06/07/17 08:40  06/07/17 08:40  06/07/17 08:40  06/07/17 08:40  06/06/17 21:00











 CBC, BMP





 06/07/17 07:00 





 06/07/17 07:00 








                               Blood Type











Blood Type  A POSITIVE   06/01/17  07:00    








 INR, PTT











INR  1.14  (0.82-1.09)   06/07/17  07:00    


 


Fibrinogen  463.0 mg/dL (238-498)  D 05/22/17  05:35    

















- ASSESSMENT/PLAN


   1. Make NPO after midnight except po meds


   2. GI/DVT PPX


   3. Medical optimization / clearance


   4. Patient as he has quinolone resistance therfore, she will need the 

following pre-op antibiotic per ID:   


      - Rocephin/flagyl or 


      - Ertapenem 








Visit type





- Case Type


Case Type: ED Admission





- New patient


This patient is new to me today: Yes


Date on this admission: 06/07/17

## 2017-06-07 NOTE — PN
Progress Note (short form)





- Note


Progress Note: 


doing well


no fevers


no abdominal pain


s/p ercp with stent 6/2


for cholycystectomy tomorrow





 


 Vital Signs











 Period  Temp  Pulse  Resp  BP Sys/Badillo  Pulse Ox


 


 Last 24 Hr  98 F-99.5 F  56-73  16-20  119-160/56-96  96-96








cor-rrr


lungs decreased bs at bases


abd soft,nt


ext trace edema bilateral





 CBC, BMP





 06/07/17 07:00 





 06/07/17 07:00 








 Microbiology





05/19/17 19:20   Blood - Peripheral Venous   Blood Culture - Final


                            NO GROWTH AFTER 5 DAYS INCUBATION


05/19/17 18:50   Blood - Peripheral Venous   Blood Culture - Final


                            NO GROWTH AFTER 5 DAYS INCUBATION


05/15/17 18:30   Abdomen   Gram Stain - Final


05/15/17 18:30   Abdomen   Wound Culture - Final


                            Staphylococcus Coagulase Neg


                            Diphtheroid/Corynebacterium


                            Streptococcus Viridans


05/16/17 10:42   Blood - Peripheral Venous   Blood Culture - Final


                            Escherichia Coli


05/16/17 10:40   Blood - Peripheral Venous   Blood Culture - Final


                            Escherichia Coli








 


 Current Medications





Albuterol/Ipratropium (Duoneb -)  1 amp NEB Q6H PRN


   PRN Reason: SHORTNESS OF BREATH


   Last Admin: 06/05/17 11:45 Dose:  1 amp


Atorvastatin Calcium (Lipitor -)  20 mg PO HS [facility]


   Last Admin: 06/06/17 21:42 Dose:  20 mg


Carvedilol (Coreg -)  6.25 mg PO BID [facility]


   Last Admin: 06/06/17 21:42 Dose:  6.25 mg


Ursodiol (Actigal -)  300 mg PO BID [facility]


   Last Admin: 06/06/17 21:42 Dose:  300 mg























imp/reccd





s/p sepsis with ecoli bacteremia-resolved





biliary sepsis/resp failure/anupama (one kidney)- resolved


s/p cbd stent 


hx TAVR


+troponins


CKD








would suggest rocephin/flagyl or ertapenem for preop antibiotics


he has quinolone resistance

## 2017-06-08 LAB
ALBUMIN SERPL-MCNC: 2.3 G/DL (ref 3.4–5)
ALP SERPL-CCNC: 240 U/L (ref 45–117)
ALT SERPL-CCNC: 20 U/L (ref 12–78)
ANION GAP SERPL CALC-SCNC: 8 MMOL/L (ref 8–16)
ANION GAP SERPL CALC-SCNC: 9 MMOL/L (ref 8–16)
AST SERPL-CCNC: 61 U/L (ref 15–37)
BASOPHILS # BLD: 1.1 % (ref 0–2)
BILIRUB SERPL-MCNC: 1.4 MG/DL (ref 0.2–1)
CALCIUM SERPL-MCNC: 7.9 MG/DL (ref 8.5–10.1)
CALCIUM SERPL-MCNC: 8 MG/DL (ref 8.5–10.1)
CO2 SERPL-SCNC: 30 MMOL/L (ref 21–32)
CO2 SERPL-SCNC: 31 MMOL/L (ref 21–32)
COCKROFT - GAULT: 27.75
COCKROFT - GAULT: 27.75
CREAT SERPL-MCNC: 2.3 MG/DL (ref 0.7–1.3)
CREAT SERPL-MCNC: 2.3 MG/DL (ref 0.7–1.3)
DEPRECATED RDW RBC AUTO: 16.9 % (ref 11.9–15.9)
EOSINOPHIL # BLD: 4.1 % (ref 0–4.5)
GLUCOSE SERPL-MCNC: 84 MG/DL (ref 74–106)
GLUCOSE SERPL-MCNC: 97 MG/DL (ref 74–106)
MCH RBC QN AUTO: 28.9 PG (ref 25.7–33.7)
MCHC RBC AUTO-ENTMCNC: 32.1 G/DL (ref 32–35.9)
MCV RBC: 89.9 FL (ref 80–96)
NEUTROPHILS # BLD: 79.6 % (ref 42.8–82.8)
PLATELET # BLD AUTO: 117 K/MM3 (ref 134–434)
PMV BLD: 10.7 FL (ref 7.5–11.1)
PROT SERPL-MCNC: 5.4 G/DL (ref 6.4–8.2)
WBC # BLD AUTO: 5.3 K/MM3 (ref 4–10)

## 2017-06-08 PROCEDURE — 0J9C0ZZ DRAINAGE OF PELVIC REGION SUBCUTANEOUS TISSUE AND FASCIA, OPEN APPROACH: ICD-10-PCS | Performed by: SURGERY

## 2017-06-08 PROCEDURE — 0FT44ZZ RESECTION OF GALLBLADDER, PERCUTANEOUS ENDOSCOPIC APPROACH: ICD-10-PCS | Performed by: SURGERY

## 2017-06-08 RX ADMIN — CARVEDILOL SCH MG: 6.25 TABLET, FILM COATED ORAL at 10:39

## 2017-06-08 RX ADMIN — ATORVASTATIN CALCIUM SCH MG: 20 TABLET, FILM COATED ORAL at 21:48

## 2017-06-08 RX ADMIN — CARVEDILOL SCH MG: 6.25 TABLET, FILM COATED ORAL at 21:48

## 2017-06-08 RX ADMIN — URSODIOL SCH: 300 CAPSULE ORAL at 10:35

## 2017-06-08 RX ADMIN — ACETAMINOPHEN SCH MG: 10 INJECTION, SOLUTION INTRAVENOUS at 21:47

## 2017-06-08 NOTE — PN
Physical Exam: 


SUBJECTIVE: Patient seen and examined at bedside. Scheduled for cholecystectomy 

today.








OBJECTIVE:





 Vital Signs











 Period  Temp  Pulse  Resp  BP Sys/Badillo  Pulse Ox


 


 Last 24 Hr  97.9 F-99.8 F  63-83  16-20  135-150/58-66  91-96











GENERAL: The patient is awake, alert, and fully oriented, in no acute distress.


HEAD: Normal with no signs of trauma.


EYES: PERRL, extraocular movements intact, sclera anicteric, conjunctiva clear. 

No ptosis. 


LUNGS: Breath sounds equal, clear to auscultation bilaterally, no wheezes, no 

crackles, no 


accessory muscle use. 


HEART: Regular rate and rhythm, S1, S2 without murmur, rub or gallop.


ABDOMEN: Soft, nontender, nondistended, normoactive bowel sounds, no guarding, 

no 


rebound, no hepatosplenomegaly, no masses.


EXTREMITIES: 2+ pulses, warm, well-perfused, bilateral edema improved, 1-2+ 

bilaterally


NEUROLOGICAL: Cranial nerves II through XII grossly intact. Normal speech, gait 

not observed.


PSYCH: Normal mood, normal affect.








 Laboratory Results - last 24 hr











  06/08/17





  06:45


 


Sodium  142


 


Potassium  3.4 L


 


Chloride  103


 


Carbon Dioxide  30


 


Anion Gap  9


 


BUN  19 H


 


Creatinine  2.3 H


 


Random Glucose  84


 


Calcium  8.0 L








                  Active Medications











Generic Name Dose Route Start Last Admin





  Trade Name Freq  PRN Reason Stop Dose Admin


 


Albuterol/Ipratropium  1 amp 06/05/17 10:44 06/05/17 11:45





  Duoneb -  NEB   1 amp





  Q6H PRN   Administration





  SHORTNESS OF BREATH   


 


Atorvastatin Calcium  20 mg 05/25/17 22:00 06/07/17 21:41





  Lipitor -  PO   20 mg





  HS ALCIDES   Administration


 


Carvedilol  6.25 mg 05/26/17 12:21 06/08/17 10:39





  Coreg -  PO   6.25 mg





  BID ALCIDES   Administration


 


Ursodiol  300 mg 05/24/17 22:00 06/08/17 10:35





  Actigal -  PO   Not Given





  BID ALCIDES   











ASSESSMENT/PLAN:


87 year-old male with a significant PMH of HTN, CAD s/p stent placement, aortic 

stenosis s/p TAVR, ureteral strictures s/p dilatation, and renal cell cancer s/

p right nephrectomy. Admitted for septic shock secondary to acute 

choledocholithiasis and E. coli bacteremia. 





Septic shock secondary to acute choledocholilithiasis and cholangitis


E.coli bacteremia


   --afebrile, WBC wnl


   --completed 10-day course of antibiotics 


   --s/p partial stone extraction and biliary stent placement 5/16


   --s/p ERCP and stone extraction 6/2; started on levofloxacin post-procedure; 

request ID re-consult


   --cholecystectomy scheduled for today; per ID should get rocephin/flagyl or 

ertapenem pre-operatively, surgery aware  


   --continue ursodial





Acute on chronic kidney injury


   --peak Cr 7.2, 2.4 today (baseline 1.6)


   --off dialysis, shiley removed 5/27


   --maintain ware


   


Thrombocytopenia


   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


   --last transfused platelets 5/23


   --anne-marie 38k on 5/19, now 94k





Severe biventricular heart failure


   --5/17 Echo: LV severely reduced, EF 23% significantly reduced since 09/2015

; septal akinesis, apical akinesis, anterolateeral wall severe hypokinesis, 

anterior wall akinesis, moderate global hypokinesis; RV moderate to severely 

reduced; BLAE; moderate MR, mild TR; no AI; AS cannot be ruled out; trace PI; 

pleural effusion


   --6/7 CXR: no vascular congestion, resolving pleural effusions 


   --Lasix PRN


   


CAD s/p stent


   --hold ASA for surgery


   --continue Lipitor





Aortic stenosis s/p tissue AVR


   --echo does not rule out aortic stenosis


   --hold Plavix pending surgery





Renal cell cancer s/p nephrectomy


   --stable





Acute respiratory failure, resolved


   --extubated 5/23





Hypertension


   --continue carvedilol








F/E/N


   Fluids: PO intake adequate


   Electrolytes: replete as indicated


   Nutrition: low sodium; NPO Wednesday midnight





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





Physical therapy





Dispo: Cholecystectomy today. Continues to require inpatient care. DNR/DNI.














Visit type





- Emergency Visit


Emergency Visit: Yes


ED Registration Date: 05/15/17


Care time: The patient presented to the Emergency Department on the above date 

and was hospitalized for further evaluation of their emergent condition.





- New Patient


This patient is new to me today: No





- Critical Care


Critical Care patient: No

## 2017-06-08 NOTE — PN
Progress Note, Physician


History of Present Illness: 


Pt seen and examined at bedside. He was seen by me earlier today in the 

recovery room. 





- Current Medication List


Current Medications: 


Active Medications





Acetaminophen (Tylenol -)  650 mg PO Q6H PRN


   PRN Reason: FEVER OR PAIN


   Last Admin: 06/08/17 17:29 Dose:  650 mg


Acetaminophen (Tylenol -)  650 mg PO Q6H PRN


   PRN Reason: FEVER OR PAIN


Albuterol/Ipratropium (Duoneb -)  1 amp NEB Q6H PRN


   PRN Reason: SHORTNESS OF BREATH


Atorvastatin Calcium (Lipitor -)  20 mg PO HS [facility]


Carvedilol (Coreg -)  6.25 mg PO BID [facility]


Fentanyl (Sublimaze Injection -)  25 mcg IVPUSH [id number] PRN


   PRN Reason: PAIN


   Stop: 06/11/17 14:28


Heparin Sodium (Porcine) (Heparin -)  5,000 unit SQ BID [facility]


Hydromorphone HCl (Dilaudid Injection -)  1 mg IVPB Q6H PRN


   PRN Reason: PAIN


Sodium Chloride (Normal Saline -)  1,000 mls @ 75 mls/hr IV ASDIR [facility]


   Last Admin: 06/08/17 18:09 Dose:  75 mls/hr


Ondansetron HCl (Zofran Injection)  4 mg IVPB Q4H PRN


   PRN Reason: NAUSEA AND/OR VOMITING











- Objective


Vital Signs: 


 Vital Signs











Temperature  103 F H  06/08/17 16:50


 


Pulse Rate  82   06/08/17 16:50


 


Respiratory Rate  20   06/08/17 16:50


 


Blood Pressure  159/57   06/08/17 16:50


 


O2 Sat by Pulse Oximetry (%)  98   06/08/17 15:15











Constitutional: Yes: Calm


Eyes: Yes: Conjunctiva Clear


HENT: Yes: Atraumatic


Neck: Yes: Supple


Cardiovascular: Yes: S1, S2


Respiratory: Yes: On Venti-Mask


Gastrointestinal: Yes: Soft


Genitourinary: Yes: Ware Present


Musculoskeletal: Yes: Muscle Weakness


Edema: Yes


Edema: LLE: Trace, RLE: Trace


Wound/Incision: Yes: Dressing Dry and Intact


Neurological: Yes: Other (drwosy)


Labs: 


 CBC, BMP





 06/08/17 17:40 





 06/08/17 17:40 





 INR, PTT











INR  1.14  (0.82-1.09)   06/07/17  07:00    


 


Fibrinogen  463.0 mg/dL (238-498)  D 05/22/17  05:35    














Problem List





- Problems


(1) CAD (coronary artery disease)


Code(s): I25.10 - ATHSCL HEART DISEASE OF NATIVE CORONARY ARTERY W/O ANG PCTRS 

  Qualifiers: 


     Coronary Disease-Associated Artery/Lesion type: unspecified vessel or 

lesion type     Native vs. transplanted heart: native heart     Associated 

angina: without angina        Qualified Code(s): I25.10 - Atherosclerotic heart 

disease of native coronary artery without angina pectoris  





(2) Calculus of common duct with obstruction


Code(s): K80.51 - CALCULUS OF BILE DUCT W/O CHOLANGITIS OR CHOLECYST W OBST





(3) Choledocholithiasis


Code(s): K80.50 - CALCULUS OF BILE DUCT W/O CHOLANGITIS OR CHOLECYST W/O OBST





(4) Renal cancer


Code(s): C64.9 - MALIGNANT NEOPLASM OF UNSP KIDNEY, EXCEPT RENAL PELVIS   

Qualifiers: 


     Laterality: right        Qualified Code(s): C64.1 - Malignant neoplasm of 

right kidney, except renal pelvis  





(5) BO (acute kidney injury)


Code(s): N17.9 - ACUTE KIDNEY FAILURE, UNSPECIFIED





(6) CKD (chronic kidney disease)


Code(s): N18.9 - CHRONIC KIDNEY DISEASE, UNSPECIFIED   








Assessment/Plan


 Current Medications











Generic Name Dose Route Start Last Admin





  Trade Name Freq  PRN Reason Stop Dose Admin


 


Acetaminophen  650 mg 06/08/17 14:05 06/08/17 17:29





  Tylenol -  PO   650 mg





  Q6H PRN   Administration





  FEVER OR PAIN   


 


Acetaminophen  650 mg 06/08/17 17:16  





  Tylenol -  PO   





  Q6H PRN   





  FEVER OR PAIN   


 


Albuterol/Ipratropium  1 amp 06/08/17 15:08  





  Duoneb -  NEB   





  Q6H PRN   





  SHORTNESS OF BREATH   


 


Atorvastatin Calcium  20 mg 06/08/17 22:00  





  Lipitor -  PO   





  HS ALCIDES   


 


Carvedilol  6.25 mg 06/08/17 22:00  





  Coreg -  PO   





  BID ALCIDES   


 


Fentanyl  25 mcg 06/08/17 14:27  





  Sublimaze Injection -  IVPUSH 06/11/17 14:28  





  [id number] PRN   





  PAIN   


 


Heparin Sodium (Porcine)  5,000 unit 06/08/17 22:00  





  Heparin -  SQ   





  BID ALCIDES   


 


Hydromorphone HCl  1 mg 06/08/17 14:05  





  Dilaudid Injection -  IVPB   





  Q6H PRN   





  PAIN   


 


Sodium Chloride  1,000 mls @ 75 mls/hr 06/08/17 17:45 06/08/17 18:09





  Normal Saline -  IV   75 mls/hr





  ASDIR ALCIDES   Administration


 


Ondansetron HCl  4 mg 06/08/17 14:05  





  Zofran Injection  IVPB   





  Q4H PRN   





  NAUSEA AND/OR VOMITING   

















Impression


1. CKD stage 3


2. BO which required several HD sessions


3. choledocholithiasis


4. CAD


5. hx of Renal Cell Cancer s/p nephroectomy


6. aortic stenosis


7. sepsis


8. acute respiratory failure requiring intubation


9. NSTEMI


10. bacteremia





Plan


- pt is s/p cholecystectomy


- renal function is stabilizing


- reviewed anesthesia record


- repeat labs in am


- monitor urine output


- d/c ware catheter in am


- monitor urine output


- PT/rehab


- cardio input appreciated


- stop fluids once he tolerated diet





Dr Rodriguez

## 2017-06-08 NOTE — PN
Progress Note, Physician


History of Present Illness: 


Plan for lap cholecystectomy today.











- Current Medication List


Current Medications: 


Active Medications





Albuterol/Ipratropium (Duoneb -)  1 amp NEB Q6H PRN


   PRN Reason: SHORTNESS OF BREATH


   Last Admin: 06/05/17 11:45 Dose:  1 amp


Atorvastatin Calcium (Lipitor -)  20 mg PO HS [facility]


   Last Admin: 06/07/17 21:41 Dose:  20 mg


Carvedilol (Coreg -)  6.25 mg PO BID [facility]


   Last Admin: 06/08/17 10:39 Dose:  6.25 mg


Ursodiol (Actigal -)  300 mg PO BID [facility]


   Last Admin: 06/08/17 10:35 Dose:  Not Given











- Objective


Vital Signs: 


 Vital Signs











Temperature  99.8 F H  06/08/17 06:00


 


Pulse Rate  83   06/08/17 10:13


 


Respiratory Rate  20   06/08/17 09:00


 


Blood Pressure  143/61   06/08/17 06:00


 


O2 Sat by Pulse Oximetry (%)  91 L  06/08/17 10:13











Constitutional: Yes: No Distress, Calm


Neck: Yes: Supple


Cardiovascular: Yes: Regular Rate and Rhythm


Respiratory: Yes: Regular, Diminished


Gastrointestinal: Yes: Soft, Hypoactive Bowel Sounds


Edema: No


Labs: 


 CBC, BMP





 06/07/17 07:00 





 06/08/17 06:45 





 INR, PTT











INR  1.14  (0.82-1.09)   06/07/17  07:00    


 


Fibrinogen  463.0 mg/dL (238-498)  D 05/22/17  05:35    














Problem List





- Problems


(1) CAD (coronary artery disease)


Code(s): I25.10 - ATHSCL HEART DISEASE OF NATIVE CORONARY ARTERY W/O ANG PCTRS 

  Qualifiers: 


     Coronary Disease-Associated Artery/Lesion type: unspecified vessel or 

lesion type     Native vs. transplanted heart: native heart     Associated 

angina: without angina        Qualified Code(s): I25.10 - Atherosclerotic heart 

disease of native coronary artery without angina pectoris  





(2) CKD (chronic kidney disease)


Code(s): N18.9 - CHRONIC KIDNEY DISEASE, UNSPECIFIED   





(3) Calculus of common duct with obstruction


Code(s): K80.51 - CALCULUS OF BILE DUCT W/O CHOLANGITIS OR CHOLECYST W OBST





(4) History of percutaneous coronary intervention


Code(s): Z98.890 - OTHER SPECIFIED POSTPROCEDURAL STATES





(5) S/P ERCP


Code(s): Z98.890 - OTHER SPECIFIED POSTPROCEDURAL STATES





(6) Thrombocytopenia


Code(s): D69.6 - THROMBOCYTOPENIA, UNSPECIFIED





(7) Left bundle branch block


Code(s): I44.7 - LEFT BUNDLE-BRANCH BLOCK, UNSPECIFIED





(8) Demand ischemia


Code(s): I24.8 - OTHER FORMS OF ACUTE ISCHEMIC HEART DISEASE





(9) Anemia


Code(s): D64.9 - ANEMIA, UNSPECIFIED   Qualifiers: 


     Anemia type: unspecified type        Qualified Code(s): D64.9 - Anemia, 

unspecified  





(10) Pre-operative cardiovascular examination


Code(s): Z01.810 - ENCOUNTER FOR PREPROCEDURAL CARDIOVASCULAR EXAMINATION








Assessment/Plan


1. POD#0 lap +/- open cholecystectomy


2. s/p hypoxemic respiratory failure and biliary septic shock (E. coli), acute 

cholangitis post ERCP, balloon sphincteroplasty and stent placement - post 

leukocytosis and lactic acidosis 


3. Post TAVR for severe AS


4. CAD, S/P PCI/stent with demand ischemic injury


5. Severe biventricular failure with pleural effusions


6. Hypertension


7. Renal CA S/P right nephrectomy


8. CKD - recovered off HD


9. 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


10. Anemia of chronic disease





PLAN:


1. F/u post operative ECG and cardiac enzyme recommended


2. Diuretic as needed and monitor renal function and electrolytes, replete K


3. Empiric Pre-op abx course per ID


4. Continue Carvedilol 6.25 bid and Lipitor 20 qhs, resume ASA once post-op 

hemostasis has been achieved


5. DVT and GI prophylaxis

## 2017-06-08 NOTE — OP
Operative Note





- Note:


Operative Date: 06/08/17


Pre-Operative Diagnosis: Cholangitis s/p ercp with stone extraction.  Umbilical 

drainage/collection


Operation: Laparoscopic cholecystectomy, Incision and drainage of umbilicus


Post-Operative Diagnosis: Same as Pre-op


Surgeon: Alfonso Swann


Assistant: Deepak De La Fuente


Anesthesia: General


Specimens Removed: Gallbladder


Estimated Blood Loss (mls): 5


Operative Report Dictated: Yes

## 2017-06-08 NOTE — PN
Progress Note (short form)





- Note


Progress Note: 


PULMONARY





Denies shortness of breath, cough or chest pain.





 Last Vital Signs











Temp Pulse Resp BP Pulse Ox


 


 99.8 F H  83   20   143/61   91 L


 


 06/08/17 06:00  06/08/17 10:13  06/08/17 06:00  06/08/17 06:00  06/08/17 10:13











Gen:  NAD at rest


Heart:  RRR


Lung: decreased breath sounds at the bases


Abd: soft, nontender


Ext: less edema





 CBC, BMP





 06/07/17 07:00 





 06/08/17 06:45 





Active Medications





Albuterol/Ipratropium (Duoneb -)  1 amp NEB Q6H PRN


   PRN Reason: SHORTNESS OF BREATH


   Last Admin: 06/05/17 11:45 Dose:  1 amp


Atorvastatin Calcium (Lipitor -)  20 mg PO HS [facility]


   Last Admin: 06/07/17 21:41 Dose:  20 mg


Carvedilol (Coreg -)  6.25 mg PO BID [facility]


   Last Admin: 06/08/17 10:39 Dose:  6.25 mg


Ursodiol (Actigal -)  300 mg PO BID [facility]


   Last Admin: 06/08/17 10:35 Dose:  Not Given











A/P


Acute Cholangitis


Gram Negative Bacteremia


s/p ERCP/sphincteroplasty/stent placement


s/p Acute Hypoxic Respiratory Failure


s/p Septic Shock


Acute on Chronic Renal Failure improving


CAD


+Troponins likely Demand Ischemia


s/p TAVR for severe Aortic Stenosis


Thrombocytopenia





-  lasix as needed


-  completed antibiotics


-  monitor urine output, creatinine


-  DVT prophylaxis


-  can proceed with cholecystectomy from pulmonary standpoint

## 2017-06-09 LAB
ALBUMIN SERPL-MCNC: 1.9 G/DL (ref 3.4–5)
ALP SERPL-CCNC: 188 U/L (ref 45–117)
ALT SERPL-CCNC: 24 U/L (ref 12–78)
ANION GAP SERPL CALC-SCNC: 8 MMOL/L (ref 8–16)
AST SERPL-CCNC: 69 U/L (ref 15–37)
BASOPHILS # BLD: 0.6 % (ref 0–2)
BILIRUB SERPL-MCNC: 1.2 MG/DL (ref 0.2–1)
CALCIUM SERPL-MCNC: 7.5 MG/DL (ref 8.5–10.1)
CO2 SERPL-SCNC: 29 MMOL/L (ref 21–32)
COCKROFT - GAULT: 25.46
CREAT SERPL-MCNC: 2.6 MG/DL (ref 0.7–1.3)
DEPRECATED RDW RBC AUTO: 17.2 % (ref 11.9–15.9)
EOSINOPHIL # BLD: 0.2 % (ref 0–4.5)
GLUCOSE SERPL-MCNC: 83 MG/DL (ref 74–106)
MCH RBC QN AUTO: 29.8 PG (ref 25.7–33.7)
MCHC RBC AUTO-ENTMCNC: 33.3 G/DL (ref 32–35.9)
MCV RBC: 89.5 FL (ref 80–96)
NEUTROPHILS # BLD: 80.9 % (ref 42.8–82.8)
PLATELET # BLD AUTO: 68 K/MM3 (ref 134–434)
PMV BLD: 10.3 FL (ref 7.5–11.1)
PROT SERPL-MCNC: 4.3 G/DL (ref 6.4–8.2)
WBC # BLD AUTO: 6.7 K/MM3 (ref 4–10)

## 2017-06-09 RX ADMIN — ACETAMINOPHEN SCH MG: 10 INJECTION, SOLUTION INTRAVENOUS at 17:26

## 2017-06-09 RX ADMIN — HYDROMORPHONE HYDROCHLORIDE PRN MG: 1 INJECTION, SOLUTION INTRAMUSCULAR; INTRAVENOUS; SUBCUTANEOUS at 11:51

## 2017-06-09 RX ADMIN — CARVEDILOL SCH MG: 6.25 TABLET, FILM COATED ORAL at 22:13

## 2017-06-09 RX ADMIN — ATORVASTATIN CALCIUM SCH MG: 20 TABLET, FILM COATED ORAL at 22:13

## 2017-06-09 RX ADMIN — CARVEDILOL SCH MG: 6.25 TABLET, FILM COATED ORAL at 10:08

## 2017-06-09 RX ADMIN — ACETAMINOPHEN SCH: 10 INJECTION, SOLUTION INTRAVENOUS at 06:49

## 2017-06-09 RX ADMIN — ACETAMINOPHEN SCH MG: 10 INJECTION, SOLUTION INTRAVENOUS at 10:08

## 2017-06-09 NOTE — SPEC
DATE OF OPERATION:  06/08/2017

 

SURGEON:  Alfonso Swann MD

 

ASSISTANT:  FERMÍN Jo

 

PROCEDURE:  Laparoscopic cholecystectomy and incision and drainage of umbilicus for

collection.  

 

PREOPERATIVE DIAGNOSIS:  Cholangitis with septic shock, resolved status post

endoscopic retrograde cholangiopancreatography with sphincterotomy stone extraction

and stent, as well as drainage from the umbilicus.  

 

POSTOPERATIVE DIAGNOSIS:  Cholangitis with septic shock, resolved status post

endoscopic retrograde cholangiopancreatography with sphincterotomy stone extraction

and stent, as well as drainage from the umbilicus.  

 

ESTIMATED BLOOD LOSS:  5 mL.

 

DRAINS:  None.

 

ANESTHESIA:  GET.

 

REASON FOR PROCEDURE:  This is an 87-year-old gentleman who presented to the hospital

and was found to have cholangitis and be in septic shock.  He went through an

extensive course including ICU care, intubation, multiple ERCPs with stents,

sphincterotomy and stone extraction.  He also required dialysis for acute renal

insufficiency.  Once the common bile duct stone was extracted, and he had resolved

clinically, he had been transferred to the floor and remained stable.  He was cleared

by Cardiology and was then consented for laparoscopic cholecystectomy, possible open.

 In addition, he was noted on a prior exam to have drainage from his umbilicus.  He

had this for some time, and because of this, he was also consented for incision and

drainage of an umbilical collection.  

 

DESCRIPTION OF PROCEDURE:  The patient was placed supine on the operating room table.

 The patient underwent general endotracheal intubation.  The abdomen was prepped and

draped in the usual sterile fashion.  A timeout was performed.  

 

A periumbilical incision was made, and a 5-mm optical trocar was inserted under

direct visualization with the laparoscope.  Pneumoperitoneum was then established. 

Subsequently, a 5-mm trocar was placed in the subxiphoid area and two 5-mm trocars

were placed in the right upper quadrant.  The 5-mm trocar at the periumbilical region

was removed and a 10-mm trocar was inserted.  The patient was placed in reverse

Trendelenburg, right side up position.

 

The gallbladder was noted and was retracted cephalad and laterally.  Any overlying

omental adhesions were carefully dissected.  The peritoneum was dissected using

electrocautery.  The cystic duct followed by the cystic artery were circumferentially

dissected, clipped and transected.  The gallbladder was removed off the liver bed

using electrocautery.  Hemostasis of the liver bed and surrounding area was attained

using electrocautery.  The gallbladder was placed in an EndoCatch bag.  Copious

irrigation and suction were performed until clear.  The gallbladder was removed from

the abdominal cavity.  The fascia at the 10-mm trocar site was closed using a

0-Vicryl suture.  All incision sites were irrigated and Marcaine was injected. 

Hemostasis of all incision sites was noted.  All incision sites were closed using 4-0

Biosyn.  Sterile dressings were applied.  

 

In addition, the umbilicus was incised with a 15-blade scalpel. The area was explored

and no purulent drainage noted.  There was noted to be a small defect within the

fascia, which was closed using 0 Vicryl suture.  The wound was packed open and

dressings were applied.  

 

The patient tolerated the procedure well and was transferred to the recovery room in

stable condition.  

 

 

JOSE ESTEVES/[id number]

DD: 06/08/2017 14:01

DT: 06/09/2017 11:53

Job #:  [id number]

## 2017-06-09 NOTE — PN
Physical Exam: 


SUBJECTIVE: Patient seen and examined at bedside. Has abdominal pain at site of 

incisions.








OBJECTIVE:





 Vital Signs











 Period  Temp  Pulse  Resp  BP Sys/Badillo  Pulse Ox


 


 Last 24 Hr  97.9 F-103 F  56-82  15-80  /41-66  95-98











GENERAL: The patient is awake, alert, and fully oriented, in no acute distress.


HEAD: Normal with no signs of trauma.


EYES: PERRL, extraocular movements intact, sclera anicteric, conjunctiva clear. 

No ptosis. 


LUNGS: Breath sounds equal, clear to auscultation bilaterally, no wheezes, no 

crackles, no 


accessory muscle use. 


HEART: Regular rate and rhythm, S1, S2 without murmur, rub or gallop.


ABDOMEN: Soft, diffuse tenderness; surgical incision sites x 3 well-approximated

, no erythema, no exudate 


EXTREMITIES: 2+ pulses, warm, well-perfused, bilateral edema improved, 1-2+ 

bilaterally


NEUROLOGICAL: Cranial nerves II through XII grossly intact. Normal speech, gait 

not observed.


PSYCH: Normal mood, normal affect.








 Laboratory Results - last 24 hr











   06/08/17 06/08/17 06/09/17





  17:40 17:40 06:30


 


WBC   5.3  6.7


 


RBC   3.79 L  3.17 L


 


Hgb   10.9 L D  9.4 L D


 


Hct   34.1 L  28.3 L D


 


MCV   89.9  89.5


 


MCHC   32.1  33.3


 


RDW   16.9 H  17.2 H


 


Plt Count   117 L D  68 L D


 


MPV   10.7  10.3


 


Neutrophils %   79.6  D  80.9


 


Lymphocytes %   10.9  D  11.2


 


Monocytes %   4.3  7.1


 


Eosinophils %   4.1  0.2  D


 


Basophils %   1.1  0.6


 


Sodium  143  


 


Potassium  3.9  


 


Chloride  104  


 


Carbon Dioxide  31  


 


Anion Gap  8  


 


BUN  21 H  


 


Creatinine  2.3 H  


 


Creat Clearance w eGFR  27.03  


 


Random Glucose  97  


 


Calcium  7.9 L  


 


Total Bilirubin  1.4 H  


 


AST  61 H D  


 


ALT  20  D  


 


Alkaline Phosphatase  240 H  


 


Total Protein  5.4 L  


 


Albumin  2.3 L  














  06/09/17





  06:30


 


WBC 


 


RBC 


 


Hgb 


 


Hct 


 


MCV 


 


MCHC 


 


RDW 


 


Plt Count 


 


MPV 


 


Neutrophils % 


 


Lymphocytes % 


 


Monocytes % 


 


Eosinophils % 


 


Basophils % 


 


Sodium  141


 


Potassium  3.9


 


Chloride  104


 


Carbon Dioxide  29


 


Anion Gap  8


 


BUN  24 H


 


Creatinine  2.6 H


 


Creat Clearance w eGFR  23.47


 


Random Glucose  83


 


Calcium  7.5 L


 


Total Bilirubin  1.2 H


 


AST  69 H


 


ALT  24


 


Alkaline Phosphatase  188 H D


 


Total Protein  4.3 L D


 


Albumin  1.9 L








                  Active Medications











Generic Name Dose Route Start Last Admin





  Trade Name Freq  PRN Reason Stop Dose Admin


 


Acetaminophen  1,000 mg 06/08/17 21:00 06/09/17 10:08





  Ofirmev Injection -  IVPB 06/09/17 15:01  1,000 mg





  Q6H [facility]IDES   Administration


 


Albuterol/Ipratropium  1 amp 06/08/17 15:08  





  Duoneb -  NEB   





  Q6H PRN   





  SHORTNESS OF BREATH   


 


Atorvastatin Calcium  20 mg 06/08/17 22:00 06/08/17 21:48





  Lipitor -  PO   20 mg





  HS ALCIDES   Administration


 


Carvedilol  6.25 mg 06/08/17 22:00 06/09/17 10:08





  Coreg -  PO   6.25 mg





  BID [facility]   Administration


 


Fentanyl  25 mcg 06/08/17 14:27  





  Sublimaze Injection -  IVPUSH 06/11/17 14:28  





  [id number] PRN   





  PAIN   


 


Hydromorphone HCl  1 mg 06/08/17 14:05 06/09/17 11:51





  Dilaudid Injection -  IVPB   1 mg





  Q6H PRN   Administration





  PAIN   


 


Sodium Chloride  1,000 mls @ 35 mls/hr 06/08/17 20:02 06/08/17 21:48





  Normal Saline -  IV   35 mls/hr





  ASDIR ALCIDES   Administration


 


Ondansetron HCl  4 mg 06/08/17 14:05  





  Zofran Injection  IVPB   





  Q4H PRN   





  NAUSEA AND/OR VOMITING   











ASSESSMENT/PLAN


87 year-old male with a significant PMH of HTN, CAD s/p stent placement, aortic 

stenosis s/p TAVR, ureteral strictures s/p dilatation, and renal cell cancer s/

p right nephrectomy. Admitted for septic shock secondary to acute 

choledocholithiasis and E. coli bacteremia. 





Septic shock secondary to acute choledocholilithiasis and cholangitis s/p lap 

liliya 6/8/17


E.coli bacteremia


   --doing well postoperatively, switch to PO pain meds tomorrow


   --remains afebrile, no leukocytosis 


   --tolerating food


   --continue ursodial





Acute on chronic kidney injury


   --peak Cr 7.2, 2.6 today, has been stable for past 5 days, may be new 

baseline 


   --off dialysis, shiley removed 5/27


   --d/c ware tomorrow morning and voiding trial


   


Thrombocytopenia


   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


   --last transfused platelets 5/23


   --anne-marie 38k on 5/19, now 68k


   --no signs of bleeding, h/h stable





Severe biventricular heart failure


   --5/17 Echo: LV severely reduced, EF 23% significantly reduced since 09/2015

; septal akinesis, apical akinesis, anterolateeral wall severe hypokinesis, 

anterior wall akinesis, moderate global hypokinesis; RV moderate to severely 

reduced; BLAE; moderate MR, mild TR; no AI; AS cannot be ruled out; trace PI; 

pleural effusion


   --6/7 CXR: no vascular congestion, resolving pleural effusions 


   --hold Lasix due to hyponatremia


   


CAD s/p stent


   --hold ASA


   --continue Lipitor





Aortic stenosis s/p tissue AVR


   --echo does not rule out aortic stenosis


   --hold Plavix 





Renal cell cancer s/p nephrectomy


   --stable





Acute respiratory failure, resolved


   --extubated 5/23





Hypertension


   --continue carvedilol








F/E/N


   Fluids: PO intake adequate


   Electrolytes: replete as indicated


   Nutrition: low sodium, fat controlled





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





Physical therapy





Dispo: consider restarting ASA, Plavix tomorrow; will need SNF placement. DNR/

DNI.








Visit type





- Emergency Visit


Emergency Visit: Yes


ED Registration Date: 05/15/17


Care time: The patient presented to the Emergency Department on the above date 

and was hospitalized for further evaluation of their emergent condition.





- New Patient


This patient is new to me today: No





- Critical Care


Critical Care patient: No

## 2017-06-09 NOTE — PN
Progress Note (short form)





- Note


Progress Note: 


POD 1 Laparoscopic cholecystectomy, incision and drainage of umbilical 

collection





Pain controlled


On diet and tolerating





 Vital Signs











 Period  Temp  Pulse  Resp  BP Sys/Badillo  Pulse Ox


 


 Last 24 Hr  97.9 F-103 F  56-82  15-80  /41-66  95-98








Abd soft, incisional tenderness, packing in place





CBC,CMP











WBC  6.7 K/mm3 (4.0-10.0)   06/09/17  06:30    


 


RBC  3.17 M/mm3 (4.00-5.60)  L  06/09/17  06:30    


 


Hgb  9.4 GM/dL (11.7-16.9)  L D 06/09/17  06:30    


 


Hct  28.3 % (35.4-49)  L D 06/09/17  06:30    


 


MCV  89.5 fl (80-96)   06/09/17  06:30    


 


MCHC  33.3 g/dl (32.0-35.9)   06/09/17  06:30    


 


RDW  17.2 % (11.9-15.9)  H  06/09/17  06:30    


 


Plt Count  68 K/MM3 (134-434)  L D 06/09/17  06:30    


 


MPV  10.3 fl (7.5-11.1)   06/09/17  06:30    


 


Neutrophils %  80.9 % (42.8-82.8)   06/09/17  06:30    


 


Lymphocytes %  11.2 % (8-40)   06/09/17  06:30    


 


Monocytes %  7.1 % (3.8-10.2)   06/09/17  06:30    


 


Eosinophils %  0.2 % (0-4.5)  D 06/09/17  06:30    


 


Basophils %  0.6 % (0-2.0)   06/09/17  06:30    


 


Band Neutrophils  11.0 % (0-10)  H D 05/17/17  05:15    


 


Metamyelocytes  5 % (0-2)  H D 05/16/17  20:30    


 


Myelocytes  9 % (0-2)  H D 05/16/17  20:30    


 


Differential Comment  Manual diff done   06/01/17  06:30    


 


Dohle Bodies  1+   05/17/17  05:15    


 


Platelet Estimate  Decreased  (NORMAL)   06/01/17  06:30    


 


Platelet Comment  No clumping noted   05/17/17  05:15    


 


Polychromasia  Few   05/17/17  05:15    


 


Hypochromic-Microcytic  Few   05/17/17  05:15    


 


Sodium  141 mmol/L (136-145)   06/09/17  06:30    


 


Potassium  3.9 mmol/L (3.5-5.1)   06/09/17  06:30    


 


Chloride  104 mmol/L ()   06/09/17  06:30    


 


Carbon Dioxide  29 mmol/L (21-32)   06/09/17  06:30    


 


Anion Gap  8  (8-16)   06/09/17  06:30    


 


BUN  24 mg/dL (7-18)  H  06/09/17  06:30    


 


Creatinine  2.6 mg/dL (0.7-1.3)  H  06/09/17  06:30    


 


Creat Clearance w eGFR  23.47  (>60)   06/09/17  06:30    


 


Random Glucose  83 mg/dL ()   06/09/17  06:30    


 


Lactic Acid  1.684 mmol/L (0.4-2.0)   05/17/17  13:20    


 


Calcium  7.5 mg/dL (8.5-10.1)  L  06/09/17  06:30    


 


Phosphorus  3.2 mg/dL (2.5-4.9)   06/07/17  07:00    


 


Magnesium  1.8 mg/dL (1.8-2.4)   06/07/17  07:00    


 


Total Bilirubin  1.2 mg/dL (0.2-1.0)  H  06/09/17  06:30    


 


Direct Bilirubin  0.9 mg/dL (0.0-0.2)  H  06/07/17  07:00    


 


AST  69 U/L (15-37)  H  06/09/17  06:30    


 


ALT  24 U/L (12-78)   06/09/17  06:30    


 


Alkaline Phosphatase  188 U/L ()  H D 06/09/17  06:30    


 


Creatine Kinase  60 IU/L ()   05/20/17  00:00    


 


Creatine Kinase Index  11.8 % (0.0-5.0)  H*  05/17/17  13:20    


 


CK-MB (CK-2)  18.446 ng/ml (0.5-3.6)  H  05/17/17  13:20    


 


CK-MB (CK-2) Rel Index  Cancelled   05/17/17  13:20    


 


Troponin I  1.73 ng/ml (0.00-0.05)  H*  05/20/17  05:15    


 


C-Reactive Protein  3.1 MG/DL (0.00-0.3)  H D 06/03/17  06:30    


 


Total Protein  4.3 g/dl (6.4-8.2)  L D 06/09/17  06:30    


 


Albumin  1.9 g/dl (3.4-5.0)  L  06/09/17  06:30    


 


Total Amylase  55 U/L ()  D 06/04/17  06:30    


 


Lipase  170 U/L ()   06/04/17  06:30    














OOB


Diet


Daily wound packing


If remains stable can start planning discharge from surgical standpoint





Problem List





- Problems


(1) Umbilical discharge


Code(s): R19.8 - OTH SYMPTOMS AND SIGNS INVOLVING THE DGSTV SYS AND ABDOMEN

## 2017-06-09 NOTE — PN
Progress Note (short form)





- Note


Progress Note: 


ANESTHESIA pod#1


s/p lAP Cholecystectomy under GA


Sleeping,comfortable,VSS,no N/V,pain


is under control. LFTs are improving.


No complications seen.





Ebony Hoyos MD.

## 2017-06-09 NOTE — PN
Progress Note, Physician


History of Present Illness: 


Pt seen and examined at bedside. He is awake and alert. He tolerated breakfast 

and lunch today. He has low blood pressure overnight but it is improving. 





- Current Medication List


Current Medications: 


Active Medications





Acetaminophen (Ofirmev Injection -)  1,000 mg IVPB Q6H [facility]


   Stop: 06/09/17 15:01


   Last Admin: 06/09/17 10:08 Dose:  1,000 mg


Albuterol/Ipratropium (Duoneb -)  1 amp NEB Q6H PRN


   PRN Reason: SHORTNESS OF BREATH


Atorvastatin Calcium (Lipitor -)  20 mg PO HS [facility]


   Last Admin: 06/08/17 21:48 Dose:  20 mg


Carvedilol (Coreg -)  6.25 mg PO BID [facility]


   Last Admin: 06/09/17 10:08 Dose:  6.25 mg


Fentanyl (Sublimaze Injection -)  25 mcg IVPUSH [id number] PRN


   PRN Reason: PAIN


   Stop: 06/11/17 14:28


Hydromorphone HCl (Dilaudid Injection -)  1 mg IVPB Q6H PRN


   PRN Reason: PAIN


   Last Admin: 06/09/17 11:51 Dose:  1 mg


Sodium Chloride (Normal Saline -)  1,000 mls @ 35 mls/hr IV [facility]CIDES


   Last Admin: 06/08/17 21:48 Dose:  35 mls/hr


Ondansetron HCl (Zofran Injection)  4 mg IVPB Q4H PRN


   PRN Reason: NAUSEA AND/OR VOMITING











- Objective


Vital Signs: 


 Vital Signs











Temperature  99.1 F   06/09/17 06:00


 


Pulse Rate  65   06/09/17 06:00


 


Respiratory Rate  80 H  06/09/17 06:00


 


Blood Pressure  105/47   06/09/17 06:00


 


O2 Sat by Pulse Oximetry (%)  96   06/08/17 22:00











Constitutional: Yes: Calm


Eyes: Yes: Conjunctiva Clear


HENT: Yes: Atraumatic


Neck: Yes: Supple


Cardiovascular: Yes: S1, S2


Gastrointestinal: Yes: Normal Bowel Sounds, Soft


Genitourinary: Yes: Ware Present


Musculoskeletal: Yes: Muscle Weakness


Edema: Yes


Edema: LLE: Trace, RLE: Trace


Neurological: Yes: Oriented


Psychiatric: Yes: Oriented


Labs: 


 CBC, BMP





 06/09/17 06:30 





 06/09/17 06:30 





 INR, PTT











INR  1.14  (0.82-1.09)   06/07/17  07:00    


 


Fibrinogen  463.0 mg/dL (238-498)  D 05/22/17  05:35    














Problem List





- Problems


(1) CAD (coronary artery disease)


Code(s): I25.10 - ATHSCL HEART DISEASE OF NATIVE CORONARY ARTERY W/O ANG PCTRS 

  Qualifiers: 


     Coronary Disease-Associated Artery/Lesion type: unspecified vessel or 

lesion type     Native vs. transplanted heart: native heart     Associated 

angina: without angina        Qualified Code(s): I25.10 - Atherosclerotic heart 

disease of native coronary artery without angina pectoris  





(2) Calculus of common duct with obstruction


Code(s): K80.51 - CALCULUS OF BILE DUCT W/O CHOLANGITIS OR CHOLECYST W OBST





(3) Choledocholithiasis


Code(s): K80.50 - CALCULUS OF BILE DUCT W/O CHOLANGITIS OR CHOLECYST W/O OBST





(4) Renal cancer


Code(s): C64.9 - MALIGNANT NEOPLASM OF UNSP KIDNEY, EXCEPT RENAL PELVIS   

Qualifiers: 


     Laterality: right        Qualified Code(s): C64.1 - Malignant neoplasm of 

right kidney, except renal pelvis  





(5) BO (acute kidney injury)


Code(s): N17.9 - ACUTE KIDNEY FAILURE, UNSPECIFIED





(6) CKD (chronic kidney disease)


Code(s): N18.9 - CHRONIC KIDNEY DISEASE, UNSPECIFIED   








Assessment/Plan


 Current Medications











Generic Name Dose Route Start Last Admin





  Trade Name Freq  PRN Reason Stop Dose Admin


 


Acetaminophen  1,000 mg 06/08/17 21:00 06/09/17 10:08





  Ofirmev Injection -  IVPB 06/09/17 15:01  1,000 mg





  Q6H [facility]CIDES   Administration


 


Albuterol/Ipratropium  1 amp 06/08/17 15:08  





  Duoneb -  NEB   





  Q6H PRN   





  SHORTNESS OF BREATH   


 


Atorvastatin Calcium  20 mg 06/08/17 22:00 06/08/17 21:48





  Lipitor -  PO   20 mg





  HS ALCIDES   Administration


 


Carvedilol  6.25 mg 06/08/17 22:00 06/09/17 10:08





  Coreg -  PO   6.25 mg





  BID ALCIDES   Administration


 


Fentanyl  25 mcg 06/08/17 14:27  





  Sublimaze Injection -  IVPUSH 06/11/17 14:28  





  [id number] PRN   





  PAIN   


 


Hydromorphone HCl  1 mg 06/08/17 14:05 06/09/17 11:51





  Dilaudid Injection -  IVPB   1 mg





  Q6H PRN   Administration





  PAIN   


 


Sodium Chloride  1,000 mls @ 35 mls/hr 06/08/17 20:02 06/08/17 21:48





  Normal Saline -  IV   35 mls/hr





  ASDIR ALCIDES   Administration


 


Ondansetron HCl  4 mg 06/08/17 14:05  





  Zofran Injection  IVPB   





  Q4H PRN   





  NAUSEA AND/OR VOMITING   














Impression


1. CKD stage 3


2. BO which required several HD sessions


3. choledocholithiasis


4. CAD


5. hx of Renal Cell Cancer s/p nephroectomy


6. aortic stenosis


7. sepsis


8. acute respiratory failure requiring intubation


9. NSTEMI


10. bacteremia





Plan


- creatinine is a little higher today


- repeat labs in am


- can d/c ware catheter


- cont with gentle fluids


- blood pressure is improving


- monitor urine output


- PT/rehab





Dr Rodriguez

## 2017-06-09 NOTE — PN
Progress Note, Physician


History of Present Illness: 


Post lap cholecystectomy and umbilical fluid collection drainage.








- Current Medication List


Current Medications: 


Active Medications





Albuterol/Ipratropium (Duoneb -)  1 amp NEB Q6H PRN


   PRN Reason: SHORTNESS OF BREATH


Atorvastatin Calcium (Lipitor -)  20 mg PO HS [facility]


   Last Admin: 06/08/17 21:48 Dose:  20 mg


Carvedilol (Coreg -)  6.25 mg PO BID [facility]


   Last Admin: 06/09/17 10:08 Dose:  6.25 mg


Fentanyl (Sublimaze Injection -)  25 mcg IVPUSH [id number] PRN


   PRN Reason: PAIN


   Stop: 06/11/17 14:28


Hydromorphone HCl (Dilaudid Injection -)  1 mg IVPB Q6H PRN


   PRN Reason: PAIN


   Last Admin: 06/09/17 11:51 Dose:  1 mg


Sodium Chloride (Normal Saline -)  1,000 mls @ 42 mls/hr IV ASDIR [facility]


   Stop: 06/09/17 22:00


Ondansetron HCl (Zofran Injection)  4 mg IVPB Q4H PRN


   PRN Reason: NAUSEA AND/OR VOMITING











- Objective


Vital Signs: 


 Vital Signs











Temperature  98.6 F   06/09/17 15:34


 


Pulse Rate  64   06/09/17 15:34


 


Respiratory Rate  80 H  06/09/17 13:00


 


Blood Pressure  107/48   06/09/17 15:34


 


O2 Sat by Pulse Oximetry (%)  96   06/08/17 22:00











Constitutional: Yes: No Distress, Calm


Neck: Yes: Supple


Cardiovascular: Yes: Regular Rate and Rhythm


Respiratory: Yes: Regular, Diminished


Gastrointestinal: Yes: Normal Bowel Sounds, Soft


Edema: No


Labs: 


 CBC, BMP





 06/09/17 06:30 





 06/09/17 06:30 





 INR, PTT











INR  1.14  (0.82-1.09)   06/07/17  07:00    


 


Fibrinogen  463.0 mg/dL (238-498)  D 05/22/17  05:35    














Problem List





- Problems


(1) CAD (coronary artery disease)


Code(s): I25.10 - ATHSCL HEART DISEASE OF NATIVE CORONARY ARTERY W/O ANG PCTRS 

  Qualifiers: 


     Coronary Disease-Associated Artery/Lesion type: unspecified vessel or 

lesion type     Native vs. transplanted heart: native heart     Associated 

angina: without angina        Qualified Code(s): I25.10 - Atherosclerotic heart 

disease of native coronary artery without angina pectoris  





(2) CKD (chronic kidney disease)


Code(s): N18.9 - CHRONIC KIDNEY DISEASE, UNSPECIFIED   





(3) Calculus of common duct with obstruction


Code(s): K80.51 - CALCULUS OF BILE DUCT W/O CHOLANGITIS OR CHOLECYST W OBST





(4) History of percutaneous coronary intervention


Code(s): Z98.890 - OTHER SPECIFIED POSTPROCEDURAL STATES





(5) S/P ERCP


Code(s): Z98.890 - OTHER SPECIFIED POSTPROCEDURAL STATES





(6) Thrombocytopenia


Code(s): D69.6 - THROMBOCYTOPENIA, UNSPECIFIED





(7) Left bundle branch block


Code(s): I44.7 - LEFT BUNDLE-BRANCH BLOCK, UNSPECIFIED





(8) Demand ischemia


Code(s): I24.8 - OTHER FORMS OF ACUTE ISCHEMIC HEART DISEASE





(9) Anemia


Code(s): D64.9 - ANEMIA, UNSPECIFIED   Qualifiers: 


     Anemia type: unspecified type        Qualified Code(s): D64.9 - Anemia, 

unspecified  








Assessment/Plan


1. POD#1 lap cholecystectomy and umbilical fluid collection drainage


2. s/p hypoxemic respiratory failure and biliary septic shock (E. coli), acute 

cholangitis post ERCP, balloon sphincteroplasty and stent placement - post 

leukocytosis and lactic acidosis 


3. Post TAVR for severe AS


4. CAD, S/P PCI/stent with demand ischemic injury


5. Severe biventricular failure with pleural effusions


6. Hypertension


7. Renal CA S/P right nephrectomy


8. CKD - recovered off HD


9. 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


10. Anemia of chronic disease





PLAN:


1. Diuretic as needed and monitor renal function and electrolytes


2. Empiric Pre-op abx course per ID


3. Continue Carvedilol 6.25 bid and Lipitor 20 qhs, resume ASA once post-op 

hemostasis has been achieved


4. DVT and GI prophylaxis

## 2017-06-10 LAB
ANION GAP SERPL CALC-SCNC: 10 MMOL/L (ref 8–16)
BASOPHILS # BLD: 0.3 % (ref 0–2)
CALCIUM SERPL-MCNC: 7.4 MG/DL (ref 8.5–10.1)
CO2 SERPL-SCNC: 29 MMOL/L (ref 21–32)
COCKROFT - GAULT: 25.46
CREAT SERPL-MCNC: 2.6 MG/DL (ref 0.7–1.3)
DEPRECATED RDW RBC AUTO: 17.5 % (ref 11.9–15.9)
EOSINOPHIL # BLD: 0.1 % (ref 0–4.5)
GLUCOSE SERPL-MCNC: 83 MG/DL (ref 74–106)
MCH RBC QN AUTO: 29.5 PG (ref 25.7–33.7)
MCHC RBC AUTO-ENTMCNC: 32.8 G/DL (ref 32–35.9)
MCV RBC: 89.9 FL (ref 80–96)
NEUTROPHILS # BLD: 87.2 % (ref 42.8–82.8)
PLATELET # BLD AUTO: 61 K/MM3 (ref 134–434)
PMV BLD: 10.7 FL (ref 7.5–11.1)
WBC # BLD AUTO: 9.4 K/MM3 (ref 4–10)

## 2017-06-10 RX ADMIN — HYDROMORPHONE HYDROCHLORIDE PRN MG: 1 INJECTION, SOLUTION INTRAMUSCULAR; INTRAVENOUS; SUBCUTANEOUS at 01:37

## 2017-06-10 RX ADMIN — ATORVASTATIN CALCIUM SCH MG: 20 TABLET, FILM COATED ORAL at 21:41

## 2017-06-10 RX ADMIN — ACETAMINOPHEN PRN MG: 325 TABLET ORAL at 21:44

## 2017-06-10 RX ADMIN — CARVEDILOL SCH MG: 6.25 TABLET, FILM COATED ORAL at 09:41

## 2017-06-10 RX ADMIN — ACETAMINOPHEN PRN MG: 325 TABLET ORAL at 12:48

## 2017-06-10 RX ADMIN — CLOPIDOGREL BISULFATE SCH MG: 75 TABLET, FILM COATED ORAL at 18:03

## 2017-06-10 RX ADMIN — CARVEDILOL SCH MG: 6.25 TABLET, FILM COATED ORAL at 21:41

## 2017-06-10 NOTE — PN
Progress Note (short form)





- Note


Progress Note: 


PULMONARY





Some pain post surgery but otherwise doing well. Denies shortness of breath, 

cough or chest pain.





 Last Vital Signs











Temp Pulse Resp BP Pulse Ox


 


 99.2 F   69   28 H  125/57   97 


 


 06/10/17 09:32  06/10/17 10:50  06/10/17 09:32  06/10/17 09:32  06/10/17 10:50











Gen:  NAD at rest


Heart:  RRR


Lung: decreased breath sounds at the bases


Abd: soft, nontender


Ext: less edema





 CBC, BMP





 06/09/17 06:30 





 06/10/17 06:00 





Active Medications





Albuterol/Ipratropium (Duoneb -)  1 amp NEB Q6H PRN


   PRN Reason: SHORTNESS OF BREATH


   Last Admin: 06/10/17 10:50 Dose:  1 amp


Atorvastatin Calcium (Lipitor -)  20 mg PO HS ALCIDES


   Last Admin: 06/09/17 22:13 Dose:  20 mg


Carvedilol (Coreg -)  6.25 mg PO BID ALCIDES


   Last Admin: 06/10/17 09:41 Dose:  6.25 mg


Fentanyl (Sublimaze Injection -)  25 mcg IVPUSH [id number] PRN


   PRN Reason: PAIN


   Stop: 06/11/17 14:28


Hydromorphone HCl (Dilaudid Injection -)  1 mg IVPB Q6H PRN


   PRN Reason: PAIN


   Last Admin: 06/10/17 01:37 Dose:  1 mg


Ondansetron HCl (Zofran Injection)  4 mg IVPB Q4H PRN


   PRN Reason: NAUSEA AND/OR VOMITING








A/P


Acute Cholangitis


Gram Negative Bacteremia


s/p ERCP/sphincteroplasty/stent placement


s/p Acute Hypoxic Respiratory Failure


s/p Laparascopic Cholecystectomy


Acute on Chronic Renal Failure improving


CAD


+Troponins likely Demand Ischemia


s/p TAVR for severe Aortic Stenosis


Thrombocytopenia





-  pain control


-  incentive spirometry


-  lasix as needed


-  completed antibiotics


-  monitor urine output, creatinine


-  DVT prophylaxis


-  rehab/PT

## 2017-06-10 NOTE — PN
Progress Note (short form)





- Note


Progress Note: 


Chief Complaint: Events noted, notes reviewed, denies any chest pain or dyspnea





History of Present Illness: 


Seen and examined. Events noted, notes reviewed, denies any chest pain or 

dyspnea





- Current Medication List





 Current Medications





Acetaminophen (Tylenol -)  650 mg PO Q6H PRN


   PRN Reason: FEVER OR PAIN


   Last Admin: 06/10/17 12:48 Dose:  650 mg


Albuterol/Ipratropium (Duoneb -)  1 amp NEB Q6H PRN


   PRN Reason: SHORTNESS OF BREATH


   Last Admin: 06/10/17 10:50 Dose:  1 amp


Atorvastatin Calcium (Lipitor -)  20 mg PO HS [facility]


   Last Admin: 06/09/17 22:13 Dose:  20 mg


Carvedilol (Coreg -)  6.25 mg PO BID [facility]


   Last Admin: 06/10/17 09:41 Dose:  6.25 mg


Clopidogrel Bisulfate (Plavix -)  75 mg PO DAILY [facility]


   Last Admin: 06/10/17 18:03 Dose:  75 mg


Ondansetron HCl (Zofran Injection)  4 mg IVPB Q4H PRN


   PRN Reason: NAUSEA AND/OR VOMITING


Oxycodone HCl (Roxicodone -)  5 mg PO Q6H PRN


   PRN Reason: PAIN








- Objective


Vital Signs: 





 Last Vital Signs











Temp Pulse Resp BP Pulse Ox


 


 98.1 F   74   18   123/58   97 


 


 06/10/17 15:44  06/10/17 15:44  06/10/17 15:44  06/10/17 15:44  06/10/17 10:50











Constitutional: No Distress, Calm


Neck: Supple


Cardiovascular: S1 S2 Regular Rate and Rhythm


Respiratory: Diminished at the Bases


Gastrointestinal:  Soft Benign Normal Bowel Sounds


Ext: No Edema





Labs: 





 CBC, BMP





 06/10/17 12:00 





 06/10/17 06:00 





 Hepatic Panel











Total Bilirubin  1.2 mg/dL (0.2-1.0)  H  06/09/17  06:30    


 


Direct Bilirubin  0.9 mg/dL (0.0-0.2)  H  06/07/17  07:00    


 


AST  69 U/L (15-37)  H  06/09/17  06:30    


 


ALT  24 U/L (12-78)   06/09/17  06:30    


 


Alkaline Phosphatase  188 U/L ()  H D 06/09/17  06:30    


 


Albumin  1.9 g/dl (3.4-5.0)  L  06/09/17  06:30    








 INR, PTT











INR  1.14  (0.82-1.09)   06/07/17  07:00    


 


Fibrinogen  463.0 mg/dL (238-498)  D 05/22/17  05:35    











Assessment/Plan





ASSESSMENT:





1. POD#2 lap cholecystectomy and umbilical fluid collection drainage (biliary 

septic shock with E. coli, acute cholangitis post ERCP, balloon 

sphincteroplasty and stent placement - post leukocytosis and lactic acidosis)


2. Post hypoxemic respiratory failure 


3. Post TAVR for severe AS


4. CAD, S/P PCI/stent with demand ischemic injury angina pectoris


5. Severe bi-ventricular failure with pleural effusions


6. Hypertension


7. Renal carcinoma post right nephrectomy


8. CKD


9. Thrombocytopenia


10. Anemia 





PLAN:





1. Diuretic as needed


2. Continue Carvedilol 


3. Continue Lipitor 


4. Continue Plavix with caution considering the above noted thrombocytopenia














Epi Baker MD

## 2017-06-10 NOTE — PN
Progress Note, Physician


History of Present Illness: 


Pt seen and examined at bedside. He is awake and appears comfortable. Ware 

cath was removed this morning, he has not voided yet. 





- Current Medication List


Current Medications: 


Active Medications





Acetaminophen (Tylenol -)  650 mg PO Q6H PRN


   PRN Reason: FEVER OR PAIN


   Last Admin: 06/10/17 12:48 Dose:  650 mg


Albuterol/Ipratropium (Duoneb -)  1 amp NEB Q6H PRN


   PRN Reason: SHORTNESS OF BREATH


   Last Admin: 06/10/17 10:50 Dose:  1 amp


Atorvastatin Calcium (Lipitor -)  20 mg PO HS [facility]


   Last Admin: 06/09/17 22:13 Dose:  20 mg


Carvedilol (Coreg -)  6.25 mg PO BID [facility]


   Last Admin: 06/10/17 09:41 Dose:  6.25 mg


Fentanyl (Sublimaze Injection -)  25 mcg IVPUSH [id number] PRN


   PRN Reason: PAIN


   Stop: 06/11/17 14:28


Hydromorphone HCl (Dilaudid Injection -)  1 mg IVPB Q6H PRN


   PRN Reason: PAIN


   Last Admin: 06/10/17 01:37 Dose:  1 mg


Ondansetron HCl (Zofran Injection)  4 mg IVPB Q4H PRN


   PRN Reason: NAUSEA AND/OR VOMITING











- Objective


Vital Signs: 


 Vital Signs











Temperature  98.1 F   06/10/17 15:44


 


Pulse Rate  74   06/10/17 15:44


 


Respiratory Rate  18   06/10/17 15:44


 


Blood Pressure  123/58   06/10/17 15:44


 


O2 Sat by Pulse Oximetry (%)  97   06/10/17 10:50











Constitutional: Yes: Calm


Eyes: Yes: Conjunctiva Clear


HENT: Yes: Atraumatic


Neck: Yes: Supple


Cardiovascular: Yes: S1, S2


Respiratory: Yes: Diminished, On Nasal O2


Gastrointestinal: Yes: Soft


Genitourinary: Yes: Other (on voiding trial)


Musculoskeletal: Yes: Muscle Weakness


Edema: Yes


Edema: LLE: Trace, RLE: Trace


Neurological: Yes: Oriented


Psychiatric: Yes: Oriented


Labs: 


 CBC, BMP





 06/10/17 12:00 





 06/10/17 06:00 





 INR, PTT











INR  1.14  (0.82-1.09)   06/07/17  07:00    


 


Fibrinogen  463.0 mg/dL (238-498)  D 05/22/17  05:35    














Problem List





- Problems


(1) CAD (coronary artery disease)


Code(s): I25.10 - ATHSCL HEART DISEASE OF NATIVE CORONARY ARTERY W/O ANG PCTRS 

  Qualifiers: 


     Coronary Disease-Associated Artery/Lesion type: unspecified vessel or 

lesion type     Native vs. transplanted heart: native heart     Associated 

angina: without angina        Qualified Code(s): I25.10 - Atherosclerotic heart 

disease of native coronary artery without angina pectoris  





(2) Calculus of common duct with obstruction


Code(s): K80.51 - CALCULUS OF BILE DUCT W/O CHOLANGITIS OR CHOLECYST W OBST





(3) Choledocholithiasis


Code(s): K80.50 - CALCULUS OF BILE DUCT W/O CHOLANGITIS OR CHOLECYST W/O OBST





(4) Renal cancer


Code(s): C64.9 - MALIGNANT NEOPLASM OF UNSP KIDNEY, EXCEPT RENAL PELVIS   

Qualifiers: 


     Laterality: right        Qualified Code(s): C64.1 - Malignant neoplasm of 

right kidney, except renal pelvis  





(5) BO (acute kidney injury)


Code(s): N17.9 - ACUTE KIDNEY FAILURE, UNSPECIFIED





(6) CKD (chronic kidney disease)


Code(s): N18.9 - CHRONIC KIDNEY DISEASE, UNSPECIFIED   








Assessment/Plan


 Current Medications











Generic Name Dose Route Start Last Admin





  Trade Name Freq  PRN Reason Stop Dose Admin


 


Acetaminophen  650 mg 06/10/17 12:13 06/10/17 12:48





  Tylenol -  PO   650 mg





  Q6H PRN   Administration





  FEVER OR PAIN   


 


Albuterol/Ipratropium  1 amp 06/08/17 15:08 06/10/17 10:50





  Duoneb -  NEB   1 amp





  Q6H PRN   Administration





  SHORTNESS OF BREATH   


 


Atorvastatin Calcium  20 mg 06/08/17 22:00 06/09/17 22:13





  Lipitor -  PO   20 mg





  HS [facility]   Administration


 


Carvedilol  6.25 mg 06/08/17 22:00 06/10/17 09:41





  Coreg -  PO   6.25 mg





  BID [facility]   Administration


 


Fentanyl  25 mcg 06/08/17 14:27  





  Sublimaze Injection -  IVPUSH 06/11/17 14:28  





  [id number] PRN   





  PAIN   


 


Hydromorphone HCl  1 mg 06/08/17 14:05 06/10/17 01:37





  Dilaudid Injection -  IVPB   1 mg





  Q6H PRN   Administration





  PAIN   


 


Ondansetron HCl  4 mg 06/08/17 14:05  





  Zofran Injection  IVPB   





  Q4H PRN   





  NAUSEA AND/OR VOMITING   

















Impression


1. CKD stage 3


2. BO which required several HD sessions


3. choledocholithiasis


4. CAD


5. hx of Renal Cell Cancer s/p nephroectomy


6. aortic stenosis


7. sepsis


8. acute respiratory failure requiring intubation


9. NSTEMI


10. bacteremia





Plan


- pt on voiding trial, will need ware re-inserted if he does not void


- repeat labs in am


- stop fluids


- monitor urine output


- PT/rehab





Dr Rodriguez

## 2017-06-10 NOTE — PN
Physical Exam: 


SUBJECTIVE: Patient seen and examined at bedside. Complaining of abdominal 

pain. 








OBJECTIVE:





 Vital Signs











 Period  Temp  Pulse  Resp  BP Sys/Badillo  Pulse Ox


 


 Last 24 Hr  97.8 F-99.2 F  63-92  18-28  114-132/53-79  94-97











GENERAL: The patient is awake, alert, and fully oriented, in no acute distress.


HEAD: Normal with no signs of trauma.


EYES: PERRL, extraocular movements intact, sclera anicteric, conjunctiva clear. 

No ptosis. 


LUNGS: Breath sounds equal, clear to auscultation bilaterally, no wheezes, no 

crackles, no 


accessory muscle use. 


HEART: Regular rate and rhythm, S1, S2 without murmur, rub or gallop.


ABDOMEN: Soft, diffuse tenderness; surgical incision sites x 3 well-approximated

, no erythema, no exudate 


EXTREMITIES: 2+ pulses, warm, well-perfused, bilateral edema improved, 1-2+ 

bilaterally


NEUROLOGICAL: Cranial nerves II through XII grossly intact. Normal speech, gait 

not observed.


PSYCH: Normal mood, normal affect.











 Laboratory Results - last 24 hr











  06/10/17 06/10/17





  06:00 12:00


 


WBC   9.4  D


 


RBC   3.22 L


 


Hgb   9.5 L


 


Hct   28.9 L


 


MCV   89.9


 


MCHC   32.8


 


RDW   17.5 H


 


Plt Count   61 L


 


MPV   10.7


 


Neutrophils %   87.2 H


 


Lymphocytes %   7.7 L D


 


Monocytes %   4.7


 


Eosinophils %   0.1


 


Basophils %   0.3


 


Sodium  140 


 


Potassium  3.8 


 


Chloride  101 


 


Carbon Dioxide  29 


 


Anion Gap  10 


 


BUN  32 H D 


 


Creatinine  2.6 H 


 


Random Glucose  83 


 


Calcium  7.4 L 








                  Active Medications











Generic Name Dose Route Start Last Admin





  Trade Name Freq  PRN Reason Stop Dose Admin


 


Acetaminophen  650 mg 06/10/17 12:13 06/10/17 12:48





  Tylenol -  PO   650 mg





  Q6H PRN   Administration





  FEVER OR PAIN   


 


Albuterol/Ipratropium  1 amp 06/08/17 15:08 06/10/17 10:50





  Duoneb -  NEB   1 amp





  Q6H PRN   Administration





  SHORTNESS OF BREATH   


 


Atorvastatin Calcium  20 mg 06/08/17 22:00 06/09/17 22:13





  Lipitor -  PO   20 mg





  HS [facility]   Administration


 


Carvedilol  6.25 mg 06/08/17 22:00 06/10/17 09:41





  Coreg -  PO   6.25 mg





  BID [facility]   Administration


 


Ondansetron HCl  4 mg 06/08/17 14:05  





  Zofran Injection  IVPB   





  Q4H PRN   





  NAUSEA AND/OR VOMITING   


 


Oxycodone HCl  5 mg 06/10/17 16:14  





  Roxicodone -  PO   





  Q6H PRN   





  PAIN   











ASSESSMENT/PLAN


87 year-old male with a significant PMH of HTN, CAD s/p stent placement, aortic 

stenosis s/p TAVR, ureteral strictures s/p dilatation, and renal cell cancer s/

p right nephrectomy. Admitted for septic shock secondary to acute 

choledocholithiasis and E. coli bacteremia. 





Septic shock secondary to acute choledocholilithiasis and cholangitis s/p lap 

liliya 6/8/17


E.coli bacteremia


   --doing well postoperatively, oxycodone PRN for pain


   --remains afebrile, no leukocytosis 


   --continue ursodial





Acute on chronic kidney injury


   --Cr 2.6, appears to be new baseline 


   --ware dc'd, voiding 


   


Thrombocytopenia


   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


   --last transfused platelets 5/23


   --anne-marie 38k on 5/19, now 61k


   --no signs of bleeding, h/h stable





Severe biventricular heart failure


   --5/17 Echo: LV severely reduced, EF 23% significantly reduced since 09/2015

; septal akinesis, apical akinesis, anterolateeral wall severe hypokinesis, 

anterior wall akinesis, moderate global hypokinesis; RV moderate to severely 

reduced; BLAE; moderate MR, mild TR; no AI; AS cannot be ruled out; trace PI; 

pleural effusion


   --6/7 CXR: no vascular congestion, resolving pleural effusions 


   --hold Lasix due to hyponatremia


   


CAD s/p stent


   --hold ASA due to thrombocytopenia


   --continue Lipitor





Aortic stenosis s/p tissue AVR


   --echo does not rule out aortic stenosis


   --resume Plavix 





Renal cell cancer s/p nephrectomy


   --stable





Acute respiratory failure, resolved


   --extubated 5/23





Hypertension


   --continue carvedilol








F/E/N


   Fluids: PO intake adequate


   Electrolytes: replete as indicated


   Nutrition: low sodium, fat controlled





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





Physical therapy





Dispo: will need SNF placement. DNR/DNI.








Visit type





- Emergency Visit


Emergency Visit: Yes


ED Registration Date: 05/15/17


Care time: The patient presented to the Emergency Department on the above date 

and was hospitalized for further evaluation of their emergent condition.





- New Patient


This patient is new to me today: No





- Critical Care


Critical Care patient: No

## 2017-06-11 LAB
ANION GAP SERPL CALC-SCNC: 13 MMOL/L (ref 8–16)
CALCIUM SERPL-MCNC: 7.4 MG/DL (ref 8.5–10.1)
CO2 SERPL-SCNC: 26 MMOL/L (ref 21–32)
COCKROFT - GAULT: 23.6
CREAT SERPL-MCNC: 2.8 MG/DL (ref 0.7–1.3)
GLUCOSE SERPL-MCNC: 82 MG/DL (ref 74–106)

## 2017-06-11 RX ADMIN — CARVEDILOL SCH MG: 6.25 TABLET, FILM COATED ORAL at 10:09

## 2017-06-11 RX ADMIN — ACETAMINOPHEN PRN MG: 325 TABLET ORAL at 21:32

## 2017-06-11 RX ADMIN — CLOPIDOGREL BISULFATE SCH MG: 75 TABLET, FILM COATED ORAL at 10:09

## 2017-06-11 RX ADMIN — FUROSEMIDE SCH MG: 10 INJECTION, SOLUTION INTRAVENOUS at 18:53

## 2017-06-11 RX ADMIN — CARVEDILOL SCH MG: 6.25 TABLET, FILM COATED ORAL at 21:32

## 2017-06-11 RX ADMIN — ATORVASTATIN CALCIUM SCH MG: 20 TABLET, FILM COATED ORAL at 21:32

## 2017-06-11 NOTE — PN
Progress Note (short form)





- Note


Progress Note: 


Chief Complaint: Events noted, notes reviewed, denies any chest pain or dyspnea





History of Present Illness: 


Seen and examined. Events noted, notes reviewed, denies any chest pain or 

dyspnea





- Current Medication List





 Current Medications





Acetaminophen (Tylenol -)  650 mg PO Q6H PRN


   PRN Reason: FEVER OR PAIN


   Last Admin: 06/10/17 21:44 Dose:  650 mg


Albuterol/Ipratropium (Duoneb -)  1 amp NEB Q6H PRN


   PRN Reason: SHORTNESS OF BREATH


   Last Admin: 06/10/17 10:50 Dose:  1 amp


Atorvastatin Calcium (Lipitor -)  20 mg PO HS [facility]


   Last Admin: 06/10/17 21:41 Dose:  20 mg


Carvedilol (Coreg -)  6.25 mg PO BID [facility]


   Last Admin: 06/11/17 10:09 Dose:  6.25 mg


Clopidogrel Bisulfate (Plavix -)  75 mg PO DAILY [facility]


   Last Admin: 06/11/17 10:09 Dose:  75 mg


Ondansetron HCl (Zofran Injection)  4 mg IVPB Q4H PRN


   PRN Reason: NAUSEA AND/OR VOMITING


Oxycodone HCl (Roxicodone -)  5 mg PO Q6H PRN


   PRN Reason: PAIN








- Objective


Vital Signs: 





 Last Vital Signs











Temp Pulse Resp BP Pulse Ox


 


 99.0 F   68   20   119/51   96 


 


 06/11/17 10:00  06/11/17 10:00  06/11/17 10:00  06/11/17 10:00  06/10/17 21:00











Constitutional: No Distress, Calm


Neck: Supple


Cardiovascular: S1 S2 Regular Rate and Rhythm


Respiratory: Diminished at the Bases


Gastrointestinal:  Soft Benign Normal Bowel Sounds


Ext: No Edema





Labs: 





 CBC, BMP





 06/10/17 12:00 





 06/11/17 06:10 








Assessment/Plan





ASSESSMENT:





1. POD#3 post lap cholecystectomy and umbilical fluid collection drainage (post 

biliary septic shock with E. coli, acute cholangitis post ERCP, balloon 

sphincteroplasty and stent placement - post leukocytosis and lactic acidosis)


2. Post hypoxemic respiratory failure 


3. Post TAVR for severe AS


4. CAD post PCI/stent with demand ischemic injury angina pectoris


5. Severe bi-ventricular failure with pleural effusions


6. Hypertension


7. Renal carcinoma post right nephrectomy


8. CKD


9. Thrombocytopenia


10. Anemia 





PLAN:





1. Diuretic as needed


2. Continue Carvedilol 


3. Continue Lipitor 


4. Continue Plavix with caution considering the above noted thrombocytopenia














Epi Baker MD

## 2017-06-11 NOTE — PN
Progress Note, Physician


History of Present Illness: 


Pt seen and examined at bedside. He is awake and alert. He tolerated voiding 

trial. 





- Current Medication List


Current Medications: 


Active Medications





Acetaminophen (Tylenol -)  650 mg PO Q6H PRN


   PRN Reason: FEVER OR PAIN


   Last Admin: 06/10/17 21:44 Dose:  650 mg


Albuterol/Ipratropium (Duoneb -)  1 amp NEB Q6H PRN


   PRN Reason: SHORTNESS OF BREATH


   Last Admin: 06/10/17 10:50 Dose:  1 amp


Atorvastatin Calcium (Lipitor -)  20 mg PO HS [facility]


   Last Admin: 06/10/17 21:41 Dose:  20 mg


Carvedilol (Coreg -)  6.25 mg PO BID [facility]


   Last Admin: 06/11/17 10:09 Dose:  6.25 mg


Clopidogrel Bisulfate (Plavix -)  75 mg PO DAILY [facility]


   Last Admin: 06/11/17 10:09 Dose:  75 mg


Ondansetron HCl (Zofran Injection)  4 mg IVPB Q4H PRN


   PRN Reason: NAUSEA AND/OR VOMITING


Oxycodone HCl (Roxicodone -)  5 mg PO Q6H PRN


   PRN Reason: PAIN











- Objective


Vital Signs: 


 Vital Signs











Temperature  98.2 F   06/11/17 14:38


 


Pulse Rate  67   06/11/17 14:38


 


Respiratory Rate  18   06/11/17 14:38


 


Blood Pressure  116/57   06/11/17 14:38


 


O2 Sat by Pulse Oximetry (%)  96   06/10/17 21:00











Constitutional: Yes: Calm


Eyes: Yes: Conjunctiva Clear


HENT: Yes: Atraumatic


Neck: Yes: Supple


Gastrointestinal: Yes: Soft


Genitourinary: Yes: Incontinence


Musculoskeletal: Yes: Muscle Weakness


Edema: Yes


Edema: LLE: Trace, RLE: Trace


Neurological: Yes: Oriented


Psychiatric: Yes: Oriented


Labs: 


 CBC, BMP





 06/10/17 12:00 





 06/11/17 06:10 





 INR, PTT











INR  1.14  (0.82-1.09)   06/07/17  07:00    


 


Fibrinogen  463.0 mg/dL (238-498)  D 05/22/17  05:35    














Problem List





- Problems


(1) CAD (coronary artery disease)


Code(s): I25.10 - ATHSCL HEART DISEASE OF NATIVE CORONARY ARTERY W/O ANG PCTRS 

  Qualifiers: 


     Coronary Disease-Associated Artery/Lesion type: unspecified vessel or 

lesion type     Native vs. transplanted heart: native heart     Associated 

angina: without angina        Qualified Code(s): I25.10 - Atherosclerotic heart 

disease of native coronary artery without angina pectoris  





(2) Calculus of common duct with obstruction


Code(s): K80.51 - CALCULUS OF BILE DUCT W/O CHOLANGITIS OR CHOLECYST W OBST





(3) Choledocholithiasis


Code(s): K80.50 - CALCULUS OF BILE DUCT W/O CHOLANGITIS OR CHOLECYST W/O OBST





(4) Renal cancer


Code(s): C64.9 - MALIGNANT NEOPLASM OF UNSP KIDNEY, EXCEPT RENAL PELVIS   

Qualifiers: 


     Laterality: right        Qualified Code(s): C64.1 - Malignant neoplasm of 

right kidney, except renal pelvis  





(5) BO (acute kidney injury)


Code(s): N17.9 - ACUTE KIDNEY FAILURE, UNSPECIFIED





(6) CKD (chronic kidney disease)


Code(s): N18.9 - CHRONIC KIDNEY DISEASE, UNSPECIFIED   








Assessment/Plan


 Current Medications











Generic Name Dose Route Start Last Admin





  Trade Name Freq  PRN Reason Stop Dose Admin


 


Acetaminophen  650 mg 06/10/17 12:13 06/10/17 21:44





  Tylenol -  PO   650 mg





  Q6H PRN   Administration





  FEVER OR PAIN   


 


Albuterol/Ipratropium  1 amp 06/08/17 15:08 06/10/17 10:50





  Duoneb -  NEB   1 amp





  Q6H PRN   Administration





  SHORTNESS OF BREATH   


 


Atorvastatin Calcium  20 mg 06/08/17 22:00 06/10/17 21:41





  Lipitor -  PO   20 mg





  HS ALCIDES   Administration


 


Carvedilol  6.25 mg 06/08/17 22:00 06/11/17 10:09





  Coreg -  PO   6.25 mg





  BID [facility]   Administration


 


Clopidogrel Bisulfate  75 mg 06/10/17 16:45 06/11/17 10:09





  Plavix -  PO   75 mg





  DAILY [facility]   Administration


 


Ondansetron HCl  4 mg 06/08/17 14:05  





  Zofran Injection  IVPB   





  Q4H PRN   





  NAUSEA AND/OR VOMITING   


 


Oxycodone HCl  5 mg 06/10/17 16:14  





  Roxicodone -  PO   





  Q6H PRN   





  PAIN   














Impression


1. CKD stage 3


2. BO which required several HD sessions


3. choledocholithiasis


4. CAD


5. hx of Renal Cell Cancer s/p nephroectomy


6. aortic stenosis


7. sepsis


8. acute respiratory failure requiring intubation


9. NSTEMI


10. bacteremia





Plan


- creatinine is rising


- check bmp in am and if elevated get a bladder scan


- will give diuretics as needed


- avoid nephrotoxins


- PT/rehab





Dr Rodriguez

## 2017-06-11 NOTE — PN
Physical Exam: 


SUBJECTIVE: Patient seen and examined oob to chair. Abdominal/incisional pain 

is much better.


OBJECTIVE:





 Vital Signs











 Period  Temp  Pulse  Resp  BP Sys/Badillo  Pulse Ox


 


 Last 24 Hr  98.1 F-99.7 F  66-71  18-20  /35-57  96-97











GENERAL: The patient is awake, alert, and fully oriented, in no acute distress.


HEAD: Normal with no signs of trauma.


EYES: PERRL, extraocular movements intact, sclera anicteric, conjunctiva clear. 

No ptosis. 


LUNGS: Breath sounds equal, clear to auscultation bilaterally, no wheezes, no 

crackles, no 


accessory muscle use. 


HEART: Regular rate and rhythm, S1, S2 without murmur, rub or gallop.


ABDOMEN: Soft, not tender, not distended; surgical incision sites x 3 edges well

-approximated; serosanguinous drainage from umbilicus 


EXTREMITIES: 2+ pulses, warm, well-perfused, no edema. 


NEUROLOGICAL: Cranial nerves II through XII grossly intact. Normal speech, gait 

not observed.








 Laboratory Results - last 24 hr











  06/11/17





  06:10


 


Sodium  138


 


Potassium  3.5


 


Chloride  99


 


Carbon Dioxide  26


 


Anion Gap  13


 


BUN  37 H


 


Creatinine  2.8 H


 


Random Glucose  82


 


Calcium  7.4 L








                  Active Medications











Generic Name Dose Route Start Last Admin





  Trade Name Freq  PRN Reason Stop Dose Admin


 


Acetaminophen  650 mg 06/10/17 12:13 06/10/17 21:44





  Tylenol -  PO   650 mg





  Q6H PRN   Administration





  FEVER OR PAIN   


 


Albuterol/Ipratropium  1 amp 06/08/17 15:08 06/10/17 10:50





  Duoneb -  NEB   1 amp





  Q6H PRN   Administration





  SHORTNESS OF BREATH   


 


Atorvastatin Calcium  20 mg 06/08/17 22:00 06/10/17 21:41





  Lipitor -  PO   20 mg





  HS ALCIDES   Administration


 


Carvedilol  6.25 mg 06/08/17 22:00 06/11/17 10:09





  Coreg -  PO   6.25 mg





  BID ALCIDES   Administration


 


Clopidogrel Bisulfate  75 mg 06/10/17 16:45 06/11/17 10:09





  Plavix -  PO   75 mg





  DAILY [facility]   Administration


 


Ondansetron HCl  4 mg 06/08/17 14:05  





  Zofran Injection  IVPB   





  Q4H PRN   





  NAUSEA AND/OR VOMITING   


 


Oxycodone HCl  5 mg 06/10/17 16:14  





  Roxicodone -  PO   





  Q6H PRN   





  PAIN   











ASSESSMENT/PLAN


87 year-old male with a significant PMH of HTN, CAD s/p stent placement, aortic 

stenosis s/p TAVR, ureteral strictures s/p dilatation, and renal cell cancer s/

p right nephrectomy. Admitted for septic shock secondary to acute 

choledocholithiasis and E. coli bacteremia. 





Septic shock secondary to acute choledocholilithiasis and cholangitis s/p lap 

liliya 6/8/17


E.coli bacteremia


   --doing well postoperatively, oxycodone PRN for pain


   --remains afebrile, no leukocytosis 


   --continue ursodial





Acute on chronic kidney injury


   --Cr 2.8


   --ware dc'd, voiding 


   


Thrombocytopenia


   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


   --last transfused platelets 5/23


   --anne-marie 38k on 5/19, now 61k


   --no signs of bleeding, h/h stable





Severe biventricular heart failure


   --5/17 Echo: LV severely reduced, EF 23% significantly reduced since 09/2015

; septal akinesis, apical akinesis, anterolateeral wall severe hypokinesis, 

anterior wall akinesis, moderate global hypokinesis; RV moderate to severely 

reduced; BLAE; moderate MR, mild TR; no AI; AS cannot be ruled out; trace PI; 

pleural effusion


   --weight is up 3.5kg in 2 days, restart lasix IV 40mg daily 


   --repeat CXR in am


   


CAD s/p stent


   --hold ASA due to thrombocytopenia


   --continue Lipitor





Aortic stenosis s/p tissue AVR


   --resume Plavix 





Renal cell cancer s/p nephrectomy


   --stable





Acute respiratory failure, resolved


   --extubated 5/23





Hypertension


   --continue carvedilol








F/E/N


   Fluids: PO intake adequate


   Electrolytes: replete as indicated


   Nutrition: low sodium, fat controlled





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





Physical therapy





Dispo: Was ready for discharge on 6/9 but insurance authorization pending for 

SNF placement. DNR/DNI.





Visit type





- Emergency Visit


Emergency Visit: Yes


ED Registration Date: 05/15/17


Care time: The patient presented to the Emergency Department on the above date 

and was hospitalized for further evaluation of their emergent condition.





- New Patient


This patient is new to me today: No





- Critical Care


Critical Care patient: No

## 2017-06-11 NOTE — PN
Progress Note (short form)





- Note


Progress Note: 


PULMONARY





Some surgical pain but less today. Denies shortness of breath, cough or chest 

pain.





 Last Vital Signs











Temp Pulse Resp BP Pulse Ox


 


 99.0 F   68   20   119/51   96 


 


 06/11/17 10:00  06/11/17 10:00  06/11/17 10:00  06/11/17 10:00  06/10/17 21:00











Gen:  NAD at rest


Heart:  RRR


Lung: decreased breath sounds at the bases


Abd: soft, nontender


Ext: less edema





 CBC, BMP





 06/10/17 12:00 





 06/11/17 06:10 





Active Medications





Acetaminophen (Tylenol -)  650 mg PO Q6H PRN


   PRN Reason: FEVER OR PAIN


   Last Admin: 06/10/17 21:44 Dose:  650 mg


Albuterol/Ipratropium (Duoneb -)  1 amp NEB Q6H PRN


   PRN Reason: SHORTNESS OF BREATH


   Last Admin: 06/10/17 10:50 Dose:  1 amp


Atorvastatin Calcium (Lipitor -)  20 mg PO HS [facility]


   Last Admin: 06/10/17 21:41 Dose:  20 mg


Carvedilol (Coreg -)  6.25 mg PO BID [facility]


   Last Admin: 06/11/17 10:09 Dose:  6.25 mg


Clopidogrel Bisulfate (Plavix -)  75 mg PO DAILY [facility]


   Last Admin: 06/11/17 10:09 Dose:  75 mg


Ondansetron HCl (Zofran Injection)  4 mg IVPB Q4H PRN


   PRN Reason: NAUSEA AND/OR VOMITING


Oxycodone HCl (Roxicodone -)  5 mg PO Q6H PRN


   PRN Reason: PAIN








A/P


Acute Cholangitis


Gram Negative Bacteremia


s/p ERCP/sphincteroplasty/stent placement


s/p Acute Hypoxic Respiratory Failure


s/p Laparascopic Cholecystectomy


Acute on Chronic Renal Failure improving


CAD


+Troponins likely Demand Ischemia


s/p TAVR for severe Aortic Stenosis


Thrombocytopenia





-  pain control


-  incentive spirometry


-  lasix as needed


-  completed antibiotics


-  monitor urine output, creatinine


-  DVT prophylaxis


-  rehab/PT

## 2017-06-12 LAB
ALBUMIN SERPL-MCNC: 1.7 G/DL (ref 3.4–5)
ALP SERPL-CCNC: 156 U/L (ref 45–117)
ALT SERPL-CCNC: 17 U/L (ref 12–78)
ANION GAP SERPL CALC-SCNC: 11 MMOL/L (ref 8–16)
AST SERPL-CCNC: 31 U/L (ref 15–37)
BASOPHILS # BLD: 0.2 % (ref 0–2)
BILIRUB SERPL-MCNC: 1.2 MG/DL (ref 0.2–1)
CALCIUM SERPL-MCNC: 7.7 MG/DL (ref 8.5–10.1)
CO2 SERPL-SCNC: 26 MMOL/L (ref 21–32)
COCKROFT - GAULT: 25.49
CREAT SERPL-MCNC: 2.5 MG/DL (ref 0.7–1.3)
DEPRECATED RDW RBC AUTO: 17.3 % (ref 11.9–15.9)
EOSINOPHIL # BLD: 0.4 % (ref 0–4.5)
GLUCOSE SERPL-MCNC: 85 MG/DL (ref 74–106)
MCH RBC QN AUTO: 29.4 PG (ref 25.7–33.7)
MCHC RBC AUTO-ENTMCNC: 33.5 G/DL (ref 32–35.9)
MCV RBC: 87.9 FL (ref 80–96)
NEUTROPHILS # BLD: 85.5 % (ref 42.8–82.8)
PLATELET # BLD AUTO: 69 K/MM3 (ref 134–434)
PMV BLD: 9.7 FL (ref 7.5–11.1)
PROT SERPL-MCNC: 4.3 G/DL (ref 6.4–8.2)
WBC # BLD AUTO: 8 K/MM3 (ref 4–10)

## 2017-06-12 RX ADMIN — ACETAMINOPHEN PRN MG: 325 TABLET ORAL at 18:19

## 2017-06-12 RX ADMIN — CLOPIDOGREL BISULFATE SCH MG: 75 TABLET, FILM COATED ORAL at 09:56

## 2017-06-12 RX ADMIN — CARVEDILOL SCH MG: 6.25 TABLET, FILM COATED ORAL at 09:55

## 2017-06-12 RX ADMIN — FUROSEMIDE SCH MG: 10 INJECTION, SOLUTION INTRAVENOUS at 09:56

## 2017-06-12 RX ADMIN — ATORVASTATIN CALCIUM SCH MG: 20 TABLET, FILM COATED ORAL at 21:49

## 2017-06-12 RX ADMIN — CARVEDILOL SCH MG: 6.25 TABLET, FILM COATED ORAL at 21:49

## 2017-06-12 NOTE — PN
Progress Note, Physician


History of Present Illness: 


Pt seen and examined at bedside. He is awake and alert. He denies shortness of 

breath. He does have increased lower extremity edema. 





- Current Medication List


Current Medications: 


Active Medications





Acetaminophen (Tylenol -)  650 mg PO Q6H PRN


   PRN Reason: FEVER OR PAIN


   Last Admin: 06/11/17 21:32 Dose:  650 mg


Albuterol/Ipratropium (Duoneb -)  1 amp NEB Q6H PRN


   PRN Reason: SHORTNESS OF BREATH


   Last Admin: 06/10/17 10:50 Dose:  1 amp


Atorvastatin Calcium (Lipitor -)  20 mg PO HS [facility]


   Last Admin: 06/11/17 21:32 Dose:  20 mg


Carvedilol (Coreg -)  6.25 mg PO BID [facility]


   Last Admin: 06/12/17 09:55 Dose:  6.25 mg


Clopidogrel Bisulfate (Plavix -)  75 mg PO DAILY [facility]


   Last Admin: 06/12/17 09:56 Dose:  75 mg


Furosemide (Lasix Injection -)  40 mg IVPUSH DAILY [facility]


   Last Admin: 06/12/17 09:56 Dose:  40 mg


Ondansetron HCl (Zofran Injection)  4 mg IVPB Q4H PRN


   PRN Reason: NAUSEA AND/OR VOMITING


Oxycodone HCl (Roxicodone -)  5 mg PO Q6H PRN


   PRN Reason: PAIN


   Last Admin: 06/12/17 02:16 Dose:  5 mg











- Objective


Vital Signs: 


 Vital Signs











Temperature  99.4 F   06/12/17 10:00


 


Pulse Rate  67   06/12/17 10:00


 


Respiratory Rate  18   06/12/17 10:00


 


Blood Pressure  119/54   06/12/17 10:00


 


O2 Sat by Pulse Oximetry (%)  98   06/12/17 09:00











Constitutional: Yes: Calm


Eyes: Yes: Conjunctiva Clear


HENT: Yes: Atraumatic


Neck: Yes: Supple


Cardiovascular: Yes: S1, S2


Respiratory: Yes: CTA Bilaterally


Gastrointestinal: Yes: Normal Bowel Sounds, Soft


Genitourinary: Yes: Incontinence


Musculoskeletal: Yes: Muscle Weakness


Edema: Yes


Edema: LLE: 1+, RLE: 1+


Neurological: Yes: Oriented


Psychiatric: Yes: Oriented


Labs: 


 CBC, BMP





 06/12/17 06:00 





 06/12/17 06:00 





 INR, PTT











INR  1.14  (0.82-1.09)   06/07/17  07:00    


 


Fibrinogen  463.0 mg/dL (238-498)  D 05/22/17  05:35    














Problem List





- Problems


(1) CAD (coronary artery disease)


Code(s): I25.10 - ATHSCL HEART DISEASE OF NATIVE CORONARY ARTERY W/O ANG PCTRS 

  Qualifiers: 


     Coronary Disease-Associated Artery/Lesion type: unspecified vessel or 

lesion type     Native vs. transplanted heart: native heart     Associated 

angina: without angina        Qualified Code(s): I25.10 - Atherosclerotic heart 

disease of native coronary artery without angina pectoris  





(2) Calculus of common duct with obstruction


Code(s): K80.51 - CALCULUS OF BILE DUCT W/O CHOLANGITIS OR CHOLECYST W OBST





(3) Choledocholithiasis


Code(s): K80.50 - CALCULUS OF BILE DUCT W/O CHOLANGITIS OR CHOLECYST W/O OBST





(4) Renal cancer


Code(s): C64.9 - MALIGNANT NEOPLASM OF UNSP KIDNEY, EXCEPT RENAL PELVIS   

Qualifiers: 


     Laterality: right        Qualified Code(s): C64.1 - Malignant neoplasm of 

right kidney, except renal pelvis  





(5) BO (acute kidney injury)


Code(s): N17.9 - ACUTE KIDNEY FAILURE, UNSPECIFIED





(6) CKD (chronic kidney disease)


Code(s): N18.9 - CHRONIC KIDNEY DISEASE, UNSPECIFIED   








Assessment/Plan


 Current Medications











Generic Name Dose Route Start Last Admin





  Trade Name Freq  PRN Reason Stop Dose Admin


 


Acetaminophen  650 mg 06/10/17 12:13 06/11/17 21:32





  Tylenol -  PO   650 mg





  Q6H PRN   Administration





  FEVER OR PAIN   


 


Albuterol/Ipratropium  1 amp 06/08/17 15:08 06/10/17 10:50





  Duoneb -  NEB   1 amp





  Q6H PRN   Administration





  SHORTNESS OF BREATH   


 


Atorvastatin Calcium  20 mg 06/08/17 22:00 06/11/17 21:32





  Lipitor -  PO   20 mg





  HS [facility]   Administration


 


Carvedilol  6.25 mg 06/08/17 22:00 06/12/17 09:55





  Coreg -  PO   6.25 mg





  BID [facility]   Administration


 


Clopidogrel Bisulfate  75 mg 06/10/17 16:45 06/12/17 09:56





  Plavix -  PO   75 mg





  DAILY [facility]   Administration


 


Furosemide  40 mg 06/11/17 18:15 06/12/17 09:56





  Lasix Injection -  IVPUSH   40 mg





  DAILY [facility]   Administration


 


Ondansetron HCl  4 mg 06/08/17 14:05  





  Zofran Injection  IVPB   





  Q4H PRN   





  NAUSEA AND/OR VOMITING   


 


Oxycodone HCl  5 mg 06/10/17 16:14 06/12/17 02:16





  Roxicodone -  PO   5 mg





  Q6H PRN   Administration





  PAIN   

















Impression


1. CKD stage 3


2. BO which required several HD sessions


3. choledocholithiasis


4. CAD


5. hx of Renal Cell Cancer s/p nephroectomy


6. aortic stenosis


7. sepsis


8. acute respiratory failure requiring intubation


9. NSTEMI


10. bacteremia





Plan


- renal function is better today


- give a dose of lasix, ordered already


- supplement potassium


- monitor renal function


- avoid nephrotoxins


- PT/rehab





Dr Rodriguez

## 2017-06-12 NOTE — DS
Physical Exam: 


SUBJECTIVE: Patient seen and examined








OBJECTIVE:





 Vital Signs











 Period  Temp  Pulse  Resp  BP Sys/Badillo  Pulse Ox


 


 Last 24 Hr  98.8 F-100.6 F  67-74  18-20  116-129/50-58  94-98








PHYSICAL EXAM





GENERAL: The patient is awake, alert, and fully oriented, in no acute distress.


HEAD: Normal with no signs of trauma.


EYES: PERRL, extraocular movements intact, sclera anicteric, conjunctiva clear. 


ENT: Ears normal, nares patent, oropharynx clear without exudates, moist mucous 

membranes.


NECK: Trachea midline, full range of motion, supple. 


LUNGS: Breath sounds equal, clear to auscultation bilaterally, no wheezes, no 

crackles, no accessory muscle use. 


HEART: Regular rate and rhythm, S1, S2 without murmur, rub or gallop.


ABDOMEN: Soft, nontender, nondistended, normoactive bowel sounds, no guarding, 

no rebound, no hepatosplenomegaly, no masses.


EXTREMITIES: 2+ pulses, warm, well-perfused, no edema. 


NEUROLOGICAL: Cranial nerves II through XII grossly intact. Normal speech, gait 

not observed.


PSYCH: Normal mood, normal affect.


SKIN: Warm, dry, normal turgor, no rashes or lesions noted.





LABS


 Laboratory Results - last 24 hr











  06/11/17 06/12/17 06/12/17





  21:52 06:00 06:00


 


WBC   8.0 


 


RBC   3.03 L 


 


Hgb   8.9 L 


 


Hct   26.6 L 


 


MCV   87.9 


 


MCHC   33.5 


 


RDW   17.3 H 


 


Plt Count   69 L 


 


MPV   9.7 


 


Neutrophils %   85.5 H 


 


Lymphocytes %   7.3 L 


 


Monocytes %   6.6 


 


Eosinophils %   0.4  D 


 


Basophils %   0.2 


 


Sodium    139


 


Potassium    3.4 L


 


Chloride    102


 


Carbon Dioxide    26


 


Anion Gap    11


 


BUN    36 H


 


Creatinine    2.5 H


 


Creat Clearance w eGFR    24.55


 


POC Glucometer  244  


 


Random Glucose    85


 


Calcium    7.7 L


 


Total Bilirubin    1.2 H


 


AST    31  D


 


ALT    17  D


 


Alkaline Phosphatase    156 H


 


Total Protein    4.3 L


 


Albumin    1.7 L











HOSPITAL COURSE:





Date of Admission:05/15/17





Date of Discharge: 06/12/17











Minutes to complete discharge: 35





Discharge Summary


Reason For Visit: CALCULUS OF COMMON DUCT WITH OBSTRUCTION


Current Active Problems





BO (acute kidney injury) (Acute) 


Acute cholangitis (Acute) 


Acute hypoxemic respiratory failure (Acute) 


Anemia (Acute) 


Bacteremia due to Gram-negative bacteria (Acute) 


Biliary sepsis (Acute) 


CAD (coronary artery disease) (Acute) 


CKD (chronic kidney disease) (Acute) 


Calculus of common duct with obstruction (Acute) 


Cellulitis, umbilical (Acute) 


Choledocholithiasis (Acute) 


DVT prophylaxis (Acute) 


Demand ischemia (Acute) 


Gram negative septic shock (Acute) 


History of percutaneous coronary intervention (Acute) 


Left bundle branch block (Acute) 


Pre-operative cardiovascular examination (Acute) 


Renal cancer (Acute) 


S/P ERCP (Acute) 


S/P laparoscopic cholecystectomy (Acute) 


Thrombocytopenia (Acute) 


Umbilical discharge (Acute) 











- Instructions


Referrals: 


Brandyn Mckay MD [Primary Care Provider] - 


Disposition: SKILLED NURSING FACILITY





- Home Medications


Comprehensive Discharge Medication List: 


Ambulatory Orders





Aspirin Coated [Ecotrin -] 81 mg PO DAILY 01/26/15 


Montelukast Na [Singulair -] 10 mg PO HS 09/09/16 


Atorvastatin Ca [Lipitor] 40 mg PO HS 02/19/17 


Finasteride 5 mg PO DAILY 02/19/17 


Omega-3 Fatty Acids [Omega-3] 1,500 mg PO 05/15/17 


Carvedilol [Coreg -] 6.25 mg PO BID #60 tablet 06/12/17 


Furosemide [Lasix] 20 mg PO DAILY #30 tablet 06/12/17 











- Discharge Referral


Referred to [facility] No

## 2017-06-12 NOTE — PN
Progress Note, Physician


History of Present Illness: 


Post lap cholecystectomy and umbilical fluid collection drainage, no complaints.








- Current Medication List


Current Medications: 


Active Medications





Acetaminophen (Tylenol -)  650 mg PO Q6H PRN


   PRN Reason: FEVER OR PAIN


   Last Admin: 06/11/17 21:32 Dose:  650 mg


Albuterol/Ipratropium (Duoneb -)  1 amp NEB Q6H PRN


   PRN Reason: SHORTNESS OF BREATH


   Last Admin: 06/10/17 10:50 Dose:  1 amp


Atorvastatin Calcium (Lipitor -)  20 mg PO HS [facility]


   Last Admin: 06/11/17 21:32 Dose:  20 mg


Carvedilol (Coreg -)  6.25 mg PO BID [facility]


   Last Admin: 06/12/17 09:55 Dose:  6.25 mg


Clopidogrel Bisulfate (Plavix -)  75 mg PO DAILY [facility]


   Last Admin: 06/12/17 09:56 Dose:  75 mg


Furosemide (Lasix Injection -)  40 mg IVPUSH DAILY [facility]


   Last Admin: 06/12/17 09:56 Dose:  40 mg


Ondansetron HCl (Zofran Injection)  4 mg IVPB Q4H PRN


   PRN Reason: NAUSEA AND/OR VOMITING


Oxycodone HCl (Roxicodone -)  5 mg PO Q6H PRN


   PRN Reason: PAIN


   Last Admin: 06/12/17 02:16 Dose:  5 mg











- Objective


Vital Signs: 


 Vital Signs











Temperature  100.6 F H  06/12/17 06:00


 


Pulse Rate  72   06/12/17 06:00


 


Respiratory Rate  20   06/12/17 06:00


 


Blood Pressure  123/50   06/12/17 06:00


 


O2 Sat by Pulse Oximetry (%)  94 L  06/11/17 21:00











Constitutional: Yes: No Distress, Calm


Neck: Yes: Supple


Cardiovascular: Yes: Regular Rate and Rhythm


Respiratory: Yes: Regular, Diminished


Gastrointestinal: Yes: Normal Bowel Sounds, Soft


Edema: No


Labs: 


 CBC, BMP





 06/12/17 06:00 





 06/12/17 06:00 





 INR, PTT











INR  1.14  (0.82-1.09)   06/07/17  07:00    


 


Fibrinogen  463.0 mg/dL (238-498)  D 05/22/17  05:35    














Problem List





- Problems


(1) CAD (coronary artery disease)


Code(s): I25.10 - ATHSCL HEART DISEASE OF NATIVE CORONARY ARTERY W/O ANG PCTRS 

  Qualifiers: 


     Coronary Disease-Associated Artery/Lesion type: unspecified vessel or 

lesion type     Native vs. transplanted heart: native heart     Associated 

angina: without angina        Qualified Code(s): I25.10 - Atherosclerotic heart 

disease of native coronary artery without angina pectoris  





(2) CKD (chronic kidney disease)


Code(s): N18.9 - CHRONIC KIDNEY DISEASE, UNSPECIFIED   





(3) Calculus of common duct with obstruction


Code(s): K80.51 - CALCULUS OF BILE DUCT W/O CHOLANGITIS OR CHOLECYST W OBST





(4) History of percutaneous coronary intervention


Code(s): Z98.890 - OTHER SPECIFIED POSTPROCEDURAL STATES





(5) S/P ERCP


Code(s): Z98.890 - OTHER SPECIFIED POSTPROCEDURAL STATES





(6) Thrombocytopenia


Code(s): D69.6 - THROMBOCYTOPENIA, UNSPECIFIED





(7) Left bundle branch block


Code(s): I44.7 - LEFT BUNDLE-BRANCH BLOCK, UNSPECIFIED





(8) Demand ischemia


Code(s): I24.8 - OTHER FORMS OF ACUTE ISCHEMIC HEART DISEASE





(9) Anemia


Code(s): D64.9 - ANEMIA, UNSPECIFIED   Qualifiers: 


     Anemia type: unspecified type        Qualified Code(s): D64.9 - Anemia, 

unspecified  





(10) S/P laparoscopic cholecystectomy


Code(s): Z90.49 - ACQUIRED ABSENCE OF OTHER SPECIFIED PARTS OF DIGESTIVE TRACT








Assessment/Plan


1. POD#4 post lap cholecystectomy and umbilical fluid collection drainage (post 

biliary septic shock with E. coli, acute cholangitis post ERCP, balloon 

sphincteroplasty and stent placement - post leukocytosis and lactic acidosis)


2. Post hypoxemic respiratory failure 


3. Post TAVR for severe AS


4. CAD post PCI/stent with demand ischemic injury angina pectoris


5. Severe bi-ventricular failure with pleural effusions


6. Hypertension


7. Renal carcinoma post right nephrectomy


8. CKD


9. Thrombocytopenia


10. Anemia 





PLAN:





1. Continuing Lasix 40 qd as needed, replete K as you are


2. Continue Carvedilol 6.25 bid, ACE-I/ARB once renal fxn stabilizes


3. Continue Lipitor 20 qhs


4. Continue Plavix 75 qd with caution considering the above noted 

thrombocytopenia


5. D/c planning to SNF

## 2017-06-12 NOTE — PN
Progress Note (short form)





- Note


Progress Note: 


No acute events


On diet


Pain controlled


 Vital Signs











 Period  Temp  Pulse  Resp  BP Sys/Badillo  Pulse Ox


 


 Last 24 Hr  98.2 F-100.6 F  67-74  18-20  116-129/50-58  94-97








Abd soft, NT, wounds c/d/i, umbilicus packed





CBC,CMP











WBC  8.0 K/mm3 (4.0-10.0)   06/12/17  06:00    


 


RBC  3.03 M/mm3 (4.00-5.60)  L  06/12/17  06:00    


 


Hgb  8.9 GM/dL (11.7-16.9)  L  06/12/17  06:00    


 


Hct  26.6 % (35.4-49)  L  06/12/17  06:00    


 


MCV  87.9 fl (80-96)   06/12/17  06:00    


 


MCHC  33.5 g/dl (32.0-35.9)   06/12/17  06:00    


 


RDW  17.3 % (11.9-15.9)  H  06/12/17  06:00    


 


Plt Count  69 K/MM3 (134-434)  L  06/12/17  06:00    


 


MPV  9.7 fl (7.5-11.1)   06/12/17  06:00    


 


Neutrophils %  85.5 % (42.8-82.8)  H  06/12/17  06:00    


 


Lymphocytes %  7.3 % (8-40)  L  06/12/17  06:00    


 


Monocytes %  6.6 % (3.8-10.2)   06/12/17  06:00    


 


Eosinophils %  0.4 % (0-4.5)  D 06/12/17  06:00    


 


Basophils %  0.2 % (0-2.0)   06/12/17  06:00    


 


Band Neutrophils  11.0 % (0-10)  H D 05/17/17  05:15    


 


Metamyelocytes  5 % (0-2)  H D 05/16/17  20:30    


 


Myelocytes  9 % (0-2)  H D 05/16/17  20:30    


 


Differential Comment  Manual diff done   06/01/17  06:30    


 


Dohle Bodies  1+   05/17/17  05:15    


 


Platelet Estimate  Decreased  (NORMAL)   06/01/17  06:30    


 


Platelet Comment  No clumping noted   05/17/17  05:15    


 


Polychromasia  Few   05/17/17  05:15    


 


Hypochromic-Microcytic  Few   05/17/17  05:15    


 


Sodium  139 mmol/L (136-145)   06/12/17  06:00    


 


Potassium  3.4 mmol/L (3.5-5.1)  L  06/12/17  06:00    


 


Chloride  102 mmol/L ()   06/12/17  06:00    


 


Carbon Dioxide  26 mmol/L (21-32)   06/12/17  06:00    


 


Anion Gap  11  (8-16)   06/12/17  06:00    


 


BUN  36 mg/dL (7-18)  H  06/12/17  06:00    


 


Creatinine  2.5 mg/dL (0.7-1.3)  H  06/12/17  06:00    


 


Creat Clearance w eGFR  24.55  (>60)   06/12/17  06:00    


 


POC Glucometer  244 UNITS (())   06/11/17  21:52    


 


Random Glucose  85 mg/dL ()   06/12/17  06:00    


 


Lactic Acid  1.684 mmol/L (0.4-2.0)   05/17/17  13:20    


 


Calcium  7.7 mg/dL (8.5-10.1)  L  06/12/17  06:00    


 


Phosphorus  3.2 mg/dL (2.5-4.9)   06/07/17  07:00    


 


Magnesium  1.8 mg/dL (1.8-2.4)   06/07/17  07:00    


 


Total Bilirubin  1.2 mg/dL (0.2-1.0)  H  06/12/17  06:00    


 


Direct Bilirubin  0.9 mg/dL (0.0-0.2)  H  06/07/17  07:00    


 


AST  31 U/L (15-37)  D 06/12/17  06:00    


 


ALT  17 U/L (12-78)  D 06/12/17  06:00    


 


Alkaline Phosphatase  156 U/L ()  H  06/12/17  06:00    


 


Creatine Kinase  60 IU/L ()   05/20/17  00:00    


 


Creatine Kinase Index  11.8 % (0.0-5.0)  H*  05/17/17  13:20    


 


CK-MB (CK-2)  18.446 ng/ml (0.5-3.6)  H  05/17/17  13:20    


 


CK-MB (CK-2) Rel Index  Cancelled   05/17/17  13:20    


 


Troponin I  1.73 ng/ml (0.00-0.05)  H*  05/20/17  05:15    


 


C-Reactive Protein  3.1 MG/DL (0.00-0.3)  H D 06/03/17  06:30    


 


Total Protein  4.3 g/dl (6.4-8.2)  L  06/12/17  06:00    


 


Albumin  1.7 g/dl (3.4-5.0)  L  06/12/17  06:00    


 


Total Amylase  55 U/L ()  D 06/04/17  06:30    


 


Lipase  170 U/L ()   06/04/17  06:30    














OOB


Diet


Daily wound packing


Awaiting SNF placement





Problem List





- Problems


(1) Umbilical discharge


Code(s): R19.8 - OT SYMPTOMS AND SIGNS INVOLVING THE DGSTV SYS AND ABDOMEN

## 2017-06-12 NOTE — PN
Progress Note (short form)





- Note


Progress Note: 


Incisional discomfort is better today. 


Denies shortness of breath, cough or chest pain.





 Intake & Output











 06/09/17 06/10/17 06/11/17 06/12/17





 23:59 23:59 23:59 23:59


 


Intake Total 350 440 462 160


 


Output Total 1200 550  


 


Balance -850 -110 462 160


 


Weight 198 lb 5 oz  198 lb 7 oz 190 lb 14.4 oz








 Last Vital Signs











Temp Pulse Resp BP Pulse Ox


 


 100.6 F H  72   20   123/50   94 L


 


 06/12/17 06:00  06/12/17 06:00  06/12/17 06:00  06/12/17 06:00  06/11/17 21:00








Active Medications





Acetaminophen (Tylenol -)  650 mg PO Q6H PRN


   PRN Reason: FEVER OR PAIN


   Last Admin: 06/11/17 21:32 Dose:  650 mg


Albuterol/Ipratropium (Duoneb -)  1 amp NEB Q6H PRN


   PRN Reason: SHORTNESS OF BREATH


   Last Admin: 06/10/17 10:50 Dose:  1 amp


Atorvastatin Calcium (Lipitor -)  20 mg PO HS [facility]


   Last Admin: 06/11/17 21:32 Dose:  20 mg


Carvedilol (Coreg -)  6.25 mg PO BID [facility]


   Last Admin: 06/12/17 09:55 Dose:  6.25 mg


Clopidogrel Bisulfate (Plavix -)  75 mg PO DAILY [facility]


   Last Admin: 06/12/17 09:56 Dose:  75 mg


Furosemide (Lasix Injection -)  40 mg IVPUSH DAILY [facility]


   Last Admin: 06/12/17 09:56 Dose:  40 mg


Ondansetron HCl (Zofran Injection)  4 mg IVPB Q4H PRN


   PRN Reason: NAUSEA AND/OR VOMITING


Oxycodone HCl (Roxicodone -)  5 mg PO Q6H PRN


   PRN Reason: PAIN


   Last Admin: 06/12/17 02:16 Dose:  5 mg








Gen:  NAD at rest


Heart:  RRR


Lung: decreased breath sounds at the bases


Abd: soft, nontender


Ext: less edema





 


 Laboratory Results - last 24 hr











  06/11/17 06/12/17 06/12/17





  21:52 06:00 06:00


 


WBC   8.0 


 


RBC   3.03 L 


 


Hgb   8.9 L 


 


Hct   26.6 L 


 


MCV   87.9 


 


MCHC   33.5 


 


RDW   17.3 H 


 


Plt Count   69 L 


 


MPV   9.7 


 


Neutrophils %   85.5 H 


 


Lymphocytes %   7.3 L 


 


Monocytes %   6.6 


 


Eosinophils %   0.4  D 


 


Basophils %   0.2 


 


Sodium    139


 


Potassium    3.4 L


 


Chloride    102


 


Carbon Dioxide    26


 


Anion Gap    11


 


BUN    36 H


 


Creatinine    2.5 H


 


Creat Clearance w eGFR    24.55


 


POC Glucometer  244  


 


Random Glucose    85


 


Calcium    7.7 L


 


Total Bilirubin    1.2 H


 


AST    31  D


 


ALT    17  D


 


Alkaline Phosphatase    156 H


 


Total Protein    4.3 L


 


Albumin    1.7 L

















A/P


Acute Cholangitis


Gram Negative Bacteremia


s/p ERCP/sphincteroplasty/stent placement


s/p Acute Hypoxic Respiratory Failure


s/p Laparascopic Cholecystectomy


Acute on Chronic Renal Failure improving


CAD


+Troponins likely Demand Ischemia


s/p TAVR for severe Aortic Stenosis


Thrombocytopenia








-  pain control


-  incentive spirometry


-  lasix as needed


-  completed antibiotics


-  DVT prophylaxis


-  D/C to rehab 





Dr Sidhu

## 2017-06-12 NOTE — PATH
Surgical Pathology Report



Patient Name:  NAEL ALDANA

Accession #:  [id number]

Med. Rec. #:  [id number]                                                        

   /Age/Gender:  1929 (Age: 87) / M

Account:  [id number]                                                          

             Location: [facility] MED/SURG

Taken:  2017

Received:  2017

Reported:  2017

Physicians:  Alfonso Swann M.D.

  



Specimen(s) Received

 GALLBLADDER 





Clinical History

Cholangitis, umbilical drainage/collection







Final Diagnosis

GALLBLADDER, CHOLECYSTECTOMY:

MARKED CHRONIC CHOLECYSTITIS; ADENOMYOMATOUS HYPERPLASIA.





***Electronically Signed***

Alexander Finkelstein, M.D.





Gross Description

Received in formalin, labeled "gallbladder" is a 7.7 x 2.5 x 2.3 cm gallbladder

with a 0.2 cm in length portion of cystic duct attached. The outer surface is

tan-pink with a focal defect and varies from smooth to shaggy. The lumen

contains tan, tenacious bile. No choleliths are identified within the lumen or

within the container. The mucosa is tan and velvety. The wall of the gallbladder

is focally thickened at the fundus and ranges from 0.1-0.9 cm in thickness.

Representative sections are submitted in 3 cassettes.

/2017



saudi

## 2017-06-13 VITALS — SYSTOLIC BLOOD PRESSURE: 95 MMHG | TEMPERATURE: 98.2 F | HEART RATE: 65 BPM | DIASTOLIC BLOOD PRESSURE: 52 MMHG

## 2017-06-13 LAB
ANION GAP SERPL CALC-SCNC: 8 MMOL/L (ref 8–16)
CALCIUM SERPL-MCNC: 7.3 MG/DL (ref 8.5–10.1)
CO2 SERPL-SCNC: 29 MMOL/L (ref 21–32)
COCKROFT - GAULT: 24.74
CREAT SERPL-MCNC: 2.5 MG/DL (ref 0.7–1.3)
GLUCOSE SERPL-MCNC: 73 MG/DL (ref 74–106)

## 2017-06-13 RX ADMIN — CARVEDILOL SCH MG: 6.25 TABLET, FILM COATED ORAL at 09:34

## 2017-06-13 RX ADMIN — FUROSEMIDE SCH MG: 10 INJECTION, SOLUTION INTRAVENOUS at 09:34

## 2017-06-13 RX ADMIN — CLOPIDOGREL BISULFATE SCH MG: 75 TABLET, FILM COATED ORAL at 09:34

## 2017-06-13 NOTE — PN
Progress Note, Physician


History of Present Illness: 


Pt seen and examined at bedside. He is awake and alert. He denies shortness of 

breath. 





- Current Medication List


Current Medications: 


Active Medications





Acetaminophen (Tylenol -)  650 mg PO Q6H PRN


   PRN Reason: FEVER OR PAIN


   Last Admin: 06/12/17 18:19 Dose:  650 mg


Albuterol/Ipratropium (Duoneb -)  1 amp NEB Q6H PRN


   PRN Reason: SHORTNESS OF BREATH


   Last Admin: 06/10/17 10:50 Dose:  1 amp


Atorvastatin Calcium (Lipitor -)  20 mg PO HS [facility]


   Last Admin: 06/12/17 21:49 Dose:  20 mg


Carvedilol (Coreg -)  6.25 mg PO BID [facility]


   Last Admin: 06/13/17 09:34 Dose:  6.25 mg


Clopidogrel Bisulfate (Plavix -)  75 mg PO DAILY [facility]


   Last Admin: 06/13/17 09:34 Dose:  75 mg


Furosemide (Lasix Injection -)  40 mg IVPUSH DAILY [facility]


   Last Admin: 06/13/17 09:34 Dose:  40 mg


Ondansetron HCl (Zofran Injection)  4 mg IVPB Q4H PRN


   PRN Reason: NAUSEA AND/OR VOMITING


Oxycodone HCl (Roxicodone -)  5 mg PO Q6H PRN


   PRN Reason: PAIN


   Last Admin: 06/12/17 02:16 Dose:  5 mg











- Objective


Vital Signs: 


 Vital Signs











Temperature  98.2 F   06/13/17 14:01


 


Pulse Rate  65   06/13/17 14:01


 


Respiratory Rate  18   06/13/17 14:01


 


Blood Pressure  95/52   06/13/17 14:01


 


O2 Sat by Pulse Oximetry (%)  99   06/13/17 09:25











Constitutional: Yes: Calm


Eyes: Yes: Conjunctiva Clear


HENT: Yes: Atraumatic


Neck: Yes: Supple


Cardiovascular: Yes: S1, S2


Respiratory: Yes: CTA Bilaterally


Gastrointestinal: Yes: Normal Bowel Sounds, Soft


Genitourinary: Yes: WNL


Musculoskeletal: Yes: WNL


Edema: No


Neurological: Yes: Oriented


Psychiatric: Yes: Oriented


Labs: 


 CBC, BMP





 06/12/17 06:00 





 06/13/17 07:15 





 INR, PTT











INR  1.14  (0.82-1.09)   06/07/17  07:00    


 


Fibrinogen  463.0 mg/dL (238-498)  D 05/22/17  05:35    














Problem List





- Problems


(1) CAD (coronary artery disease)


Code(s): I25.10 - ATHSCL HEART DISEASE OF NATIVE CORONARY ARTERY W/O ANG PCTRS 

  Qualifiers: 


     Coronary Disease-Associated Artery/Lesion type: unspecified vessel or 

lesion type     Native vs. transplanted heart: native heart     Associated 

angina: without angina        Qualified Code(s): I25.10 - Atherosclerotic heart 

disease of native coronary artery without angina pectoris  





(2) Calculus of common duct with obstruction


Code(s): K80.51 - CALCULUS OF BILE DUCT W/O CHOLANGITIS OR CHOLECYST W OBST





(3) Choledocholithiasis


Code(s): K80.50 - CALCULUS OF BILE DUCT W/O CHOLANGITIS OR CHOLECYST W/O OBST





(4) Renal cancer


Code(s): C64.9 - MALIGNANT NEOPLASM OF UNSP KIDNEY, EXCEPT RENAL PELVIS   

Qualifiers: 


     Laterality: right        Qualified Code(s): C64.1 - Malignant neoplasm of 

right kidney, except renal pelvis  





(5) BO (acute kidney injury)


Code(s): N17.9 - ACUTE KIDNEY FAILURE, UNSPECIFIED





(6) CKD (chronic kidney disease)


Code(s): N18.9 - CHRONIC KIDNEY DISEASE, UNSPECIFIED   








Assessment/Plan


 Current Medications











Generic Name Dose Route Start Last Admin





  Trade Name Freq  PRN Reason Stop Dose Admin


 


Acetaminophen  650 mg 06/10/17 12:13 06/12/17 18:19





  Tylenol -  PO   650 mg





  Q6H PRN   Administration





  FEVER OR PAIN   


 


Albuterol/Ipratropium  1 amp 06/08/17 15:08 06/10/17 10:50





  Duoneb -  NEB   1 amp





  Q6H PRN   Administration





  SHORTNESS OF BREATH   


 


Atorvastatin Calcium  20 mg 06/08/17 22:00 06/12/17 21:49





  Lipitor -  PO   20 mg





  HS [facility]   Administration


 


Carvedilol  6.25 mg 06/08/17 22:00 06/13/17 09:34





  Coreg -  PO   6.25 mg





  BID [facility]   Administration


 


Clopidogrel Bisulfate  75 mg 06/10/17 16:45 06/13/17 09:34





  Plavix -  PO   75 mg





  DAILY [facility]   Administration


 


Furosemide  40 mg 06/11/17 18:15 06/13/17 09:34





  Lasix Injection -  IVPUSH   40 mg





  DAILY [facility]   Administration


 


Ondansetron HCl  4 mg 06/08/17 14:05  





  Zofran Injection  IVPB   





  Q4H PRN   





  NAUSEA AND/OR VOMITING   


 


Oxycodone HCl  5 mg 06/10/17 16:14 06/12/17 02:16





  Roxicodone -  PO   5 mg





  Q6H PRN   Administration





  PAIN   














Impression


1. CKD stage 3


2. BO which required several HD sessions


3. choledocholithiasis


4. CAD


5. hx of Renal Cell Cancer s/p nephroectomy


6. aortic stenosis


7. sepsis


8. acute respiratory failure requiring intubation


9. NSTEMI


10. bacteremia





Plan


- pts volume status is improved


- can hold lasix and monitor volume status closely


- renal function is stabilizing


- recommend checking bmp again in a few days


- will need outpt follow up


- pt follows with Dr Alaniz


- avoid nephrotoxins


- PT/rehab





Dr Rodriguez

## 2017-06-13 NOTE — PN
Progress Note (short form)





- Note


Progress Note: 


No acute events


Pain controlled


Tolerating diet


 Vital Signs











 Period  Temp  Pulse  Resp  BP Sys/Badillo  Pulse Ox


 


 Last 24 Hr  98.2 F-101.2 F  69-79  20-20  102-125/50-60  98








Abd soft, wound c/d/i, umbilicus packed





 CBC, BMP





 06/12/17 06:00 





 06/13/17 07:15 





Daily wound packing


OOB


Awaiting SNF placement





Problem List





- Problems


(1) Umbilical discharge


Code(s): R19.8 - OTH SYMPTOMS AND SIGNS INVOLVING THE DGSTV SYS AND ABDOMEN

## 2017-06-13 NOTE — PN
Progress Note (short form)





- Note


Progress Note: 


No acute events overnight. 


Denies shortness of breath, cough or chest pain.


 Intake & Output











 06/10/17 06/11/17 06/12/17 06/13/17





 23:59 23:59 23:59 23:59


 


Intake Total 440 462 220 


 


Output Total 550   


 


Balance -110 462 220 


 


Weight  198 lb 7 oz 190 lb 14.4 oz 185 lb 4.8 oz








 Last Vital Signs











Temp Pulse Resp BP Pulse Ox


 


 98.6 F   72   20   120/60   98 


 


 06/13/17 08:42  06/13/17 08:42  06/13/17 08:42  06/13/17 08:42  06/12/17 21:00








Active Medications





Acetaminophen (Tylenol -)  650 mg PO Q6H PRN


   PRN Reason: FEVER OR PAIN


   Last Admin: 06/12/17 18:19 Dose:  650 mg


Albuterol/Ipratropium (Duoneb -)  1 amp NEB Q6H PRN


   PRN Reason: SHORTNESS OF BREATH


   Last Admin: 06/10/17 10:50 Dose:  1 amp


Atorvastatin Calcium (Lipitor -)  20 mg PO HS [facility]


   Last Admin: 06/12/17 21:49 Dose:  20 mg


Carvedilol (Coreg -)  6.25 mg PO BID [facility]


   Last Admin: 06/13/17 09:34 Dose:  6.25 mg


Clopidogrel Bisulfate (Plavix -)  75 mg PO DAILY [facility]


   Last Admin: 06/13/17 09:34 Dose:  75 mg


Furosemide (Lasix Injection -)  40 mg IVPUSH DAILY [facility]


   Last Admin: 06/13/17 09:34 Dose:  40 mg


Ondansetron HCl (Zofran Injection)  4 mg IVPB Q4H PRN


   PRN Reason: NAUSEA AND/OR VOMITING


Oxycodone HCl (Roxicodone -)  5 mg PO Q6H PRN


   PRN Reason: PAIN


   Last Admin: 06/12/17 02:16 Dose:  5 mg








Gen:  NAD at rest


Heart:  RRR


Lung: decreased breath sounds at the bases


Abd: soft, nontender


Ext: less edema


 Laboratory Results - last 24 hr











  06/13/17





  07:15


 


Sodium  140


 


Potassium  3.7


 


Chloride  103


 


Carbon Dioxide  29


 


Anion Gap  8


 


BUN  38 H


 


Creatinine  2.5 H


 


Random Glucose  73 L


 


Calcium  7.3 L











A/P


Acute Cholangitis


Gram Negative Bacteremia


s/p ERCP/sphincteroplasty/stent placement


s/p Acute Hypoxic Respiratory Failure


s/p Laparascopic Cholecystectomy


Acute on Chronic Renal Failure improving


CAD


+Troponins likely Demand Ischemia


s/p TAVR for severe Aortic Stenosis


Thrombocytopenia





-  pain control


-  incentive spirometry


-  completed antibiotics


-  DVT prophylaxis


-  D/C to rehab 





Dr Sidhu

## 2017-06-13 NOTE — PN
Progress Note, Physician


Chief Complaint: 


Events noted


Awake and alert. Not in distress





History of Present Illness: 


Patient was seen and examined. Chart was reviewed


Denies chest pain, SOB or palpitations





- Current Medication List


Current Medications: 


Active Medications





Acetaminophen (Tylenol -)  650 mg PO Q6H PRN


   PRN Reason: FEVER OR PAIN


   Last Admin: 06/12/17 18:19 Dose:  650 mg


Albuterol/Ipratropium (Duoneb -)  1 amp NEB Q6H PRN


   PRN Reason: SHORTNESS OF BREATH


   Last Admin: 06/10/17 10:50 Dose:  1 amp


Atorvastatin Calcium (Lipitor -)  20 mg PO HS [facility]


   Last Admin: 06/12/17 21:49 Dose:  20 mg


Carvedilol (Coreg -)  6.25 mg PO BID [facility]


   Last Admin: 06/13/17 09:34 Dose:  6.25 mg


Clopidogrel Bisulfate (Plavix -)  75 mg PO DAILY [facility]


   Last Admin: 06/13/17 09:34 Dose:  75 mg


Furosemide (Lasix Injection -)  40 mg IVPUSH DAILY [facility]


   Last Admin: 06/13/17 09:34 Dose:  40 mg


Ondansetron HCl (Zofran Injection)  4 mg IVPB Q4H PRN


   PRN Reason: NAUSEA AND/OR VOMITING


Oxycodone HCl (Roxicodone -)  5 mg PO Q6H PRN


   PRN Reason: PAIN


   Last Admin: 06/12/17 02:16 Dose:  5 mg











- Objective


Vital Signs: 


 Vital Signs











Temperature  98.6 F   06/13/17 08:42


 


Pulse Rate  97 H  06/13/17 09:25


 


Respiratory Rate  20   06/13/17 08:42


 


Blood Pressure  120/60   06/13/17 08:42


 


O2 Sat by Pulse Oximetry (%)  99   06/13/17 09:25











Neck: Yes: Supple


Cardiovascular: Yes: Regular Rate and Rhythm, S1, S2


Respiratory: Yes: CTA Bilaterally


Gastrointestinal: Yes: Normal Bowel Sounds, Soft.  No: Tenderness


Edema: No


Labs: 


 CBC, BMP





 06/12/17 06:00 





 06/13/17 07:15 





 








Problem List





- Problems


(1) Cellulitis, umbilical


Code(s): L03.316 - CELLULITIS OF UMBILICUS





(2) Choledocholithiasis


Code(s): K80.50 - CALCULUS OF BILE DUCT W/O CHOLANGITIS OR CHOLECYST W/O OBST





(3) Hypertension


Code(s): I10 - ESSENTIAL (PRIMARY) HYPERTENSION   Qualifiers: 


     Hypertension type: essential hypertension        Qualified Code(s): I10 - 

Essential (primary) hypertension  





(4) CAD (coronary artery disease)


Code(s): I25.10 - ATHSCL HEART DISEASE OF NATIVE CORONARY ARTERY W/O ANG PCTRS 

  Qualifiers: 


     Coronary Disease-Associated Artery/Lesion type: unspecified vessel or 

lesion type     Native vs. transplanted heart: native heart     Associated 

angina: without angina        Qualified Code(s): I25.10 - Atherosclerotic heart 

disease of native coronary artery without angina pectoris  





(5) History of percutaneous coronary intervention


Code(s): Z98.890 - OTHER SPECIFIED POSTPROCEDURAL STATES





(6) Renal cancer


Code(s): C64.9 - MALIGNANT NEOPLASM OF UNSP KIDNEY, EXCEPT RENAL PELVIS   

Qualifiers: 


     Laterality: right        Qualified Code(s): C64.1 - Malignant neoplasm of 

right kidney, except renal pelvis  








Assessment/Plan


1. Post lap cholecystectomy and umbilical fluid collection drainage (post 

biliary septic shock with E. coli, acute cholangitis post ERCP, balloon 

sphincteroplasty and stent placement - post leukocytosis and lactic acidosis)


2. Post hypoxemic respiratory failure 


3. Post TAVR for severe AS


4. CAD post PCI/stent with demand ischemic injury angina pectoris


5. Severe bi-ventricular failure with pleural effusions


6. Hypertension


7. Renal carcinoma post right nephrectomy


8. CKD


9. Thrombocytopenia


10. Anemia 





PLAN:


1. Continue Lasix as tolerate and replete K as needed


2. Continue Carvedilol 6.25 mg bid, ACEI/ARB once renal function stabilizes


3. Continue Lipitor 


4. Continue Plavix  with caution considering the above noted thrombocytopenia


5. Await SNF placement





Further plans are to follow


Gordon Botello MD

## 2017-06-13 NOTE — HOSP
Physical Examination


Vital Signs: 


 Vital Signs











Temperature  98.6 F   06/13/17 08:42


 


Pulse Rate  97 H  06/13/17 09:25


 


Respiratory Rate  20   06/13/17 08:42


 


Blood Pressure  120/60   06/13/17 08:42


 


O2 Sat by Pulse Oximetry (%)  99   06/13/17 09:25











Labs: 


 CBC, BMP





 06/12/17 06:00 





 06/13/17 07:15 











Hospitalist Encounter


Assessment: 


Patient seen and examined. No sob or difficulty laying flat. Breathing is 

regular


He was able to ambulate with PT. 


Bed confirmed at [facility] 


To continue lasix 40mg daily x2 days and then reassess in NH by provider 


Daily abdominal wound changes and packing


Referral information placed in discharge paperwork

## 2017-09-01 ENCOUNTER — HOSPITAL ENCOUNTER (OUTPATIENT)
Dept: HOSPITAL 74 - JASU-ENDO | Age: 82
Discharge: HOME | End: 2017-09-01
Attending: INTERNAL MEDICINE
Payer: COMMERCIAL

## 2017-09-01 VITALS — BODY MASS INDEX: 26.9 KG/M2

## 2017-09-01 VITALS — SYSTOLIC BLOOD PRESSURE: 135 MMHG | DIASTOLIC BLOOD PRESSURE: 83 MMHG | HEART RATE: 67 BPM

## 2017-09-01 VITALS — TEMPERATURE: 98 F

## 2017-09-01 DIAGNOSIS — K80.50: Primary | ICD-10-CM

## 2017-09-01 PROCEDURE — 0FPB8DZ REMOVAL OF INTRALUMINAL DEVICE FROM HEPATOBILIARY DUCT, VIA NATURAL OR ARTIFICIAL OPENING ENDOSCOPIC: ICD-10-PCS | Performed by: INTERNAL MEDICINE

## 2017-09-01 PROCEDURE — 0FC98ZZ EXTIRPATION OF MATTER FROM COMMON BILE DUCT, VIA NATURAL OR ARTIFICIAL OPENING ENDOSCOPIC: ICD-10-PCS | Performed by: INTERNAL MEDICINE

## 2017-09-02 ENCOUNTER — HOSPITAL ENCOUNTER (INPATIENT)
Dept: HOSPITAL 74 - JER | Age: 82
LOS: 6 days | Discharge: HOME | DRG: 863 | End: 2017-09-08
Attending: INTERNAL MEDICINE | Admitting: INTERNAL MEDICINE
Payer: COMMERCIAL

## 2017-09-02 VITALS — BODY MASS INDEX: 26.2 KG/M2

## 2017-09-02 DIAGNOSIS — K80.32: ICD-10-CM

## 2017-09-02 DIAGNOSIS — N17.9: ICD-10-CM

## 2017-09-02 DIAGNOSIS — N35.8: ICD-10-CM

## 2017-09-02 DIAGNOSIS — Z90.5: ICD-10-CM

## 2017-09-02 DIAGNOSIS — N40.0: ICD-10-CM

## 2017-09-02 DIAGNOSIS — E78.5: ICD-10-CM

## 2017-09-02 DIAGNOSIS — Z95.2: ICD-10-CM

## 2017-09-02 DIAGNOSIS — R74.0: ICD-10-CM

## 2017-09-02 DIAGNOSIS — R13.19: ICD-10-CM

## 2017-09-02 DIAGNOSIS — T81.4XXA: Primary | ICD-10-CM

## 2017-09-02 DIAGNOSIS — K57.90: ICD-10-CM

## 2017-09-02 DIAGNOSIS — Z95.5: ICD-10-CM

## 2017-09-02 DIAGNOSIS — I12.9: ICD-10-CM

## 2017-09-02 DIAGNOSIS — R49.0: ICD-10-CM

## 2017-09-02 DIAGNOSIS — R71.0: ICD-10-CM

## 2017-09-02 DIAGNOSIS — D69.6: ICD-10-CM

## 2017-09-02 DIAGNOSIS — I44.7: ICD-10-CM

## 2017-09-02 DIAGNOSIS — I25.10: ICD-10-CM

## 2017-09-02 DIAGNOSIS — A49.9: ICD-10-CM

## 2017-09-02 DIAGNOSIS — K59.09: ICD-10-CM

## 2017-09-02 DIAGNOSIS — I25.2: ICD-10-CM
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
## 2017-09-02 DIAGNOSIS — N18.4: ICD-10-CM

## 2017-09-02 DIAGNOSIS — Z85.53: ICD-10-CM

## 2017-09-02 DIAGNOSIS — J44.9: ICD-10-CM

## 2017-09-02 LAB
ALBUMIN SERPL-MCNC: 3 G/DL (ref 3.4–5)
ALP SERPL-CCNC: 394 U/L (ref 45–117)
ALT SERPL-CCNC: 427 U/L (ref 12–78)
ANION GAP SERPL CALC-SCNC: 9 MMOL/L (ref 8–16)
APTT BLD: 29.3 SECONDS (ref 26.9–34.4)
AST SERPL-CCNC: 1107 U/L (ref 15–37)
BASOPHILS # BLD: 0.4 % (ref 0–2)
BILIRUB SERPL-MCNC: 2.9 MG/DL (ref 0.2–1)
CALCIUM SERPL-MCNC: 9.3 MG/DL (ref 8.5–10.1)
CK SERPL-CCNC: 97 IU/L (ref 39–308)
CO2 SERPL-SCNC: 26 MMOL/L (ref 21–32)
CREAT SERPL-MCNC: 1.7 MG/DL (ref 0.7–1.3)
DEPRECATED RDW RBC AUTO: 17.1 % (ref 11.9–15.9)
EOSINOPHIL # BLD: 0.1 % (ref 0–4.5)
GLUCOSE SERPL-MCNC: 134 MG/DL (ref 74–106)
INR BLD: 1.18 (ref 0.82–1.09)
MCH RBC QN AUTO: 32.9 PG (ref 25.7–33.7)
MCHC RBC AUTO-ENTMCNC: 33.7 G/DL (ref 32–35.9)
MCV RBC: 97.7 FL (ref 80–96)
NEUTROPHILS # BLD: 92 % (ref 42.8–82.8)
PH BLDV: 7.37 [PH] (ref 7.32–7.42)
PLATELET # BLD AUTO: 112 K/MM3 (ref 134–434)
PMV BLD: 9 FL (ref 7.5–11.1)
PROT SERPL-MCNC: 6.1 G/DL (ref 6.4–8.2)
PT PNL PPP: 13 SEC (ref 9.98–11.88)
SAO2 % BLDV: 26.6 MEQ/L (ref 19–25)
TROPONIN I SERPL-MCNC: < 0.02 NG/ML (ref 0–0.05)
WBC # BLD AUTO: 7.7 K/MM3 (ref 4–10)

## 2017-09-02 PROCEDURE — P9058 RBC, L/R, CMV-NEG, IRRAD: HCPCS

## 2017-09-02 PROCEDURE — P9038 RBC IRRADIATED: HCPCS

## 2017-09-02 RX ADMIN — LEVOFLOXACIN ONE MLS/HR: 5 INJECTION, SOLUTION INTRAVENOUS at 11:56

## 2017-09-02 RX ADMIN — URSODIOL SCH MG: 300 CAPSULE ORAL at 21:20

## 2017-09-02 RX ADMIN — METRONIDAZOLE SCH MLS/HR: 500 INJECTION, SOLUTION INTRAVENOUS at 17:51

## 2017-09-02 RX ADMIN — ACLIDINIUM BROMIDE SCH PUFF: 400 POWDER, METERED RESPIRATORY (INHALATION) at 21:20

## 2017-09-02 RX ADMIN — LEVOFLOXACIN ONE: 5 INJECTION, SOLUTION INTRAVENOUS at 15:40

## 2017-09-02 RX ADMIN — CARVEDILOL SCH MG: 6.25 TABLET, FILM COATED ORAL at 21:20

## 2017-09-02 RX ADMIN — PANTOPRAZOLE SODIUM SCH: 40 TABLET, DELAYED RELEASE ORAL at 17:51

## 2017-09-02 NOTE — PN
Teaching Attending Note


Name of Resident: Jr Rogers


ATTENDING PHYSICIAN STATEMENT





I saw and evaluated the patient.


I reviewed the resident's note and discussed the case with the resident.


I agree with the resident's findings and plan as documented.








SUBJECTIVE: This is an 88 year old man with a history of HTN, CAD, MI, cardiac 

stent, AS, TAVR, right nephrectomy, BPH, urethral strictures who presents with 

abdominal pain and vomiting. In 5/2017 he was admitted with sepsis, 

choledocholithiasis, acute cholangitis. He had ERCP and stone extraction 6/2 

and laparoscopic cholecystectomy 6/8. He subsequently had a CBD stent placed. 

Yesterday, he underwent ERCP with stone removal and stent removal. He was 

discharged on Actigall, Levaquin, Flagyl, but he did not fill the 

prescriptions. He says he has been having abdominal pain, nausea and vomiting 

since the procedure yesterday. Pain is epigastric and RUQ, radiating to his 

back. He thinks he had fevers.








OBJECTIVE:


 Vital Signs











 Period  Temp  Pulse  Resp  BP Sys/Badillo  Pulse Ox


 


 Last 24 Hr  98.9 F-99.7 F  70-87  20-20  134-140/80-85  90-94








HEART: S1S2, RRR


LUNGS: Clear


ABDOMEN: Soft, non-tender, non-distended, normal BS


EXTREMITIES: Trace edema








ASSESSMENT AND PLAN:


This is an 88 year old man with a history of HTN, CAD, MI, cardiac stent, AS, 

TAVR, right nephrectomy, BPH, urethral strictures who presents to the ER with 

abdominal pain, vomiting and fever which started after CBD removal 9/1.





1. Choledocholithiasis


   - s/p ERCP, stone removal, CBD stent removal yesterday


   - NPO


   - IV fluid


   - Pain control


   - Continue Levaquin, Flagyl, Actigall


   - GI consult


2. CAD, history of MI, stent


   - Continue Coreg, Lipitor


3. AS, history of TAVR


4. HTN


   - Continue Coreg


5. CKD, stage 3 vs stage 4


   - Monitor creatinine


6. History of renal cancer, right nephrectomy


7. BPH


   - Continue Proscar


8. Chronic anemia


   - Hemoglobin stable

## 2017-09-02 NOTE — CON.GI
Consult


Consult Specialty:: Gastroenterology


Referred by:: Dr Mckay


Reason for Consultation:: Hematemesis and abdominal pain following ERCP





- History of Present Illness


Chief Complaint: Abdominal pain and vomiting


History of Present Illness: 


88M developed abdominal pain and vomiting of blood last evening. He had 

undergone and ERCP to remove a stent and which required removal of multiple 

stone fragments yesterday morning. He is jaundiced. He presented with ascending 

cholangitis leading to sepsis, DIC and renal failure in . He required 

temporary dialysis. At that time he was on Plavix so I was only able to do a 

balloon dilation of his sphincter and stent insertion. At a subsequent ERCP I 

did a sphincterotomy and repeated balloon sweeping of his common bile duct as 

his large stones splintered into shreds and stone fragments. When his 

sphincterotomy began to swell and close I inserted a stent. I removed the stent 

yesterday and swept out residual stone fragments. The bile duct was very 

dilated and difficult to assess but I terminated the procedure when I failed to 

get out any more stone fragments. He had slight bleeding from the mouth after 

the procedure which was felt to be intubation and ERCP scope induced trauma as 

no extension of his sphincterotomy was attempted. MRI in  revealed a 

fullness in the head of the pancreas and a cyst in the tail of diego pancreas 

with a very dilated CBD with stones.  





- History Source


History Provided By: Patient, Medical Record


Limitations to Obtaining History: No Limitations





- Past Medical History


Cardio/Vascular: Yes: Aortic Stenosis (Post TAVR at [facility] in ), CAD (s/p 

cardiac stent/ PCI ), HTN, Hyperlipdemia, Other


Gastrointestinal: Yes: Diverticulosis


Hepatobiliary: Yes: Cholelithiasis, Choledocholithiasis (ERCP x 3 with stent 

insertion and removal)


Renal/: Yes: Renal Inusuff, Cancer (Renal CA with right nephrectomy), Renal 

Calculi, Other (urethral stricture s/p cystoscopy, nephrectomy for RCC)





- Past Surgical History


Past Surgical History: Yes: Nephrectomy (right nephrectomy for cancer ), 

Stent, Valve Replacement (TAVR)





- Alcohol/Substance Use


Hx Alcohol Use: No


History of Substance Use: reports: None





- Smoking History


Smoking history: Never smoked


Have you smoked in the past 12 months: No





- Social History


Usual Living Arrangement: With Spouse


ADL: Independent


Occupation: CPA: still working


Place of Birth: [place]


History of Recent Travel: No





Home Medications





- Allergies


Allergies/Adverse Reactions: 


 Allergies











Allergy/AdvReac Type Severity Reaction Status Date / Time


 


No Known Allergies Allergy   Verified 17 11:46














- Home Medications


Home Medications: 


Ambulatory Orders





Finasteride 5 mg PO DAILY 17 


Carvedilol [Coreg] 6.25 mg PO BID  tablet 17 


Ursodiol 300 mg PO BID  capsule 17 


Montelukast Sodium [Singulair] 10 mg PO DAILY  tablet 17 


Atorvastatin Calcium 10 mg PO DAILY  tablet 17 


Levofloxacin [Levaquin] 500 mg PO DAILY #7 tablet 17 


Metronidazole [Flagyl -] 500 mg PO BID #14 tablet 17 


Tamsulosin HCl [Flomax -] 0.4 mg PO DAILY 17 


Ursodiol [Actigall] 300 mg PO BID #180 capsule 17 











Family Disease History





- Family Disease History


Family Disease History: Other: Father ( 80's unclear cause), Mother ( 80

's CVA/MI), Brother (1 brother  CHF, 1 brother  stomach cancer)





Review of Systems





- Review of Systems


Constitutional: reports: Weakness


Eyes: reports: No Symptoms


HENT: reports: No Symptoms


Neck: reports: No Symptoms


Cardiovascular: reports: No Symptoms


Respiratory: reports: No Symptoms


Gastrointestinal: reports: Abdominal Pain, Vomiting, Vomiting Blood


Genitourinary: reports: No Symptoms





Physical Exam-GI


Vital Signs: 


 Vital Signs











Temperature  98.9 F   17 13:13


 


Pulse Rate  70   17 13:13


 


Respiratory Rate  20   17 13:13


 


Blood Pressure  134/80   17 13:13


 


O2 Sat by Pulse Oximetry (%)  94 L  17 13:13








CBC,CMP











WBC  7.7 K/mm3 (4.0-10.0)   17  09:30    


 


RBC  3.06 M/mm3 (4.00-5.60)  L  17  09:30    


 


Hgb  10.1 GM/dL (11.7-16.9)  L D 17  09:30    


 


Hct  29.9 % (35.4-49)  L  17  09:30    


 


MCV  97.7 fl (80-96)  H  17  09:30    


 


MCH  32.9 pg (25.7-33.7)  D 17  09:30    


 


MCHC  33.7 g/dl (32.0-35.9)   17  09:30    


 


RDW  17.1 % (11.9-15.9)  H  17  09:30    


 


Plt Count  112 K/MM3 (134-434)  L D 17  09:30    


 


MPV  9.0 fl (7.5-11.1)   17  09:30    


 


Neutrophils %  92.0 % (42.8-82.8)  H  17  09:30    


 


Lymphocytes %  3.1 % (8-40)  L D 17  09:30    


 


Monocytes %  4.4 % (3.8-10.2)   17  09:30    


 


Eosinophils %  0.1 % (0-4.5)   17  09:30    


 


Basophils %  0.4 % (0-2.0)   17  09:30    


 


Sodium  139 mmol/L (136-145)   17  09:30    


 


Potassium  4.8 mmol/L (3.5-5.1)  D 17  09:30    


 


Chloride  104 mmol/L ()   17  09:30    


 


Carbon Dioxide  26 mmol/L (21-32)   17  09:30    


 


Anion Gap  9  (8-16)   17  09:30    


 


BUN  31 mg/dL (7-18)  H  17  09:30    


 


Creatinine  1.7 mg/dL (0.7-1.3)  H D 17  09:30    


 


Creat Clearance w eGFR  38.23  (>60)   17  09:30    


 


Random Glucose  134 mg/dL ()  H D 17  09:30    


 


Lactic Acid  1.9 mmol/L (0.4-2.0)   17  09:30    


 


Calcium  9.3 mg/dL (8.5-10.1)  D 17  09:30    


 


Total Bilirubin  2.9 mg/dL (0.2-1.0)  H D 17  09:30    


 


AST  1107 U/L (15-37)  H  17  09:30    


 


ALT  427 U/L (12-78)  H D 17  09:30    


 


Alkaline Phosphatase  394 U/L ()  H D 17  09:30    


 


Creatine Kinase  97 IU/L ()   17  09:30    


 


Troponin I  < 0.02 ng/ml (0.00-0.05)  D 17  09:30    


 


Total Protein  6.1 g/dl (6.4-8.2)  L D 17  09:30    


 


Albumin  3.0 g/dl (3.4-5.0)  L D 17  09:30    


 


Lipase  85 U/L ()   17  09:30    








 Current Medications











Generic Name Dose Route Start Last Admin





  Trade Name Freq  PRN Reason Stop Dose Admin


 


Atorvastatin Calcium  10 mg 17 10:00  





  Lipitor -  PO   





  DAILY [facility]   


 


Carvedilol  6.25 mg 17 22:00  





  Coreg -  PO   





  BID [facility]   


 


Clopidogrel Bisulfate  75 mg 17 10:00  





  Plavix -  PO   





  DAILY [facility]   


 


Finasteride  5 mg 17 10:00  





  Proscar -  PO   





  DAILY [facility]   


 


Levofloxacin  250 mg 17 06:00  





  Levaquin -  PO   





  DAILY@0600 [facility]   


 


Metronidazole  500 mg 17 20:00  





  Flagyl -  PO   





  TID [facility]   


 


Montelukast Sodium  10 mg 17 10:00  





  Singulair -  PO   





  DAILY [facility]   


 


Morphine Sulfate  2 mg 17 13:21  





  Morphine Injection -  IVPUSH   





  Q4H PRN   





  PAIN   


 


Non-Formulary Medication  300 mg 17 22:00  





  Ursodiol  PO   





  BID [facility]   


 


Non-Formulary Medication  300 mg 17 22:00  





  Ursodiol  PO   





  BID [facility]   


 


Ondansetron HCl  4 mg 17 13:21  





  Zofran Injection  IVPB   





  Q6H PRN   





  NAUSEA   


 


Pantoprazole Sodium  40 mg 17 13:30  





  Protonix -  PO   





  DAILY [facility]   


 


Tamsulosin HCl  0.4 mg 17 10:00  





  Flomax -  PO   





  DAILY [facility]   


 


Tiotropium Bromide   puff 17 10:00  





  Spiriva -  IH   





  DAILY [facility]   











Constitutional: Yes: Calm


Eyes: Yes: Sclera Icterus


HENT: Yes: Atraumatic


Neck: Yes: Supple


Cardiovascular: Yes: Regular Rate and Rhythm, Murmur (2/6 ERNESTO at base)


Respiratory: Yes: CTA Bilaterally


Gastrointestinal Inspection: Yes: Scars (right flank incision with nontender 

hernia)


...Auscultate: Yes: Normoactive Bowel Sounds


...Palpate: Yes: Soft, Other (nontender)


...Rectal Exam: Yes: Deferred


Labs: 


 INR, PTT











INR  1.18  (0.82-1.09)  H  17  09:30    














Imaging





- Results


Ultrasound: Report Reviewed (Aleksandar Redzehra Name: SURJIT ALDANA  DEPARTMENT OF 

RADIOLOGY Phys: Phil Dickerson MD   : [id number]    Age: 88     Sex: M  [facility] Acct: [id number] Loc: [address] Exam 

Date: 17 Status: [place] [address] Unit Number: [id number]  [phone]

[id number]      ACCESSION #:  [id number]    EXAM#:     TYPE/EXAM: RESULT:  [id number]

0022 US/LIVER US      Status post biliary stent removal. Right upper quadrant 

pain. Elevated liver function tests.     Right upper abdomen ultrasound.     

Compared to prior gallbladder ultrasound dated 5/15/2027   The liver is within 

normal limits in size with a moderately dense echotexture. Status post  

cholecystectomy. No intrahepatic bile duct dilatation is seen. Dilated common 

bile duct measuring  1.8 cm in diameter.   Right kidney was not visualized. 

Status post right nephrectomy. Pancreas was not visualized.   Visualized 

portion of the proximal abdominal aorta and inferior vena cava appear 

unremarkable.  Normal flow in the main portal vein. Normal-appearing abdominal 

aorta. Very limited visualization  of the inferior vena cava.       IMPRESSION:

     Status post cholecystectomy. Dilated common bile duct measuring 1.8 cm in 

diameter.   Status post nephrectomy.   Nonvisualization of the pancreas.       

        Reported By: Alyssa Ko MD   17 124    Phil Dickerson  Technologist

: Nedra Parkinson  Transcribed Date/Time: 17 124  : 

Alyssa Ko  Printed Date/Time: [  rep prt dt last] [  rep prt tm last]   By: [  

rep prt user last]      Signed by: Alyssa Ko    Signed on: 2017 12:42)





Assessment/Plan


I believe that Surjit's bleeding has resolved and reflects the intubation and 

ERCP scope trauma that arose yesterday. His Hb has not changed significantly 

since his last level. I will empirically start pantoprazole for possible 

esophageal or gastric mucosal erosions that the ERCPscope can cause. His 

jaundice and elevated transaminases are worrisome and suggests residual stones 

and/or fragments causing obstruction. I have discussed the possible need to 

repeat an ERCP with Surjit and his wife Claudia and reminded them of the risks 

of perforation, hemorrhage and pancreatitis. He fortunately has not developed 

pancreatitis and has not resumed his Plavix. I have started IV antibiotics 

after a culture was taken in the ER. He did receive antibiotics yesterday.I 

will order an MRCP to assess whether he has residual stones but will refrain 

from contrast given that he has a single kidney and azotemia.  If bleeding 

resumes ENT will be consulted. Actigall will be given. Hopefully his jaundice 

reflects some edema at his previous sphincterotomy which will resolve with 

time. An underlying pancreatic malignancy cannot be excluded but again the 

contrast risk preclude using it for imaging. An EUS may need to be considered 

in the future.

## 2017-09-02 NOTE — PDOC
History of Present Illness





<Ty Alba - Last Filed: 09/02/17 11:43>





<Phil Dickerson - Last Filed: 09/02/17 12:18>





- History of Present Illness


Initial Comments: 





09/02/17 13:39


Patient is a 88 year old male with a history of CAD, MI s/p stenting, Valve 

replacement s/p TVAR, right nephrectomy, BPH, pancreatits, cholecystits, 

choledocolethiasis who presents with abdominal pain and vomiting.  The patient 

had a cholecystectomy done in June 2017 and a subsequent stenting of his bile 

duct due to choledocolethiasis.  He had stent removal 1 day ago and reports two 

episodes of vomiting with bright red blood during the evening.  He reports 

another episode of brown vomit this morning prompting his visit to the ED 

today.  He states that he cannot tolerate any food and has been experiencing 

upper abdominal since yesterday evening.  The patient was prescribed post-op 

meds including pain medication and antibiotics, however was not able to fill 

them yesterday.  He does endorse some subjective chills and has not measured 

his temperature.  He denies chest pain, SOB, or changes with his bowel 

movements or urination.





<Darren Orona - Last Filed: 09/02/17 13:57>





- General


Chief Complaint: Nausea/Vomiting


Stated Complaint: VOMITING, CHILLS


Time Seen by Provider: 09/02/17 09:24





Past History





<Ty Alba - Last Filed: 09/02/17 11:43>





<Phil Dickerson - Last Filed: 09/02/17 12:18>





- Past Medical History


Anemia: No


Cancer: Yes (R.KIDNEY, WITH RIGHT NEPHRECTOMY)


Cardiac Disorders: Yes (valve disease)


COPD: Yes


 Disorders: Yes (URINARY RETENTION,BPH)


HTN: Yes


Hypercholesterolemia: Yes





- Surgical History


Cardiac Surgery: Yes (Angioplasty with stent,AORTIC VALVE SX)





- Immunization History


Immunization Up to Date: Yes





- Psycho/Social/Smoking Cessation Hx


Anxiety: No


Suicidal Ideation: No


Smoking History: Never smoked


Have you smoked in the past 12 months: No


Hx Alcohol Use: No


Drug/Substance Use Hx: No


Substance Use Type: None


Hx Substance Use Treatment: No





<Darren Orona - Last Filed: 09/02/17 13:57>





- Past Medical History


Allergies/Adverse Reactions: 


 Allergies











Allergy/AdvReac Type Severity Reaction Status Date / Time


 


No Known Allergies Allergy   Verified 09/01/17 11:46











Home Medications: 


Ambulatory Orders





Finasteride 5 mg PO DAILY 02/19/17 


Carvedilol [Coreg] 6.25 mg PO BID  tablet 07/12/17 


Ursodiol 300 mg PO BID  capsule 07/12/17 


Montelukast Sodium [Singulair] 10 mg PO DAILY  tablet 08/16/17 


Atorvastatin Calcium 10 mg PO DAILY  tablet 08/18/17 


Levofloxacin [Levaquin] 500 mg PO DAILY #7 tablet 09/01/17 


Metronidazole [Flagyl -] 500 mg PO BID #14 tablet 09/01/17 


Tamsulosin HCl [Flomax -] 0.4 mg PO DAILY 09/01/17 


Ursodiol [Actigall] 300 mg PO BID #180 capsule 09/01/17 











**Review of Systems





- Review of Systems


Constitutional: Yes: Chills.  No: Fever


Respiratory: No: Cough, Shortness of Breath


Cardiac (ROS): No: Chest Pain, Lightheadedness


ABD/GI: Yes: Nausea, Poor Appetite, Poor Fluid Intake, Vomiting.  No: 

Constipated, Diarrhea


: No: Dysuria


Integumentary: No: Rash


Neurological: No: Headache, Numbness, Tingling, Weakness





<Darren Orona - Last Filed: 09/02/17 13:57>





*Physical Exam





- Vital Signs


 Last Vital Signs











Temp Pulse Resp BP Pulse Ox


 


 99.7 F H  87   20   140/85   90 L


 


 09/02/17 10:07  09/02/17 09:36  09/02/17 09:36  09/02/17 09:36  09/02/17 09:36














<Ty Alba - Last Filed: 09/02/17 11:43>





- Vital Signs


 Last Vital Signs











Temp Pulse Resp BP Pulse Ox


 


 99.7 F H  87   20   140/85   90 L


 


 09/02/17 10:07  09/02/17 09:36  09/02/17 09:36  09/02/17 09:36  09/02/17 09:36














<Phil Dickerson - Last Filed: 09/02/17 12:18>





- Vital Signs


 Last Vital Signs











Temp Pulse Resp BP Pulse Ox


 


 99.1 F   87   20   140/85   90 L


 


 09/02/17 09:36  09/02/17 09:36  09/02/17 09:36  09/02/17 09:36  09/02/17 09:36














- Physical Exam


Comments: 





09/02/17 13:47


General Appearance: Nourished, Visibly pale.  No Apparent Distress


HEENT: No Pharyngeal Erythema, Tonsillar Exudate, Tonsillar Erythema


Respiratory/Chest:  Lungs Clear, Normal Breath Sounds.  No Crackles, Rales, 

Rhonchi, Wheezing


Cardiovascular:  Regular Rhythm, Regular Rate.  No Murmur, Gallop/S3, Gallop/S4


Gastrointestinal/Abdominal:  Normal Bowel Sounds, Soft, Tenderness to palpation 

in the left upper quadrant and epigastric regions.  No Guarding, Rebound


Extremity:  Normal Capillary Refill


Integumentary:  Dry, Warm, Pale


Neurologic:  Fully Oriented, Alert, Normal Mood/Affect, Normal Response





<Darren Orona - Last Filed: 09/02/17 13:57>





ED Treatment Course





- LABORATORY


CBC & Chemistry Diagram: 


 09/02/17 09:30





 09/02/17 09:30





- ADDITIONAL ORDERS


Additional order review: 


 Laboratory  Results











  09/02/17 09/02/17 09/02/17





  10:11 10:07 09:30


 


INR   


 


PTT (Actin FS)   


 


VBG pH  7.37  


 


POC VBG pCO2  47.5  


 


POC VBG pO2  15.1 L*  


 


Mixed VBG HCO3  26.6 H  


 


Sodium   


 


Potassium   


 


Chloride   


 


Carbon Dioxide   


 


Anion Gap   


 


BUN   


 


Creatinine   


 


Creat Clearance w eGFR   


 


Random Glucose   


 


Lactic Acid   


 


Calcium   


 


Total Bilirubin   


 


AST   


 


ALT   


 


Alkaline Phosphatase   


 


Creatine Kinase   


 


Troponin I   


 


Total Protein   


 


Albumin   


 


Lipase   


 


Stool Occult Blood   Negative 


 


Blood Type    A POSITIVE


 


Antibody Screen    Negative














  09/02/17 09/02/17 09/02/17





  09:30 09:30 09:30


 


INR    1.18 H


 


PTT (Actin FS)    29.3


 


VBG pH   


 


POC VBG pCO2   


 


POC VBG pO2   


 


Mixed VBG HCO3   


 


Sodium   139 


 


Potassium   4.8  D 


 


Chloride   104 


 


Carbon Dioxide   26 


 


Anion Gap   9 


 


BUN   31 H 


 


Creatinine   1.7 H D 


 


Creat Clearance w eGFR   38.23 


 


Random Glucose   134 H D 


 


Lactic Acid  1.9  


 


Calcium   9.3  D 


 


Total Bilirubin   2.9 H D 


 


AST   1107 H 


 


ALT   427 H D 


 


Alkaline Phosphatase   394 H D 


 


Creatine Kinase   97 


 


Troponin I   < 0.02  D 


 


Total Protein   6.1 L D 


 


Albumin   3.0 L D 


 


Lipase   85 


 


Stool Occult Blood   


 


Blood Type   


 


Antibody Screen   








 











  09/02/17





  09:30


 


RBC  3.06 L


 


MCV  97.7 H


 


MCHC  33.7


 


RDW  17.1 H


 


MPV  9.0


 


Neutrophils %  92.0 H


 


Lymphocytes %  3.1 L D


 


Monocytes %  4.4


 


Eosinophils %  0.1


 


Basophils %  0.4














- Medications


Given in the ED: 


ED Medications














Discontinued Medications














Generic Name Dose Route Start Last Admin





  Trade Name Mohan  PRN Reason Stop Dose Admin


 


Morphine Sulfate  2 mg 09/02/17 10:17 09/02/17 10:22





  Morphine Injection -  IVPUSH 09/02/17 10:18  2 mg





  ONCE ONE   Administration


 


Ondansetron HCl  4 mg 09/02/17 10:18 09/02/17 10:22





  Zofran Injection  IVPUSH 09/02/17 10:19  4 mg





  ONCE ONE   Administration














<Ty Alba - Last Filed: 09/02/17 11:43>





- LABORATORY


CBC & Chemistry Diagram: 


 09/02/17 09:30





 09/02/17 09:30





- ADDITIONAL ORDERS


Additional order review: 


 Laboratory  Results











  09/02/17 09/02/17 09/02/17





  10:11 10:07 09:30


 


INR   


 


PTT (Actin FS)   


 


VBG pH  7.37  


 


POC VBG pCO2  47.5  


 


POC VBG pO2  15.1 L*  


 


Mixed VBG HCO3  26.6 H  


 


Sodium   


 


Potassium   


 


Chloride   


 


Carbon Dioxide   


 


Anion Gap   


 


BUN   


 


Creatinine   


 


Creat Clearance w eGFR   


 


Random Glucose   


 


Lactic Acid   


 


Calcium   


 


Total Bilirubin   


 


AST   


 


ALT   


 


Alkaline Phosphatase   


 


Creatine Kinase   


 


Troponin I   


 


Total Protein   


 


Albumin   


 


Lipase   


 


Stool Occult Blood   Negative 


 


Blood Type    A POSITIVE


 


Antibody Screen    Negative














  09/02/17 09/02/17 09/02/17





  09:30 09:30 09:30


 


INR    1.18 H


 


PTT (Actin FS)    29.3


 


VBG pH   


 


POC VBG pCO2   


 


POC VBG pO2   


 


Mixed VBG HCO3   


 


Sodium   139 


 


Potassium   4.8  D 


 


Chloride   104 


 


Carbon Dioxide   26 


 


Anion Gap   9 


 


BUN   31 H 


 


Creatinine   1.7 H D 


 


Creat Clearance w eGFR   38.23 


 


Random Glucose   134 H D 


 


Lactic Acid  1.9  


 


Calcium   9.3  D 


 


Total Bilirubin   2.9 H D 


 


AST   1107 H 


 


ALT   427 H D 


 


Alkaline Phosphatase   394 H D 


 


Creatine Kinase   97 


 


Troponin I   < 0.02  D 


 


Total Protein   6.1 L D 


 


Albumin   3.0 L D 


 


Lipase   85 


 


Stool Occult Blood   


 


Blood Type   


 


Antibody Screen   








 











  09/02/17





  09:30


 


RBC  3.06 L


 


MCV  97.7 H


 


MCHC  33.7


 


RDW  17.1 H


 


MPV  9.0


 


Neutrophils %  92.0 H


 


Lymphocytes %  3.1 L D


 


Monocytes %  4.4


 


Eosinophils %  0.1


 


Basophils %  0.4














- RADIOLOGY


Radiology Studies Ordered: 














 Category Date Time Status


 


 LIVER US [US] Stat Ultrasound  09/02/17 11:34 Ordered














- Medications


Given in the ED: 


ED Medications














Discontinued Medications














Generic Name Dose Route Start Last Admin





  Trade Name Freq  PRN Reason Stop Dose Admin


 


Morphine Sulfate  2 mg 09/02/17 10:17 09/02/17 10:22





  Morphine Injection -  IVPUSH 09/02/17 10:18  2 mg





  ONCE ONE   Administration


 


Ondansetron HCl  4 mg 09/02/17 10:18 09/02/17 10:22





  Zofran Injection  IVPUSH 09/02/17 10:19  4 mg





  ONCE ONE   Administration














<Phil Dickerson - Last Filed: 09/02/17 12:18>





- LABORATORY


CBC & Chemistry Diagram: 


 09/02/17 09:30





 09/02/17 09:30





- ADDITIONAL ORDERS


Additional order review: 


 











  09/02/17





  09:30


 


RBC  3.06 L


 


MCV  97.7 H


 


MCHC  33.7


 


RDW  17.1 H


 


MPV  9.0


 


Neutrophils %  92.0 H


 


Lymphocytes %  3.1 L D


 


Monocytes %  4.4


 


Eosinophils %  0.1


 


Basophils %  0.4














- RADIOLOGY


Radiology Studies Ordered: 














 Category Date Time Status


 


 CHEST X-RAY PORTABLE* [RAD] Stat Radiology  09/02/17 09:33 Taken














<Darren Orona - Last Filed: 09/02/17 13:57>





Medical Decision Making





- Medical Decision Making


09/02/17 11:34


Dr. Terrazas was called regarding the patient at 11:34am


Dr. Terrazas was consulted regarding the patient at 11:44am


[phone]





<Ty Alba - Last Filed: 09/02/17 11:43>





- Medical Decision Making


09/02/17 13:48


Patient is a 88 year old male with a history of CAD, MI s/p stenting, Valve 

replacement s/p TVAR, right nephrectomy, BPH, pancreatits, cholecystits, 

choledocolethiasis who presents with abdominal pain and vomiting.  Differential 

includes but is not limited to: Peptic Ulcer, surgical complication, UGI bleed, 

choledocolethiasis, metabolic derangement.  Given the patient's history of 

bloody vomit and that he is visibly pale, it is likely the patient has a UGI 

bleed possibly related to his stent removal surgery.  It is also possible that 

the patient has choledocolethasis due to his history of similar presentations 

with choledocolethasis.  We will perform a sepsis work up including blood 

cultures, cbc, cmp, vbg, troponin, lactate, chest radiograph.  We will contact 

Dr. Terrazas who performed the stent removal surgery.





09/02/17 13:51


CBC demonstrated a hemoglobin of 10.1 which was higher then a hemoglobin of 8.9 

2 months ago.  Cmp was remarkable for some elevation in his liver enzymes.  We 

discussed the case with Dr. Terrazas who believes that the patient may have 

choledocolethasis once again and requests that we obtain a US and admit the 

patient to the hospital.





09/02/17 13:55


Case discussed with [facility] and the patient has been accepted for 

admission.





<Darren Orona - Last Filed: 09/02/17 13:57>





*DC/Admit/Observation/Transfer





<Ty Alba - Last Filed: 09/02/17 11:43>





- Discharge Dispostion


Admit: Yes





<Phil Dickerson - Last Filed: 09/02/17 12:18>





<Darren Orona - Last Filed: 09/02/17 13:57>


Diagnosis at time of Disposition: 


 Elevated liver enzymes





- Discharge Dispostion


Condition at time of disposition: Guarded





- Referrals

## 2017-09-02 NOTE — HP
CHIEF COMPLAINT:





vomiting blood and abd pain





PCP: Dr. Mckay  GI: Dr. Terrazas  Surgery: Dr. Swann  Cardiology: Dr. Pinto





HISTORY OF PRESENT ILLNESS:





Briefly, 87 yo M h/o choledocholithiasis and cholangitis s/p stent removal 

yesterday presented to the ED with abdominal pain and vomiting since this 

morning. The symptoms started in early morning before breakfast while the abd 

pain is RUQ, stabbing, 7/10, non-radiating, not related to position or food, 

associated with subjective fever, chills, hemetemesis x 5 episodes changing 

from blood tinted to brown vomitus. Denies chest pain, shortness of breath, 

dizziness, weakness,dysuria, diarrhea or constipation.





ER course was notable for:


(1) RUQ U/S shows dilated CBD


(2) Elevated LFTs and total bilirubin


(3) Received morphine for pain, flagyl and levoquin for suspected cholangitis





Recent Travel:





Denies





PAST MEDICAL HISTORY:





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

Stent, 96), R-Nephrectomy (Kidney Ca), Urethral Strictures s/p Dilation





PAST SURGICAL HISTORY:





R nephrectomy, urethral stricture dilation, TAVR





Social History:


Smoking: Never


Alcohol:  None


Drugs:    None


 lives with spouse, employed- 





Family History:





Non-Contributory





Allergies





No Known Allergies Allergy (Verified 09/01/17 11:46)


 








HOME MEDICATIONS:


 Home Medications











 Medication  Instructions  Recorded


 


Finasteride 5 mg PO DAILY 02/19/17


 


Carvedilol [Coreg] 6.25 mg PO BID  tablet 07/12/17


 


Ursodiol 300 mg PO BID  capsule 07/12/17


 


Montelukast Sodium [Singulair] 10 mg PO DAILY  tablet 08/16/17


 


Atorvastatin Calcium 10 mg PO DAILY  tablet 08/18/17


 


Levofloxacin [Levaquin] 500 mg PO DAILY #7 tablet 09/01/17


 


Metronidazole [Flagyl -] 500 mg PO BID #14 tablet 09/01/17


 


Tamsulosin HCl [Flomax -] 0.4 mg PO DAILY 09/01/17


 


Ursodiol [Actigall] 300 mg PO BID #180 capsule 09/01/17








REVIEW OF SYSTEMS


CONSTITUTIONAL: fever, chills


Absent: diaphoresis, generalized weakness, malaise, loss of appetite, weight 

change


HEENT: 


Absent:  rhinorrhea, nasal congestion, throat pain, throat swelling, difficulty 

swallowing, mouth swelling, ear pain, eye pain, visual changes


CARDIOVASCULAR: 


Absent: chest pain, syncope, palpitations, irregular heart rate, lightheadedness

, peripheral edema


RESPIRATORY: shortness of breath


Absent: cough, dyspnea with exertion, orthopnea, wheezing, stridor, hemoptysis


GASTROINTESTINAL:abdominal pain


Absent: abdominal distension, nausea, vomiting, diarrhea, constipation, melena, 

hematochezia


GENITOURINARY: 


Absent: dysuria, frequency, urgency, hesitancy, hematuria, flank pain, genital 

pain


MUSCULOSKELETAL: 


Absent: myalgia, arthralgia, joint swelling, back pain, neck pain


SKIN: 


Absent: rash, itching, pallor


HEMATOLOGIC/IMMUNOLOGIC: 


Absent: easy bleeding, easy bruising, lymphadenopathy, frequent infections


ENDOCRINE:


Absent: unexplained weight gain, unexplained weight loss, heat intolerance, 

cold intolerance


NEUROLOGIC: 


Absent: headache, focal weakness or paresthesias, dizziness, unsteady gait, 

seizure, mental status changes, bladder or bowel incontinence


PSYCHIATRIC: 


Absent: anxiety, depression, suicidal or homicidal ideation, hallucinations.








PHYSICAL EXAMINATION


 


 Last Vital Signs











Temp Pulse Resp BP Pulse Ox


 


 99.7 F H  87   20   140/85   90 L


 


 09/02/17 10:07  09/02/17 09:36  09/02/17 09:36  09/02/17 09:36  09/02/17 09:36








GENERAL: Awake, alert, and fully oriented, in no acute distress.


EYES: sclera anicteric, conjunctiva clear.


LUNGS: bilateral rhonchi


HEART: RRR, normal S1 and S2 without murmur, rub or gallop.


ABDOMEN: Soft, RUQ tenderness upon palpation, not distended, normoactive bowel 

sounds, no guarding, no rebound, no masses.  


EXTREMITIES: No peripheral edema. 





 


 CBCD











WBC  7.7 K/mm3 (4.0-10.0)   09/02/17  09:30    


 


RBC  3.06 M/mm3 (4.00-5.60)  L  09/02/17  09:30    


 


Hgb  10.1 GM/dL (11.7-16.9)  L D 09/02/17  09:30    


 


Hct  29.9 % (35.4-49)  L  09/02/17  09:30    


 


MCV  97.7 fl (80-96)  H  09/02/17  09:30    


 


MCHC  33.7 g/dl (32.0-35.9)   09/02/17  09:30    


 


RDW  17.1 % (11.9-15.9)  H  09/02/17  09:30    


 


Plt Count  112 K/MM3 (134-434)  L D 09/02/17  09:30    


 


MPV  9.0 fl (7.5-11.1)   09/02/17  09:30    








 CMP











Sodium  139 mmol/L (136-145)   09/02/17  09:30    


 


Potassium  4.8 mmol/L (3.5-5.1)  D 09/02/17  09:30    


 


Chloride  104 mmol/L ()   09/02/17  09:30    


 


Carbon Dioxide  26 mmol/L (21-32)   09/02/17  09:30    


 


Anion Gap  9  (8-16)   09/02/17  09:30    


 


BUN  31 mg/dL (7-18)  H  09/02/17  09:30    


 


Creatinine  1.7 mg/dL (0.7-1.3)  H D 09/02/17  09:30    


 


Creat Clearance w eGFR  38.23  (>60)   09/02/17  09:30    


 


Calcium  9.3 mg/dL (8.5-10.1)  D 09/02/17  09:30    


 


Total Bilirubin  2.9 mg/dL (0.2-1.0)  H D 09/02/17  09:30    


 


AST  1107 U/L (15-37)  H  09/02/17  09:30    


 


ALT  427 U/L (12-78)  H D 09/02/17  09:30    


 


Alkaline Phosphatase  394 U/L ()  H D 09/02/17  09:30    


 


Total Protein  6.1 g/dl (6.4-8.2)  L D 09/02/17  09:30    


 


Albumin  3.0 g/dl (3.4-5.0)  L D 09/02/17  09:30    








IMAGING





EKG on 9/2: NSR with LBBB, unchanged compared to prior


Liver U/S on 9/2: dilated CBD 1.8cm


CXR on 9/2: No acute pathology





ASSESSMENT/PLAN:





87 yo M h/o choledocholithiasis and cholangitis s/p stent removal admitted to 

med-surg inpatient service for cholangitis.





Acute choledocholithiasis


   - Likely 2/2 residual stones post stent removal


   - Cont. empiric levaquin and flagyl


   - Cont. actigall


   - Supportive care to optimize for ERCP


      * NPO


      * pain control





CKD, stage 3


   - Stable


   - Monitor Cr





CAD s/p stent


   - Continue Lipitor





Aortic stenosis s/p TAVR


   - Cont. plavix and asa





Renal cell cancer s/p nephrectomy


   - Stable





Hypertension


   - continue carvedilol





BPH


   - Stable


   - Cont. finasteride and flomax





FEN


   - IVF 50cc/hr


   - Monitor lytes


   - NPO for now





Prophylaxis


   - DVT: SCDs


   - GI: not indicated





Dispo


   - Awaiting GI evaluation





Visit type





- Emergency Visit


Emergency Visit: Yes


ED Registration Date: 09/02/17


Care time: The patient presented to the Emergency Department on the above date 

and was hospitalized for further evaluation of their emergent condition.





- New Patient


This patient is new to me today: Yes


Date on this admission: 09/02/17





- Critical Care


Critical Care patient: No

## 2017-09-02 NOTE — PDOC
Attending Attestation





- Resident


Resident Name: Darren Orona





- ED Attending Attestation


I have performed the following: I have examined & evaluated the patient, The 

case was reviewed & discussed with the resident, I agree w/resident's findings 

& plan, Exceptions are as noted





- HPI


HPI: 


09/02/17 10:12


88y M hx of nephrectomy due to renal ca, copd, bph, htn, hl, recent 

choledocolithiasis s/p stent removal yesterday presents with Upper abdominal 

pain and multiple episodes of red hemetemsis that turned borwn this morning 

last night and today - pt unable to toleate any oral intake.  No associated 

fever, diarrhea/melena. On exam the pt appears pale. His abdomen noted for mild 

tenderness in the upper abdomen without rebound/tuarding.  Stool does not look 

melenatotic, guaiac pending.





possible GIB due to procedur yesterday?


will ck labs


will d/w dr. uriarte


will reassess

















- Physicial Exam


PE: 





09/02/17 12:16


GENERAL: The patient is awake, alert, and fully oriented, Nontoxic - in no 

acute distress.


HEAD: Normocephalic, atraumatic.


EYES: extraocular movements intact, pale sclera, conjunctiva clear.


ENT: Normal voice,  Moist mucous membranes.


NECK: Normal range of motion, supple


LUNGS: Breath sounds equal, clear to auscultation bilaterally.  No wheezes, no 

rhonchi, no rales.


HEART: Regular rate and rhythm, normal S1 and S2 without murmur, rub or gallop.


ABDOMEN: soft, mild ruq tenderness, no rebound/guarding


EXTREMITIES: Normal range of motion, no edema.  No clubbing or cyanosis. No 

cords, erythema, or tenderness.


NEUROLOGICAL: No facial assymetry, Normal speech, 


PSYCH: Normal mood, normal affect.


SKIN: Warm, Dry, normal turgor,





- Medical Decision Making


09/02/17 11:46


labs reviewed


hgb stable


noted for eelvated LFTs and T bili


case dw dr. uriarte


requested abx due to concern for colangitis


agree with liver US to eval duct size


erquests admission for forther management


will admit to hospitalist service














09/02/17 12:13


case dw dr. hernandez, agree with admission to med surg for further management





Case discussed in detail with admitting physician including history, physical 

exam and ancillary studies.


Admitting physician has assumed care for the patient, will follow all pending 

diagnostics and will complete the evaluation and treatment. 








**Heart Score/ECG Review





- ECG Impressions


Comment:: 


09/02/17 10:16


Twelve-lead EKG was performed and reviewed by me. 


There is normal sinus rhythm with a normal rate. 


Rate of 87


Left bundle-branch block


No signs of acute ischemia or sgarbossa criteria

## 2017-09-02 NOTE — EKG
Test Reason : 

Blood Pressure : ***/*** mmHG

Vent. Rate : 087 BPM     Atrial Rate : 087 BPM

   P-R Int : 196 ms          QRS Dur : 162 ms

    QT Int : 420 ms       P-R-T Axes : 029 013 168 degrees

   QTc Int : 505 ms

 

PROBABLE SINUS RHYTHM

BASE LINE ARTIFACTS

LEFT BUNDLE BRANCH BLOCK

ABNORMAL ECG

WHEN COMPARED WITH ECG OF 17-MAY-2017 04:29,

NO SIGNIFICANT CHANGE WAS FOUND

CLINICAL CORRELATION IS RECOMMENDED

Confirmed by ANABEL SIMMONS MD ([id number]) on 9/2/2017 12:20:27 PM

 

Referred By:             Confirmed By:ANABEL SIMMONS MD

## 2017-09-02 NOTE — HP
CHIEF COMPLAINT: Abdominal pain with bloody vomitting





PCP: Dr. Mckay  GI: Dr. Terrazas  Surgery: Dr. Swann  Cardiology: Dr. Pinto





HISTORY OF PRESENT ILLNESS:


patient is an 87 YO white male with H/O  HTN, HLD, COPD, S/P Nephroectomy (

renal carcinoma), recent Cholidocholithiasis S/P CBD stent removal yesterday 

who presented to the ED with one day history of med epigastric abdominal and 

RUQ abdominal pain and bloody vomiting. The pain started yesterday after the 

procedure, 9/10 in intensity, constant, stabbing , radiating to the back. the 

pain is associated with fever, chills, nausea and vomiting blood X 5, dark 

urine and black stool. 


Patient is not able to tolerate any food. Patient denies any chest pain, 

palpitation, light headedness,dizziness,weakness, Diarrhea, constipation 

dysuria or hematuria











ER course was notable for:


(1) RUQ U/S shows dilated CBD


(2) Elevated LFTs and total bilirubin


(3) Received morphine for pain, flagyl and levoquin for suspected cholangitis








Recent Travel: NO





PAST MEDICAL HISTORY: as per HPI





PAST SURGICAL HISTORY: Nephrectomy, valve replacement, CBD Stent, Cholectomy.





Social History:


Smoking: None 


Alcohol:None 


Drugs: None





Family History:


Allergies





No Known Allergies Allergy (Verified 09/01/17 11:46)


 








HOME MEDICATIONS:


 Home Medications











 Medication  Instructions  Recorded


 


Finasteride 5 mg PO DAILY 02/19/17


 


Carvedilol [Coreg] 6.25 mg PO BID  tablet 07/12/17


 


Ursodiol 300 mg PO BID  capsule 07/12/17


 


Montelukast Sodium [Singulair] 10 mg PO DAILY  tablet 08/16/17


 


Atorvastatin Calcium 10 mg PO DAILY  tablet 08/18/17


 


Levofloxacin [Levaquin] 500 mg PO DAILY #7 tablet 09/01/17


 


Metronidazole [Flagyl -] 500 mg PO BID #14 tablet 09/01/17


 


Tamsulosin HCl [Flomax -] 0.4 mg PO DAILY 09/01/17


 


Ursodiol [Actigall] 300 mg PO BID #180 capsule 09/01/17








REVIEW OF SYSTEMS


CONSTITUTIONAL: 


Absent:  fever, chills, diaphoresis, generalized weakness, malaise,+ loss of 

appetite, weight change


HEENT: 


Absent:  rhinorrhea, nasal congestion, throat pain, throat swelling, difficulty 

swallowing, mouth swelling, ear pain, eye pain, visual changes


CARDIOVASCULAR: 


Absent: chest pain, syncope, palpitations, irregular heart rate, lightheadedness

, peripheral edema


RESPIRATORY: 


Absent: cough, shortness of breath, dyspnea with exertion, orthopnea, wheezing, 

stridor, hemoptysis


GASTROINTESTINAL:


Absent: + RUQ abdominal pain, abdominal distension, nausea, vomiting Blood, 

diarrhea, constipation, melena, hematochezia


GENITOURINARY: 


Absent: dysuria, frequency, urgency, hesitancy, hematuria, flank pain, genital 

pain


MUSCULOSKELETAL: 


Absent: myalgia, arthralgia, joint swelling, back pain, neck pain


SKIN: 


Absent: rash, itching, pallor, echymoses on both arms due to anticoagulation, 

small ulcer 1X1 stage 1 on the left arm


HEMATOLOGIC/IMMUNOLOGIC: 


Absent: easy bleeding, easy bruising, lymphadenopathy, frequent infections


ENDOCRINE:


Absent: unexplained weight gain, unexplained weight loss, heat intolerance, 

cold intolerance


NEUROLOGIC: 


Absent: headache, focal weakness or paresthesias, dizziness, unsteady gait, 

seizure, mental status changes, bladder or bowel incontinence


PSYCHIATRIC: 


Absent: anxiety, depression, suicidal or homicidal ideation, hallucinations.








PHYSICAL EXAMINATION


 Vital Signs - 24 hr











  09/02/17 09/02/17





  09:36 10:07


 


Temperature 99.1 F 99.7 F H


 


Pulse Rate 87 


 


Respiratory 20 





Rate  


 


Blood Pressure 140/85 


 


O2 Sat by Pulse 90 L 





Oximetry (%)  











GENERAL: Awake, alert, and fully oriented, in mild acute distress.


HEAD: Normal with no signs of trauma.


EYES: Pupils equal, round and reactive to light, icteric slera, conjunctiva 

clear. No lid lag.


EARS, NOSE, THROAT: Moist mucous membranes.


NECK: Normal range of motion, supple without lymphadenopathy, JVD, or masses.


LUNGS: Breath sounds equal, clear to auscultation bilaterally. No wheezes, and 

no crackles. No accessory muscle use.


HEART: Regular rate and rhythm, normal S1 and S2 Systolic murmur 2-3/6, no rub 

or gallop.


ABDOMEN: Soft,  RUQ tendereness , not distended, normoactive  bowel sounds, no 

guarding, no rebound, no masses.  No hepatomegaly or  splenomegaly. 


MUSCULOSKELETAL: Normal range of motion at all joints. No bony deformities or 

tenderness. No CVA tenderness.


UPPER EXTREMITIES: 2+ pulses, warm, well-perfused. No cyanosis. No clubbing. no 

peripheral edema.


LOWER EXTREMITIES: 2+ pulses, warm, well-perfused. No calf tenderness. trace 

peripheral edema. 


NEUROLOGICAL:  Normal speech. Normal gait.


PSYCHIATRIC: Cooperative. Good eye contact. Appropriate mood and affect.


SKIN: Warm, dry, normal turgor, no rashes or lesions noted, normal capillary 

refill. 


 Laboratory Results - last 24 hr











  09/02/17 09/02/17 09/02/17





  09:30 09:30 09:30


 


WBC  7.7  


 


RBC  3.06 L  


 


Hgb  10.1 L D  


 


Hct  29.9 L  


 


MCV  97.7 H  


 


MCH  32.9  D  


 


MCHC  33.7  


 


RDW  17.1 H  


 


Plt Count  112 L D  


 


MPV  9.0  


 


Neutrophils %  92.0 H  


 


Lymphocytes %  3.1 L D  


 


Monocytes %  4.4  


 


Eosinophils %  0.1  


 


Basophils %  0.4  


 


INR   1.18 H 


 


PTT (Actin FS)   29.3 


 


VBG pH   


 


POC VBG pCO2   


 


POC VBG pO2   


 


Mixed VBG HCO3   


 


Sodium    139


 


Potassium    4.8  D


 


Chloride    104


 


Carbon Dioxide    26


 


Anion Gap    9


 


BUN    31 H


 


Creatinine    1.7 H D


 


Creat Clearance w eGFR    38.23


 


Random Glucose    134 H D


 


Lactic Acid   


 


Calcium    9.3  D


 


Total Bilirubin    2.9 H D


 


AST    1107 H


 


ALT    427 H D


 


Alkaline Phosphatase    394 H D


 


Creatine Kinase    97


 


Troponin I    < 0.02  D


 


Total Protein    6.1 L D


 


Albumin    3.0 L D


 


Lipase    85


 


Stool Occult Blood   


 


Blood Type   


 


Antibody Screen   














  09/02/17 09/02/17 09/02/17





  09:30 09:30 10:07


 


WBC   


 


RBC   


 


Hgb   


 


Hct   


 


MCV   


 


MCH   


 


MCHC   


 


RDW   


 


Plt Count   


 


MPV   


 


Neutrophils %   


 


Lymphocytes %   


 


Monocytes %   


 


Eosinophils %   


 


Basophils %   


 


INR   


 


PTT (Actin FS)   


 


VBG pH   


 


POC VBG pCO2   


 


POC VBG pO2   


 


Mixed VBG HCO3   


 


Sodium   


 


Potassium   


 


Chloride   


 


Carbon Dioxide   


 


Anion Gap   


 


BUN   


 


Creatinine   


 


Creat Clearance w eGFR   


 


Random Glucose   


 


Lactic Acid  1.9  


 


Calcium   


 


Total Bilirubin   


 


AST   


 


ALT   


 


Alkaline Phosphatase   


 


Creatine Kinase   


 


Troponin I   


 


Total Protein   


 


Albumin   


 


Lipase   


 


Stool Occult Blood    Negative


 


Blood Type   A POSITIVE 


 


Antibody Screen   Negative 














  09/02/17





  10:11


 


WBC 


 


RBC 


 


Hgb 


 


Hct 


 


MCV 


 


MCH 


 


MCHC 


 


RDW 


 


Plt Count 


 


MPV 


 


Neutrophils % 


 


Lymphocytes % 


 


Monocytes % 


 


Eosinophils % 


 


Basophils % 


 


INR 


 


PTT (Actin FS) 


 


VBG pH  7.37


 


POC VBG pCO2  47.5


 


POC VBG pO2  15.1 L*


 


Mixed VBG HCO3  26.6 H


 


Sodium 


 


Potassium 


 


Chloride 


 


Carbon Dioxide 


 


Anion Gap 


 


BUN 


 


Creatinine 


 


Creat Clearance w eGFR 


 


Random Glucose 


 


Lactic Acid 


 


Calcium 


 


Total Bilirubin 


 


AST 


 


ALT 


 


Alkaline Phosphatase 


 


Creatine Kinase 


 


Troponin I 


 


Total Protein 


 


Albumin 


 


Lipase 


 


Stool Occult Blood 


 


Blood Type 


 


Antibody Screen 








 Current Medications





Aclidinium Bromide (Tudorza -)  1 puff IH BID [facility]


Atorvastatin Calcium (Lipitor -)  10 mg PO HS [facility]


Carvedilol (Coreg -)  6.25 mg PO BID [facility]


Finasteride (Proscar -)  5 mg PO DAILY [facility]


Levofloxacin (Levaquin 250 Mg Premixed Ivpb -)  50 mls @ 50 mls/hr IVPB DAILY 

[facility]


Metronidazole (Flagyl 500mg Premixed Ivpb -)  100 mls @ 100 mls/hr IVPB Q8H-IV 

[facility]


   Last Admin: 09/02/17 17:51 Dose:  100 mls/hr


Lactated Ringer's (Lactated Ringers Solution)  1,000 mls @ 75 mls/hr IV ASDIR 

[facility]


   Last Admin: 09/02/17 17:51 Dose:  75 mls/hr


Montelukast Sodium (Singulair -)  10 mg PO HS [facility]


Morphine Sulfate (Morphine Injection -)  2 mg IVPUSH Q4H PRN


   PRN Reason: PAIN


Pantoprazole Sodium (Protonix -)  40 mg PO DAILY [facility]


   Last Admin: 09/02/17 17:51 Dose:  Not Given


Tamsulosin HCl (Flomax -)  0.4 mg PO DAILY@0830 [facility]


Ursodiol (Actigal -)  300 mg PO BID [facility]








EKG on 9/2: NSR with LBBB, unchanged compared to prior, no e/o ACS


Liver U/S on 9/2: dilated CBD 1.8cm


CXR on 9/2: No acute pathology





ASSESSMENT/PLAN:





bPatient is 88 Year old white male with H/O choledocholithiasis and cholangitis 

s/p stent removal  presented to Ed with one day history of RUQ abdominal pain 

and bloody vomiting ,admitted to med-surg inpatient service for common bile 

duct obstruction.





#Acute choledocholithiasis with possi


*  Likely 2/2 residual stones post stent removal


* CBD dilated 1.8 cm


*  Cont. empiric levaquin 500 mg po daily (adjusted to 250 by the pharmacy due 

to transaminitis and flagyl  500 BID for 7 days 


* Cont. actigall (Ursodiol) 300 mg PO BID [facility]


* Supportive care to optimize for ERCP


* NPO 


* Pain control with Morphine 2 mg PO Q4 Hr PRN


* GI consulted , recommend repeat MRCP with consideration to do ERCP





#vomitting Blood(hematechemesis) likely 2/2 gastric mucosal erosions from ERCP 

day before


* Patient had 5 episode of bloody vomiting 


* improved 


* Stool ocult negative 


* Start Protonix 40 mg PO daily 


* IV fluids Riger lactate @ 75 CC /Hr  


* Montitor clinically 


* Consider ENT consult if continue to bleed





#Transaminitis , likely 2/2 to CBD obstruction 


* AST /ALT 1107/427,   , total Bili 2.9


* Check direct Bilirubin  


* , PTT 29.3


* Trend liver enzyme 


* Monitor clinically 


* US showed 





#CKD, stage 3to 4 


* Stable


* Bun/Cr 56/1.2


* Monitor Cr


* F/U clinically





#CAD s/p stent, chronic


* Continue Lipitor 10 mg PO HS [facility]


* Continue Coreg(carvedilol) 6.25 mg PO BID [facility]


* EKG did not show any specific  ST/T wave changes





#Aortic stenosis s/p TAVR


* Systolic Murmur 2-3 /6 


* Cont. plavix and ASA





# Renal cell cancer s/p nephrectomy


*  Stable


* single kidney , avoid nephrotoxic including contrast





#Hypertension


* continue carvedilol 6.25 mg PO BID [facility]


* F/u BP 





#BPH, chronic


*  Stable


* Continue home meds finasteride 5 mg PO DAILY  and flomax  0.4 mg PO DAILY





#FEN


* F: IVF @ 75cc/hr ringer lactate


* E: Monitor lytes


*  NPO for now till GI consulted





#Prophylaxis


* DVT: SCDs,


* GI: Protonix 40 mg PO daily  





#Dispo


*  Awaiting GI evaluation





 





Visit type





- Emergency Visit


Emergency Visit: Yes


ED Registration Date: 09/02/17


Care time: The patient presented to the Emergency Department on the above date 

and was hospitalized for further evaluation of their emergent condition.





- New Patient


This patient is new to me today: Yes


Date on this admission: 09/06/17





- Critical Care


Critical Care patient: No

## 2017-09-03 LAB
ALBUMIN SERPL-MCNC: 2.5 G/DL (ref 3.4–5)
ALP SERPL-CCNC: 336 U/L (ref 45–117)
ALT SERPL-CCNC: 277 U/L (ref 12–78)
AMYLASE SERPL-CCNC: 21 U/L (ref 25–115)
ANION GAP SERPL CALC-SCNC: 10 MMOL/L (ref 8–16)
APTT BLD: 30.4 SECONDS (ref 26.9–34.4)
AST SERPL-CCNC: 438 U/L (ref 15–37)
BASOPHILS # BLD: 0.3 % (ref 0–2)
BILIRUB CONJ SERPL-MCNC: 1.4 MG/DL (ref 0–0.2)
BILIRUB SERPL-MCNC: 2 MG/DL (ref 0.2–1)
CALCIUM SERPL-MCNC: 8.8 MG/DL (ref 8.5–10.1)
CO2 SERPL-SCNC: 25 MMOL/L (ref 21–32)
CREAT SERPL-MCNC: 1.9 MG/DL (ref 0.7–1.3)
CRP SERPL-MCNC: 10.1 MG/DL (ref 0–0.3)
DEPRECATED RDW RBC AUTO: 16.8 % (ref 11.9–15.9)
EOSINOPHIL # BLD: 0 % (ref 0–4.5)
GLUCOSE SERPL-MCNC: 94 MG/DL (ref 74–106)
INR BLD: 1.36 (ref 0.82–1.09)
MCH RBC QN AUTO: 33.4 PG (ref 25.7–33.7)
MCHC RBC AUTO-ENTMCNC: 34.5 G/DL (ref 32–35.9)
MCV RBC: 96.9 FL (ref 80–96)
NEUTROPHILS # BLD: 90.4 % (ref 42.8–82.8)
PLATELET # BLD AUTO: 86 K/MM3 (ref 134–434)
PMV BLD: 9.7 FL (ref 7.5–11.1)
PROT SERPL-MCNC: 5.2 G/DL (ref 6.4–8.2)
PT PNL PPP: 15 SEC (ref 9.98–11.88)
WBC # BLD AUTO: 10.1 K/MM3 (ref 4–10)

## 2017-09-03 RX ADMIN — CARVEDILOL SCH: 6.25 TABLET, FILM COATED ORAL at 22:32

## 2017-09-03 RX ADMIN — ACLIDINIUM BROMIDE SCH PUFF: 400 POWDER, METERED RESPIRATORY (INHALATION) at 10:34

## 2017-09-03 RX ADMIN — PIPERACILLIN SODIUM AND TAZOBACTAM SODIUM SCH MLS/HR: .25; 2 INJECTION, POWDER, LYOPHILIZED, FOR SOLUTION INTRAVENOUS at 21:15

## 2017-09-03 RX ADMIN — PIPERACILLIN SODIUM AND TAZOBACTAM SODIUM SCH MLS/HR: .25; 2 INJECTION, POWDER, LYOPHILIZED, FOR SOLUTION INTRAVENOUS at 12:20

## 2017-09-03 RX ADMIN — ATORVASTATIN CALCIUM SCH MG: 10 TABLET, FILM COATED ORAL at 21:16

## 2017-09-03 RX ADMIN — URSODIOL SCH MG: 300 CAPSULE ORAL at 21:16

## 2017-09-03 RX ADMIN — PANTOPRAZOLE SODIUM SCH MG: 40 TABLET, DELAYED RELEASE ORAL at 10:37

## 2017-09-03 RX ADMIN — ACLIDINIUM BROMIDE SCH PUFF: 400 POWDER, METERED RESPIRATORY (INHALATION) at 22:11

## 2017-09-03 RX ADMIN — URSODIOL SCH MG: 300 CAPSULE ORAL at 10:37

## 2017-09-03 RX ADMIN — TAMSULOSIN HYDROCHLORIDE SCH MG: 0.4 CAPSULE ORAL at 10:37

## 2017-09-03 RX ADMIN — FINASTERIDE SCH MG: 5 TABLET, FILM COATED ORAL at 10:37

## 2017-09-03 RX ADMIN — CARVEDILOL SCH MG: 6.25 TABLET, FILM COATED ORAL at 10:37

## 2017-09-03 RX ADMIN — MONTELUKAST SODIUM SCH MG: 10 TABLET, COATED ORAL at 21:16

## 2017-09-03 RX ADMIN — SODIUM CHLORIDE, POTASSIUM CHLORIDE, SODIUM LACTATE AND CALCIUM CHLORIDE SCH MLS/HR: 600; 310; 30; 20 INJECTION, SOLUTION INTRAVENOUS at 19:15

## 2017-09-03 RX ADMIN — METRONIDAZOLE SCH MLS/HR: 500 INJECTION, SOLUTION INTRAVENOUS at 01:06

## 2017-09-03 NOTE — PN
Progress Note, Physician


Chief Complaint: 


Appears comfortable





NO abd pain





- Current Medication List


Current Medications: 


Active Medications





Aclidinium Bromide (Tudorza -)  1 puff IH BID [facility]


   Last Admin: 09/02/17 21:20 Dose:  1 puff


Atorvastatin Calcium (Lipitor -)  10 mg PO HS [facility]


Carvedilol (Coreg -)  6.25 mg PO BID [facility]


   Last Admin: 09/02/17 21:20 Dose:  6.25 mg


Finasteride (Proscar -)  5 mg PO DAILY [facility]


Levofloxacin (Levaquin 250 Mg Premixed Ivpb -)  50 mls @ 50 mls/hr IVPB DAILY 

[facility]


Metronidazole (Flagyl 500mg Premixed Ivpb -)  100 mls @ 100 mls/hr IVPB Q8H-IV 

[facility]


   Last Admin: 09/03/17 01:06 Dose:  100 mls/hr


Lactated Ringer's (Lactated Ringers Solution)  1,000 mls @ 75 mls/hr IV ASDIR 

[facility]


   Last Admin: 09/02/17 17:51 Dose:  75 mls/hr


Montelukast Sodium (Singulair -)  10 mg PO HS [facility]


Morphine Sulfate (Morphine Injection -)  2 mg IVPUSH Q4H PRN


   PRN Reason: PAIN


Pantoprazole Sodium (Protonix -)  40 mg PO DAILY [facility]


   Last Admin: 09/02/17 17:51 Dose:  Not Given


Tamsulosin HCl (Flomax -)  0.4 mg PO DAILY@0830 [facility]


Ursodiol (Actigal -)  300 mg PO BID [facility]


   Last Admin: 09/02/17 21:20 Dose:  300 mg











- Objective


Vital Signs: 


 Vital Signs











Temperature  98.4 F   09/03/17 06:00


 


Pulse Rate  90   09/03/17 06:00


 


Respiratory Rate  20   09/03/17 06:00


 


Blood Pressure  99/52   09/03/17 06:00


 


O2 Sat by Pulse Oximetry (%)  94 L  09/02/17 20:25











Gastrointestinal: Yes: Soft.  No: Tenderness


Labs: 


 CBC, BMP





 09/03/17 07:15 





 09/03/17 07:15 





 INR, PTT











INR  1.36  (0.82-1.09)  H  09/03/17  07:15    














Problem List





- Problems


(1) Acute cholangitis


Code(s): K83.0 - CHOLANGITIS





(2) Bacteremia due to Gram-negative bacteria


Code(s): R78.81 - BACTEREMIA








Assessment/Plan


Microbiology





09/02/17 10:11   Blood - Peripheral Venous   Blood Culture - Preliminary


                              Lactose Fermenting Neg Bacilli


09/02/17 10:11   Blood - Peripheral Venous   Blood Culture - Preliminary


                              Lactose Fermenting Neg Bacilli





 Laboratory Tests











  09/03/17 09/03/17 09/03/17





  07:15 07:15 07:15


 


WBC    10.1 H D


 


RBC    2.67 L


 


Hct    25.9 L


 


Plt Count    86 L D


 


Creat Clearance w eGFR  33.62  


 


Random Glucose  94  D  


 


Direct Bilirubin   1.4 H D 


 


AST  438 H D  


 


ALT  277 H D  


 


Alkaline Phosphatase  336 H  


 


C-Reactive Protein   10.1 H D 








Assessment Cholangitis gram negative reggie bacteremia





Plan               Broaden coverage pending c/s YESI Villalba MD

## 2017-09-03 NOTE — CONS
DATE OF CONSULTATION:

 

DATE OF DICTATION:  09/03/2017 

 

INFECTIOUS DISEASE CONSULTATION 

 

HISTORY OF PRESENT ILLNESS:  This is an 88-year-old male whom I am asked to see for

evaluation of sepsis, with acute cholangitis and gram-negative bacteremia.  The

patient has a history of hypertension, coronary artery disease, prior MI (with

coronary stents), aortic stenosis (status post TAVR), right nephrectomy, BPH and

urethral strictures.  He presented with abdominal pain and vomiting, and had had an

episode of cholangitis in May 2017.  He underwent an ERCP with stone extraction on

June 2, laparoscopic cholecystectomy on June 8 and subsequently placement of a common

bile duct stent.  

 

Yesterday he was seen and had an ERCP, with removal of a stone as well as removal of

the stent.  He had been discharged on Actigall, Levaquin and metronidazole, but

apparently did not fill the prescriptions.  He returned now complaining of abdominal

pain, nausea, vomiting and subjective fevers, and now has gram-negative rods in the

blood.  

 

PAST MEDICAL HISTORY:  As noted above.

 

CURRENT MEDICATIONS:  Include levofloxacin, metronidazole, Tudorza, Coreg, Actigall,

Lipitor. 

 

ALLERGIES:  None known.  

 

SOCIAL HISTORY:  Nonsmoker.  No history of EtOH. 

 

FAMILY HISTORY:  Reviewed and noncontributory.

 

REVIEW OF SYSTEMS:  Reviewed and noncontributory.  All systems reviewed. 

 

PHYSICAL EXAMINATION:  

General:  He was an alert elderly male in no acute distress. 

Vital Signs:  T-max 99.7, currently 98.4.  Pulse 90, blood pressure 100/52,

respirations 20. 

Neck:  Supple.

Lungs:  Clear to percussion and auscultation. 

Heart:  S1, S2.  Regular rhythm, without audible murmur.

Abdomen:  Soft, nontender, without hepatosplenomegaly.  Positive bowel sounds. 

Extremities:  Without clubbing, cyanosis or edema.

 

DIAGNOSTIC STUDIES:  White count 7.7, hemoglobin 10.1, platelets 112.  Alkaline

phosphatase 394, AST 1100, , bilirubin 2.9.  

 

ASSESSMENT:  Gram-negative reggie bacteremia secondary to cholangitis.  

 

RECOMMENDATIONS:  The patient has been seen by Dr. Terrazas and has discussed another

endoscopic retrograde cholangiopancreatogram with the patient.  Antibiotics currently

started are levofloxacin and metronidazole.  Now with gram-negative reggie bacteremia

and low platelets.  An magnetic resonance cholangiopancreatogram has been ordered. 

We will broaden the coverage to include piperacillin and tazobactam for added

gram-negative coverage.  Operative note dated September 1 indicates removal of stent

and extraction of a stone.  

 

 

 

BRIAN PARSON M.D.

 

[id number]

DD: 09/03/2017 09:59

DT: 09/03/2017 10:28

Job #:  [id number]

## 2017-09-03 NOTE — PN
Physical Exam: 


SUBJECTIVE: Patient seen and examined. Abdominal pain and nausea are better. He 

had chills overnight.








OBJECTIVE:





 Vital Signs











 Period  Temp  Pulse  Resp  BP Sys/[place]  Pulse Ox


 


 Last 24 Hr  97.7 F-98.9 F  70-91  18-20  /51-80  94-94











GENERAL: The patient is awake, alert, and fully oriented, in no acute distress.


LUNGS: Breath sounds equal, clear to auscultation bilaterally, no wheezes, no 

crackles, no accessory muscle use. 


HEART: Regular rate and rhythm, S1, S2 without murmur, rub or gallop.


ABDOMEN: Soft, nontender, nondistended, normoactive bowel sounds, no guarding, 

no rebound, no hepatosplenomegaly, no masses.


EXTREMITIES: 2+ pulses, warm, well-perfused, no edema. 








 Laboratory Results - last 24 hr











  09/03/17 09/03/17 09/03/17





  07:15 07:15 07:15


 


WBC   


 


RBC   


 


Hgb   


 


Hct   


 


MCV   


 


MCH   


 


MCHC   


 


RDW   


 


Plt Count   


 


MPV   


 


Neutrophils %   


 


Lymphocytes %   


 


Monocytes %   


 


Eosinophils %   


 


Basophils %   


 


INR  1.36 H  


 


PTT (Actin FS)  30.4  


 


Sodium   140 


 


Potassium   4.5 


 


Chloride   105 


 


Carbon Dioxide   25 


 


Anion Gap   10 


 


BUN   53 H D 


 


Creatinine   1.9 H 


 


Creat Clearance w eGFR   33.62 


 


Random Glucose   94  D 


 


Calcium   8.8 


 


Total Bilirubin   2.0 H D 


 


Direct Bilirubin    1.4 H D


 


AST   438 H D 


 


ALT   277 H D 


 


Alkaline Phosphatase   336 H 


 


C-Reactive Protein    10.1 H D


 


Total Protein   5.2 L 


 


Albumin   2.5 L 


 


Total Amylase    21 L D


 


Lipase    75














  09/03/17





  07:15


 


WBC  10.1 H D


 


RBC  2.67 L


 


Hgb  8.9 L D


 


Hct  25.9 L


 


MCV  96.9 H


 


MCH  33.4


 


MCHC  34.5


 


RDW  16.8 H


 


Plt Count  86 L D


 


MPV  9.7


 


Neutrophils %  90.4 H


 


Lymphocytes %  6.2 L D


 


Monocytes %  3.1 L


 


Eosinophils %  0.0  D


 


Basophils %  0.3


 


INR 


 


PTT (Actin FS) 


 


Sodium 


 


Potassium 


 


Chloride 


 


Carbon Dioxide 


 


Anion Gap 


 


BUN 


 


Creatinine 


 


Creat Clearance w eGFR 


 


Random Glucose 


 


Calcium 


 


Total Bilirubin 


 


Direct Bilirubin 


 


AST 


 


ALT 


 


Alkaline Phosphatase 


 


C-Reactive Protein 


 


Total Protein 


 


Albumin 


 


Total Amylase 


 


Lipase 








Active Medications











Generic Name Dose Route Start Last Admin





  Trade Name Freq  PRN Reason Stop Dose Admin


 


Aclidinium Bromide  1 puff 09/02/17 22:00 09/03/17 10:34





  Tudorza -  IH   1 puff





  BID ALCIDES   Administration


 


Atorvastatin Calcium  10 mg 09/03/17 22:00  





  Lipitor -  PO   





  HS ALCIDES   


 


Carvedilol  6.25 mg 09/02/17 22:00 09/03/17 10:37





  Coreg -  PO   6.25 mg





  BID ALCIDES   Administration


 


Finasteride  5 mg 09/03/17 10:00 09/03/17 10:37





  Proscar -  PO   5 mg





  DAILY ALCIDES   Administration


 


Lactated Ringer's  1,000 mls @ 75 mls/hr 09/02/17 16:00 09/02/17 17:51





  Lactated Ringers Solution  IV   75 mls/hr





  ASDIR ALCIDES   Administration


 


Piperacillin Sod/Tazobactam  50 mls @ 100 mls/hr 09/03/17 15:00  





  Sod 2.25 gm/ Dextrose  IVPB   





  Q6H-IV ALCIDES   





  Protocol   


 


Montelukast Sodium  10 mg 09/03/17 22:00  





  Singulair -  PO   





  HS ALCIDES   


 


Morphine Sulfate  2 mg 09/02/17 13:21  





  Morphine Injection -  IVPUSH   





  Q4H PRN   





  PAIN   


 


Pantoprazole Sodium  40 mg 09/02/17 13:30 09/03/17 10:37





  Protonix -  PO   40 mg





  DAILY ALCIDES   Administration


 


Tamsulosin HCl  0.4 mg 09/03/17 08:30 09/03/17 10:37





  Flomax -  PO   0.4 mg





  DAILY@0830 ALCIDES   Administration


 


Ursodiol  300 mg 09/02/17 22:00 09/03/17 10:37





  Actigal -  PO   300 mg





  BID ALCIDES   Administration











ASSESSMENT/PLAN:


This is an 88 year old man with a history of HTN, CAD, MI, cardiac stent, AS, 

TAVR, right nephrectomy, BPH, urethral strictures who presents to the ER with 

abdominal pain, vomiting and fever which started after CBD removal 9/1.





1. Choledocholithiasis with acute cholangitis and gram negative bacteremia


   - s/p ERCP, stone removal, CBD stent removal 9/1


   - LFTs improving


   - Continue IV fluid


   - Pain control


   - Continue Actigall


   - GI and ID consults appreciated


   - Antibiotics changed to Zosyn


   - Follow up blood cultures


   - MRCP ordered


2. CAD, history of MI, stent


   - Continue Coreg, Lipitor


3. AS, history of TAVR


4. HTN


   - Continue Coreg


5. CKD, stage 3 vs stage 4


   - Monitor creatinine


6. History of renal cancer, right nephrectomy


7. BPH


   - Continue Flomax, Proscar


8. Anemia, microcytic


   - Monitor hemoglobin


   - Check iron studies, stool occult blood








Visit type





- Emergency Visit


Emergency Visit: Yes


ED Registration Date: 09/02/17


Care time: The patient presented to the Emergency Department on the above date 

and was hospitalized for further evaluation of their emergent condition.





- New Patient


This patient is new to me today: No





- Critical Care


Critical Care patient: No





- Discharge Referral


Referred to [facility] No

## 2017-09-03 NOTE — CON.NEP
Consult


Consult Specialty:: Nephrology


Referred by:: Dr Terrazas


Reason for Consultation:: renal insufficiency





- History of Present Illness


History of Present Illness: 


abd pain and vomiting/ jaundice


s/p ercp and stent removal and removal of stone fragments





noted with azotemia on admit


in may 2017 -s/p ascending cholangitis and anupama requiring HD temporarily


his discharge day s creatinine was 2.5


a lab done in 2017 - 1.9


now he is admitted with creat 1.9











- History Source


History Provided By: Patient, Medical Record


Limitations to Obtaining History: Physical Impairment (hard of hearing)





- Past Medical History


Cardio/Vascular: Yes: Aortic Stenosis (Post TAVR at [facility] in ), CAD (s/p 

cardiac stent/ PCI ), HTN, Hyperlipdemia, Other


Gastrointestinal: Yes: Diverticulosis


Hepatobiliary: Yes: Cholelithiasis, Choledocholithiasis (ERCP x 3 with stent 

insertion and removal)


Renal/: Yes: Renal Inusuff, Cancer (Renal CA with right nephrectomy), Renal 

Calculi, Other (urethral stricture s/p cystoscopy, nephrectomy for RCC)





- Past Surgical History


Past Surgical History: Yes: Nephrectomy (right nephrectomy for cancer ), 

Stent, Valve Replacement (TAVR)





- Alcohol/Substance Use


Hx Alcohol Use: No


History of Substance Use: reports: None





- Smoking History


Smoking history: Never smoked


Have you smoked in the past 12 months: No





- Social History


Usual Living Arrangement: With Spouse


ADL: Independent


Occupation: CPA: still working


History of Recent Travel: No





Home Medications





- Allergies


Allergies/Adverse Reactions: 


 Allergies











Allergy/AdvReac Type Severity Reaction Status Date / Time


 


No Known Allergies Allergy   Verified 17 11:46














- Home Medications


Home Medications: 


Ambulatory Orders





Finasteride 5 mg PO DAILY 17 


Carvedilol [Coreg] 6.25 mg PO BID  tablet 17 


Ursodiol 300 mg PO BID  capsule 17 


Montelukast Sodium [Singulair] 10 mg PO DAILY  tablet 17 


Atorvastatin Calcium 10 mg PO DAILY  tablet 17 


Levofloxacin [Levaquin] 500 mg PO DAILY #7 tablet 17 


Metronidazole [Flagyl -] 500 mg PO BID #14 tablet 17 


Tamsulosin HCl [Flomax -] 0.4 mg PO DAILY 17 


Ursodiol [Actigall] 300 mg PO BID #180 capsule 17 











Family Disease History





- Family Disease History


Family Disease History: Other: Father ( 80's unclear cause), Mother ( 80

's CVA/MI), Brother (1 brother  CHF, 1 brother  stomach cancer)





Review of Systems





- Review of Systems


Constitutional: reports: No Symptoms, Malaise


Eyes: reports: No Symptoms


HENT: reports: No Symptoms


Neck: reports: No Symptoms


Cardiovascular: reports: No Symptoms


Respiratory: reports: No Symptoms


Gastrointestinal: reports: No Symptoms


Genitourinary: reports: No Symptoms


Musculoskeletal: reports: No Symptoms


Integumentary: reports: No Symptoms


Endocrine: reports: No Symptoms


Hematology/Lymphatic: reports: No Symptoms


Psychiatric: reports: No Symptoms





Nephrology Consult





- Height


Height: 5 ft 9 in





- Weight


Weight: 178 lb





- BMI


Body Mass Index (BMI): 26.2





- Lab Results


CBC,BMP: 


 CBC, BMP





 17 07:15 





 17 07:15 








Anion Gap: 


 Anion Gap











Anion Gap  10  (8-16)   17  07:15    














- Physical Examination


Vital Signs: 


 Vital Signs











Temperature  97.8 F   17 15:44


 


Pulse Rate  93 H  17 15:44


 


Respiratory Rate  20   17 15:44


 


Blood Pressure  88/54   17 15:44


 


O2 Sat by Pulse Oximetry (%)  94 L  17 09:00











Constitutional: Yes: Well Nourished, No Distress, Calm


Eyes: Yes: WNL, Conjunctiva Clear, EOM Intact, Sclera Icterus


HENT: Yes: WNL, Atraumatic, Normocephalic


Neck: Yes: WNL, Supple, Trachea Midline


Cardiovascular: Yes: WNL, Regular Rate and Rhythm


Respiratory: Yes: WNL, Regular, CTA Bilaterally


Gastrointestinal: Yes: WNL, Normal Bowel Sounds


Musculoskeletal: Yes: WNL


Extremities: Yes: WNL


Integumentary: Yes: WNL


Neurological: Yes: WNL, Alert, Oriented


Psychiatric: Yes: WNL, Alert, Oriented





Assessment/Plan


Acute on chronic renal failure


probably prerenal in setting of acute illness and vomiting


He is on IVF now which may help his renal function quicker than depending on 

oral fluids





Biliary duct stone didease and probable pancreatic head fullness


hemodynamically at risk








anemia


h/h drop due to IV hydration, r/o post procedure bleeding





Plan- agree with IVF


strict I and O





follow up BMP


urine indices

## 2017-09-03 NOTE — EKG
Test Reason : 

Blood Pressure : ***/*** mmHG

Vent. Rate : 068 BPM     Atrial Rate : 068 BPM

   P-R Int : 184 ms          QRS Dur : 168 ms

    QT Int : 482 ms       P-R-T Axes : 008 008 131 degrees

   QTc Int : 512 ms

 

SINUS RHYTHM WITH 1ST DEGREE A-V BLOCK

LEFT BUNDLE BRANCH BLOCK

ABNORMAL ECG

WHEN COMPARED WITH ECG OF 02-SEP-2017 09:36,

PREVIOUS ECG HAS UNDETERMINED RHYTHM, NEEDS REVIEW

Confirmed by ANABEL SIMMONS MD ([id number]) on 9/3/2017 12:09:42 PM

 

Referred By: JESÚS CANO           Confirmed By:ANABEL SIMMONS MD

## 2017-09-03 NOTE — CON.CARD
Consult


Consult Specialty:: Cardiology


Referred by:: Dr. Terrazas


Reason for Consultation:: Cardiac evaluation





- History of Present Illness


History of Present Illness: 


Patient is an 88 year old  male with underlying history of renal 

cancer s/p right nephrectomy, hypertension, coronary artery disease s/p PCI/

stent, s/p TAVR ( at [facility]) who had ERCP for removal of stent and removal of 

multiple stones. He had presented with ascending cholangitis which lead to 

sepsis, DIC and renal failure in May of this year. GI input was noted. Currently

, he denies chest pain, shortness of breath or palpitations. He denies 

paroxysmal nocturnal dyspnea or orthopnea. He denies fever or chills. He denies 

headache or lightheadedness. Cardiology consultation was called for further 

evaluation





- History Source


History Provided By: Patient, Medical Record


Limitations to Obtaining History: No Limitations





- Past Medical History


Cardio/Vascular: Yes: Aortic Stenosis (Post TAVR at [facility] in ), CAD (s/p 

cardiac stent/ PCI ), HTN, Hyperlipdemia


Gastrointestinal: Yes: Diverticulosis


Hepatobiliary: Yes: Cholelithiasis, Choledocholithiasis (ERCP x 3 with stent 

insertion and removal)


Renal/: Yes: Renal Inusuff, Cancer (Renal CA with right nephrectomy), Renal 

Calculi, Other (urethral stricture s/p cystoscopy, nephrectomy for RCC)





- Past Surgical History


Past Surgical History: Yes: Nephrectomy (right nephrectomy for cancer ), 

Stent, Valve Replacement (TAVR)





- Alcohol/Substance Use


Hx Alcohol Use: No


History of Substance Use: reports: None





- Smoking History


Smoking history: Never smoked


Have you smoked in the past 12 months: No





- Social History


Usual Living Arrangement: With Spouse


ADL: Independent


Occupation: CPA: still working


History of Recent Travel: No





Home Medications





- Allergies


Allergies/Adverse Reactions: 


 Allergies











Allergy/AdvReac Type Severity Reaction Status Date / Time


 


No Known Allergies Allergy   Verified 17 11:46














- Home Medications


Home Medications: 


Ambulatory Orders





Finasteride 5 mg PO DAILY 17 


Carvedilol [Coreg] 6.25 mg PO BID  tablet 17 


Ursodiol 300 mg PO BID  capsule 17 


Montelukast Sodium [Singulair] 10 mg PO DAILY  tablet 17 


Atorvastatin Calcium 10 mg PO DAILY  tablet 17 


Levofloxacin [Levaquin] 500 mg PO DAILY #7 tablet 17 


Metronidazole [Flagyl -] 500 mg PO BID #14 tablet 17 


Tamsulosin HCl [Flomax -] 0.4 mg PO DAILY 17 


Ursodiol [Actigall] 300 mg PO BID #180 capsule 17 











Family Disease History





- Family Disease History


Family Disease History: Other: Father ( 80's unclear cause), Mother ( 80

's CVA/MI), Brother (1 brother  CHF, 1 brother  stomach cancer)





Review of Systems





- Review of Systems


Constitutional: denies: Chills, Fever


Cardiovascular: denies: Chest Pain, Palpitations, Shortness of Breath


Respiratory: denies: Cough, Hemoptysis, Orthopnea, PND, SOB, SOB on Exertion


Gastrointestinal: denies: Abdominal Pain, Melena, Nausea, Rectal Bleeding, 

Vomiting


Neurological: reports: Weakness.  denies: Dizziness, Headache, Seizure, Syncope


Vital Signs: 


 Vital Signs











Temperature  98.4 F   17 06:00


 


Pulse Rate  90   17 06:00


 


Respiratory Rate  20   17 06:00


 


Blood Pressure  99/52   17 06:00


 


O2 Sat by Pulse Oximetry (%)  94 L  17 20:25











Neck: Yes: Supple


Respiratory: Yes: Diminished


Gastrointestinal: Yes: Normal Bowel Sounds, Soft.  No: Tenderness


Cardiovascular: Yes: Regular Rate and Rhythm


JVD: No


Carotid Bruit: No


PMI: Non-Displaced


Heart Sounds: Yes: S1, S2


Edema: No





- Other Data


Labs, Other Data: 


 CBC, BMP





 17 07:15 





 17 07:15 





 INR, PTT











INR  1.36  (0.82-1.09)  H  17  07:15    











Sinus rhythm with LBBB





Imaging





- Results


Chest X-ray: Report Reviewed (Unremarkable)


EKG: Report Reviewed





Problem List





- Problems


(1) Acute cholangitis


Code(s): K83.0 - CHOLANGITIS





(2) Biliary sepsis


Code(s): K83.0 - CHOLANGITIS





(3) CAD (coronary artery disease)


Code(s): I25.10 - ATHSCL HEART DISEASE OF NATIVE CORONARY ARTERY W/O ANG PCTRS 

  Qualifiers: 


     Coronary Disease-Associated Artery/Lesion type: unspecified vessel or 

lesion type     Native vs. transplanted heart: native heart     Associated 

angina: without angina        Qualified Code(s): I25.10 - Atherosclerotic heart 

disease of native coronary artery without angina pectoris  





(4) CKD (chronic kidney disease)


Code(s): N18.9 - CHRONIC KIDNEY DISEASE, UNSPECIFIED   





(5) S/P ERCP


Code(s): Z98.890 - OTHER SPECIFIED POSTPROCEDURAL STATES





(6) Hypertension


Code(s): I10 - ESSENTIAL (PRIMARY) HYPERTENSION   Qualifiers: 


     Hypertension type: essential hypertension        Qualified Code(s): I10 - 

Essential (primary) hypertension  





(7) Hypercholesterolemia


Code(s): E78.00 - PURE HYPERCHOLESTEROLEMIA, UNSPECIFIED





(8) S/P TAVR (transcatheter aortic valve replacement)


Code(s): Z95.2 - PRESENCE OF PROSTHETIC HEART VALVE








Assessment/Plan


1. Status post ERCP for ascending cholangitis leading to sepsis


2. Post TAVR


3. Coronary artery disease, s/p PCI/stent, angina


4. Hypertension


5. Renal cancer s/p right nephrectomy





PLAN:


1. Continue GI recommendation with possible repeat ERCP


2. Continue Carvedilol


3. Continue statin (follow LFTs)


4. Antibiotics





Further plans are to follow


Gordon Botello MD

## 2017-09-03 NOTE — PN
GI Progress Note


Subjective: 


GI NOte: Feels much better. No further vomiting. Tolerating liquids. LFTs are 

decreasing but so has the Hb. Has a gram negative growing in his blood. 

Azotemia worsening. Will consult renal. Dr Villalba's and Dr Botello's consultations 

are appreciated.  





- Objective


Vital Signs: 


 Vital Signs











Temperature  98.4 F   09/03/17 06:00


 


Pulse Rate  90   09/03/17 06:00


 


Respiratory Rate  20   09/03/17 06:00


 


Blood Pressure  99/52   09/03/17 06:00


 


O2 Sat by Pulse Oximetry (%)  94 L  09/02/17 20:25








 Laboratory Tests











  09/02/17 09/03/17 09/03/17





  09:30 07:15 07:15


 


WBC   


 


BUN   53 H D 


 


Creatinine   1.9 H 


 


Total Bilirubin  2.9 H D  2.0 H D 


 


Direct Bilirubin    1.4 H D


 


AST  1107 H  438 H D 


 


ALT  427 H D  277 H D 


 


Alkaline Phosphatase  394 H D  336 H 


 


C-Reactive Protein    10.1 H D


 


Total Amylase    21 L D


 


Lipase    75














  09/03/17





  07:15


 


WBC  10.1 H D


 


BUN 


 


Creatinine 


 


Total Bilirubin 


 


Direct Bilirubin 


 


AST 


 


ALT 


 


Alkaline Phosphatase 


 


C-Reactive Protein 


 


Total Amylase 


 


Lipase 











Constitutional: Calm


Cardiovascular: Yes: Regular Rate and Rhythm


Respiratory: Yes: CTA Bilaterally


...Auscultate: Yes: Normoactive Bowel Sounds


...Palpate: Yes: Soft, Other (nontender)


Labs: 


 CBC, BMP





 09/03/17 07:15 





 09/03/17 07:15 





 INR, PTT











INR  1.36  (0.82-1.09)  H  09/03/17  07:15    














Assessment/Plan


Gram negative bacteremia from ascending cholangitis after ERCP manipulation of 

the CBD. He may have residual CBD stones or debris and may need another ERCP. 

MRCP is pending. Antibiotics as per Dr Villalba. Will consult renal. Discussed 

situation with his wife.

## 2017-09-04 LAB
ALBUMIN SERPL-MCNC: 2.3 G/DL (ref 3.4–5)
ALP SERPL-CCNC: 216 U/L (ref 45–117)
ALT SERPL-CCNC: 157 U/L (ref 12–78)
ANION GAP SERPL CALC-SCNC: 8 MMOL/L (ref 8–16)
APPEARANCE UR: CLEAR
AST SERPL-CCNC: 177 U/L (ref 15–37)
BACTERIA #/AREA URNS HPF: (no result) /HPF
BASOPHILS # BLD: 0.6 % (ref 0–2)
BILIRUB CONJ SERPL-MCNC: 0.5 MG/DL (ref 0–0.2)
BILIRUB SERPL-MCNC: 1.3 MG/DL (ref 0.2–1)
BILIRUB UR STRIP.AUTO-MCNC: NEGATIVE MG/DL
CALCIUM SERPL-MCNC: 8.4 MG/DL (ref 8.5–10.1)
CO2 SERPL-SCNC: 26 MMOL/L (ref 21–32)
COLOR UR: YELLOW
CREAT SERPL-MCNC: 2 MG/DL (ref 0.7–1.3)
CRP SERPL-MCNC: 6.9 MG/DL (ref 0–0.3)
DEPRECATED RDW RBC AUTO: 17 % (ref 11.9–15.9)
EOSINOPHIL # BLD: 4.2 % (ref 0–4.5)
FERRITIN SERPL-MCNC: 207.35 NG/ML (ref 16.4–293.9)
FIBRINOGEN PPP-MCNC: 317 MG/DL (ref 238–498)
GLUCOSE SERPL-MCNC: 86 MG/DL (ref 74–106)
INR BLD: 1.09 (ref 0.82–1.09)
KETONES UR QL STRIP: NEGATIVE
LEUKOCYTE ESTERASE UR QL STRIP.AUTO: (no result)
MCH RBC QN AUTO: 33.3 PG (ref 25.7–33.7)
MCHC RBC AUTO-ENTMCNC: 34.1 G/DL (ref 32–35.9)
MCV RBC: 97.8 FL (ref 80–96)
MUCOUS THREADS URNS QL MICRO: (no result)
NEUTROPHILS # BLD: 81.7 % (ref 42.8–82.8)
NITRITE UR QL STRIP: NEGATIVE
PH UR: 5 [PH] (ref 5–8)
PLATELET # BLD AUTO: 67 K/MM3 (ref 134–434)
PMV BLD: 9.3 FL (ref 7.5–11.1)
PROT SERPL-MCNC: 4.6 G/DL (ref 6.4–8.2)
PROT UR QL STRIP: NEGATIVE
PROT UR QL STRIP: NEGATIVE
PT PNL PPP: 12 SEC (ref 9.98–11.88)
RBC # BLD AUTO: (no result) /HPF (ref 0–3)
RBC # UR STRIP: NEGATIVE /UL
SP GR UR: 1.01 (ref 1–1.02)
UROBILINOGEN UR STRIP-MCNC: NEGATIVE MG/DL (ref 0.2–1)
WBC # BLD AUTO: 4.8 K/MM3 (ref 4–10)
WBC # UR AUTO: 6 /HPF (ref 3–5)

## 2017-09-04 PROCEDURE — 30233N1 TRANSFUSION OF NONAUTOLOGOUS RED BLOOD CELLS INTO PERIPHERAL VEIN, PERCUTANEOUS APPROACH: ICD-10-PCS | Performed by: INTERNAL MEDICINE

## 2017-09-04 RX ADMIN — CARVEDILOL SCH MG: 6.25 TABLET, FILM COATED ORAL at 21:46

## 2017-09-04 RX ADMIN — MONTELUKAST SODIUM SCH MG: 10 TABLET, COATED ORAL at 21:46

## 2017-09-04 RX ADMIN — PIPERACILLIN SODIUM AND TAZOBACTAM SODIUM SCH MLS/HR: .25; 2 INJECTION, POWDER, LYOPHILIZED, FOR SOLUTION INTRAVENOUS at 14:48

## 2017-09-04 RX ADMIN — PIPERACILLIN SODIUM AND TAZOBACTAM SODIUM SCH MLS/HR: .25; 2 INJECTION, POWDER, LYOPHILIZED, FOR SOLUTION INTRAVENOUS at 11:12

## 2017-09-04 RX ADMIN — ACLIDINIUM BROMIDE SCH PUFF: 400 POWDER, METERED RESPIRATORY (INHALATION) at 11:14

## 2017-09-04 RX ADMIN — SODIUM CHLORIDE, POTASSIUM CHLORIDE, SODIUM LACTATE AND CALCIUM CHLORIDE SCH MLS/HR: 600; 310; 30; 20 INJECTION, SOLUTION INTRAVENOUS at 04:00

## 2017-09-04 RX ADMIN — PANTOPRAZOLE SODIUM SCH MG: 40 TABLET, DELAYED RELEASE ORAL at 11:14

## 2017-09-04 RX ADMIN — URSODIOL SCH MG: 300 CAPSULE ORAL at 21:46

## 2017-09-04 RX ADMIN — URSODIOL SCH MG: 300 CAPSULE ORAL at 11:14

## 2017-09-04 RX ADMIN — ACLIDINIUM BROMIDE SCH PUFF: 400 POWDER, METERED RESPIRATORY (INHALATION) at 21:47

## 2017-09-04 RX ADMIN — SODIUM CHLORIDE, POTASSIUM CHLORIDE, SODIUM LACTATE AND CALCIUM CHLORIDE SCH MLS/HR: 600; 310; 30; 20 INJECTION, SOLUTION INTRAVENOUS at 14:50

## 2017-09-04 RX ADMIN — TAMSULOSIN HYDROCHLORIDE SCH MG: 0.4 CAPSULE ORAL at 11:14

## 2017-09-04 RX ADMIN — CARVEDILOL SCH MG: 6.25 TABLET, FILM COATED ORAL at 11:14

## 2017-09-04 RX ADMIN — FINASTERIDE SCH MG: 5 TABLET, FILM COATED ORAL at 11:14

## 2017-09-04 RX ADMIN — PIPERACILLIN SODIUM AND TAZOBACTAM SODIUM SCH MLS/HR: .25; 2 INJECTION, POWDER, LYOPHILIZED, FOR SOLUTION INTRAVENOUS at 02:01

## 2017-09-04 RX ADMIN — PIPERACILLIN SODIUM AND TAZOBACTAM SODIUM SCH MLS/HR: .25; 2 INJECTION, POWDER, LYOPHILIZED, FOR SOLUTION INTRAVENOUS at 21:46

## 2017-09-04 RX ADMIN — ATORVASTATIN CALCIUM SCH MG: 10 TABLET, FILM COATED ORAL at 21:46

## 2017-09-04 RX ADMIN — PANTOPRAZOLE SODIUM SCH MLS/HR: 40 INJECTION, POWDER, FOR SOLUTION INTRAVENOUS at 16:38

## 2017-09-04 NOTE — PN
Progress Note, Physician


Chief Complaint: 


ID








Juan tolerates no issue





Looks much better today





NO fever chills





- Current Medication List


Current Medications: 


Active Medications





Aclidinium Bromide (Tudorza -)  1 puff IH BID [facility]


   Last Admin: 09/03/17 22:11 Dose:  1 puff


Atorvastatin Calcium (Lipitor -)  10 mg PO HS [facility]


   Last Admin: 09/03/17 21:16 Dose:  10 mg


Carvedilol (Coreg -)  6.25 mg PO BID [facility]


   Last Admin: 09/03/17 22:32 Dose:  Not Given


Finasteride (Proscar -)  5 mg PO DAILY [facility]


   Last Admin: 09/03/17 10:37 Dose:  5 mg


Piperacillin Sod/Tazobactam (Sod 2.25 gm/ Dextrose)  50 mls @ 100 mls/hr IVPB 

Q6H-IV ALCIDES


   PRN Reason: Protocol


   Last Admin: 09/04/17 02:01 Dose:  100 mls/hr


Lactated Ringer's (Lactated Ringers Solution)  1,000 mls @ 125 mls/hr IV ASDIR 

[facility]


   Last Admin: 09/04/17 04:00 Dose:  125 mls/hr


Montelukast Sodium (Singulair -)  10 mg PO [facility]


   Last Admin: 09/03/17 21:16 Dose:  10 mg


Morphine Sulfate (Morphine Injection -)  2 mg IVPUSH Q4H PRN


   PRN Reason: PAIN


   Last Admin: 09/04/17 02:03 Dose:  2 mg


Pantoprazole Sodium (Protonix -)  40 mg PO DAILY [facility]


   Last Admin: 09/03/17 10:37 Dose:  40 mg


Tamsulosin HCl (Flomax -)  0.4 mg PO DAILY@0830 [facility]


   Last Admin: 09/03/17 10:37 Dose:  0.4 mg


Ursodiol (Actigal -)  300 mg PO BID [facility]


   Last Admin: 09/03/17 21:16 Dose:  300 mg











- Objective


Vital Signs: 


 Vital Signs











Temperature  97.5 F L  09/04/17 05:59


 


Pulse Rate  56 L  09/04/17 05:59


 


Respiratory Rate  18   09/04/17 05:59


 


Blood Pressure  104/56   09/04/17 05:59


 


O2 Sat by Pulse Oximetry (%)  95   09/03/17 21:00











Constitutional: Yes: No Distress


Cardiovascular: Yes: S1, S2


Respiratory: Yes: WNL, Regular, CTA Bilaterally


Gastrointestinal: Yes: Soft.  No: Tenderness


Labs: 


 CBC, BMP





 09/04/17 07:30 





 09/04/17 07:30 





 INR, PTT











INR  1.36  (0.82-1.09)  H  09/03/17  07:15    














Problem List





- Problems


(1) Acute cholangitis


Code(s): K83.0 - CHOLANGITIS





(2) Bacteremia due to Gram-negative bacteria


Code(s): R78.81 - BACTEREMIA








Assessment/Plan


Microbiology





09/02/17 10:11   Blood - Peripheral Venous   Blood Culture - Preliminary


                              Lactose Fermenting Neg Bacilli


09/02/17 10:11   Blood - Peripheral Venous   Blood Culture - Preliminary


                              Lactose Fermenting Neg Bacilli





 Laboratory Tests











  09/03/17 09/04/17 09/04/17





  07:15 07:30 07:30


 


WBC   


 


Hgb   


 


Hct   


 


Plt Count   


 


INR  1.36 H  


 


BUN   49 H 


 


Creatinine   2.0 H 


 


Direct Bilirubin    0.5 H D


 


AST    177 H D


 


ALT    157 H D


 


Alkaline Phosphatase    216 H D














  09/04/17





  07:30


 


WBC  4.8  D


 


Hgb  7.5 L D


 


Hct  22.0 L D


 


Plt Count  67 L D


 


INR 


 


BUN 


 


Creatinine 


 


Direct Bilirubin 


 


AST 


 


ALT 


 


Alkaline Phosphatase 








Assessment Cholangitis with Gram negative reggie bacteremia looks better





Plan               Continue Zosyn pending final culture For MRCP ERCP


Deejay ABRAHAM

## 2017-09-04 NOTE — CONSULT
Consult - text type





- Consultation


Consultation Note: 


Patient is a 88 year old male with a history of CAD, MI s/p stenting, Valve 

replacement s/p TVAR, right nephrectomy, BPH, pancreatits, cholecystits, 

choledocolethiasis who presents with abdominal pain and vomiting.  The patient 

had a cholecystectomy done in June 2017 and a subsequent stenting of his bile 

duct due to choledocolethiasis.  He had stent removal 8/30 and got admitted 9/2 

with vomiting  


He is being treated for cholangitis/G- bacteremia/BO


currentlt denies any abdominalpain/cough/chills/SOB/fevers








- Past Medical History


Cancer: Yes (R.KIDNEY, WITH RIGHT NEPHRECTOMY)


Cardiac Disorders: Yes (valve disease)


COPD: Yes


 Disorders: Yes (URINARY RETENTION,BPH)


HTN: Yes


Hypercholesterolemia: Yes





- Surgical History


Cardiac Surgery: Yes (Angioplasty with stent,AORTIC VALVE SX)





- Immunization History


Immunization Up to Date: Yes





- Psycho/Social/Smoking Cessation Hx


Smoking History: Never smoked








- Past Medical History


Allergies/Adverse Reactions: 


 Allergies











Allergy/AdvReac Type Severity Reaction Status Date / Time


 


No Known Allergies Allergy   Verified 09/01/17 11:46











Home Medications: 


Ambulatory Orders





Finasteride 5 mg PO DAILY 02/19/17 


Carvedilol [Coreg] 6.25 mg PO BID  tablet 07/12/17 


Ursodiol 300 mg PO BID  capsule 07/12/17 


Montelukast Sodium [Singulair] 10 mg PO DAILY  tablet 08/16/17 


Atorvastatin Calcium 10 mg PO DAILY  tablet 08/18/17 


Levofloxacin [Levaquin] 500 mg PO DAILY #7 tablet 09/01/17 


Metronidazole [Flagyl -] 500 mg PO BID #14 tablet 09/01/17 


Tamsulosin HCl [Flomax -] 0.4 mg PO DAILY 09/01/17 


Ursodiol [Actigall] 300 mg PO BID #180 capsule 09/01/17 














- Vital Signs


 


 Last Vital Signs











Temp Pulse Resp BP Pulse Ox


 


 98.5 F   66   18   110/60   95 


 


 09/04/17 18:00  09/04/17 18:00  09/04/17 18:00  09/04/17 18:00  09/04/17 09:00














HEENT: MAKR, EOM Intact


Oropharynx: No thrush, No mucositis


Cor: RSR, No murmurs, No gallops


Lungs: Clear to P&A


Abd: Soft, Normal bowel sounds, No organomegaly


Ext:No significant edema





A/P





Patient is a 88 year old male with a history of CAD, MI s/p stenting, Valve 

replacement s/p TVAR, right nephrectomy, BPH, pancreatits, cholecystits, 

choledocolethiasis who presents with abdominal pain and vomiting. 


Being treated for cholangitis/sepsis/BO/G- bacteremia--E.coli


On zosyn since 9/3





Thrombocytopenia/pancytopenia--baseline low nl platelets, now with worsening


suspect worsening due to bacteremia


But currently hemodynamically stable and no overt signs of sepsis. 


Time frame not related to zosyn and clinically not septic


Hence  suspect further dip in platelet count today due to  Dilutional effect 

from fluids. Drop in all counts noted.


INR/fibrinogrn nl making DIC less likely





will continue to monitor





IV fluids per primary team





Discussed with ID team

## 2017-09-04 NOTE — PN
Progress Note (short form)





- Note


Progress Note: 


 Current Medications





Aclidinium Bromide (Tudorza -)  1 puff IH BID [facility]


   Last Admin: 09/04/17 11:14 Dose:  1 puff


Atorvastatin Calcium (Lipitor -)  10 mg PO HS [facility]


   Last Admin: 09/03/17 21:16 Dose:  10 mg


Carvedilol (Coreg -)  6.25 mg PO BID [facility]


   Last Admin: 09/04/17 11:14 Dose:  6.25 mg


Finasteride (Proscar -)  5 mg PO DAILY [facility]


   Last Admin: 09/04/17 11:14 Dose:  5 mg


Piperacillin Sod/Tazobactam (Sod 2.25 gm/ Dextrose)  50 mls @ 100 mls/hr IVPB 

Q6H-IV ALCIDES


   PRN Reason: Protocol


   Last Admin: 09/04/17 14:48 Dose:  100 mls/hr


Lactated Ringer's (Lactated Ringers Solution)  1,000 mls @ 125 mls/hr IV ASDIR 

[facility]


   Last Admin: 09/04/17 14:50 Dose:  125 mls/hr


Pantoprazole Sodium 80 mg/ (Sodium Chloride)  100 mls @ 10 mls/hr IVPB Q10H ALCIDES


   PRN Reason: 8 MG/HR


   Last Admin: 09/04/17 16:38 Dose:  10 mls/hr


Montelukast Sodium (Singulair -)  10 mg PO HS [facility]


   Last Admin: 09/03/17 21:16 Dose:  10 mg


Morphine Sulfate (Morphine Injection -)  2 mg IVPUSH Q4H PRN


   PRN Reason: PAIN


   Last Admin: 09/04/17 02:03 Dose:  2 mg


Tamsulosin HCl (Flomax -)  0.4 mg PO DAILY@0830 [facility]


   Last Admin: 09/04/17 11:14 Dose:  0.4 mg


Ursodiol (Actigal -)  300 mg PO BID [facility]


   Last Admin: 09/04/17 11:14 Dose:  300 mg





 Last Vital Signs











Temp Pulse Resp BP Pulse Ox


 


 98.1 F   62   17   115/53   95 


 


 09/04/17 14:00  09/04/17 14:00  09/04/17 14:00  09/04/17 14:00  09/04/17 09:00














Lungs clear


Heart RRR


Abd soft 


Ext no edema














 CBC, BMP





 09/04/17 07:30 





 09/04/17 07:30 





IMP- anupama on chronic


prerenal


anemia

















Plan-  continue IVF Ringers Lactate


f/u cmp in am

## 2017-09-04 NOTE — PN
Progress Note, Physician


Chief Complaint: 


Not in distress


History of Present Illness: 


Patient was seen and examined. Awake and alert. Chart was reviewed


Denies chest pain, SOB or palpitations


Denies abdominal pain





- Current Medication List


Current Medications: 


Active Medications





Aclidinium Bromide (Tudorza -)  1 puff IH BID [facility]


   Last Admin: 09/04/17 11:14 Dose:  1 puff


Atorvastatin Calcium (Lipitor -)  10 mg PO HS [facility]


   Last Admin: 09/03/17 21:16 Dose:  10 mg


Carvedilol (Coreg -)  6.25 mg PO BID [facility]


   Last Admin: 09/04/17 11:14 Dose:  6.25 mg


Finasteride (Proscar -)  5 mg PO DAILY [facility]


   Last Admin: 09/04/17 11:14 Dose:  5 mg


Piperacillin Sod/Tazobactam (Sod 2.25 gm/ Dextrose)  50 mls @ 100 mls/hr IVPB 

Q6H-IV ALCIDES


   PRN Reason: Protocol


   Last Admin: 09/04/17 11:12 Dose:  100 mls/hr


Lactated Ringer's (Lactated Ringers Solution)  1,000 mls @ 125 mls/hr IV ASDIR 

[facility]


   Last Admin: 09/04/17 04:00 Dose:  125 mls/hr


Montelukast Sodium (Singulair -)  10 mg PO HS [facility]


   Last Admin: 09/03/17 21:16 Dose:  10 mg


Morphine Sulfate (Morphine Injection -)  2 mg IVPUSH Q4H PRN


   PRN Reason: PAIN


   Last Admin: 09/04/17 02:03 Dose:  2 mg


Pantoprazole Sodium (Protonix -)  40 mg PO DAILY [facility]


   Last Admin: 09/04/17 11:14 Dose:  40 mg


Tamsulosin HCl (Flomax -)  0.4 mg PO DAILY@0830 [facility]


   Last Admin: 09/04/17 11:14 Dose:  0.4 mg


Ursodiol (Actigal -)  300 mg PO BID [facility]


   Last Admin: 09/04/17 11:14 Dose:  300 mg











- Objective


Vital Signs: 


 Vital Signs











Temperature  97.5 F L  09/04/17 05:59


 


Pulse Rate  72   09/04/17 09:00


 


Respiratory Rate  18   09/04/17 09:00


 


Blood Pressure  132/79   09/04/17 09:00


 


O2 Sat by Pulse Oximetry (%)  95   09/04/17 09:00











Neck: Yes: Supple


Cardiovascular: Yes: Regular Rate and Rhythm, S1, S2


Respiratory: Yes: CTA Bilaterally


Gastrointestinal: Yes: Normal Bowel Sounds, Soft.  No: Tenderness


Edema: No


Additional Findings/Remarks: 








- Review of Systems


Constitutional: denies: Chills, Fever


Cardiovascular: denies: Chest Pain, Palpitations, Shortness of Breath


Respiratory: denies: Cough, Hemoptysis, Orthopnea, PND, SOB, SOB on Exertion


Gastrointestinal: denies: Abdominal Pain, Melena, Nausea, Rectal Bleeding, 

Vomiting


Neurological: reports: Weakness.  denies: Dizziness, Headache, Seizure, Syncope








Labs: 


 CBC, BMP





 09/04/17 07:30 





 09/04/17 07:30 





 INR, PTT











INR  1.36  (0.82-1.09)  H  09/03/17  07:15    














- ....Imaging


MRI: Pending (MRCP)





Problem List





- Problems


(1) Acute cholangitis


Code(s): K83.0 - CHOLANGITIS





(2) Biliary sepsis


Code(s): K83.0 - CHOLANGITIS





(3) CAD (coronary artery disease)


Code(s): I25.10 - ATHSCL HEART DISEASE OF NATIVE CORONARY ARTERY W/O ANG PCTRS 

  Qualifiers: 


     Coronary Disease-Associated Artery/Lesion type: unspecified vessel or 

lesion type     Native vs. transplanted heart: native heart     Associated 

angina: without angina        Qualified Code(s): I25.10 - Atherosclerotic heart 

disease of native coronary artery without angina pectoris  





(4) CKD (chronic kidney disease)


Code(s): N18.9 - CHRONIC KIDNEY DISEASE, UNSPECIFIED   





(5) S/P ERCP


Code(s): Z98.890 - OTHER SPECIFIED POSTPROCEDURAL STATES





(6) Hypertension


Code(s): I10 - ESSENTIAL (PRIMARY) HYPERTENSION   Qualifiers: 


     Hypertension type: essential hypertension        Qualified Code(s): I10 - 

Essential (primary) hypertension  





(7) Hypercholesterolemia


Code(s): E78.00 - PURE HYPERCHOLESTEROLEMIA, UNSPECIFIED





(8) S/P TAVR (transcatheter aortic valve replacement)


Code(s): Z95.2 - PRESENCE OF PROSTHETIC HEART VALVE








Assessment/Plan


1. Status post ERCP for ascending cholangitis leading to sepsis


2. Post TAVR


3. Coronary artery disease, s/p PCI/stent, angina


4. Hypertension


5. Renal cancer s/p right nephrectomy





PLAN:


1. Continue GI recommendation with possible repeat ERCP - await official MRCP 

result


2. Continue Carvedilol


3. Continue statin (follow LFTs)


4. Antibiotics


5. Follow H/H and transfuse PRBC is indicated





Further plans are to follow


Gordon Botello MD

## 2017-09-04 NOTE — PN
GI Progress Note


Subjective: 


GI NOte: Feels better today, LFTs are improving. Hb has dropped below 8 however 

and his platelets are plummeting while the INR is rising. He may have recurrent 

DIC and I have contacted Dr Fernandez and asked for her to consult. He did have a 

black BM yesterday.   MRCP was discussed with Dr Velasco. There is so much 

residual air in the CBD from the ERCP that he cannot exclude residual stones. 

He advises a CT scan.    





- Objective


Vital Signs: 


 Vital Signs











Temperature  98.1 F   09/04/17 14:00


 


Pulse Rate  62   09/04/17 14:00


 


Respiratory Rate  17   09/04/17 14:00


 


Blood Pressure  115/53   09/04/17 14:00


 


O2 Sat by Pulse Oximetry (%)  95   09/04/17 09:00








 Laboratory Tests











  09/04/17 09/04/17 09/04/17





  07:30 07:30 07:30


 


WBC    4.8  D


 


Hgb    7.5 L D


 


Hct    22.0 L D


 


Plt Count    67 L D


 


Ferritin  207.355  


 


Total Bilirubin   1.3 H D 


 


Direct Bilirubin   0.5 H D 


 


AST   177 H D 


 


ALT   157 H D 


 


Alkaline Phosphatase   216 H D 


 


Albumin   2.3 L 











Constitutional: Calm, Pallor


Cardiovascular: Yes: Regular Rate and Rhythm


Respiratory: Yes: CTA Bilaterally


...Auscultate: Yes: Normoactive Bowel Sounds


...Palpate: Yes: Soft, Other (nontender)


Labs: 


 CBC, BMP





 09/04/17 07:30 





 09/04/17 07:30 





 INR, PTT











INR  1.36  (0.82-1.09)  H  09/03/17  07:15    














Assessment/Plan


Gram negative bacteremia from ascending cholangitis after ERCP manipulation of 

the CBD. Will order CT to exclude residual CBD stones. He has GI bleeding 

perhaps from a traumatized sphincterotomy site or an ERCP scope induced 

erosion. Will transfuse and continue PPI. If bleeding persists will need to do 

an EGD. Will order studies to exclude DIC and have consulted hematology. 

Discussed situation with his wife, Claudia.

## 2017-09-04 NOTE — PN
Physical Exam: 


SUBJECTIVE: Patient seen and examined. Patient comfortable. He denies abdominal 

pain, nausea, chills. 








OBJECTIVE:





 Vital Signs











 Period  Temp  Pulse  Resp  BP Sys/Badillo  Pulse Ox


 


 Last 24 Hr  97.5 F-98.7 F  56-93  18-20  /44-79  95-95











GENERAL: The patient is awake, alert, and fully oriented, in no acute distress.


LUNGS: Breath sounds equal, clear to auscultation bilaterally, no wheezes, no 

crackles, no accessory muscle use. 


HEART: Regular rate and rhythm, S1, S2 without murmur, rub or gallop.


ABDOMEN: Soft, nontender, nondistended, normoactive bowel sounds, no guarding, 

no rebound, no hepatosplenomegaly, no masses.


EXTREMITIES: 2+ pulses, warm, well-perfused, no edema. 














 Laboratory Results - last 24 hr











  09/03/17 09/04/17 09/04/17





  15:00 05:10 05:10


 


WBC   


 


RBC   


 


Hgb   


 


Hct   


 


MCV   


 


MCH   


 


MCHC   


 


RDW   


 


Plt Count   


 


MPV   


 


Neutrophils %   


 


Lymphocytes %   


 


Monocytes %   


 


Eosinophils %   


 


Basophils %   


 


Retic Count   


 


Sodium   


 


Potassium   


 


Chloride   


 


Carbon Dioxide   


 


Anion Gap   


 


BUN   


 


Creatinine   


 


Random Glucose   


 


Calcium   


 


Ferritin   


 


Total Bilirubin   


 


Direct Bilirubin   


 


AST   


 


ALT   


 


Alkaline Phosphatase   


 


C-Reactive Protein   


 


Total Protein   


 


Albumin   


 


Urine Color    Yellow


 


Urine Appearance    Clear


 


Urine pH    5.0


 


Ur Specific Gravity    1.015


 


Urine Protein    Negative


 


Urine Glucose (UA)    Negative


 


Urine Ketones    Negative


 


Urine Blood    Negative


 


Urine Nitrite    Negative


 


Urine Bilirubin    Negative


 


Urine Urobilinogen    Negative


 


Ur Leukocyte Esterase    Trace


 


Urine RBC    None


 


Urine WBC    6


 


Ur Epithelial Cells    Rare


 


Urine Bacteria    Rare


 


Urine Mucus    Rare


 


Urine Creatinine   72.8 


 


Stool Occult Blood  Negative  














  09/04/17 09/04/17 09/04/17





  07:30 07:30 07:30


 


WBC    4.8  D


 


RBC    2.25 L


 


Hgb    7.5 L D


 


Hct    22.0 L D


 


MCV    97.8 H


 


MCH    33.3


 


MCHC    34.1


 


RDW    17.0 H


 


Plt Count    67 L D


 


MPV    9.3


 


Neutrophils %    81.7


 


Lymphocytes %    9.8  D


 


Monocytes %    3.7 L


 


Eosinophils %    4.2  D


 


Basophils %    0.6


 


Retic Count    1.03


 


Sodium  139  


 


Potassium  4.3  


 


Chloride  105  


 


Carbon Dioxide  26  


 


Anion Gap  8  


 


BUN  49 H  


 


Creatinine  2.0 H  


 


Random Glucose  86  


 


Calcium  8.4 L  


 


Ferritin  207.355  


 


Total Bilirubin   1.3 H D 


 


Direct Bilirubin   0.5 H D 


 


AST   177 H D 


 


ALT   157 H D 


 


Alkaline Phosphatase   216 H D 


 


C-Reactive Protein   6.9 H D 


 


Total Protein   4.6 L 


 


Albumin   2.3 L 


 


Urine Color   


 


Urine Appearance   


 


Urine pH   


 


Ur Specific Gravity   


 


Urine Protein   


 


Urine Glucose (UA)   


 


Urine Ketones   


 


Urine Blood   


 


Urine Nitrite   


 


Urine Bilirubin   


 


Urine Urobilinogen   


 


Ur Leukocyte Esterase   


 


Urine RBC   


 


Urine WBC   


 


Ur Epithelial Cells   


 


Urine Bacteria   


 


Urine Mucus   


 


Urine Creatinine   


 


Stool Occult Blood   








Active Medications











Generic Name Dose Route Start Last Admin





  Trade Name Freq  PRN Reason Stop Dose Admin


 


Aclidinium Bromide  1 puff 09/02/17 22:00 09/04/17 11:14





  Tudorza -  IH   1 puff





  BID ALCIDES   Administration


 


Atorvastatin Calcium  10 mg 09/03/17 22:00 09/03/17 21:16





  Lipitor -  PO   10 mg





  HS ALCIDES   Administration


 


Carvedilol  6.25 mg 09/02/17 22:00 09/04/17 11:14





  Coreg -  PO   6.25 mg





  BID ALCIDES   Administration


 


Finasteride  5 mg 09/03/17 10:00 09/04/17 11:14





  Proscar -  PO   5 mg





  DAILY ALCIDES   Administration


 


Piperacillin Sod/Tazobactam  50 mls @ 100 mls/hr 09/03/17 12:00 09/04/17 11:12





  Sod 2.25 gm/ Dextrose  IVPB   100 mls/hr





  Q6H-IV ALCIDES   Administration





  Protocol   


 


Lactated Ringer's  1,000 mls @ 125 mls/hr 09/03/17 12:50 09/04/17 04:00





  Lactated Ringers Solution  IV   125 mls/hr





  ASDIR ALCIDES   Administration


 


Montelukast Sodium  10 mg 09/03/17 22:00 09/03/17 21:16





  Singulair -  PO   10 mg





  HS ALCIDES   Administration


 


Morphine Sulfate  2 mg 09/02/17 13:21 09/04/17 02:03





  Morphine Injection -  IVPUSH   2 mg





  Q4H PRN   Administration





  PAIN   


 


Pantoprazole Sodium  40 mg 09/02/17 13:30 09/04/17 11:14





  Protonix -  PO   40 mg





  DAILY ALCIDES   Administration


 


Tamsulosin HCl  0.4 mg 09/03/17 08:30 09/04/17 11:14





  Flomax -  PO   0.4 mg





  DAILY@0830 ALCIDES   Administration


 


Ursodiol  300 mg 09/02/17 22:00 09/04/17 11:14





  Actigal -  PO   300 mg





  BID ALCIDES   Administration











ASSESSMENT/PLAN:


This is an 88 year old man with a history of HTN, CAD, MI, cardiac stent, AS, 

TAVR, right nephrectomy, BPH, urethral strictures who presents to the ER with 

abdominal pain, vomiting and fever which started after CBD removal 9/1.





1. Choledocholithiasis with acute cholangitis and E.coli bacteremia


   - s/p ERCP, stone removal, CBD stent removal 9/1


   - LFTs improving


   - Continue IV fluid


   - Pain control


   - Continue Actigall


   - GI and ID consults appreciated


   - Continue Zosyn


   - MRCP pending


2. Acute kidney injury on stage 3 vs 4 CKD


   - Nephrology consult appreciated


   - Continue IV fluid


   - Continue to monitor BUN, creatinine


3. CAD, history of MI, stent


   - Continue Coreg, Lipitor


4. AS, history of TAVR


5. HTN


   - Continue Coreg


6. History of renal cancer, right nephrectomy


7. BPH


   - Continue Flomax, Proscar


8. Anemia, macrocytic


   - Monitor hemoglobin


   - Ferritin is normal. Iron, TIBC, iron sat, B12, folate, TSH ordered


   - Stool is negative for occult blood x 2


9. Thrombocytopenia


   - Monitor platelets





Visit type





- Emergency Visit


Emergency Visit: Yes


ED Registration Date: 09/02/17


Care time: The patient presented to the Emergency Department on the above date 

and was hospitalized for further evaluation of their emergent condition.





- New Patient


This patient is new to me today: No





- Critical Care


Critical Care patient: No





- Discharge Referral


Referred to [facility] No

## 2017-09-05 LAB
ALBUMIN SERPL-MCNC: 2.3 G/DL (ref 3.4–5)
ALP SERPL-CCNC: 194 U/L (ref 45–117)
ALT SERPL-CCNC: 114 U/L (ref 12–78)
ANION GAP SERPL CALC-SCNC: 10 MMOL/L (ref 8–16)
APTT BLD: 31.9 SECONDS (ref 26.9–34.4)
AST SERPL-CCNC: 91 U/L (ref 15–37)
BASOPHILS # BLD: 0.9 % (ref 0–2)
BILIRUB CONJ SERPL-MCNC: 0.6 MG/DL (ref 0–0.2)
BILIRUB SERPL-MCNC: 1.2 MG/DL (ref 0.2–1)
CALCIUM SERPL-MCNC: 8.1 MG/DL (ref 8.5–10.1)
CO2 SERPL-SCNC: 24 MMOL/L (ref 21–32)
CREAT SERPL-MCNC: 1.8 MG/DL (ref 0.7–1.3)
CRP SERPL-MCNC: 4.2 MG/DL (ref 0–0.3)
DEPRECATED RDW RBC AUTO: 17.7 % (ref 11.9–15.9)
DEPRECATED RDW RBC AUTO: 18 % (ref 11.9–15.9)
EOSINOPHIL # BLD: 6.9 % (ref 0–4.5)
GLUCOSE SERPL-MCNC: 85 MG/DL (ref 74–106)
INR BLD: 1.06 (ref 0.82–1.09)
IRON SERPL-MCNC: 40 UG/DL (ref 38–169)
MCH RBC QN AUTO: 32.8 PG (ref 25.7–33.7)
MCH RBC QN AUTO: 32.9 PG (ref 25.7–33.7)
MCHC RBC AUTO-ENTMCNC: 34.2 G/DL (ref 32–35.9)
MCHC RBC AUTO-ENTMCNC: 34.5 G/DL (ref 32–35.9)
MCV RBC: 95.2 FL (ref 80–96)
MCV RBC: 95.8 FL (ref 80–96)
NEUTROPHILS # BLD: 77.7 % (ref 42.8–82.8)
PLATELET # BLD AUTO: 73 K/MM3 (ref 134–434)
PLATELET # BLD AUTO: 82 K/MM3 (ref 134–434)
PMV BLD: 9.6 FL (ref 7.5–11.1)
PMV BLD: 9.9 FL (ref 7.5–11.1)
PROT SERPL-MCNC: 4.8 G/DL (ref 6.4–8.2)
PT PNL PPP: 11.7 SEC (ref 9.98–11.88)
TIBC SERPL-MCNC: 147 UG/DL (ref 250–450)
UIBC SERPL-MCNC: 107 UG/DL (ref 111–343)
VIT B12 SERPL-MCNC: 817 PG/ML (ref 180–914)
WBC # BLD AUTO: 4.9 K/MM3 (ref 4–10)
WBC # BLD AUTO: 5.4 K/MM3 (ref 4–10)

## 2017-09-05 RX ADMIN — FINASTERIDE SCH MG: 5 TABLET, FILM COATED ORAL at 11:34

## 2017-09-05 RX ADMIN — PANTOPRAZOLE SODIUM SCH MLS/HR: 40 INJECTION, POWDER, FOR SOLUTION INTRAVENOUS at 03:00

## 2017-09-05 RX ADMIN — CARVEDILOL SCH MG: 6.25 TABLET, FILM COATED ORAL at 22:02

## 2017-09-05 RX ADMIN — CEFAZOLIN SODIUM SCH MLS/HR: 2 SOLUTION INTRAVENOUS at 22:03

## 2017-09-05 RX ADMIN — CARVEDILOL SCH MG: 6.25 TABLET, FILM COATED ORAL at 11:35

## 2017-09-05 RX ADMIN — URSODIOL SCH MG: 300 CAPSULE ORAL at 22:02

## 2017-09-05 RX ADMIN — PIPERACILLIN SODIUM AND TAZOBACTAM SODIUM SCH MLS/HR: .25; 2 INJECTION, POWDER, LYOPHILIZED, FOR SOLUTION INTRAVENOUS at 03:00

## 2017-09-05 RX ADMIN — TAMSULOSIN HYDROCHLORIDE SCH MG: 0.4 CAPSULE ORAL at 11:35

## 2017-09-05 RX ADMIN — URSODIOL SCH MG: 300 CAPSULE ORAL at 11:35

## 2017-09-05 RX ADMIN — PIPERACILLIN SODIUM AND TAZOBACTAM SODIUM SCH MLS/HR: .25; 2 INJECTION, POWDER, LYOPHILIZED, FOR SOLUTION INTRAVENOUS at 11:35

## 2017-09-05 RX ADMIN — PANTOPRAZOLE SODIUM SCH MG: 40 TABLET, DELAYED RELEASE ORAL at 22:02

## 2017-09-05 RX ADMIN — ACLIDINIUM BROMIDE SCH PUFF: 400 POWDER, METERED RESPIRATORY (INHALATION) at 11:38

## 2017-09-05 RX ADMIN — ATORVASTATIN CALCIUM SCH MG: 10 TABLET, FILM COATED ORAL at 22:02

## 2017-09-05 RX ADMIN — SODIUM CHLORIDE, POTASSIUM CHLORIDE, SODIUM LACTATE AND CALCIUM CHLORIDE SCH MLS/HR: 600; 310; 30; 20 INJECTION, SOLUTION INTRAVENOUS at 06:20

## 2017-09-05 RX ADMIN — MONTELUKAST SODIUM SCH MG: 10 TABLET, COATED ORAL at 22:02

## 2017-09-05 RX ADMIN — ACLIDINIUM BROMIDE SCH PUFF: 400 POWDER, METERED RESPIRATORY (INHALATION) at 22:02

## 2017-09-05 NOTE — PATH
Surgical Pathology Report



Patient Name:  NAEL ALDANA

Accession #:  [id number]

Med. Rec. #:  [id number]                                                        

   /Age/Gender:  1929 (Age: 88) / M

Account:  [id number]                                                          

             Location: [facility]-ENDOSCOPY

Taken:  2017

Received:  2017

Reported:  2017

Physicians:  Raul Terrazas M.D.

  



Specimen(s) Received

 OLD STENT FORM BILE DUCT 





Clinical History

Cholelithiasis, status post stent







Final Diagnosis

OLD STENT, BILE DUCT, REMOVAL:

STENT (GROSS EXAM).





***Electronically Signed***

Alexander Finkelstein, M.D.





Gross Description

Received fresh labeled "old stent from bile duct" is a blue, coiled portion of

tubing, consistent with a biliary stent. No soft tissue is present. No sections

are submitted, gross only.





saudi

## 2017-09-05 NOTE — PN
Teaching Attending Note


Name of Resident: Jr Rogers


ATTENDING PHYSICIAN STATEMENT





I saw and evaluated the patient.


I reviewed the resident's note and discussed the case with the resident.


I agree with the resident's findings and plan as documented.








SUBJECTIVE:asymptomatic. states his throat feels hoarse since friday. no 

dysphagia or odynopahgia. requesting to eat and go home. denies CP, SOB, had 

loose BM 2 days ago. denies CP, SOB< fever,chills, N/V/








OBJECTIVE:


 Last Vital Signs











Temp Pulse Resp BP Pulse Ox


 


 97.4 F L  65   18   122/61   96 


 


 09/05/17 14:00  09/05/17 14:00  09/05/17 14:00  09/05/17 14:00  09/05/17 09:00








General NAD


CV S1 S2 RRR no murmur/rub/gallop


Lungs CTA B/L no wheezing/rales/rhonchi


ABdomen soft NT/ND no rebound or guarding. normoactive BS





ASSESSMENT AND PLAN:


88 year old man with a history of HTN, CAD, MI, cardiac stent, AS, TAVR, right 

nephrectomy, BPH, urethral strictures who presents to the ER with abdominal pain

, vomiting and fever which started after CBD removal 9/1.


1. Choledocholithiasis with acute cholangitis and E.coli bacteremia- s/p ERCP, 

stone removal, CBD stent removal 9/1. MRCP inconclusive. CT done this am. will 

advance to clear liquid diet. LFT trending down. received zosyn x3 days and 

switched to cefazolin today by ID. repeat BCx ordered. ID and GI on board. 


2. Acute Hgb drop- concern for GI bleeding as pt has had hx vs spincterectomy. s

/p 1 unit PRBC yesterday with good response. no reports of bleeding. iron 

studies pending


3. Hoarse throat-speech and swallow eval. chloraseptic spray if normal. 


4. Acute kidney injury on stage 3 vs 4 CKD- improving. will d/c IVF once 

tolerating diet. nephrology on board. 


5. CAD, history of MI, stent-cont to hold plavix. Continue Coreg, Lipitor


6. AS, history of TAVR


7. HTN- Continue Coreg


8. History of renal cancer, right nephrectomy


9. BPH- Continue Flomax, Proscar


10. Thrombocytopenia-likley due to bacteremia. HIT pending. hematology 

consulted. Monitor platelets


11. DVT ppx- SCD due to GI bleed.

## 2017-09-05 NOTE — PN
GI Progress Note


Subjective: 


GI NOte: Appetite is good. NO BM overnight. Hb is 9.4 post-transfusion. 

Discussed CT with Dr Velasco who finds pneumobilia but no obvious CBD stones. 

His renal function has responded nicely to the transfusion and fluids. The renal

, ID and hematology notes are appreciated. Fortunately does not appears to have 

DIC.    





- Objective


Vital Signs: 


 Vital Signs











Temperature  97.7 F   09/05/17 08:38


 


Pulse Rate  61   09/05/17 08:38


 


Respiratory Rate  20   09/05/17 09:00


 


Blood Pressure  113/53   09/05/17 08:38


 


O2 Sat by Pulse Oximetry (%)  96   09/05/17 09:00








 Laboratory Tests











  09/05/17 09/05/17 09/05/17





  06:00 06:00 06:00


 


WBC   5.4 


 


Hgb   9.4 L D 


 


Plt Count   73 L 


 


BUN  39 H D  


 


Creatinine  1.8 H  


 


Total Bilirubin  1.2 H  


 


Direct Bilirubin    0.6 H


 


GGT    93 H


 


AST  91 H D  


 


ALT  114 H D  


 


Alkaline Phosphatase  194 H  


 


C-Reactive Protein    4.2 H D











Constitutional: Calm


Cardiovascular: Yes: Regular Rate and Rhythm


Respiratory: Yes: CTA Bilaterally


...Auscultate: Yes: Normoactive Bowel Sounds


...Palpate: Yes: Soft, Other (nontender)


Labs: 


 CBC, BMP





 09/05/17 06:00 





 09/05/17 06:00 





 INR, PTT











INR  1.06  (0.82-1.09)   09/05/17  06:00    


 


Fibrinogen  313.0 mg/dL (238-498)   09/05/17  06:00    














Assessment/Plan


GI Bleeding appears to have subsided. Will stop pantoprazole infusion. 

Discussed case with Dr. Dejesus who will opine on how many days of IV 

antibiotics are indicated. Wife is at bedside and was updated.

## 2017-09-05 NOTE — CONSULT
Admitting History and Physical





- Primary Care Physician


PCP: Candis Perez





- Admission


Chief Complaint: "pain on swallowing"


History of Present Illness: 


Patient is 88 Year old white male with H/O choledocholithiasis and cholangitis s

/p stent removal  presented to Ed with one day history of RUQ abdominal pain 

and bloody vomiting ,admitted to [facility] inpatient service for common bile 

duct obstruction.


 Selected Entries











  09/04/17 09/04/17 09/04/17





  02:00 05:58 05:59


 


Breakfast   


 


Lunch   


 


Supper  50% 


 


Temperature 97.8 F  97.5 F L














  09/04/17 09/04/17 09/04/17





  14:00 18:00 19:00


 


Breakfast 50%  


 


Lunch 50%  


 


Supper   


 


Temperature 98.1 F 98.5 F 98.1 F














  09/04/17 09/05/17 09/05/17





  23:00 01:05 01:58


 


Breakfast   


 


Lunch   


 


Supper 50%  


 


Temperature  98.1 F 98.0 F














  09/05/17 09/05/17





  05:00 08:38


 


Breakfast  


 


Lunch  


 


Supper  


 


Temperature 97.7 F 97.7 F








 Laboratory Tests











  09/05/17





  06:00


 


WBC  5.4








Pt with c/o pain on swallowing. Placed on soft diet today.


History Source: Patient, Medical Record


Limitations to Obtaining History: No Limitations





- Past Medical History


Cardiovascular: Yes: Aortic Stenosis (Post TAVR at [facility] in 2016), CAD (s/p 

cardiac stent/ PCI 1996), HTN, Hyperlipdemia


Gastrointestinal: Yes: Diverticulosis


Hepatobiliary: Yes: Cholelithiasis, Choledocholithiasis (ERCP x 3 with stent 

insertion and removal)


Renal/: Yes: Renal Inusuff, Cancer (Renal CA with right nephrectomy), Renal 

Calculi, Other (urethral stricture s/p cystoscopy, nephrectomy for RCC)


Heme/Onc: Yes: Other (renal cell cancer)





- Past Surgical History


Past Surgical History: Yes: Nephrectomy (right nephrectomy for cancer 9/16), 

Stent, Valve Replacement (TAVR)





- Smoking History


Smoking history: Never smoked


Have you smoked in the past 12 months: No





- Alcohol/Substance Use


Hx Alcohol Use: No


History of Substance Use: reports: None





- Social History


ADL: Independent


Occupation: CPA: still working


History of Recent Travel: No





History





- Admission


Reason For Visit: ELEVATED LIVER ENZYMES





- General


Mental Status: Alert and Oriented, Awake and Alert, Able to Follow Commands


Attention: Intact


Ability to Follow Directions: Good


Head/Neck Control: Good





- Hearing


Hearing: Impaired, Left Ear, Right Ear


Hearing Aide: Yes


With Patient: Yes





Speech Evaluation





- Communication


Primary Language: ENGLISH


Communication: Yes: Within Normal Limits





- Speech Production


Able to Make Needs Known: Yes: WNL


Intelligibility: Yes: Mildly Impaired





- Speech Characteristics


Voice Loudness: Mildly Soft/Quiet


Voice Pitch: Yes: Pitch Breaks


Voice Phonatory-based Quality: Yes: Dysphonia, Vocal Wetness (intermittent)


Articulation: Yes: Imprecise


Rate of Speech: Intact





- Language/Auditory Comprehension


Observation: Comprehends Conversational Speech: Yes (if loud enough), Benefits 

from Repetiton: Yes, Benefits from Increased Volume of Speech: Yes





- Language/Verbal Expression


Functional Communication Status: Yes: WNL





- Swallow Evaluation/Bedside Assessment


Current Nutritional Intake: Soft, Thin Liquids


Oral Secretions: Yes: WFL


Dentition: Yes: Adequate, Dental Appliance Upper, Dental Appliance Lower


Facial Symmetry at Rest: Symmetrical


Facial Symmetry on Retraction: Symmetrical


Facial Movement: Controlled


Against Resistance Opening: Normal


Against Resistance Closing: Normal


Pucker Lips: Normal


Smile: Normal


Lingual Movement: Normal, Symmetric


Lingual Speed of Movement: Normal


Lingual Movement Strgth Against Opposition: Normal


Lingual Movement Characteristics: Normal


Velopharyngeal Movement: Normal


Laryngeal Elevation: WFL


Laryngeal Movement: Able to Palpate


Rate of Intake: Impulsive (large bites)


Labial Seal: WFL


Chewing: WFL


Oral Prep Time: WFL


A-P Transit: WFL


Pocketing: None


Timing of Swallow: WFL


Odynophagia: Pharyngeal (initially c/o pain while swallowing.)


Coughing/Throat Clear: No


Change in Voice: No





Recommendations





- Speech Evaluation, Impression/Plan


Impression: Initially c/o pain while swallowing.Pt assessed with puree, water 

and bread with no further c/o of odynophagia.  I concur with soft diet. Observe 

tolerance.





- Dysphagia Impressions/Plan


Dysphagia Impressions: Ongoing Evaluation


*Silent aspiration: cannot be R/O at bedside





- Recommendations


Diet Consistency: Regular (soft)


Liquids: Thin Liquids

## 2017-09-05 NOTE — PN
Physical Exam: 


SUBJECTIVE: Patient seen and examined at bed side.  No acute events over night. 

He denies fever, chills , N/V/D/C. Abdominal CAT w/o contrast is pending.








OBJECTIVE:





 Vital Signs











 Period  Temp  Pulse  Resp  BP Sys/Badillo  Pulse Ox


 


 Last 24 Hr  97.7 F-98.5 F  61-72  17-20  108-132/53-79  95-95











GENERAL: The patient is awake, alert, and fully oriented, in no acute distress.


HEAD: Normal with no signs of trauma.


EYES: PERRL, extraocular movements intact, sclera anicteric, conjunctiva clear. 

No ptosis. 


ENT: Ears normal, nares patent, oropharynx clear without exudates, moist mucous 


membranes.


NECK: Trachea midline, full range of motion, supple. 


LUNGS: Breath sounds equal, clear to auscultation bilaterally, no wheezes, no 

crackles, no 


accessory muscle use. 


HEART: Regular rate and rhythm, S1, S2 without murmur, rub or gallop.


ABDOMEN: Soft, nontender, nondistended, normoactive bowel sounds, no guarding, 

no 


rebound, no hepatosplenomegaly, no masses.


EXTREMITIES: 2+ pulses, warm, well-perfused, no edema. 


NEUROLOGICAL: Cranial nerves II through XII grossly intact. Normal speech, gait 

not 


observed.


PSYCH: Normal mood, normal affect.


SKIN: Warm, dry, normal turgor, no rashes or lesions noted














 Laboratory Results - last 24 hr











  09/04/17 09/04/17 09/04/17





  05:10 07:30 07:30


 


WBC   


 


RBC   


 


Hgb   


 


Hct   


 


MCV   


 


MCH   


 


MCHC   


 


RDW   


 


Plt Count   


 


MPV   


 


Neutrophils %   


 


Lymphocytes %   


 


Monocytes %   


 


Eosinophils %   


 


Basophils %   


 


Retic Count   


 


INR   


 


PTT (Actin FS)   


 


Fibrinogen   


 


Sodium   139 


 


Potassium   4.3 


 


Chloride   105 


 


Carbon Dioxide   26 


 


Anion Gap   8 


 


BUN   49 H 


 


Creatinine   2.0 H 


 


Random Glucose   86 


 


Calcium   8.4 L 


 


Iron    40


 


TIBC    147 L


 


Iron Saturation    27


 


Ferritin   207.355 


 


Direct Bilirubin   


 


GGT   


 


LD Total   


 


C-Reactive Protein   


 


Urine Color  Yellow  


 


Urine Appearance  Clear  


 


Urine pH  5.0  


 


Ur Specific Gravity  1.015  


 


Urine Protein  Negative  


 


Urine Glucose (UA)  Negative  


 


Urine Ketones  Negative  


 


Urine Blood  Negative  


 


Urine Nitrite  Negative  


 


Urine Bilirubin  Negative  


 


Urine Urobilinogen  Negative  


 


Ur Leukocyte Esterase  Trace  


 


Urine RBC  None  


 


Urine WBC  6  


 


Ur Epithelial Cells  Rare  


 


Urine Bacteria  Rare  


 


Urine Mucus  Rare  


 


Blood Type   


 


Antibody Screen   


 


Crossmatch   














  09/04/17 09/04/17 09/04/17





  15:40 15:40 15:40


 


WBC   


 


RBC   


 


Hgb   


 


Hct   


 


MCV   


 


MCH   


 


MCHC   


 


RDW   


 


Plt Count   


 


MPV   


 


Neutrophils %   


 


Lymphocytes %   


 


Monocytes %   


 


Eosinophils %   


 


Basophils %   


 


Retic Count   1.14  D 


 


INR    1.09


 


PTT (Actin FS)   


 


Fibrinogen    317.0  D


 


Sodium   


 


Potassium   


 


Chloride   


 


Carbon Dioxide   


 


Anion Gap   


 


BUN   


 


Creatinine   


 


Random Glucose   


 


Calcium   


 


Iron   


 


TIBC   


 


Iron Saturation   


 


Ferritin   


 


Direct Bilirubin   


 


GGT   


 


LD Total   


 


C-Reactive Protein   


 


Urine Color   


 


Urine Appearance   


 


Urine pH   


 


Ur Specific Gravity   


 


Urine Protein   


 


Urine Glucose (UA)   


 


Urine Ketones   


 


Urine Blood   


 


Urine Nitrite   


 


Urine Bilirubin   


 


Urine Urobilinogen   


 


Ur Leukocyte Esterase   


 


Urine RBC   


 


Urine WBC   


 


Ur Epithelial Cells   


 


Urine Bacteria   


 


Urine Mucus   


 


Blood Type  A POSITIVE  


 


Antibody Screen  Negative  


 


Crossmatch  See Detail  














  09/04/17 09/05/17 09/05/17





  15:40 06:00 06:00


 


WBC    5.4


 


RBC    2.86 L D


 


Hgb    9.4 L D


 


Hct    27.4 L D


 


MCV    95.8


 


MCH    32.8


 


MCHC    34.2


 


RDW    18.0 H


 


Plt Count    73 L


 


MPV    9.6


 


Neutrophils %    77.7


 


Lymphocytes %    10.5


 


Monocytes %    4.0


 


Eosinophils %    6.9 H


 


Basophils %    0.9


 


Retic Count   


 


INR   


 


PTT (Actin FS)   


 


Fibrinogen   


 


Sodium   139 


 


Potassium   4.1 


 


Chloride   105 


 


Carbon Dioxide   


 


Anion Gap   


 


BUN   


 


Creatinine   


 


Random Glucose   


 


Calcium   


 


Iron   


 


TIBC   


 


Iron Saturation   


 


Ferritin   


 


Direct Bilirubin   


 


GGT   


 


LD Total  192  


 


C-Reactive Protein   


 


Urine Color   


 


Urine Appearance   


 


Urine pH   


 


Ur Specific Gravity   


 


Urine Protein   


 


Urine Glucose (UA)   


 


Urine Ketones   


 


Urine Blood   


 


Urine Nitrite   


 


Urine Bilirubin   


 


Urine Urobilinogen   


 


Ur Leukocyte Esterase   


 


Urine RBC   


 


Urine WBC   


 


Ur Epithelial Cells   


 


Urine Bacteria   


 


Urine Mucus   


 


Blood Type   


 


Antibody Screen   


 


Crossmatch   














  09/05/17 09/05/17 09/05/17





  06:00 06:00 06:00


 


WBC   


 


RBC   


 


Hgb   


 


Hct   


 


MCV   


 


MCH   


 


MCHC   


 


RDW   


 


Plt Count   


 


MPV   


 


Neutrophils %   


 


Lymphocytes %   


 


Monocytes %   


 


Eosinophils %   


 


Basophils %   


 


Retic Count   


 


INR  1.06  


 


PTT (Actin FS)  31.9  


 


Fibrinogen    313.0


 


Sodium   


 


Potassium   


 


Chloride   


 


Carbon Dioxide   


 


Anion Gap   


 


BUN   


 


Creatinine   


 


Random Glucose   


 


Calcium   


 


Iron   


 


TIBC   


 


Iron Saturation   


 


Ferritin   


 


Direct Bilirubin   0.6 H 


 


GGT   93 H 


 


LD Total   


 


C-Reactive Protein   4.2 H D 


 


Urine Color   


 


Urine Appearance   


 


Urine pH   


 


Ur Specific Gravity   


 


Urine Protein   


 


Urine Glucose (UA)   


 


Urine Ketones   


 


Urine Blood   


 


Urine Nitrite   


 


Urine Bilirubin   


 


Urine Urobilinogen   


 


Ur Leukocyte Esterase   


 


Urine RBC   


 


Urine WBC   


 


Ur Epithelial Cells   


 


Urine Bacteria   


 


Urine Mucus   


 


Blood Type   


 


Antibody Screen   


 


Crossmatch   








Active Medications











Generic Name Dose Route Start Last Admin





  Trade Name Freq  PRN Reason Stop Dose Admin


 


Aclidinium Bromide  1 puff 09/02/17 22:00 09/04/17 21:47





  Tudorza -  IH   1 puff





  BID ALCIDES   Administration


 


Atorvastatin Calcium  10 mg 09/03/17 22:00 09/04/17 21:46





  Lipitor -  PO   10 mg





  HS ALCIDES   Administration


 


Carvedilol  6.25 mg 09/02/17 22:00 09/04/17 21:46





  Coreg -  PO   6.25 mg





  BID ALCIDES   Administration


 


Finasteride  5 mg 09/03/17 10:00 09/04/17 11:14





  Proscar -  PO   5 mg





  DAILY ALCIDES   Administration


 


Piperacillin Sod/Tazobactam  50 mls @ 100 mls/hr 09/03/17 12:00 09/05/17 03:00





  Sod 2.25 gm/ Dextrose  IVPB   100 mls/hr





  Q6H-IV ALCIDES   Administration





  Protocol   


 


Lactated Ringer's  1,000 mls @ 125 mls/hr 09/03/17 12:50 09/05/17 06:20





  Lactated Ringers Solution  IV   125 mls/hr





  ASDIR ALCIDES   Administration


 


Pantoprazole Sodium 80 mg/  100 mls @ 10 mls/hr 09/04/17 15:30 09/05/17 03:00





  Sodium Chloride  IVPB   10 mls/hr





  Q10H ALCIDES   Administration





  8 MG/HR   


 


Montelukast Sodium  10 mg 09/03/17 22:00 09/04/17 21:46





  Singulair -  PO   10 mg





  HS ALCIDES   Administration


 


Morphine Sulfate  2 mg 09/02/17 13:21 09/04/17 02:03





  Morphine Injection -  IVPUSH   2 mg





  Q4H PRN   Administration





  PAIN   


 


Tamsulosin HCl  0.4 mg 09/03/17 08:30 09/04/17 11:14





  Flomax -  PO   0.4 mg





  DAILY@0830 ALCIDES   Administration


 


Ursodiol  300 mg 09/02/17 22:00 09/04/17 21:46





  Actigal -  PO   300 mg





  BID ALCIDES   Administration











ASSESSMENT/PLAN:





Patient is 88 Year old white male with H/O choledocholithiasis and cholangitis s

/p stent removal  presented to Ed with one day history of RUQ abdominal pain 

and bloody vomiting.admitted to med-surg inpatient service for common bile duct 

obstruction.





#Acute choledocholithiasis with Acute cholangitis and E.choli Bacteremia 


*  Likely 2/2 residual stones post stent removal @9/1 


* CBD dilated 1.8 cm


* Continue Abx Cefazoline 2 g  IvPB - day 3


* Cont. actigall (Ursodiol) 300 mg PO BID ALCIDES


* Start soft diet  


* Pain control with Morphine 2 mg PO Q4 Hr PRN


* GI consulted , MRCP inconclusive. 


* F/U  CT result : CBD 1.7 cm with Pneumobilia , right flank hernia containing 

ascitis same to prior CT on 5/18/2017





# Anemia, macrocytic, concerned about  GI bleed S/P ERCP


*  H/H today 8.9/26 less than yesterday 9.4/27. S/P one unite transfusion 


* Monitor hemogloglobin 


*  Ferritin is normal. Iron, TIBC, iron sat, B12, folate, TSH ordered





# Constipation: 


* did not move his bowel for 4 days 


* Start Mirlax daily 


* F/u Clinically 





# Odynophagia, S/P ERCP 


* Immroved 


* on  Chloraseptic 


* Monitor Clinically  


* Speach and awallow eval came back  normal 


* Start Soft diet 








# Thrombocytopenia, likely 2/2 Bacteremia vs HIT 


* PLT today 72


* Monitor platlets 


* HIT AB pending 


* Consult hematology 


*  Stool is negative for occult blood x 2


#Transaminitis , likely 2/2 to CBD obstruction 


* AST /ALT 1107/427,   , total Bili 2.9,  today AST/ ALT 53/80    


* , PTT 29.3


* Trend liver enzyme: normal 


* Monitor clinically 


* US showed dilated CBD 1.8 with pneumobilia S/P ERCP


#vomitting Blood(hematechemesis) likely 2/2 gastric mucosal S/P  ERCP , 

Resolved 


* Patient had 5 episode of bloody vomiting 


* improved 


* Stool ocult negative 


* Start Protonix 40 mg PO daily 


*  D/C IV fluids  


* Montitor clinically 


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 


* Bun/Cr 56/1.2, today 33/1.9 stable 


* Monitor Cr


* F/U clinically


* F/U as out patient 


* Nephrology on board 





#CAD s/p stent, chronic


* Continue to hold plavix due to GI bleed


* Continue Lipitor 10 mg PO HS ALCIDES


* Continue Coreg(carvedilol) 6.25 mg PO BID ALCIDES


* EKG did not show any specific  ST/T wave changes





#Aortic stenosis s/p TAVR


* Systolic Murmur 2-3 /6 


* Cont. plavix and ASA





# Renal cell cancer s/p nephrectomy


*  Stable


* single kidney , avoid nephrotoxic including contrast





#Hypertension


* /70 today


* continue carvedilol 6.25 mg PO BID ALCIDES


* F/u BP 





#BPH, chronic


*  Stable


* Continue home meds finasteride 5 mg PO DAILY  and flomax  0.4 mg PO DAILY





#FEN


* F: On NO fluids now 


* E: Monitor lytes


*  soft diet 





#Prophylaxis


* DVT: SCDs, due to GI bleed


* GI: Protonix 40 mg PO daily  











Visit type





- Emergency Visit


Emergency Visit: Yes


ED Registration Date: 09/02/17


Care time: The patient presented to the Emergency Department on the above date 

and was hospitalized for further evaluation of their emergent condition.





- New Patient


This patient is new to me today: No





- Critical Care


Critical Care patient: No





- Discharge Referral


Referred to [facility] No

## 2017-09-05 NOTE — PN
Progress Note, Physician


History of Present Illness: 


Pt seen and examined at bedside. He is awake and alert. He denies abdominal 

pain. 





- Current Medication List


Current Medications: 


Active Medications





Aclidinium Bromide (Tudorza -)  1 puff IH BID [facility]


   Last Admin: 09/05/17 11:38 Dose:  1 puff


Atorvastatin Calcium (Lipitor -)  10 mg PO [facility]


   Last Admin: 09/04/17 21:46 Dose:  10 mg


Carvedilol (Coreg -)  6.25 mg PO BID [facility]


   Last Admin: 09/05/17 11:35 Dose:  6.25 mg


Finasteride (Proscar -)  5 mg PO DAILY [facility]


   Last Admin: 09/05/17 11:34 Dose:  5 mg


Pantoprazole Sodium 80 mg/ (Sodium Chloride)  100 mls @ 10 mls/hr IVPB Q10H [facility]


   PRN Reason: 8 MG/HR


   Last Admin: 09/05/17 03:00 Dose:  10 mls/hr


Cefazolin Sodium 2 gm/ (Dextrose)  50 mls @ 200 mls/hr IVPB BID [facility]


Montelukast Sodium (Singulair -)  10 mg PO [facility]


   Last Admin: 09/04/17 21:46 Dose:  10 mg


Phenol/Menthol (Chloraseptic -)  1 spray MM Q6HPO PRN


   PRN Reason: SORE THROAT


Tamsulosin HCl (Flomax -)  0.4 mg PO DAILY@0830 [facility]


   Last Admin: 09/05/17 11:35 Dose:  0.4 mg


Ursodiol (Actigal -)  300 mg PO BID [facility]


   Last Admin: 09/05/17 11:35 Dose:  300 mg











- Objective


Vital Signs: 


 Vital Signs











Temperature  97.7 F   09/05/17 08:38


 


Pulse Rate  61   09/05/17 08:38


 


Respiratory Rate  20   09/05/17 08:38


 


Blood Pressure  113/53   09/05/17 08:38


 


O2 Sat by Pulse Oximetry (%)  95   09/04/17 21:00











Constitutional: Yes: Calm


Eyes: Yes: Conjunctiva Clear


HENT: Yes: Atraumatic


Neck: Yes: Supple


Cardiovascular: Yes: S1, S2


Respiratory: Yes: CTA Bilaterally


Gastrointestinal: Yes: Normal Bowel Sounds, Soft


Genitourinary: Yes: Incontinence


Musculoskeletal: Yes: WNL


Edema: Yes


Edema: LLE: Trace, RLE: Trace


Neurological: Yes: Oriented


Psychiatric: Yes: Oriented


Labs: 


 CBC, BMP





 09/05/17 06:00 





 09/05/17 06:00 





 INR, PTT











INR  1.06  (0.82-1.09)   09/05/17  06:00    


 


Fibrinogen  313.0 mg/dL (238-498)   09/05/17  06:00    














Problem List





- Problems


(1) Elevated liver enzymes


Code(s): R74.8 - ABNORMAL LEVELS OF OTHER SERUM ENZYMES





(2) CAD (coronary artery disease)


Code(s): I25.10 - ATHSCL HEART DISEASE OF NATIVE CORONARY ARTERY W/O ANG PCTRS 

  Qualifiers: 


     Coronary Disease-Associated Artery/Lesion type: unspecified vessel or 

lesion type     Native vs. transplanted heart: native heart     Associated 

angina: without angina        Qualified Code(s): I25.10 - Atherosclerotic heart 

disease of native coronary artery without angina pectoris  





(3) CKD (chronic kidney disease)


Code(s): N18.9 - CHRONIC KIDNEY DISEASE, UNSPECIFIED   








Assessment/Plan


 Current Medications











Generic Name Dose Route Start Last Admin





  Trade Name Freq  PRN Reason Stop Dose Admin


 


Aclidinium Bromide  1 puff 09/02/17 22:00 09/05/17 11:38





  Tudorza -  IH   1 puff





  BID ALCIDES   Administration


 


Atorvastatin Calcium  10 mg 09/03/17 22:00 09/04/17 21:46





  Lipitor -  PO   10 mg





  HS ALCIDES   Administration


 


Carvedilol  6.25 mg 09/02/17 22:00 09/05/17 11:35





  Coreg -  PO   6.25 mg





  BID ALCIDES   Administration


 


Finasteride  5 mg 09/03/17 10:00 09/05/17 11:34





  Proscar -  PO   5 mg





  DAILY ALCIDES   Administration


 


Pantoprazole Sodium 80 mg/  100 mls @ 10 mls/hr 09/04/17 15:30 09/05/17 03:00





  Sodium Chloride  IVPB   10 mls/hr





  Q10H ALCIDES   Administration





  8 MG/HR   


 


Cefazolin Sodium 2 gm/  50 mls @ 200 mls/hr 09/05/17 22:00  





  Dextrose  IVPB   





  BID ALCIDES   


 


Montelukast Sodium  10 mg 09/03/17 22:00 09/04/17 21:46





  Singulair -  PO   10 mg





  HS ALCIDES   Administration


 


Phenol/Menthol  1 spray 09/05/17 13:13  





  Chloraseptic -  MM   





  Q6HPO PRN   





  SORE THROAT   


 


Tamsulosin HCl  0.4 mg 09/03/17 08:30 09/05/17 11:35





  Flomax -  PO   0.4 mg





  DAILY@0830 ALCIDES   Administration


 


Ursodiol  300 mg 09/02/17 22:00 09/05/17 11:35





  Actigal -  PO   300 mg





  BID ALCIDES   Administration











Impression


1. CKD with stable renal function


2. hx of BO in the past


3. choledocholithiasis


4. CAD


5. hx of Renal Cell Cancer s/p nephroectomy


6. aortic stenosis





Plan


- follow up ct scan


- GI follow u


- cont current meds


- repeat labs in am


- renal function is stable


- will follow


Dr Rodriguez

## 2017-09-05 NOTE — PN
Progress Note (short form)





- Note


Progress Note: 


doing well


no abdominal pain


no fevers





 Vital Signs











 Period  Temp  Pulse  Resp  BP Sys/Badillo  Pulse Ox


 


 Last 24 Hr  97.7 F-98.5 F  61-66  17-20  108-130/53-65  95








cor-rrr


lungs clear


abd soft,nt


ext no edema





 CBC, BMP





 09/05/17 06:00 





 09/05/17 06:00 





 Microbiology





09/02/17 10:11   Blood - Peripheral Venous   Blood Culture - Final


                            Escherichia Coli


09/02/17 10:11   Blood - Peripheral Venous   Blood Culture - Final


                            Escherichia Coli





blood cultures 9/5 pending





a/p


cholangitis


ecoli bacteremia s/p ERCP


INR, platelets improving


can switch to cefazolin


lfts improving





TAVR





CKD

## 2017-09-05 NOTE — PN
Progress Note, Physician


Chief Complaint: 


Not in distress


Feels better


History of Present Illness: 


Patient was seen and examined. Awake and alert. Chart was reviewed


Denies chest pain, SOB or palpitations


Denies abdominal pain





- Current Medication List


Current Medications: 


Active Medications





Aclidinium Bromide (Tudorza -)  1 puff IH BID [facility]


   Last Admin: 09/05/17 22:02 Dose:  1 puff


Atorvastatin Calcium (Lipitor -)  10 mg PO HS [facility]


   Last Admin: 09/05/17 22:02 Dose:  10 mg


Carvedilol (Coreg -)  6.25 mg PO BID [facility]


   Last Admin: 09/05/17 22:02 Dose:  6.25 mg


Finasteride (Proscar -)  5 mg PO DAILY [facility]


   Last Admin: 09/05/17 11:34 Dose:  5 mg


Cefazolin Sodium/Dextrose (Ancef 2 Gm Premixed Ivpb -)  50 mls @ 200 mls/hr 

IVPB BID [facility]


   Last Admin: 09/05/17 22:03 Dose:  200 mls/hr


Montelukast Sodium (Singulair -)  10 mg PO HS [facility]


   Last Admin: 09/05/17 22:02 Dose:  10 mg


Pantoprazole Sodium (Protonix -)  40 mg PO BID [facility]


   Last Admin: 09/05/17 22:02 Dose:  40 mg


Phenol/Menthol (Chloraseptic -)  1 spray MM Q6HPO PRN


   PRN Reason: SORE THROAT


Tamsulosin HCl (Flomax -)  0.4 mg PO DAILY@0830 [facility]


   Last Admin: 09/05/17 11:35 Dose:  0.4 mg


Ursodiol (Actigal -)  300 mg PO BID [facility]


   Last Admin: 09/05/17 22:02 Dose:  300 mg











- Objective


Vital Signs: 


 Vital Signs











Temperature  97.4 F L  09/05/17 14:00


 


Pulse Rate  65   09/05/17 14:00


 


Respiratory Rate  18   09/05/17 14:00


 


Blood Pressure  122/61   09/05/17 14:00


 


O2 Sat by Pulse Oximetry (%)  96   09/05/17 09:00











Neck: Yes: Supple


Cardiovascular: Yes: Regular Rate and Rhythm, S1, S2


Respiratory: Yes: CTA Bilaterally


Gastrointestinal: Yes: Normal Bowel Sounds, Soft.  No: Tenderness


Edema: No


Labs: 


 CBC, BMP





 09/05/17 17:25 





 09/05/17 06:00 





 INR, PTT











INR  1.06  (0.82-1.09)   09/05/17  06:00    


 


Fibrinogen  313.0 mg/dL (238-498)   09/05/17  06:00    














Problem List





- Problems


(1) Acute cholangitis


Code(s): K83.0 - CHOLANGITIS





(2) Biliary sepsis


Code(s): K83.0 - CHOLANGITIS





(3) CAD (coronary artery disease)


Code(s): I25.10 - ATHSCL HEART DISEASE OF NATIVE CORONARY ARTERY W/O ANG PCTRS 

  Qualifiers: 


     Coronary Disease-Associated Artery/Lesion type: unspecified vessel or 

lesion type     Native vs. transplanted heart: native heart     Associated 

angina: without angina        Qualified Code(s): I25.10 - Atherosclerotic heart 

disease of native coronary artery without angina pectoris  





(4) CKD (chronic kidney disease)


Code(s): N18.9 - CHRONIC KIDNEY DISEASE, UNSPECIFIED   





(5) S/P ERCP


Code(s): Z98.890 - OTHER SPECIFIED POSTPROCEDURAL STATES





(6) Hypertension


Code(s): I10 - ESSENTIAL (PRIMARY) HYPERTENSION   Qualifiers: 


     Hypertension type: essential hypertension        Qualified Code(s): I10 - 

Essential (primary) hypertension  





(7) Hypercholesterolemia


Code(s): E78.00 - PURE HYPERCHOLESTEROLEMIA, UNSPECIFIED





(8) S/P TAVR (transcatheter aortic valve replacement)


Code(s): Z95.2 - PRESENCE OF PROSTHETIC HEART VALVE








Assessment/Plan


1. Status post ERCP for ascending cholangitis leading to sepsis


2. Post TAVR


3. Coronary artery disease, s/p PCI/stent, angina


4. Hypertension


5. Renal cancer s/p right nephrectomy





PLAN:


1. Continue GI recommendation 


2. Continue Carvedilol


3. Continue statin and follow LFTs


4. Antibiotics as per ID in terms of duration of therapy


5. Follow H/H and transfuse PRBC is indicated





Further plans are to follow


Gordon Botello MD

## 2017-09-06 LAB
ALBUMIN SERPL-MCNC: 2.4 G/DL (ref 3.4–5)
ALP SERPL-CCNC: 168 U/L (ref 45–117)
ALT SERPL-CCNC: 80 U/L (ref 12–78)
ANION GAP SERPL CALC-SCNC: 9 MMOL/L (ref 8–16)
AST SERPL-CCNC: 53 U/L (ref 15–37)
BILIRUB CONJ SERPL-MCNC: 0.4 MG/DL (ref 0–0.2)
BILIRUB SERPL-MCNC: 0.7 MG/DL (ref 0.2–1)
CALCIUM SERPL-MCNC: 7.9 MG/DL (ref 8.5–10.1)
CO2 SERPL-SCNC: 25 MMOL/L (ref 21–32)
CREAT SERPL-MCNC: 1.9 MG/DL (ref 0.7–1.3)
DEPRECATED RDW RBC AUTO: 17.4 % (ref 11.9–15.9)
GLUCOSE SERPL-MCNC: 74 MG/DL (ref 74–106)
MCH RBC QN AUTO: 32.9 PG (ref 25.7–33.7)
MCHC RBC AUTO-ENTMCNC: 34.2 G/DL (ref 32–35.9)
MCV RBC: 96.3 FL (ref 80–96)
PLATELET # BLD AUTO: 72 K/MM3 (ref 134–434)
PMV BLD: 9.5 FL (ref 7.5–11.1)
PROT SERPL-MCNC: 4.9 G/DL (ref 6.4–8.2)
WBC # BLD AUTO: 4.7 K/MM3 (ref 4–10)

## 2017-09-06 RX ADMIN — PANTOPRAZOLE SODIUM SCH MG: 40 TABLET, DELAYED RELEASE ORAL at 21:15

## 2017-09-06 RX ADMIN — ACLIDINIUM BROMIDE SCH PUFF: 400 POWDER, METERED RESPIRATORY (INHALATION) at 09:42

## 2017-09-06 RX ADMIN — TAMSULOSIN HYDROCHLORIDE SCH MG: 0.4 CAPSULE ORAL at 08:27

## 2017-09-06 RX ADMIN — PANTOPRAZOLE SODIUM SCH MG: 40 TABLET, DELAYED RELEASE ORAL at 09:41

## 2017-09-06 RX ADMIN — CEFAZOLIN SODIUM SCH MLS/HR: 2 SOLUTION INTRAVENOUS at 09:41

## 2017-09-06 RX ADMIN — FINASTERIDE SCH MG: 5 TABLET, FILM COATED ORAL at 09:41

## 2017-09-06 RX ADMIN — ACLIDINIUM BROMIDE SCH PUFF: 400 POWDER, METERED RESPIRATORY (INHALATION) at 21:18

## 2017-09-06 RX ADMIN — MONTELUKAST SODIUM SCH MG: 10 TABLET, COATED ORAL at 21:15

## 2017-09-06 RX ADMIN — URSODIOL SCH MG: 300 CAPSULE ORAL at 09:41

## 2017-09-06 RX ADMIN — CARVEDILOL SCH MG: 6.25 TABLET, FILM COATED ORAL at 09:41

## 2017-09-06 RX ADMIN — URSODIOL SCH MG: 300 CAPSULE ORAL at 21:15

## 2017-09-06 RX ADMIN — CARVEDILOL SCH MG: 6.25 TABLET, FILM COATED ORAL at 21:18

## 2017-09-06 RX ADMIN — CEFAZOLIN SODIUM SCH MLS/HR: 2 SOLUTION INTRAVENOUS at 21:18

## 2017-09-06 RX ADMIN — ATORVASTATIN CALCIUM SCH MG: 10 TABLET, FILM COATED ORAL at 21:15

## 2017-09-06 NOTE — PN
Progress Note, Physician


History of Present Illness: 


Pt seen and examined at bedside. He is awake and alert. He denies abdominal 

pain. 





- Current Medication List


Current Medications: 


Active Medications





Aclidinium Bromide (Tudorza -)  1 puff IH BID [facility]


   Last Admin: 09/06/17 09:42 Dose:  1 puff


Atorvastatin Calcium (Lipitor -)  10 mg PO HS [facility]


   Last Admin: 09/05/17 22:02 Dose:  10 mg


Carvedilol (Coreg -)  6.25 mg PO BID [facility]


   Last Admin: 09/06/17 09:41 Dose:  6.25 mg


Finasteride (Proscar -)  5 mg PO DAILY [facility]


   Last Admin: 09/06/17 09:41 Dose:  5 mg


Cefazolin Sodium/Dextrose (Ancef 2 Gm Premixed Ivpb -)  50 mls @ 200 mls/hr 

IVPB BID [facility]


   Last Admin: 09/06/17 09:41 Dose:  200 mls/hr


Montelukast Sodium (Singulair -)  10 mg PO HS [facility]


   Last Admin: 09/05/17 22:02 Dose:  10 mg


Pantoprazole Sodium (Protonix -)  40 mg PO BID [facility]


   Last Admin: 09/06/17 09:41 Dose:  40 mg


Phenol/Menthol (Chloraseptic -)  1 spray MM Q6HPO PRN


   PRN Reason: SORE THROAT


Tamsulosin HCl (Flomax -)  0.4 mg PO DAILY@0830 [facility]


   Last Admin: 09/06/17 08:27 Dose:  0.4 mg


Ursodiol (Actigal -)  300 mg PO BID [facility]


   Last Admin: 09/06/17 09:41 Dose:  300 mg











- Objective


Vital Signs: 


 Vital Signs











Temperature  98.0 F   09/06/17 08:44


 


Pulse Rate  74   09/06/17 08:44


 


Respiratory Rate  20   09/06/17 08:44


 


Blood Pressure  123/69   09/06/17 08:44


 


O2 Sat by Pulse Oximetry (%)  96   09/05/17 21:00











Constitutional: Yes: Calm


Eyes: Yes: Conjunctiva Clear


HENT: Yes: Atraumatic


Neck: Yes: Supple


Cardiovascular: Yes: S1, S2


Respiratory: Yes: CTA Bilaterally


Gastrointestinal: Yes: Normal Bowel Sounds, Soft


Genitourinary: Yes: WNL


Musculoskeletal: Yes: WNL


Edema: No


Neurological: Yes: Oriented


Psychiatric: Yes: Oriented


Labs: 


 CBC, BMP





 09/06/17 08:00 





 09/06/17 08:00 





 INR, PTT











INR  1.06  (0.82-1.09)   09/05/17  06:00    


 


Fibrinogen  313.0 mg/dL (238-498)   09/05/17  06:00    














Problem List





- Problems


(1) Elevated liver enzymes


Code(s): R74.8 - ABNORMAL LEVELS OF OTHER SERUM ENZYMES





(2) CAD (coronary artery disease)


Code(s): I25.10 - ATHSCL HEART DISEASE OF NATIVE CORONARY ARTERY W/O ANG PCTRS 

  Qualifiers: 


     Coronary Disease-Associated Artery/Lesion type: unspecified vessel or 

lesion type     Native vs. transplanted heart: native heart     Associated 

angina: without angina        Qualified Code(s): I25.10 - Atherosclerotic heart 

disease of native coronary artery without angina pectoris  





(3) CKD (chronic kidney disease)


Code(s): N18.9 - CHRONIC KIDNEY DISEASE, UNSPECIFIED   








Assessment/Plan


 Current Medications











Generic Name Dose Route Start Last Admin





  Trade Name Freq  PRN Reason Stop Dose Admin


 


Aclidinium Bromide  1 puff 09/02/17 22:00 09/06/17 09:42





  Tudorza -  IH   1 puff





  BID [facility]   Administration


 


Atorvastatin Calcium  10 mg 09/03/17 22:00 09/05/17 22:02





  Lipitor -  PO   10 mg





  HS [facility]   Administration


 


Carvedilol  6.25 mg 09/02/17 22:00 09/06/17 09:41





  Coreg -  PO   6.25 mg





  BID [facility]   Administration


 


Finasteride  5 mg 09/03/17 10:00 09/06/17 09:41





  Proscar -  PO   5 mg





  DAILY [facility]   Administration


 


Cefazolin Sodium/Dextrose  50 mls @ 200 mls/hr 09/05/17 22:00 09/06/17 09:41





  Ancef 2 Gm Premixed Ivpb -  IVPB   200 mls/hr





  BID [facility]   Administration


 


Montelukast Sodium  10 mg 09/03/17 22:00 09/05/17 22:02





  Singulair -  PO   10 mg





  HS [facility]   Administration


 


Pantoprazole Sodium  40 mg 09/05/17 22:00 09/06/17 09:41





  Protonix -  PO   40 mg





  BID [facility]   Administration


 


Phenol/Menthol  1 spray 09/05/17 13:13  





  Chloraseptic -  MM   





  Q6HPO PRN   





  SORE THROAT   


 


Tamsulosin HCl  0.4 mg 09/03/17 08:30 09/06/17 08:27





  Flomax -  PO   0.4 mg





  DAILY@0830 [facility]   Administration


 


Ursodiol  300 mg 09/02/17 22:00 09/06/17 09:41





  Actigal -  PO   300 mg





  BID [facility]   Administration














Impression


1. CKD with stable renal function


2. hx of BO in the past


3. choledocholithiasis


4. CAD


5. hx of Renal Cell Cancer s/p nephroectomy


6. aortic stenosis





Plan


- renal function is stable


- cont abx


- monitor creatinine


- would not give fluids


- will evaluate for lasix in am


- will follow


Dr Rodriguez

## 2017-09-06 NOTE — PN
Progress Note, Physician


Chief Complaint: 


Not in distress


Feels better


History of Present Illness: 


Patient was seen and examined. Awake and alert. Chart was reviewed


Denies chest pain, SOB or palpitations


Denies abdominal pain





- Current Medication List


Current Medications: 


Active Medications





Aclidinium Bromide (Tudorza -)  1 puff IH BID [facility]


   Last Admin: 09/06/17 09:42 Dose:  1 puff


Atorvastatin Calcium (Lipitor -)  10 mg PO HS [facility]


   Last Admin: 09/05/17 22:02 Dose:  10 mg


Carvedilol (Coreg -)  6.25 mg PO BID [facility]


   Last Admin: 09/06/17 09:41 Dose:  6.25 mg


Finasteride (Proscar -)  5 mg PO DAILY [facility]


   Last Admin: 09/06/17 09:41 Dose:  5 mg


Cefazolin Sodium/Dextrose (Ancef 2 Gm Premixed Ivpb -)  50 mls @ 200 mls/hr 

IVPB BID [facility]


   Last Admin: 09/06/17 09:41 Dose:  200 mls/hr


Montelukast Sodium (Singulair -)  10 mg PO [facility]


   Last Admin: 09/05/17 22:02 Dose:  10 mg


Pantoprazole Sodium (Protonix -)  40 mg PO BID [facility]


   Last Admin: 09/06/17 09:41 Dose:  40 mg


Phenol/Menthol (Chloraseptic -)  1 spray MM Q6HPO PRN


   PRN Reason: SORE THROAT


Tamsulosin HCl (Flomax -)  0.4 mg PO DAILY@0830 [facility]


   Last Admin: 09/06/17 08:27 Dose:  0.4 mg


Ursodiol (Actigal -)  300 mg PO BID [facility]


   Last Admin: 09/06/17 09:41 Dose:  300 mg











- Objective


Vital Signs: 


 Vital Signs











Temperature  98.0 F   09/06/17 08:44


 


Pulse Rate  74   09/06/17 08:44


 


Respiratory Rate  20   09/06/17 08:44


 


Blood Pressure  123/69   09/06/17 08:44


 


O2 Sat by Pulse Oximetry (%)  96   09/05/17 21:00











Cardiovascular: Yes: Regular Rate and Rhythm, S1, S2


Respiratory: Yes: CTA Bilaterally


Gastrointestinal: Yes: Normal Bowel Sounds, Soft.  No: Tenderness


Edema: No


Labs: 


 CBC, BMP





 09/06/17 08:00 





 09/06/17 08:00 





 INR, PTT











INR  1.06  (0.82-1.09)   09/05/17  06:00    


 


Fibrinogen  313.0 mg/dL (238-498)   09/05/17  06:00    














Problem List





- Problems


(1) Acute cholangitis


Code(s): K83.0 - CHOLANGITIS





(2) Biliary sepsis


Code(s): K83.0 - CHOLANGITIS





(3) CAD (coronary artery disease)


Code(s): I25.10 - ATHSCL HEART DISEASE OF NATIVE CORONARY ARTERY W/O ANG PCTRS 

  Qualifiers: 


     Coronary Disease-Associated Artery/Lesion type: unspecified vessel or 

lesion type     Native vs. transplanted heart: native heart     Associated 

angina: without angina        Qualified Code(s): I25.10 - Atherosclerotic heart 

disease of native coronary artery without angina pectoris  





(4) CKD (chronic kidney disease)


Code(s): N18.9 - CHRONIC KIDNEY DISEASE, UNSPECIFIED   





(5) S/P ERCP


Code(s): Z98.890 - OTHER SPECIFIED POSTPROCEDURAL STATES





(6) Hypertension


Code(s): I10 - ESSENTIAL (PRIMARY) HYPERTENSION   Qualifiers: 


     Hypertension type: essential hypertension        Qualified Code(s): I10 - 

Essential (primary) hypertension  





(7) Hypercholesterolemia


Code(s): E78.00 - PURE HYPERCHOLESTEROLEMIA, UNSPECIFIED





(8) S/P TAVR (transcatheter aortic valve replacement)


Code(s): Z95.2 - PRESENCE OF PROSTHETIC HEART VALVE








Assessment/Plan


1. Status post ERCP for ascending cholangitis leading to sepsis


2. Post TAVR


3. Coronary artery disease, s/p PCI/stent, angina


4. Hypertension


5. Renal cancer s/p right nephrectomy





PLAN:


1. Continue GI recommendation 


2. Continue Carvedilol


3. Continue statin and follow LFTs


4. Antibiotics as per ID in terms of duration of therapy. ID input noted


5. Follow H/H and transfuse PRBC is indicated





Further plans are to follow


Gordon Botello MD

## 2017-09-06 NOTE — PN
Progress Note (short form)





- Note


Progress Note: 


ID





Cefazolin for E Coli bacteremia 9/2  sensitive organism


 Selected Entries











  09/06/17





  06:00


 


Temperature 97.6 F


 


Pulse Rate 80


 


Respiratory 20





Rate 


 


Blood Pressure 111/70








Microbiology





09/02/17 10:11   Blood - Peripheral Venous   Blood Culture - Final


                              Escherichia Coli


09/02/17 10:11   Blood - Peripheral Venous   Blood Culture - Final


                              Escherichia Coli





 Laboratory Tests











  09/05/17 09/05/17 09/05/17





  06:00 06:00 17:25


 


WBC    4.9


 


Hgb    9.4 L


 


Plt Count    82 L


 


BUN  39 H D  


 


Creatinine  1.8 H  


 


Creat Clearance w eGFR  35.79  


 


Direct Bilirubin   0.6 H 


 


AST  91 H D  


 


ALT  114 H D  


 


Alkaline Phosphatase  194 H  








Assessment E Coli bacteremia 9/2 No day 4 Given the valve ( TAVR) I would treat 

him total of 7 days IV as organism


                       resistant to quinolone and cannot given him Bactrim.  Do 

not feel he need oral antibiotic to go home


                       with. 





Problem List





- Problems


(1) Acute cholangitis


Code(s): K83.0 - CHOLANGITIS





(2) Bacteremia due to Gram-negative bacteria


Code(s): R78.81 - BACTEREMIA

## 2017-09-06 NOTE — PN
GI Progress Note


Subjective: 


GI NOte: Gurpreet tells me that he had a brown BM overnight but I cannot confirm 

this. His Hb has dwindled a bit. Discussed case with Dr Villalba. Gurpreet will 

complete 7 days of antibiotics on 9/9. LFTS continue to normalize. Eating well. 





- Objective


Vital Signs: 


 Vital Signs











Temperature  98.0 F   09/06/17 08:44


 


Pulse Rate  74   09/06/17 08:44


 


Respiratory Rate  20   09/06/17 08:44


 


Blood Pressure  123/69   09/06/17 08:44


 


O2 Sat by Pulse Oximetry (%)  96   09/05/17 21:00








 Laboratory Tests











  09/05/17 09/05/17 09/05/17





  06:00 06:00 06:00


 


Hgb   


 


Plt Count   


 


INR   1.06 


 


BUN   


 


Creatinine   


 


Total Bilirubin  1.2 H  


 


Direct Bilirubin   


 


GGT    93 H


 


AST   


 


ALT   


 


Alkaline Phosphatase   














  09/05/17 09/06/17 09/06/17





  17:25 08:00 08:00


 


Hgb  9.4 L  8.9 L 


 


Plt Count   72 L 


 


INR   


 


BUN    33 H


 


Creatinine    1.9 H


 


Total Bilirubin    0.7  D


 


Direct Bilirubin    0.4 H D


 


GGT   


 


AST    53 H D


 


ALT    80 H D


 


Alkaline Phosphatase    168 H











Constitutional: Calm


...Auscultate: Yes: Normoactive Bowel Sounds


...Palpate: Yes: Soft, Other (nontender)


Labs: 


 CBC, BMP





 09/06/17 08:00 





 09/06/17 08:00 





 INR, PTT











INR  1.06  (0.82-1.09)   09/05/17  06:00    


 


Fibrinogen  313.0 mg/dL (238-498)   09/05/17  06:00    














Assessment/Plan


Continue Kefzol IVPB until 9/9/17. Follow CBC and LFTs until then. Renal 

function stablilizing.

## 2017-09-06 NOTE — PN
Progress Note, SLP





- Note


Progress Note: 


 Selected Entries











  09/05/17 09/05/17 09/05/17





  01:05 01:58 05:00


 


Breakfast   


 


Lunch   


 


Supper   


 


Temperature 98.1 F 98.0 F 97.7 F














  09/05/17 09/05/17 09/05/17





  08:38 14:00 20:00


 


Breakfast  NPO 


 


Lunch  50% 


 


Supper   


 


Temperature 97.7 F 97.4 F L 97.4 F L














  09/05/17 09/06/17 09/06/17





  22:00 06:00 08:44


 


Breakfast   


 


Lunch   


 


Supper 50%  


 


Temperature  97.6 F 98.0 F














  09/06/17





  11:55


 


Breakfast 50%


 


Lunch 75%


 


Supper 


 


Temperature 








 Laboratory Tests











  09/06/17





  08:00


 


WBC  4.7








Pt is tolerating diet. No further f/u indicated.

## 2017-09-06 NOTE — PN
Physical Exam: 


SUBJECTIVE: Patient seen and examined at bed side.He is doing well, NO acute 

events over night. odynophagia improved. He denies any N/V/D/C. denies 

abdominal pain , or urinary symptoms. working well with the physical therapy. 

asking to go home.








OBJECTIVE:





 Vital Signs











 Period  Temp  Pulse  Resp  BP Sys/Badillo  Pulse Ox


 


 Last 24 Hr  97.4 F-98.0 F  62-80  18-20  110-123/60-70  96











GENERAL: The patient is awake, alert, and fully oriented, in no acute distress.


HEAD: Normal with no signs of trauma.


EYES: PERRL,  sclera anicteric, conjunctiva clear. No ptosis. 


ENT: Ears normal, nares patent, oropharynx clear without exudates, moist mucous 


membranes.


NECK: Trachea midline, full range of motion, supple. 


LUNGS: Breath sounds equal, clear to auscultation bilaterally, no wheezes, no 

crackles, no 


accessory muscle use. 


HEART: Regular rate and rhythm, S1, S2 , 2/6 systolic murmur,No  rub or gallop.


ABDOMEN: Soft, nontender, nondistended, normoactive bowel sounds, no guarding, 

no 


rebound, no hepatosplenomegaly, no masses.


EXTREMITIES: 2+ pulses, warm, well-perfused, no edema. 


NEUROLOGICAL:  Normal speech, gait not 


observed.


PSYCH: Normal mood, normal affect.


SKIN: Warm, dry, normal turgor, no rashes or lesions noted














 Laboratory Results - last 24 hr











  09/04/17 09/05/17 09/06/17





  15:40 17:25 05:15


 


WBC   4.9 


 


RBC   2.85 L 


 


Hgb   9.4 L 


 


Hct   27.2 L 


 


MCV   95.2 


 


MCH   32.9 


 


MCHC   34.5 


 


RDW   17.7 H 


 


Plt Count   82 L 


 


MPV   9.9 


 


Haptoglobin  93  


 


Sodium   


 


Potassium   


 


Chloride   


 


Carbon Dioxide   


 


Anion Gap   


 


BUN   


 


Creatinine   


 


Creat Clearance w eGFR   


 


Random Glucose   


 


Calcium   


 


Total Bilirubin   


 


Direct Bilirubin   


 


AST   


 


ALT   


 


Alkaline Phosphatase   


 


Total Protein   


 


Albumin   


 


Urine Creatinine    37.0














  09/06/17 09/06/17 09/06/17





  08:00 08:00 08:00


 


WBC  4.7  


 


RBC  2.70 L  


 


Hgb  8.9 L  


 


Hct  26.0 L  


 


MCV  96.3 H  


 


MCH  32.9  


 


MCHC  34.2  


 


RDW  17.4 H  


 


Plt Count  72 L  


 


MPV  9.5  


 


Haptoglobin   


 


Sodium   140  Cancelled


 


Potassium   4.0  Cancelled


 


Chloride   106  Cancelled


 


Carbon Dioxide   25  Cancelled


 


Anion Gap   9  Cancelled


 


BUN   33 H  Cancelled


 


Creatinine   1.9 H  Cancelled


 


Creat Clearance w eGFR    Cancelled


 


Random Glucose   74  Cancelled


 


Calcium   7.9 L  Cancelled


 


Total Bilirubin   0.7  D  Cancelled


 


Direct Bilirubin   0.4 H D 


 


AST   53 H D  Cancelled


 


ALT   80 H D  Cancelled


 


Alkaline Phosphatase   168 H  Cancelled


 


Total Protein   4.9 L  Cancelled


 


Albumin   2.4 L  Cancelled


 


Urine Creatinine   








Active Medications











Generic Name Dose Route Start Last Admin





  Trade Name Freq  PRN Reason Stop Dose Admin


 


Aclidinium Bromide  1 puff 09/02/17 22:00 09/06/17 09:42





  Tudorza -  IH   1 puff





  BID [facility]   Administration


 


Atorvastatin Calcium  10 mg 09/03/17 22:00 09/05/17 22:02





  Lipitor -  PO   10 mg





  HS ALCIDES   Administration


 


Carvedilol  6.25 mg 09/02/17 22:00 09/06/17 09:41





  Coreg -  PO   6.25 mg





  BID [facility]   Administration


 


Finasteride  5 mg 09/03/17 10:00 09/06/17 09:41





  Proscar -  PO   5 mg





  DAILY [facility]   Administration


 


Cefazolin Sodium/Dextrose  50 mls @ 200 mls/hr 09/05/17 22:00 09/06/17 09:41





  Ancef 2 Gm Premixed Ivpb -  IVPB   200 mls/hr





  BID [facility]   Administration


 


Montelukast Sodium  10 mg 09/03/17 22:00 09/05/17 22:02





  Singulair -  PO   10 mg





  HS ALCIDES   Administration


 


Pantoprazole Sodium  40 mg 09/05/17 22:00 09/06/17 09:41





  Protonix -  PO   40 mg





  BID [facility]   Administration


 


Phenol/Menthol  1 spray 09/05/17 13:13  





  Chloraseptic -  MM   





  Q6HPO PRN   





  SORE THROAT   


 


Tamsulosin HCl  0.4 mg 09/03/17 08:30 09/06/17 08:27





  Flomax -  PO   0.4 mg





  DAILY@0830 [facility]   Administration


 


Ursodiol  300 mg 09/02/17 22:00 09/06/17 09:41





  Actigal -  PO   300 mg





  BID [facility]   Administration











ASSESSMENT/PLAN:





Patient is 88 Year old white male with H/O choledocholithiasis and cholangitis s

/p stent removal  presented to Ed with one day history of RUQ abdominal pain 

and bloody vomiting.admitted to med-surg inpatient service for common bile duct 

obstruction.





#Acute choledocholithiasis with Acute cholangitis and E.choli Bacteremia 


*  Likely 2/2 residual stones post stent removal @9/1 


* CBD dilated 1.8 cm


* Continue Abx Cefazoline 2 g  IvPB - day 4


* Cont. actigall (Ursodiol) 300 mg PO BID [facility]


* Start soft diet  


* Pain control with Morphine 2 mg PO Q4 Hr PRN


* GI consulted , MRCP inconclusive. 


* F/U  CT result : CBD 1.7 cm with Pneumobilia , right flank hernia containing 

ascitis same to prior CT on 5/18/2017





# Anemia, macrocytic, concerned about  GI bleed S/P ERCP


*  H/H today 8.9/26 less than yesterday 9.4/27. S/P one unite transfusion 


* Monitor hemogloglobin 


*  Ferritin is normal. Iron, TIBC, iron sat, B12, folate, TSH ordered





# Constipation: 


* did not move his bowel for 4 days 


* Start Mirlax daily 


* F/u Clinically 





# Odynophagia, S/P ERCP 


* Immroved 


* on  Chloraseptic 


* Monitor Clinically  


* Speach and awallow eval came back  normal 


* Start Soft diet 








# Thrombocytopenia, likely 2/2 Bacteremia vs HIT 


* PLT today 72


* Monitor platlets 


* HIT AB pending 


* Consult hematology 


* 





*  Stool is negative for occult blood x 2


#Transaminitis , likely 2/2 to CBD obstruction 


* AST /ALT 1107/427,   , total Bili 2.9,  today AST/ ALT 53/80    


* , PTT 29.3


* Trend liver enzyme: normal 


* Monitor clinically 


* US showed dilated CBD 1.8 with pneumobilia S/P ERCP


#vomitting Blood(hematechemesis) likely 2/2 gastric mucosal S/P  ERCP , 

Resolved 


* Patient had 5 episode of bloody vomiting 


* improved 


* Stool ocult negative 


* Start Protonix 40 mg PO daily 


*  D/C IV fluids  


* Montitor clinically 


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 


* Bun/Cr 56/1.2, today 33/1.9 stable 


* Monitor Cr


* F/U clinically


* F/U as out patient 


* Nephrology on board 





#CAD s/p stent, chronic


* Continue to hold plavix due to GI bleed


* Continue Lipitor 10 mg PO HS [facility]


* Continue Coreg(carvedilol) 6.25 mg PO BID [facility]


* EKG did not show any specific  ST/T wave changes





#Aortic stenosis s/p TAVR


* Systolic Murmur 2-3 /6 


* Cont. plavix and ASA





# Renal cell cancer s/p nephrectomy


*  Stable


* single kidney , avoid nephrotoxic including contrast





#Hypertension


* /70 today


* continue carvedilol 6.25 mg PO BID ALCIDES


* F/u BP 





#BPH, chronic


*  Stable


* Continue home meds finasteride 5 mg PO DAILY  and flomax  0.4 mg PO DAILY





#FEN


* F: On NO fluids now 


* E: Monitor lytes


*  soft diet 





#Prophylaxis


* DVT: SCDs, due to GI bleed


* GI: Protonix 40 mg PO daily  





#Dispo


* Will need IV antibiotics total of 7 days per ID 





Visit type





- Emergency Visit


Emergency Visit: Yes


ED Registration Date: 09/02/17


Care time: The patient presented to the Emergency Department on the above date 

and was hospitalized for further evaluation of their emergent condition.





- New Patient


This patient is new to me today: No





- Critical Care


Critical Care patient: No





- Discharge Referral


Referred to [facility] No

## 2017-09-07 LAB
ALBUMIN SERPL-MCNC: 2.4 G/DL (ref 3.4–5)
ALP SERPL-CCNC: 145 U/L (ref 45–117)
ALT SERPL-CCNC: 42 U/L (ref 12–78)
ANION GAP SERPL CALC-SCNC: 7 MMOL/L (ref 8–16)
AST SERPL-CCNC: 32 U/L (ref 15–37)
BASOPHILS # BLD: 1.1 % (ref 0–2)
BILIRUB CONJ SERPL-MCNC: 0.3 MG/DL (ref 0–0.2)
BILIRUB SERPL-MCNC: 0.6 MG/DL (ref 0.2–1)
CALCIUM SERPL-MCNC: 8 MG/DL (ref 8.5–10.1)
CO2 SERPL-SCNC: 28 MMOL/L (ref 21–32)
CREAT SERPL-MCNC: 1.7 MG/DL (ref 0.7–1.3)
CRP SERPL-MCNC: 1.6 MG/DL (ref 0–0.3)
DEPRECATED RDW RBC AUTO: 17.1 % (ref 11.9–15.9)
EOSINOPHIL # BLD: 6.2 % (ref 0–4.5)
GLUCOSE SERPL-MCNC: 83 MG/DL (ref 74–106)
MCH RBC QN AUTO: 32.9 PG (ref 25.7–33.7)
MCHC RBC AUTO-ENTMCNC: 34.3 G/DL (ref 32–35.9)
MCV RBC: 95.9 FL (ref 80–96)
NEUTROPHILS # BLD: 66.2 % (ref 42.8–82.8)
PLATELET # BLD AUTO: 73 K/MM3 (ref 134–434)
PMV BLD: 9.5 FL (ref 7.5–11.1)
PROT SERPL-MCNC: 4.9 G/DL (ref 6.4–8.2)
TSH SERPL-ACNC: 53.6 UIU/ML (ref 0.36–3.74)
WBC # BLD AUTO: 4.8 K/MM3 (ref 4–10)

## 2017-09-07 RX ADMIN — CEFAZOLIN SODIUM SCH MLS/HR: 2 SOLUTION INTRAVENOUS at 09:11

## 2017-09-07 RX ADMIN — PANTOPRAZOLE SODIUM SCH: 40 INJECTION, POWDER, FOR SOLUTION INTRAVENOUS at 07:23

## 2017-09-07 RX ADMIN — CEFAZOLIN SODIUM SCH MLS/HR: 2 SOLUTION INTRAVENOUS at 21:32

## 2017-09-07 RX ADMIN — POLYETHYLENE GLYCOL 3350 SCH GM: 17 POWDER, FOR SOLUTION ORAL at 13:27

## 2017-09-07 RX ADMIN — PANTOPRAZOLE SODIUM SCH MG: 40 TABLET, DELAYED RELEASE ORAL at 09:11

## 2017-09-07 RX ADMIN — URSODIOL SCH MG: 300 CAPSULE ORAL at 21:32

## 2017-09-07 RX ADMIN — ATORVASTATIN CALCIUM SCH MG: 10 TABLET, FILM COATED ORAL at 21:32

## 2017-09-07 RX ADMIN — TAMSULOSIN HYDROCHLORIDE SCH MG: 0.4 CAPSULE ORAL at 08:10

## 2017-09-07 RX ADMIN — ACLIDINIUM BROMIDE SCH PUFF: 400 POWDER, METERED RESPIRATORY (INHALATION) at 09:12

## 2017-09-07 RX ADMIN — PANTOPRAZOLE SODIUM SCH MG: 40 TABLET, DELAYED RELEASE ORAL at 21:32

## 2017-09-07 RX ADMIN — MONTELUKAST SODIUM SCH MG: 10 TABLET, COATED ORAL at 21:33

## 2017-09-07 RX ADMIN — FINASTERIDE SCH MG: 5 TABLET, FILM COATED ORAL at 09:11

## 2017-09-07 RX ADMIN — URSODIOL SCH MG: 300 CAPSULE ORAL at 09:11

## 2017-09-07 RX ADMIN — CARVEDILOL SCH MG: 6.25 TABLET, FILM COATED ORAL at 09:11

## 2017-09-07 RX ADMIN — ACLIDINIUM BROMIDE SCH PUFF: 400 POWDER, METERED RESPIRATORY (INHALATION) at 21:45

## 2017-09-07 RX ADMIN — CARVEDILOL SCH MG: 6.25 TABLET, FILM COATED ORAL at 21:33

## 2017-09-07 NOTE — PN
Teaching Attending Note


Name of Resident: Jr Rogers


ATTENDING PHYSICIAN STATEMENT





I saw and evaluated the patient.


I reviewed the resident's note and discussed the case with the resident.


I agree with the resident's findings and plan as documented.








SUBJECTIVE:c/o constipation. no BM in 72H. denies Cp, SOB< fever, chills, 

abdominal pain, N/V








OBJECTIVE:


 Last Vital Signs











Temp Pulse Resp BP Pulse Ox


 


 97.4 F L  54 L  17   97/52   96 


 


 09/07/17 13:46  09/07/17 13:46  09/07/17 13:46  09/07/17 13:46  09/07/17 09:00











General NAD


ABdomen soft NT/ND no rebound or guarding. normoactive BS





ASSESSMENT AND PLAN:


88 year old man with a history of HTN, CAD, MI, cardiac stent, AS, TAVR, right 

nephrectomy, BPH, urethral strictures who presents to the ER with abdominal pain

, vomiting and fever which started after CBD removal 9/1.


1. Choledocholithiasis with acute cholangitis and E.coli bacteremia- s/p ERCP, 

stone removal, CBD stent removal 9/1. MRCP inconclusive. CT done. advanced to 

regular diet and tolerating. LFT continue to trend down. repeat Bcx sent 

yesterday and NGTD. on Cefipime. day 6 of abx therapy. as per ID plan is to 

complete 7 day course of IV ABx. ID and GI on board. 


2. Acute Hgb drop- concern for GI bleeding as pt has had hx vs spincterectomy. s

/p 1 unit PRBC this admission. Hgb remains stable. 


3. constipation- start stool softeners, miralax


4. Acute kidney injury on stage 3 vs 4 CKD- improving. small dose lasix given 

by nephrology, monitor Cr and electrolytes. nephrology on board. 


5. CAD, history of MI, stent-cont to hold plavix. Continue Coreg, Lipitor


6. AS, history of TAVR


7. HTN- Continue Coreg


8. History of renal cancer, right nephrectomy


9. BPH- Continue Flomax, Proscar


10. Thrombocytopenia-likley due to bacteremia. stable. HIT pending. hematology 

consulted. Monitor platelets


11. DVT ppx- SCD due to GI bleed. 


12. d/c home when abx course completed.

## 2017-09-07 NOTE — PN
Progress Note, Physician


Chief Complaint: 


Not in distress


Feels better


History of Present Illness: 


Patient was seen and examined. Awake and alert. Chart was reviewed


Denies chest pain, SOB or palpitations


Denies abdominal pain





- Current Medication List


Current Medications: 


Active Medications





Aclidinium Bromide (Tudorza -)  1 puff [facility]ID [facility]


   Last Admin: 09/07/17 09:12 Dose:  1 puff


Atorvastatin Calcium (Lipitor -)  10 mg PO HS [facility]


   Last Admin: 09/06/17 21:15 Dose:  10 mg


Carvedilol (Coreg -)  6.25 mg PO BID [facility]


   Last Admin: 09/07/17 09:11 Dose:  6.25 mg


Finasteride (Proscar -)  5 mg PO DAILY [facility]


   Last Admin: 09/07/17 09:11 Dose:  5 mg


Cefazolin Sodium/Dextrose (Ancef 2 Gm Premixed Ivpb -)  50 mls @ 200 mls/hr 

IVPB BID [facility]


   Last Admin: 09/07/17 09:11 Dose:  200 mls/hr


Montelukast Sodium (Singulair -)  10 mg PO [facility]


   Last Admin: 09/06/17 21:15 Dose:  10 mg


Pantoprazole Sodium (Protonix -)  40 mg PO BID [facility]


   Last Admin: 09/07/17 09:11 Dose:  40 mg


Phenol/Menthol (Chloraseptic -)  1 spray MM Q6HPO PRN


   PRN Reason: SORE THROAT


Tamsulosin HCl (Flomax -)  0.4 mg PO DAILY@0830 [facility]


   Last Admin: 09/07/17 08:10 Dose:  0.4 mg


Ursodiol (Actigal -)  300 mg PO BID [facility]


   Last Admin: 09/07/17 09:11 Dose:  300 mg











- Objective


Vital Signs: 


 Vital Signs











Temperature  98.4 F   09/07/17 09:29


 


Pulse Rate  67   09/07/17 09:29


 


Respiratory Rate  18   09/07/17 09:29


 


Blood Pressure  112/63   09/07/17 09:29


 


O2 Sat by Pulse Oximetry (%)  96   09/07/17 09:00











Neck: Yes: Supple


Cardiovascular: Yes: Regular Rate and Rhythm, S1, S2


Respiratory: Yes: CTA Bilaterally


Gastrointestinal: Yes: Normal Bowel Sounds, Soft.  No: Tenderness


Edema: No


Labs: 


 CBC, BMP





 09/07/17 06:30 





 09/07/17 06:30 





 INR, PTT











INR  1.06  (0.82-1.09)   09/05/17  06:00    


 


Fibrinogen  313.0 mg/dL (238-498)   09/05/17  06:00    














Problem List





- Problems


(1) Acute cholangitis


Code(s): K83.0 - CHOLANGITIS





(2) Biliary sepsis


Code(s): K83.0 - CHOLANGITIS





(3) CAD (coronary artery disease)


Code(s): I25.10 - ATHSCL HEART DISEASE OF NATIVE CORONARY ARTERY W/O ANG PCTRS 

  Qualifiers: 


     Coronary Disease-Associated Artery/Lesion type: unspecified vessel or 

lesion type     Native vs. transplanted heart: native heart     Associated 

angina: without angina        Qualified Code(s): I25.10 - Atherosclerotic heart 

disease of native coronary artery without angina pectoris  





(4) CKD (chronic kidney disease)


Code(s): N18.9 - CHRONIC KIDNEY DISEASE, UNSPECIFIED   





(5) S/P ERCP


Code(s): Z98.890 - OTHER SPECIFIED POSTPROCEDURAL STATES





(6) Hypertension


Code(s): I10 - ESSENTIAL (PRIMARY) HYPERTENSION   Qualifiers: 


     Hypertension type: essential hypertension        Qualified Code(s): I10 - 

Essential (primary) hypertension  





(7) Hypercholesterolemia


Code(s): E78.00 - PURE HYPERCHOLESTEROLEMIA, UNSPECIFIED





(8) S/P TAVR (transcatheter aortic valve replacement)


Code(s): Z95.2 - PRESENCE OF PROSTHETIC HEART VALVE








Assessment/Plan


1. Status post ERCP for ascending cholangitis leading to sepsis


2. Post TAVR


3. Coronary artery disease, s/p PCI/stent, angina


4. Hypertension


5. Renal cancer s/p right nephrectomy





PLAN:


1. Continue GI recommendation 


2. Continue Carvedilol


3. Continue statin and follow LFTs


4. Antibiotics as per ID 


5. Follow H/H and transfuse PRBC is indicated





Further plans are to follow. So far patient is hemodynamically stable


Gordon Botello MD

## 2017-09-07 NOTE — PN
Physical Exam: 


SUBJECTIVE: Patient seen and examined at bed side. He is doing well. NO acute 

events over night. He is still constipated. He is asking to go home.


He denies any fever, chills, D/C/N/V.








OBJECTIVE:





 Vital Signs











 Period  Temp  Pulse  Resp  BP Sys/[place]  Pulse Ox


 


 Last 24 Hr  97.4 F-98.4 F  54-73  17-20  /52-87  96-96











GENERAL: The patient is awake, alert, and fully oriented, in no acute distress.


HEAD: Normal with no signs of trauma.


EYES: PERRL, sclera anicteric, conjunctiva clear. No ptosis. 


ENT:  moist mucous membranes.


NECK: Trachea midline, full range of motion, supple. 


LUNGS: Breath sounds equal, clear to auscultation bilaterally, no wheezes, no 

crackles, no 


accessory muscle use. 


HEART: Regular rate and rhythm, S1, S2, 2/6 systolic murmur,no  rub or gallop.


ABDOMEN: Soft, nontender, nondistended, normoactive bowel sounds, no guarding, 

no 


rebound, no hepatosplenomegaly, no masses.


EXTREMITIES: 2+ pulses, warm, well-perfused, no edema. 


NEUROLOGICAL:  Normal speech, gait not 


observed.


PSYCH: Normal mood, normal affect.


SKIN: Warm, dry, normal turgor, no rashes or lesions noted














 Laboratory Results - last 24 hr











  09/05/17 09/05/17 09/07/17





  06:00 06:00 06:30


 


WBC   


 


RBC   


 


Hgb   


 


Hct   


 


MCV   


 


MCH   


 


MCHC   


 


RDW   


 


Plt Count   


 


MPV   


 


Neutrophils %   


 


Lymphocytes %   


 


Monocytes %   


 


Eosinophils %   


 


Basophils %   


 


Retic Count   


 


Sodium  139   140


 


Potassium  4.1   3.7


 


Chloride  105   105


 


Carbon Dioxide  24   28


 


Anion Gap  10   7 L


 


BUN  39 H D   28 H


 


Creatinine  1.8 H   1.7 H


 


Creat Clearance w eGFR  35.79  


 


Random Glucose  85   83


 


Calcium  8.1 L   8.0 L


 


Total Bilirubin  1.2 H   0.6


 


Direct Bilirubin    0.3 H D


 


AST  91 H D   32  D


 


ALT  114 H D   42  D


 


Alkaline Phosphatase  194 H   145 H


 


C-Reactive Protein    1.6 H D


 


Total Protein  4.8 L   4.9 L


 


Albumin  2.3 L   2.4 L


 


Vitamin B12  817  


 


Serum Folate  8  


 


TSH  53.60 H  


 


Hep-Induced Plt Ab Rapid   0.312 














  09/07/17





  06:30


 


WBC  4.8


 


RBC  2.70 L


 


Hgb  8.9 L


 


Hct  25.9 L


 


MCV  95.9


 


MCH  32.9


 


MCHC  34.3


 


RDW  17.1 H


 


Plt Count  73 L


 


MPV  9.5


 


Neutrophils %  66.2


 


Lymphocytes %  20.1  D


 


Monocytes %  6.4


 


Eosinophils %  6.2 H


 


Basophils %  1.1


 


Retic Count  1.37  D


 


Sodium 


 


Potassium 


 


Chloride 


 


Carbon Dioxide 


 


Anion Gap 


 


BUN 


 


Creatinine 


 


Creat Clearance w eGFR 


 


Random Glucose 


 


Calcium 


 


Total Bilirubin 


 


Direct Bilirubin 


 


AST 


 


ALT 


 


Alkaline Phosphatase 


 


C-Reactive Protein 


 


Total Protein 


 


Albumin 


 


Vitamin B12 


 


Serum Folate 


 


TSH 


 


Hep-Induced Plt Ab Rapid 








Active Medications











Generic Name Dose Route Start Last Admin





  Trade Name Freq  PRN Reason Stop Dose Admin


 


Aclidinium Bromide  1 puff 09/02/17 22:00 09/07/17 09:12





  Tudorza -  IH   1 puff





  BID [facility]ID[facility]   Administration


 


Atorvastatin Calcium  10 mg 09/03/17 22:00 09/06/17 21:15





  Lipitor -  PO   10 mg





  HS ALCIDES   Administration


 


Carvedilol  6.25 mg 09/02/17 22:00 09/07/17 09:11





  Coreg -  PO   6.25 mg





  BID [facility]   Administration


 


Docusate Sodium  300 mg 09/07/17 22:00  





  Colace -  PO   





  HS ALCIDES   


 


Finasteride  5 mg 09/03/17 10:00 09/07/17 09:11





  Proscar -  PO   5 mg





  DAILY [facility]   Administration


 


Cefazolin Sodium/Dextrose  50 mls @ 200 mls/hr 09/05/17 22:00 09/07/17 09:11





  Ancef 2 Gm Premixed Ivpb -  IVPB   200 mls/hr





  BID [facility]   Administration


 


Montelukast Sodium  10 mg 09/03/17 22:00 09/06/17 21:15





  Singulair -  PO   10 mg





  HS ALCIDES   Administration


 


Pantoprazole Sodium  40 mg 09/05/17 22:00 09/07/17 09:11





  Protonix -  PO   40 mg





  BID [facility]   Administration


 


Phenol/Menthol  1 spray 09/05/17 13:13  





  Chloraseptic -  MM   





  Q6HPO PRN   





  SORE THROAT   


 


Polyethylene Glycol  17 gm 09/07/17 13:00 09/07/17 13:27





  Miralax (For Daily Use) -  PO   17 gm





  DAILY [facility]   Administration


 


Tamsulosin HCl  0.4 mg 09/03/17 08:30 09/07/17 08:10





  Flomax -  PO   0.4 mg





  DAILY@0830 [facility]   Administration


 


Ursodiol  300 mg 09/02/17 22:00 09/07/17 09:11





  Actigal -  PO   300 mg





  BID [facility]   Administration











ASSESSMENT/PLAN:





Patient is 88 Year old white male with H/O choledocholithiasis and cholangitis s

/p stent removal  presented to Ed with one day history of RUQ abdominal pain 

and bloody vomiting.admitted to med-surg inpatient service for common bile duct 

obstruction.





#Acute choledocholithiasis with Acute cholangitis and E.choli Bacteremia 


*  Likely 2/2 residual stones post stent removal @9/1 


* CBD dilated 1.8 cm


* Continue Abx Cefazoline 2 g  IvPB - day 6


* Cont. actigall (Ursodiol) 300 mg PO BID [facility]


* Start soft diet  


* Pain control with Morphine 2 mg PO Q4 Hr PRN


* GI consulted , MRCP inconclusive. 


* F/U  CT result : CBD 1.7 cm with Pneumobilia , right flank hernia containing 

ascitis same to prior CT on 5/18/2017


* Will give one dose IV ceftriaxone 2 g before D/C home tomorrow





# Anemia, macrocytic, concerned about  GI bleed S/P ERCP


*  H/H today 8.9/26 less than yesterday 9.4/27. S/P one unite transfusion 


* Monitor hemogloglobin 


*  Ferritin is normal. Iron, TIBC, iron sat, B12, folate, TSH ordered





# Constipation: 


* did not move his bowel for 5 days 


* Start Meralax PO daily 


* Start Colace 300 mg QHS


* F/u Clinically 





# Odynophagia, S/P ERCP 


* Immroved 


* on  Chloraseptic 


* Monitor Clinically  


* Speach and awallow eval came back  normal 


* Start Soft diet 








# Thrombocytopenia, likely 2/2 Bacteremia vs HIT 


* PLT today 72


* Monitor platlets 


* HIT AB pending 


* Consult hematology 


* 





*  Stool is negative for occult blood x 2


#Transaminitis , likely 2/2 to CBD obstruction 


* AST /ALT 1107/427,   , total Bili 2.9,  today AST/ ALT 53/80    


* , PTT 29.3


* Trend liver enzyme: normal 


* Monitor clinically 


* US showed dilated CBD 1.8 with pneumobilia S/P ERCP


#vomitting Blood(hematechemesis) likely 2/2 gastric mucosal S/P  ERCP , 

Resolved 


* Patient had 5 episode of bloody vomiting 


* improved 


* Stool ocult negative 


* Start Protonix 40 mg PO daily 


*  D/C IV fluids  


* Montitor clinically 


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 


* Bun/Cr 56/1.2, today 33/1.9 stable 


* small dose lasix given by nephrology


* Monitor Cr


* F/U clinically


* F/U as out patient 


* Nephrology on board 





#CAD s/p stent, chronic


* Continue to hold plavix due to GI bleed


* Continue Lipitor 10 mg PO HS [facility]


* Continue Coreg(carvedilol) 6.25 mg PO BID [facility]


* EKG did not show any specific  ST/T wave changes





#Aortic stenosis s/p TAVR


* Systolic Murmur 2-3 /6 


* Hold plavix and ASA





# Renal cell cancer s/p nephrectomy


*  Stable


* single kidney , avoid nephrotoxic including contrast





#Hypertension


* /70 yesterday , today 119/59


* continue carvedilol 6.25 mg PO BID [facility]


* F/u BP 





#BPH, chronic


*  Stable


* Continue home meds finasteride 5 mg PO DAILY  and flomax  0.4 mg PO DAILY





#FEN


* F: On NO fluids now 


* E: Monitor lytes


*  soft food diet 





#Prophylaxis


* DVT: SCDs, due to GI bleed


* GI: Protonix 40 mg PO daily  





#Dispo


*  Dc home When  IV antibiotics 7 days completed per ID 








Visit type





- Emergency Visit


Emergency Visit: Yes


ED Registration Date: 09/02/17


Care time: The patient presented to the Emergency Department on the above date 

and was hospitalized for further evaluation of their emergent condition.





- New Patient


This patient is new to me today: No





- Critical Care


Critical Care patient: No





- Discharge Referral


Referred to [facility] No

## 2017-09-07 NOTE — PN
Progress Note, Physician


History of Present Illness: 


Pt seen and examined at bedside. He is awake and alert. He denies shortness of 

breath. He says he was on lasix but ran out of it and stopped taking it. 





- Current Medication List


Current Medications: 


Active Medications





Aclidinium Bromide (Tudorza -)  1 puff IH BID [facility]


   Last Admin: 09/07/17 09:12 Dose:  1 puff


Atorvastatin Calcium (Lipitor -)  10 mg PO [facility]


   Last Admin: 09/06/17 21:15 Dose:  10 mg


Carvedilol (Coreg -)  6.25 mg PO BID [facility]


   Last Admin: 09/07/17 09:11 Dose:  6.25 mg


Docusate Sodium (Colace -)  300 mg PO [facility]


Finasteride (Proscar -)  5 mg PO DAILY [facility]


   Last Admin: 09/07/17 09:11 Dose:  5 mg


Cefazolin Sodium/Dextrose (Ancef 2 Gm Premixed Ivpb -)  50 mls @ 200 mls/hr 

IVPB BID [facility]


   Last Admin: 09/07/17 09:11 Dose:  200 mls/hr


Montelukast Sodium (Singulair -)  10 mg PO [facility]


   Last Admin: 09/06/17 21:15 Dose:  10 mg


Pantoprazole Sodium (Protonix -)  40 mg PO BID [facility]


   Last Admin: 09/07/17 09:11 Dose:  40 mg


Phenol/Menthol (Chloraseptic -)  1 spray MM Q6HPO PRN


   PRN Reason: SORE THROAT


Polyethylene Glycol (Miralax (For Daily Use) -)  17 gm PO DAILY [facility]


Tamsulosin HCl (Flomax -)  0.4 mg PO DAILY@0830 [facility]


   Last Admin: 09/07/17 08:10 Dose:  0.4 mg


Ursodiol (Actigal -)  300 mg PO BID [facility]


   Last Admin: 09/07/17 09:11 Dose:  300 mg











- Objective


Vital Signs: 


 Vital Signs











Temperature  98.4 F   09/07/17 09:29


 


Pulse Rate  67   09/07/17 09:29


 


Respiratory Rate  18   09/07/17 09:29


 


Blood Pressure  112/63   09/07/17 09:29


 


O2 Sat by Pulse Oximetry (%)  96   09/07/17 09:00











Constitutional: Yes: Calm


Eyes: Yes: Conjunctiva Clear


HENT: Yes: Atraumatic


Neck: Yes: Supple


Cardiovascular: Yes: S1, S2


Respiratory: Yes: CTA Bilaterally


Gastrointestinal: Yes: Soft


Genitourinary: Yes: WNL


Musculoskeletal: Yes: WNL


Edema: Yes


Edema: LLE: Trace, RLE: Trace


Neurological: Yes: Oriented


Psychiatric: Yes: Oriented


Labs: 


 CBC, BMP





 09/07/17 06:30 





 09/07/17 06:30 





 INR, PTT











INR  1.06  (0.82-1.09)   09/05/17  06:00    


 


Fibrinogen  313.0 mg/dL (238-498)   09/05/17  06:00    














Problem List





- Problems


(1) Elevated liver enzymes


Code(s): R74.8 - ABNORMAL LEVELS OF OTHER SERUM ENZYMES





(2) CAD (coronary artery disease)


Code(s): I25.10 - ATHSCL HEART DISEASE OF NATIVE CORONARY ARTERY W/O ANG PCTRS 

  Qualifiers: 


     Coronary Disease-Associated Artery/Lesion type: unspecified vessel or 

lesion type     Native vs. transplanted heart: native heart     Associated 

angina: without angina        Qualified Code(s): I25.10 - Atherosclerotic heart 

disease of native coronary artery without angina pectoris  





(3) CKD (chronic kidney disease)


Code(s): N18.9 - CHRONIC KIDNEY DISEASE, UNSPECIFIED   








Assessment/Plan


 Current Medications











Generic Name Dose Route Start Last Admin





  Trade Name Freq  PRN Reason Stop Dose Admin


 


Aclidinium Bromide  1 puff 09/02/17 22:00 09/07/17 09:12





  Tudorza -  IH   1 puff





  BID [facility]   Administration


 


Atorvastatin Calcium  10 mg 09/03/17 22:00 09/06/17 21:15





  Lipitor -  PO   10 mg





  HS ALCIDES   Administration


 


Carvedilol  6.25 mg 09/02/17 22:00 09/07/17 09:11





  Coreg -  PO   6.25 mg





  BID [facility]   Administration


 


Docusate Sodium  300 mg 09/07/17 22:00  





  Colace -  PO   





  HS ALCIDES   


 


Finasteride  5 mg 09/03/17 10:00 09/07/17 09:11





  Proscar -  PO   5 mg





  DAILY [facility]   Administration


 


Cefazolin Sodium/Dextrose  50 mls @ 200 mls/hr 09/05/17 22:00 09/07/17 09:11





  Ancef 2 Gm Premixed Ivpb -  IVPB   200 mls/hr





  BID [facility]   Administration


 


Montelukast Sodium  10 mg 09/03/17 22:00 09/06/17 21:15





  Singulair -  PO   10 mg





  HS ALCIDES   Administration


 


Pantoprazole Sodium  40 mg 09/05/17 22:00 09/07/17 09:11





  Protonix -  PO   40 mg





  BID [facility]   Administration


 


Phenol/Menthol  1 spray 09/05/17 13:13  





  Chloraseptic -  MM   





  Q6HPO PRN   





  SORE THROAT   


 


Polyethylene Glycol  17 gm 09/07/17 13:00  





  Miralax (For Daily Use) -  PO   





  DAILY [facility]   


 


Tamsulosin HCl  0.4 mg 09/03/17 08:30 09/07/17 08:10





  Flomax -  PO   0.4 mg





  DAILY@0830 [facility]   Administration


 


Ursodiol  300 mg 09/02/17 22:00 09/07/17 09:11





  Actigal -  PO   300 mg





  BID [facility]   Administration














Impression


1. CKD with stable renal function


2. hx of BO in the past


3. choledocholithiasis


4. CAD


5. hx of Renal Cell Cancer s/p nephroectomy


6. aortic stenosis





Plan


- will give a small dose of lasix


- repeat labs in am


- renal function is stable


- asked pt to have family bring in his home meds


- will follow


Dr Rodriguez

## 2017-09-08 VITALS — DIASTOLIC BLOOD PRESSURE: 68 MMHG | SYSTOLIC BLOOD PRESSURE: 102 MMHG | TEMPERATURE: 97.9 F

## 2017-09-08 VITALS — HEART RATE: 62 BPM

## 2017-09-08 LAB
ALBUMIN SERPL-MCNC: 2.5 G/DL (ref 3.4–5)
ALP SERPL-CCNC: 151 U/L (ref 45–117)
ALT SERPL-CCNC: 24 U/L (ref 12–78)
ANION GAP SERPL CALC-SCNC: 11 MMOL/L (ref 8–16)
ANION GAP SERPL CALC-SCNC: 8 MMOL/L (ref 8–16)
AST SERPL-CCNC: 29 U/L (ref 15–37)
BILIRUB SERPL-MCNC: 0.5 MG/DL (ref 0.2–1)
CALCIUM SERPL-MCNC: 7.9 MG/DL (ref 8.5–10.1)
CALCIUM SERPL-MCNC: 8.2 MG/DL (ref 8.5–10.1)
CO2 SERPL-SCNC: 24 MMOL/L (ref 21–32)
CO2 SERPL-SCNC: 26 MMOL/L (ref 21–32)
CREAT SERPL-MCNC: 1.7 MG/DL (ref 0.7–1.3)
CREAT SERPL-MCNC: 1.7 MG/DL (ref 0.7–1.3)
DEPRECATED RDW RBC AUTO: 17.2 % (ref 11.9–15.9)
GLUCOSE SERPL-MCNC: 123 MG/DL (ref 74–106)
GLUCOSE SERPL-MCNC: 80 MG/DL (ref 74–106)
MCH RBC QN AUTO: 32.8 PG (ref 25.7–33.7)
MCHC RBC AUTO-ENTMCNC: 34.1 G/DL (ref 32–35.9)
MCV RBC: 96.1 FL (ref 80–96)
PLATELET # BLD AUTO: 89 K/MM3 (ref 134–434)
PMV BLD: 9.4 FL (ref 7.5–11.1)
PROT SERPL-MCNC: 5.2 G/DL (ref 6.4–8.2)
WBC # BLD AUTO: 5.8 K/MM3 (ref 4–10)

## 2017-09-08 RX ADMIN — CARVEDILOL SCH MG: 6.25 TABLET, FILM COATED ORAL at 09:44

## 2017-09-08 RX ADMIN — TAMSULOSIN HYDROCHLORIDE SCH MG: 0.4 CAPSULE ORAL at 08:19

## 2017-09-08 RX ADMIN — CEFAZOLIN SODIUM SCH MLS/HR: 2 SOLUTION INTRAVENOUS at 09:44

## 2017-09-08 RX ADMIN — URSODIOL SCH MG: 300 CAPSULE ORAL at 09:44

## 2017-09-08 RX ADMIN — PANTOPRAZOLE SODIUM SCH MG: 40 TABLET, DELAYED RELEASE ORAL at 09:44

## 2017-09-08 RX ADMIN — ACLIDINIUM BROMIDE SCH PUFF: 400 POWDER, METERED RESPIRATORY (INHALATION) at 09:46

## 2017-09-08 RX ADMIN — FINASTERIDE SCH MG: 5 TABLET, FILM COATED ORAL at 09:44

## 2017-09-08 RX ADMIN — POLYETHYLENE GLYCOL 3350 SCH GM: 17 POWDER, FOR SOLUTION ORAL at 09:44

## 2017-09-08 NOTE — PN
Progress Note, Physician


Chief Complaint: 


Not in distress


Feels better


History of Present Illness: 


Patient was seen and examined. Awake and alert. Chart was reviewed


Denies chest pain, SOB or palpitations


Denies abdominal pain





- Current Medication List


Current Medications: 


Active Medications





Aclidinium Bromide (Tudorza -)  1 puff IH BID [facility]


   Last Admin: 09/07/17 21:45 Dose:  1 puff


Atorvastatin Calcium (Lipitor -)  10 mg PO HS [facility]


   Last Admin: 09/07/17 21:32 Dose:  10 mg


Carvedilol (Coreg -)  6.25 mg PO BID [facility]


   Last Admin: 09/07/17 21:33 Dose:  6.25 mg


Docusate Sodium (Colace -)  300 mg PO HS [facility]


   Last Admin: 09/07/17 21:42 Dose:  300 mg


Finasteride (Proscar -)  5 mg PO DAILY [facility]


   Last Admin: 09/07/17 09:11 Dose:  5 mg


Cefazolin Sodium/Dextrose (Ancef 2 Gm Premixed Ivpb -)  50 mls @ 200 mls/hr 

IVPB BID [facility]


   Last Admin: 09/07/17 21:32 Dose:  200 mls/hr


Montelukast Sodium (Singulair -)  10 mg PO HS [facility]


   Last Admin: 09/07/17 21:33 Dose:  10 mg


Pantoprazole Sodium (Protonix -)  40 mg PO BID [facility]


   Last Admin: 09/07/17 21:32 Dose:  40 mg


Phenol/Menthol (Chloraseptic -)  1 spray MM Q6HPO PRN


   PRN Reason: SORE THROAT


Polyethylene Glycol (Miralax (For Daily Use) -)  17 gm PO DAILY [facility]


   Last Admin: 09/07/17 13:27 Dose:  17 gm


Tamsulosin HCl (Flomax -)  0.4 mg PO DAILY@0830 [facility]


   Last Admin: 09/08/17 08:19 Dose:  0.4 mg


Ursodiol (Actigal -)  300 mg PO BID [facility]


   Last Admin: 09/07/17 21:32 Dose:  300 mg











- Objective


Vital Signs: 


 Vital Signs











Temperature  97.7 F   09/08/17 05:09


 


Pulse Rate  62   09/08/17 05:09


 


Respiratory Rate  18   09/08/17 05:09


 


Blood Pressure  108/52   09/08/17 05:09


 


O2 Sat by Pulse Oximetry (%)  96   09/07/17 20:51











Neck: Yes: Supple


Cardiovascular: Yes: Regular Rate and Rhythm, S1, S2


Respiratory: Yes: CTA Bilaterally


Gastrointestinal: Yes: Normal Bowel Sounds, Soft.  No: Tenderness


Edema: No


Labs: 


 CBC, BMP





 09/08/17 08:45 





 INR, PTT











INR  1.06  (0.82-1.09)   09/05/17  06:00    


 


Fibrinogen  313.0 mg/dL (238-498)   09/05/17  06:00    














Problem List





- Problems


(1) Acute cholangitis


Code(s): K83.0 - CHOLANGITIS





(2) Biliary sepsis


Code(s): K83.0 - CHOLANGITIS





(3) CAD (coronary artery disease)


Code(s): I25.10 - ATHSCL HEART DISEASE OF NATIVE CORONARY ARTERY W/O ANG PCTRS 

  Qualifiers: 


     Coronary Disease-Associated Artery/Lesion type: unspecified vessel or 

lesion type     Native vs. transplanted heart: native heart     Associated 

angina: without angina        Qualified Code(s): I25.10 - Atherosclerotic heart 

disease of native coronary artery without angina pectoris  





(4) CKD (chronic kidney disease)


Code(s): N18.9 - CHRONIC KIDNEY DISEASE, UNSPECIFIED   





(5) S/P ERCP


Code(s): Z98.890 - OTHER SPECIFIED POSTPROCEDURAL STATES





(6) Hypertension


Code(s): I10 - ESSENTIAL (PRIMARY) HYPERTENSION   Qualifiers: 


     Hypertension type: essential hypertension        Qualified Code(s): I10 - 

Essential (primary) hypertension  





(7) Hypercholesterolemia


Code(s): E78.00 - PURE HYPERCHOLESTEROLEMIA, UNSPECIFIED





(8) S/P TAVR (transcatheter aortic valve replacement)


Code(s): Z95.2 - PRESENCE OF PROSTHETIC HEART VALVE








Assessment/Plan


1. Status post ERCP for ascending cholangitis leading to sepsis


2. Post TAVR


3. Coronary artery disease, s/p PCI/stent, angina


4. Hypertension


5. Renal cancer s/p right nephrectomy





PLAN:


1. Continue GI recommendation 


2. Continue Carvedilol


3. Continue statin and follow LFTs


4. Antibiotics as per ID 








Further plans are to follow. So far patient is hemodynamically stable. 

Discharge planning


Gordon Botello MD

## 2017-09-08 NOTE — DS
Physical Exam: 


SUBJECTIVE: Patient seen and examined at bed side. He is doing well and stable 

to go home. He denies nay fever, chills ,D/C/N/V. 








OBJECTIVE:





 Vital Signs











 Period  Temp  Pulse  Resp  BP Sys/Badillo  Pulse Ox


 


 Last 24 Hr  97.7 F-98.9 F  62-72  18-18  102-108/52-68  96-96








PHYSICAL EXAM





GENERAL: The patient is awake, alert, and fully oriented, in no acute distress.


HEAD: Normal with no signs of trauma.


EYES: PERRL, extraocular movements intact, sclera anicteric, conjunctiva clear. 


ENT: Ears normal, nares patent, oropharynx clear without exudates, moist mucous 

membranes.


NECK: Trachea midline, full range of motion, supple. 


LUNGS: Breath sounds equal, clear to auscultation bilaterally, no wheezes, no 

crackles, no accessory muscle use. 


HEART: Regular rate and rhythm, S1, S2 without murmur, rub or gallop.


ABDOMEN: Soft, nontender, nondistended, normoactive bowel sounds, no guarding, 

no rebound, no hepatosplenomegaly, no masses.


EXTREMITIES: 2+ pulses, warm, well-perfused, no edema. 


NEUROLOGICAL: Cranial nerves II through XII grossly intact. Normal speech, gait 

not observed.


PSYCH: Normal mood, normal affect.


SKIN: Warm, dry, normal turgor, no rashes or lesions noted.





LABS


 Laboratory Results - last 24 hr











  09/04/17 09/04/17 09/08/17





  15:40 15:40 06:00


 


WBC   


 


RBC   


 


Hgb   


 


Hct   


 


MCV   


 


MCH   


 


MCHC   


 


RDW   


 


Plt Count   


 


MPV   


 


Fibrin Degrad Products  10 H  


 


Sodium    139


 


Potassium    3.9


 


Chloride    105


 


Carbon Dioxide    26


 


Anion Gap    8


 


BUN    24 H


 


Creatinine    1.7 H


 


Creat Clearance w eGFR   


 


Random Glucose    80


 


Calcium    7.9 L


 


Total Bilirubin   


 


AST   


 


ALT   


 


Alkaline Phosphatase   


 


Total Protein   


 


Albumin   


 


Stool Occult Blood   


 


Blood Type   A POSITIVE 


 


Antibody Screen   Negative 


 


Crossmatch   See Detail 














  09/08/17 09/08/17 09/08/17





  07:55 08:45 08:45


 


WBC   5.8 


 


RBC   3.02 L 


 


Hgb   9.9 L D 


 


Hct   29.0 L 


 


MCV   96.1 H 


 


MCH   32.8 


 


MCHC   34.1 


 


RDW   17.2 H 


 


Plt Count   89 L D 


 


MPV   9.4 


 


Fibrin Degrad Products   


 


Sodium    140


 


Potassium    3.7


 


Chloride    105


 


Carbon Dioxide    24


 


Anion Gap    11


 


BUN    24 H


 


Creatinine    1.7 H


 


Creat Clearance w eGFR    38.23


 


Random Glucose    123 H D


 


Calcium    8.2 L


 


Total Bilirubin    0.5


 


AST    29


 


ALT    24  D


 


Alkaline Phosphatase    151 H


 


Total Protein    5.2 L


 


Albumin    2.5 L


 


Stool Occult Blood  Positive  


 


Blood Type   


 


Antibody Screen   


 


Crossmatch   











HOSPITAL COURSE:





Date of Admission:09/02/17





Date of Discharge: 09/08/17





88 year old man with a history of HTN, CAD, MI, cardiac stent, AS, TAVR, right 

nephrectomy, BPH, urethral strictures who presents to the ER with abdominal pain

, vomiting and fever which started after CBD removal 9/1.


Patient was found to have  Choledocholithiasis with acute cholangitis and 

E.coli bacteremia- s/p ERCP, stone removal, CBD stent removal 9/1. tolerating 

regular diet. LFT  come to near normal. was given 7 days total of IV 

antibiotics. will need to f/u with GI after discharged.  


He has  Acute Hgb drop we were concerned for GI bleeding as pt has had hx vs 

spincterectomy. s/p 1 unit PRBC this admission. Hgb remains stable. however did 

have melanotic stool this morning which can be residual blood from recent bleed 

as Hgb is currently stable. FOBT is + which can also be accounted for same 

reason.  patient will follow up GI within one week of discharge.


Patient had  constipation was treated with stool softeners. He needs to 

continue using the softeners on daily basis to help moving his bowel. he needs 

to stop using them if he develop any diarrhea. 


He develped odynophagia after ERCP and choraseptic was prescriped to help with 

that. 


Patient develop thrombocytopenia secondary to sepsis. He will need to follow up 

with the pcp as out patient and Hematologist follow up  if needed.


Patient will continue his home meds for HTN, BPH . 


Patient will return to the hospital if he develp any fever, chills, or his 

symptoms worsen. 


Patient will hold on plavix till his stool is brown or till he see dr Jones or 

his PCP to make sure he is not bleeding.




















Minutes to complete discharge: 30





Discharge Summary


Reason For Visit: ELEVATED LIVER ENZYMES


Current Active Problems





BO (acute kidney injury) (Acute) 


Acute cholangitis (Acute) 


Acute hypoxemic respiratory failure (Acute) 


Anemia (Acute) 


Bacteremia due to Gram-negative bacteria (Acute) 


Biliary sepsis (Acute) 


Elevated liver enzymes (Acute) 


Hypercholesterolemia (Acute) 


S/P TAVR (transcatheter aortic valve replacement) (Acute) 








Condition: Stable





- Instructions


Diet, Activity, Other Instructions: 


Please follow up with  within one week


Please follow up wit your Primary care physician within one week.


Please resume all home meds as prescribed. No adjustment to your medicine has 

been done. 


Please eat soft high fiber diet.


Please use Miralax daily to help with constipation , and colace 300 mg at bed 

time . Please stop them if you notice any diarrhea. 


Please stop Plavix until you no longer have black stools. 





If you develop any fever, chills, abdominal pain, or your symptoms worsen 

please return to Emergency room.





Referrals: 


Raul Terrazas MD [Staff Physician] - 


Brandyn Mckay MD [Primary Care Provider] - 


Disposition: HOME





- Home Medications


Comprehensive Discharge Medication List: 


Ambulatory Orders





Finasteride 5 mg PO DAILY 02/19/17 


Carvedilol [Coreg] 6.25 mg PO BID  tablet 07/12/17 


Ursodiol 300 mg PO BID  capsule 07/12/17 


Montelukast Sodium [Singulair] 10 mg PO DAILY  tablet 08/16/17 


Atorvastatin Calcium 10 mg PO DAILY  tablet 08/18/17 


Tamsulosin HCl [Flomax -] 0.4 mg PO DAILY 09/01/17 


Ursodiol [Actigall] 300 mg PO BID #180 capsule 09/01/17 


Docusate Sodium [Colace -] 300 mg PO HS #21 cap 09/08/17 


Pantoprazole Sodium [Protonix -] 40 mg PO DAILY #30 tab 09/08/17 


Phenol [Chloraseptic -] 1 spray MM Q6HPO PRN #1 bottle 09/08/17 


Polyethylene Glycol 3350 [Miralax 119 gm Btl -] 17 gm PO DAILY #1 bottle 09/08/ 17 








This patient is new to me today: No


Emergency Visit: Yes


ED Registration Date: 09/02/17


Care time: The patient presented to the Emergency Department on the above date 

and was hospitalized for further evaluation of their emergent condition.


Critical Care patient: No





- Discharge Referral


Referred to [facility] No

## 2017-09-08 NOTE — PN
Progress Note, Physician


History of Present Illness: 


Pt seen and examined at bedside. He is awake and alert. He denies shortness of 

breath. 





- Current Medication List


Current Medications: 


Active Medications





Aclidinium Bromide (Tudorza -)  1 puff IH BID [facility]


   Last Admin: 09/08/17 09:46 Dose:  1 puff


Atorvastatin Calcium (Lipitor -)  10 mg PO HS [facility]


   Last Admin: 09/07/17 21:32 Dose:  10 mg


Carvedilol (Coreg -)  6.25 mg PO BID [facility]


   Last Admin: 09/08/17 09:44 Dose:  6.25 mg


Docusate Sodium (Colace -)  300 mg PO [facility]


   Last Admin: 09/07/17 21:42 Dose:  300 mg


Finasteride (Proscar -)  5 mg PO DAILY [facility]


   Last Admin: 09/08/17 09:44 Dose:  5 mg


Montelukast Sodium (Singulair -)  10 mg PO [facility]


   Last Admin: 09/07/17 21:33 Dose:  10 mg


Pantoprazole Sodium (Protonix -)  40 mg PO BID [facility]


   Last Admin: 09/08/17 09:44 Dose:  40 mg


Phenol/Menthol (Chloraseptic -)  1 spray MM Q6HPO PRN


   PRN Reason: SORE THROAT


Polyethylene Glycol (Miralax (For Daily Use) -)  17 gm PO DAILY [facility]


   Last Admin: 09/08/17 09:44 Dose:  17 gm


Tamsulosin HCl (Flomax -)  0.4 mg PO DAILY@0830 [facility]


   Last Admin: 09/08/17 08:19 Dose:  0.4 mg


Ursodiol (Actigal -)  300 mg PO BID [facility]


   Last Admin: 09/08/17 09:44 Dose:  300 mg











- Objective


Vital Signs: 


 Vital Signs











Temperature  97.9 F   09/08/17 14:21


 


Pulse Rate  62   09/08/17 14:21


 


Respiratory Rate  18   09/08/17 14:21


 


Blood Pressure  102/68   09/08/17 14:21


 


O2 Sat by Pulse Oximetry (%)  96   09/08/17 09:00











Constitutional: Yes: Calm


Eyes: Yes: Conjunctiva Clear


HENT: Yes: Atraumatic


Neck: Yes: Supple


Cardiovascular: Yes: S1, S2


Respiratory: Yes: CTA Bilaterally


Gastrointestinal: Yes: Soft


Genitourinary: Yes: WNL


Edema: Yes


Edema: LLE: Trace, RLE: Trace


Neurological: Yes: Oriented


Psychiatric: Yes: Oriented


Labs: 


 CBC, BMP





 09/08/17 08:45 





 09/08/17 08:45 





 INR, PTT











INR  1.06  (0.82-1.09)   09/05/17  06:00    


 


Fibrinogen  313.0 mg/dL (238-498)   09/05/17  06:00    














Problem List





- Problems


(1) Elevated liver enzymes


Code(s): R74.8 - ABNORMAL LEVELS OF OTHER SERUM ENZYMES





(2) CAD (coronary artery disease)


Code(s): I25.10 - ATHSCL HEART DISEASE OF NATIVE CORONARY ARTERY W/O ANG PCTRS 

  Qualifiers: 


        Qualified Code(s): I25.10 - Atherosclerotic heart disease of native 

coronary artery without angina pectoris  





(3) CKD (chronic kidney disease)


Code(s): N18.9 - CHRONIC KIDNEY DISEASE, UNSPECIFIED   








Assessment/Plan


 Current Medications











Generic Name Dose Route Start Last Admin





  Trade Name Freq  PRN Reason Stop Dose Admin


 


Aclidinium Bromide  1 puff 09/02/17 22:00 09/08/17 09:46





  Tudorza -  IH   1 puff





  BID ALCIDES   Administration


 


Atorvastatin Calcium  10 mg 09/03/17 22:00 09/07/17 21:32





  Lipitor -  PO   10 mg





  HS ALCIDES   Administration


 


Carvedilol  6.25 mg 09/02/17 22:00 09/08/17 09:44





  Coreg -  PO   6.25 mg





  BID ALCIDES   Administration


 


Docusate Sodium  300 mg 09/07/17 22:00 09/07/17 21:42





  Colace -  PO   300 mg





  HS ALCIDES   Administration


 


Finasteride  5 mg 09/03/17 10:00 09/08/17 09:44





  Proscar -  PO   5 mg





  DAILY ALCIDES   Administration


 


Montelukast Sodium  10 mg 09/03/17 22:00 09/07/17 21:33





  Singulair -  PO   10 mg





  HS ALCIDES   Administration


 


Pantoprazole Sodium  40 mg 09/05/17 22:00 09/08/17 09:44





  Protonix -  PO   40 mg





  BID ALCIDES   Administration


 


Phenol/Menthol  1 spray 09/05/17 13:13  





  Chloraseptic -  MM   





  Q6HPO PRN   





  SORE THROAT   


 


Polyethylene Glycol  17 gm 09/07/17 13:00 09/08/17 09:44





  Miralax (For Daily Use) -  PO   17 gm





  DAILY ALCIDES   Administration


 


Tamsulosin HCl  0.4 mg 09/03/17 08:30 09/08/17 08:19





  Flomax -  PO   0.4 mg





  DAILY@0830 ALCIDES   Administration


 


Ursodiol  300 mg 09/02/17 22:00 09/08/17 09:44





  Actigal -  PO   300 mg





  BID ALCIDES   Administration














Impression


1. CKD with stable renal function


2. hx of BO in the past


3. choledocholithiasis


4. CAD


5. hx of Renal Cell Cancer s/p nephroectomy


6. aortic stenosis





Plan


- pt tolerated dose of lasix


- will need outpt follow up


- abx per ID


- will likely need diuretics at home, pt says he is out of his water pills. 

Asked family to bring in meds for review.


- will follow


Dr Rodriguez

## 2017-09-08 NOTE — PN
Teaching Attending Note


Name of Resident: Jr Rogers


ATTENDING PHYSICIAN STATEMENT





I saw and evaluated the patient.


I reviewed the resident's note and discussed the case with the resident.


I agree with the resident's findings and plan as documented.








SUBJECTIVE:asymptomatic. denies Cp, SOB, fever, chills, abdominal pain, N/V/C/D


reported by RN that he had melanotic stool x1.








OBJECTIVE:





 Last Vital Signs











Temp Pulse Resp BP Pulse Ox


 


 97.7 F   62   18   108/52   96 


 


 09/08/17 05:09 09/08/17 05:09 09/08/17 05:09 09/08/17 05:09 09/07/17 20:51








General NAD


Lungs CTA B/L no wheezing/rales/rhonchi


ABdomen soft NT/ND no rebound or guarding. normoactive BS





ASSESSMENT AND PLAN:


88 year old man with a history of HTN, CAD, MI, cardiac stent, AS, TAVR, right 

nephrectomy, BPH, urethral strictures who presents to the ER with abdominal pain

, vomiting and fever which started after CBD removal 9/1.


1. Choledocholithiasis with acute cholangitis and E.coli bacteremia- s/p ERCP, 

stone removal, CBD stent removal 9/1. tolerating regular diet. LFT near normal. 

will give Ceftriaxone 2g once today to complete abx course. will need to f/u 

with GI closely for further management.  ID and GI on board. 


2. Acute Hgb drop- concern for GI bleeding as pt has had hx vs spincterectomy. s

/p 1 unit PRBC this admission. Hgb remains stable. however did have melanotic 

stool this morning which can be residual blood from recent bleed as Hgb is 

currently stable. FOBT is + which can also be accounted for same reason. will 

reach out to GI if further management is required at this time.


3. constipation- 1 BM today. cont stool softeners prn for daily bm


4. Acute kidney injury on stage 3 vs 4 CKD-stable. will d/w nephro if to 

continue lasix at this time. nephrology on board. 


5. CAD, history of MI, stent-cont to hold plavix. Continue Coreg, Lipitor


6. AS, history of TAVR


7. HTN- Continue Coreg


8. History of renal cancer, right nephrectomy


9. BPH- Continue Flomax, Proscar


10. Thrombocytopenia-likley due to bacteremia. stable. HIT pending. hematology 

consulted. Monitor platelets


11. DVT ppx- SCD due to GI bleed.

## 2017-09-19 ENCOUNTER — HOSPITAL ENCOUNTER (INPATIENT)
Dept: HOSPITAL 74 - JER | Age: 82
LOS: 9 days | Discharge: HOME HEALTH | DRG: 378 | End: 2017-09-28
Attending: INTERNAL MEDICINE | Admitting: INTERNAL MEDICINE
Payer: COMMERCIAL

## 2017-09-19 VITALS — BODY MASS INDEX: 27.4 KG/M2

## 2017-09-19 DIAGNOSIS — T47.1X5A: ICD-10-CM

## 2017-09-19 DIAGNOSIS — I25.2: ICD-10-CM

## 2017-09-19 DIAGNOSIS — Z95.4: ICD-10-CM

## 2017-09-19 DIAGNOSIS — Z80.0: ICD-10-CM
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
## 2017-09-19 DIAGNOSIS — Q27.33: ICD-10-CM

## 2017-09-19 DIAGNOSIS — N18.3: ICD-10-CM

## 2017-09-19 DIAGNOSIS — K92.1: ICD-10-CM

## 2017-09-19 DIAGNOSIS — I34.0: ICD-10-CM
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
## 2017-09-19 DIAGNOSIS — K59.00: ICD-10-CM

## 2017-09-19 DIAGNOSIS — K25.0: Primary | ICD-10-CM
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
## 2017-09-19 DIAGNOSIS — E78.00: ICD-10-CM

## 2017-09-19 DIAGNOSIS — N40.0: ICD-10-CM

## 2017-09-19 DIAGNOSIS — Z90.5: ICD-10-CM

## 2017-09-19 DIAGNOSIS — T39.395A: ICD-10-CM

## 2017-09-19 DIAGNOSIS — I12.9: ICD-10-CM

## 2017-09-19 DIAGNOSIS — R91.1: ICD-10-CM

## 2017-09-19 DIAGNOSIS — S39.82XA: ICD-10-CM

## 2017-09-19 DIAGNOSIS — Z85.528: ICD-10-CM

## 2017-09-19 DIAGNOSIS — D69.6: ICD-10-CM

## 2017-09-19 DIAGNOSIS — D62: ICD-10-CM

## 2017-09-19 DIAGNOSIS — D63.8: ICD-10-CM

## 2017-09-19 DIAGNOSIS — T45.525A: ICD-10-CM

## 2017-09-19 DIAGNOSIS — N17.9: ICD-10-CM

## 2017-09-19 DIAGNOSIS — Z95.5: ICD-10-CM

## 2017-09-19 DIAGNOSIS — I25.10: ICD-10-CM

## 2017-09-19 DIAGNOSIS — K25.4: ICD-10-CM

## 2017-09-19 LAB
ALBUMIN SERPL-MCNC: 2.4 G/DL (ref 3.4–5)
ALP SERPL-CCNC: 69 U/L (ref 45–117)
ALT SERPL-CCNC: 11 U/L (ref 12–78)
ANION GAP SERPL CALC-SCNC: 9 MMOL/L (ref 8–16)
APPEARANCE UR: CLEAR
APTT BLD: 26.8 SECONDS (ref 26.9–34.4)
AST SERPL-CCNC: 17 U/L (ref 15–37)
BACTERIA #/AREA URNS HPF: (no result) /HPF
BASOPHILS # BLD: 0.5 % (ref 0–2)
BILIRUB SERPL-MCNC: 0.5 MG/DL (ref 0.2–1)
BILIRUB UR STRIP.AUTO-MCNC: NEGATIVE MG/DL
CALCIUM SERPL-MCNC: 8.2 MG/DL (ref 8.5–10.1)
CK SERPL-CCNC: 75 IU/L (ref 39–308)
CO2 SERPL-SCNC: 27 MMOL/L (ref 21–32)
COLOR UR: (no result)
CREAT SERPL-MCNC: 2.1 MG/DL (ref 0.7–1.3)
DEPRECATED RDW RBC AUTO: 16.9 % (ref 11.9–15.9)
EOSINOPHIL # BLD: 1.4 % (ref 0–4.5)
GLUCOSE SERPL-MCNC: 95 MG/DL (ref 74–106)
INR BLD: 1.17 (ref 0.82–1.09)
KETONES UR QL STRIP: NEGATIVE
LEUKOCYTE ESTERASE UR QL STRIP.AUTO: NEGATIVE
MCH RBC QN AUTO: 33.2 PG (ref 25.7–33.7)
MCHC RBC AUTO-ENTMCNC: 34.1 G/DL (ref 32–35.9)
MCV RBC: 97.3 FL (ref 80–96)
MUCOUS THREADS URNS QL MICRO: (no result)
NEUTROPHILS # BLD: 82.7 % (ref 42.8–82.8)
NITRITE UR QL STRIP: NEGATIVE
PH BLDV: 7.41 [PH] (ref 7.32–7.42)
PH UR: 5 [PH] (ref 5–8)
PLATELET # BLD AUTO: 106 K/MM3 (ref 134–434)
PMV BLD: 9 FL (ref 7.5–11.1)
PROT SERPL-MCNC: 4.8 G/DL (ref 6.4–8.2)
PROT UR QL STRIP: NEGATIVE
PROT UR QL STRIP: NEGATIVE
PT PNL PPP: 12.9 SEC (ref 9.98–11.88)
RBC # BLD AUTO: <1 /HPF (ref 0–3)
RBC # UR STRIP: (no result) /UL
SAO2 % BLDV: 27.1 MEQ/L (ref 19–25)
TROPONIN I SERPL-MCNC: 0.04 NG/ML (ref 0–0.05)
UROBILINOGEN UR STRIP-MCNC: NEGATIVE MG/DL (ref 0.2–1)
WBC # BLD AUTO: 6.1 K/MM3 (ref 4–10)
WBC # UR AUTO: <1 /HPF (ref 3–5)

## 2017-09-19 PROCEDURE — 30233H1 TRANSFUSION OF NONAUTOLOGOUS WHOLE BLOOD INTO PERIPHERAL VEIN, PERCUTANEOUS APPROACH: ICD-10-PCS

## 2017-09-19 PROCEDURE — P9017 PLASMA 1 DONOR FRZ W/IN 8 HR: HCPCS

## 2017-09-19 PROCEDURE — P9058 RBC, L/R, CMV-NEG, IRRAD: HCPCS

## 2017-09-19 PROCEDURE — P9038 RBC IRRADIATED: HCPCS

## 2017-09-19 PROCEDURE — P9034 PLATELETS, PHERESIS: HCPCS

## 2017-09-19 RX ADMIN — PANTOPRAZOLE SODIUM SCH MLS/HR: 40 INJECTION, POWDER, FOR SOLUTION INTRAVENOUS at 22:34

## 2017-09-19 NOTE — CONSULT
Consult


Consult Specialty:: PULM / CCM


Referred by:: Dr. Cesar Pennington


Reason for Consultation:: GIB





- History of Present Illness


Chief Complaint: Dizzy


History of Present Illness: 


Mr. Marshall is an 87 y/o man w/ HTN, HL, CAD, MI w/ stents, AS s/p TAVR w/ 

porcine valve (on ASA and Plavix), CKD, renal CA s/p nephrectomy, UTI, and 

urethral structures s/p balloon dilation, multiple admits throughout this 

summer for choledocholithiasis and cholangitis now s/p ERCP, sphincterotomy, & 

cholecystectomy. Pt BIBA this AM c/o weakness & worsening lower extremity edema 

(for which his cardiologist Dr Sanabria started furosemide about a week ago). 

Today the pt reports feeling weak & experienced a "brown" vomitus at home. The 

ED reports a large black BM & a Hgb < 7.0, but no LA (1.5). The pt denies any 

CP N/V/D, abd pain, or dysuria. Of note, pt's last ERCP was on 17 where his 

sphincterotomy was extended in order to remove residual stones. The pt was re-

admitted the very next day for hematemesis. W/u revealed bleeding m/l 2/2 his 

sphincterotomy site and it resolved w/ PPI therapy and several transfusions.





- History Source


History Provided By: Patient, Medical Record


Limitations to Obtaining History: Clinical Condition





- Past Medical History


Cardio/Vascular: Yes: Aortic Stenosis (Post TAVR at [facility] in ), CAD (s/p 

cardiac stent/ PCI ), HTN, Hyperlipdemia


Gastrointestinal: Yes: Diverticulosis


Hepatobiliary: Yes: Cholelithiasis, Choledocholithiasis (ERCP x 3 with stent 

insertion and removal)


Renal/: Yes: Renal Failure (required dialysis in  following sepsis with 

ATN), Renal Inusuff, Cancer (Renal CA with right nephrectomy), Renal Calculi, 

Other (urethral stricture s/p cystoscopy, nephrectomy for RCC)


Musculoskeletal: Yes: Other (DJD)





- Past Surgical History


Past Surgical History: Yes: Cholecystectomy, Nephrectomy (right nephrectomy for 

cancer ), Stent (biliary stent , removed ), Valve Replacement (TAVR)





- Alcohol/Substance Use


Hx Alcohol Use: No


History of Substance Use: reports: None





- Smoking History


Smoking history: Never smoked


Have you smoked in the past 12 months: No





- Social History


Usual Living Arrangement: With Spouse


ADL: Independent


Occupation: CPA: still working


History of Recent Travel: No





Home Medications





- Allergies


Allergies/Adverse Reactions: 


 Allergies











Allergy/AdvReac Type Severity Reaction Status Date / Time


 


No Known Allergies Allergy   Verified 17 13:17














- Home Medications


Home Medications: 


Ambulatory Orders





Finasteride 5 mg PO DAILY 17 


Carvedilol [Coreg] 6.25 mg PO BID  tablet 17 


Ursodiol 300 mg PO BID  capsule 17 


Montelukast Sodium [Singulair] 10 mg PO DAILY  tablet 17 


Atorvastatin Calcium 10 mg PO DAILY  tablet 17 


Tamsulosin HCl [Flomax -] 0.4 mg PO DAILY 17 


Ursodiol [Actigall] 300 mg PO BID #180 capsule 17 


Docusate Sodium [Colace -] 300 mg PO HS #21 cap 17 


Pantoprazole Sodium [Protonix -] 40 mg PO DAILY #30 tab 17 


Phenol [Chloraseptic -] 1 spray MM Q6HPO PRN #1 bottle 17 


Polyethylene Glycol 3350 [Miralax 119 gm Btl -] 17 gm PO DAILY #1 bottle  


Carvedilol [Coreg -] 6.25 mg PO BID #60 tablet 09/10/17 


Pantoprazole Sodium 40 mg PO DAILY 17 











Family Disease History





- Family Disease History


Family Disease History: Other: Father ( 80's unclear cause), Mother ( 80

's CVA/MI), Brother (1 brother  CHF, 1 brother  stomach cancer)





Review of Systems





- Review of Systems


Constitutional: reports: Lethargy, Weakness


Eyes: reports: No Symptoms


HENT: reports: No Symptoms


Neck: reports: No Symptoms


Cardiovascular: reports: No Symptoms


Respiratory: reports: SOB on Exertion


Gastrointestinal: reports: Vomiting


Genitourinary: reports: No Symptoms


Breasts: reports: No Symptoms Reported


Musculoskeletal: reports: No Symptoms


Integumentary: reports: No Symptoms


Neurological: reports: Weakness


Endocrine: reports: No Symptoms


Hematology/Lymphatic: reports: No Symptoms


Psychiatric: reports: No Symptoms


Pain Intensity: 0





Physical Exam


Vital Signs: 


 Vital Signs











Temperature  97 F L  17 19:37


 


Pulse Rate  72   17 19:37


 


Respiratory Rate  15   17 19:37


 


Blood Pressure  107/60   17 19:37


 


O2 Sat by Pulse Oximetry (%)  99   17 19:37








 Intake & Output











 17





 23:59 23:59 23:59 23:59


 


Output Total   1100 


 


Balance   -1100 


 


Weight   84.4 kg 











Constitutional: Yes: Well Nourished, No Distress, Calm, Pallor


Eyes: Yes: WNL, Conjunctiva Clear, EOM Intact


HENT: Yes: WNL, Atraumatic, Normocephalic


Neck: Yes: WNL, Supple, Trachea Midline


Cardiovascular: Yes: WNL, Regular Rate and Rhythm


Respiratory: Yes: WNL, Regular, CTA Bilaterally


Gastrointestinal: Yes: WNL, Normal Bowel Sounds, Soft


...Rectal Exam: Yes: Deferred


Renal/: Yes: WNL


Breast(s): Yes: WNL


Musculoskeletal: Yes: WNL


Extremities: Yes: WNL


Edema: Yes


Edema: LLE: 4+, RLE: 4+


Peripheral Pulses WNL: Yes


Integumentary: Yes: WNL


Neurological: Yes: WNL, Alert, Oriented


...Motor Strength: WNL


Psychiatric: Yes: WNL, Alert, Oriented


Labs: 


 CBC, BMP





 17 15:34 





 17 15:34 





 Troponin, BNP











  17





  15:34 15:34


 


Troponin I  0.04  D 


 


B-Natriuretic Peptide   3263.14 H








 INR, PTT











INR  1.17  (0.82-1.09)  H  17  15:34    














Imaging





- Results


Chest X-ray: Image Reviewed (: Low Volumes, otherwise Clear (My Read).)


EKG: Image Reviewed (RSR in the 70's w/ Ect, L BBB (Old), QTc = 509, no acute 

process (My Read).)





Problem List





- Problems


(1) GI (gastrointestinal hemorrhage)


Code(s): K92.2 - GASTROINTESTINAL HEMORRHAGE, UNSPECIFIED   Qualifiers: 


     GI bleed type/associated pathology: melena     Qualified Code(s): K92.1 - 

Melena  





(2) BO (acute kidney injury)


Code(s): N17.9 - ACUTE KIDNEY FAILURE, UNSPECIFIED





(3) CAD (coronary artery disease)


Code(s): I25.10 - ATHSCL HEART DISEASE OF NATIVE CORONARY ARTERY W/O ANG PCTRS 

  Qualifiers: 


     Coronary Disease-Associated Artery/Lesion type: unspecified vessel or 

lesion type     Native vs. transplanted heart: native heart     Associated 

angina: without angina        Qualified Code(s): I25.10 - Atherosclerotic heart 

disease of native coronary artery without angina pectoris  





(4) Urinary retention


Code(s): R33.9 - RETENTION OF URINE, UNSPECIFIED








Assessment/Plan


ASSESS: This is an 87 y/o man w/ HTN, HL, CAD, MI w/ stents, AS s/p TAVR (on 

ASA and Plavix), CKD, s/p nephrectomy, pt admitted now to the ICU for UGIB. 





PLAN:


-NPO


-HOB > 30


-Supp FiO2 for an SpO2 > 92%


-Nebs


-IS


-Large Bore IV Access X2


-Active T & S


-IV fluids


-[place]


-Strict I's & O's


-Trend CBC


-Trend LA


-Normal Transfusion Thresholds [Flavio all blood products w/ Lasix (AS)]


-IV PPI (Recent admit for GIB was thought to be 2/2 recent sphincterotomy & it 

resolved w/ PPI therapy and several transfusions)


-Vit K


-DDAVP


-Hold All AC


-D/c all anti-HTN meds


-GI --> scope


-If no Scope --> Transfer to floor for continued management


-Surgery (Recent ERCP, sphincterotomy, & liliya)


-Dr. Sanabria (CARDS)


-TTE in the AM for worsening R HF





Thank you for this Interesting Consult





Michael Suresh, [facility] ICU


PULM / CCM

## 2017-09-19 NOTE — CONSULT
Consult


Consult Specialty:: Surgery


Reason for Consultation:: GI bleed





- History of Present Illness


History of Present Illness: 


88 male history of ascending cholangitis s/p cholecystectomy and stent s/p 

removal presents to the ER 


Brought by family for lethargy and weakness


Found to have Hgb of 6.3 in ER


Seen by GI


Being transfused PRBC








- History Source


History Provided By: Patient, Family Member





- Past Medical History


Cardio/Vascular: Yes: Aortic Stenosis (Post TAVR at [facility] in ), CAD (s/p 

cardiac stent/ PCI ), HTN, Hyperlipdemia


Gastrointestinal: Yes: Diverticulosis


Hepatobiliary: Yes: Cholelithiasis, Choledocholithiasis (ERCP x 3 with stent 

insertion and removal)


Renal/: Yes: Renal Failure (required dialysis in  following sepsis with 

ATN), Renal Inusuff, Cancer (Renal CA with right nephrectomy), Renal Calculi, 

Other (urethral stricture s/p cystoscopy, nephrectomy for RCC)


Musculoskeletal: Yes: Other (DJD)





- Past Surgical History


Past Surgical History: Yes: Cholecystectomy, Nephrectomy (right nephrectomy for 

cancer ), Stent (biliary stent , removed ), Valve Replacement (TAVR)





- Alcohol/Substance Use


Hx Alcohol Use: No


History of Substance Use: reports: None





- Smoking History


Smoking history: Never smoked


Have you smoked in the past 12 months: No





- Social History


Usual Living Arrangement: With Spouse


ADL: Independent


Occupation: CPA: still working


History of Recent Travel: No





Home Medications





- Allergies


Allergies/Adverse Reactions: 


 Allergies











Allergy/AdvReac Type Severity Reaction Status Date / Time


 


No Known Allergies Allergy   Verified 17 13:17














- Home Medications


Home Medications: 


Ambulatory Orders





Finasteride 5 mg PO DAILY 17 


Carvedilol [Coreg] 6.25 mg PO BID  tablet 17 


Ursodiol 300 mg PO BID  capsule 17 


Montelukast Sodium [Singulair] 10 mg PO DAILY  tablet 17 


Atorvastatin Calcium 10 mg PO DAILY  tablet 17 


Tamsulosin HCl [Flomax -] 0.4 mg PO DAILY 17 


Ursodiol [Actigall] 300 mg PO BID #180 capsule 17 


Docusate Sodium [Colace -] 300 mg PO HS #21 cap 09/08/17 


Pantoprazole Sodium [Protonix -] 40 mg PO DAILY #30 tab 17 


Phenol [Chloraseptic -] 1 spray MM Q6HPO PRN #1 bottle 17 


Polyethylene Glycol 3350 [Miralax 119 gm Btl -] 17 gm PO DAILY #1 bottle  


Carvedilol [Coreg -] 6.25 mg PO BID #60 tablet 09/10/17 


Pantoprazole Sodium 40 mg PO DAILY 17 











Family Disease History





- Family Disease History


Family Disease History: Other: Father ( 80's unclear cause), Mother ( 80

's CVA/MI), Brother (1 brother  CHF, 1 brother  stomach cancer)





Review of Systems





- Review of Systems


Constitutional: denies: Fever


Neck: reports: No Symptoms


Cardiovascular: denies: Chest Pain


Respiratory: denies: Cough


Gastrointestinal: reports: Abdominal Pain, Melena.  denies: Nausea


Pain Intensity: 2





Physical Exam


Vital Signs: 


 Vital Signs











Temperature  97 F L  17 19:37


 


Pulse Rate  72   17 19:37


 


Respiratory Rate  15   17 19:37


 


Blood Pressure  107/60   17 19:37


 


O2 Sat by Pulse Oximetry (%)  99   17 19:37











Constitutional: Yes: Anxious, Pallor


Cardiovascular: Yes: WNL


Respiratory: Yes: Regular


Gastrointestinal: Yes: Soft, Tenderness.  No: Tenderness, Rebound


...Rectal Exam: Yes: Other (+ Blood per rectum noted on ER bed)


Neurological: Yes: Alert





Assessment/Plan


88 male with GI bleed


NPO


IV fluids


Hold all blood thinner/anticoagulation


ICU care


Transfuse PRBCs/FFP/Platelets as needed


Serial H/H q 6 hours


GI planning to scope in am

## 2017-09-19 NOTE — CON.GI
Consult


Consult Specialty:: Gastroenterology


Referred by:: Jeferson Chávez M.D.


Reason for Consultation:: GI bleeding





- History of Present Illness


Chief Complaint: Progressive weakness with exertion leading to a near syncopal 

spell at home today


History of Present Illness: 


88M has noted worsening dyspnea on exertion leading to a near syncopal spell at 

home. His home aid denies loss of consciousness and caught him before he fell. 

Surjit denies chest or abdominal pain. He had brown vomitus at home. In the ER 

he had a large black BM. He had bleeding following his recent ERCP and 

sphincterotomy to extract residual CBD stones and required blood transfusions. 

EGD was deferred when the bleeding stopped and decision was made not to resume 

his clopidrogel. His wife shows me a medication list that suggests that his 

clopidrogel was resumed at home. He had worsening lower extremity edema for 

which his cardiologist Dr Sanabria started furosemide about a week ago. He was 

taking pantoprazole at home. 


He presented with sepsis due to ascending cholangitis in  when I placed a 

CBD stent while he was under Plavix. His course was complicated by DIC and 

renal failure requiring temporary dialysis. I subsequently did an ERCP to 

remove stones which splintered on extraction and required multiple balloon 

sweeps. A stent was placed when the sphincterotomy swelled. he had a 

cholecystectomy and was eventually discharged. I did an elective ERCP on 17 

when I had to extend his sphincterotomy to remove residual stones. He was 

admitted the next day for hematemesis and jaundice. The jaundice resolved 

suggesting it was due to sphincterotomy swelling. The bleeding was assumed to 

be from his sphincterotomy site and resolved with PPI therapy and after several 

transfusions. An EGD was not undertaken as there was concern about pharyngeal 

swelling and bleeding form the site of ET and ERCP tube trauma.    





- History Source


History Provided By: Patient, Family Member, Medical Record


Limitations to Obtaining History: Clinical Condition





- Past Medical History


Cardio/Vascular: Yes: Aortic Stenosis (Post TAVR at [facility] in ), CAD (s/p 

cardiac stent/ PCI ), HTN, Hyperlipdemia


Gastrointestinal: Yes: Diverticulosis


Hepatobiliary: Yes: Cholelithiasis, Choledocholithiasis (ERCP x 3 with stent 

insertion and removal)


Renal/: Yes: Renal Failure (required dialysis in  following sepsis with 

ATN), Renal Inusuff, Cancer (Renal CA with right nephrectomy), Renal Calculi, 

Other (urethral stricture s/p cystoscopy, nephrectomy for RCC)


Musculoskeletal: Yes: Other (DJD)





- Past Surgical History


Past Surgical History: Yes: Cholecystectomy, Nephrectomy (right nephrectomy for 

cancer ), Stent (biliary stent , removed ), Valve Replacement (TAVR)





- Alcohol/Substance Use


Hx Alcohol Use: No


History of Substance Use: reports: None





- Smoking History


Smoking history: Never smoked


Have you smoked in the past 12 months: No





- Social History


Usual Living Arrangement: With Spouse


ADL: Independent


Occupation: CPA: still working


Place of Birth: [place]


History of Recent Travel: No





Home Medications





- Allergies


Allergies/Adverse Reactions: 


 Allergies











Allergy/AdvReac Type Severity Reaction Status Date / Time


 


No Known Allergies Allergy   Verified 17 13:17














- Home Medications


Home Medications: 


Ambulatory Orders





Finasteride 5 mg PO DAILY 17 


Carvedilol [Coreg] 6.25 mg PO BID  tablet 17 


Ursodiol 300 mg PO BID  capsule 17 


Montelukast Sodium [Singulair] 10 mg PO DAILY  tablet 17 


Atorvastatin Calcium 10 mg PO DAILY  tablet 17 


Tamsulosin HCl [Flomax -] 0.4 mg PO DAILY 17 


Ursodiol [Actigall] 300 mg PO BID #180 capsule 17 


Docusate Sodium [Colace -] 300 mg PO HS #21 cap 17 


Pantoprazole Sodium [Protonix -] 40 mg PO DAILY #30 tab 17 


Phenol [Chloraseptic -] 1 spray MM Q6HPO PRN #1 bottle 17 


Polyethylene Glycol 3350 [Miralax 119 gm Btl -] 17 gm PO DAILY #1 bottle  


Carvedilol [Coreg -] 6.25 mg PO BID #60 tablet 09/10/17 


Pantoprazole Sodium 40 mg PO DAILY 17 











Family Disease History





- Family Disease History


Family Disease History: Other: Father ( 80's unclear cause), Mother ( 80

's CVA/MI), Brother (1 brother  CHF, 1 brother  stomach cancer)





Review of Systems





- Review of Systems


Constitutional: reports: Weakness


Eyes: reports: Blurred Vision


HENT: reports: No Symptoms


Neck: reports: No Symptoms


Cardiovascular: reports: Palpitations, Shortness of Breath


Respiratory: reports: Exercise Intolerance


Gastrointestinal: reports: Melena, Vomiting Blood


Musculoskeletal: reports: Joint Pain





Physical Exam-GI


Vital Signs: 


 Vital Signs











Temperature  99.0 F   17 16:56


 


Pulse Rate  70   17 16:36


 


Respiratory Rate  18   17 16:36


 


Blood Pressure  114/51   17 16:36


 


O2 Sat by Pulse Oximetry (%)  100   17 16:36








 Laboratory Tests











  17





  15:34 15:34 15:34


 


WBC  6.1  


 


RBC  1.91 L D  


 


Hgb  6.3 L* D  


 


Hct  18.6 L D  


 


MCV  97.3 H  


 


Plt Count  106 L  


 


INR   1.17 H 


 


BUN    48 H D


 


Creatinine    2.1 H D


 


Total Bilirubin    0.5


 


AST    17  D


 


ALT    11 L D


 


Alkaline Phosphatase    69  D


 


Creatine Kinase    75


 


B-Natriuretic Peptide   


 


Albumin    2.4 L














  17





  15:34


 


WBC 


 


RBC 


 


Hgb 


 


Hct 


 


MCV 


 


Plt Count 


 


INR 


 


BUN 


 


Creatinine 


 


Total Bilirubin 


 


AST 


 


ALT 


 


Alkaline Phosphatase 


 


Creatine Kinase 


 


B-Natriuretic Peptide  3263.14 H


 


Albumin 








 Current Medications











Generic Name Dose Route Start Last Admin





  Trade Name Freq  PRN Reason Stop Dose Admin


 


Chlorhexidine Gluconate  1 applic 17 22:00  





  Hibiclens For Decolonization -  TP   





  HS ALCIDES   


 


Pantoprazole Sodium 80 mg/  100 mls @ 10 mls/hr 17 18:00  





  Sodium Chloride  IVPB   





  Q10H ALCIDES   





  8 MG/HR   


 


Mupirocin  1 applic 17 22:00  





  Bactroban Ointment (For Decolonization) -  NS 17 21:59  





  BID ALCIDES   








CBC,CMP











WBC  6.1 K/mm3 (4.0-10.0)   17  15:34    


 


RBC  1.91 M/mm3 (4.00-5.60)  L D 17  15:34    


 


Hgb  6.3 GM/dL (11.7-16.9)  L* D 17  15:34    


 


Hct  18.6 % (35.4-49)  L D 17  15:34    


 


MCV  97.3 fl (80-96)  H  17  15:34    


 


MCH  33.2 pg (25.7-33.7)   17  15:34    


 


MCHC  34.1 g/dl (32.0-35.9)   17  15:34    


 


RDW  16.9 % (11.9-15.9)  H  17  15:34    


 


Plt Count  106 K/MM3 (134-434)  L  17  15:34    


 


MPV  9.0 fl (7.5-11.1)   17  15:34    


 


Neutrophils %  82.7 % (42.8-82.8)  D 17  15:34    


 


Lymphocytes %  8.7 % (8-40)  D 17  15:34    


 


Monocytes %  6.7 % (3.8-10.2)   17  15:34    


 


Eosinophils %  1.4 % (0-4.5)   17  15:34    


 


Basophils %  0.5 % (0-2.0)   17  15:34    


 


Sodium  138 mmol/L (136-145)   17  15:34    


 


Potassium  4.4 mmol/L (3.5-5.1)   17  15:34    


 


Chloride  102 mmol/L ()   17  15:34    


 


Carbon Dioxide  27 mmol/L (21-32)   17  15:34    


 


Anion Gap  9  (8-16)   17  15:34    


 


BUN  48 mg/dL (7-18)  H D 17  15:34    


 


Creatinine  2.1 mg/dL (0.7-1.3)  H D 17  15:34    


 


Creat Clearance w eGFR  29.95  (>60)   17  15:34    


 


Random Glucose  95 mg/dL ()  D 17  15:34    


 


Lactic Acid  1.5 mmol/L (0.4-2.0)   17  15:34    


 


Calcium  8.2 mg/dL (8.5-10.1)  L  17  15:34    


 


Total Bilirubin  0.5 mg/dL (0.2-1.0)   17  15:34    


 


AST  17 U/L (15-37)  D 17  15:34    


 


ALT  11 U/L (12-78)  L D 17  15:34    


 


Alkaline Phosphatase  69 U/L ()  D 17  15:34    


 


Creatine Kinase  75 IU/L ()   17  15:34    


 


Troponin I  0.04 ng/ml (0.00-0.05)  D 17  15:34    


 


B-Natriuretic Peptide  3263.14 pg/ml (5-450)  H  17  15:34    


 


Total Protein  4.8 g/dl (6.4-8.2)  L  17  15:34    


 


Albumin  2.4 g/dl (3.4-5.0)  L  17  15:34    











Constitutional: Yes: Anxious, Pallor


Eyes: Yes: Conjunctiva Clear


HENT: Yes: Atraumatic


Neck: Yes: Supple


Cardiovascular: Yes: Regular Rate and Rhythm, Murmur (2/6 ERNESTO)


Respiratory: Yes: CTA Bilaterally


Gastrointestinal Inspection: Yes: Scars (healed laparoscopic incisions)


...Auscultate: Yes: Hyperactive Bowel Sounds


...Palpate: Yes: Soft, Other (nontender)


...Percussion: Yes: Tympanitic


...Rectal Exam: Yes: Guaiac Positive (red tinged melena), Sphincter Tone Poor


Labs: 


 INR, PTT











INR  1.17  (0.82-1.09)  H  17  15:34    














Assessment/Plan


The ground emesis and melena suggests an upper GI source of bleeding. I have 

therefore advised an EGD after blood and fluid resuscitation. I have informed 

Surjit and his wife of the potential risks of perforation and hemorrhage that 

can arise during endoscopy. He has granted an informed consent. I have 

scheduled it for the AM.  I have discussed diego case with the residency staff 

and advised blood transfusion with lasix after the 2nd unit and a PPI drip. 

Will refrain from an NG tube as he is on plavix and had suspected ET and ERCP 

tube trauma just 3 weeks ago. His LFTs are WNL and I believe that his duct is 

finally cleared of stones .

## 2017-09-19 NOTE — HP
CHIEF COMPLAINT: lightheadedness and fall





PCP: Dr. Mckay  GI: Dr. Terrazas  Surgery: Dr. Swann  Cardiology: Dr. Botello





HISTORY OF PRESENT ILLNESS:





88M w/ extensive PMH including choledocholithiasis and cholangitis, most 

recently 09/02 s/p ERCP and CBD stent removal, presenting with lightheadedness 

leading to a fall. Pt reports that at noon today, he was walking from his 

bedroom to the bathroom with his walker, felt lightheaded, and fell down on his 

buttocks. He denies hitting his head or any LOC. He reports no complaints in 

the last few days. Pt has JETER at baseline, but denies any worsening SOB, any 

weakness, chest pain, nausea, emesis, constipation, diarrhea, abdominal pain, 

or dysuria. 





Of note, GI did an elective ERCP on 9/1/17 and had to extend his sphincterotomy 

to remove residual stones. Pt was admitted the next day for hematemesis and 

jaundice. The jaundice resolved suggesting it was due to sphincterotomy 

swelling. The bleeding was assumed to be from his sphincterotomy site and 

resolved with PPI therapy and after several transfusions. An EGD was not 

undertaken as there was concern about pharyngeal swelling and bleeding form the 

site of ET and ERCP tube trauma.    





Pt was instructed to discontinue plavix after last admission, unclear if he did 

or not. 








ER course was notable for:


(1) melena 


(2) Hgb of 6.3 (down from 9.4 on discharge on 9/14)


(3)





Recent Travel:





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

on Asa, Plavix), CAD (Stent, 96), R-Nephrectomy (Kidney Ca), Urethral 

Strictures s/p Dilation





PAST SURGICAL HISTORY: R nephrectomy, urethral stricture dilation, TAVR





Allergies





No Known Allergies Allergy (Verified 09/19/17 13:17)


 





HOME MEDICATIONS:


 Home Medications











 Medication  Instructions  Recorded


 


Finasteride 5 mg PO DAILY 02/19/17


 


Carvedilol [Coreg] 6.25 mg PO BID  tablet 07/12/17


 


Ursodiol 300 mg PO BID  capsule 07/12/17


 


Montelukast Sodium [Singulair] 10 mg PO DAILY  tablet 08/16/17


 


Atorvastatin Calcium 10 mg PO DAILY  tablet 08/18/17


 


Tamsulosin HCl [Flomax -] 0.4 mg PO DAILY 09/01/17


 


Ursodiol [Actigall] 300 mg PO BID #180 capsule 09/01/17


 


Docusate Sodium [Colace -] 300 mg PO HS #21 cap 09/08/17


 


Pantoprazole Sodium [Protonix -] 40 mg PO DAILY #30 tab 09/08/17


 


Phenol [Chloraseptic -] 1 spray MM Q6HPO PRN #1 bottle 09/08/17


 


Polyethylene Glycol 3350 [Miralax 17 gm PO DAILY #1 bottle 09/08/17





119 gm Btl -]  


 


Carvedilol [Coreg -] 6.25 mg PO BID #60 tablet 09/10/17


 


Pantoprazole Sodium 40 mg PO DAILY 09/12/17








Social Hx:





Smoking: Never


Alcohol:  None


Drugs:    None





 lives with spouse, employed- 





REVIEW OF SYSTEMS


CONSTITUTIONAL: 


Absent:  fever, chills, diaphoresis, generalized weakness, malaise, loss of 

appetite, weight change


HEENT: 


Absent:  rhinorrhea, nasal congestion, throat pain, throat swelling, difficulty 

swallowing, mouth swelling, ear pain, eye pain, visual changes


CARDIOVASCULAR: 


Absent: chest pain, syncope, palpitations, irregular heart rate, 


Present: lightheadedness, peripheral edema


RESPIRATORY: 


Absent: cough, wheezing, stridor, hemoptysis


Present: SOB, JETER


GASTROINTESTINAL:


Absent: abdominal pain, abdominal distension, nausea, vomiting, diarrhea, 

constipation


Present: melena


GENITOURINARY: 


Absent: dysuria, frequency, urgency, hesitancy, hematuria, flank pain, genital 

pain


MUSCULOSKELETAL: 


Absent: myalgia, arthralgia, joint swelling, back pain, neck pain


SKIN: 


Absent: rash, itching, pallor


HEMATOLOGIC/IMMUNOLOGIC: 


Absent: easy bleeding, easy bruising, lymphadenopathy, frequent infections


ENDOCRINE:


Absent: unexplained weight gain, unexplained weight loss, heat intolerance, 

cold intolerance


NEUROLOGIC: 


Absent: headache, focal weakness or paresthesias, dizziness, unsteady gait, 

seizure, mental status changes, bladder or bowel incontinence


PSYCHIATRIC: 


Absent: anxiety, depression, suicidal or homicidal ideation, hallucinations.








PHYSICAL EXAMINATION





Vitals: 98   114/51   70   18   100% on NR





GENERAL: Awake, alert, and fully oriented, in no acute distress, pale appearing


HEAD: Normal with no signs of trauma.


EYES: Pupils equal, round and reactive to light, extraocular movements intact, 

sclera anicteric, conjunctiva clear. No lid lag.


EARS, NOSE, THROAT: Ears normal, nares patent, oropharynx clear without 

exudates. Moist mucous membranes.


NECK: Normal range of motion, supple without lymphadenopathy, JVD, or masses.


LUNGS: Expiratory wheezing


HEART: Regular rate and rhythm, normal S1 and S2 without murmur, rub or gallop.


ABDOMEN: rash around umbilicus in skin fold. Large bulging mass on right side/

flank that is reducible. bowel sounds are normoactive, abdomen is NT, ND.


MUSCULOSKELETAL: 2+ b/l LE edema


NEUROLOGICAL:  Cranial nerves II-XII intact. Normal speech. gait not observed.


PSYCHIATRIC: Cooperative. Good eye contact. Appropriate mood and affect.


Rectal: maroon appearing stool, +FOBT





 Laboratory Results - last 24 hr











  09/19/17 09/19/17 09/19/17





  15:34 15:34 15:34


 


WBC  6.1  


 


RBC  1.91 L D  


 


Hgb  6.3 L* D  


 


Hct  18.6 L D  


 


MCV  97.3 H  


 


MCH  33.2  


 


MCHC  34.1  


 


RDW  16.9 H  


 


Plt Count  106 L  


 


MPV  9.0  


 


Neutrophils %  82.7  D  


 


Lymphocytes %  8.7  D  


 


Monocytes %  6.7  


 


Eosinophils %  1.4  


 


Basophils %  0.5  


 


PT with INR   12.90 H 


 


INR   1.17 H 


 


PTT (Actin FS)   26.8 L 


 


VBG pH    7.41


 


POC VBG pCO2    43.6


 


POC VBG pO2    29.3  D


 


Mixed VBG HCO3    27.1 H


 


Sodium   


 


Potassium   


 


Chloride   


 


Carbon Dioxide   


 


Anion Gap   


 


BUN   


 


Creatinine   


 


Creat Clearance w eGFR   


 


Random Glucose   


 


Lactic Acid   


 


Calcium   


 


Total Bilirubin   


 


AST   


 


ALT   


 


Alkaline Phosphatase   


 


Creatine Kinase   


 


Troponin I   


 


B-Natriuretic Peptide   


 


Total Protein   


 


Albumin   


 


Urine Color   


 


Urine Appearance   


 


Urine pH   


 


Urine Protein   


 


Urine Glucose (UA)   


 


Urine Ketones   


 


Urine Blood   


 


Urine Nitrite   


 


Urine Bilirubin   


 


Urine Urobilinogen   


 


Urine RBC   


 


Urine WBC   


 


Ur Epithelial Cells   


 


Urine Bacteria   


 


Urine Mucus   


 


Stool Occult Blood   


 


Blood Type   


 


Antibody Screen   


 


Crossmatch   














  09/19/17 09/19/17 09/19/17





  15:34 15:34 15:34


 


WBC   


 


RBC   


 


Hgb   


 


Hct   


 


MCV   


 


MCH   


 


MCHC   


 


RDW   


 


Plt Count   


 


MPV   


 


Neutrophils %   


 


Lymphocytes %   


 


Monocytes %   


 


Eosinophils %   


 


Basophils %   


 


PT with INR   


 


INR   


 


PTT (Actin FS)   


 


VBG pH   


 


POC VBG pCO2   


 


POC VBG pO2   


 


Mixed VBG HCO3   


 


Sodium  138  


 


Potassium  4.4  


 


Chloride  102  


 


Carbon Dioxide  27  


 


Anion Gap  9  


 


BUN  48 H D  


 


Creatinine  2.1 H D  


 


Creat Clearance w eGFR  29.95  


 


Random Glucose  95  D  


 


Lactic Acid   1.5 


 


Calcium  8.2 L  


 


Total Bilirubin  0.5  


 


AST  17  D  


 


ALT  11 L D  


 


Alkaline Phosphatase  69  D  


 


Creatine Kinase  75  


 


Troponin I  0.04  D  


 


B-Natriuretic Peptide   


 


Total Protein  4.8 L  


 


Albumin  2.4 L  


 


Urine Color   


 


Urine Appearance   


 


Urine pH   


 


Urine Protein   


 


Urine Glucose (UA)   


 


Urine Ketones   


 


Urine Blood   


 


Urine Nitrite   


 


Urine Bilirubin   


 


Urine Urobilinogen   


 


Urine RBC   


 


Urine WBC   


 


Ur Epithelial Cells   


 


Urine Bacteria   


 


Urine Mucus   


 


Stool Occult Blood   


 


Blood Type    A POSITIVE


 


Antibody Screen    Negative


 


Crossmatch    See Detail














  09/19/17 09/19/17 09/19/17





  15:34 18:30 22:36


 


WBC   


 


RBC   


 


Hgb   


 


Hct   


 


MCV   


 


MCH   


 


MCHC   


 


RDW   


 


Plt Count   


 


MPV   


 


Neutrophils %   


 


Lymphocytes %   


 


Monocytes %   


 


Eosinophils %   


 


Basophils %   


 


PT with INR   


 


INR   


 


PTT (Actin FS)   


 


VBG pH   


 


POC VBG pCO2   


 


POC VBG pO2   


 


Mixed VBG HCO3   


 


Sodium   


 


Potassium   


 


Chloride   


 


Carbon Dioxide   


 


Anion Gap   


 


BUN   


 


Creatinine   


 


Creat Clearance w eGFR   


 


Random Glucose   


 


Lactic Acid   


 


Calcium   


 


Total Bilirubin   


 


AST   


 


ALT   


 


Alkaline Phosphatase   


 


Creatine Kinase   


 


Troponin I   


 


B-Natriuretic Peptide  3263.14 H  


 


Total Protein   


 


Albumin   


 


Urine Color    Straw


 


Urine Appearance    Clear


 


Urine pH    5.0


 


Urine Protein    Negative


 


Urine Glucose (UA)    Negative


 


Urine Ketones    Negative


 


Urine Blood    3+ H


 


Urine Nitrite    Negative


 


Urine Bilirubin    Negative


 


Urine Urobilinogen    Negative


 


Urine RBC    <1


 


Urine WBC    <1


 


Ur Epithelial Cells    Rare


 


Urine Bacteria    Rare


 


Urine Mucus    Rare


 


Stool Occult Blood   Positive 


 


Blood Type   


 


Antibody Screen   


 


Crossmatch   














  09/20/17





  02:00


 


WBC  8.3  D


 


RBC  2.45 L D


 


Hgb  8.0 L D


 


Hct  23.4 L D


 


MCV  95.4


 


MCH  32.5


 


MCHC  34.1


 


RDW  16.0 H


 


Plt Count  72 L D


 


MPV  9.1


 


Neutrophils %  78.2


 


Lymphocytes %  8.6


 


Monocytes %  6.7


 


Eosinophils %  6.0 H D


 


Basophils %  0.5


 


PT with INR 


 


INR 


 


PTT (Actin FS) 


 


VBG pH 


 


POC VBG pCO2 


 


POC VBG pO2 


 


Mixed VBG HCO3 


 


Sodium 


 


Potassium 


 


Chloride 


 


Carbon Dioxide 


 


Anion Gap 


 


BUN 


 


Creatinine 


 


Creat Clearance w eGFR 


 


Random Glucose 


 


Lactic Acid 


 


Calcium 


 


Total Bilirubin 


 


AST 


 


ALT 


 


Alkaline Phosphatase 


 


Creatine Kinase 


 


Troponin I 


 


B-Natriuretic Peptide 


 


Total Protein 


 


Albumin 


 


Urine Color 


 


Urine Appearance 


 


Urine pH 


 


Urine Protein 


 


Urine Glucose (UA) 


 


Urine Ketones 


 


Urine Blood 


 


Urine Nitrite 


 


Urine Bilirubin 


 


Urine Urobilinogen 


 


Urine RBC 


 


Urine WBC 


 


Ur Epithelial Cells 


 


Urine Bacteria 


 


Urine Mucus 


 


Stool Occult Blood 


 


Blood Type 


 


Antibody Screen 


 


Crossmatch 











EKG: NSR


CXR: questionable 5mm LLL nodule 








ASSESSMENT/PLAN:





88M w/ extensive PMH including choledocholithiasis and cholangitis, most 

recently 09/02 s/p ERCP and CBD stent removal, who presented with 

lightheadedness leading to a fall, found to have a Hgb of 6.3 and melena in the 

ED, admitted to the ICU for suspected upper GI bleed.





#GI bleed- likely upper, r/o lower


   -Hgb of 6.3


   -transfuse 3 units of PRBCs


   -lasix 20mg IV now and after 2 units of PRBCs


   -protonix drip


   -duonebs PRN


   -GI on board- Dr. Terrazas, informed medical team that he believes the 

bleeding is at site of sphincterotomy and CBD stent removal, plan is for EGD in 

am after adequate transfusion


   -surgery on board- Dr. Swann


   -cardiology on board- Dr. Botello


   -hematology on board- Dr. Dye


   -f/u CBC after 3 units are given


   -f/u CBC, CMP, Mg, Ph, PT/PTT/INR in am


   -f/u UCx, Bcx, CXR


   -confirm most recent medication list with pt and wife





#fall on buttocks


   -f/u pelvic XR in am





#HTN


   -hold carvedilol, started on lopressor 5mg IV q6h  (hold if systolic BP < 

130 OR HR < 60)





#CAD


   -hold atorvastatin





#Urethral Strictures s/p Dilation


   -hold finasteride and flomax





#Hx of choledocholithiasis


   -hold ursodiol





#constipation


   -hold colace and miralax





#AS


   


#Porcine Valve Replacement








#FEN/PPx


   -no fluids


   -electrolytes wnl


   -NPO


   -protonix drip


   -SCDs





Isaac Good MD PGY1














Visit type





- Emergency Visit


Emergency Visit: Yes


ED Registration Date: 09/19/17


Care time: The patient presented to the Emergency Department on the above date 

and was hospitalized for further evaluation of their emergent condition.





- New Patient


This patient is new to me today: Yes


Date on this admission: 09/20/17





- Critical Care


Critical Care patient: Yes


Total Critical Care Time (in minutes): 40


Critical Care Statement: The care of this patient involved high complexity 

decision making to prevent further life threatening deterioration of the patient

's condition and/or to evaluate & treat vital organ system(s) failure or risk 

of failure.

## 2017-09-19 NOTE — PDOC
History of Present Illness





- General


Chief Complaint: Syncope/Near Syncope


Stated Complaint: SYNCOPE


Time Seen by Provider: 09/19/17 13:39


History Source: Patient, Family


Exam Limitations: No Limitations





- History of Present Illness


Initial Comments: 


This is an 88 yom with h/o recent cholecystectomy (June 2017) complicated by 

choledocholithiasis and CBD stenting for suspected cholangitis (removed 9/1/17, 

currently on Flagyl-Levaquin), MI with stenting, aortic stenosis s/p TAVR with 

porcine valve (on ASA and Plavix), CKD, renal cancer s/p nephrectomy, UTI and 

urethral structures s/p balloon dilation. He presents BIBA with his family c/o 

generalized weakness resulting in a fall just PTA ("slumped to the ground" in 

his daughter's arms without trauma). The family additionally notes increase leg 

swelling (both sides) recently, shortness of breath today, and increased stress 

level in the past few weeks.





Past History





- Past Medical History


Allergies/Adverse Reactions: 


 Allergies











Allergy/AdvReac Type Severity Reaction Status Date / Time


 


No Known Allergies Allergy   Verified 09/19/17 13:17











Home Medications: 


Ambulatory Orders





Finasteride 5 mg PO DAILY 02/19/17 


Carvedilol [Coreg] 6.25 mg PO BID  tablet 07/12/17 


Ursodiol 300 mg PO BID  capsule 07/12/17 


Montelukast Sodium [Singulair] 10 mg PO DAILY  tablet 08/16/17 


Atorvastatin Calcium 10 mg PO DAILY  tablet 08/18/17 


Tamsulosin HCl [Flomax -] 0.4 mg PO DAILY 09/01/17 


Ursodiol [Actigall] 300 mg PO BID #180 capsule 09/01/17 


Docusate Sodium [Colace -] 300 mg PO HS #21 cap 09/08/17 


Pantoprazole Sodium [Protonix -] 40 mg PO DAILY #30 tab 09/08/17 


Phenol [Chloraseptic -] 1 spray MM Q6HPO PRN #1 bottle 09/08/17 


Polyethylene Glycol 3350 [Miralax 119 gm Btl -] 17 gm PO DAILY #1 bottle 09/08/ 17 


Carvedilol [Coreg -] 6.25 mg PO BID #60 tablet 09/10/17 


Pantoprazole Sodium 40 mg PO DAILY 09/12/17 








Anemia: No


Cancer: Yes (R.KIDNEY, WITH RIGHT NEPHRECTOMY)


Cardiac Disorders: Yes (valve disease)


COPD: Yes


 Disorders: Yes (URINARY RETENTION,BPH)


HTN: Yes


Hypercholesterolemia: Yes





- Surgical History


Cardiac Surgery: Yes (Angioplasty with stent,AORTIC VALVE SX)





- Immunization History


Immunization Up to Date: Yes





- Suicide/Smoking/Psychosocial Hx


Smoking History: Never smoked


Have you smoked in the past 12 months: No


Hx Alcohol Use: No


Drug/Substance Use Hx: No


Substance Use Type: None


Hx Substance Use Treatment: No





Cardiac Specific PMH





- Complaint Specific PMHX


Pacemaker: No





**Review of Systems





- Review of Systems


Able to Perform ROS?: Yes


Constitutional: Yes: Weakness.  No: Chills, Fever, Unexplained wgt Loss


HEENTM: No: Nose Congestion, Throat Pain


Respiratory: Yes: Shortness of Breath.  No: Cough


Cardiac (ROS): No: Chest Pain, Palpitations


ABD/GI: No: Constipated, Diarrhea, Nausea, Vomiting


: No: Burning, Dysuria


Musculoskeletal: No: Back Pain, Neck Pain


Integumentary: No: Bruising, Rash


Neurological: No: Headache, Numbness, Tingling, Weakness, Dizziness


Endocrine: No: Unexplained Weight Gain, Unexplained Weight Loss





*Physical Exam





- Vital Signs


 Last Vital Signs











Temp Pulse Resp BP Pulse Ox


 


 99.0 F   70   18   114/51   100 


 


 09/19/17 16:56  09/19/17 16:36  09/19/17 16:36  09/19/17 16:36  09/19/17 16:36














- Physical Exam


General Appearance: Yes: Appropriately Dressed, Mild Distress, Other (pale-

appearing older male who is hard of hearing but conversive and answers 

questions appropriately)


HEENT: positive: EOMI, Normal Voice, Hearing Grossly Normal.  negative: Scleral 

Icterus (R), Scleral Icterus (L), Nasal Congestion


Neck: positive: Trachea midline, Supple.  negative: Tender, Rigid


Respiratory/Chest: positive: Lungs Clear, Rapid RR, Crackles, Wheezing.  

negative: Stridor


Cardiovascular: positive: Regular Rhythm, Regular Rate, Edema (bilateral lower 

extremities with 2+ pitting edema, old healing ecchymoses noted to LLE), 

Systolic Murmur (2/6 at LUSB)


Gastrointestinal/Abdominal: positive: Normal Bowel Sounds, Soft, Other (large 

melena stool noted in diaper, healing ecchymosis noted overlying RUQ/right 

lower ribs).  negative: Tender, Organomegaly, Pulsatile Mass, Guarding


Musculoskeletal: positive: Normal Inspection.  negative: Decreased Range of 

Motion, Vertebral Tenderness


Extremity: positive: Normal Capillary Refill, Normal Inspection, Normal Range 

of Motion, Other (good strength in BUE (able to pull trunk up to sitting 

position on hospital bed)).  negative: Tender, Cyanosis


Integumentary: positive: Normal Color, Dry, Warm, Pale, Other (scattered 

healing ecchymoses LLE, venous stasis changes BLE).  negative: Erythema, Rash


Neurologic: positive: CNs II-XII NML intact, Fully Oriented, Alert, Normal Mood/

Affect, Normal Response, Motor Strength 5/5.  negative: Facial Droop, Numbness, 

Sensory Deficit, Confused, Disoriented





**Heart Score/ECG Review





- History


History: Slightly suspicious





- Electrocardiogram


EKG: Non specific repolarization disturbance





- Age


Age: >/= 65





- Risk Factors


Risk Factors Heart Score: Yes Hx Hypercholesterolemia, Yes Hx Hypertension


Based on the list above the patient has:: 1-2 risk factors





- Troponin


Troponin: </= normal limit





- Score


Heart Score - Total: 4


  ** #1


ECG reviewed & interpreted by me at: 14:56


Sinus rhythm with rate of 75, LBBB same as prior EKG.





ED Treatment Course





- LABORATORY


CBC & Chemistry Diagram: 


 09/19/17 15:34





 09/19/17 15:34





- ADDITIONAL ORDERS


Additional order review: 


 Laboratory  Results











  09/19/17 09/19/17 09/19/17





  15:34 15:34 15:34


 


PT with INR   


 


INR   


 


PTT (Actin FS)   


 


VBG pH   


 


POC VBG pCO2   


 


POC VBG pO2   


 


Mixed VBG HCO3   


 


Sodium   


 


Potassium   


 


Chloride   


 


Carbon Dioxide   


 


Anion Gap   


 


BUN   


 


Creatinine   


 


Creat Clearance w eGFR   


 


Random Glucose   


 


Lactic Acid    1.5


 


Calcium   


 


Total Bilirubin   


 


AST   


 


ALT   


 


Alkaline Phosphatase   


 


Creatine Kinase   


 


Troponin I   


 


B-Natriuretic Peptide  3263.14 H  


 


Total Protein   


 


Albumin   


 


Blood Type   A POSITIVE 


 


Antibody Screen   Negative 


 


Crossmatch   See Detail 














  09/19/17 09/19/17 09/19/17





  15:34 15:34 15:34


 


PT with INR    12.90 H


 


INR    1.17 H


 


PTT (Actin FS)    26.8 L


 


VBG pH   7.41 


 


POC VBG pCO2   43.6 


 


POC VBG pO2   29.3  D 


 


Mixed VBG HCO3   27.1 H 


 


Sodium  138  


 


Potassium  4.4  


 


Chloride  102  


 


Carbon Dioxide  27  


 


Anion Gap  9  


 


BUN  48 H D  


 


Creatinine  2.1 H D  


 


Creat Clearance w eGFR  29.95  


 


Random Glucose  95  D  


 


Lactic Acid   


 


Calcium  8.2 L  


 


Total Bilirubin  0.5  


 


AST  17  D  


 


ALT  11 L D  


 


Alkaline Phosphatase  69  D  


 


Creatine Kinase  75  


 


Troponin I  0.04  D  


 


B-Natriuretic Peptide   


 


Total Protein  4.8 L  


 


Albumin  2.4 L  


 


Blood Type   


 


Antibody Screen   


 


Crossmatch   








 











  09/19/17





  15:34


 


RBC  1.91 L D


 


MCV  97.3 H


 


MCHC  34.1


 


RDW  16.9 H


 


MPV  9.0


 


Neutrophils %  82.7  D


 


Lymphocytes %  8.7  D


 


Monocytes %  6.7


 


Eosinophils %  1.4


 


Basophils %  0.5














- RADIOLOGY


Radiology Studies Ordered: 














 Category Date Time Status


 


 CHEST X-RAY PORTABLE* [RAD] Stat Radiology  09/19/17 13:43 Completed











CXR shows prominent mediastinum but no acute cardiopulmonary processes, no 

interval change from prior CXR.





- Medications


Given in the ED: 


ED Medications














Discontinued Medications














Generic Name Dose Route Start Last Admin





  Trade Name Freq  PRN Reason Stop Dose Admin


 


Sodium Chloride  500 mls @ 1,000 mls/hr 09/19/17 13:42 09/19/17 16:10





  Normal Saline -  IV 09/19/17 14:11  1,000 mls/hr





  ASDIR STA   Administration


 


Pantoprazole Sodium 80 mg/  100 mls @ 200 mls/hr 09/19/17 16:06 09/19/17 16:36





  Sodium Chloride  IVPB 09/19/17 16:35  200 mls/hr





  ONCE ONE   Administration














Medical Decision Making





- Medical Decision Making


88 yom with recent cholecystectomy c/b choledocholithiasis, stenting, and 

cholangitis (on Lev-Flagyl now).


Presents with generalized weakness, incr BLE edema, SOB today.


On exam he is hypotensive initially (systolic in 80s), crackles on lung exam, 2/

6 systolic murmur, BLE 2+ pitting edema.


EKG with old LBBB and otherwise no acute change.


CXR is unchanged from prior.





Pt has a large bowel movement of melena as IV is being placed.


2 units blood ordered from blood bank.


Pt is given 1 L bolus of NS.


Blood labs are sent and resulted.


Labs notable for Hgb 6.3, BNP >3000.


ICU and hospitalist agree with plan for admission to ICU.


After IVF bolus Pt's systolic blood pressure is improved to mid-110s.


Blood transfusion is begun.


Care is signed out to oncoming team.





*DC/Admit/Observation/Transfer


Diagnosis at time of Disposition: 


GI (gastrointestinal hemorrhage)


Qualifiers:


 GI bleed type/associated pathology: melena Qualified Code(s): K92.1 - Melena





Anemia


Qualifiers:


 Anemia type: unspecified type Qualified Code(s): D64.9 - Anemia, unspecified





- Discharge Dispostion


Condition at time of disposition: Guarded


Admit: Yes


Decision to Admit order Date/Time: 


Decision to Admit Order











 Category Date Time Status


 


 Decision to Admit to Hospital Routine Admission  09/19/17 16:55 Active














- Referrals


Referrals: 


Brandyn Mckay MD [Primary Care Provider] -

## 2017-09-19 NOTE — EKG
Test Reason : 

Blood Pressure : ***/*** mmHG

Vent. Rate : 075 BPM     Atrial Rate : 075 BPM

   P-R Int : 186 ms          QRS Dur : 168 ms

    QT Int : 456 ms       P-R-T Axes : 063 -11 137 degrees

   QTc Int : 509 ms

 

NORMAL SINUS RHYTHM

LEFT BUNDLE BRANCH BLOCK

ABNORMAL ECG

WHEN COMPARED WITH ECG OF 03-SEP-2017 09:55,

NO SIGNIFICANT CHANGE WAS FOUND

REPEAT EKG IF CLINICALLY INDICATED

Confirmed by ANABEL SIMMONS MD ([id number]) on 9/19/2017 8:16:08 PM

 

Referred By:             Confirmed By:ANABEL SIMMONS MD

## 2017-09-19 NOTE — HP
CHIEF COMPLAINT: Lightheaded and pre-syncope





PCP: Dr. Brandyn Mckay 





HISTORY OF PRESENT ILLNESS:


89 yo M with PMhx of CAD(s/p stents), HTN, HLD, and recent hospitalization for 

sepsis 2/2 ascending cholangitis presents to ER with weakness and near syncope. 

At home he was ambulating with his walker when he felt weak and subsequently 

fell to floor. Denies palpitations, LOC, or head trauma. In ER he has a large 

black BM.He presented with sepsis due to ascending cholangitis in 5/17 when  a 

CBD stent while he was under Plavix. His course was complicated by DIC and 

renal failure requiring temporary dialysis. An elective ERCP was done on 9/1/17 

in which his  sphincterotomy was extended  to remove residual stones.At that 

time his plavix was stopped but it is unclear at this time if he has been 

receiving it at home. At this time he denies CP,HA, palpitations, abd.pain, N/

V. 





ER course was notable for:


(1)Hbg 6.3 BUN 45


(2)Large black BM 


(3)Fluid resuscitation.


(4)EKG: NSR with LBBB( seen on previous EKG as well) , no st or t wave 

abnormalities. 





Recent Travel: Denies





PAST MEDICAL HISTORY:Aortic Stenosis (Post TAVR at [facility] in 2016), CAD (s/p 

cardiac stent/ PCI 1996), HTN, Hyperlipdemia,Diverticulosis,Cholelithiasis,

Renal Failure (required dialysis in 5/17 following sepsis with ATN), Renal 

Inusuff, Cancer (Renal CA with right nephrectomy), Renal Calculi 

Choledocholithiasis (ERCP x 3 with stent insertion and removal)





PAST SURGICAL HISTORY:Cholecystectomy, Nephrectomy (right nephrectomy for 

cancer 9/16), Stent (biliary stent 5/17, removed 9/17), Valve Replacement (TAVR)





Social History:


Smoking:never smoked 


Alcohol:no 


Drugs: no





Family History:


Allergies





No Known Allergies Allergy (Verified 09/19/17 13:17)


 








HOME MEDICATIONS:


 Home Medications











 Medication  Instructions  Recorded


 


Finasteride 5 mg PO DAILY 02/19/17


 


Carvedilol [Coreg] 6.25 mg PO BID  tablet 07/12/17


 


Ursodiol 300 mg PO BID  capsule 07/12/17


 


Montelukast Sodium [Singulair] 10 mg PO DAILY  tablet 08/16/17


 


Atorvastatin Calcium 10 mg PO DAILY  tablet 08/18/17


 


Tamsulosin HCl [Flomax -] 0.4 mg PO DAILY 09/01/17


 


Ursodiol [Actigall] 300 mg PO BID #180 capsule 09/01/17


 


Docusate Sodium [Colace -] 300 mg PO HS #21 cap 09/08/17


 


Pantoprazole Sodium [Protonix -] 40 mg PO DAILY #30 tab 09/08/17


 


Phenol [Chloraseptic -] 1 spray MM Q6HPO PRN #1 bottle 09/08/17


 


Polyethylene Glycol 3350 [Miralax 17 gm PO DAILY #1 bottle 09/08/17





119 gm Btl -]  


 


Carvedilol [Coreg -] 6.25 mg PO BID #60 tablet 09/10/17


 


Pantoprazole Sodium 40 mg PO DAILY 09/12/17








REVIEW OF SYSTEMS


CONSTITUTIONAL: generalized weakness


Absent:  fever, chills, diaphoresis, , malaise, loss of appetite, weight change


HEENT: 


Absent:  rhinorrhea, nasal congestion, throat pain, throat swelling, difficulty 

swallowing, mouth swelling, ear pain, eye pain, visual changes


CARDIOVASCULAR: 


Absent: chest pain, syncope, palpitations, irregular heart rate, lightheadedness

, peripheral edema


RESPIRATORY: 


Absent: cough, shortness of breath, dyspnea with exertion, orthopnea, wheezing, 

stridor, hemoptysis


GASTROINTESTINAL:melena


Absent: abdominal pain, abdominal distension, nausea, vomiting, diarrhea, 

constipation, , hematochezia


GENITOURINARY: 


Absent: dysuria, frequency, urgency, hesitancy, hematuria, flank pain, genital 

pain


MUSCULOSKELETAL: 


Absent: myalgia, arthralgia, joint swelling, back pain, neck pain


SKIN: 


Absent: rash, itching, pallor


HEMATOLOGIC/IMMUNOLOGIC: 


Absent: easy bleeding, easy bruising, lymphadenopathy, frequent infections


ENDOCRINE:


Absent: unexplained weight gain, unexplained weight loss, heat intolerance, 

cold intolerance


NEUROLOGIC: 


Absent: headache, focal weakness or paresthesias, dizziness, unsteady gait, 

seizure, mental status changes, bladder or bowel incontinence


PSYCHIATRIC: 


Absent: anxiety, depression, suicidal or homicidal ideation, hallucinations.








PHYSICAL EXAMINATION


 Vital Signs - 24 hr











  09/19/17 09/19/17 09/19/17





  16:56 18:25 18:45


 


Temperature 99.0 F 97.5 F L 97.6 F


 


Pulse Rate [  74 74





Apical]   


 


Respiratory  18 21





Rate   


 


Blood Pressure  116/53 111/54





[Arm]   


 


O2 Sat by Pulse  100 100





Oximetry (%)   











GENERAL: Awake and alert. Lethargic 


HEAD: NC/AT


EYES:PERRLA, EOMI, sclera pallor, conjunctiva clear. No lid lag.


EARS, NOSE, THROAT: dry mucous membranes.


NECK:supple, No jvd


LUNGS: CTAB, No wheezes, and no crackles. No accessory muscle use.


HEART: RRR, normal S1 and S2 , 2/6 ERNESTO 


ABDOMEN: Maroon stools found on rectal exam, FOBT (+),Soft, nontender, not 

distended, prior drainage site clean and dry, mild erythema. hyperactive BS+ 


MUSCULOSKELETAL: Normal range of motion at all joints. No bony deformities or 

tenderness. No CVA tenderness.


UPPER EXTREMITIES: 2+ pulses, warm, well-perfused. No cyanosis. No clubbing. No 

peripheral edema.


LOWER EXTREMITIES: 2+ pulses, warm, well-perfused. No calf tenderness. 2+ 

peripheral edema. 


NEUROLOGICAL: decreased hearing.  Normal speech. gait no observed 


PSYCHIATRIC: Cooperative. Good eye contact.lethargic.


SKIN: Warm, dry,, no rashes or lesions noted, normal capillary refill. 


 Laboratory Results - last 24 hr











  09/19/17





  18:30


 


Stool Occult Blood  Positive











ASSESSMENT/PLAN:


89 yo M with PMhx of CAD(s/p stents), HTN, HLD, and recent hospitalization for 

sepsis 2/2 ascending cholangitis admitted to ICU for GI bleed. 





Problem List





- Problem


(1) GI (gastrointestinal hemorrhage)


Assessment/Plan: 


* Likely Upper GI given melana; brisk bleed possible given physical exam 

findings of maroon stools 


* Admit to ICU 


* 2 units of PRBC ordered- will need to be transfused slowly and give lasix to 

avoid overload. 


* More blood may be need as there was maroon stools indicative of brisk bleed. 


* Will repeat H/H after second unit. 


* Seen by Dr. Terrazas for EGD in AM 


* Cardiology consult for clearance. 


* Surgery Dr. Swann on board. 


* Hematology on board for blood product management. 














Visit type





- Emergency Visit


Emergency Visit: Yes


ED Registration Date: 09/19/17


Care time: The patient presented to the Emergency Department on the above date 

and was hospitalized for further evaluation of their emergent condition.





- New Patient


This patient is new to me today: Yes


Date on this admission: 09/20/17





- Critical Care


Critical Care patient: Yes


Total Critical Care Time (in minutes): 42


Critical Care Statement: The care of this patient involved high complexity 

decision making to prevent further life threatening deterioration of the patient

's condition and/or to evaluate & treat vital organ system(s) failure or risk 

of failure.

## 2017-09-19 NOTE — PN
Teaching Attending Note


Name of Resident: Isaac Good


ATTENDING PHYSICIAN STATEMENT





I saw and evaluated the patient.


I reviewed the resident's note and discussed the case with the resident.


I agree with the resident's findings and plan as documented.








SUBJECTIVE:


Patient is a 87yo male with PMHx of choledocholithiasis with acute cholangitis 

and E.coli bacteremia- s/p ERCP, stone removal, CBD stent removal 9/1.


Today the patient presentsd with Hemoglobin of 6.6. as per patient he felt 

lightheaded at home which lead him  to a fall while he was walking from his 

bedroom to the bathroom with his walker and fell down on his buttocks. Denies 

any head injury , no headache, or shortness of breath.





OBJECTIVE:


 Vital Signs











Temperature  99.0 F   09/19/17 16:56


 


Pulse Rate  70   09/19/17 16:36


 


Respiratory Rate  18   09/19/17 16:36


 


Blood Pressure  114/51   09/19/17 16:36


 


O2 Sat by Pulse Oximetry (%)  100   09/19/17 16:36








 CBCD











WBC  6.1 K/mm3 (4.0-10.0)   09/19/17  15:34    


 


RBC  1.91 M/mm3 (4.00-5.60)  L D 09/19/17  15:34    


 


Hgb  6.3 GM/dL (11.7-16.9)  L* D 09/19/17  15:34    


 


Hct  18.6 % (35.4-49)  L D 09/19/17  15:34    


 


MCV  97.3 fl (80-96)  H  09/19/17  15:34    


 


MCHC  34.1 g/dl (32.0-35.9)   09/19/17  15:34    


 


RDW  16.9 % (11.9-15.9)  H  09/19/17  15:34    


 


Plt Count  106 K/MM3 (134-434)  L  09/19/17  15:34    


 


MPV  9.0 fl (7.5-11.1)   09/19/17  15:34    








 CMP











Sodium  138 mmol/L (136-145)   09/19/17  15:34    


 


Potassium  4.4 mmol/L (3.5-5.1)   09/19/17  15:34    


 


Chloride  102 mmol/L ()   09/19/17  15:34    


 


Carbon Dioxide  27 mmol/L (21-32)   09/19/17  15:34    


 


Anion Gap  9  (8-16)   09/19/17  15:34    


 


BUN  48 mg/dL (7-18)  H D 09/19/17  15:34    


 


Creatinine  2.1 mg/dL (0.7-1.3)  H D 09/19/17  15:34    


 


Creat Clearance w eGFR  29.95  (>60)   09/19/17  15:34    


 


Random Glucose  95 mg/dL ()  D 09/19/17  15:34    


 


Calcium  8.2 mg/dL (8.5-10.1)  L  09/19/17  15:34    


 


Total Bilirubin  0.5 mg/dL (0.2-1.0)   09/19/17  15:34    


 


AST  17 U/L (15-37)  D 09/19/17  15:34    


 


ALT  11 U/L (12-78)  L D 09/19/17  15:34    


 


Alkaline Phosphatase  69 U/L ()  D 09/19/17  15:34    


 


Total Protein  4.8 g/dl (6.4-8.2)  L  09/19/17  15:34    


 


Albumin  2.4 g/dl (3.4-5.0)  L  09/19/17  15:34    








 CARDIAC ENZYMES











Creatine Kinase  75 IU/L ()   09/19/17  15:34    


 


Troponin I  0.04 ng/ml (0.00-0.05)  D 09/19/17  15:34    








 Current Medications











Generic Name Dose Route Start Last Admin





  Trade Name Kennethq  PRN Reason Stop Dose Admin


 


Chlorhexidine Gluconate  1 applic 09/19/17 22:00  





  Hibiclens For Decolonization -  TP   





  HS ALCIDES   


 


Pantoprazole Sodium 80 mg/  100 mls @ 10 mls/hr 09/19/17 18:00  





  Sodium Chloride  IVPB   





  Q10H ALCIDES   





  8 MG/HR   


 


Mupirocin  1 applic 09/19/17 22:00  





  Bactroban Ointment (For Decolonization) -  NS 09/24/17 21:59  





  BID [facility]   








 Home Medications











 Medication  Instructions  Recorded


 


Finasteride 5 mg PO DAILY 02/19/17


 


Carvedilol [Coreg] 6.25 mg PO BID  tablet 07/12/17


 


Ursodiol 300 mg PO BID  capsule 07/12/17


 


Montelukast Sodium [Singulair] 10 mg PO DAILY  tablet 08/16/17


 


Atorvastatin Calcium 10 mg PO DAILY  tablet 08/18/17


 


Tamsulosin HCl [Flomax -] 0.4 mg PO DAILY 09/01/17


 


Ursodiol [Actigall] 300 mg PO BID #180 capsule 09/01/17


 


Docusate Sodium [Colace -] 300 mg PO HS #21 cap 09/08/17


 


Pantoprazole Sodium [Protonix -] 40 mg PO DAILY #30 tab 09/08/17


 


Phenol [Chloraseptic -] 1 spray MM Q6HPO PRN #1 bottle 09/08/17


 


Polyethylene Glycol 3350 [Miralax 17 gm PO DAILY #1 bottle 09/08/17





119 gm Btl -]  


 


Carvedilol [Coreg -] 6.25 mg PO BID #60 tablet 09/10/17


 


Pantoprazole Sodium 40 mg PO DAILY 09/12/17











PE: as per resident's notes


AAOx3, looks very pale


CVS: S1S2 positive, ERNESTO 2/6


ext: b/l edema L(3+)>R 2plus 


Neuro: AAOx3 





ASSESSMENT AND PLAN:


Patient is 88 year old man with a history of HTN, CAD, MI, cardiac stent, AS, 

TAVR, right nephrectomy, BPH, urethral strictures,  presented with Hemoglobin 

of 6.6. as per patient he felt lightheaded at home which lead him  to a fall 

while he was walking from his bedroom to the bathroom with his walker and fell 

down on his buttocks. Denies any head injury , no headache, or shortness of 

breath. 





# Acute GIB r/o Upper vs lower.  for consult, type and screen for 3 

units total , transfuse , give lasix prior to transfusion and in b/t 

transfusion 


admit in ICu. repeat h/h 4 hrs post transfusion, EGD in am as per GI


# CKD stable. 


# Hx of  CAD, history of MI, stent-cont to hold plavix since patient is 

actively bleeding. hld po meds for now, since patient is npo , Cardio consult 

Dr. Bradshaw


# AS, history of TAVR


# HTN- hold Coreg, IV lopressor instead


# History of renal cancer, right nephrectomy


# BPH- hold Flomax, Proscar


DVT Px: can't anticoagulate since having active bleeding.





Posiible EGD in am


critical care time of 40 minutes

## 2017-09-19 NOTE — PDOC
Attending Attestation





- Resident


Resident Name: Geno Storm





- ED Attending Attestation


I have performed the following: I have examined & evaluated the patient, The 

case was reviewed & discussed with the resident, I agree w/resident's findings 

& plan, Exceptions are as noted





- HPI


HPI: 





09/19/17 15:03


88-year-old male with extensive recent medical history and hospitalizations 

complicated by ascending cholangitis status post cholecystectomy, on 

antibiotics now with near syncope in the setting of progressive generalized 

weakness.





- Physicial Exam


PE: 





09/19/17 15:03


Afebrile, O2 sat notably 93% on room air, improved 100% on nonrebreather


Pale, generally weak appearing


Alert and conversant


Abdomen is soft


Bilateral edema is chronic





- Critical Care Time


Total Critical Care Time: 70


Critical Care Statement: The care of this patient involved high complexity 

decision making to prevent further life threatening deterioration of the patient

's condition and/or to evaluate & treat vital organ system(s) failure or risk 

of failure.





- Medical Decision Making


09/19/17 15:04


Patient seen and evaluated with the resident. I agree with the overall 

evaluation, assessment, and management with the following summary of visit:





88-year-old male presents with progressive weakness, near syncope. Differential 

is broad particularly given his recent past medical history, possible 

infectious versus metabolic versus cardiopulmonary etiology. Rule out severe 

anemia.


Sepsis protocol initiated 


gentle IV fluid resuscitation


Transfuse as needed


Admission





09/19/17 16:30


had large melena rectal output in ED. PRBC and PPI ordered, GI Dr. Terrazas 

consulted, ICU Dr. Sidhu accepts for ICU. 


not on blood thinners requiring any reversal. 


HR remained in 70s, BP 95 systolic. 


Admit to [facility], [facility] [place]. 





**Heart Score/ECG Review


  ** #1


General ECG Interpretation: Sinus Rhythm, Normal Rate, Normal Intervals (LBBB), 

No acute ischemic changes


Compared to previous ECG there are: No significant change

## 2017-09-20 LAB
ALBUMIN SERPL-MCNC: 2.4 G/DL (ref 3.4–5)
ALP SERPL-CCNC: 64 U/L (ref 45–117)
ALT SERPL-CCNC: 8 U/L (ref 12–78)
ANION GAP SERPL CALC-SCNC: 9 MMOL/L (ref 8–16)
APTT BLD: 26.2 SECONDS (ref 26.9–34.4)
AST SERPL-CCNC: 17 U/L (ref 15–37)
BASOPHILS # BLD: 0.5 % (ref 0–2)
BASOPHILS # BLD: 0.6 % (ref 0–2)
BILIRUB SERPL-MCNC: 0.8 MG/DL (ref 0.2–1)
CALCIUM SERPL-MCNC: 8 MG/DL (ref 8.5–10.1)
CO2 SERPL-SCNC: 28 MMOL/L (ref 21–32)
CREAT SERPL-MCNC: 2 MG/DL (ref 0.7–1.3)
DEPRECATED RDW RBC AUTO: 15.9 % (ref 11.9–15.9)
DEPRECATED RDW RBC AUTO: 16 % (ref 11.9–15.9)
DEPRECATED RDW RBC AUTO: 16.1 % (ref 11.9–15.9)
DEPRECATED RDW RBC AUTO: 16.2 % (ref 11.9–15.9)
DEPRECATED RDW RBC AUTO: 16.5 % (ref 11.9–15.9)
EOSINOPHIL # BLD: 6 % (ref 0–4.5)
EOSINOPHIL # BLD: 9.7 % (ref 0–4.5)
GLUCOSE SERPL-MCNC: 79 MG/DL (ref 74–106)
INR BLD: 1.16 (ref 0.82–1.09)
MAGNESIUM SERPL-MCNC: 2 MG/DL (ref 1.8–2.4)
MCH RBC QN AUTO: 32.5 PG (ref 25.7–33.7)
MCH RBC QN AUTO: 32.5 PG (ref 25.7–33.7)
MCH RBC QN AUTO: 32.7 PG (ref 25.7–33.7)
MCH RBC QN AUTO: 33 PG (ref 25.7–33.7)
MCH RBC QN AUTO: 33 PG (ref 25.7–33.7)
MCHC RBC AUTO-ENTMCNC: 34.1 G/DL (ref 32–35.9)
MCHC RBC AUTO-ENTMCNC: 34.3 G/DL (ref 32–35.9)
MCHC RBC AUTO-ENTMCNC: 34.6 G/DL (ref 32–35.9)
MCHC RBC AUTO-ENTMCNC: 34.8 G/DL (ref 32–35.9)
MCHC RBC AUTO-ENTMCNC: 34.9 G/DL (ref 32–35.9)
MCV RBC: 93.9 FL (ref 80–96)
MCV RBC: 94.5 FL (ref 80–96)
MCV RBC: 94.8 FL (ref 80–96)
MCV RBC: 95.4 FL (ref 80–96)
MCV RBC: 95.4 FL (ref 80–96)
NEUTROPHILS # BLD: 68.3 % (ref 42.8–82.8)
NEUTROPHILS # BLD: 78.2 % (ref 42.8–82.8)
PHOSPHATE SERPL-MCNC: 3 MG/DL (ref 2.5–4.9)
PLATELET # BLD AUTO: 64 K/MM3 (ref 134–434)
PLATELET # BLD AUTO: 69 K/MM3 (ref 134–434)
PLATELET # BLD AUTO: 70 K/MM3 (ref 134–434)
PLATELET # BLD AUTO: 72 K/MM3 (ref 134–434)
PLATELET # BLD AUTO: 86 K/MM3 (ref 134–434)
PMV BLD: 8.7 FL (ref 7.5–11.1)
PMV BLD: 9 FL (ref 7.5–11.1)
PMV BLD: 9.1 FL (ref 7.5–11.1)
PMV BLD: 9.1 FL (ref 7.5–11.1)
PMV BLD: 9.5 FL (ref 7.5–11.1)
PROT SERPL-MCNC: 4.4 G/DL (ref 6.4–8.2)
PT PNL PPP: 12.8 SEC (ref 9.98–11.88)
SP GR UR: 1.01 (ref 1–1.02)
WBC # BLD AUTO: 6 K/MM3 (ref 4–10)
WBC # BLD AUTO: 6.1 K/MM3 (ref 4–10)
WBC # BLD AUTO: 6.4 K/MM3 (ref 4–10)
WBC # BLD AUTO: 7.8 K/MM3 (ref 4–10)
WBC # BLD AUTO: 8.3 K/MM3 (ref 4–10)

## 2017-09-20 PROCEDURE — 0DJ08ZZ INSPECTION OF UPPER INTESTINAL TRACT, VIA NATURAL OR ARTIFICIAL OPENING ENDOSCOPIC: ICD-10-PCS | Performed by: INTERNAL MEDICINE

## 2017-09-20 RX ADMIN — MUPIROCIN SCH APPLIC: 20 OINTMENT TOPICAL at 22:54

## 2017-09-20 RX ADMIN — PANTOPRAZOLE SODIUM SCH MLS/HR: 40 INJECTION, POWDER, FOR SOLUTION INTRAVENOUS at 03:00

## 2017-09-20 RX ADMIN — PHYTONADIONE SCH MG: 10 INJECTION, EMULSION INTRAMUSCULAR; INTRAVENOUS; SUBCUTANEOUS at 08:15

## 2017-09-20 RX ADMIN — CHLORHEXIDINE GLUCONATE SCH APPLIC: 213 SOLUTION TOPICAL at 00:05

## 2017-09-20 RX ADMIN — MUPIROCIN SCH APPLIC: 20 OINTMENT TOPICAL at 12:13

## 2017-09-20 RX ADMIN — CHLORHEXIDINE GLUCONATE SCH APPLIC: 213 SOLUTION TOPICAL at 22:54

## 2017-09-20 RX ADMIN — PANTOPRAZOLE SODIUM SCH MG: 40 TABLET, DELAYED RELEASE ORAL at 22:56

## 2017-09-20 RX ADMIN — MUPIROCIN SCH APPLIC: 20 OINTMENT TOPICAL at 00:06

## 2017-09-20 NOTE — PN
Progress Note (short form)





- Note


Progress Note: 


S/P EGD


Two gastric ulcers seen in the fundus


No active bleeding


Awake, alert


 Vital Signs











 Period  Temp  Pulse  Resp  BP Sys/Badillo  Pulse Ox


 


 Last 24 Hr  97.7 F-99.2 F  59-72    /43-63  








Abd soft, NT





 CBC, BMP





 09/20/17 17:00 





 09/20/17 05:15 





Transfuse PRN


Hold anticoagulation


Serial H/H

## 2017-09-20 NOTE — PN
Physical Exam: 


SUBJECTIVE: Patient seen and examined








OBJECTIVE:





 Vital Signs











 Period  Temp  Pulse  Resp  BP Sys/Badillo  Pulse Ox


 


 Last 24 Hr  97 F-99.0 F  59-74  13-21  /43-63  











GENERAL: The patient is awake, alert, and fully oriented, in no acute distress.


HEAD: Normal with no signs of trauma.


EYES: PERRL, extraocular movements intact, sclera anicteric, conjunctiva clear. 

No ptosis. 


ENT: Ears normal, nares patent, oropharynx clear without exudates, moist mucous 


membranes.


NECK: Trachea midline, full range of motion, supple. 


LUNGS: Breath sounds equal, clear to auscultation bilaterally, no wheezes, no 

crackles, no 


accessory muscle use. 


HEART: Regular rate and rhythm, S1, S2 without murmur, rub or gallop.


ABDOMEN: Soft, nontender, nondistended, normoactive bowel sounds, no guarding, 

no 


rebound, no hepatosplenomegaly, no masses.


EXTREMITIES: 2+ pulses, warm, well-perfused, no edema. 


NEUROLOGICAL: Cranial nerves II through XII grossly intact. Normal speech, gait 

not 


observed.


PSYCH: Normal mood, normal affect.


SKIN: Warm, dry, normal turgor, no rashes or lesions noted














 Laboratory Results - last 24 hr











  09/19/17 09/19/17 09/20/17





  18:30 22:36 02:00


 


WBC    8.3  D


 


RBC    2.45 L D


 


Hgb    8.0 L D


 


Hct    23.4 L D


 


MCV    95.4


 


MCH    32.5


 


MCHC    34.1


 


RDW    16.0 H


 


Plt Count    72 L D


 


MPV    9.1


 


Neutrophils %    78.2


 


Lymphocytes %    8.6


 


Monocytes %    6.7


 


Eosinophils %    6.0 H D


 


Basophils %    0.5


 


PT with INR   


 


INR   


 


PTT (Actin FS)   


 


Sodium   


 


Potassium   


 


Chloride   


 


Carbon Dioxide   


 


Anion Gap   


 


BUN   


 


Creatinine   


 


Creat Clearance w eGFR   


 


Random Glucose   


 


Calcium   


 


Phosphorus   


 


Magnesium   


 


Total Bilirubin   


 


AST   


 


ALT   


 


Alkaline Phosphatase   


 


Total Protein   


 


Albumin   


 


Urine Color   Straw 


 


Urine Appearance   Clear 


 


Urine pH   5.0 


 


Ur Specific Gravity   1.010 


 


Urine Protein   Negative 


 


Urine Glucose (UA)   Negative 


 


Urine Ketones   Negative 


 


Urine Blood   3+ H 


 


Urine Nitrite   Negative 


 


Urine Bilirubin   Negative 


 


Urine Urobilinogen   Negative 


 


Urine RBC   <1 


 


Urine WBC   <1 


 


Ur Epithelial Cells   Rare 


 


Urine Bacteria   Rare 


 


Urine Mucus   Rare 


 


Stool Occult Blood  Positive  














  09/20/17 09/20/17 09/20/17





  05:15 05:15 05:15


 


WBC  7.8  


 


RBC  2.18 L  


 


Hgb  7.2 L  


 


Hct  20.6 L  


 


MCV  94.5  


 


MCH  33.0  


 


MCHC  34.9  


 


RDW  16.5 H  


 


Plt Count  86 L  


 


MPV  9.5  


 


Neutrophils %   


 


Lymphocytes %   


 


Monocytes %   


 


Eosinophils %   


 


Basophils %   


 


PT with INR   12.80 H 


 


INR   1.16 H 


 


PTT (Actin FS)   26.2 L 


 


Sodium    141


 


Potassium    4.0


 


Chloride    104


 


Carbon Dioxide    28


 


Anion Gap    9


 


BUN    52 H


 


Creatinine    2.0 H


 


Creat Clearance w eGFR    31.69


 


Random Glucose    79


 


Calcium    8.0 L


 


Phosphorus    3.0


 


Magnesium    2.0


 


Total Bilirubin    0.8  D


 


AST    17


 


ALT    8 L D


 


Alkaline Phosphatase    64


 


Total Protein    4.4 L


 


Albumin    2.4 L


 


Urine Color   


 


Urine Appearance   


 


Urine pH   


 


Ur Specific Gravity   


 


Urine Protein   


 


Urine Glucose (UA)   


 


Urine Ketones   


 


Urine Blood   


 


Urine Nitrite   


 


Urine Bilirubin   


 


Urine Urobilinogen   


 


Urine RBC   


 


Urine WBC   


 


Ur Epithelial Cells   


 


Urine Bacteria   


 


Urine Mucus   


 


Stool Occult Blood   














  09/20/17





  12:20


 


WBC  6.0


 


RBC  2.32 L


 


Hgb  7.5 L


 


Hct  21.9 L


 


MCV  94.8


 


MCH  32.5


 


MCHC  34.3


 


RDW  15.9


 


Plt Count  64 L D


 


MPV  8.7


 


Neutrophils % 


 


Lymphocytes % 


 


Monocytes % 


 


Eosinophils % 


 


Basophils % 


 


PT with INR 


 


INR 


 


PTT (Actin FS) 


 


Sodium 


 


Potassium 


 


Chloride 


 


Carbon Dioxide 


 


Anion Gap 


 


BUN 


 


Creatinine 


 


Creat Clearance w eGFR 


 


Random Glucose 


 


Calcium 


 


Phosphorus 


 


Magnesium 


 


Total Bilirubin 


 


AST 


 


ALT 


 


Alkaline Phosphatase 


 


Total Protein 


 


Albumin 


 


Urine Color 


 


Urine Appearance 


 


Urine pH 


 


Ur Specific Gravity 


 


Urine Protein 


 


Urine Glucose (UA) 


 


Urine Ketones 


 


Urine Blood 


 


Urine Nitrite 


 


Urine Bilirubin 


 


Urine Urobilinogen 


 


Urine RBC 


 


Urine WBC 


 


Ur Epithelial Cells 


 


Urine Bacteria 


 


Urine Mucus 


 


Stool Occult Blood 








Active Medications











Generic Name Dose Route Start Last Admin





  Trade Name Freq  PRN Reason Stop Dose Admin


 


Albuterol Sulfate  1 amp 09/19/17 23:23  





  Ventolin 0.083% Nebulizer Soln -  NEB   





  Q6H PRN   





  SHORT OF BREATH/WHEEZING   


 


Chlorhexidine Gluconate  1 applic 09/19/17 22:00 09/20/17 00:05





  Hibiclens For Decolonization -  TP   1 applic





  HS ALCIDES   Administration


 


Mupirocin  1 applic 09/19/17 22:00 09/20/17 12:13





  Bactroban Ointment (For Decolonization) -  NS   1 applic





  BID ALCIDES   Administration


 


Pantoprazole Sodium  40 mg 09/20/17 22:00  





  Protonix -  PO   





  BID ALCIDES   


 


Phytonadione  1 mg 09/20/17 08:00 09/20/17 08:15





  Aqua Mephyton Injection -  IVPB 09/23/17 07:59  1 mg





  DAILY@0800 ALCIDES   Administration








 Intake & Output











 09/17/17 09/18/17 09/19/17 09/20/17





 23:59 23:59 23:59 23:59


 


Intake Total    1830


 


Output Total   1100 1200


 


Balance   -1100 630


 


Weight   186 lb 1.122 oz 











ASSESSMENT/PLAN: 





87yo M with PMH CAD s/p stent, CKD, AS, with recent admission for bacteremia 

and GI bleed from ERCP procedure and recently discharged on 9/8/17 and was re-

admitted after mechanical fall and found to have hgb 6.3


1. Upper GI bleed- 


    got egd done, shows two ulcers in fundus, not bleeding. 


    Gi consult appreciated, started on protonix 40 po bid. 


    avoid plavix. 


    monitor haemoglobin 


    got 3 units of blood, Hb 7.5


    monitor vitals


    monitor intake and output 830/1200 


    has no episode of gill and haemtmais in icu. 





2. HTN- 


  currently hypotensive. likely from bleeding 


  hold oral hypertensive. 


  If Bp decreases consider IV fluid bolus, but watch for fluid overload as 

patient has poor EF 





3 Anemia: likely from acute blood loss. 


  monitor Hb 





4 CKD 


  at base line. 





5 h/o CAD s/p stent


  continue to hold plavix due to gi bleed. 


  cardiology on case, can give asa with ppi. 





Fluid: orally allowed 


electrolyte: normal 


nutrition: on sodium controlled diet. 





DVT pro: on scd. 


GI pro: on protonix. 





Dispo; in icu























Visit type





- Emergency Visit


Emergency Visit: Yes


ED Registration Date: 09/19/17


Care time: The patient presented to the Emergency Department on the above date 

and was hospitalized for further evaluation of their emergent condition.





- New Patient


This patient is new to me today: Yes


Date on this admission: 09/20/17





- Critical Care


Critical Care patient: Yes


Total Critical Care Time (in minutes): 45


Critical Care Statement: The care of this patient involved high complexity 

decision making to prevent further life threatening deterioration of the patient

's condition and/or to evaluate & treat vital organ system(s) failure or risk 

of failure.

## 2017-09-20 NOTE — PN
Teaching Attending Note


Name of Resident: Jr Rogers


ATTENDING PHYSICIAN STATEMENT





I saw and evaluated the patient.


I reviewed the resident's note and discussed the case with the resident.


I agree with the resident's findings and plan as documented.








SUBJECTIVE:states that he was walking to the bathroom and felt lightheaded and 

dizzy and fell on his bottom. states he was not taking plavix and has not been 

on it for several months. states he started having normal brown BM 1 day after 

previous discharge. currently asymptomatic and requesting to eat. denies CP, SOB

, fever, chills, LOC, striking his head, N/V/C/D or any symptoms prior to or 

after his fall


as per ER notes pt did not fall on the ground and was lowered by HHA. and 

unclear if he re-started his plavix as wife stated he was taking it. 


as per RN has not had any BM since admission which was melena. 








OBJECTIVE:





 Last Vital Signs











Temp Pulse Resp BP Pulse Ox


 


 98.4 F   59 L  18   91/47   100 


 


 09/20/17 12:22  09/20/17 12:22  09/20/17 12:22  09/20/17 12:22  09/20/17 12:22








General NAD


CV S1 S2 + murmur


Lungs CTA B/L no wheezing/rales/rhonchi


Abdomen soft NT/ND obese


Extremities no pedal edema





ASSESSMENT AND PLAN:


89yo M with PMH CAD s/p stent, CKD, AS, with recent admission for bacteremia 

and GI bleed from ERCP procedure and recently discharged on 9/8/17 and was re-

admitted after mechanical fall and found to have hgb 6.3


1. Upper GI bleed- no repeated episode of melena since presentation. s/p 2 unit 

PRBC overnight. s/p Vit K and DDAVP. Hgb dropped this AM and currently 

receiving PRBC #3. unclear if EGD will be done today as RN reports it was 

cancelled. will give additional dose of vitamin K. trend Hgb closely. give 

lasix IVP prn to prevent volume overload. on PPI ggt. GI on board


2. HTN- currently hypotensive. likely due to acute bleeding. received 500cc 

bolus. will monitor closely. hold all oral antihypertensives. IVF as needed. 


3. Mechanical fall-likley due to hypotension vs acute anemia claims to not have 

hit his head. XR of his hips negative for acute pathology. PT eval when 

medically optimized


4. Pulmonary nodule- incidental 5mm LLL pulmonary nodule. will need CT scan to 

further evaluate


5. CAD s/p stent- was instructed to hold plavix since last admission until 

cleared to re-start by GI. unclear if pt did so. cont to hold at this time. 

will likely not tolerate in the future. will d/w cardio about starting asa vs 

no therapy


6. AS


7. CKD- at baseline


8. DVT ppx- SCD


9. MICU monitoring





The care of this patient involved high complexity decision making to prevent 

further life threatening deterioration of the patient's condition and/or to 

evaluate & treat vital organ system(s) failure or risk of failure. 45 minutes

## 2017-09-20 NOTE — PN
Progress Note (short form)





- Note


Progress Note: 


GI Procedure NOte: Please see scanned EGD report. Has two ulcers in the gastric 

fundus. No longer bleeding. Will advance diet. Avoid Plavix for now. Will need 

chronic PPI. Problem is that these ulcers developed while on Pantoprazole with 

Plavix. Hopefully aspirin would be an adequate agent for the future as PPI are 

recognized to protect against ASA ulceration. 





  Dr. Pinto just responded that aspirin will be adequate. Would allow for at 

least 2 weeks of healing before resuming.

## 2017-09-20 NOTE — PN
Teaching Attending Note


Name of Resident: Dustin Headley


ATTENDING PHYSICIAN STATEMENT





I saw and evaluated the patient.


I reviewed the resident's note and discussed the case with the resident.


I agree with the resident's findings and plan as documented.








SUBJECTIVE:





Pt seen and examined in the ICU. No further hematemesis. Received 3 units PRBC 

so far. s/p EGD which showed 2 gastric ulcers. Denies abdominal pain, shortness 

of breath or chest pain.





OBJECTIVE:





 Last Vital Signs











Temp Pulse Resp BP Pulse Ox


 


 98.4 F   59 L  18   91/47   100 


 


 09/20/17 12:22  09/20/17 12:22  09/20/17 12:22  09/20/17 12:22  09/20/17 12:22








 Intake & Output











 09/17/17 09/18/17 09/19/17 09/20/17





 23:59 23:59 23:59 23:59


 


Intake Total    1480


 


Output Total   1100 1200


 


Balance   -1100 280


 


Weight   186 lb 1.122 oz 








Gen:  NAD at rest


Heart: RRR, +systolic murmur


Lung: decreased breath sounds at the bases


Abd: soft, nontender


Ext: + edema





 CBC, BMP





 09/20/17 12:20 





 09/20/17 05:15 





Active Medications





Albuterol Sulfate (Ventolin 0.083% Nebulizer Soln -)  1 amp NEB Q6H PRN


   PRN Reason: SHORT OF BREATH/WHEEZING


Chlorhexidine Gluconate (Hibiclens For Decolonization -)  1 applic TP HS [facility]


   Last Admin: 09/20/17 00:05 Dose:  1 applic


Mupirocin (Bactroban Ointment (For Decolonization) -)  1 applic NS BID [facility]


   Last Admin: 09/20/17 12:13 Dose:  1 applic


Pantoprazole Sodium (Protonix -)  40 mg PO BID [facility]


Phytonadione (Aqua Mephyton Injection -)  1 mg IVPB DAILY@0800 [facility]


   Stop: 09/23/17 07:59


   Last Admin: 09/20/17 08:15 Dose:  1 mg








ASSESSMENT AND PLAN:


GI Bleed 


Gastric Ulcers


Acute Blood Loss Anemia


CAD s/p stent


Aortic Stenosis s/p TAVR


LV Systolic Dysfunction


h/o Cholangitis


CKD


h/o R Nephrectomy





-  monitor H/H


-  transfuse as needed


-  protonix


-  ensure large bore peripheral access


-  PO per GI


-  hold all anticoagulation, antiplatelets


-  echocardiogram


-  DVT prophylaxis


-  continue ICU monitoring








critical care time spent in reviewing chart, evaluating patient and formulating 

plan 35 min

## 2017-09-20 NOTE — CON.CARD
Consult


Consult Specialty:: Cardiology


Referred by:: ICU


Reason for Consultation:: H/o TAVR





- History of Present Illness


Chief Complaint: GI bleed


History of Present Illness: 


Patient is an 88 year old  male with history of renal cancer s/p right 

nephrectomy, hypertension, coronary artery disease s/p PCI/stent, s/p TAVR (

 at [facility]) who had ERCP for choledocholithiasis and cholangitis post stent 

removal and removal of multiple stones. He had presented 2017 with ascending 

cholangitis which lead to sepsis, DIC and renal failure. He reported increasing 

lower extremity edema and started on Lasix by Dr. Clint Sanabria, presents for 

melena, hematemesis, profound anemia. He also reports increased dyspnea on 

exertion with near syncopal episode, he denies chest pain, palpitations, true 

syncope, paroxysmal nocturnal dyspnea or orthopnea, currently receiving pRBC 

transfusion. 











- History Source


History Provided By: Medical Record


Limitations to Obtaining History: Poor Historian





- Past Medical History


Cardio/Vascular: Yes: Aortic Stenosis (Post TAVR at [facility] in ), CAD (s/p 

cardiac stent/ PCI ), HTN, Hyperlipdemia


Gastrointestinal: Yes: Diverticulosis


Hepatobiliary: Yes: Cholelithiasis, Choledocholithiasis (ERCP x 3 with stent 

insertion and removal)


Renal/: Yes: Renal Failure (required dialysis in  following sepsis with 

ATN), Renal Inusuff, Cancer (Renal CA with right nephrectomy), Renal Calculi, 

Other (urethral stricture s/p cystoscopy, nephrectomy for RCC)


Musculoskeletal: Yes: Other (DJD)





- Past Surgical History


Past Surgical History: Yes: Cholecystectomy, Nephrectomy (right nephrectomy for 

cancer ), Stent (biliary stent , removed ), Valve Replacement (TAVR)





- Alcohol/Substance Use


Hx Alcohol Use: No


History of Substance Use: reports: None





- Smoking History


Smoking history: Never smoked


Have you smoked in the past 12 months: No





- Social History


Usual Living Arrangement: With Spouse


ADL: Independent


Occupation: CPA: still working


History of Recent Travel: No





Home Medications





- Allergies


Allergies/Adverse Reactions: 


 Allergies











Allergy/AdvReac Type Severity Reaction Status Date / Time


 


No Known Allergies Allergy   Verified 17 13:17














- Home Medications


Home Medications: 


Ambulatory Orders





Finasteride 5 mg PO DAILY 17 


Carvedilol [Coreg] 6.25 mg PO BID  tablet 17 


Ursodiol 300 mg PO BID  capsule 17 


Montelukast Sodium [Singulair] 10 mg PO DAILY  tablet 17 


Atorvastatin Calcium 10 mg PO DAILY  tablet 17 


Tamsulosin HCl [Flomax -] 0.4 mg PO DAILY 17 


Ursodiol [Actigall] 300 mg PO BID #180 capsule 17 


Docusate Sodium [Colace -] 300 mg PO HS #21 cap 17 


Pantoprazole Sodium [Protonix -] 40 mg PO DAILY #30 tab 17 


Phenol [Chloraseptic -] 1 spray MM Q6HPO PRN #1 bottle 17 


Polyethylene Glycol 3350 [Miralax 119 gm Btl -] 17 gm PO DAILY #1 bottle  


Carvedilol [Coreg -] 6.25 mg PO BID #60 tablet 09/10/17 


Pantoprazole Sodium 40 mg PO DAILY 17 











Family Disease History





- Family Disease History


Family Disease History: Other: Father ( 80's unclear cause), Mother ( 80

's CVA/MI), Brother (1 brother  CHF, 1 brother  stomach cancer)





Review of Systems





- Review of Systems


Constitutional: reports: Weakness


Gastrointestinal: reports: Melena


Vital Signs: 


 Vital Signs











Temperature  98 F   17 06:00


 


Pulse Rate  71   17 08:00


 


Respiratory Rate  13   17 08:59


 


Blood Pressure  88/43   17 08:00


 


O2 Sat by Pulse Oximetry (%)  99   17 23:20











Constitutional: Yes: No Distress, Calm


Neck: Yes: Supple


Respiratory: Yes: Regular, Diminished, On Nasal O2


Gastrointestinal: Yes: Normal Bowel Sounds, Soft


Cardiovascular: Yes: Regular Rate and Rhythm


JVD: No


Carotid Bruit: No


Heart Sounds: Yes: S1, S2


Murmur: Yes: Systolic Murmur, Grade 2


Edema: No





- Other Data


Labs, Other Data: 


 CBC, BMP





 17 05:15 





 17 05:15 





 INR, PTT











INR  1.16  (0.82-1.09)  H  17  05:15    











NSR LBBB





Imaging





- Results


Chest X-ray: Report Reviewed (NAD)





Problem List





- Problems


(1) Anemia


Code(s): D64.9 - ANEMIA, UNSPECIFIED   Qualifiers: 


     Anemia type: unspecified type        Qualified Code(s): D64.9 - Anemia, 

unspecified  





(2) GI (gastrointestinal hemorrhage)


Code(s): K92.2 - GASTROINTESTINAL HEMORRHAGE, UNSPECIFIED   Qualifiers: 


     GI bleed type/associated pathology: melena     Qualified Code(s): K92.1 - 

Melena  





(3) CAD (coronary artery disease)


Code(s): I25.10 - ATHSCL HEART DISEASE OF NATIVE CORONARY ARTERY W/O ANG PCTRS 

  Qualifiers: 


     Coronary Disease-Associated Artery/Lesion type: unspecified vessel or 

lesion type     Native vs. transplanted heart: native heart     Associated 

angina: without angina        Qualified Code(s): I25.10 - Atherosclerotic heart 

disease of native coronary artery without angina pectoris  





(4) CKD (chronic kidney disease)


Code(s): N18.9 - CHRONIC KIDNEY DISEASE, UNSPECIFIED   Qualifiers: 


     Chronic kidney disease stage: stage 3 (moderate)        Qualified Code(s): 

N18.3 - Chronic kidney disease, stage 3 (moderate)  





(5) History of percutaneous coronary intervention


Code(s): Z98.890 - OTHER SPECIFIED POSTPROCEDURAL STATES





(6) Hypercholesterolemia


Code(s): E78.00 - PURE HYPERCHOLESTEROLEMIA, UNSPECIFIED





(7) Left bundle branch block


Code(s): I44.7 - LEFT BUNDLE-BRANCH BLOCK, UNSPECIFIED





(8) Pre-operative cardiovascular examination


Code(s): Z01.810 - ENCOUNTER FOR PREPROCEDURAL CARDIOVASCULAR EXAMINATION





(9) Renal cancer


Code(s): C64.9 - MALIGNANT NEOPLASM OF UNSP KIDNEY, EXCEPT RENAL PELVIS   

Qualifiers: 


     Laterality: right     Qualified Code(s): C64.1 - Malignant neoplasm of 

right kidney, except renal pelvis  





(10) S/P ERCP


Code(s): Z98.890 - OTHER SPECIFIED POSTPROCEDURAL STATES





(11) S/P TAVR (transcatheter aortic valve replacement)


Code(s): Z95.2 - PRESENCE OF PROSTHETIC HEART VALVE





(12) S/P laparoscopic cholecystectomy


Code(s): Z90.49 - ACQUIRED ABSENCE OF OTHER SPECIFIED PARTS OF DIGESTIVE TRACT





(13) Hypertension


Code(s): I10 - ESSENTIAL (PRIMARY) HYPERTENSION   Qualifiers: 


     Hypertension type: essential hypertension        Qualified Code(s): I10 - 

Essential (primary) hypertension  








Assessment/Plan


2017 Echo: Normal LV and RV size with severely decreased LV systolic fxn, 

mod-severe decrease RV fxn, mod MR, mild TR





1. UGI bleed planned for EGD


2. LV systolic dysfunction


3. AS Post TAVR


4. Coronary artery disease, s/p PCI/stent, angina


5. Hypertension


6. h/o ERCP, biliary stent implant/removal and stone retrieval for ascending 

cholangitis leading to sepsis


7. 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





PLAN:


1. Continue Protonix gtt, monitor Hgb post transfusion and repeat as needed, 

await EGD 


2. On IV Lopressor, resume Carvedilol 3.125 bid once oral intake resumes


3. Resume Lipitor 10 qd and follow LFTs onc oral intake resumes


4. Recheck echo to reassess LV fxn


5. Thank you for consultatibe opportunity

## 2017-09-20 NOTE — HOSP
Physical Examination


Vital Signs: 


 Vital Signs











Temperature  98 F   09/20/17 02:00


 


Pulse Rate  64   09/20/17 02:00


 


Respiratory Rate  18   09/20/17 02:00


 


Blood Pressure  111/46   09/20/17 02:00


 


O2 Sat by Pulse Oximetry (%)  99   09/19/17 23:20











Labs: 


 CBC, BMP





 09/20/17 02:00 











Hospitalist Encounter


Assessment: 


Patient signed out by day team regarding medical condition of GI bleed. Initial 

hemoglobin 6.5. 2 prbc's given. Repeat cbc 1 hour post transfusion revealed a 

hemoglobin of 8. Due to pt's extensive history of heart failure, will hold 3rd 

prbc. Repeat cbc will be obtained in the AM. If patient actively bleeding, will 

give 3rd unit of prbc. RN notified





Visit type





- Emergency Visit


Emergency Visit: Yes


ED Registration Date: 09/19/17


Care time: The patient presented to the Emergency Department on the above date 

and was hospitalized for further evaluation of their emergent condition.





- New Patient


This patient is new to me today: Yes


Date on this admission: 09/20/17





- Critical Care


Critical Care patient: Yes


Total Critical Care Time (in minutes): 30


Critical Care Statement: The care of this patient involved high complexity 

decision making to prevent further life threatening deterioration of the patient

's condition and/or to evaluate & treat vital organ system(s) failure or risk 

of failure.

## 2017-09-20 NOTE — PN
Physical Exam: 


SUBJECTIVE: Mr. Marshall is an 87 y/o man w/ HTN, HL, CAD, MI w/ stents, AS s/p 

TAVR w/ porcine valve (on ASA and Plavix), CKD, renal CA s/p nephrectomy, UTI, 

and urethral structures s/p balloon dilation, multiple admits throughout this 

summer for choledocholithiasis and cholangitis now s/p ERCP, sphincterotomy, & 

cholecystectomy. Pt BIBA this AM c/o weakness & worsening lower extremity edema 

(for which his cardiologist Dr Sanabria started furosemide about a week ago). 

Today the pt reports feeling weak & experienced a "brown" vomitus at home. The 

ED reports a large black BM & a Hgb < 7.0, but no LA (1.5). The pt denies any 

CP N/V/D, abd pain, or dysuria. Of note, pt's last ERCP was on 9/1/17 where his 

sphincterotomy was extended in order to remove residual stones. The pt was re-

admitted the very next day for hematemesis. W/u revealed bleeding m/l 2/2 his 

sphincterotomy site and it resolved w/ PPI therapy and several transfusions.


 











OBJECTIVE:





 Vital Signs











 Period  Temp  Pulse  Resp  BP Sys/[place]illo  Pulse Ox


 


 Last 24 Hr  97 F-99.0 F  61-74  13-21  /44-63  











GENERAL: The patient is awake, alert, and fully oriented, in no acute distress.


HEAD: Normal with no signs of trauma.


EYES:  sclera anicteric, conjunctiva pallor. No ptosis. 


ENT: dry mucous membranes.


LUNGS: Breath sounds equal, clear to auscultation bilaterally, no wheezes, no 

crackles, no 


accessory muscle use. 


HEART: Regular rate and rhythm, S1, S2 ,2/6 systolic  murmur,no  rub or gallop.


ABDOMEN: Soft, nontender, nondistended, normoactive bowel sounds, no guarding, 

no 


rebound tenderness


EXTREMITIES: , warm, well-perfused, +1 edema. 


NEUROLOGICAL: good mentation 


PSYCH: Normal mood, normal affect.


SKIN: Warm, dry,  no rashes or lesions noted














 Laboratory Results - last 24 hr











  09/19/17 09/19/17 09/20/17





  18:30 22:36 02:00


 


WBC    8.3  D


 


RBC    2.45 L D


 


Hgb    8.0 L D


 


Hct    23.4 L D


 


MCV    95.4


 


MCH    32.5


 


MCHC    34.1


 


RDW    16.0 H


 


Plt Count    72 L D


 


MPV    9.1


 


Neutrophils %    78.2


 


Lymphocytes %    8.6


 


Monocytes %    6.7


 


Eosinophils %    6.0 H D


 


Basophils %    0.5


 


PT with INR   


 


INR   


 


PTT (Actin FS)   


 


Sodium   


 


Potassium   


 


Chloride   


 


Carbon Dioxide   


 


Anion Gap   


 


BUN   


 


Creatinine   


 


Creat Clearance w eGFR   


 


Random Glucose   


 


Calcium   


 


Phosphorus   


 


Magnesium   


 


Total Bilirubin   


 


AST   


 


ALT   


 


Alkaline Phosphatase   


 


Total Protein   


 


Albumin   


 


Urine Color   Straw 


 


Urine Appearance   Clear 


 


Urine pH   5.0 


 


Urine Protein   Negative 


 


Urine Glucose (UA)   Negative 


 


Urine Ketones   Negative 


 


Urine Blood   3+ H 


 


Urine Nitrite   Negative 


 


Urine Bilirubin   Negative 


 


Urine Urobilinogen   Negative 


 


Urine RBC   <1 


 


Urine WBC   <1 


 


Ur Epithelial Cells   Rare 


 


Urine Bacteria   Rare 


 


Urine Mucus   Rare 


 


Stool Occult Blood  Positive  














  09/20/17 09/20/17 09/20/17





  05:15 05:15 05:15


 


WBC  7.8  


 


RBC  2.18 L  


 


Hgb  7.2 L  


 


Hct  20.6 L  


 


MCV  94.5  


 


MCH  33.0  


 


MCHC  34.9  


 


RDW  16.5 H  


 


Plt Count  86 L  


 


MPV  9.5  


 


Neutrophils %   


 


Lymphocytes %   


 


Monocytes %   


 


Eosinophils %   


 


Basophils %   


 


PT with INR   12.80 H 


 


INR   1.16 H 


 


PTT (Actin FS)   26.2 L 


 


Sodium    141


 


Potassium    4.0


 


Chloride    104


 


Carbon Dioxide    28


 


Anion Gap    9


 


BUN    52 H


 


Creatinine    2.0 H


 


Creat Clearance w eGFR    31.69


 


Random Glucose    79


 


Calcium    8.0 L


 


Phosphorus    3.0


 


Magnesium    2.0


 


Total Bilirubin    0.8  D


 


AST    17


 


ALT    8 L D


 


Alkaline Phosphatase    64


 


Total Protein    4.4 L


 


Albumin    2.4 L


 


Urine Color   


 


Urine Appearance   


 


Urine pH   


 


Urine Protein   


 


Urine Glucose (UA)   


 


Urine Ketones   


 


Urine Blood   


 


Urine Nitrite   


 


Urine Bilirubin   


 


Urine Urobilinogen   


 


Urine RBC   


 


Urine WBC   


 


Ur Epithelial Cells   


 


Urine Bacteria   


 


Urine Mucus   


 


Stool Occult Blood   








Active Medications











Generic Name Dose Route Start Last Admin





  Trade Name Freq  PRN Reason Stop Dose Admin


 


Albuterol Sulfate  1 amp 09/19/17 23:23  





  Ventolin 0.083% Nebulizer Soln -  NEB   





  Q6H PRN   





  SHORT OF BREATH/WHEEZING   


 


Chlorhexidine Gluconate  1 applic 09/19/17 22:00 09/20/17 00:05





  Hibiclens For Decolonization -  TP   1 applic





  [facility]   Administration


 


Pantoprazole Sodium 80 mg/  100 mls @ 10 mls/hr 09/19/17 18:00 09/20/17 03:00





  Sodium Chloride  IVPB   10 mls/hr





  Q10H [facility]   Administration





  8 MG/HR   


 


Lactated Ringer's  1,000 mls @ 500 mls/hr 09/20/17 07:05 09/20/17 07:00





  Lactated Ringers Solution  IV 09/20/17 09:04  500 mls/hr





  ONCE STA   Administration


 


Mupirocin  1 applic 09/19/17 22:00 09/20/17 00:06





  Bactroban Ointment (For Decolonization) -  NS   1 applic





  BID [facility]   Administration


 


Phytonadione  1 mg 09/20/17 08:00  





  Aqua Mephyton Injection -  IVPB 09/23/17 07:59  





  DAILY@0800 [facility]   








 CBC, BMP





 09/20/17 23:45 





 09/20/17 05:15 








ASSESSMENT/PLAN:





88M w/ extensive PMH CAD, S/P stent , CKD, AS, choledocholithiasis and 

cholangitis, most recently 09/02 s/p ERCP and CBD stent removal, who presented 

with mechanical  fall, found to have a Hgb of 6.3 and melena in the ED, 

admitted to the ICU for suspected upper GI bleed.





#GI bleed- likely upper, r/o lower(unlikely)


   -Hgb of 6.3 improved to 7.2 after 2 units PRBC ,


   -transfuse 3 total  units of PRBCs


   -lasix 20mg IV 


         -vitk , DDAVP


   -protonix drip


   -duonebs PRN


   -GI on board- Dr. Terrazas, 


   -surgery on board- Dr. Swann


   -cardiology on board- Dr. Botello


   -hematology on board- Dr. Dye


   -f/u CBC after 3 units are given


   -f/u CBC, CMP, Mg, Ph, PT/PTT/INR in am


   -f/u UCx, Bcx, CXR


   





#fall on buttocks, most likely mechanical, vs hypotension vs  acute anemia 


*  denies any dizzines or loss of consciousness before and after the fall, 

denies to heat his head


*  pelvic XR  negative for fracture


*  consider CT if pain persist


* PT when stable 





#HTN


* currently hypotensive 


* will monitor closely 


* will give IV fluids as needed


* hold carvedilol, started on lopressor 5mg IV q6h  (hold if systolic BP < 130 

OR HR < 60)





# CKD, stable 


* at his base line 


#CAD


* hold atorvastatin


* Hold blavix for now due to bleeding 


* Cardiology recommend to use ASA instead of plavix when he is stable and 

hemoglobin are stable.


* 


#Pulmonary nodule


*  incidental 5mm LLL pulmonary nodule.


*  will need CT scan to further evaluate


* F/U as out patient 





#Urethral Strictures s/p Dilation


   -hold finasteride and flomax





#Hx of choledocholithiasis


   -hold ursodiol





#constipation


   -hold colace and miralax





#AS,


- f/u as out patient 


   


#Porcine Valve Replacement


 - F/U as pit patient 








#FEN/PPx


   -no fluids


   -electrolytes wnl


   -NPO , advanced to low sodium diet  after the endoscopy


   -protonix drip


   -SCDs





#Dispo


* Admit to ICU due to upper GI bleed





Visit type





- Emergency Visit


Emergency Visit: Yes


ED Registration Date: 09/19/17


Care time: The patient presented to the Emergency Department on the above date 

and was hospitalized for further evaluation of their emergent condition.





- New Patient


This patient is new to me today: Yes


Date on this admission: 09/21/17





- Critical Care


Critical Care patient: Yes


Total Critical Care Time (in minutes): 40


Critical Care Statement: The care of this patient involved high complexity 

decision making to prevent further life threatening deterioration of the patient

's condition and/or to evaluate & treat vital organ system(s) failure or risk 

of failure.





- Discharge Referral


Referred to [facility] No

## 2017-09-20 NOTE — PN
Progress Note (short form)





- Note


Progress Note: 


88M brought to [facility] ER for evaluation of their emergent condition. Per family, 

he developed worsening dyspnea on exertion leading to a near syncopal spell at 

home. S/p recent ERCP/sphinterotomy to remove residual stones 9/1/17. He had 

bleeding following his recent ERCP and sphincterotomy to extract residual CBD 

stones and required blood transfusions. EGD was deferred when the bleeding 

stopped and decision was made NOT to resume his Clopidrogel. Apparently, per 

his wife, he resumed taking his Clopidigrel despite being told not to take it. 

In the ER he had a large black BM.  GI Consult appreciated. 


Denies CP, n/v/d, fever or chills.





 Last Vital Signs











Temp Pulse Resp BP Pulse Ox


 


 98 F   72   13   90/46   99 


 


 09/20/17 06:00  09/20/17 06:00  09/20/17 06:00  09/20/17 06:00  09/19/17 23:20








 H/H TREND











  09/19/17 09/20/17 09/20/17





  15:34 02:00 05:15


 


Hgb  6.3 L* D  8.0 L D  7.2 L


 


Hct  18.6 L D  23.4 L D  20.6 L











PE


GENERAL: alert. nad. pale appearing


HEAD: NC. AT.


EYES: PERRL.


ABD: NT, ND.


MUSCULOSKEL: 2+ b/l LE edema


Rectal: +FOBT





Problem List





- Problems


(1) GI (gastrointestinal hemorrhage)


Assessment/Plan: 


Per GI note, taking patient today for EGD


NPO / IVF 


PRBC PRN


Serial Hct


PPI


DVT ppx


Cont ICU management


General Surgery will continue to follow





Code(s): K92.2 - GASTROINTESTINAL HEMORRHAGE, UNSPECIFIED   Qualifiers: 


     GI bleed type/associated pathology: melena     Qualified Code(s): K92.1 - 

Melena

## 2017-09-21 LAB
ALBUMIN SERPL-MCNC: 2.3 G/DL (ref 3.4–5)
ALP SERPL-CCNC: 61 U/L (ref 45–117)
ALT SERPL-CCNC: 10 U/L (ref 12–78)
ANION GAP SERPL CALC-SCNC: 3 MMOL/L (ref 8–16)
AST SERPL-CCNC: 19 U/L (ref 15–37)
BASOPHILS # BLD: 0.5 % (ref 0–2)
BILIRUB SERPL-MCNC: 0.5 MG/DL (ref 0.2–1)
CALCIUM SERPL-MCNC: 8.5 MG/DL (ref 8.5–10.1)
CO2 SERPL-SCNC: 31 MMOL/L (ref 21–32)
CREAT SERPL-MCNC: 1.8 MG/DL (ref 0.7–1.3)
DEPRECATED RDW RBC AUTO: 16.2 % (ref 11.9–15.9)
DEPRECATED RDW RBC AUTO: 16.3 % (ref 11.9–15.9)
DEPRECATED RDW RBC AUTO: 16.7 % (ref 11.9–15.9)
EOSINOPHIL # BLD: 9.8 % (ref 0–4.5)
FERRITIN SERPL-MCNC: 118.39 NG/ML (ref 16.4–293.9)
GLUCOSE SERPL-MCNC: 84 MG/DL (ref 74–106)
MAGNESIUM SERPL-MCNC: 2 MG/DL (ref 1.8–2.4)
MCH RBC QN AUTO: 32.5 PG (ref 25.7–33.7)
MCH RBC QN AUTO: 32.5 PG (ref 25.7–33.7)
MCH RBC QN AUTO: 32.9 PG (ref 25.7–33.7)
MCHC RBC AUTO-ENTMCNC: 34.3 G/DL (ref 32–35.9)
MCHC RBC AUTO-ENTMCNC: 34.5 G/DL (ref 32–35.9)
MCHC RBC AUTO-ENTMCNC: 34.7 G/DL (ref 32–35.9)
MCV RBC: 94 FL (ref 80–96)
MCV RBC: 94.6 FL (ref 80–96)
MCV RBC: 94.7 FL (ref 80–96)
NEUTROPHILS # BLD: 66.5 % (ref 42.8–82.8)
PHOSPHATE SERPL-MCNC: 2.6 MG/DL (ref 2.5–4.9)
PLATELET # BLD AUTO: 57 K/MM3 (ref 134–434)
PLATELET # BLD AUTO: 60 K/MM3 (ref 134–434)
PLATELET # BLD AUTO: 85 K/MM3 (ref 134–434)
PMV BLD: 9 FL (ref 7.5–11.1)
PMV BLD: 9.3 FL (ref 7.5–11.1)
PMV BLD: 9.5 FL (ref 7.5–11.1)
PROT SERPL-MCNC: 4.5 G/DL (ref 6.4–8.2)
WBC # BLD AUTO: 4.8 K/MM3 (ref 4–10)
WBC # BLD AUTO: 5.4 K/MM3 (ref 4–10)
WBC # BLD AUTO: 7.4 K/MM3 (ref 4–10)

## 2017-09-21 RX ADMIN — CHLORHEXIDINE GLUCONATE SCH APPLIC: 213 SOLUTION TOPICAL at 22:34

## 2017-09-21 RX ADMIN — MUPIROCIN SCH APPLIC: 20 OINTMENT TOPICAL at 22:34

## 2017-09-21 RX ADMIN — ATORVASTATIN CALCIUM SCH MG: 10 TABLET, FILM COATED ORAL at 22:34

## 2017-09-21 RX ADMIN — PHYTONADIONE SCH MG: 10 INJECTION, EMULSION INTRAMUSCULAR; INTRAVENOUS; SUBCUTANEOUS at 08:52

## 2017-09-21 RX ADMIN — PANTOPRAZOLE SODIUM SCH MG: 40 TABLET, DELAYED RELEASE ORAL at 09:07

## 2017-09-21 RX ADMIN — MUPIROCIN SCH APPLIC: 20 OINTMENT TOPICAL at 09:06

## 2017-09-21 RX ADMIN — CARVEDILOL SCH MG: 3.12 TABLET, FILM COATED ORAL at 22:34

## 2017-09-21 NOTE — PN
Teaching Attending Note


Name of Resident: Mac Aguilera


ATTENDING PHYSICIAN STATEMENT





I saw and evaluated the patient.


I reviewed the resident's note and discussed the case with the resident.


I agree with the resident's findings and plan as documented.








SUBJECTIVE:


Patient seen and examined in the ICU.  No further hematemesis.  Required 1 

additional unit of pRBC.  Denies abdominal pain, shortness of breath, or chest 

pain.





OBJECTIVE:


 Intake & Output











 09/18/17 09/19/17 09/20/17 09/21/17





 23:59 23:59 23:59 23:59


 


Intake Total   2230 200


 


Output Total  1100 2600 550


 


Balance  -1100 -370 -350


 


Weight  186 lb 1.122 oz  182 lb 8.684 oz








 Last Vital Signs











Temp Pulse Resp BP Pulse Ox


 


 97.9 F   65   20   117/60   100 


 


 09/21/17 10:00  09/21/17 10:00  09/21/17 10:00  09/21/17 10:00  09/21/17 07:59








Active Medications





Albuterol Sulfate (Ventolin 0.083% Nebulizer Soln -)  1 amp NEB Q6H PRN


   PRN Reason: SHORT OF BREATH/WHEEZING


Atorvastatin Calcium (Lipitor -)  10 mg PO HS [facility]


Carvedilol (Coreg -)  3.125 mg PO BID [facility]


Chlorhexidine Gluconate (Hibiclens For Decolonization -)  1 applic TP HS [facility]


   Last Admin: 09/20/17 22:54 Dose:  1 applic


Mupirocin (Bactroban Ointment (For Decolonization) -)  1 applic NS BID [facility]


   Last Admin: 09/21/17 09:06 Dose:  1 applic


Pantoprazole Sodium (Protonix -)  40 mg PO BID [facility]


   Last Admin: 09/21/17 09:07 Dose:  40 mg


Phytonadione (Aqua Mephyton Injection -)  1 mg IVPB DAILY@0800 [facility]


   Stop: 09/23/17 07:59


   Last Admin: 09/21/17 08:52 Dose:  1 mg








Gen:  NAD at rest


Heart: RRR, +systolic murmur


Lung: decreased breath sounds at the bases


Abd: soft, nontender


Ext: + edema





 


 Laboratory Results - last 24 hr











  09/19/17 09/20/17 09/20/17





  15:34 12:20 14:45


 


WBC   6.0  6.4


 


RBC   2.32 L  2.47 L


 


Hgb   7.5 L  8.1 L


 


Hct   21.9 L  23.5 L


 


MCV   94.8  95.4


 


MCH   32.5  33.0


 


MCHC   34.3  34.6


 


RDW   15.9  16.2 H


 


Plt Count   64 L D  70 L


 


MPV   8.7  9.1


 


Neutrophils %    68.3


 


Lymphocytes %    13.5  D


 


Monocytes %    7.9


 


Eosinophils %    9.7 H


 


Basophils %    0.6


 


Retic Count   


 


Sodium   


 


Potassium   


 


Chloride   


 


Carbon Dioxide   


 


Anion Gap   


 


BUN   


 


Creatinine   


 


Creat Clearance w eGFR   


 


Random Glucose   


 


Calcium   


 


Phosphorus   


 


Magnesium   


 


Ferritin   


 


Total Bilirubin   


 


AST   


 


ALT   


 


Alkaline Phosphatase   


 


Total Protein   


 


Albumin   


 


Blood Type  A POSITIVE  


 


Antibody Screen  Negative  


 


Crossmatch  See Detail  














  09/20/17 09/20/17 09/21/17





  17:00 23:45 05:10


 


WBC  6.1  5.4  4.8


 


RBC  2.32 L  2.23 L  2.18 L


 


Hgb  7.6 L  7.2 L  7.2 L


 


Hct  21.8 L  21.1 L  20.6 L


 


MCV  93.9  94.7  94.6


 


MCH  32.7  32.5  32.9


 


MCHC  34.8  34.3  34.7


 


RDW  16.1 H  16.3 H  16.2 H


 


Plt Count  69 L  57 L  60 L


 


MPV  9.0  9.0  9.3


 


Neutrophils %    66.5


 


Lymphocytes %    16.2


 


Monocytes %    7.0


 


Eosinophils %    9.8 H


 


Basophils %    0.5


 


Retic Count    1.16  D


 


Sodium   


 


Potassium   


 


Chloride   


 


Carbon Dioxide   


 


Anion Gap   


 


BUN   


 


Creatinine   


 


Creat Clearance w eGFR   


 


Random Glucose   


 


Calcium   


 


Phosphorus   


 


Magnesium   


 


Ferritin   


 


Total Bilirubin   


 


AST   


 


ALT   


 


Alkaline Phosphatase   


 


Total Protein   


 


Albumin   


 


Blood Type   


 


Antibody Screen   


 


Crossmatch   














  09/21/17





  05:10


 


WBC 


 


RBC 


 


Hgb 


 


Hct 


 


MCV 


 


MCH 


 


MCHC 


 


RDW 


 


Plt Count 


 


MPV 


 


Neutrophils % 


 


Lymphocytes % 


 


Monocytes % 


 


Eosinophils % 


 


Basophils % 


 


Retic Count 


 


Sodium  139


 


Potassium  4.0


 


Chloride  105


 


Carbon Dioxide  31


 


Anion Gap  3 L


 


BUN  52 H


 


Creatinine  1.8 H


 


Creat Clearance w eGFR  35.79


 


Random Glucose  84


 


Calcium  8.5


 


Phosphorus  2.6


 


Magnesium  2.0


 


Ferritin  118.389


 


Total Bilirubin  0.5  D


 


AST  19


 


ALT  10 L D


 


Alkaline Phosphatase  61


 


Total Protein  4.5 L


 


Albumin  2.3 L


 


Blood Type 


 


Antibody Screen 


 


Crossmatch 








ASSESSMENT AND PLAN:


GI Bleed  due to gastric ulcers


Acute Blood Loss Anemia


CAD S/P PCI 


Aortic Stenosis s/p TAVR


LV Systolic Dysfunction


h/o Cholangitis


CKD


Hx of Right Nephrectomy








-  monitor H/H


-  Normal transfusion thresholds 


-  protonix


-  Maintain large bore peripheral access


-  PO per GI


-  hold all anticoagulation, antiplatelets


-  Mechanical VTE prophylaxis








Dr Sidhu 


Critical care time spent in reviewing chart, evaluating patient and formulating 

plan 35 min

## 2017-09-21 NOTE — PN
Teaching Attending Note


Name of Resident: Jr Rogers


ATTENDING PHYSICIAN STATEMENT





I saw and evaluated the patient.


I reviewed the resident's note and discussed the case with the resident.


I agree with the resident's findings and plan as documented.








SUBJECTIVE:states he has no symptoms at this time. no reports of BM since 

presentation. denies CP, SOB, fever, chills, N/V/C/D








OBJECTIVE:


 Last Vital Signs











Temp Pulse Resp BP Pulse Ox


 


 97.9 F   67   22   119/53   100 


 


 09/21/17 10:00  09/21/17 12:00  09/21/17 12:00  09/21/17 12:00  09/21/17 07:59








General NAD


CV S1 S2 + murmur


Lungs CTA B/L no wheezing/rales/rhonchi


Abdomen soft NT/ND obese


Extremities no pedal edema





ASSESSMENT AND PLAN:


89yo M with PMH CAD s/p stent, CKD, AS, with recent admission for bacteremia 

and GI bleed from ERCP procedure and recently discharged on 9/8/17 and was re-

admitted after mechanical fall and found to have hgb 6.3


1. Upper GI bleed- no repeated episode of melena since presentation. s/p 2 unit 

PRBC. slight drop in Hgb will transfuse additional PRBC. EGD showed 2 gastric 

ulcers in the fundus, no active bleeding or visible vessel. tolerating regular 

diet. PPI ggt switched to po BID. cont to trend hgb. txn as needed. GI on board


2. HTN- normotensive. re-start antihypertensives as needed. low dose coreg 

resumed this am.


3. Mechanical fall-likley due to hypotension vs acute anemia claims to not have 

hit his head. XR of his hips negative for acute pathology. PT eval when 

medically optimized


4. Constipation- start stool softeners. monitor for BM


5. Pulmonary nodule- incidental 5mm LLL pulmonary nodule. will need CT scan to 

further evaluate


6. CAD s/p stent- will start low dose asa in 2 weeks after GI follow up


7. AS


8. CKD- at baseline


9. DVT ppx- SCD


10. MICU monitoring. stable for transfer to the floors.





The care of this patient involved high complexity decision making to prevent 

further life threatening deterioration of the patient's condition and/or to 

evaluate & treat vital organ system(s) failure or risk of failure. 35 minutes








ASSESSMENT AND PLAN:

## 2017-09-21 NOTE — PN
Progress Note (short form)





- Note


Progress Note: 


No new events


In ICU


Tolerating diet


No pain


 Vital Signs











 Period  Temp  Pulse  Resp  BP Sys/Badillo  Pulse Ox


 


 Last 24 Hr  97.8 F-99.2 F    16-22  /40-60  100-100








Abd soft, NT





 CBC, BMP





 09/21/17 05:10 





 09/21/17 05:10 





Serial H/H


Continue medical care

## 2017-09-21 NOTE — PN
Physical Exam: 


SUBJECTIVE: Patient seen and examined at bedside. No acute events over night. 

his hemoglobin 7.2 after 3 units of transfusion PRBCs , will transfuse another 

one today. He denies any fever, chills, N/V/D/C. He denies lightheadedness , 

dizziness, palpitation,SOB. He reports pain in his left hip due to his fall at 

home.








OBJECTIVE:





 Vital Signs











 Period  Temp  Pulse  Resp  BP Sys/Badillo  Pulse Ox


 


 Last 24 Hr  97.8 F-99.2 F    16-22  /40-60  100-100











GENERAL: The patient is awake, alert, and fully oriented, in mild acute 

distress due to hip pain.


HEAD: Normal with no signs of trauma.


EYES: sclera anicteric, conjunctiva pallor. No ptosis. 


ENT: Ears normal, nares patent, oropharynx clear without exudates, moist mucous 


membranes.


NECK: Trachea midline, full range of motion, supple. 


LUNGS: Breath sounds equal, clear to auscultation bilaterally, no wheezes, no 

crackles, no 


accessory muscle use. 


HEART: Regular rate and rhythm, S1, S2, 2/6 systolic  murmur, no rub or gallop.


ABDOMEN: Soft, nontender, nondistended, normoactive bowel sounds, no guarding, 

no 


rebound tenderness.


EXTREMITIES:  warm, well-perfused, no edema. 


NEUROLOGICAL: good mentation


SKIN: Warm, dry,  no rashes or lesions noted














 Laboratory Results - last 24 hr











  09/20/17 09/20/17 09/20/17





  14:45 17:00 23:45


 


WBC  6.4  6.1  5.4


 


RBC  2.47 L  2.32 L  2.23 L


 


Hgb  8.1 L  7.6 L  7.2 L


 


Hct  23.5 L  21.8 L  21.1 L


 


MCV  95.4  93.9  94.7


 


MCH  33.0  32.7  32.5


 


MCHC  34.6  34.8  34.3


 


RDW  16.2 H  16.1 H  16.3 H


 


Plt Count  70 L  69 L  57 L


 


MPV  9.1  9.0  9.0


 


Neutrophils %  68.3  


 


Lymphocytes %  13.5  D  


 


Monocytes %  7.9  


 


Eosinophils %  9.7 H  


 


Basophils %  0.6  


 


Retic Count   


 


Sodium   


 


Potassium   


 


Chloride   


 


Carbon Dioxide   


 


Anion Gap   


 


BUN   


 


Creatinine   


 


Creat Clearance w eGFR   


 


Random Glucose   


 


Calcium   


 


Phosphorus   


 


Magnesium   


 


Ferritin   


 


Total Bilirubin   


 


AST   


 


ALT   


 


Alkaline Phosphatase   


 


Total Protein   


 


Albumin   














  09/21/17 09/21/17





  05:10 05:10


 


WBC  4.8 


 


RBC  2.18 L 


 


Hgb  7.2 L 


 


Hct  20.6 L 


 


MCV  94.6 


 


MCH  32.9 


 


MCHC  34.7 


 


RDW  16.2 H 


 


Plt Count  60 L 


 


MPV  9.3 


 


Neutrophils %  66.5 


 


Lymphocytes %  16.2 


 


Monocytes %  7.0 


 


Eosinophils %  9.8 H 


 


Basophils %  0.5 


 


Retic Count  1.16  D 


 


Sodium   139


 


Potassium   4.0


 


Chloride   105


 


Carbon Dioxide   31


 


Anion Gap   3 L


 


BUN   52 H


 


Creatinine   1.8 H


 


Creat Clearance w eGFR   35.79


 


Random Glucose   84


 


Calcium   8.5


 


Phosphorus   2.6


 


Magnesium   2.0


 


Ferritin   118.389


 


Total Bilirubin   0.5  D


 


AST   19


 


ALT   10 L D


 


Alkaline Phosphatase   61


 


Total Protein   4.5 L


 


Albumin   2.3 L








Active Medications











Generic Name Dose Route Start Last Admin





  Trade Name Freq  PRN Reason Stop Dose Admin


 


Albuterol Sulfate  1 amp 09/19/17 23:23  





  Ventolin 0.083% Nebulizer Soln -  [facility]   





  Q6H PRN   





  SHORT OF BREATH/WHEEZING   


 


Atorvastatin Calcium  10 mg 09/21/17 22:00  





  Lipitor -  PO   





  HS ALCIDES   


 


Carvedilol  3.125 mg 09/21/17 10:00  





  Coreg -  PO   





  BID [facility]   


 


Chlorhexidine Gluconate  1 applic 09/19/17 22:00 09/20/17 22:54





  Hibiclens For Decolonization -  TP   1 applic





  HS ALCIDES   Administration


 


Mupirocin  1 applic 09/19/17 22:00 09/21/17 09:06





  Bactroban Ointment (For Decolonization) -  NS   1 applic





  BID [facility]   Administration


 


Pantoprazole Sodium  40 mg 09/20/17 22:00 09/21/17 09:07





  Protonix -  PO   40 mg





  BID [facility]   Administration


 


Phytonadione  1 mg 09/20/17 08:00 09/21/17 08:52





  Aqua Mephyton Injection -  IVPB 09/23/17 07:59  1 mg





  DAILY@0800 [facility]   Administration











ASSESSMENT/PLAN:


88M w/ extensive PMH CAD, S/P stent , CKD, AS, choledocholithiasis and 

cholangitis, most recently 09/02 s/p ERCP and CBD stent removal, who presented 

with mechanical  fall, found to have a Hgb of 6.3 and melena in the ED, 

admitted to the ICU for suspected upper GI bleed.





#GI bleed- likely upper, r/o lower(unlikely)


   -Hgb of 6.3 improved to 7.2 after 2 units PRBC ,


   - total of 4   units of PRBCs has been transfused 


   -lasix 20mg IV 


         -vitk , DDAVP


   -protonix  40 BID


   -duonebs PRN


   -GI on board- Dr. Terrazas,  EGD showed 2 gastric ulcers in the fundus with 

no active bleeding or visible vessel. 


          -tolerating regular diet. 


   -surgery on board- Dr. Swann


   -cardiology on board- Dr. Botello


   -hematology on board- Dr. Dye


   -f/u CBC after 4 units are given


   -f/u CBC, CMP, Mg, Ph, PT/PTT/INR in am


   -f/u UCx, Bcx, CXR


   





#fall on buttocks, most likely mechanical, vs hypotension vs  acute anemia 


* complain of pain in the left hip


*  denies any dizzines or loss of consciousness before and after the fall, 

denies to heat his head


*  pelvic XR  negative for fracture


*  consider CT if pain persist


* pain control 


* PT when stable 





#HTN


* currently hypotensive 


* will monitor closely 


* will give IV fluids as needed


* hold carvedilol, started on lopressor 5mg IV q6h  (hold if systolic BP < 130 

OR HR < 60)





# CKD, stable 


* at his base line 


#CAD


* hold atorvastatin


* Hold blavix for now due to bleeding 


* Cardiology recommend to use ASA instead of plavix when he is stable and 

hemoglobin are stable.2 weeks after GI follow up 


* 


#Pulmonary nodule


*  incidental 5mm LLL pulmonary nodule.


*  will need CT scan to further evaluate


* F/U as out patient 





#Urethral Strictures s/p Dilation


* hold finasteride and flomax





#Hx of choledocholithiasis


* hold ursodiol





#constipation


* continue  colace, senna  





#AS,


*  f/u as out patient 


   


#Porcine Valve Replacement


* F/U as out patient 








#FEN/PPx


   -no fluids


   -electrolytes wnl


   - advanced to low sodium diet  


   -protonix 40 BID


   -SCDs, no pharmacology anticoagulation for now 





#Dispo


* Admit to ICU due to upper GI bleed


* consider transfer to med surg when Hgb stable 








Visit type





- Emergency Visit


Emergency Visit: Yes


ED Registration Date: 09/19/17


Care time: The patient presented to the Emergency Department on the above date 

and was hospitalized for further evaluation of their emergent condition.





- New Patient


This patient is new to me today: No





- Critical Care


Critical Care patient: Yes


Total Critical Care Time (in minutes): 40


Critical Care Statement: The care of this patient involved high complexity 

decision making to prevent further life threatening deterioration of the patient

's condition and/or to evaluate & treat vital organ system(s) failure or risk 

of failure.





- Discharge Referral


Referred to [facility] No

## 2017-09-21 NOTE — PN
Physical Exam: 


SUBJECTIVE: Patient seen and examined








OBJECTIVE:





 Vital Signs











 Period  Temp  Pulse  Resp  BP Sys/Badillo  Pulse Ox


 


 Last 24 Hr  97.8 F-99.2 F  58-68  13-20  /40-55  100-100














GENERAL: The patient is awake, alert,


HEAD: Normal with no signs of trauma.


EYES: PERRL, 


ENT: oropharynx clear without exudates, moist mucous membranes.


NECK: Trachea midline, full range of motion, supple. 


LUNGS: Breath sounds equal, clear to auscultation bilaterally, no wheezes,  no 

accessory muscle use. 


HEART: s1s2 normal. 


ABDOMEN: Soft, nontender, nondistended, normoactive bowel sounds, no guarding, 


EXTREMITIES:  well-perfused,   edema ++


SKIN: Warm, dry, 














 Laboratory Results - last 24 hr











  09/19/17 09/20/17 09/20/17





  22:36 12:20 14:45


 


WBC   6.0  6.4


 


RBC   2.32 L  2.47 L


 


Hgb   7.5 L  8.1 L


 


Hct   21.9 L  23.5 L


 


MCV   94.8  95.4


 


MCH   32.5  33.0


 


MCHC   34.3  34.6


 


RDW   15.9  16.2 H


 


Plt Count   64 L D  70 L


 


MPV   8.7  9.1


 


Neutrophils %    68.3


 


Lymphocytes %    13.5  D


 


Monocytes %    7.9


 


Eosinophils %    9.7 H


 


Basophils %    0.6


 


Retic Count   


 


Sodium   


 


Potassium   


 


Chloride   


 


Carbon Dioxide   


 


Anion Gap   


 


BUN   


 


Creatinine   


 


Creat Clearance w eGFR   


 


Random Glucose   


 


Calcium   


 


Phosphorus   


 


Magnesium   


 


Ferritin   


 


Total Bilirubin   


 


AST   


 


ALT   


 


Alkaline Phosphatase   


 


Total Protein   


 


Albumin   


 


Urine Color  Straw  


 


Urine Appearance  Clear  


 


Urine pH  5.0  


 


Ur Specific Gravity  1.010  


 


Urine Protein  Negative  


 


Urine Glucose (UA)  Negative  


 


Urine Ketones  Negative  


 


Urine Blood  3+ H  


 


Urine Nitrite  Negative  


 


Urine Bilirubin  Negative  


 


Urine Urobilinogen  Negative  


 


Urine RBC  <1  


 


Urine WBC  <1  


 


Ur Epithelial Cells  Rare  


 


Urine Bacteria  Rare  


 


Urine Mucus  Rare  














  09/20/17 09/20/17 09/21/17





  17:00 23:45 05:10


 


WBC  6.1  5.4  4.8


 


RBC  2.32 L  2.23 L  2.18 L


 


Hgb  7.6 L  7.2 L  7.2 L


 


Hct  21.8 L  21.1 L  20.6 L


 


MCV  93.9  94.7  94.6


 


MCH  32.7  32.5  32.9


 


MCHC  34.8  34.3  34.7


 


RDW  16.1 H  16.3 H  16.2 H


 


Plt Count  69 L  57 L  60 L


 


MPV  9.0  9.0  9.3


 


Neutrophils %    66.5


 


Lymphocytes %    16.2


 


Monocytes %    7.0


 


Eosinophils %    9.8 H


 


Basophils %    0.5


 


Retic Count    1.16  D


 


Sodium   


 


Potassium   


 


Chloride   


 


Carbon Dioxide   


 


Anion Gap   


 


BUN   


 


Creatinine   


 


Creat Clearance w eGFR   


 


Random Glucose   


 


Calcium   


 


Phosphorus   


 


Magnesium   


 


Ferritin   


 


Total Bilirubin   


 


AST   


 


ALT   


 


Alkaline Phosphatase   


 


Total Protein   


 


Albumin   


 


Urine Color   


 


Urine Appearance   


 


Urine pH   


 


Ur Specific Gravity   


 


Urine Protein   


 


Urine Glucose (UA)   


 


Urine Ketones   


 


Urine Blood   


 


Urine Nitrite   


 


Urine Bilirubin   


 


Urine Urobilinogen   


 


Urine RBC   


 


Urine WBC   


 


Ur Epithelial Cells   


 


Urine Bacteria   


 


Urine Mucus   














  09/21/17





  05:10


 


WBC 


 


RBC 


 


Hgb 


 


Hct 


 


MCV 


 


MCH 


 


MCHC 


 


RDW 


 


Plt Count 


 


MPV 


 


Neutrophils % 


 


Lymphocytes % 


 


Monocytes % 


 


Eosinophils % 


 


Basophils % 


 


Retic Count 


 


Sodium  139


 


Potassium  4.0


 


Chloride  105


 


Carbon Dioxide  31


 


Anion Gap  3 L


 


BUN  52 H


 


Creatinine  1.8 H


 


Creat Clearance w eGFR  35.79


 


Random Glucose  84


 


Calcium  8.5


 


Phosphorus  2.6


 


Magnesium  2.0


 


Ferritin  118.389


 


Total Bilirubin  0.5  D


 


AST  19


 


ALT  10 L D


 


Alkaline Phosphatase  61


 


Total Protein  4.5 L


 


Albumin  2.3 L


 


Urine Color 


 


Urine Appearance 


 


Urine pH 


 


Ur Specific Gravity 


 


Urine Protein 


 


Urine Glucose (UA) 


 


Urine Ketones 


 


Urine Blood 


 


Urine Nitrite 


 


Urine Bilirubin 


 


Urine Urobilinogen 


 


Urine RBC 


 


Urine WBC 


 


Ur Epithelial Cells 


 


Urine Bacteria 


 


Urine Mucus 








Active Medications











Generic Name Dose Route Start Last Admin





  Trade Name Freq  PRN Reason Stop Dose Admin


 


Albuterol Sulfate  1 amp 09/19/17 23:23  





  Ventolin 0.083% Nebulizer Soln -  NEB   





  Q6H PRN   





  SHORT OF BREATH/WHEEZING   


 


Chlorhexidine Gluconate  1 applic 09/19/17 22:00 09/20/17 22:54





  Hibiclens For Decolonization -  TP   1 applic





  HS ALCIDES   Administration


 


Mupirocin  1 applic 09/19/17 22:00 09/20/17 22:54





  Bactroban Ointment (For Decolonization) -  NS   1 applic





  BID ALCIDES   Administration


 


Pantoprazole Sodium  40 mg 09/20/17 22:00 09/20/17 22:56





  Protonix -  PO   40 mg





  BID [facility]ID[facility]   Administration


 


Phytonadione  1 mg 09/20/17 08:00 09/20/17 08:15





  Aqua Mephyton Injection -  IVPB 09/23/17 07:59  1 mg





  DAILY@0800 [facility]   Administration











ASSESSMENT/PLAN:


89yo M with PMH CAD s/p stent, CKD, AS, with recent admission for bacteremia 

and GI bleed from ERCP procedure and recently discharged on 9/8/17 and was re-

admitted after mechanical fall and found to have hgb 6.3


Upper GI bleed- 


    got egd done, shows two ulcers in fundus, not bleeding. 


    on protonix 40 po bid. 


    hold plavix/asa


    got 3 units of blood, Hb 7.5


    getting 4th unit. recheck h/h


    PO per gi (Sodium controlled)


 





HTN- 


  currently hypotensive. likely from bleeding 


  hold oral hypertensive. 


  If Bp decreases consider IV fluid bolus, but watch for fluid overload as 

patient has poor EF 











DVT pro: on scd. 


GI pro: on protonix. 





Dispo; in icu








-  Maintain large bore peripheral access


-  Mechanical VTE prophylaxis








Visit type





- Emergency Visit


Emergency Visit: No





- New Patient


This patient is new to me today: Yes


Date on this admission: 09/21/17





- Critical Care


Critical Care patient: Yes


Total Critical Care Time (in minutes): 35


Critical Care Statement: The care of this patient involved high complexity 

decision making to prevent further life threatening deterioration of the patient

's condition and/or to evaluate & treat vital organ system(s) failure or risk 

of failure.

## 2017-09-21 NOTE — PN
Progress Note, Physician


History of Present Illness: 


EGD showed nonbleeding gastric ulcers, Hgb stabilizing post transfusion.











- Current Medication List


Current Medications: 


Active Medications





Albuterol Sulfate (Ventolin 0.083% Nebulizer Soln -)  1 amp NEB Q6H PRN


   PRN Reason: SHORT OF BREATH/WHEEZING


Chlorhexidine Gluconate (Hibiclens For Decolonization -)  1 applic TP HS [facility]


   Last Admin: 09/20/17 22:54 Dose:  1 applic


Mupirocin (Bactroban Ointment (For Decolonization) -)  1 applic NS BID [facility]


   Last Admin: 09/21/17 09:06 Dose:  1 applic


Pantoprazole Sodium (Protonix -)  40 mg PO BID [facility]


   Last Admin: 09/21/17 09:07 Dose:  40 mg


Phytonadione (Aqua Mephyton Injection -)  1 mg IVPB DAILY@0800 [facility]


   Stop: 09/23/17 07:59


   Last Admin: 09/21/17 08:52 Dose:  1 mg











- Objective


Vital Signs: 


 Vital Signs











Temperature  97.8 F   09/21/17 06:00


 


Pulse Rate  108 H  09/21/17 08:00


 


Respiratory Rate  21   09/21/17 08:00


 


Blood Pressure  118/54   09/21/17 08:00


 


O2 Sat by Pulse Oximetry (%)  100   09/21/17 07:59











Constitutional: Yes: No Distress, Calm


Neck: Yes: Supple


Cardiovascular: Yes: Regular Rate and Rhythm


Respiratory: Yes: Regular, Diminished, On Nasal O2


Gastrointestinal: Yes: Soft, Hypoactive Bowel Sounds


Edema: No


Labs: 


 CBC, BMP





 09/21/17 05:10 





 09/21/17 05:10 





 INR, PTT











INR  1.16  (0.82-1.09)  H  09/20/17  05:15    














Problem List





- Problems


(1) Anemia


Code(s): D64.9 - ANEMIA, UNSPECIFIED   Qualifiers: 


     Anemia type: unspecified type        Qualified Code(s): D64.9 - Anemia, 

unspecified  





(2) GI (gastrointestinal hemorrhage)


Code(s): K92.2 - GASTROINTESTINAL HEMORRHAGE, UNSPECIFIED   Qualifiers: 


     GI bleed type/associated pathology: melena     Qualified Code(s): K92.1 - 

Melena  





(3) CAD (coronary artery disease)


Code(s): I25.10 - ATHSCL HEART DISEASE OF NATIVE CORONARY ARTERY W/O ANG PCTRS 

  Qualifiers: 


     Coronary Disease-Associated Artery/Lesion type: unspecified vessel or 

lesion type     Native vs. transplanted heart: native heart     Associated 

angina: without angina        Qualified Code(s): I25.10 - Atherosclerotic heart 

disease of native coronary artery without angina pectoris  





(4) CKD (chronic kidney disease)


Code(s): N18.9 - CHRONIC KIDNEY DISEASE, UNSPECIFIED   Qualifiers: 


     Chronic kidney disease stage: stage 3 (moderate)        Qualified Code(s): 

N18.3 - Chronic kidney disease, stage 3 (moderate)  





(5) History of percutaneous coronary intervention


Code(s): Z98.890 - OTHER SPECIFIED POSTPROCEDURAL STATES





(6) Hypercholesterolemia


Code(s): E78.00 - PURE HYPERCHOLESTEROLEMIA, UNSPECIFIED





(7) Left bundle branch block


Code(s): I44.7 - LEFT BUNDLE-BRANCH BLOCK, UNSPECIFIED





(8) Renal cancer


Code(s): C64.9 - MALIGNANT NEOPLASM OF UNSP KIDNEY, EXCEPT RENAL PELVIS   

Qualifiers: 


     Laterality: right     Qualified Code(s): C64.1 - Malignant neoplasm of 

right kidney, except renal pelvis  





(9) S/P ERCP


Code(s): Z98.890 - OTHER SPECIFIED POSTPROCEDURAL STATES





(10) S/P TAVR (transcatheter aortic valve replacement)


Code(s): Z95.2 - PRESENCE OF PROSTHETIC HEART VALVE





(11) S/P laparoscopic cholecystectomy


Code(s): Z90.49 - ACQUIRED ABSENCE OF OTHER SPECIFIED PARTS OF DIGESTIVE TRACT





(12) Hypertension


Code(s): I10 - ESSENTIAL (PRIMARY) HYPERTENSION   Qualifiers: 


     Hypertension type: essential hypertension        Qualified Code(s): I10 - 

Essential (primary) hypertension  





(13) Gastric ulcer due to nonsteroidal anti-inflammatory drug (NSAID)


Code(s): K25.9 - GASTRIC ULCER, UNSP AS ACUTE OR CHRONIC, W/O HEMOR OR PERF


T39.395A - ADVERSE EFFECT OF NONSTEROIDAL ANTI-INFLAMMATORY DRUGS, INIT








Assessment/Plan


5/17/2017 Echo: Normal LV and RV size with severely decreased LV systolic fxn, 

mod-severe decrease RV fxn, mod MR, mild TR





1. UGI bleed referable to gastric ulcers in fundus 


2. LV systolic dysfunction


3. AS Post TAVR


4. Coronary artery disease, s/p PCI/stent, angina


5. Hypertension


6. h/o ERCP, biliary stent implant/removal and stone retrieval for ascending 

cholangitis leading to sepsis


7. 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


8. Anemia post transfusion





PLAN:


1. Continue Protonix gtt, monitor Hgb post transfusion and repeat as needed


2. Resume Carvedilol 3.125 bid now that oral intake resumed


3. Resume Lipitor 10 qd and follow LFTs now that oral intake resumed


4. Recheck echo to reassess LV fxn


5. Resume antiplatelet after 2 weeks of healing with PPI

## 2017-09-22 LAB
ALBUMIN SERPL-MCNC: 2.2 G/DL (ref 3.4–5)
ALP SERPL-CCNC: 59 U/L (ref 45–117)
ALT SERPL-CCNC: 11 U/L (ref 12–78)
ANION GAP SERPL CALC-SCNC: 9 MMOL/L (ref 8–16)
AST SERPL-CCNC: 19 U/L (ref 15–37)
BASOPHILS # BLD: 0.4 % (ref 0–2)
BILIRUB DIRECT SERPL-MCNC: 195 U/L (ref 87–241)
BILIRUB SERPL-MCNC: 0.6 MG/DL (ref 0.2–1)
CALCIUM SERPL-MCNC: 8.1 MG/DL (ref 8.5–10.1)
CO2 SERPL-SCNC: 27 MMOL/L (ref 21–32)
CREAT SERPL-MCNC: 1.4 MG/DL (ref 0.7–1.3)
DEPRECATED RDW RBC AUTO: 15.9 % (ref 11.9–15.9)
DEPRECATED RDW RBC AUTO: 16.5 % (ref 11.9–15.9)
DEPRECATED RDW RBC AUTO: 16.6 % (ref 11.9–15.9)
DEPRECATED RDW RBC AUTO: 17.2 % (ref 11.9–15.9)
EOSINOPHIL # BLD: 8 % (ref 0–4.5)
GLUCOSE SERPL-MCNC: 85 MG/DL (ref 74–106)
IRON SERPL-MCNC: 138 UG/DL (ref 38–169)
MAGNESIUM SERPL-MCNC: 1.9 MG/DL (ref 1.8–2.4)
MCH RBC QN AUTO: 30.8 PG (ref 25.7–33.7)
MCH RBC QN AUTO: 31.2 PG (ref 25.7–33.7)
MCH RBC QN AUTO: 31.9 PG (ref 25.7–33.7)
MCH RBC QN AUTO: 32.1 PG (ref 25.7–33.7)
MCHC RBC AUTO-ENTMCNC: 34.8 G/DL (ref 32–35.9)
MCHC RBC AUTO-ENTMCNC: 34.9 G/DL (ref 32–35.9)
MCHC RBC AUTO-ENTMCNC: 35.2 G/DL (ref 32–35.9)
MCHC RBC AUTO-ENTMCNC: 36 G/DL (ref 32–35.9)
MCV RBC: 86.6 FL (ref 80–96)
MCV RBC: 88.5 FL (ref 80–96)
MCV RBC: 91.3 FL (ref 80–96)
MCV RBC: 91.5 FL (ref 80–96)
NEUTROPHILS # BLD: 74.8 % (ref 42.8–82.8)
PHOSPHATE SERPL-MCNC: 2.3 MG/DL (ref 2.5–4.9)
PLATELET # BLD AUTO: 60 K/MM3 (ref 134–434)
PLATELET # BLD AUTO: 63 K/MM3 (ref 134–434)
PLATELET # BLD AUTO: 65 K/MM3 (ref 134–434)
PLATELET # BLD AUTO: 65 K/MM3 (ref 134–434)
PMV BLD: 9.2 FL (ref 7.5–11.1)
PMV BLD: 9.4 FL (ref 7.5–11.1)
PMV BLD: 9.5 FL (ref 7.5–11.1)
PMV BLD: 9.5 FL (ref 7.5–11.1)
PROT SERPL-MCNC: 4.3 G/DL (ref 6.4–8.2)
TIBC SERPL-MCNC: 182 UG/DL (ref 250–450)
UIBC SERPL-MCNC: 44 UG/DL (ref 111–343)
WBC # BLD AUTO: 7.2 K/MM3 (ref 4–10)
WBC # BLD AUTO: 7.8 K/MM3 (ref 4–10)
WBC # BLD AUTO: 8.2 K/MM3 (ref 4–10)
WBC # BLD AUTO: 8.6 K/MM3 (ref 4–10)

## 2017-09-22 RX ADMIN — MUPIROCIN SCH APPLIC: 20 OINTMENT TOPICAL at 21:34

## 2017-09-22 RX ADMIN — CHLORHEXIDINE GLUCONATE SCH APPLIC: 213 SOLUTION TOPICAL at 21:34

## 2017-09-22 RX ADMIN — CARVEDILOL SCH MG: 3.12 TABLET, FILM COATED ORAL at 21:38

## 2017-09-22 RX ADMIN — ATORVASTATIN CALCIUM SCH MG: 10 TABLET, FILM COATED ORAL at 21:38

## 2017-09-22 RX ADMIN — PANTOPRAZOLE SODIUM SCH MLS/HR: 40 INJECTION, POWDER, FOR SOLUTION INTRAVENOUS at 21:34

## 2017-09-22 RX ADMIN — PANTOPRAZOLE SODIUM SCH MLS/HR: 40 INJECTION, POWDER, FOR SOLUTION INTRAVENOUS at 11:16

## 2017-09-22 RX ADMIN — MUPIROCIN SCH APPLIC: 20 OINTMENT TOPICAL at 10:00

## 2017-09-22 RX ADMIN — ACETYLCYSTEINE SCH: 200 SOLUTION ORAL; RESPIRATORY (INHALATION) at 22:30

## 2017-09-22 RX ADMIN — ACETYLCYSTEINE SCH MG: 200 SOLUTION ORAL; RESPIRATORY (INHALATION) at 16:55

## 2017-09-22 RX ADMIN — CARVEDILOL SCH: 3.12 TABLET, FILM COATED ORAL at 11:03

## 2017-09-22 NOTE — PN
Progress Note (short form)





- Note


Progress Note: 


+ Melenotic stool noted by RN yesterday


No abdominal pain


No nausea/vomiting





 Vital Signs











 Period  Temp  Pulse  Resp  BP Sys/Badillo  Pulse Ox


 


 Last 24 Hr  97.6 F-98.7 F  63-79  16-20  /47-92  








Abd soft, NT





 CBC, BMP





 09/22/17 05:00 





 09/22/17 05:00 





Transfuse PRBCs


GI- following


Planning colonoscopy








If continues to bleed, may require embolization by Interventional radiology

## 2017-09-22 NOTE — CONSULT
Consult


Consult Specialty:: Nephrology


Reason for Consultation:: BO and CKD





- History of Present Illness


Chief Complaint: weakness


History of Present Illness: 


Pt is an 88 year old male who is well known to me from previous admissions who 

presents for weakness. He was found to have a GI bleed. I was called to 

evaluate him for CKD and for risk for contrast administration. He is awake and 

alert. He denies shortness of breath. He denies abdominal pain. He is awake of 

his clinical condition and of the GI bleed. 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 

required HD. 





- History Source


History Provided By: Patient





- Past Medical History


Cardio/Vascular: Yes: Aortic Stenosis (Post TAVR at [facility] in ), CAD (s/p 

cardiac stent/ PCI ), HTN, Hyperlipdemia


Gastrointestinal: Yes: Diverticulosis, Peptic Ulcer Disease


Hepatobiliary: Yes: Cholelithiasis, Choledocholithiasis (ERCP x 3 with stent 

insertion and removal)


Renal/: Yes: Renal Failure (required dialysis in  following sepsis with 

ATN), Renal Inusuff, Cancer (Renal CA with right nephrectomy), Renal Calculi, 

Other (urethral stricture s/p cystoscopy, nephrectomy for RCC)


Musculoskeletal: Yes: Other (DJD)





- Past Surgical History


Past Surgical History: Yes: Cholecystectomy, Nephrectomy (right nephrectomy for 

cancer ), Stent (biliary stent , removed ), Valve Replacement (TAVR)





- Alcohol/Substance Use


Hx Alcohol Use: No


History of Substance Use: reports: None





- Smoking History


Smoking history: Never smoked


Have you smoked in the past 12 months: No





- Social History


Usual Living Arrangement: With Spouse


ADL: Independent


Occupation: CPA: still working


History of Recent Travel: No





Home Medications





- Allergies


Allergies/Adverse Reactions: 


 Allergies











Allergy/AdvReac Type Severity Reaction Status Date / Time


 


No Known Allergies Allergy   Verified 17 13:17














- Home Medications


Home Medications: 


Ambulatory Orders





Finasteride 5 mg PO DAILY 17 


Carvedilol [Coreg] 6.25 mg PO BID  tablet 17 


Ursodiol 300 mg PO BID  capsule 17 


Montelukast Sodium [Singulair] 10 mg PO DAILY  tablet 17 


Atorvastatin Calcium 10 mg PO DAILY  tablet 17 


Tamsulosin HCl [Flomax -] 0.4 mg PO DAILY 17 


Ursodiol [Actigall] 300 mg PO BID #180 capsule 17 


Docusate Sodium [Colace -] 300 mg PO HS #21 cap 17 


Pantoprazole Sodium [Protonix -] 40 mg PO DAILY #30 tab 17 


Phenol [Chloraseptic -] 1 spray MM Q6HPO PRN #1 bottle 17 


Polyethylene Glycol 3350 [Miralax 119 gm Btl -] 17 gm PO DAILY #1 bottle  


Carvedilol [Coreg -] 6.25 mg PO BID #60 tablet 09/10/17 


Pantoprazole Sodium 40 mg PO DAILY 17 











Family Disease History





- Family Disease History


Family Disease History: Other: Father ( 80's unclear cause), Mother ( 80

's CVA/MI), Brother (1 brother  CHF, 1 brother  stomach cancer)





Review of Systems





- Review of Systems


Constitutional: reports: Malaise


Eyes: reports: No Symptoms


HENT: reports: No Symptoms


Neck: reports: No Symptoms


Cardiovascular: reports: No Symptoms


Respiratory: reports: No Symptoms


Gastrointestinal: reports: Rectal Bleeding


Musculoskeletal: reports: Muscle Weakness


Neurological: reports: No Symptoms


Endocrine: reports: No Symptoms


Psychiatric: reports: No Symptoms





Physical Exam


Vital Signs: 


 Vital Signs











Temperature  98.4 F   17 14:00


 


Pulse Rate  70   17 16:00


 


Respiratory Rate  18   17 16:00


 


Blood Pressure  103/55   17 16:00


 


O2 Sat by Pulse Oximetry (%)  97   17 10:29











Constitutional: Yes: Calm


Eyes: Yes: Conjunctiva Clear


HENT: Yes: Atraumatic


Cardiovascular: Yes: S1, S2


Respiratory: Yes: CTA Bilaterally


Gastrointestinal: Yes: Soft


Renal/: Yes: Chadwick Present


Edema: Yes


Edema: LLE: 1+, RLE: 1+


Neurological: Yes: Oriented


Psychiatric: Yes: Oriented


Labs: 


 CBC, BMP





 17 05:00 





 17 05:00 





 Laboratory Tests











  17





  07:15 06:00 08:45


 


WBC   


 


Hgb   


 


Sodium   


 


Potassium   


 


Chloride   


 


Carbon Dioxide   


 


Anion Gap   


 


BUN   


 


Creatinine  1.9 H  1.8 H  1.7 H


 


Urine Color   


 


Urine Appearance   


 


Urine pH   


 


Ur Specific Gravity   


 


Urine Protein   


 


Urine Glucose (UA)   


 


Urine Ketones   


 


Urine Blood   


 


Urine Nitrite   


 


Urine Bilirubin   


 


Urine Urobilinogen   














  17





  07:25 15:34 22:36


 


WBC   


 


Hgb   


 


Sodium   


 


Potassium   


 


Chloride   


 


Carbon Dioxide   


 


Anion Gap   


 


BUN   


 


Creatinine  2.1 H  2.1 H D 


 


Urine Color    Straw


 


Urine Appearance    Clear


 


Urine pH    5.0


 


Ur Specific Gravity    1.010


 


Urine Protein    Negative


 


Urine Glucose (UA)    Negative


 


Urine Ketones    Negative


 


Urine Blood    3+ H


 


Urine Nitrite    Negative


 


Urine Bilirubin    Negative


 


Urine Urobilinogen    Negative














  17





  05:15 05:10 19:22


 


WBC   


 


Hgb    7.2 L


 


Sodium   


 


Potassium   


 


Chloride   


 


Carbon Dioxide   


 


Anion Gap   


 


BUN   


 


Creatinine  2.0 H  1.8 H 


 


Urine Color   


 


Urine Appearance   


 


Urine pH   


 


Ur Specific Gravity   


 


Urine Protein   


 


Urine Glucose (UA)   


 


Urine Ketones   


 


Urine Blood   


 


Urine Nitrite   


 


Urine Bilirubin   


 


Urine Urobilinogen   














  17





  03:00 05:00 05:00


 


WBC   7.2 


 


Hgb  7.3 L  7.3 L 


 


Sodium    140


 


Potassium    4.1


 


Chloride    104


 


Carbon Dioxide    27


 


Anion Gap    9


 


BUN    58 H


 


Creatinine    1.4 H D


 


Urine Color   


 


Urine Appearance   


 


Urine pH   


 


Ur Specific Gravity   


 


Urine Protein   


 


Urine Glucose (UA)   


 


Urine Ketones   


 


Urine Blood   


 


Urine Nitrite   


 


Urine Bilirubin   


 


Urine Urobilinogen   














Imaging





- Results


Chest X-ray: Report Reviewed





Problem List





- Problems


(1) Anemia


Code(s): D64.9 - ANEMIA, UNSPECIFIED   Qualifiers: 


     Anemia type: unspecified type        Qualified Code(s): D64.9 - Anemia, 

unspecified  





(2) GI (gastrointestinal hemorrhage)


Code(s): K92.2 - GASTROINTESTINAL HEMORRHAGE, UNSPECIFIED   Qualifiers: 


     GI bleed type/associated pathology: melena     Qualified Code(s): K92.1 - 

Melena  





(3) BO (acute kidney injury)


Code(s): N17.9 - ACUTE KIDNEY FAILURE, UNSPECIFIED





(4) CAD (coronary artery disease)


Code(s): I25.10 - ATHSCL HEART DISEASE OF NATIVE CORONARY ARTERY W/O ANG PCTRS 

  Qualifiers: 


     Coronary Disease-Associated Artery/Lesion type: unspecified vessel or 

lesion type     Native vs. transplanted heart: native heart     Associated 

angina: without angina        Qualified Code(s): I25.10 - Atherosclerotic heart 

disease of native coronary artery without angina pectoris  





(5) CKD (chronic kidney disease)


Code(s): N18.9 - CHRONIC KIDNEY DISEASE, UNSPECIFIED   Qualifiers: 


     Chronic kidney disease stage: stage 3 (moderate)        Qualified Code(s): 

N18.3 - Chronic kidney disease, stage 3 (moderate)  








Assessment/Plan


 Current Medications











Generic Name Dose Route Start Last Admin





  Trade Name Freq  PRN Reason Stop Dose Admin


 


Acetylcysteine  1,200 mg 17 15:15 17 16:55





  Mucomyst 20 Oral / Inh Use Only*  PO 17 15:14  1,200 mg





  BID ALCIDES   Administration


 


Albuterol Sulfate  1 amp 17 18:26  





  Ventolin 0.083% Nebulizer Soln -  NEB   





  Q6H PRN   





  SHORT OF BREATH/WHEEZING   


 


Atorvastatin Calcium  10 mg 17 22:00 17 22:34





  Lipitor -  PO   10 mg





  HS ALCIDES   Administration


 


Carvedilol  3.125 mg 17 22:00 17 11:03





  Coreg -  PO   Not Given





  BID ALCIDES   


 


Chlorhexidine Gluconate  1 applic 17 22:00 17 22:34





  Hibiclens For Decolonization -  TP   1 applic





  HS ALCIDES   Administration


 


Docusate Sodium  300 mg 17 22:00 17 22:35





  Colace -  PO   300 mg





  HS ALCIDES   Administration


 


Pantoprazole Sodium 80 mg/  100 mls @ 10 mls/hr 17 10:00 17 11:16





  Sodium Chloride  IVPB   10 mls/hr





  Q10H ALCIDES   Administration





  8 MG/HR   


 


Mupirocin  1 applic 17 22:00 17 10:00





  Bactroban Ointment (For Decolonization) -  NS   1 applic





  BID ALCIDES   Administration


 


Senna  2 tab 17 22:00 17 22:35





  Senna -  PO   2 tab





  HS ALCIDES   Administration














Impression


1. hx CKD


2. BO improving


3. choledocholithiasis


4. CAD


5. hx of Renal Cell Cancer s/p nephroectomy


6. aortic stenosis


7. GI bleed





Plan


- renal function is improving


- pt is high risk for LAQUITA. Pt does understand the risk


- prep with mucomyst and gently hydration as needed


- pt is not getting a ct scan now however medical team will monitor his hg and 

do the scan if his hg drops further


- check labs daily


- pt has a baseline crt of about 1.7


- will follow


Dr Rodriguez

## 2017-09-22 NOTE — CONSULT
Consult


Consult Specialty:: Hematology Oncology


Referred by:: Candis Bocanegradrew





- History of Present Illness


Chief Complaint: admitted for near syncope


History of Present Illness: 


89 y/o M with multiple medical problems including TAVR on ASA/Plavix, s/p 

jaundice due to stones s/p ERCP /cholecystectomy , hx R nephrectomy for RCC 

admitted w severe anemia and evidence of GI bleeding  w melena H/H 6/18.6 MCV 

97 Retic 1.6 , mild thrombocytopenia 106K ; pt transfused PC's and underwent 

endoscopy- gastric ulcers ; BNP high ; anti-platelet agents held . Chemistries 

w creat 2-3 range , now improved.Pt denies pain , N&V/,SOB now , bleeding 

phenomena; still w melenotic stool.Platelets drifted down to 65K, Hgb 7.3, WBC 

has been nl w unremarkable diff(sl high eos) ; LFT's nl.CXR without acute 

pathology , R hilum slightly full .Peripheral smear reviewed - plates small to 

mod. large , no clumps ; no schistocytes or microspherocytes , WBC's 

unremarkable.





- History Source


History Provided By: Patient, Medical Record


Limitations to Obtaining History: Poor Historian





- Past Medical History


CNS: No: Alzheimer's, CVA, Dementia, Migraine, Multiple Sclerosis, Peripheral 

Neuropathy, Parkinson's, Seizure, Syncope, TIA, Vertigo, Other


Cardio/Vascular: Yes: Aortic Stenosis (Post TAVR at [facility] in ), CAD (s/p 

cardiac stent/ PCI ), HTN, Hyperlipdemia


Pulmonary: No: Asthma, Bronchitis, Cancer, COPD, O2 Dependent, Pneumonia, 

Previously Intubated, Pulmonary Embolus, Pulmonary Fibrosis, Sleep Apnea, Other


Gastrointestinal: Yes: Diverticulosis, Peptic Ulcer Disease


Hepatobiliary: Yes: Cholelithiasis, Choledocholithiasis (ERCP x 3 with stent 

insertion and removal)


Renal/: Yes: Renal Failure (required dialysis in  following sepsis with 

ATN), Renal Inusuff, Cancer (Renal CA with right nephrectomy), Renal Calculi, 

Other (urethral stricture s/p cystoscopy, nephrectomy for RCC)


Heme/Onc: No: Anemia, B12 Deficiency, Bleeding Disorder, Cancer, Current 

Chemotherapy, Current Radiation Therapy, Hemochromatosis, Hypercoaguable State, 

Myeloproliferative Synd, Sickle Cell Disease, Sickle Cell Trait, 

Thrombocytopenia, Other


Infectious Disease: No: AIDS, C-Diff, Herpes Zoster, HIV, MRSA, STD's, 

Tuberculosis, VREF, Other


Psych: No: Addictions, Anxiety, Bipolar, Depression, Panic, Psychosis, 

Schizophrenia, Other


Musculoskeletal: Yes: Other (DJD)


Rheumatology: No: Fibromyalgia, Gout, Lupus, Rheumatoid Arthritis, Sarcoidosis, 

Vasculitis, Other


Endocrine: No: Bourbon's Disease, Cushing's Disease, Diabetes Insipidus, 

Diabetes Mellitus, Hyperparathyroidism, Hyperthyroidism, Hypothyroidism, 

Osteopenia, SIADH, Other


Dermatology: No: Basal Cell, Cellulitis, Eczema, Melanoma, Psoriasis, Squamous 

Cell, Other





- Past Surgical History


Past Surgical History: Yes: Cholecystectomy, Nephrectomy (right nephrectomy for 

cancer ), Stent (biliary stent , removed ), Valve Replacement (TAVR)





- Alcohol/Substance Use


Hx Alcohol Use: No


History of Substance Use: reports: None





- Smoking History


Smoking history: Never smoked


Have you smoked in the past 12 months: No





- Social History


Usual Living Arrangement: With Spouse


ADL: Independent


Occupation: CPA: still working


History of Recent Travel: No





Home Medications





- Allergies


Allergies/Adverse Reactions: 


 Allergies











Allergy/AdvReac Type Severity Reaction Status Date / Time


 


No Known Allergies Allergy   Verified 17 13:17














- Home Medications


Home Medications: 


Ambulatory Orders





Finasteride 5 mg PO DAILY 17 


Carvedilol [Coreg] 6.25 mg PO BID  tablet 17 


Ursodiol 300 mg PO BID  capsule 17 


Montelukast Sodium [Singulair] 10 mg PO DAILY  tablet 17 


Atorvastatin Calcium 10 mg PO DAILY  tablet 17 


Tamsulosin HCl [Flomax -] 0.4 mg PO DAILY 17 


Ursodiol [Actigall] 300 mg PO BID #180 capsule 17 


Docusate Sodium [Colace -] 300 mg PO HS #21 cap 17 


Pantoprazole Sodium [Protonix -] 40 mg PO DAILY #30 tab 17 


Phenol [Chloraseptic -] 1 spray MM Q6HPO PRN #1 bottle 17 


Polyethylene Glycol 3350 [Miralax 119 gm Btl -] 17 gm PO DAILY #1 bottle  


Carvedilol [Coreg -] 6.25 mg PO BID #60 tablet 09/10/17 


Pantoprazole Sodium 40 mg PO DAILY 17 











Family Disease History





- Family Disease History


Family Disease History: Other: Father ( 80's unclear cause), Mother ( 80

's CVA/MI), Brother (1 brother  CHF, 1 brother  stomach cancer)





Review of Systems





- Review of Systems


Constitutional: reports: Loss of Appetite, Weakness


Eyes: reports: No Symptoms


HENT: reports: No Symptoms


Neck: reports: No Symptoms


Cardiovascular: reports: No Symptoms (had JETER on admission)


Respiratory: reports: SOB on Exertion (improved)


Gastrointestinal: reports: Melena


Genitourinary: reports: No Symptoms


Musculoskeletal: reports: No Symptoms


Integumentary: reports: No Symptoms


Neurological: reports: No Symptoms, Dizziness (improved)


Endocrine: reports: No Symptoms


Hematology/Lymphatic: reports: Excessive Bleeding


Psychiatric: reports: No Symptoms





Physical Exam


Vital Signs: 


 Vital Signs











Temperature  97.6 F   17 10:00


 


Pulse Rate  70   17 10:29


 


Respiratory Rate  20   17 10:00


 


Blood Pressure  131/57   17 10:00


 


O2 Sat by Pulse Oximetry (%)  97   17 10:29











Constitutional: Yes: Well Nourished, No Distress, Calm, Pallor


Eyes: Yes: WNL, Conjunctiva Clear, EOM Intact


HENT: Yes: WNL, Atraumatic, Normocephalic


Neck: Yes: WNL, Supple, Trachea Midline


Cardiovascular: Yes: WNL, Regular Rate and Rhythm, S2


Respiratory: Yes: WNL, CTA Bilaterally


Gastrointestinal: Yes: WNL, Normal Bowel Sounds, Soft


Musculoskeletal: Yes: WNL


Extremities: Yes: WNL


Edema: No


Integumentary: Yes: WNL


Labs: 


 CBC, BMP





 17 05:00 





 17 05:00 











Assessment/Plan


Pt w Hypoproliferative anemia , probably mostly due to GI bleeding, but also 

likely has anemia of chronic disease and mild renal insuff ; no signs of 

microangiopathy on smear or autoimmune hemolysis, doubt hemolysis,  ; suggest 

transfuse to > hgb 8 ; order haptoglobin,LDH,haptoglobin, B12/folate ; 

Thronbocytopenia- moderate which may also be multifactorial but mostly due to 

peripheral destruction (consumption) , perhaps dilutional , cannot r/o immune 

etiology ; doubt TTP or DIC (PT/PTT nl) ; doubt HIT (no heparin exposure) ; 

keep platelets> 80K due to active bleeding w Tx's and follow clinical course 

for now ; order the HIT assay anyway , fibrinogen; if platelets continue to 

drop and refractory to platelet tx's , would consider trial of IVIg . If pt 

improves , would recommend CT chest at some point to eval the R hilar fulness 

on cxr ; to try to avoid  anti-platelet agents while pt still w clinical 

bleeding.

## 2017-09-22 NOTE — PN
Teaching Attending Note


Name of Resident: Jr Rogers


ATTENDING PHYSICIAN STATEMENT





I saw and evaluated the patient.


I reviewed the resident's note and discussed the case with the resident.


I agree with the resident's findings and plan as documented.








SUBJECTIVE:asymptomatic. requesting to eat and go home. denies CP, SOB< fever, 

chillss, N/V/C/D. reported by RN to have 2 maroon colored BM in past 24H








OBJECTIVE:





 Last Vital Signs











Temp Pulse Resp BP Pulse Ox


 


 97.6 F   72   18   119/60   97 


 


 09/22/17 10:00  09/22/17 12:00  09/22/17 12:00  09/22/17 12:00  09/22/17 10:29








General NAD


CV S1 S2 + murmur


Lungs CTA B/L no wheezing/rales/rhonchi


Abdomen soft NT/ND obese


Extremities no pedal edema





ASSESSMENT AND PLAN:


87yo M with PMH CAD s/p stent, CKD, AS, with recent admission for bacteremia 

and GI bleed from ERCP procedure and recently discharged on 9/8/17 and was re-

admitted after mechanical fall and found to have hgb 6.3


1. Upper GI bleed- + maroon BM with low Hgb not responding to transfusion. NPO. 

plan for colonoscopy today vs bleeding scan. received an additional 1 unit PRBC 

last night and currently receiving PRBC #6. closely monitor Hgb with serial 

checks. PPI BID. surgery on standby. GI on board


2. HTN- normotensive. cont coreg


3. thrombocytopenia- continuing to trend down. hematology evaluating with plans 

to transfuse platelets as pt is actively bleeding and source unknown. HIT assay 

and autoimmune workup. 


4. Mechanical fall-likley due to hypotension vs acute anemia claims to not have 

hit his head. XR of his hips negative for acute pathology. PT eval when 

medically optimized


5. Constipation- resolved


6. Pulmonary nodule- incidental 5mm LLL pulmonary nodule. will need CT scan to 

further evaluate


7. CAD s/p stent- will start low dose asa in 2 weeks after GI follow up


8. AS


9. CKD- at baseline


10. DVT ppx- SCD


11. MICU monitoring. 





The care of this patient involved high complexity decision making to prevent 

further life threatening deterioration of the patient's condition and/or to 

evaluate & treat vital organ system(s) failure or risk of failure. 40 minutes

## 2017-09-22 NOTE — PN
Teaching Attending Note


Name of Resident: Mac Aguilera


ATTENDING PHYSICIAN STATEMENT





I saw and evaluated the patient.


I reviewed the resident's note and discussed the case with the resident.


I agree with the resident's findings and plan as documented.








SUBJECTIVE:


Patient seen and examined in the ICU.  Still with occult blood loss.  


No further hematemesis.  Required additional units of pRBC.  


Denies abdominal pain, shortness of breath, or chest pain.





OBJECTIVE:


 





 Intake & Output











 09/19/17 09/20/17 09/21/17 09/22/17





 23:59 23:59 23:59 23:59


 


Intake Total  2230 1050 450


 


Output Total 1100 2600 1450 1300


 


Balance -1100 -370 -400 -850


 


Weight 186 lb 1.122 oz  182 lb 8.684 oz 184 lb 3.2 oz








 Last Vital Signs











Temp Pulse Resp BP Pulse Ox


 


 97.6 F   70   20   131/57   97 


 


 09/22/17 10:00  09/22/17 10:29  09/22/17 10:00  09/22/17 10:00  09/22/17 10:29








Active Medications





Albuterol Sulfate (Ventolin 0.083% Nebulizer Soln -)  1 amp NEB Q6H PRN


   PRN Reason: SHORT OF BREATH/WHEEZING


Atorvastatin Calcium (Lipitor -)  10 mg PO [facility]


   Last Admin: 09/21/17 22:34 Dose:  10 mg


Carvedilol (Coreg -)  3.125 mg PO BID [facility]


   Last Admin: 09/22/17 11:03 Dose:  Not Given


Chlorhexidine Gluconate (Hibiclens For Decolonization -)  1 applic TP [facility]


   Last Admin: 09/21/17 22:34 Dose:  1 applic


Docusate Sodium (Colace -)  300 mg PO [facility]


   Last Admin: 09/21/17 22:35 Dose:  300 mg


Potassium Phosphate 30 mm/ (Sodium Chloride)  260 mls @ 62.5 mls/hr IVPB ONCE 

ONE


   Stop: 09/22/17 13:39


   Last Admin: 09/22/17 11:14 Dose:  62.5 mls/hr


Pantoprazole Sodium 80 mg/ (Sodium Chloride)  100 mls @ 10 mls/hr IVPB Q10H [facility]


   PRN Reason: 8 MG/HR


   Last Admin: 09/22/17 11:16 Dose:  10 mls/hr


Mupirocin (Bactroban Ointment (For Decolonization) -)  1 applic NS BID [facility]


   Last Admin: 09/22/17 10:00 Dose:  1 applic


Senna (Senna -)  2 tab PO HS [facility]


   Last Admin: 09/21/17 22:35 Dose:  2 tab











Gen:  NAD at rest


Heart: RRR 


Lung: decreased breath sounds at the bases


Abd: soft, nontender


Ext: + edema





 


 


 Laboratory Results - last 24 hr











  09/19/17 09/19/17 09/19/17





  15:34 15:34 15:34


 


WBC  6.1  


 


RBC  1.91 L D  


 


Hgb  6.3 L* D  


 


Hct  18.6 L D  


 


MCV  97.3 H  


 


MCH  33.2  


 


MCHC  34.1  


 


RDW  16.9 H  


 


Plt Count  106 L  


 


MPV  9.0  


 


Neutrophils %  82.7  D  


 


Lymphocytes %  8.7  D  


 


Monocytes %  6.7  


 


Eosinophils %  1.4  


 


Basophils %  0.5  


 


PT with INR   12.90 H 


 


INR   1.17 H 


 


PTT (Actin FS)   26.8 L 


 


VBG pH    7.41


 


POC VBG pCO2    43.6


 


POC VBG pO2    29.3  D


 


Mixed VBG HCO3    27.1 H


 


Sodium   


 


Potassium   


 


Chloride   


 


Carbon Dioxide   


 


Anion Gap   


 


BUN   


 


Creatinine   


 


Creat Clearance w eGFR   


 


Random Glucose   


 


Lactic Acid   


 


Calcium   


 


Phosphorus   


 


Magnesium   


 


Iron   


 


TIBC   


 


Iron Saturation   


 


Total Bilirubin   


 


AST   


 


ALT   


 


Alkaline Phosphatase   


 


Creatine Kinase   


 


Troponin I   


 


Total Protein   


 


Albumin   


 


Urine Color   


 


Urine Appearance   


 


Urine pH   


 


Ur Specific Gravity   


 


Urine Protein   


 


Urine Glucose (UA)   


 


Urine Ketones   


 


Urine Blood   


 


Urine Nitrite   


 


Urine Bilirubin   


 


Urine Urobilinogen   


 


Urine RBC   


 


Urine WBC   


 


Ur Epithelial Cells   


 


Urine Bacteria   


 


Urine Mucus   


 


Blood Type   


 


Antibody Screen   


 


Crossmatch   














  09/19/17 09/19/17 09/19/17





  15:34 15:34 15:34


 


WBC   


 


RBC   


 


Hgb   


 


Hct   


 


MCV   


 


MCH   


 


MCHC   


 


RDW   


 


Plt Count   


 


MPV   


 


Neutrophils %   


 


Lymphocytes %   


 


Monocytes %   


 


Eosinophils %   


 


Basophils %   


 


PT with INR   


 


INR   


 


PTT (Actin FS)   


 


VBG pH   


 


POC VBG pCO2   


 


POC VBG pO2   


 


Mixed VBG HCO3   


 


Sodium  138  


 


Potassium  4.4  


 


Chloride  102  


 


Carbon Dioxide  27  


 


Anion Gap  9  


 


BUN  48 H D  


 


Creatinine  2.1 H D  


 


Creat Clearance w eGFR  29.95  


 


Random Glucose  95  D  


 


Lactic Acid   1.5 


 


Calcium  8.2 L  


 


Phosphorus   


 


Magnesium   


 


Iron   


 


TIBC   


 


Iron Saturation   


 


Total Bilirubin  0.5  


 


AST  17  D  


 


ALT  11 L D  


 


Alkaline Phosphatase  69  D  


 


Creatine Kinase  75  


 


Troponin I  0.04  D  


 


Total Protein  4.8 L  


 


Albumin  2.4 L  


 


Urine Color   


 


Urine Appearance   


 


Urine pH   


 


Ur Specific Gravity   


 


Urine Protein   


 


Urine Glucose (UA)   


 


Urine Ketones   


 


Urine Blood   


 


Urine Nitrite   


 


Urine Bilirubin   


 


Urine Urobilinogen   


 


Urine RBC   


 


Urine WBC   


 


Ur Epithelial Cells   


 


Urine Bacteria   


 


Urine Mucus   


 


Blood Type    A POSITIVE


 


Antibody Screen    Negative


 


Crossmatch    See Detail














  09/19/17 09/21/17 09/21/17





  22:36 05:10 19:22


 


WBC    7.4  D


 


RBC    2.23 L


 


Hgb    7.2 L


 


Hct    21.0 L


 


MCV    94.0


 


MCH    32.5


 


MCHC    34.5


 


RDW    16.7 H


 


Plt Count    85 L D


 


MPV    9.5


 


Neutrophils %   


 


Lymphocytes %   


 


Monocytes %   


 


Eosinophils %   


 


Basophils %   


 


PT with INR   


 


INR   


 


PTT (Actin FS)   


 


VBG pH   


 


POC VBG pCO2   


 


POC VBG pO2   


 


Mixed VBG HCO3   


 


Sodium   


 


Potassium   


 


Chloride   


 


Carbon Dioxide   


 


Anion Gap   


 


BUN   


 


Creatinine   


 


Creat Clearance w eGFR   


 


Random Glucose   


 


Lactic Acid   


 


Calcium   


 


Phosphorus   


 


Magnesium   


 


Iron   138 


 


TIBC   182 L 


 


Iron Saturation   76 H 


 


Total Bilirubin   


 


AST   


 


ALT   


 


Alkaline Phosphatase   


 


Creatine Kinase   


 


Troponin I   


 


Total Protein   


 


Albumin   


 


Urine Color  Straw  


 


Urine Appearance  Clear  


 


Urine pH  5.0  


 


Ur Specific Gravity  1.010  


 


Urine Protein  Negative  


 


Urine Glucose (UA)  Negative  


 


Urine Ketones  Negative  


 


Urine Blood  3+ H  


 


Urine Nitrite  Negative  


 


Urine Bilirubin  Negative  


 


Urine Urobilinogen  Negative  


 


Urine RBC  <1  


 


Urine WBC  <1  


 


Ur Epithelial Cells  Rare  


 


Urine Bacteria  Rare  


 


Urine Mucus  Rare  


 


Blood Type   


 


Antibody Screen   


 


Crossmatch   














  09/22/17 09/22/17 09/22/17





  03:00 05:00 05:00


 


WBC  7.8  7.2 


 


RBC  2.29 L  2.27 L 


 


Hgb  7.3 L  7.3 L 


 


Hct  20.9 L  20.8 L 


 


MCV  91.3  91.5 


 


MCH  32.1  31.9 


 


MCHC  35.2  34.9 


 


RDW  15.9  16.5 H 


 


Plt Count  63 L D  65 L 


 


MPV  9.2  9.5 


 


Neutrophils %   74.8 


 


Lymphocytes %   10.7  D 


 


Monocytes %   6.1 


 


Eosinophils %   8.0 H 


 


Basophils %   0.4 


 


PT with INR   


 


INR   


 


PTT (Actin FS)   


 


VBG pH   


 


POC VBG pCO2   


 


POC VBG pO2   


 


Mixed VBG HCO3   


 


Sodium    140


 


Potassium    4.1


 


Chloride    104


 


Carbon Dioxide    27


 


Anion Gap    9


 


BUN    58 H


 


Creatinine    1.4 H D


 


Creat Clearance w eGFR    47.83


 


Random Glucose    85


 


Lactic Acid   


 


Calcium    8.1 L


 


Phosphorus    2.3 L


 


Magnesium    1.9


 


Iron   


 


TIBC   


 


Iron Saturation   


 


Total Bilirubin    0.6


 


AST    19


 


ALT    11 L


 


Alkaline Phosphatase    59


 


Creatine Kinase   


 


Troponin I   


 


Total Protein    4.3 L


 


Albumin    2.2 L


 


Urine Color   


 


Urine Appearance   


 


Urine pH   


 


Ur Specific Gravity   


 


Urine Protein   


 


Urine Glucose (UA)   


 


Urine Ketones   


 


Urine Blood   


 


Urine Nitrite   


 


Urine Bilirubin   


 


Urine Urobilinogen   


 


Urine RBC   


 


Urine WBC   


 


Ur Epithelial Cells   


 


Urine Bacteria   


 


Urine Mucus   


 


Blood Type   


 


Antibody Screen   


 


Crossmatch   











ASSESSMENT AND PLAN:


GI Bleed  due to gastric ulcers


Acute Blood Loss Anemia


CAD S/P PCI 


Aortic Stenosis s/p TAVR


LV Systolic Dysfunction


h/o Cholangitis


CKD


Hx of Right Nephrectomy








-  Heme / Hematology follow up noted 


-  monitor H/H


-  Normal transfusion thresholds 


-  protonix


-  Maintain large bore peripheral access


-  PO per GI


-  hold all anticoagulation, antiplatelets


-  Mechanical VTE prophylaxis








Dr Sidhu 


Critical care time spent in reviewing chart, evaluating patient and formulating 

plan 35 min

## 2017-09-22 NOTE — PN
GI Progress Note


Subjective: 


GI NOte: Bleeding has recurred but is now maroon colored and has required 3 

units of blood. I had ordered a bleeding scan but after discussion with Dr. Burns have canceled it and instead will pursue CTA if absolutely necessary. 

Given his renal insufficiency there is significant risk with the contrast which 

I have discussed with Surjit. I have requested a renal consult to determine 

whether Mucormyst would be beneficial. I have informed Surjit of the potential 

need for IR embolization and possibly for surgery if the bleeding continues.  I 

have discussed the case with Dr Dye who will administer platelets. Discussed 

the case with Dr Swann who is on standby.





- Objective


Vital Signs: 


 Vital Signs











Temperature  97.6 F   09/22/17 10:00


 


Pulse Rate  70   09/22/17 10:29


 


Respiratory Rate  20   09/22/17 10:00


 


Blood Pressure  131/57   09/22/17 10:00


 


O2 Sat by Pulse Oximetry (%)  97   09/22/17 10:29








 Laboratory Tests











  09/21/17 09/22/17 09/22/17





  05:10 05:00 05:00


 


Plt Count   65 L 


 


Retic Count  1.16  D  


 


Total Bilirubin    0.6


 


AST    19


 


ALT    11 L


 


Alkaline Phosphatase    59


 


Albumin    2.2 L











Constitutional: Calm, Pallor


Cardiovascular: Yes: Regular Rate and Rhythm


Respiratory: Yes: CTA Bilaterally


...Auscultate: Yes: Hyperactive Bowel Sounds


...Palpate: Yes: Soft, Other (nontender)


Labs: 


 CBC, BMP





 09/22/17 05:00 





 09/22/17 05:00 





 INR, PTT











INR  1.16  (0.82-1.09)  H  09/20/17  05:15    














Assessment/Plan


The maroon color of his stool now suggests an lower GI source of bleeding. 

Await platelet transfusion. If bleeding persisit will pursue CTA and IR 

intervention. I have also discussed the potential need for a colonoscopy. I 

have informed Surjit of the potential risks of perforation and hemorrhage that 

can arise during endoscopy. He has granted an informed consent. I have 

scheduled it for the 9/25. Will refrain from an NG tube given his 

thrombocytopenia.

## 2017-09-22 NOTE — PN
Progress Note, Physician


History of Present Illness: 


EGD showed nonbleeding gastric ulcers, receiving transfusion.











- Current Medication List


Current Medications: 


Active Medications





Albuterol Sulfate (Ventolin 0.083% Nebulizer Soln -)  1 amp NEB Q6H PRN


   PRN Reason: SHORT OF BREATH/WHEEZING


Atorvastatin Calcium (Lipitor -)  10 mg PO [facility]


   Last Admin: 09/21/17 22:34 Dose:  10 mg


Carvedilol (Coreg -)  3.125 mg PO BID [facility]


   Last Admin: 09/22/17 11:03 Dose:  Not Given


Chlorhexidine Gluconate (Hibiclens For Decolonization -)  1 applic TP [facility]


   Last Admin: 09/21/17 22:34 Dose:  1 applic


Docusate Sodium (Colace -)  300 mg PO [facility]


   Last Admin: 09/21/17 22:35 Dose:  300 mg


Pantoprazole Sodium 80 mg/ (Sodium Chloride)  100 mls @ 10 mls/hr IVPB Q10H [facility]


   PRN Reason: 8 MG/HR


   Last Admin: 09/22/17 11:16 Dose:  10 mls/hr


Mupirocin (Bactroban Ointment (For Decolonization) -)  1 applic NS BID [facility]


   Last Admin: 09/22/17 10:00 Dose:  1 applic


Senna (Senna -)  2 tab PO [facility]


   Last Admin: 09/21/17 22:35 Dose:  2 tab











- Objective


Vital Signs: 


 Vital Signs











Temperature  97.6 F   09/22/17 10:00


 


Pulse Rate  72   09/22/17 12:00


 


Respiratory Rate  18   09/22/17 12:00


 


Blood Pressure  119/60   09/22/17 12:00


 


O2 Sat by Pulse Oximetry (%)  97   09/22/17 10:29











Constitutional: Yes: No Distress, Calm


Neck: Yes: Supple


Cardiovascular: Yes: Regular Rate and Rhythm, Murmur (2/6 SM)


Respiratory: Yes: Regular, Diminished


Gastrointestinal: Yes: Soft, Hypoactive Bowel Sounds


Edema: No


Labs: 


 CBC, BMP





 09/22/17 05:00 





 09/22/17 05:00 





 INR, PTT











INR  1.16  (0.82-1.09)  H  09/20/17  05:15    














Problem List





- Problems


(1) Anemia


Code(s): D64.9 - ANEMIA, UNSPECIFIED   Qualifiers: 


     Anemia type: unspecified type        Qualified Code(s): D64.9 - Anemia, 

unspecified  





(2) GI (gastrointestinal hemorrhage)


Code(s): K92.2 - GASTROINTESTINAL HEMORRHAGE, UNSPECIFIED   Qualifiers: 


     GI bleed type/associated pathology: melena     Qualified Code(s): K92.1 - 

Melena  





(3) CAD (coronary artery disease)


Code(s): I25.10 - ATHSCL HEART DISEASE OF NATIVE CORONARY ARTERY W/O ANG PCTRS 

  Qualifiers: 


     Coronary Disease-Associated Artery/Lesion type: unspecified vessel or 

lesion type     Native vs. transplanted heart: native heart     Associated 

angina: without angina        Qualified Code(s): I25.10 - Atherosclerotic heart 

disease of native coronary artery without angina pectoris  





(4) CKD (chronic kidney disease)


Code(s): N18.9 - CHRONIC KIDNEY DISEASE, UNSPECIFIED   Qualifiers: 


     Chronic kidney disease stage: stage 3 (moderate)        Qualified Code(s): 

N18.3 - Chronic kidney disease, stage 3 (moderate)  





(5) History of percutaneous coronary intervention


Code(s): Z98.890 - OTHER SPECIFIED POSTPROCEDURAL STATES





(6) Hypercholesterolemia


Code(s): E78.00 - PURE HYPERCHOLESTEROLEMIA, UNSPECIFIED





(7) Left bundle branch block


Code(s): I44.7 - LEFT BUNDLE-BRANCH BLOCK, UNSPECIFIED





(8) Renal cancer


Code(s): C64.9 - MALIGNANT NEOPLASM OF UNSP KIDNEY, EXCEPT RENAL PELVIS   

Qualifiers: 


     Laterality: right     Qualified Code(s): C64.1 - Malignant neoplasm of 

right kidney, except renal pelvis  





(9) S/P ERCP


Code(s): Z98.890 - OTHER SPECIFIED POSTPROCEDURAL STATES





(10) S/P TAVR (transcatheter aortic valve replacement)


Code(s): Z95.2 - PRESENCE OF PROSTHETIC HEART VALVE





(11) S/P laparoscopic cholecystectomy


Code(s): Z90.49 - ACQUIRED ABSENCE OF OTHER SPECIFIED PARTS OF DIGESTIVE TRACT





(12) Hypertension


Code(s): I10 - ESSENTIAL (PRIMARY) HYPERTENSION   Qualifiers: 


     Hypertension type: essential hypertension        Qualified Code(s): I10 - 

Essential (primary) hypertension  





(13) Gastric ulcer due to nonsteroidal anti-inflammatory drug (NSAID)


Code(s): K25.9 - GASTRIC ULCER, UNSP AS ACUTE OR CHRONIC, W/O HEMOR OR PERF


T39.395A - ADVERSE EFFECT OF NONSTEROIDAL ANTI-INFLAMMATORY DRUGS, INIT





(14) Thrombocytopenia


Code(s): D69.6 - THROMBOCYTOPENIA, UNSPECIFIED








Assessment/Plan


5/17/2017 Echo: Normal LV and RV size with severely decreased LV systolic fxn, 

mod-severe decrease RV fxn, mod MR, mild TR





1. UGI bleed referable to gastric ulcers in fundus 


2. LV systolic dysfunction


3. Aortic stenosis Post TAVR


4. Coronary artery disease, s/p PCI/stent, angina


5. Hypertension


6. h/o ERCP, biliary stent implant/removal and stone retrieval for ascending 

cholangitis leading to sepsis


7. 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


8. Anemia post transfusion


9. Thrombocytopenia





PLAN:


1. Continue Protonix gtt, monitor Hgb and Plt post transfusion and repeat as 

needed


2. Continue Carvedilol 3.125 bid now that oral intake resumed


3. Continue Lipitor 10 qd and follow LFTs now that oral intake resumed


4. Resume antiplatelet after 2 weeks of healing with PPI

## 2017-09-22 NOTE — PN
Physical Exam: 


SUBJECTIVE: CBC at 7PM notable for HgB 7.2 (7.2 in AM). Per nursing, pt with 

melenic BM. On-call GI Dr. Terrazas notified, recommended 1unit pRBCs overnight w

/ repeat CBC in AM and f/u colonoscopy. Pt transfer to floors cancelled pending 

possible re-bleed. Ordered for NPO overnight. Night team aware. 








OBJECTIVE:





 Vital Signs











 Period  Temp  Pulse  Resp  BP Sys/[place]  Pulse Ox


 


 Last 24 Hr  97.6 F-98.7 F    16-22  /47-92  100-100








 Intake & Output











 09/19/17 09/20/17 09/21/17 09/22/17





 23:59 23:59 23:59 23:59


 


Intake Total  2230 1050 450


 


Output Total 1100 2600 1450 1300


 


Balance -1100 -370 -400 -850


 


Weight 84.4 kg  82.8 kg 83.552 kg

















GENERAL: The patient is awake, alert,


HEAD: Normal with no signs of trauma.


EYES: PERRL, 


ENT: oropharynx clear without exudates, moist mucous membranes.


NECK: Trachea midline, full range of motion, supple. 


LUNGS: Breath sounds equal, clear to auscultation bilaterally, no wheezes,  no 

accessory muscle use. 


HEART: s1s2 normal. 


ABDOMEN: Soft, nontender, nondistended, normoactive bowel sounds, no guarding, 


EXTREMITIES:  well-perfused,   edema ++


SKIN: Warm, dry














 Laboratory Results - last 24 hr











  09/19/17 09/21/17 09/22/17





  22:36 19:22 03:00


 


WBC   7.4  D  7.8


 


RBC   2.23 L  2.29 L


 


Hgb   7.2 L  7.3 L


 


Hct   21.0 L  20.9 L


 


MCV   94.0  91.3


 


MCH   32.5  32.1


 


MCHC   34.5  35.2


 


RDW   16.7 H  15.9


 


Plt Count   85 L D  63 L D


 


MPV   9.5  9.2


 


Neutrophils %   


 


Lymphocytes %   


 


Monocytes %   


 


Eosinophils %   


 


Basophils %   


 


Sodium   


 


Potassium   


 


Chloride   


 


Carbon Dioxide   


 


Anion Gap   


 


BUN   


 


Creatinine   


 


Creat Clearance w eGFR   


 


Random Glucose   


 


Calcium   


 


Phosphorus   


 


Magnesium   


 


Total Bilirubin   


 


AST   


 


ALT   


 


Alkaline Phosphatase   


 


Total Protein   


 


Albumin   


 


Urine Color  Straw  


 


Urine Appearance  Clear  


 


Urine pH  5.0  


 


Ur Specific Gravity  1.010  


 


Urine Protein  Negative  


 


Urine Glucose (UA)  Negative  


 


Urine Ketones  Negative  


 


Urine Blood  3+ H  


 


Urine Nitrite  Negative  


 


Urine Bilirubin  Negative  


 


Urine Urobilinogen  Negative  


 


Urine RBC  <1  


 


Urine WBC  <1  


 


Ur Epithelial Cells  Rare  


 


Urine Bacteria  Rare  


 


Urine Mucus  Rare  














  09/22/17 09/22/17





  05:00 05:00


 


WBC  7.2 


 


RBC  2.27 L 


 


Hgb  7.3 L 


 


Hct  20.8 L 


 


MCV  91.5 


 


MCH  31.9 


 


MCHC  34.9 


 


RDW  16.5 H 


 


Plt Count  65 L 


 


MPV  9.5 


 


Neutrophils %  74.8 


 


Lymphocytes %  10.7  D 


 


Monocytes %  6.1 


 


Eosinophils %  8.0 H 


 


Basophils %  0.4 


 


Sodium   140


 


Potassium   4.1


 


Chloride   104


 


Carbon Dioxide   27


 


Anion Gap   9


 


BUN   58 H


 


Creatinine   1.4 H D


 


Creat Clearance w eGFR   47.83


 


Random Glucose   85


 


Calcium   8.1 L


 


Phosphorus   2.3 L


 


Magnesium   1.9


 


Total Bilirubin   0.6


 


AST   19


 


ALT   11 L


 


Alkaline Phosphatase   59


 


Total Protein   4.3 L


 


Albumin   2.2 L


 


Urine Color  


 


Urine Appearance  


 


Urine pH  


 


Ur Specific Gravity  


 


Urine Protein  


 


Urine Glucose (UA)  


 


Urine Ketones  


 


Urine Blood  


 


Urine Nitrite  


 


Urine Bilirubin  


 


Urine Urobilinogen  


 


Urine RBC  


 


Urine WBC  


 


Ur Epithelial Cells  


 


Urine Bacteria  


 


Urine Mucus  








Active Medications











Generic Name Dose Route Start Last Admin





  Trade Name Freq  PRN Reason Stop Dose Admin


 


Albuterol Sulfate  1 amp 09/21/17 18:26  





  Ventolin 0.083% Nebulizer Soln -  NEB   





  Q6H PRN   





  SHORT OF BREATH/WHEEZING   


 


Atorvastatin Calcium  10 mg 09/21/17 22:00 09/21/17 22:34





  Lipitor -  PO   10 mg





  HS ALCIDES   Administration


 


Carvedilol  3.125 mg 09/21/17 22:00 09/21/17 22:34





  Coreg -  PO   3.125 mg





  BID [facility]IDES   Administration


 


Chlorhexidine Gluconate  1 applic 09/21/17 22:00 09/21/17 22:34





  Hibiclens For Decolonization -  TP   1 applic





  HS ALCIDES   Administration


 


Docusate Sodium  300 mg 09/21/17 22:00 09/21/17 22:35





  Colace -  PO   300 mg





  HS ALCIDES   Administration


 


Mupirocin  1 applic 09/21/17 22:00 09/21/17 22:34





  Bactroban Ointment (For Decolonization) -  NS   1 applic





  BID [facility]IDES   Administration


 


Pantoprazole Sodium  40 mg 09/21/17 22:00 09/21/17 22:34





  Protonix -  PO   40 mg





  BID [facility]IDES   Administration


 


Senna  2 tab 09/21/17 22:00 09/21/17 22:35





  Senna -  PO   2 tab





  [facility] ALC[facility]   Administration











ASSESSMENT/PLAN:


89yo M with PMH CAD s/p stent, CKD, AS, with recent admission for bacteremia 

and GI bleed from ERCP procedure and recently discharged on 9/8/17 and was re-

admitted after mechanical fall and found to have hgb 6.3


Upper GI bleed- 


    got egd done, shows two ulcers in fundus, not bleeding. 


    on protonix 40 po bid. 


    hold plavix/asa


   getting 7th unit of blood


    PO per gi (Sodium controlled)


PER GI: if bleeding persist, will consider CTA. Colonoscopy scheduled for 9/25


Per Hematology: f/u on order of haptoglobin, b12/folate, HIT assay and 

fibrinogen.


 





HTN- 


  currently hypotensive. likely from bleeding 


  hold oral hypertensive. 


  If Bp decreases consider IV fluid bolus, but watch for fluid overload as 

patient has poor EF 











DVT pro: on scd. 


GI pro: on protonix. 





Dispo; in icu














Visit type





- Emergency Visit


Emergency Visit: No





- New Patient


This patient is new to me today: Yes


Date on this admission: 09/22/17





- Critical Care


Critical Care patient: Yes


Total Critical Care Time (in minutes): 30


Critical Care Statement: The care of this patient involved high complexity 

decision making to prevent further life threatening deterioration of the patient

's condition and/or to evaluate & treat vital organ system(s) failure or risk 

of failure.

## 2017-09-23 LAB
ALBUMIN SERPL-MCNC: 2.2 G/DL (ref 3.4–5)
ALP SERPL-CCNC: 53 U/L (ref 45–117)
ALT SERPL-CCNC: 10 U/L (ref 12–78)
ANION GAP SERPL CALC-SCNC: 5 MMOL/L (ref 8–16)
ANION GAP SERPL CALC-SCNC: 7 MMOL/L (ref 8–16)
AST SERPL-CCNC: 18 U/L (ref 15–37)
BASOPHILS # BLD: 0.2 % (ref 0–2)
BASOPHILS # BLD: 0.4 % (ref 0–2)
BILIRUB SERPL-MCNC: 1 MG/DL (ref 0.2–1)
CALCIUM SERPL-MCNC: 7.9 MG/DL (ref 8.5–10.1)
CALCIUM SERPL-MCNC: 8.2 MG/DL (ref 8.5–10.1)
CO2 SERPL-SCNC: 27 MMOL/L (ref 21–32)
CO2 SERPL-SCNC: 27 MMOL/L (ref 21–32)
CREAT SERPL-MCNC: 1.3 MG/DL (ref 0.7–1.3)
CREAT SERPL-MCNC: 1.4 MG/DL (ref 0.7–1.3)
DEPRECATED RDW RBC AUTO: 15.8 % (ref 11.9–15.9)
DEPRECATED RDW RBC AUTO: 16.3 % (ref 11.9–15.9)
DEPRECATED RDW RBC AUTO: 16.9 % (ref 11.9–15.9)
EOSINOPHIL # BLD: 10.6 % (ref 0–4.5)
EOSINOPHIL # BLD: 10.9 % (ref 0–4.5)
GLUCOSE SERPL-MCNC: 87 MG/DL (ref 74–106)
GLUCOSE SERPL-MCNC: 97 MG/DL (ref 74–106)
INR BLD: 1.14 (ref 0.82–1.09)
MAGNESIUM SERPL-MCNC: 1.9 MG/DL (ref 1.8–2.4)
MCH RBC QN AUTO: 30.9 PG (ref 25.7–33.7)
MCH RBC QN AUTO: 31.2 PG (ref 25.7–33.7)
MCH RBC QN AUTO: 31.3 PG (ref 25.7–33.7)
MCHC RBC AUTO-ENTMCNC: 34.9 G/DL (ref 32–35.9)
MCHC RBC AUTO-ENTMCNC: 35.3 G/DL (ref 32–35.9)
MCHC RBC AUTO-ENTMCNC: 35.4 G/DL (ref 32–35.9)
MCV RBC: 88.3 FL (ref 80–96)
MCV RBC: 88.4 FL (ref 80–96)
MCV RBC: 88.6 FL (ref 80–96)
NEUTROPHILS # BLD: 68.9 % (ref 42.8–82.8)
NEUTROPHILS # BLD: 71 % (ref 42.8–82.8)
PHOSPHATE SERPL-MCNC: 3.4 MG/DL (ref 2.5–4.9)
PLATELET # BLD AUTO: 136 K/MM3 (ref 134–434)
PLATELET # BLD AUTO: 63 K/MM3 (ref 134–434)
PLATELET # BLD AUTO: 71 K/MM3 (ref 134–434)
PMV BLD: 8 FL (ref 7.5–11.1)
PMV BLD: 8.9 FL (ref 7.5–11.1)
PMV BLD: 9.5 FL (ref 7.5–11.1)
PROT SERPL-MCNC: 4.2 G/DL (ref 6.4–8.2)
PT PNL PPP: 12.6 SEC (ref 9.98–11.88)
WBC # BLD AUTO: 6 K/MM3 (ref 4–10)
WBC # BLD AUTO: 6.8 K/MM3 (ref 4–10)
WBC # BLD AUTO: 7.1 K/MM3 (ref 4–10)

## 2017-09-23 PROCEDURE — 30233K1 TRANSFUSION OF NONAUTOLOGOUS FROZEN PLASMA INTO PERIPHERAL VEIN, PERCUTANEOUS APPROACH: ICD-10-PCS

## 2017-09-23 PROCEDURE — 30233R1 TRANSFUSION OF NONAUTOLOGOUS PLATELETS INTO PERIPHERAL VEIN, PERCUTANEOUS APPROACH: ICD-10-PCS

## 2017-09-23 RX ADMIN — MUPIROCIN SCH APPLIC: 20 OINTMENT TOPICAL at 22:58

## 2017-09-23 RX ADMIN — MUPIROCIN SCH APPLIC: 20 OINTMENT TOPICAL at 11:00

## 2017-09-23 RX ADMIN — ACETYLCYSTEINE SCH MG: 200 SOLUTION ORAL; RESPIRATORY (INHALATION) at 12:31

## 2017-09-23 RX ADMIN — OCTREOTIDE ACETATE SCH MLS/HR: 100 INJECTION, SOLUTION INTRAVENOUS; SUBCUTANEOUS at 17:41

## 2017-09-23 RX ADMIN — PANTOPRAZOLE SODIUM SCH: 40 INJECTION, POWDER, FOR SOLUTION INTRAVENOUS at 17:40

## 2017-09-23 RX ADMIN — PANTOPRAZOLE SODIUM SCH MLS/HR: 40 INJECTION, POWDER, FOR SOLUTION INTRAVENOUS at 12:00

## 2017-09-23 RX ADMIN — CARVEDILOL SCH: 3.12 TABLET, FILM COATED ORAL at 10:00

## 2017-09-23 RX ADMIN — CHLORHEXIDINE GLUCONATE SCH APPLIC: 213 SOLUTION TOPICAL at 22:58

## 2017-09-23 RX ADMIN — ATORVASTATIN CALCIUM SCH MG: 10 TABLET, FILM COATED ORAL at 23:00

## 2017-09-23 RX ADMIN — ACETYLCYSTEINE SCH MG: 200 SOLUTION ORAL; RESPIRATORY (INHALATION) at 22:58

## 2017-09-23 RX ADMIN — CARVEDILOL SCH MG: 3.12 TABLET, FILM COATED ORAL at 23:00

## 2017-09-23 NOTE — PN
Progress Note (short form)





- Note


Progress Note: 


GI Addendum: After another large bloody BM a CTA was done. Dr Andrew has 

reported to the nurse that there is no active bleeding seen. I therefore 

inserted an NG and lavaged until bile returned excluding a UGI bleed. I then 

did a rectal exam and got no bright red blood excluding a stercoral ulcer 

bleed.  I have told Surjit and his wife that surgery is the next option and 

that this would entail a subtotal colectomy with a ileostomy that may be 

reversed in the future. I have discussed the case with Dr Swann who agrees. I 

will transfuse more PRBCs, another unit of platelets and start an octreotide 

drip.

## 2017-09-23 NOTE — PN
Progress Note (short form)





- Note


Progress Note: 


PULMONARY/CCM





Pt seen and examined in the ICU. Maroon stools overnight, decreased H/H this 

AM. c/o left leg pain. No shortness of breath, chest pain, lightheadedness, 

dizziness.





 Last Vital Signs











Temp Pulse Resp BP Pulse Ox


 


 98.5 F   71   14   101/49   97 


 


 09/23/17 06:00  09/23/17 06:00  09/23/17 06:00  09/23/17 06:00  09/22/17 19:58








 Intake & Output











 09/20/17 09/21/17 09/22/17 09/23/17





 23:59 23:59 23:59 23:59


 


Intake Total 2230 1050 1640 120


 


Output Total 2600 1450 3000 400


 


Balance -370 -400 -1360 -280


 


Weight  182 lb 8.684 oz 184 lb 3.2 oz 172 lb 6.424 oz








Gen:  NAD at rest


Heart:  RRR


Lung: decreased breath sounds at the bases


Abd: soft, nontender


Ext: no edema





 CBC, BMP





 09/23/17 05:15 





 09/23/17 05:15 





Active Medications





Acetylcysteine (Mucomyst 20 Oral / Inh Use Only*)  1,200 mg PO BID [facility]


   Stop: 09/24/17 15:14


   Last Admin: 09/22/17 22:30 Dose:  Not Given


Albuterol Sulfate (Ventolin 0.083% Nebulizer Soln -)  1 amp NEB Q6H PRN


   PRN Reason: SHORT OF BREATH/WHEEZING


Atorvastatin Calcium (Lipitor -)  10 mg PO HS [facility]


   Last Admin: 09/22/17 21:38 Dose:  10 mg


Carvedilol (Coreg -)  3.125 mg PO BID [facility]


   Last Admin: 09/22/17 21:38 Dose:  3.125 mg


Chlorhexidine Gluconate (Hibiclens For Decolonization -)  1 applic TP HS [facility]


   Last Admin: 09/22/17 21:34 Dose:  1 applic


Furosemide (Lasix Injection -)  20 mg IVPB ON CALL [facility]


   Stop: 09/23/17 12:00


Pantoprazole Sodium 80 mg/ (Sodium Chloride)  100 mls @ 10 mls/hr IVPB Q10H [facility]


   PRN Reason: 8 MG/HR


   Last Admin: 09/22/17 21:34 Dose:  10 mls/hr


Mupirocin (Bactroban Ointment (For Decolonization) -)  1 applic NS BID [facility]


   Last Admin: 09/22/17 21:34 Dose:  1 applic





A/P


GI Bleed 


Gastric Ulcers


Acute Blood Loss Anemia


Thrombocytopenia


CAD s/p stent


Aortic Stenosis s/p TAVR


LV Systolic Dysfunction


h/o Cholangitis


Acute on CKD


h/o R Nephrectomy





-  monitor H/H, platelets


-  transfuse as needed


-  protonix


-  ensure large bore peripheral access


-  for bleeding scan


-  hold all anticoagulation, antiplatelets


-  DVT prophylaxis


-  continue ICU monitoring








critical care time spent in reviewing chart, evaluating patient and formulating 

plan 35 min

## 2017-09-23 NOTE — PN
GI Progress Note


Subjective: 


GI Note: Continues to bleed but did not receive platelets until today. Last BM 

was 4AM. NO pain. No vomiting. I have discussed the options of CTA with IR 

embolization vs surgery with Surjit and his wife. I explained that the angio 

contrast could cause renal failure and that embolization could cause the 

segment of colon involved to become gangrenous and require emergent surgery. I 

have explained that immediate surgery may call for a subtotal colectomy and 

temporary colostomy. A bowel prep could precipitate brisk hemorrhage. Given the 

options we will wait to see whether the platelets and dDAVP bring this 

hemorrhage to a halt.





- Objective


Vital Signs: 


 Vital Signs











Temperature  98.5 F   09/23/17 06:00


 


Pulse Rate  74   09/23/17 11:49


 


Respiratory Rate  14   09/23/17 11:49


 


Blood Pressure  126/66   09/23/17 11:49


 


O2 Sat by Pulse Oximetry (%)  97   09/22/17 19:58











Constitutional: Calm, Pallor


...Auscultate: Yes: Hyperactive Bowel Sounds


...Palpate: Yes: Soft, Other (nontender)


Labs: 


 CBC, BMP





 09/23/17 05:15 





 09/23/17 05:15 





 INR, PTT











INR  1.16  (0.82-1.09)  H  09/20/17  05:15    


 


Fibrinogen  170.0 mg/dL (238-498)  L  09/23/17  05:15    














Assessment/Plan


Lower GI hemorrhage. Await to see effects of platelet transfusion and dDAVP. If 

bleeding persists will pursue CTA and IR intervention.

## 2017-09-23 NOTE — PN
Progress Note (short form)





- Note


Progress Note: 


asymptomatic. denies CP, SOB, fever, chills


as per RN has had multiple maroon colored BM. 





 Current Medications











Generic Name Dose Route Start Last Admin





  Trade Name [place]  PRN Reason Stop Dose Admin


 


Acetylcysteine  1,200 mg 09/22/17 15:15 09/22/17 22:30





  Mucomyst 20 Oral / Inh Use Only*  PO 09/24/17 15:14  Not Given





  BID [facility]   


 


Albuterol Sulfate  1 amp 09/21/17 18:26  





  Ventolin 0.083% Nebulizer Soln -  NEB   





  Q6H PRN   





  SHORT OF BREATH/WHEEZING   


 


Atorvastatin Calcium  10 mg 09/21/17 22:00 09/22/17 21:38





  Lipitor -  PO   10 mg





  HS [facility]   Administration


 


Carvedilol  3.125 mg 09/21/17 22:00 09/22/17 21:38





  Coreg -  PO   3.125 mg





  BID [facility]   Administration


 


Chlorhexidine Gluconate  1 applic 09/21/17 22:00 09/22/17 21:34





  Hibiclens For Decolonization -  TP   1 applic





  HS [facility]   Administration


 


Pantoprazole Sodium 80 mg/  100 mls @ 10 mls/hr 09/22/17 10:00 09/22/17 21:34





  Sodium Chloride  IVPB   10 mls/hr





  Q10H [facility]   Administration





  8 MG/HR   


 


Mupirocin  1 applic 09/21/17 22:00 09/22/17 21:34





  Bactroban Ointment (For Decolonization) -  NS   1 applic





  BID [facility]   Administration








 Last Vital Signs











Temp Pulse Resp BP Pulse Ox


 


 98.5 F   71   14   101/49   97 


 


 09/23/17 06:00  09/23/17 06:00  09/23/17 06:00  09/23/17 06:00  09/22/17 19:58








General NAD


CV S1 S2 + murmur


Lungs CTA B/L no wheezing/rales/rhonchi


Abdomen soft NT/ND obese


Extremities no pedal edema


 CBCD











WBC  6.8 K/mm3 (4.0-10.0)   09/23/17  05:15    


 


RBC  2.42 M/mm3 (4.00-5.60)  L  09/23/17  05:15    


 


Hgb  7.6 GM/dL (11.7-16.9)  L D 09/23/17  05:15    


 


Hct  21.4 % (35.4-49)  L  09/23/17  05:15    


 


MCV  88.4 fl (80-96)   09/23/17  05:15    


 


MCHC  35.4 g/dl (32.0-35.9)   09/23/17  05:15    


 


RDW  16.9 % (11.9-15.9)  H  09/23/17  05:15    


 


Plt Count  63 K/MM3 (134-434)  L  09/23/17  05:15    


 


MPV  9.5 fl (7.5-11.1)   09/23/17  05:15    








 CMP











Sodium  141 mmol/L (136-145)   09/23/17  05:15    


 


Potassium  4.2 mmol/L (3.5-5.1)   09/23/17  05:15    


 


Chloride  109 mmol/L ()  H  09/23/17  05:15    


 


Carbon Dioxide  27 mmol/L (21-32)   09/23/17  05:15    


 


Anion Gap  5  (8-16)  L  09/23/17  05:15    


 


BUN  71 mg/dL (7-18)  H D 09/23/17  05:15    


 


Creatinine  1.3 mg/dL (0.7-1.3)   09/23/17  05:15    


 


Creat Clearance w eGFR  52.10  (>60)   09/23/17  05:15    


 


Calcium  7.9 mg/dL (8.5-10.1)  L  09/23/17  05:15    


 


Total Bilirubin  1.0 mg/dL (0.2-1.0)  D 09/23/17  05:15    


 


AST  18 U/L (15-37)   09/23/17  05:15    


 


ALT  10 U/L (12-78)  L  09/23/17  05:15    


 


Alkaline Phosphatase  53 U/L ()   09/23/17  05:15    


 


Total Protein  4.2 g/dl (6.4-8.2)  L  09/23/17  05:15    


 


Albumin  2.2 g/dl (3.4-5.0)  L  09/23/17  05:15    











ASSESSMENT AND PLAN:


89yo M with PMH CAD s/p stent, CKD, AS, with recent admission for bacteremia 

and GI bleed from ERCP procedure and recently discharged on 9/8/17 and was re-

admitted after mechanical fall and found to have hgb 6.3


1. Upper GI bleed- s/p 7 units PRBC this admission. + maroon BM with low Hgb 

not responding to transfusion. plan is for CTA to further locate source of 

bleeding. assuming source of bleeding is from previous sphincterotomy vs small 

bowel. for possible embolization. d/w cardio and agree that conservative 

management would likely be the most beneficial in this patient with significant 

cardiac history and high risk of mortality for invasive surgery. will transfuse 

1 unit PRBC with lasix prn with goal hgb >8. PPI ggt. NPO. surgery on standby. 

GI on board


2. HTN- normotensive. cont coreg


3. thrombocytopenia- low in setting of active bleeding. was informed platelets 

were ordered but were not. will tranfuse platelets prior to PRBC goal for 

platelets >90. workup sent by hematology. 


4. Mechanical fall-likley due to hypotension vs acute anemia claims to not have 

hit his head. XR of his hips negative for acute pathology. PT eval when 

medically optimized


5. Constipation- resolved


6. Pulmonary nodule- incidental 5mm LLL pulmonary nodule. will need CT scan to 

further evaluate


7. CAD s/p stent-goal to re-start asa after several weeks of inactive bleeding


8. AS


9. CKD- at baseline


10. DVT ppx- SCD


11. MICU monitoring. 





The care of this patient involved high complexity decision making to prevent 

further life threatening deterioration of the patient's condition and/or to 

evaluate & treat vital organ system(s) failure or risk of failure. 45 minutes








Visit type





- Emergency Visit


Emergency Visit: Yes


ED Registration Date: 09/19/17


Care time: The patient presented to the Emergency Department on the above date 

and was hospitalized for further evaluation of their emergent condition.





- New Patient


This patient is new to me today: No





- Critical Care


Critical Care patient: Yes


Total Critical Care Time (in minutes): 40


Critical Care Statement: The care of this patient involved high complexity 

decision making to prevent further life threatening deterioration of the patient

's condition and/or to evaluate & treat vital organ system(s) failure or risk 

of failure.





- Discharge Referral


Referred to [facility] No

## 2017-09-23 NOTE — PN
Progress Note (short form)





- Note


Progress Note: 


RENAL


Pt seen and examined 


appeared comfortable


did not answer any questions


 Last Vital Signs











Temp Pulse Resp BP Pulse Ox


 


 98.5 F   71   14   101/49   97 


 


 09/23/17 06:00  09/23/17 06:00  09/23/17 06:00  09/23/17 06:00  09/22/17 19:58








lungs clear


cvs s1s2 rr  ERNESTO


abd soft


ext no edema


neuro awake





 Current Medications











Generic Name Dose Route Start Last Admin





  Trade Name Freq  PRN Reason Stop Dose Admin


 


Acetaminophen  1,000 mg 09/23/17 09:30  





  Ofirmev Injection -  IVPB 09/24/17 03:31  





  Q6H PRN   





  FEVER OR PAIN   


 


Acetylcysteine  1,200 mg 09/22/17 15:15 09/22/17 22:30





  Mucomyst 20 Oral / Inh Use Only*  PO 09/24/17 15:14  Not Given





  BID [facility]   


 


Albuterol Sulfate  1 amp 09/21/17 18:26  





  Ventolin 0.083% Nebulizer Soln -  NEB   





  Q6H PRN   





  SHORT OF BREATH/WHEEZING   


 


Atorvastatin Calcium  10 mg 09/21/17 22:00 09/22/17 21:38





  Lipitor -  PO   10 mg





  HS [facility]   Administration


 


Carvedilol  3.125 mg 09/21/17 22:00 09/22/17 21:38





  Coreg -  PO   3.125 mg





  BID [facility]   Administration


 


Chlorhexidine Gluconate  1 applic 09/21/17 22:00 09/22/17 21:34





  Hibiclens For Decolonization -  TP   1 applic





  HS [facility]   Administration


 


Furosemide  20 mg 09/23/17 07:57  





  Lasix Injection -  IVPB 09/23/17 12:00  





  ON CALL [facility]   


 


Pantoprazole Sodium 80 mg/  100 mls @ 10 mls/hr 09/22/17 10:00 09/22/17 21:34





  Sodium Chloride  IVPB   10 mls/hr





  Q10H [facility]   Administration





  8 MG/HR   


 


Mupirocin  1 applic 09/21/17 22:00 09/22/17 21:34





  Bactroban Ointment (For Decolonization) -  NS   1 applic





  BID [facility]   Administration








 CBC, BMP





 09/23/17 05:15 





 09/23/17 05:15 





Impression


1. hx CKD


2. BO improving


3. choledocholithiasis


4. CAD


5. hx of Renal Cell Cancer s/p nephroectomy


6. aortic stenosis which is associated with avm


7. GI bleed





Plan


- renal function is improving


- pt is high risk for LAQUITA. Pt does understand the risk


- would keep hydrated


- transfuse as necessary





MV

## 2017-09-23 NOTE — PN
Progress Note (short form)





- Note


Progress Note: 


Chief Complaint: Events noted, notes reviewed, denies any chest pain or dyspnea

, recurrent GI bleed, sinus rhythm is noted with ICVD  





History of Present Illness: 


Seen and examined in the ICU. Events noted, notes reviewed, denies any chest 

pain or dyspnea, recurrent GI bleed, sinus rhythm is noted with ICVD  





Most likely source of bleed is AVM which are associated with aortic valve 

disease/aortic stenosis





My personal recommendation is to manage the patient conservatively with prn 

PRBC and platelet transfusions with cautious utilization of IV diuretics 

considering his advanced cardiovascular disease including severe LV and RV 

dysfunction, CAD, TAVR and advanced age, maintain Hg equal or > 8.0





Echocardiography dated 5/17/2017 revealed Normal LV and RV size with severely 

decreased LV systolic function, mod-severe decrease RV function, moderate MR 

and mild TR








- Current Medication List





 Current Medications





Acetylcysteine (Mucomyst 20 Oral / Inh Use Only*)  1,200 mg PO BID [facility]


   Stop: 09/24/17 15:14


   Last Admin: 09/22/17 16:55 Dose:  1,200 mg


Albuterol Sulfate (Ventolin 0.083% Nebulizer Soln -)  1 amp NEB Q6H PRN


   PRN Reason: SHORT OF BREATH/WHEEZING


Atorvastatin Calcium (Lipitor -)  10 mg PO [facility]


   Last Admin: 09/22/17 21:38 Dose:  10 mg


Carvedilol (Coreg -)  3.125 mg PO BID [facility]


   Last Admin: 09/22/17 21:38 Dose:  3.125 mg


Chlorhexidine Gluconate (Hibiclens For Decolonization -)  1 applic TP HS [facility]


   Last Admin: 09/22/17 21:34 Dose:  1 applic


Pantoprazole Sodium 80 mg/ (Sodium Chloride)  100 mls @ 10 mls/hr IVPB Q10H [facility]


   PRN Reason: 8 MG/HR


   Last Admin: 09/22/17 21:34 Dose:  10 mls/hr


Mupirocin (Bactroban Ointment (For Decolonization) -)  1 applic NS BID [facility]


   Last Admin: 09/22/17 21:34 Dose:  1 applic








Review of Systems





- Review of Systems


Constitutional: no symptoms reported


Respiratory: denies: Cough or Sputum Production 


Cardiovascular: as noted above


Gastrointestinal: denies Nausea, Vomiting, Diarrhea, Constipation or Abdominal 

Pain, Persistent Bleed as noted above


Genitourinary: No symptoms reported


Musculoskeletal: Degenerative Joint Disease


Endocrine: No symptoms reported  





- Objective


Vital Signs: 





 Last Vital Signs











Temp Pulse Resp BP Pulse Ox


 


 98.5 F   71   14   101/49   97 


 


 09/23/17 06:00  09/23/17 06:00  09/23/17 06:00  09/23/17 06:00  09/22/17 19:58








 Intake & Output











 09/20/17 09/21/17 09/22/17 09/23/17





 23:59 23:59 23:59 23:59


 


Intake Total 2230 1050 1640 120


 


Output Total 2600 1450 3000 400


 


Balance -370 -400 -1360 -280


 


Weight  182 lb 8.684 oz 184 lb 3.2 oz 172 lb 6.424 oz











Constitutional: No Distress, Calm, Pale


Neck: Supple Negative JVD


Cardiovascular: S1 S2 Regular Rate and Rhythm, Grade 2/6 Systolic Ejection 

Murmur


Respiratory: Diminished at the Bases


Gastrointestinal: Soft, Benign Normal Bowel Sounds


Ext: No Edema





Labs: 





 CBC, BMP





 09/23/17 05:15 





 09/23/17 05:15 





 INR, PTT











INR  1.16  (0.82-1.09)  H  09/20/17  05:15    


 


Fibrinogen  170.0 mg/dL (238-498)  L  09/23/17  05:15    








 Hepatic Panel











Total Bilirubin  1.0 mg/dL (0.2-1.0)  D 09/23/17  05:15    


 


AST  18 U/L (15-37)   09/23/17  05:15    


 


ALT  10 U/L (12-78)  L  09/23/17  05:15    


 


Alkaline Phosphatase  53 U/L ()   09/23/17  05:15    


 


Albumin  2.2 g/dl (3.4-5.0)  L  09/23/17  05:15    











Assessment/Plan





ASSESSMENT:





1. Recurrent gastro-intestinal bleed, most likely related to AVM's 


2. Coronary artery disease, post PCI/stent, angina pectoris


3. LV systolic dysfunction with chronic class I NYHA classification LV failure, 

compensated/euvolemic


4. Aortic stenosis Post AVR-TAVR


5. Hypertension


6. Anemia and thrombocytopenia


7. CKD with history of renal cancer post right nephrectomy


8. History of ERCP, biliary stent implant/removal and stone retrieval for 

ascending cholangitis





PLAN:





1. Continue Protonix 


2. Continue Carvedilol hemodynamics permitting


3. Continue Lipitor 


4. As outlined above prn PRBC and platelet transfusions, maintain Hg equal or > 

8.0














Epi Baker M.D.

## 2017-09-24 LAB
ALBUMIN SERPL-MCNC: 2.6 G/DL (ref 3.4–5)
ALP SERPL-CCNC: 60 U/L (ref 45–117)
ALT SERPL-CCNC: 14 U/L (ref 12–78)
ANION GAP SERPL CALC-SCNC: 6 MMOL/L (ref 8–16)
APTT BLD: 27.6 SECONDS (ref 26.9–34.4)
AST SERPL-CCNC: 20 U/L (ref 15–37)
BASOPHILS # BLD: 0.3 % (ref 0–2)
BASOPHILS # BLD: 0.3 % (ref 0–2)
BILIRUB SERPL-MCNC: 0.8 MG/DL (ref 0.2–1)
CALCIUM SERPL-MCNC: 8.2 MG/DL (ref 8.5–10.1)
CO2 SERPL-SCNC: 31 MMOL/L (ref 21–32)
CREAT SERPL-MCNC: 1.5 MG/DL (ref 0.7–1.3)
DEPRECATED RDW RBC AUTO: 15.6 % (ref 11.9–15.9)
DEPRECATED RDW RBC AUTO: 15.7 % (ref 11.9–15.9)
DEPRECATED RDW RBC AUTO: 16.1 % (ref 11.9–15.9)
EOSINOPHIL # BLD: 13.3 % (ref 0–4.5)
EOSINOPHIL # BLD: 14.6 % (ref 0–4.5)
GLUCOSE SERPL-MCNC: 78 MG/DL (ref 74–106)
INR BLD: 1.12 (ref 0.82–1.09)
MAGNESIUM SERPL-MCNC: 2.1 MG/DL (ref 1.8–2.4)
MCH RBC QN AUTO: 30.4 PG (ref 25.7–33.7)
MCH RBC QN AUTO: 30.6 PG (ref 25.7–33.7)
MCH RBC QN AUTO: 31.2 PG (ref 25.7–33.7)
MCHC RBC AUTO-ENTMCNC: 34.1 G/DL (ref 32–35.9)
MCHC RBC AUTO-ENTMCNC: 34.3 G/DL (ref 32–35.9)
MCHC RBC AUTO-ENTMCNC: 35.3 G/DL (ref 32–35.9)
MCV RBC: 88.4 FL (ref 80–96)
MCV RBC: 89.1 FL (ref 80–96)
MCV RBC: 89.3 FL (ref 80–96)
NEUTROPHILS # BLD: 64.2 % (ref 42.8–82.8)
NEUTROPHILS # BLD: 68 % (ref 42.8–82.8)
PHOSPHATE SERPL-MCNC: 3.5 MG/DL (ref 2.5–4.9)
PLATELET # BLD AUTO: 103 K/MM3 (ref 134–434)
PLATELET # BLD AUTO: 107 K/MM3 (ref 134–434)
PLATELET # BLD AUTO: 97 K/MM3 (ref 134–434)
PMV BLD: 8.1 FL (ref 7.5–11.1)
PMV BLD: 8.3 FL (ref 7.5–11.1)
PMV BLD: 8.6 FL (ref 7.5–11.1)
PROT SERPL-MCNC: 4.8 G/DL (ref 6.4–8.2)
PT PNL PPP: 12.4 SEC (ref 9.98–11.88)
WBC # BLD AUTO: 4.9 K/MM3 (ref 4–10)
WBC # BLD AUTO: 5 K/MM3 (ref 4–10)
WBC # BLD AUTO: 5.1 K/MM3 (ref 4–10)

## 2017-09-24 RX ADMIN — PANTOPRAZOLE SODIUM SCH MLS/HR: 40 INJECTION, POWDER, FOR SOLUTION INTRAVENOUS at 20:10

## 2017-09-24 RX ADMIN — MUPIROCIN SCH APPLIC: 20 OINTMENT TOPICAL at 21:58

## 2017-09-24 RX ADMIN — DEXTROSE AND SODIUM CHLORIDE SCH MLS/HR: 5; 450 INJECTION, SOLUTION INTRAVENOUS at 20:09

## 2017-09-24 RX ADMIN — CARVEDILOL SCH: 3.12 TABLET, FILM COATED ORAL at 10:17

## 2017-09-24 RX ADMIN — ACETYLCYSTEINE SCH MG: 200 SOLUTION ORAL; RESPIRATORY (INHALATION) at 10:17

## 2017-09-24 RX ADMIN — ATORVASTATIN CALCIUM SCH MG: 10 TABLET, FILM COATED ORAL at 21:57

## 2017-09-24 RX ADMIN — OCTREOTIDE ACETATE SCH MLS/HR: 100 INJECTION, SOLUTION INTRAVENOUS; SUBCUTANEOUS at 20:10

## 2017-09-24 RX ADMIN — CHLORHEXIDINE GLUCONATE SCH APPLIC: 213 SOLUTION TOPICAL at 21:57

## 2017-09-24 RX ADMIN — CARVEDILOL SCH: 3.12 TABLET, FILM COATED ORAL at 21:57

## 2017-09-24 RX ADMIN — MUPIROCIN SCH APPLIC: 20 OINTMENT TOPICAL at 10:17

## 2017-09-24 RX ADMIN — PANTOPRAZOLE SODIUM SCH MLS/HR: 40 INJECTION, POWDER, FOR SOLUTION INTRAVENOUS at 21:59

## 2017-09-24 RX ADMIN — PANTOPRAZOLE SODIUM SCH MLS/HR: 40 INJECTION, POWDER, FOR SOLUTION INTRAVENOUS at 00:00

## 2017-09-24 NOTE — PN
Progress Note (short form)





- Note


Progress Note: 


RENAL


Pt seen and examined 


appeared comfortableanswering questions today


had CTA but no  obvious source of bleeding


bled again


received ddavp yesterday and platelets


 








 Last Vital Signs











Temp Pulse Resp BP Pulse Ox


 


 97.8 F   54 L  15   104/54   98 


 


 09/24/17 06:00  09/24/17 08:00  09/24/17 08:00  09/24/17 08:00  09/23/17 21:00











lungs clear


cvs s1s2 rr  ERNESTO


abd soft


ext some edema


neuro awake





 











 CBC, BMP





 09/24/17 05:15 





 09/24/17 05:15 





 Current Medications











Generic Name Dose Route Start Last Admin





  Trade Name Freq  PRN Reason Stop Dose Admin


 


Acetylcysteine  1,200 mg 09/22/17 15:15 09/23/17 22:58





  Mucomyst 20 Oral / Inh Use Only*  PO 09/24/17 15:14  1,200 mg





  BID [facility]   Administration


 


Albuterol Sulfate  1 amp 09/21/17 18:26  





  Ventolin 0.083% Nebulizer Soln -  NEB   





  Q6H PRN   





  SHORT OF BREATH/WHEEZING   


 


Atorvastatin Calcium  10 mg 09/21/17 22:00 09/23/17 23:00





  Lipitor -  PO   10 mg





  HS [facility]   Administration


 


Carvedilol  3.125 mg 09/21/17 22:00 09/23/17 23:00





  Coreg -  PO   3.125 mg





  BID [facility]   Administration


 


Chlorhexidine Gluconate  1 applic 09/21/17 22:00 09/23/17 22:58





  Hibiclens For Decolonization -  TP   1 applic





  HS [facility]   Administration


 


Pantoprazole Sodium 80 mg/  100 mls @ 10 mls/hr 09/22/17 10:00 09/23/17 17:40





  Sodium Chloride  IVPB   Not Given





  Q10H [facility]   





  8 MG/HR   


 


Octreotide Acetate 1,200 mcg/  500 mls @ 20.83 mls/hr 09/23/17 15:15 09/23/17 17

:41





  Dextrose  IVPB   20.83 mls/hr





  ASDIR [facility]   Administration





  50 MCG/HR   


 


Mupirocin  1 applic 09/21/17 22:00 09/23/17 22:58





  Bactroban Ointment (For Decolonization) -  NS   1 applic





  BID [facility]   Administration














Impression


1. hx CKD


2. BO improving


3. choledocholithiasis


4. CAD


5. hx of Renal Cell Cancer s/p nephroectomy


6. aortic stenosis which is associated with avm


7. GI bleed





Plan


-has had a slight rise in creatinine which may not be due to contrast yet, 

perhaps ischemic


-will get another dose of mucomyst now


-avoid hypotensive episodes


-octreotide per GI


- received ddavp





MV

## 2017-09-24 NOTE — PN
Progress Note (short form)





- Note


Progress Note: 


PULMONARY/CCM





Pt seen and examined in the ICU. Continues to bleed with decreased H/H this AM. 

CTA A/P did not reveal source of bleeding. No shortness of breath, chest pain, 

lightheadedness, dizziness.





 Last Vital Signs











Temp Pulse Resp BP Pulse Ox


 


 97.8 F   54 L  15   104/54   98 


 


 09/24/17 06:00  09/24/17 08:00  09/24/17 08:00  09/24/17 08:00  09/23/17 21:00








 Intake & Output











 09/21/17 09/22/17 09/23/17 09/24/17





 23:59 23:59 23:59 23:59


 


Intake Total 1050 1640 2425.2 246.4


 


Output Total 1450 3000 400 


 


Balance -400 -1360 2025.2 246.4


 


Weight 182 lb 8.684 oz 184 lb 3.2 oz 172 lb 6.424 oz 168 lb 3.2 oz











Gen:  NAD at rest


Heart:  RRR


Lung: decreased breath sounds at the bases


Abd: soft, nontender


Ext: no edema


 CBC, BMP





 09/24/17 05:15 





 09/24/17 05:15 





 INR, PTT











INR  1.12  (0.82-1.09)   09/24/17  05:15    


 


Fibrinogen  170.0 mg/dL (238-498)  L  09/23/17  05:15    











Active Medications





Acetylcysteine (Mucomyst 20 Oral / Inh Use Only*)  1,200 mg PO BID [facility]


   Stop: 09/24/17 15:14


   Last Admin: 09/23/17 22:58 Dose:  1,200 mg


Albuterol Sulfate (Ventolin 0.083% Nebulizer Soln -)  1 amp [facility]B Q6H PRN


   PRN Reason: SHORT OF BREATH/WHEEZING


Atorvastatin Calcium (Lipitor -)  10 mg PO HS [facility]


   Last Admin: 09/23/17 23:00 Dose:  10 mg


Carvedilol (Coreg -)  3.125 mg PO BID [facility]


   Last Admin: 09/23/17 23:00 Dose:  3.125 mg


Chlorhexidine Gluconate (Hibiclens For Decolonization -)  1 applic TP HS [facility]


   Last Admin: 09/23/17 22:58 Dose:  1 applic


Pantoprazole Sodium 80 mg/ (Sodium Chloride)  100 mls @ 10 mls/hr IVPB Q10H [facility]


   PRN Reason: 8 MG/HR


   Last Admin: 09/23/17 17:40 Dose:  Not Given


Octreotide Acetate 1,200 mcg/ (Dextrose)  500 mls @ 20.83 mls/hr IVPB [facility]


   PRN Reason: 50 MCG/HR


   Last Admin: 09/23/17 17:41 Dose:  20.83 mls/hr


Mupirocin (Bactroban Ointment (For Decolonization) -)  1 applic NS BID [facility]


   Last Admin: 09/23/17 22:58 Dose:  1 applic








A/P


GI Bleed 


Gastric Ulcers


Acute Blood Loss Anemia


Thrombocytopenia


CAD s/p stent


Aortic Stenosis s/p TAVR


LV Systolic Dysfunction


h/o Cholangitis


Acute on CKD


h/o R Nephrectomy





-  monitor H/H, platelets


-  transfuse as needed


-  protonix, octreotide gtts


-  ensure large bore peripheral access


-  NPO


-  hold all anticoagulation, antiplatelets


-  DVT prophylaxis


-  continue ICU monitoring











critical care time spent in reviewing chart, evaluating patient and formulating 

plan 35 min

## 2017-09-24 NOTE — PN
Physical Exam: 


SUBJECTIVE: Patient seen and examined


Reports fatigue 


NO pain 


Had an episode of bloody bowel movement last night 


No vomiting 





OBJECTIVE:





 Vital Signs











 Period  Temp  Pulse  Resp  BP Sys/Badillo  Pulse Ox


 


 Last 24 Hr  97.8 F-98.8 F  53-70  13-16  /48-58  








Gen:  NAD at rest


Heart:  RRR


Lung: decreased breath sounds at the bases


Abd: soft, nontender


Ext: no edema











 Laboratory Results - last 24 hr











  09/22/17 09/23/17 09/23/17





  18:30 05:15 15:35


 


WBC    6.0


 


RBC    2.64 L


 


Hgb    8.2 L


 


Hct    23.3 L


 


MCV    88.3


 


MCH    31.2


 


MCHC    35.3


 


RDW    16.3 H


 


Plt Count    71 L


 


MPV    8.9


 


Neutrophils %    71.0


 


Lymphocytes %    12.1


 


Monocytes %    5.9


 


Eosinophils %    10.6 H


 


Basophils %    0.4


 


Haptoglobin  49  


 


PT with INR   


 


INR   


 


PTT (Actin FS)   


 


Sodium   


 


Potassium   


 


Chloride   


 


Carbon Dioxide   


 


Anion Gap   


 


BUN   


 


Creatinine   


 


Creat Clearance w eGFR   


 


Random Glucose   


 


Calcium   


 


Phosphorus   


 


Magnesium   


 


Total Bilirubin   


 


AST   


 


ALT   


 


Alkaline Phosphatase   


 


Total Protein   


 


Albumin   


 


Blood Type   A POSITIVE 


 


Antibody Screen   Negative 


 


Crossmatch   See Detail 














  09/23/17 09/23/17 09/23/17





  15:35 15:35 21:45


 


WBC    7.1


 


RBC    2.78 L


 


Hgb    8.6 L


 


Hct    24.7 L


 


MCV    88.6


 


MCH    30.9


 


MCHC    34.9


 


RDW    15.8


 


Plt Count    136  D


 


MPV    8.0  D


 


Neutrophils %   


 


Lymphocytes %   


 


Monocytes %   


 


Eosinophils %   


 


Basophils %   


 


Haptoglobin   


 


PT with INR  12.60 H  


 


INR  1.14  


 


PTT (Actin FS)   


 


Sodium   142 


 


Potassium   3.9 


 


Chloride   108 H 


 


Carbon Dioxide   27 


 


Anion Gap   7 L 


 


BUN   65 H 


 


Creatinine   1.4 H 


 


Creat Clearance w eGFR   


 


Random Glucose   97 


 


Calcium   8.2 L 


 


Phosphorus   


 


Magnesium   


 


Total Bilirubin   


 


AST   


 


ALT   


 


Alkaline Phosphatase   


 


Total Protein   


 


Albumin   


 


Blood Type   


 


Antibody Screen   


 


Crossmatch   














  09/24/17 09/24/17 09/24/17





  05:15 05:15 05:15


 


WBC  5.1  


 


RBC  2.49 L  


 


Hgb  7.8 L  


 


Hct  22.0 L  


 


MCV  88.4  


 


MCH  31.2  


 


MCHC  35.3  


 


RDW  16.1 H  


 


Plt Count  107 L D  


 


MPV  8.3  


 


Neutrophils %  64.2  


 


Lymphocytes %  15.8  D  


 


Monocytes %  6.4  


 


Eosinophils %  13.3 H  


 


Basophils %  0.3  


 


Haptoglobin   


 


PT with INR   12.40 H 


 


INR   1.12 


 


PTT (Actin FS)   27.6 


 


Sodium    142


 


Potassium    3.8


 


Chloride    105


 


Carbon Dioxide    31


 


Anion Gap    6 L


 


BUN    59 H


 


Creatinine    1.5 H


 


Creat Clearance w eGFR    44.17


 


Random Glucose    78


 


Calcium    8.2 L


 


Phosphorus    3.5


 


Magnesium    2.1


 


Total Bilirubin    0.8


 


AST    20


 


ALT    14  D


 


Alkaline Phosphatase    60


 


Total Protein    4.8 L


 


Albumin    2.6 L


 


Blood Type   


 


Antibody Screen   


 


Crossmatch   








Active Medications











Generic Name Dose Route Start Last Admin





  Trade Name Freq  PRN Reason Stop Dose Admin


 


Acetylcysteine  1,200 mg 09/22/17 15:15 09/24/17 10:17





  Mucomyst 20 Oral / Inh Use Only*  PO 09/24/17 15:14  1,200 mg





  BID ALCIDES   Administration


 


Albuterol Sulfate  1 amp 09/21/17 18:26  





  Ventolin 0.083% Nebulizer Soln -  NEB   





  Q6H PRN   





  SHORT OF BREATH/WHEEZING   


 


Atorvastatin Calcium  10 mg 09/21/17 22:00 09/23/17 23:00





  Lipitor -  PO   10 mg





  HS ALCIDES   Administration


 


Carvedilol  3.125 mg 09/21/17 22:00 09/24/17 10:17





  Coreg -  PO   Not Given





  BID ALCIDES   


 


Chlorhexidine Gluconate  1 applic 09/21/17 22:00 09/23/17 22:58





  Hibiclens For Decolonization -  TP   1 applic





  HS ALCIDES   Administration


 


Pantoprazole Sodium 80 mg/  100 mls @ 10 mls/hr 09/22/17 10:00 09/23/17 17:40





  Sodium Chloride  IVPB   Not Given





  Q10H ALCIDES   





  8 MG/HR   


 


Octreotide Acetate 1,200 mcg/  500 mls @ 20.83 mls/hr 09/23/17 15:15 09/23/17 17

:41





  Dextrose  IVPB   20.83 mls/hr





  ASDIR ALCIDES   Administration





  50 MCG/HR   


 


Mupirocin  1 applic 09/21/17 22:00 09/24/17 10:17





  Bactroban Ointment (For Decolonization) -  NS   1 applic





  BID ALCIDES   Administration











ASSESSMENT/PLAN:


1. Acute blood loss anemia secondary to GI bleed - likely AVM. Despite  9 units 

PRBC, DDAVP and PLatelet transfusion he continues dropping HB level . CTA is 

non contributory.  Considering cardiac history and high risk of mortality 

patient is a poor candidate for surgical intervention.  





- Nuclear Bleeding Scan 


- transfused today 


- octreotide drip 


- monitor CBC 








2. Bradycardia - asympthomatic 


- Echo 2D


- EKG 





3. ARF -improved 


-monitor closely 

















Visit type





- Emergency Visit


Emergency Visit: Yes


ED Registration Date: 09/19/17


Care time: The patient presented to the Emergency Department on the above date 

and was hospitalized for further evaluation of their emergent condition.





- New Patient


This patient is new to me today: Yes


Date on this admission: 09/24/17





- Critical Care


Critical Care patient: No





- Discharge Referral


Referred to [facility] No

## 2017-09-24 NOTE — PN
GI Progress Note


Subjective: 


GI NOte: Had not had a bloody BM since overnight. Looks stronger. Denies pain. 

Renal function fortunately does not appear to have deteriorated following the 

CTA.  





- Objective


Vital Signs: 


 Vital Signs











Temperature  97.6 F   09/24/17 13:06


 


Pulse Rate  55 L  09/24/17 15:21


 


Respiratory Rate  15   09/24/17 15:21


 


Blood Pressure  114/54   09/24/17 15:21


 


O2 Sat by Pulse Oximetry (%)  100   09/24/17 11:11








 Laboratory Tests











  09/22/17 09/22/17 09/22/17





  03:00 05:00 18:30


 


Hgb   7.3 L  8.5 L D


 


Plt Count  63 L D  


 


BUN   


 


Creatinine   














  09/22/17 09/23/17 09/23/17





  21:00 05:15 05:15


 


Hgb  8.5 L  7.6 L D 


 


Plt Count   63 L 


 


BUN    71 H D


 


Creatinine    1.3














  09/24/17 09/24/17





  05:15 15:58


 


Hgb   8.6 L D


 


Plt Count   103 L


 


BUN  59 H 


 


Creatinine  1.5 H 











Constitutional: No Distress


Cardiovascular: Yes: Regular Rate and Rhythm


Respiratory: Yes: CTA Bilaterally


...Auscultate: Yes: Normoactive Bowel Sounds


...Palpate: Yes: Soft, Other (nontender)


Labs: 


 CBC, BMP





 09/24/17 15:58 





 09/24/17 05:15 





 INR, PTT











INR  1.12  (0.82-1.09)   09/24/17  05:15    


 


Fibrinogen  170.0 mg/dL (238-498)  L  09/23/17  05:15    














Assessment/Plan


GI bleeding appears to at least have slowed down. Will continue octreotide and 

pantoprazole drips. Platelet count and INR adequate. Surgery remains on standby 

so is NPO.

## 2017-09-24 NOTE — PN
Progress Note (short form)





- Note


Progress Note: 


Chief Complaint: Events noted, notes reviewed, denies any chest pain or dyspnea

, recurrent GI bleed yesterday and last night, sinus rhythm is noted with ICVD  





History of Present Illness: 


Seen and examined in the ICU. Events noted, notes reviewed, denies any chest 

pain or dyspnea, recurrent GI bleed yesterday and last night, sinus rhythm is 

noted with IVCD  





As outlined most likely source of bleed is AVM which are associated with aortic 

valve disease/aortic stenosis





CTA no bleeding source identified, for nuclear bleeding scan





Echocardiography dated 5/17/2017 revealed Normal LV and RV size with severely 

decreased LV systolic function, moderate-severe decrease RV function, moderate 

MR and mild TR








- Current Medication List





 Current Medications





Acetylcysteine (Mucomyst 20 Oral / Inh Use Only*)  1,200 mg PO BID [facility]


   Stop: 09/24/17 15:14


   Last Admin: 09/23/17 22:58 Dose:  1,200 mg


Albuterol Sulfate (Ventolin 0.083% Nebulizer Soln -)  1 amp NEB Q6H PRN


   PRN Reason: SHORT OF BREATH/WHEEZING


Atorvastatin Calcium (Lipitor -)  10 mg PO HS [facility]


   Last Admin: 09/23/17 23:00 Dose:  10 mg


Carvedilol (Coreg -)  3.125 mg PO BID [facility]


   Last Admin: 09/23/17 23:00 Dose:  3.125 mg


Chlorhexidine Gluconate (Hibiclens For Decolonization -)  1 applic TP HS [facility]


   Last Admin: 09/23/17 22:58 Dose:  1 applic


Pantoprazole Sodium 80 mg/ (Sodium Chloride)  100 mls @ 10 mls/hr IVPB Q10H ALCIDES


   PRN Reason: 8 MG/HR


   Last Admin: 09/23/17 17:40 Dose:  Not Given


Octreotide Acetate 1,200 mcg/ (Dextrose)  500 mls @ 20.83 mls/hr IVPB ASDIR ALCIDES


   PRN Reason: 50 MCG/HR


   Last Admin: 09/23/17 17:41 Dose:  20.83 mls/hr


Mupirocin (Bactroban Ointment (For Decolonization) -)  1 applic NS BID [facility]


   Last Admin: 09/23/17 22:58 Dose:  1 applic





Review of Systems





- Review of Systems


Constitutional: no symptoms reported


Respiratory: denies: Cough or Sputum Production 


Cardiovascular: as noted above


Gastrointestinal: denies Nausea, Vomiting, Diarrhea, Constipation or Abdominal 

Pain, Persistent Bleed as noted above


Genitourinary: No symptoms reported


Musculoskeletal: Degenerative Joint Disease


Endocrine: No symptoms reported  





- Objective


Vital Signs: 





 Last Vital Signs











Temp Pulse Resp BP Pulse Ox


 


 97.8 F   53 L  15   101/52   98 


 


 09/24/17 06:00  09/24/17 06:00  09/24/17 06:00  09/24/17 06:00  09/23/17 21:00








 Intake & Output











 09/21/17 09/22/17 09/23/17 09/24/17





 23:59 23:59 23:59 23:59


 


Intake Total 1050 1640 2425.2 246.4


 


Output Total 1450 3000 400 


 


Balance -400 -1360 2025.2 246.4


 


Weight 182 lb 8.684 oz 184 lb 3.2 oz 172 lb 6.424 oz 168 lb 3.2 oz











Constitutional: No Distress, Calm, Pale


Neck: Supple Negative JVD


Cardiovascular: S1 S2 Regular Rate and Rhythm, Grade 2/6 Systolic Ejection 

Murmur


Respiratory: Diminished at the Bases


Gastrointestinal: Soft, Benign Normal Bowel Sounds


Ext: No Edema





Labs: 





 CBC, BMP





 09/24/17 05:15 





 09/24/17 05:15 





 INR, PTT











INR  1.12  (0.82-1.09)   09/24/17  05:15    


 


Fibrinogen  170.0 mg/dL (238-498)  L  09/23/17  05:15    








 Hepatic Panel











Total Bilirubin  0.8 mg/dL (0.2-1.0)   09/24/17  05:15    


 


AST  20 U/L (15-37)   09/24/17  05:15    


 


ALT  14 U/L (12-78)  D 09/24/17  05:15    


 


Alkaline Phosphatase  60 U/L ()   09/24/17  05:15    


 


Albumin  2.6 g/dl (3.4-5.0)  L  09/24/17  05:15    











Assessment/Plan





ASSESSMENT:





1. Recurrent gastro-intestinal bleed, most likely related to AVM's, persistent 


2. Coronary artery disease, post PCI/stent, angina pectoris


3. LV systolic dysfunction with chronic class I NYHA classification LV failure, 

compensated/euvolemic


4. Aortic stenosis Post AVR-TAVR


5. Hypertension


6. Anemia and thrombocytopenia


7. CKD with history of renal cancer post right nephrectomy


8. History of ERCP, biliary stent implant/removal and stone retrieval for 

ascending cholangitis





PLAN:





1. Continue Protonix 


2. Continue Carvedilol hemodynamics permitting


3. Continue Lipitor 


4. Prn PRBC and platelet transfusions, maintain Hg equal or > 8.0 and platelet 

equal or > 80














Epi Baker M.D.

## 2017-09-24 NOTE — PN
Progress Note (short form)





- Note


Progress Note: 


Discussed condition of patient with RN


CTA- no active bleeding


No further bloody BMs after CTA


Octreotide started


Given more platelets an FFP





 CBC, BMP





 09/24/17 15:58 





 09/24/17 05:15 





Continue to follow


May require further platelets or FFP


Serial H/H

## 2017-09-25 LAB
ALBUMIN SERPL-MCNC: 2.5 G/DL (ref 3.4–5)
ALP SERPL-CCNC: 64 U/L (ref 45–117)
ALT SERPL-CCNC: 13 U/L (ref 12–78)
ANION GAP SERPL CALC-SCNC: 8 MMOL/L (ref 8–16)
APTT BLD: 26.7 SECONDS (ref 26.9–34.4)
AST SERPL-CCNC: 20 U/L (ref 15–37)
BASOPHILS # BLD: 0.5 % (ref 0–2)
BASOPHILS # BLD: 0.6 % (ref 0–2)
BILIRUB SERPL-MCNC: 0.9 MG/DL (ref 0.2–1)
CALCIUM SERPL-MCNC: 7.8 MG/DL (ref 8.5–10.1)
CO2 SERPL-SCNC: 29 MMOL/L (ref 21–32)
CREAT SERPL-MCNC: 1.5 MG/DL (ref 0.7–1.3)
DEPRECATED RDW RBC AUTO: 15.9 % (ref 11.9–15.9)
DEPRECATED RDW RBC AUTO: 16 % (ref 11.9–15.9)
EOSINOPHIL # BLD: 15.5 % (ref 0–4.5)
EOSINOPHIL # BLD: 16.5 % (ref 0–4.5)
GLUCOSE SERPL-MCNC: 123 MG/DL (ref 74–106)
INR BLD: 1.14 (ref 0.82–1.09)
MAGNESIUM SERPL-MCNC: 2 MG/DL (ref 1.8–2.4)
MCH RBC QN AUTO: 30.6 PG (ref 25.7–33.7)
MCH RBC QN AUTO: 30.7 PG (ref 25.7–33.7)
MCHC RBC AUTO-ENTMCNC: 34 G/DL (ref 32–35.9)
MCHC RBC AUTO-ENTMCNC: 34.5 G/DL (ref 32–35.9)
MCV RBC: 88.6 FL (ref 80–96)
MCV RBC: 90.3 FL (ref 80–96)
NEUTROPHILS # BLD: 64.3 % (ref 42.8–82.8)
NEUTROPHILS # BLD: 65.8 % (ref 42.8–82.8)
PHOSPHATE SERPL-MCNC: 3.2 MG/DL (ref 2.5–4.9)
PLATELET # BLD AUTO: 106 K/MM3 (ref 134–434)
PLATELET # BLD AUTO: 99 K/MM3 (ref 134–434)
PMV BLD: 7.8 FL (ref 7.5–11.1)
PMV BLD: 8.6 FL (ref 7.5–11.1)
PROT SERPL-MCNC: 4.7 G/DL (ref 6.4–8.2)
PT PNL PPP: 12.6 SEC (ref 9.98–11.88)
WBC # BLD AUTO: 5.6 K/MM3 (ref 4–10)
WBC # BLD AUTO: 6.2 K/MM3 (ref 4–10)

## 2017-09-25 RX ADMIN — ACETAMINOPHEN PRN MG: 325 TABLET ORAL at 23:46

## 2017-09-25 RX ADMIN — CARVEDILOL SCH MG: 3.12 TABLET, FILM COATED ORAL at 10:01

## 2017-09-25 RX ADMIN — DEXTROSE AND SODIUM CHLORIDE SCH MLS/HR: 5; 450 INJECTION, SOLUTION INTRAVENOUS at 17:18

## 2017-09-25 RX ADMIN — PANTOPRAZOLE SODIUM SCH MLS/HR: 40 INJECTION, POWDER, FOR SOLUTION INTRAVENOUS at 07:54

## 2017-09-25 RX ADMIN — CHLORHEXIDINE GLUCONATE SCH APPLIC: 213 SOLUTION TOPICAL at 21:27

## 2017-09-25 RX ADMIN — MUPIROCIN SCH APPLIC: 20 OINTMENT TOPICAL at 21:27

## 2017-09-25 RX ADMIN — PANTOPRAZOLE SODIUM SCH MG: 40 TABLET, DELAYED RELEASE ORAL at 21:27

## 2017-09-25 RX ADMIN — ATORVASTATIN CALCIUM SCH MG: 10 TABLET, FILM COATED ORAL at 21:27

## 2017-09-25 RX ADMIN — MUPIROCIN SCH APPLIC: 20 OINTMENT TOPICAL at 09:51

## 2017-09-25 RX ADMIN — CARVEDILOL SCH MG: 3.12 TABLET, FILM COATED ORAL at 21:27

## 2017-09-25 NOTE — PN
Progress Note (short form)





- Note


Progress Note: 


No complaints 


On clears


States he feels better


No further bloody BM per RN


+ Flatus


 Vital Signs











 Period  Temp  Pulse  Resp  BP Sys/Badillo  Pulse Ox


 


 Last 24 Hr  97.6 F-98.6 F  48-59  13-16  100-120/43-75  








Abd soft, NT





 CBC, BMP





 09/25/17 05:00 





 09/25/17 05:00 





Last three hemoglobin counts have been stable at 8.6





Continue serial H/H





Hold anticoagulation

## 2017-09-25 NOTE — PN
Progress Note (short form)





- Note


Progress Note: 


Chief Complaint: Events noted, notes reviewed, denies any chest pain or dyspnea

, no recurrent GI bleed since yesterday, sinus rhythm with IVCD is noted with 

intermittent bradycardia  





History of Present Illness: 


Seen and examined in the ICU. Events noted, notes reviewed, denies any chest 

pain or dyspnea, no recurrent GI bleed since yesterday, sinus rhythm with IVCD 

is noted with intermittent bradycardia





Complaining of left hip area discomfort 





As outlined in prior notes most likely source of bleed is AVM which are 

associated with aortic valve disease/aortic stenosis





Echocardiography dated 5/17/2017 revealed Normal LV and RV size with severely 

decreased LV systolic function, moderate-severe decrease RV function, moderate 

MR and mild TR








- Current Medication List





 Current Medications





Albuterol Sulfate (Ventolin 0.083% Nebulizer Soln -)  1 amp NEB Q6H PRN


   PRN Reason: SHORT OF BREATH/WHEEZING


Atorvastatin Calcium (Lipitor -)  10 mg PO HS [facility]


   Last Admin: 09/24/17 21:57 Dose:  10 mg


Carvedilol (Coreg -)  3.125 mg PO BID [facility]


   Last Admin: 09/24/17 21:57 Dose:  Not Given


Chlorhexidine Gluconate (Hibiclens For Decolonization -)  1 applic TP HS [facility]


   Last Admin: 09/24/17 21:57 Dose:  1 applic


Pantoprazole Sodium 80 mg/ (Sodium Chloride)  100 mls @ 10 mls/hr IVPB Q10H ALCIDES


   PRN Reason: 8 MG/HR


   Last Admin: 09/24/17 21:59 Dose:  10 mls/hr


Octreotide Acetate 1,200 mcg/ (Dextrose)  500 mls @ 20.83 mls/hr IVPB ASDIR ALCIDES


   PRN Reason: 50 MCG/HR


   Last Admin: 09/24/17 20:10 Dose:  20.83 mls/hr


Dextrose/Sodium Chloride (D5-1/2ns -)  1,000 mls @ 75 mls/hr IV ASDIR [facility]


   Last Admin: 09/24/17 20:09 Dose:  75 mls/hr


Mupirocin (Bactroban Ointment (For Decolonization) -)  1 applic NS BID [facility]


   Last Admin: 09/24/17 21:58 Dose:  1 applic








Review of Systems





- Review of Systems


Constitutional: no symptoms reported


Respiratory: denies: Cough or Sputum Production 


Cardiovascular: as noted above


Gastrointestinal: denies Nausea, Vomiting, Diarrhea, Constipation or Abdominal 

Pain, Persistent Bleed as noted above


Genitourinary: No symptoms reported


Musculoskeletal: Degenerative Joint Disease, as noted above


Endocrine: No symptoms reported  





- Objective


Vital Signs: 





 Last Vital Signs











Temp Pulse Resp BP Pulse Ox


 


 98.6 F   54 L  13   114/51   97 


 


 09/25/17 02:00  09/25/17 02:00  09/25/17 02:00  09/25/17 02:00  09/24/17 21:00








 Intake & Output











 09/22/17 09/23/17 09/24/17 09/25/17





 23:59 23:59 23:59 23:59


 


Intake Total 1640 2425.2 1314.2 


 


Output Total 3000 400  


 


Balance -1360 2025.2 1314.2 


 


Weight 184 lb 3.2 oz 172 lb 6.424 oz 168 lb 3.2 oz 











Constitutional: No Distress, Calm, Pale


Neck: Supple Negative JVD


Cardiovascular: S1 S2 Regular Rate and Rhythm, Grade 2/6 Systolic Ejection 

Murmur


Respiratory: Diminished at the Bases


Gastrointestinal: Soft, Benign Normal Bowel Sounds


Ext: No Edema





Labs: 





 CBC, BMP





 09/25/17 05:00 





BMP from this AM pending





Assessment/Plan





ASSESSMENT:





1. Recurrent gastro-intestinal bleed, most likely related to AVM's, resolving 


2. Coronary artery disease, post PCI/stent, angina pectoris


3. LV systolic dysfunction with chronic class I NYHA classification LV failure, 

compensated/euvolemic


4. Aortic stenosis Post AVR-TAVR


5. Hypertension


6. Anemia and thrombocytopenia


7. CKD with history of renal cancer post right nephrectomy


8. History of ERCP, biliary stent implant/removal and stone retrieval for 

ascending cholangitis





PLAN:





1. Continue Protonix 


2. Continue Carvedilol hemodynamics permitting


3. Continue Lipitor 


4. Prn PRBC and platelet transfusions, maintain Hg equal or > 8.0 and platelet 

equal or > 80


5. Follow BMP from this AM














Epi Baker M.D.

## 2017-09-25 NOTE — PN
Physical Exam: 


SUBJECTIVE: 


89 yo M with h/o CAD, aortic stenosis, and ascending cholangitis with recent 

admission from bacteremia and GI bleed following ERCP discharged on 09/08 and 

readmitted with syncopal event found to have hemoglobin of 6.3, BUN 45, and + 

FOBT. 


Currently with no complaints. No acute events overnight. Denies visualizing 

blood in stool and denies abdominal or chest pain. Nurses report 1 black stool 

overnight. Complains of left hip pain. 





OBJECTIVE:





 Vital Signs











 Period  Temp  Pulse  Resp  BP Sys/Badillo  Pulse Ox


 


 Last 24 Hr  97.8 F-98.6 F  48-59  13-18  100-120/43-75  











GENERAL: The patient is awake, alert, and fully oriented, in no acute distress.


HEAD: Normal with no signs of trauma.


EYES: PERRL, extraocular movements intact, sclera anicteric, conjunctiva clear. 

No ptosis. 


ENT: Ears normal, nares patent, oropharynx clear without exudates, moist mucous 


membranes.


NECK: Trachea midline, full range of motion, supple. 


LUNGS: Breath sounds equal, clear to auscultation bilaterally, no wheezes, no 

crackles, no 


accessory muscle use. 


HEART: Regular rate and rhythm, S1, S2 without murmur, rub or gallop.


ABDOMEN: Soft, nontender, nondistended, normoactive bowel sounds, no guarding, 

no 


rebound, no hepatosplenomegaly, no masses.


EXTREMITIES: 2+ pulses, warm, well-perfused, no edema. 


NEUROLOGICAL: Cranial nerves II through XII grossly intact. Normal speech, gait 

not 


observed.


PSYCH: Normal mood, normal affect.


SKIN: Warm, dry, normal turgor, no rashes or lesions noted














 Laboratory Results - last 24 hr











  09/22/17 09/24/17 09/24/17





  18:30 15:58 21:00


 


WBC   4.9  5.0


 


RBC   2.80 L  2.82 L


 


Hgb   8.6 L D  8.6 L


 


Hct   25.0 L  25.1 L


 


MCV   89.3  89.1


 


MCH   30.6  30.4


 


MCHC   34.3  34.1


 


RDW   15.7  15.6


 


Plt Count   103 L  97 L


 


MPV   8.6  8.1


 


Neutrophils %   68.0 


 


Lymphocytes %   12.1  D 


 


Monocytes %   5.0 


 


Eosinophils %   14.6 H 


 


Basophils %   0.3 


 


PT with INR   


 


INR   


 


PTT (Actin FS)   


 


Sodium   


 


Potassium   


 


Chloride   


 


Carbon Dioxide   


 


Anion Gap   


 


BUN   


 


Creatinine   


 


Creat Clearance w eGFR   


 


Random Glucose   


 


Calcium   


 


Phosphorus   


 


Magnesium   


 


Total Bilirubin   


 


AST   


 


ALT   


 


Alkaline Phosphatase   


 


Total Protein   


 


Albumin   


 


Hep-Induced Plt Ab Rapid  0.379  














  09/25/17 09/25/17 09/25/17





  05:00 05:00 05:00


 


WBC  5.6  


 


RBC  2.80 L  


 


Hgb  8.6 L  


 


Hct  24.8 L  


 


MCV  88.6  


 


MCH  30.6  


 


MCHC  34.5  


 


RDW  15.9  


 


Plt Count  99 L  


 


MPV  8.6  


 


Neutrophils %  64.3  


 


Lymphocytes %  13.7  


 


Monocytes %  6.0  


 


Eosinophils %  15.5 H  


 


Basophils %  0.5  


 


PT with INR   12.60 H 


 


INR   1.14 


 


PTT (Actin FS)   26.7 L 


 


Sodium    142


 


Potassium    3.8


 


Chloride    105


 


Carbon Dioxide    29


 


Anion Gap    8


 


BUN    47 H D


 


Creatinine    1.5 H


 


Creat Clearance w eGFR    44.17


 


Random Glucose    123 H D


 


Calcium    7.8 L


 


Phosphorus    3.2


 


Magnesium    2.0


 


Total Bilirubin    0.9


 


AST    20


 


ALT    13


 


Alkaline Phosphatase    64


 


Total Protein    4.7 L


 


Albumin    2.5 L


 


Hep-Induced Plt Ab Rapid   














  09/25/17





  13:35


 


WBC  6.2


 


RBC  3.03 L


 


Hgb  9.3 L


 


Hct  27.4 L


 


MCV  90.3


 


MCH  30.7


 


MCHC  34.0


 


RDW  16.0 H


 


Plt Count  106 L


 


MPV  7.8


 


Neutrophils %  65.8


 


Lymphocytes %  12.2


 


Monocytes %  4.9


 


Eosinophils %  16.5 H


 


Basophils %  0.6


 


PT with INR 


 


INR 


 


PTT (Actin FS) 


 


Sodium 


 


Potassium 


 


Chloride 


 


Carbon Dioxide 


 


Anion Gap 


 


BUN 


 


Creatinine 


 


Creat Clearance w eGFR 


 


Random Glucose 


 


Calcium 


 


Phosphorus 


 


Magnesium 


 


Total Bilirubin 


 


AST 


 


ALT 


 


Alkaline Phosphatase 


 


Total Protein 


 


Albumin 


 


Hep-Induced Plt Ab Rapid 








Active Medications











Generic Name Dose Route Start Last Admin





  Trade Name Freq  PRN Reason Stop Dose Admin


 


Albuterol Sulfate  1 amp 09/21/17 18:26  





  Ventolin 0.083% Nebulizer Soln -  NEB   





  Q6H PRN   





  SHORT OF BREATH/WHEEZING   


 


Atorvastatin Calcium  10 mg 09/21/17 22:00 09/24/17 21:57





  Lipitor -  PO   10 mg





  HS ALCIDES   Administration


 


Carvedilol  3.125 mg 09/21/17 22:00 09/25/17 10:01





  Coreg -  PO   3.125 mg





  BID ALCIDES   Administration


 


Chlorhexidine Gluconate  1 applic 09/21/17 22:00 09/24/17 21:57





  Hibiclens For Decolonization -  TP   1 applic





  HS ALCIDES   Administration


 


Pantoprazole Sodium 80 mg/  100 mls @ 10 mls/hr 09/22/17 10:00 09/25/17 07:54





  Sodium Chloride  IVPB   10 mls/hr





  Q10H ALCIDES   Administration





  8 MG/HR   


 


Dextrose/Sodium Chloride  1,000 mls @ 75 mls/hr 09/24/17 17:00 09/24/17 20:09





  D5-1/2ns -  IV   75 mls/hr





  ASDIR ALCIDES   Administration


 


Mupirocin  1 applic 09/21/17 22:00 09/25/17 09:51





  Bactroban Ointment (For Decolonization) -  NS   1 applic





  BID ALCIDES   Administration











ASSESSMENT/PLAN:


89 yo M with h/o CAD, aortic stenosis, and ascending cholangitis with recent 

admission from bacteremia and GI bleed following ERCP discharged on 09/08 and 

readmitted with syncopal event found to have hemoglobin of 6.3, BUN 45, and + 

FOBT. 





GI: GI bleed most likely 2/2 AVM in setting of aortic stenosis. 


s/p 10 units PRBC 


EGD (09/19) 2 small non bleeding ulcers


CTA ( 09/23) No active bleeding 


Cont serial H/H


Maintain Hgb >8.0, PLT>80 . PRN PRBC & PLT's


Red blood cell nuclear scan 


Advance to clear liquid diet


D/c Ocreotide 





HTN: 


Normotenisve ~ cont Coreg


D/C coreg if bradycardic





Renal: 


BO on CKD~ resolved 


Trend Cr/BUN





Pulm: 


Incidental LLL 5 mm nodule on x ray. CT to reevaluate 





FEN: lytes PRN, Clear liquids


PPx:


DVT-SCD's


PPI ppt





Dispo:  Keep 1 more day to monitor bleeding. 





Visit type





- Emergency Visit


Emergency Visit: Yes


ED Registration Date: 09/19/17


Care time: The patient presented to the Emergency Department on the above date 

and was hospitalized for further evaluation of their emergent condition.





- New Patient


This patient is new to me today: Yes


Date on this admission: 09/25/17





- Critical Care


Critical Care patient: Yes


Total Critical Care Time (in minutes): 35


Critical Care Statement: The care of this patient involved high complexity 

decision making to prevent further life threatening deterioration of the patient

's condition and/or to evaluate & treat vital organ system(s) failure or risk 

of failure.

## 2017-09-25 NOTE — PN
Physical Exam: 


SUBJECTIVE: Patient seen and examined at bedside. No acute events over night. 

He is asking to go home . He denies  fever, chills, N/V/D/C, fatigue, CP, SOB, 

lightheadedness, dizziness. 








OBJECTIVE:





 Vital Signs











 Period  Temp  Pulse  Resp  BP Sys/[place]  Pulse Ox


 


 Last 24 Hr  97.8 F-98.8 F  51-57  13-18  109-120/43-72  











GENERAL: The patient is awake, alert, and fully oriented, in no acute distress.


HEAD: Normal with no signs of trauma.


EYES:  sclera anicteric, conjunctiva clear. No ptosis. 


ENT:  moist mucous membranes.





LUNGS: Breath sounds equal, clear to auscultation bilaterally, no wheezes, no 

crackles, no 


accessory muscle use. 


HEART: Regular rate and rhythm, S1, S2 with 2/6 sytolic murmur,no rub or gallop.


ABDOMEN: Soft, nontender, nondistended, normoactive bowel sounds, no guarding, 

no 


rebound


EXTREMITIES:  warm, well-perfused, no edema. 


NEUROLOGICAL: good mentation


PSYCH: Normal mood, normal affect.


SKIN: Warm, dry,  no rashes or lesions noted














 Laboratory Results - last 24 hr











  09/22/17 09/24/17 09/25/17





  18:30 21:00 05:00


 


WBC   5.0  5.6


 


RBC   2.82 L  2.80 L


 


Hgb   8.6 L  8.6 L


 


Hct   25.1 L  24.8 L


 


MCV   89.1  88.6


 


MCH   30.4  30.6


 


MCHC   34.1  34.5


 


RDW   15.6  15.9


 


Plt Count   97 L  99 L


 


MPV   8.1  8.6


 


Neutrophils %    64.3


 


Lymphocytes %    13.7


 


Monocytes %    6.0


 


Eosinophils %    15.5 H


 


Basophils %    0.5


 


PT with INR   


 


INR   


 


PTT (Actin FS)   


 


Sodium   


 


Potassium   


 


Chloride   


 


Carbon Dioxide   


 


Anion Gap   


 


BUN   


 


Creatinine   


 


Creat Clearance w eGFR   


 


Random Glucose   


 


Calcium   


 


Phosphorus   


 


Magnesium   


 


Total Bilirubin   


 


AST   


 


ALT   


 


Alkaline Phosphatase   


 


Total Protein   


 


Albumin   


 


Hep-Induced Plt Ab Rapid  0.379  














  09/25/17 09/25/17 09/25/17





  05:00 05:00 13:35


 


WBC    6.2


 


RBC    3.03 L


 


Hgb    9.3 L


 


Hct    27.4 L


 


MCV    90.3


 


MCH    30.7


 


MCHC    34.0


 


RDW    16.0 H


 


Plt Count    106 L


 


MPV    7.8


 


Neutrophils %    65.8


 


Lymphocytes %    12.2


 


Monocytes %    4.9


 


Eosinophils %    16.5 H


 


Basophils %    0.6


 


PT with INR  12.60 H  


 


INR  1.14  


 


PTT (Actin FS)  26.7 L  


 


Sodium   142 


 


Potassium   3.8 


 


Chloride   105 


 


Carbon Dioxide   29 


 


Anion Gap   8 


 


BUN   47 H D 


 


Creatinine   1.5 H 


 


Creat Clearance w eGFR   44.17 


 


Random Glucose   123 H D 


 


Calcium   7.8 L 


 


Phosphorus   3.2 


 


Magnesium   2.0 


 


Total Bilirubin   0.9 


 


AST   20 


 


ALT   13 


 


Alkaline Phosphatase   64 


 


Total Protein   4.7 L 


 


Albumin   2.5 L 


 


Hep-Induced Plt Ab Rapid   








Active Medications











Generic Name Dose Route Start Last Admin





  Trade Name Freq  PRN Reason Stop Dose Admin


 


Albuterol Sulfate  1 amp 09/21/17 18:26  





  Ventolin 0.083% Nebulizer Soln -  NEB   





  Q6H PRN   





  SHORT OF BREATH/WHEEZING   


 


Atorvastatin Calcium  10 mg 09/21/17 22:00 09/24/17 21:57





  Lipitor -  PO   10 mg





  HS ALCIDES   Administration


 


Carvedilol  3.125 mg 09/21/17 22:00 09/25/17 10:01





  Coreg -  PO   3.125 mg





  BID ALCIDES   Administration


 


Chlorhexidine Gluconate  1 applic 09/21/17 22:00 09/24/17 21:57





  Hibiclens For Decolonization -  TP   1 applic





  HS ALCIDES   Administration


 


Dextrose/Sodium Chloride  1,000 mls @ 75 mls/hr 09/24/17 17:00 09/25/17 17:18





  D5-1/2ns -  IV   75 mls/hr





  ASDIR ALCIDES   Administration


 


Mupirocin  1 applic 09/21/17 22:00 09/25/17 09:51





  Bactroban Ointment (For Decolonization) -  NS   1 applic





  BID ALCIDES   Administration


 


Pantoprazole Sodium  40 mg 09/25/17 22:00  





  Protonix -  PO   





  BID ALCIDES   











ASSESSMENT/PLAN:


88M w/ extensive PMH CAD, S/P stent , CKD, AS, choledocholithiasis and 

cholangitis, most recently 09/02 s/p ERCP and CBD stent removal, who presented 

with mechanical  fall, found to have a Hgb of 6.3 and melena in the ED, 

admitted to the ICU for suspected upper GI bleed.





#GI bleed- likely upper, r/o lower(unlikely)


* total of 10  units of PRBCs has been transfused 


* H/H stable over the last 24 hour 9.3/27.4


* lasix 20mg IV  daily 


* vitk , DDAVP,protonix  40 BID


* GI on board- Dr. Terrazas,  EGD showed 2 gastric ulcers in the fundus with no 

active bleeding or visible vessel. possible colonoscopy today vs     bleeding 

scan 


*  maintain 2 large bore 


*  advance diet to clear liquids


* surgery on board- Dr. Swann


* cardiology on board- Dr. Botello


* hematology on board- Dr. Dye  


* f/u CBC, CMP, Mg, Ph, PT/PTT/INR in am


* f/u UCx, Bcx, CXR


*  CTA did not localize bleed


* continue conservative management as patient is high risk for surgery 


* nuclear red blood cell scan differed for now 


* Transfuse if Hgb < 8 





# thrombocytopenia


*  continuing to trend down. 


* hematology consulted 


*  S/p 3 unit of platlets transfusion as pt is actively bleeding and source 

unknown. 


* HIT assay and autoimmune workup. 


* goal for platelets >90. 


   


#fall on buttocks, most likely mechanical, vs hypotension vs  acute anemia 


* complain of pain in the left hip


*  denies any dizzines or loss of consciousness before and after the fall, 

denies to heat his head


*  pelvic XR  negative for fracture


*  consider CT if pain persist


* pain control 


* PT when stable 





#HTN


* currently hypotensive 


* will monitor closely 


* will give IV fluids as needed


* start carvedilol,   (hold if systolic BP < 130 OR HR < 60)





# CKD, stable 


* at his base line 


#CAD


* hold atorvastatin


* Hold blavix for now due to bleeding 


* Cardiology recommend to use ASA instead of plavix when he is stable and 

hemoglobin are stable.2 weeks after GI follow up 


* 


#Pulmonary nodule


*  incidental 5mm LLL pulmonary nodule.


*  will need CT scan to further evaluate


* F/U as out patient 





#Urethral Strictures s/p Dilation


* hold finasteride and flomax





#Hx of choledocholithiasis


* hold ursodiol





#constipation, resolved 


* Hold stool softeners





#AS,


*  f/u as out patient 


   


#Porcine Valve Replacement


* F/U as out patient 








#FEN/PPx


   -no fluids


   -electrolytes wnl


   - advanced to clear lequid  


   -protonix 40 BID


   -SCDs, no pharmacology anticoagulation for now 





#Dispo


* Admit to ICU due to upper GI bleed


* consider transfer to med surg when Hgb stable 











Visit type





- Emergency Visit


Emergency Visit: Yes


ED Registration Date: 09/19/17


Care time: The patient presented to the Emergency Department on the above date 

and was hospitalized for further evaluation of their emergent condition.





- New Patient


This patient is new to me today: No





- Critical Care


Critical Care patient: Yes


Total Critical Care Time (in minutes): 40


Critical Care Statement: The care of this patient involved high complexity 

decision making to prevent further life threatening deterioration of the patient

's condition and/or to evaluate & treat vital organ system(s) failure or risk 

of failure.

## 2017-09-25 NOTE — PN
Teaching Attending Note


Name of Resident: Jr Rogers


ATTENDING PHYSICIAN STATEMENT





I saw and evaluated the patient.


I reviewed the resident's note and discussed the case with the resident.


I agree with the resident's findings and plan as documented.








SUBJECTIVE:resting comfortable. requesting to go home. denies CP, SOB< fever, 

chills, N/V/C/D or abdominal pain








OBJECTIVE:


 Last Vital Signs











Temp Pulse Resp BP Pulse Ox


 


 98.0 F   57 L  16   109/43   99 


 


 09/25/17 10:00  09/25/17 10:43  09/25/17 10:00  09/25/17 10:00  09/25/17 10:43








General NAD


Lungs CTA B/L no wheezing/rales/rhonchi


abdomen soft NT/ND





ASSESSMENT AND PLAN:


89yo M with PMH CAD s/p stent, CKD, AS, with recent admission for bacteremia 

and GI bleed from ERCP procedure and recently discharged on 9/8/17 and was re-

admitted after mechanical fall and found to have hgb 6.3


1. Upper GI bleed- s/p 10 units PRBC this admission. 1 FFP and 3 units 

platelets. 1 black tarry stool last night. no BM since then. Hgb has been 

stable since 10th transfusion yesterday. appears bleeding has stopped. CTA did 

not localize bleed. diet advance to clear liquid. will cont to monitor Hgb 

closely. agree with conserative management at this time as he is high risk for 

surgery. cont to monitor Hgb. txn for hgb <8.  surgery on standby. GI on board


2. HTN- normotensive. slightly bradycardic. asymptomatic. consider holding 

coreg if becomes symptomatic. cardiac monitoring. cont coreg


3. thrombocytopenia- low in setting of active bleeding. s/p 3 units platlets. 

now stable. goal for platelets >90. workup sent by hematology. 


4. Mechanical fall-likley due to hypotension vs acute anemia claims to not have 

hit his head. XR of his hips negative for acute pathology. PT eval when 

medically optimized


5. Constipation- resolved


6. Pulmonary nodule- incidental 5mm LLL pulmonary nodule. will need CT scan to 

further evaluate


7. CAD s/p stent-goal to re-start asa after several weeks of inactive bleeding


8. AS


9. CKD- at baseline


10. DVT ppx- SCD


11. MICU monitoring. 





The care of this patient involved high complexity decision making to prevent 

further life threatening deterioration of the patient's condition and/or to 

evaluate & treat vital organ system(s) failure or risk of failure. 40 minutes

## 2017-09-25 NOTE — PN
Teaching Attending Note


Name of Resident: Martin Landeros


ATTENDING PHYSICIAN STATEMENT





I saw and evaluated the patient.


I reviewed the resident's note and discussed the case with the resident.


I agree with the resident's findings and plan as documented.








SUBJECTIVE:





Pt seen and examined in the ICU. Bleeding appears to have stopped. Denies 

abdominal pain, shortness of breath or chest pain.





OBJECTIVE:





 Last Vital Signs











Temp Pulse Resp BP Pulse Ox


 


 98.0 F   57 L  16   109/43   99 


 


 09/25/17 10:00  09/25/17 10:43  09/25/17 10:00  09/25/17 10:00  09/25/17 10:43








 Intake & Output











 09/22/17 09/23/17 09/24/17 09/25/17





 23:59 23:59 23:59 23:59


 


Intake Total 1640 2425.2 1314.2 846.4


 


Output Total 3000 400  


 


Balance -1360 2025.2 1314.2 846.4


 


Weight 184 lb 3.2 oz 172 lb 6.424 oz 168 lb 3.2 oz 167 lb 11.2 oz








Gen:  NAD at rest


Heart:  RRR


Lung: decreased breath sounds at the bases


Abd: soft, nontender


Ext: no edema





 CBC, BMP





 09/25/17 05:00 





 09/25/17 05:00 





Active Medications





Albuterol Sulfate (Ventolin 0.083% Nebulizer Soln -)  1 amp NEB Q6H PRN


   PRN Reason: SHORT OF BREATH/WHEEZING


Atorvastatin Calcium (Lipitor -)  10 mg PO HS ALCIDES


   Last Admin: 09/24/17 21:57 Dose:  10 mg


Carvedilol (Coreg -)  3.125 mg PO BID ALCIDES


   Last Admin: 09/25/17 10:01 Dose:  3.125 mg


Chlorhexidine Gluconate (Hibiclens For Decolonization -)  1 applic TP HS ALCIDES


   Last Admin: 09/24/17 21:57 Dose:  1 applic


Pantoprazole Sodium 80 mg/ (Sodium Chloride)  100 mls @ 10 mls/hr IVPB Q10H ALCIDES


   PRN Reason: 8 MG/HR


   Last Admin: 09/25/17 07:54 Dose:  10 mls/hr


Dextrose/Sodium Chloride (D5-1/2ns -)  1,000 mls @ 75 mls/hr IV ASDIR ALCIDES


   Last Admin: 09/24/17 20:09 Dose:  75 mls/hr


Mupirocin (Bactroban Ointment (For Decolonization) -)  1 applic NS BID ALCIDES


   Last Admin: 09/25/17 09:51 Dose:  1 applic








ASSESSMENT AND PLAN:


GI Bleed 


Gastric Ulcers


Acute Blood Loss Anemia


Thrombocytopenia


CAD s/p stent


Aortic Stenosis s/p TAVR


LV Systolic Dysfunction


h/o Cholangitis


Acute on CKD


h/o R Nephrectomy





-  monitor H/H, platelets


-  transfuse as needed


-  protonix gtt, off octreotide


-  ensure large bore peripheral access


-  PO per GI


-  hold all anticoagulation, antiplatelets


-  DVT prophylaxis


-  continue ICU monitoring











critical care time spent in reviewing chart, evaluating patient and formulating 

plan 35 min

## 2017-09-25 NOTE — PN
Progress Note, Physician


History of Present Illness: 


Pt seen and examined at bedside. He is awake and alert. He denies abdominal 

pain. 





- Current Medication List


Current Medications: 


Active Medications





Albuterol Sulfate (Ventolin 0.083% Nebulizer Soln -)  1 amp NEB Q6H PRN


   PRN Reason: SHORT OF BREATH/WHEEZING


Atorvastatin Calcium (Lipitor -)  10 mg PO HS [facility]


   Last Admin: 09/24/17 21:57 Dose:  10 mg


Carvedilol (Coreg -)  3.125 mg PO BID [facility]


   Last Admin: 09/25/17 10:01 Dose:  3.125 mg


Chlorhexidine Gluconate (Hibiclens For Decolonization -)  1 applic TP HS [facility]


   Last Admin: 09/24/17 21:57 Dose:  1 applic


Pantoprazole Sodium 80 mg/ (Sodium Chloride)  100 mls @ 10 mls/hr IVPB Q10H [facility]


   PRN Reason: 8 MG/HR


   Last Admin: 09/25/17 07:54 Dose:  10 mls/hr


Dextrose/Sodium Chloride (D5-1/2ns -)  1,000 mls @ 75 mls/hr IV ASDIR [facility]


   Last Admin: 09/24/17 20:09 Dose:  75 mls/hr


Mupirocin (Bactroban Ointment (For Decolonization) -)  1 applic NS BID [facility]


   Last Admin: 09/25/17 09:51 Dose:  1 applic











- Objective


Vital Signs: 


 Vital Signs











Temperature  98.8 F   09/25/17 14:00


 


Pulse Rate  54 L  09/25/17 14:00


 


Respiratory Rate  18   09/25/17 12:00


 


Blood Pressure  113/52   09/25/17 14:00


 


O2 Sat by Pulse Oximetry (%)  99   09/25/17 10:43











Constitutional: Yes: Calm


Eyes: Yes: Conjunctiva Clear


HENT: Yes: Atraumatic


Neck: Yes: Supple


Cardiovascular: Yes: S1, S2


Respiratory: Yes: CTA Bilaterally


Gastrointestinal: Yes: Soft


Genitourinary: Yes: WNL


Musculoskeletal: Yes: Muscle Weakness


Edema: Yes


Edema: LLE: Trace, RLE: Trace


Neurological: Yes: Oriented


Labs: 


 CBC, BMP





 09/25/17 13:35 





 09/25/17 05:00 





 INR, PTT











INR  1.14  (0.82-1.09)   09/25/17  05:00    


 


Fibrinogen  170.0 mg/dL (238-498)  L  09/23/17  05:15    














Problem List





- Problems


(1) Anemia


Code(s): D64.9 - ANEMIA, UNSPECIFIED   Qualifiers: 


     Anemia type: unspecified type        Qualified Code(s): D64.9 - Anemia, 

unspecified  





(2) GI (gastrointestinal hemorrhage)


Code(s): K92.2 - GASTROINTESTINAL HEMORRHAGE, UNSPECIFIED   Qualifiers: 


     GI bleed type/associated pathology: melena     Qualified Code(s): K92.1 - 

Melena  





(3) BO (acute kidney injury)


Code(s): N17.9 - ACUTE KIDNEY FAILURE, UNSPECIFIED





(4) CAD (coronary artery disease)


Code(s): I25.10 - ATHSCL HEART DISEASE OF NATIVE CORONARY ARTERY W/O ANG PCTRS 

  Qualifiers: 


     Coronary Disease-Associated Artery/Lesion type: unspecified vessel or 

lesion type     Native vs. transplanted heart: native heart     Associated 

angina: without angina        Qualified Code(s): I25.10 - Atherosclerotic heart 

disease of native coronary artery without angina pectoris  





(5) CKD (chronic kidney disease)


Code(s): N18.9 - CHRONIC KIDNEY DISEASE, UNSPECIFIED   Qualifiers: 


     Chronic kidney disease stage: stage 3 (moderate)        Qualified Code(s): 

N18.3 - Chronic kidney disease, stage 3 (moderate)  








Assessment/Plan


 Current Medications











Generic Name Dose Route Start Last Admin





  Trade Name Freq  PRN Reason Stop Dose Admin


 


Albuterol Sulfate  1 amp 09/21/17 18:26  





  Ventolin 0.083% Nebulizer Soln -  NEB   





  Q6H PRN   





  SHORT OF BREATH/WHEEZING   


 


Atorvastatin Calcium  10 mg 09/21/17 22:00 09/24/17 21:57





  Lipitor -  PO   10 mg





  HS ALCIDES   Administration


 


Carvedilol  3.125 mg 09/21/17 22:00 09/25/17 10:01





  Coreg -  PO   3.125 mg





  BID ALCIDES   Administration


 


Chlorhexidine Gluconate  1 applic 09/21/17 22:00 09/24/17 21:57





  Hibiclens For Decolonization -  TP   1 applic





  HS ALCIDES   Administration


 


Pantoprazole Sodium 80 mg/  100 mls @ 10 mls/hr 09/22/17 10:00 09/25/17 07:54





  Sodium Chloride  IVPB   10 mls/hr





  Q10H ALCIDES   Administration





  8 MG/HR   


 


Dextrose/Sodium Chloride  1,000 mls @ 75 mls/hr 09/24/17 17:00 09/24/17 20:09





  D5-1/2ns -  IV   75 mls/hr





  ASDIR ALCIDES   Administration


 


Mupirocin  1 applic 09/21/17 22:00 09/25/17 09:51





  Bactroban Ointment (For Decolonization) -  NS   1 applic





  BID ALCIDES   Administration














Impression


1. hx CKD


2. BO improving


3. choledocholithiasis


4. CAD


5. hx of Renal Cell Cancer s/p nephroectomy


6. aortic stenosis


7. GI bleed





Plan


- check cxr


- renal function stable


- repeat labs in am


- pt has a baseline crt of about 1.7


- will follow


Dr Rodriguez

## 2017-09-25 NOTE — PN
GI Progress Note


Subjective: 


GI NOte: The Hb has finally stabilized. He just had a black BM which is 

hopefully old blood. No evelin blood seen. Tolerated clear liquids. 





- Objective


Vital Signs: 


 Vital Signs











Temperature  98.0 F   09/25/17 10:00


 


Pulse Rate  57 L  09/25/17 10:43


 


Respiratory Rate  16   09/25/17 10:00


 


Blood Pressure  109/43   09/25/17 10:00


 


O2 Sat by Pulse Oximetry (%)  99   09/25/17 10:43








 Laboratory Tests











  09/25/17 09/25/17 09/25/17





  05:00 05:00 05:00


 


WBC  5.6  


 


Hgb  8.6 L  


 


Hct  24.8 L  


 


Plt Count  99 L  


 


PT with INR   12.60 H 


 


BUN    47 H D


 


Creatinine    1.5 H











Constitutional: Calm


...Auscultate: Yes: Normoactive Bowel Sounds


...Palpate: Yes: Soft, Other (nontender)


Labs: 


 CBC, BMP





 09/25/17 05:00 





 09/25/17 05:00 





 INR, PTT











INR  1.14  (0.82-1.09)   09/25/17  05:00    


 


Fibrinogen  170.0 mg/dL (238-498)  L  09/23/17  05:15    














Assessment/Plan


Hopefully bleeding has resolved. Will observe on clear liquids. Will defer 

colonoscopy as prep may trigger rebleeding.

## 2017-09-26 LAB
ALBUMIN SERPL-MCNC: 2.5 G/DL (ref 3.4–5)
ALP SERPL-CCNC: 65 U/L (ref 45–117)
ALT SERPL-CCNC: 13 U/L (ref 12–78)
ANION GAP SERPL CALC-SCNC: 7 MMOL/L (ref 8–16)
AST SERPL-CCNC: 21 U/L (ref 15–37)
BASOPHILS # BLD: 0.6 % (ref 0–2)
BILIRUB SERPL-MCNC: 0.9 MG/DL (ref 0.2–1)
CALCIUM SERPL-MCNC: 7.1 MG/DL (ref 8.5–10.1)
CO2 SERPL-SCNC: 29 MMOL/L (ref 21–32)
CREAT SERPL-MCNC: 1.4 MG/DL (ref 0.7–1.3)
DEPRECATED RDW RBC AUTO: 15.4 % (ref 11.9–15.9)
DEPRECATED RDW RBC AUTO: 15.8 % (ref 11.9–15.9)
EOSINOPHIL # BLD: 15.1 % (ref 0–4.5)
GLUCOSE SERPL-MCNC: 111 MG/DL (ref 74–106)
INR BLD: 1.17 (ref 0.82–1.09)
MAGNESIUM SERPL-MCNC: 1.9 MG/DL (ref 1.8–2.4)
MCH RBC QN AUTO: 30.6 PG (ref 25.7–33.7)
MCH RBC QN AUTO: 30.6 PG (ref 25.7–33.7)
MCHC RBC AUTO-ENTMCNC: 33.6 G/DL (ref 32–35.9)
MCHC RBC AUTO-ENTMCNC: 33.7 G/DL (ref 32–35.9)
MCV RBC: 91 FL (ref 80–96)
MCV RBC: 91.1 FL (ref 80–96)
NEUTROPHILS # BLD: 61 % (ref 42.8–82.8)
PHOSPHATE SERPL-MCNC: 2.4 MG/DL (ref 2.5–4.9)
PLATELET # BLD AUTO: 88 K/MM3 (ref 134–434)
PLATELET # BLD AUTO: 96 K/MM3 (ref 134–434)
PMV BLD: 8.9 FL (ref 7.5–11.1)
PMV BLD: 9.1 FL (ref 7.5–11.1)
PROT SERPL-MCNC: 4.6 G/DL (ref 6.4–8.2)
PT PNL PPP: 12.9 SEC (ref 9.98–11.88)
WBC # BLD AUTO: 4 K/MM3 (ref 4–10)
WBC # BLD AUTO: 5.1 K/MM3 (ref 4–10)

## 2017-09-26 RX ADMIN — CARVEDILOL SCH MG: 3.12 TABLET, FILM COATED ORAL at 10:31

## 2017-09-26 RX ADMIN — CARVEDILOL SCH MG: 3.12 TABLET, FILM COATED ORAL at 21:41

## 2017-09-26 RX ADMIN — PANTOPRAZOLE SODIUM SCH MG: 40 TABLET, DELAYED RELEASE ORAL at 10:31

## 2017-09-26 RX ADMIN — MUPIROCIN SCH APPLIC: 20 OINTMENT TOPICAL at 10:31

## 2017-09-26 RX ADMIN — DEXTROSE AND SODIUM CHLORIDE SCH MLS/HR: 5; 450 INJECTION, SOLUTION INTRAVENOUS at 16:34

## 2017-09-26 RX ADMIN — ACETAMINOPHEN PRN MG: 325 TABLET ORAL at 21:51

## 2017-09-26 RX ADMIN — ACETAMINOPHEN PRN MG: 325 TABLET ORAL at 07:31

## 2017-09-26 RX ADMIN — ATORVASTATIN CALCIUM SCH MG: 10 TABLET, FILM COATED ORAL at 21:41

## 2017-09-26 RX ADMIN — PANTOPRAZOLE SODIUM SCH MG: 40 TABLET, DELAYED RELEASE ORAL at 21:41

## 2017-09-26 NOTE — PN
Physical Exam: 


SUBJECTIVE: Patient seen and examined at bedside. No acute events over night. 

He is asking to go home . He denies  fever, chills, N/V/D/C, fatigue, CP, SOB, 

lightheadedness, dizziness. 











OBJECTIVE:





 Vital Signs











 Period  Temp  Pulse  Resp  BP Sys/Badillo  Pulse Ox


 


 Last 24 Hr  97.8 F-98.8 F  52-61  14-18  109-129/43-76  











GENERAL: The patient is awake, alert, and fully oriented, in no acute distress.


HEAD: Normal with no signs of trauma.


EYES:  sclera anicteric, conjunctiva clear. No ptosis. 


ENT:  moist mucous membranes.





LUNGS: Breath sounds equal, clear to auscultation bilaterally, no wheezes, no 

crackles, no 


accessory muscle use. 


HEART: Regular rate and rhythm, S1, S2 with 2/6 sytolic murmur,no rub or gallop.


ABDOMEN: Soft, nontender, nondistended, normoactive bowel sounds, no guarding, 

no 


rebound


EXTREMITIES:  warm, well-perfused, no edema. 


NEUROLOGICAL: good mentation


PSYCH: Normal mood, normal affect.


SKIN: Warm, dry,  no rashes or lesions noted














 Laboratory Results - last 24 hr











  09/25/17 09/26/17 09/26/17





  13:35 05:00 05:00


 


WBC  6.2  4.0  D 


 


RBC  3.03 L  2.79 L 


 


Hgb  9.3 L  8.6 L 


 


Hct  27.4 L  25.4 L 


 


MCV  90.3  91.0 


 


MCH  30.7  30.6 


 


MCHC  34.0  33.7 


 


RDW  16.0 H  15.8 


 


Plt Count  106 L  88 L 


 


MPV  7.8  9.1  D 


 


Neutrophils %  65.8  61.0 


 


Lymphocytes %  12.2  16.2  D 


 


Monocytes %  4.9  7.1 


 


Eosinophils %  16.5 H  15.1 H 


 


Basophils %  0.6  0.6 


 


PT with INR    12.90 H


 


INR    1.17 H


 


PTT (Actin FS)   


 


Sodium   


 


Potassium   


 


Chloride   


 


Carbon Dioxide   


 


Anion Gap   


 


BUN   


 


Creatinine   


 


Creat Clearance w eGFR   


 


Random Glucose   


 


Calcium   


 


Phosphorus   


 


Magnesium   


 


Total Bilirubin   


 


AST   


 


ALT   


 


Alkaline Phosphatase   


 


Total Protein   


 


Albumin   














  09/26/17 09/26/17





  05:00 05:00


 


WBC  


 


RBC  


 


Hgb  


 


Hct  


 


MCV  


 


MCH  


 


MCHC  


 


RDW  


 


Plt Count  


 


MPV  


 


Neutrophils %  


 


Lymphocytes %  


 


Monocytes %  


 


Eosinophils %  


 


Basophils %  


 


PT with INR  


 


INR  


 


PTT (Actin FS)   29.4


 


Sodium  139 


 


Potassium  3.5 


 


Chloride  103 


 


Carbon Dioxide  29 


 


Anion Gap  7 L 


 


BUN  32 H D 


 


Creatinine  1.4 H 


 


Creat Clearance w eGFR  47.83 


 


Random Glucose  111 H 


 


Calcium  7.1 L 


 


Phosphorus  2.4 L D 


 


Magnesium  1.9 


 


Total Bilirubin  0.9 


 


AST  21 


 


ALT  13 


 


Alkaline Phosphatase  65 


 


Total Protein  4.6 L 


 


Albumin  2.5 L 








Active Medications











Generic Name Dose Route Start Last Admin





  Trade Name Freq  PRN Reason Stop Dose Admin


 


Acetaminophen  650 mg 09/25/17 23:37 09/26/17 07:31





  Tylenol -  PO   650 mg





  Q6H PRN   Administration





  FEVER OR PAIN   


 


Albuterol Sulfate  1 amp 09/21/17 18:26  





  Ventolin 0.083% Nebulizer Soln -  NEB   





  Q6H PRN   





  SHORT OF BREATH/WHEEZING   


 


Atorvastatin Calcium  10 mg 09/21/17 22:00 09/25/17 21:27





  Lipitor -  PO   10 mg





  HS ALCIDES   Administration


 


Carvedilol  3.125 mg 09/21/17 22:00 09/25/17 21:27





  Coreg -  PO   3.125 mg





  BID ALCIDES   Administration


 


Chlorhexidine Gluconate  1 applic 09/21/17 22:00 09/25/17 21:27





  Hibiclens For Decolonization -  TP   1 applic





  HS ALCIDES   Administration


 


Dextrose/Sodium Chloride  1,000 mls @ 75 mls/hr 09/24/17 17:00 09/25/17 17:18





  D5-1/2ns -  IV   75 mls/hr





  ASDIR ALCIDES   Administration


 


Mupirocin  1 applic 09/21/17 22:00 09/25/17 21:27





  Bactroban Ointment (For Decolonization) -  NS   1 applic





  BID ALCIDES   Administration


 


Pantoprazole Sodium  40 mg 09/25/17 22:00 09/25/17 21:27





  Protonix -  PO   40 mg





  BID ALCIDES   Administration








 CBC, BMP





 09/26/17 16:00 





 09/26/17 05:00 








ASSESSMENT/PLAN:


88M w/ extensive PMH CAD, S/P stent , CKD, AS, choledocholithiasis and 

cholangitis, most recently 09/02 s/p ERCP and CBD stent removal, who presented 

with mechanical  fall, found to have a Hgb of 6.3 and melena in the ED, 

admitted to the ICU for suspected upper GI bleed.





#GI bleed- likely upper, r/o lower(unlikely)


* total of 10  units of PRBCs has been transfused 


* H/H stable over the last 24 hour 9.3/27.4


* lasix 20mg IV  daily 


* vitk , DDAVP,protonix  40 BID


* GI on board- Dr. Terrazas,  EGD showed 2 gastric ulcers in the fundus with no 

active bleeding or visible vessel. possible colonoscopy today vs     bleeding 

scan 


*  maintain 2 large bore 


*  advance diet to clear liquids


* surgery on board- Dr. Swann


* cardiology on board- Dr. Botello


* hematology on board- Dr. Dye  


* f/u CBC, CMP, Mg, Ph, PT/PTT/INR in am


* f/u UCx, Bcx, CXR


*  CTA did not localize bleed


* continue conservative management as patient is high risk for surgery 


* nuclear red blood cell scan differed for now 


* Transfuse if Hgb < 8 





# thrombocytopenia


*  continuing to trend down. 


* hematology consulted 


*  S/p 3 unit of platlets transfusion as pt is actively bleeding and source 

unknown. 


* HIT assay and autoimmune workup. 


* goal for platelets >90. 


   


#fall on buttocks, most likely mechanical, vs hypotension vs  acute anemia 


* complain of pain in the left hip


*  denies any dizzines or loss of consciousness before and after the fall, 

denies to heat his head


*  pelvic XR  negative for fracture


*  consider CT if pain persist


* pain control 


* PT when stable 





#HTN


* currently hypotensive 


* will monitor closely 


* will give IV fluids as needed


* start carvedilol,   (hold if systolic BP < 130 OR HR < 60)





# CKD, stable 


* at his base line 


#CAD


* hold atorvastatin


* Hold blavix for now due to bleeding 


* Cardiology recommend to use ASA instead of plavix when he is stable and 

hemoglobin are stable.2 weeks after GI follow up 


* 


#Pulmonary nodule


*  incidental 5mm LLL pulmonary nodule.


*  will need CT scan to further evaluate


* F/U as out patient 





#Urethral Strictures s/p Dilation


* hold finasteride and flomax





#Hx of choledocholithiasis


* hold ursodiol





#constipation, resolved 


* Hold stool softeners





#AS,


*  f/u as out patient 


   


#Porcine Valve Replacement


* F/U as out patient 








#FEN/PPx


   -no fluids


   -electrolytes wnl


   - advanced to clear lequid  


   -protonix 40 BID


   -SCDs, no pharmacology anticoagulation for now 





#Dispo


* Admit to ICU due to upper GI bleed


* consider transfer to med surg when Hgb stable 








Visit type





- Emergency Visit


Emergency Visit: Yes


ED Registration Date: 09/19/17


Care time: The patient presented to the Emergency Department on the above date 

and was hospitalized for further evaluation of their emergent condition.





- New Patient


This patient is new to me today: No





- Critical Care


Critical Care patient: Yes


Total Critical Care Time (in minutes): 40


Critical Care Statement: The care of this patient involved high complexity 

decision making to prevent further life threatening deterioration of the patient

's condition and/or to evaluate & treat vital organ system(s) failure or risk 

of failure.

## 2017-09-26 NOTE — PN
Teaching Attending Note


Name of Resident: Jr Rogers


ATTENDING PHYSICIAN STATEMENT





I saw and evaluated the patient.


I reviewed the resident's note and discussed the case with the resident.


I agree with the resident's findings and plan as documented.








SUBJECTIVE: No complaints.








OBJECTIVE:


 Vital Signs











 Period  Temp  Pulse  Resp  BP Sys/Badillo  Pulse Ox


 


 Last 24 Hr  97.6 F-98.6 F  52-61  14-18  /43-76  








HEART: S1S2, RRR


LUNGS: Clear


ABDOMEN: Soft, non-tender, non-distended, normal BS


EXTREMITIES: No edema





 Current Medications











Generic Name Dose Route Start Last Admin





  Trade Name Freq  PRN Reason Stop Dose Admin


 


Acetaminophen  650 mg 09/25/17 23:37 09/26/17 07:31





  Tylenol -  PO   650 mg





  Q6H PRN   Administration





  FEVER OR PAIN   


 


Albuterol Sulfate  1 amp 09/21/17 18:26  





  Ventolin 0.083% Nebulizer Soln -  NEB   





  Q6H PRN   





  SHORT OF BREATH/WHEEZING   


 


Atorvastatin Calcium  10 mg 09/21/17 22:00 09/25/17 21:27





  Lipitor -  PO   10 mg





  HS ALCIDES   Administration


 


Carvedilol  3.125 mg 09/21/17 22:00 09/26/17 10:31





  Coreg -  PO   3.125 mg





  BID ALCIDES   Administration


 


Chlorhexidine Gluconate  1 applic 09/21/17 22:00 09/25/17 21:27





  Hibiclens For Decolonization -  TP   1 applic





  HS ALCIDES   Administration


 


Dextrose/Sodium Chloride  1,000 mls @ 40 mls/hr 09/26/17 15:51 09/26/17 16:34





  D5-1/2ns -  IV   40 mls/hr





  ASDIR ALCIDES   Administration


 


Mupirocin  1 applic 09/21/17 22:00 09/26/17 10:31





  Bactroban Ointment (For Decolonization) -  NS   1 applic





  BID ALCIDES   Administration


 


Pantoprazole Sodium  40 mg 09/25/17 22:00 09/26/17 10:31





  Protonix -  PO   40 mg





  BID ALCIDES   Administration














ASSESSMENT AND PLAN:


89yo M with PMH CAD s/p stent, CKD, AS, with recent admission for bacteremia 

and GI bleed from ERCP procedure and recently discharged on 9/8/17 and was re-

admitted after mechanical fall and found to have hgb 6.3


1. Upper GI bleed- s/p 10 units PRBC this admission. 1 FFP and 3 units 

platelets. 1 black tarry stool last night. no BM since then. Hgb has been 

stable since 10th transfusion yesterday. appears bleeding has stopped. CTA did 

not localize bleed. diet advance to clear liquid. will cont to monitor Hgb 

closely. agree with conserative management at this time as he is high risk for 

surgery. cont to monitor Hgb. txn for hgb <8.  surgery on standby. GI on board


2. HTN- normotensive. slightly bradycardic. asymptomatic. consider holding 

coreg if becomes symptomatic. cardiac monitoring. cont coreg


3. thrombocytopenia- low in setting of active bleeding. s/p 3 units platlets. 

now stable. goal for platelets >90. workup sent by hematology. 


4. Mechanical fall-likley due to hypotension vs acute anemia claims to not have 

hit his head. XR of his hips negative for acute pathology. PT eval when 

medically optimized


5. Constipation- resolved


6. Pulmonary nodule- incidental 5mm LLL pulmonary nodule. will need CT scan to 

further evaluate


7. CAD s/p stent-goal to re-start asa after several weeks of inactive bleeding


8. AS


9. CKD- at baseline


10. DVT ppx- SCD


11. MICU monitoring.

## 2017-09-26 NOTE — PN
Teaching Attending Note


Name of Resident: Martin Landeros


ATTENDING PHYSICIAN STATEMENT





I saw and evaluated the patient.


I reviewed the resident's note and discussed the case with the resident.


I agree with the resident's findings and plan as documented.








SUBJECTIVE:





Pt seen and examined in the ICU. No further bleeding. H/H stable not requiring 

transfusions. Denies shortness of breath or chest pain.





OBJECTIVE:





 Last Vital Signs











Temp Pulse Resp BP Pulse Ox


 


 97.6 F   53 L  16   101/43   100 


 


 09/26/17 10:00  09/26/17 10:00  09/26/17 10:00  09/26/17 10:00  09/26/17 09:00








 Intake & Output











 09/23/17 09/24/17 09/25/17 09/26/17





 23:59 23:59 23:59 23:59


 


Intake Total 2425.2 1314.2 2473.6 600


 


Output Total 400   


 


Balance 2025.2 1314.2 2473.6 600


 


Weight 172 lb 6.424 oz 168 lb 3.2 oz 167 lb 11.2 oz 168 lb 12.8 oz








Gen:  NAD at rest


Heart: RRR


Lung: decreased breath sounds at the bases


Abd: soft, nontender


Ext: no edema





 CBC, BMP





 09/26/17 05:00 





 09/26/17 05:00 





Active Medications





Acetaminophen (Tylenol -)  650 mg PO Q6H PRN


   PRN Reason: FEVER OR PAIN


   Last Admin: 09/26/17 07:31 Dose:  650 mg


Albuterol Sulfate (Ventolin 0.083% Nebulizer Soln -)  1 amp NEB Q6H PRN


   PRN Reason: SHORT OF BREATH/WHEEZING


Atorvastatin Calcium (Lipitor -)  10 mg PO HS [facility]


   Last Admin: 09/25/17 21:27 Dose:  10 mg


Carvedilol (Coreg -)  3.125 mg PO BID [facility]


   Last Admin: 09/26/17 10:31 Dose:  3.125 mg


Chlorhexidine Gluconate (Hibiclens For Decolonization -)  1 applic TP [facility]


   Last Admin: 09/25/17 21:27 Dose:  1 applic


Dextrose/Sodium Chloride (D5-1/2ns -)  1,000 mls @ 75 mls/hr IV ASDIR [facility]


   Last Admin: 09/25/17 17:18 Dose:  75 mls/hr


Mupirocin (Bactroban Ointment (For Decolonization) -)  1 applic NS BID [facility]


   Last Admin: 09/26/17 10:31 Dose:  1 applic


Pantoprazole Sodium (Protonix -)  40 mg PO BID [facility]


   Last Admin: 09/26/17 10:31 Dose:  40 mg








ASSESSMENT AND PLAN:


GI Bleed 


Gastric Ulcers


Acute Blood Loss Anemia


Thrombocytopenia


CAD s/p stent


Aortic Stenosis s/p TAVR


LV Systolic Dysfunction


h/o Cholangitis


Acute on CKD


h/o R Nephrectomy





-  monitor H/H, platelets


-  transfuse as needed


-  protonix


-  advance diet per GI


-  hold all anticoagulation, antiplatelets


-  DVT prophylaxis


-  can monitor on floor

## 2017-09-26 NOTE — PN
Progress Note (short form)





- Note


Progress Note: 


Chief Complaint: Events noted, notes reviewed, complaining of persistent left 

hip pain not alleviated by Tylenol, denies any chest pain or dyspnea, no 

recurrent GI bleed, sinus rhythm with IVCD is noted with intermittent 

bradycardia  





History of Present Illness: 


Seen and examined in the ICU. Events noted, notes reviewed, complaining of 

persistent left hip pain not alleviated by Tylenol, denies any chest pain or 

dyspnea, no recurrent GI bleed, sinus rhythm with IVCD is noted with 

intermittent bradycardia





As outlined in prior notes most likely source of bleed is AVM which are 

associated with aortic valve disease/aortic stenosis





Echocardiography dated 5/17/2017 revealed Normal LV and RV size with severely 

decreased LV systolic function, moderate-severe decrease RV function, moderate 

MR and mild TR








- Current Medication List





 Current Medications





Acetaminophen (Tylenol -)  650 mg PO Q6H PRN


   PRN Reason: FEVER OR PAIN


   Last Admin: 09/25/17 23:46 Dose:  650 mg


Albuterol Sulfate (Ventolin 0.083% Nebulizer Soln -)  1 amp NEB Q6H PRN


   PRN Reason: SHORT OF BREATH/WHEEZING


Atorvastatin Calcium (Lipitor -)  10 mg PO HS [facility]


   Last Admin: 09/25/17 21:27 Dose:  10 mg


Carvedilol (Coreg -)  3.125 mg PO BID [facility]


   Last Admin: 09/25/17 21:27 Dose:  3.125 mg


Chlorhexidine Gluconate (Hibiclens For Decolonization -)  1 applic TP HS [facility]


   Last Admin: 09/25/17 21:27 Dose:  1 applic


Dextrose/Sodium Chloride (D5-1/2ns -)  1,000 mls @ 75 mls/hr IV ASDIR [facility]


   Last Admin: 09/25/17 17:18 Dose:  75 mls/hr


Mupirocin (Bactroban Ointment (For Decolonization) -)  1 applic NS BID [facility]


   Last Admin: 09/25/17 21:27 Dose:  1 applic


Pantoprazole Sodium (Protonix -)  40 mg PO BID [facility]


   Last Admin: 09/25/17 21:27 Dose:  40 mg





Review of Systems





- Review of Systems


Constitutional: no symptoms reported


Respiratory: denies: Cough or Sputum Production 


Cardiovascular: as noted above


Gastrointestinal: denies Nausea, Vomiting, Diarrhea, Constipation or Abdominal 

Pain, Persistent Bleed as noted above


Genitourinary: No symptoms reported


Musculoskeletal: Degenerative Joint Disease, as noted above


Endocrine: No symptoms reported  





- Objective


Vital Signs: 





 Last Vital Signs











Temp Pulse Resp BP Pulse Ox


 


 97.8 F   61   17   129/76   99 


 


 09/26/17 02:00  09/26/17 04:00  09/26/17 04:00  09/26/17 04:00  09/25/17 21:00








 Intake & Output











 09/23/17 09/24/17 09/25/17 09/26/17





 23:59 23:59 23:59 23:59


 


Intake Total 2425.2 1314.2 2473.6 


 


Output Total 400   


 


Balance 2025.2 1314.2 2473.6 


 


Weight 172 lb 6.424 oz 168 lb 3.2 oz 167 lb 11.2 oz 











Constitutional: No Distress, Calm, Pale


Neck: Supple Negative JVD


Cardiovascular: S1 S2 Regular Rate and Rhythm, Grade 2/6 Systolic Ejection 

Murmur


Respiratory: Diminished at the Bases


Gastrointestinal: Soft, Benign Normal Bowel Sounds


Ext: No Edema





Labs: 





 CBC, BMP





 09/26/17 05:00 








BMP from this AM pending





Assessment/Plan





ASSESSMENT:





1. Recurrent gastro-intestinal bleed, most likely related to AVM's, no further 

bleed 


2. Coronary artery disease, post PCI/stent, angina pectoris


3. LV systolic dysfunction with chronic class I NYHA classification LV failure, 

compensated/euvolemic


4. Aortic stenosis Post AVR-TAVR


5. Hypertension


6. Anemia and thrombocytopenia


7. CKD with history of renal cancer post right nephrectomy


8. History of ERCP, biliary stent implant/removal and stone retrieval for 

ascending cholangitis





PLAN:





1. Continue Protonix 


2. Continue Carvedilol hemodynamics permitting


3. Continue Lipitor 


4. Pain management for the above noted presentation


5. Prn PRBC and platelet transfusions, maintain Hg equal or > 8.0 and platelet 

equal or > 80


6. Follow BMP from this AM














Epi Baker M.D.

## 2017-09-26 NOTE — PN
Progress Note, Physician


History of Present Illness: 


Pt seen and examined at bedside. He is awake and alert. He is eager to go home. 

He denies shortness of breath. 





- Current Medication List


Current Medications: 


Active Medications





Acetaminophen (Tylenol -)  650 mg PO Q6H PRN


   PRN Reason: FEVER OR PAIN


   Last Admin: 09/26/17 07:31 Dose:  650 mg


Albuterol Sulfate (Ventolin 0.083% Nebulizer Soln -)  1 amp NEB Q6H PRN


   PRN Reason: SHORT OF BREATH/WHEEZING


Atorvastatin Calcium (Lipitor -)  10 mg PO HS [facility]


   Last Admin: 09/25/17 21:27 Dose:  10 mg


Carvedilol (Coreg -)  3.125 mg PO BID [facility]


   Last Admin: 09/26/17 10:31 Dose:  3.125 mg


Chlorhexidine Gluconate (Hibiclens For Decolonization -)  1 applic TP HS [facility]


   Last Admin: 09/25/17 21:27 Dose:  1 applic


Dextrose/Sodium Chloride (D5-1/2ns -)  1,000 mls @ 75 mls/hr IV ASDIR [facility]


   Last Admin: 09/25/17 17:18 Dose:  75 mls/hr


Mupirocin (Bactroban Ointment (For Decolonization) -)  1 applic NS BID [facility]


   Last Admin: 09/26/17 10:31 Dose:  1 applic


Pantoprazole Sodium (Protonix -)  40 mg PO BID [facility]


   Last Admin: 09/26/17 10:31 Dose:  40 mg











- Objective


Vital Signs: 


 Vital Signs











Temperature  97.7 F   09/26/17 14:00


 


Pulse Rate  53 L  09/26/17 14:00


 


Respiratory Rate  18   09/26/17 14:00


 


Blood Pressure  120/54   09/26/17 14:00


 


O2 Sat by Pulse Oximetry (%)  100   09/26/17 09:00











Constitutional: Yes: Calm


Eyes: Yes: Conjunctiva Clear


HENT: Yes: Atraumatic


Cardiovascular: Yes: S1, S2


Respiratory: Yes: CTA Bilaterally


Gastrointestinal: Yes: Normal Bowel Sounds, Soft


Genitourinary: Yes: WNL


Musculoskeletal: Yes: Muscle Weakness


Edema: Yes


Edema: LLE: 1+, RLE: 1+


Neurological: Yes: Oriented


Psychiatric: Yes: Oriented


Labs: 


 CBC, BMP





 09/26/17 05:00 





 09/26/17 05:00 





 INR, PTT











INR  1.17  (0.82-1.09)  H  09/26/17  05:00    


 


Fibrinogen  170.0 mg/dL (238-498)  L  09/23/17  05:15    














Problem List





- Problems


(1) Anemia


Code(s): D64.9 - ANEMIA, UNSPECIFIED   Qualifiers: 


     Anemia type: unspecified type        Qualified Code(s): D64.9 - Anemia, 

unspecified  





(2) GI (gastrointestinal hemorrhage)


Code(s): K92.2 - GASTROINTESTINAL HEMORRHAGE, UNSPECIFIED   Qualifiers: 


     GI bleed type/associated pathology: melena     Qualified Code(s): K92.1 - 

Melena  





(3) BO (acute kidney injury)


Code(s): N17.9 - ACUTE KIDNEY FAILURE, UNSPECIFIED





(4) CAD (coronary artery disease)


Code(s): I25.10 - ATHSCL HEART DISEASE OF NATIVE CORONARY ARTERY W/O ANG PCTRS 

  Qualifiers: 


     Coronary Disease-Associated Artery/Lesion type: unspecified vessel or 

lesion type     Native vs. transplanted heart: native heart     Associated 

angina: without angina        Qualified Code(s): I25.10 - Atherosclerotic heart 

disease of native coronary artery without angina pectoris  





(5) CKD (chronic kidney disease)


Code(s): N18.9 - CHRONIC KIDNEY DISEASE, UNSPECIFIED   Qualifiers: 


     Chronic kidney disease stage: stage 3 (moderate)        Qualified Code(s): 

N18.3 - Chronic kidney disease, stage 3 (moderate)  








Assessment/Plan


 Current Medications











Generic Name Dose Route Start Last Admin





  Trade Name Freq  PRN Reason Stop Dose Admin


 


Acetaminophen  650 mg 09/25/17 23:37 09/26/17 07:31





  Tylenol -  PO   650 mg





  Q6H PRN   Administration





  FEVER OR PAIN   


 


Albuterol Sulfate  1 amp 09/21/17 18:26  





  Ventolin 0.083% Nebulizer Soln -  NEB   





  Q6H PRN   





  SHORT OF BREATH/WHEEZING   


 


Atorvastatin Calcium  10 mg 09/21/17 22:00 09/25/17 21:27





  Lipitor -  PO   10 mg





  HS ALCIDES   Administration


 


Carvedilol  3.125 mg 09/21/17 22:00 09/26/17 10:31





  Coreg -  PO   3.125 mg





  BID ALCIDES   Administration


 


Chlorhexidine Gluconate  1 applic 09/21/17 22:00 09/25/17 21:27





  Hibiclens For Decolonization -  TP   1 applic





  HS ALCIDES   Administration


 


Dextrose/Sodium Chloride  1,000 mls @ 75 mls/hr 09/24/17 17:00 09/25/17 17:18





  D5-1/2ns -  IV   75 mls/hr





  ASDIR ALCIDES   Administration


 


Mupirocin  1 applic 09/21/17 22:00 09/26/17 10:31





  Bactroban Ointment (For Decolonization) -  NS   1 applic





  BID ALCIDES   Administration


 


Pantoprazole Sodium  40 mg 09/25/17 22:00 09/26/17 10:31





  Protonix -  PO   40 mg





  BID ALCIDES   Administration














Impression


1. hx CKD


2. BO improving


3. choledocholithiasis


4. CAD


5. hx of Renal Cell Cancer s/p nephroectomy


6. aortic stenosis


7. GI bleed


8. anemia





Plan


- cxr reviewed


- renal function is stable


- decrease rate of fluids and stop them once he is tolerating diet


- repeat labs in am


- discussed plan with family


- pt has a baseline crt of about 1.7


- will follow


Dr Rodriguez

## 2017-09-26 NOTE — PN
Physical Exam: 


SUBJECTIVE: 87 yo M with h/o CAD, aortic stenosis, and ascending cholangitis 

with recent admission from bacteremia and GI bleed following ERCP discharged on 

09/08 and readmitted with syncopal event found to have hemoglobin of 6.3, BUN 45

, and + FOBT. 


Currently with no complaints. 





No acute events overnight.  Nurse report 1 scant black stool this AM. 

Tolerating clear liquid diet this AM. Denies N/V, C/D, chest pain, abdominal 

pain. 





OBJECTIVE:





 Vital Signs











 Period  Temp  Pulse  Resp  BP Sys/Badillo  Pulse Ox


 


 Last 24 Hr  97.6 F-98.8 F  52-61  14-18  /43-76  











GENERAL: The patient is awake, alert, and fully oriented, in no acute distress.


HEAD: Normal with no signs of trauma.


EYES: PERRL, extraocular movements intact, sclera anicteric, conjunctiva clear. 

No ptosis. 


ENT: Ears normal, nares patent, oropharynx clear without exudates, moist mucous 


membranes.


NECK: Trachea midline, full range of motion, supple. 


LUNGS: Breath sounds equal, clear to auscultation bilaterally, no wheezes, no 

crackles, no 


accessory muscle use. 


HEART: Regular rate and rhythm, S1, S2 without murmur, rub or gallop.


ABDOMEN: Soft, nontender, nondistended, normoactive bowel sounds, no guarding, 

no 


rebound, no hepatosplenomegaly, no masses.


EXTREMITIES: 2+ pulses, warm, well-perfused, no edema. 


NEUROLOGICAL: Cranial nerves II through XII grossly intact. Normal speech, gait 

not 


observed.


PSYCH: Normal mood, normal affect.


SKIN: Warm, dry, normal turgor, no rashes or lesions noted














 Laboratory Results - last 24 hr











  09/23/17 09/25/17 09/26/17





  05:15 13:35 05:00


 


WBC   6.2  4.0  D


 


RBC   3.03 L  2.79 L


 


Hgb   9.3 L  8.6 L


 


Hct   27.4 L  25.4 L


 


MCV   90.3  91.0


 


MCH   30.7  30.6


 


MCHC   34.0  33.7


 


RDW   16.0 H  15.8


 


Plt Count   106 L  88 L


 


MPV   7.8  9.1  D


 


Neutrophils %   65.8  61.0


 


Lymphocytes %   12.2  16.2  D


 


Monocytes %   4.9  7.1


 


Eosinophils %   16.5 H  15.1 H


 


Basophils %   0.6  0.6


 


PT with INR   


 


INR   


 


PTT (Actin FS)   


 


Sodium   


 


Potassium   


 


Chloride   


 


Carbon Dioxide   


 


Anion Gap   


 


BUN   


 


Creatinine   


 


Creat Clearance w eGFR   


 


Random Glucose   


 


Calcium   


 


Phosphorus   


 


Magnesium   


 


Total Bilirubin   


 


AST   


 


ALT   


 


Alkaline Phosphatase   


 


Total Protein   


 


Albumin   


 


Blood Type  A POSITIVE  


 


Antibody Screen  Negative  


 


Crossmatch  See Detail  














  09/26/17 09/26/17 09/26/17





  05:00 05:00 05:00


 


WBC   


 


RBC   


 


Hgb   


 


Hct   


 


MCV   


 


MCH   


 


MCHC   


 


RDW   


 


Plt Count   


 


MPV   


 


Neutrophils %   


 


Lymphocytes %   


 


Monocytes %   


 


Eosinophils %   


 


Basophils %   


 


PT with INR  12.90 H  


 


INR  1.17 H  


 


PTT (Actin FS)    29.4


 


Sodium   139 


 


Potassium   3.5 


 


Chloride   103 


 


Carbon Dioxide   29 


 


Anion Gap   7 L 


 


BUN   32 H D 


 


Creatinine   1.4 H 


 


Creat Clearance w eGFR   47.83 


 


Random Glucose   111 H 


 


Calcium   7.1 L 


 


Phosphorus   2.4 L D 


 


Magnesium   1.9 


 


Total Bilirubin   0.9 


 


AST   21 


 


ALT   13 


 


Alkaline Phosphatase   65 


 


Total Protein   4.6 L 


 


Albumin   2.5 L 


 


Blood Type   


 


Antibody Screen   


 


Crossmatch   








Active Medications











Generic Name Dose Route Start Last Admin





  Trade Name Freq  PRN Reason Stop Dose Admin


 


Acetaminophen  650 mg 09/25/17 23:37 09/26/17 07:31





  Tylenol -  PO   650 mg





  Q6H PRN   Administration





  FEVER OR PAIN   


 


Albuterol Sulfate  1 amp 09/21/17 18:26  





  Ventolin 0.083% Nebulizer Soln -  NEB   





  Q6H PRN   





  SHORT OF BREATH/WHEEZING   


 


Atorvastatin Calcium  10 mg 09/21/17 22:00 09/25/17 21:27





  Lipitor -  PO   10 mg





  HS ALCIDES   Administration


 


Carvedilol  3.125 mg 09/21/17 22:00 09/26/17 10:31





  Coreg -  PO   3.125 mg





  BID ALCIDES   Administration


 


Chlorhexidine Gluconate  1 applic 09/21/17 22:00 09/25/17 21:27





  Hibiclens For Decolonization -  TP   1 applic





  HS ALCIDES   Administration


 


Dextrose/Sodium Chloride  1,000 mls @ 75 mls/hr 09/24/17 17:00 09/25/17 17:18





  D5-1/2ns -  IV   75 mls/hr





  ASDIR ALCIDES   Administration


 


Mupirocin  1 applic 09/21/17 22:00 09/26/17 10:31





  Bactroban Ointment (For Decolonization) -  NS   1 applic





  BID ALCIDES   Administration


 


Pantoprazole Sodium  40 mg 09/25/17 22:00 09/26/17 10:31





  Protonix -  PO   40 mg





  BID ALCIDES   Administration











ASSESSMENT/PLAN:


87 yo M with h/o CAD, aortic stenosis, and ascending cholangitis with recent 

admission from bacteremia and GI bleed following ERCP discharged on 09/08 and 

readmitted with syncopal event found to have hemoglobin of 6.3, BUN 45, and + 

FOBT. 





GI: GI bleed most likely 2/2 AVM in setting of aortic stenosis. 


s/p 10 units PRBC 


EGD (09/19) 2 small non bleeding ulcers


CTA ( 09/23) No active bleeding 


Cont serial H/H


Maintain Hgb >8.0, PLT>80 . PRN PRBC & PLT's





HTN: 


Normotenisve ~ cont Coreg


D/C coreg if bradycardic





Renal: 


BO on CKD~ resolved 


Trend Cr/BUN





Pulm: 


Incidental LLL 5 mm nodule on x ray. CT to reevaluate as outpatient





FEN: lytes PRN, diet per GI


PPx:


DVT-SCD's


PPI ppt





Dispo:  Transfer to Med/Surg  





Visit type





- Emergency Visit


Emergency Visit: Yes


ED Registration Date: 09/19/17


Care time: The patient presented to the Emergency Department on the above date 

and was hospitalized for further evaluation of their emergent condition.





- New Patient


This patient is new to me today: No





- Critical Care


Critical Care patient: Yes


Total Critical Care Time (in minutes): 30


Critical Care Statement: The care of this patient involved high complexity 

decision making to prevent further life threatening deterioration of the patient

's condition and/or to evaluate & treat vital organ system(s) failure or risk 

of failure.

## 2017-09-27 LAB
ALBUMIN SERPL-MCNC: 2.4 G/DL (ref 3.4–5)
ALP SERPL-CCNC: 70 U/L (ref 45–117)
ALT SERPL-CCNC: 14 U/L (ref 12–78)
ANION GAP SERPL CALC-SCNC: 8 MMOL/L (ref 8–16)
AST SERPL-CCNC: 21 U/L (ref 15–37)
BILIRUB SERPL-MCNC: 0.5 MG/DL (ref 0.2–1)
CALCIUM SERPL-MCNC: 7.5 MG/DL (ref 8.5–10.1)
CO2 SERPL-SCNC: 26 MMOL/L (ref 21–32)
CREAT SERPL-MCNC: 1.3 MG/DL (ref 0.7–1.3)
DEPRECATED RDW RBC AUTO: 15.9 % (ref 11.9–15.9)
GLUCOSE SERPL-MCNC: 96 MG/DL (ref 74–106)
MAGNESIUM SERPL-MCNC: 1.8 MG/DL (ref 1.8–2.4)
MCH RBC QN AUTO: 30.8 PG (ref 25.7–33.7)
MCHC RBC AUTO-ENTMCNC: 33.9 G/DL (ref 32–35.9)
MCV RBC: 90.8 FL (ref 80–96)
PHOSPHATE SERPL-MCNC: 2 MG/DL (ref 2.5–4.9)
PLATELET # BLD AUTO: 89 K/MM3 (ref 134–434)
PMV BLD: 9 FL (ref 7.5–11.1)
PROT SERPL-MCNC: 4.6 G/DL (ref 6.4–8.2)
WBC # BLD AUTO: 5.3 K/MM3 (ref 4–10)

## 2017-09-27 RX ADMIN — PANTOPRAZOLE SODIUM SCH MG: 40 TABLET, DELAYED RELEASE ORAL at 09:18

## 2017-09-27 RX ADMIN — PANTOPRAZOLE SODIUM SCH MG: 40 TABLET, DELAYED RELEASE ORAL at 21:02

## 2017-09-27 RX ADMIN — CARVEDILOL SCH MG: 3.12 TABLET, FILM COATED ORAL at 09:18

## 2017-09-27 RX ADMIN — DEXTROSE AND SODIUM CHLORIDE SCH MLS/HR: 5; 450 INJECTION, SOLUTION INTRAVENOUS at 13:02

## 2017-09-27 RX ADMIN — ATORVASTATIN CALCIUM SCH MG: 10 TABLET, FILM COATED ORAL at 21:02

## 2017-09-27 RX ADMIN — CARVEDILOL SCH MG: 3.12 TABLET, FILM COATED ORAL at 21:02

## 2017-09-27 NOTE — PN
Teaching Attending Note


Name of Resident: Jr Rogers


ATTENDING PHYSICIAN STATEMENT





I saw and evaluated the patient.


I reviewed the resident's note and discussed the case with the resident.


I agree with the resident's findings and plan as documented.








SUBJECTIVE:








OBJECTIVE:


 Vital Signs











 Period  Temp  Pulse  Resp  BP Sys/Badillo  Pulse Ox


 


 Last 24 Hr  97.3 F-98.6 F  52-82  16-20  100-128/45-66  














 Current Medications











Generic Name Dose Route Start Last Admin





  Trade Name Freq  PRN Reason Stop Dose Admin


 


Acetaminophen  650 mg 09/26/17 20:20 09/26/17 21:51





  Tylenol -  PO   650 mg





  Q6H PRN   Administration





  FEVER OR PAIN   


 


Atorvastatin Calcium  10 mg 09/26/17 22:00 09/26/17 21:41





  Lipitor -  PO   10 mg





  HS ALCIDES   Administration


 


Carvedilol  3.125 mg 09/26/17 22:00 09/27/17 09:18





  Coreg -  PO   3.125 mg





  BID ALCIDES   Administration


 


Pantoprazole Sodium  40 mg 09/26/17 22:00 09/27/17 09:18





  Protonix -  PO   40 mg





  BID ALCIDES   Administration














ASSESSMENT AND PLAN:

## 2017-09-27 NOTE — PN
Progress Note, Physician


History of Present Illness: 


Pt seen and examined at bedside. He is awake and alert. 





- Current Medication List


Current Medications: 


Active Medications





Acetaminophen (Tylenol -)  650 mg PO Q6H PRN


   PRN Reason: FEVER OR PAIN


   Last Admin: 09/26/17 21:51 Dose:  650 mg


Atorvastatin Calcium (Lipitor -)  10 mg PO HS [facility]


   Last Admin: 09/26/17 21:41 Dose:  10 mg


Carvedilol (Coreg -)  3.125 mg PO BID [facility]


   Last Admin: 09/27/17 09:18 Dose:  3.125 mg


Dextrose/Sodium Chloride (D5-1/2ns -)  1,000 mls @ 40 mls/hr IV ASDIR [facility]


   Last Admin: 09/27/17 13:02 Dose:  40 mls/hr


Pantoprazole Sodium (Protonix -)  40 mg PO BID [facility]


   Last Admin: 09/27/17 09:18 Dose:  40 mg











- Objective


Vital Signs: 


 Vital Signs











Temperature  98.1 F   09/27/17 08:00


 


Pulse Rate  82   09/27/17 08:00


 


Respiratory Rate  20   09/27/17 08:00


 


Blood Pressure  123/66   09/27/17 08:00


 


O2 Sat by Pulse Oximetry (%)  96   09/27/17 08:00











Constitutional: Yes: Calm


Eyes: Yes: Conjunctiva Clear


HENT: Yes: Atraumatic


Neck: Yes: Supple


Cardiovascular: Yes: S1, S2


Respiratory: Yes: CTA Bilaterally


Gastrointestinal: Yes: Soft


Genitourinary: Yes: WNL


Musculoskeletal: Yes: Muscle Weakness


Edema: Yes


Edema: LLE: Trace, RLE: Trace


Neurological: Yes: Oriented


Psychiatric: Yes: Oriented


Labs: 


 CBC, BMP





 09/27/17 06:30 





 09/27/17 06:30 





 INR, PTT











INR  1.17  (0.82-1.09)  H  09/26/17  05:00    


 


Fibrinogen  170.0 mg/dL (238-498)  L  09/23/17  05:15    














Problem List





- Problems


(1) Anemia


Code(s): D64.9 - ANEMIA, UNSPECIFIED   Qualifiers: 


     Anemia type: unspecified type        Qualified Code(s): D64.9 - Anemia, 

unspecified  





(2) GI (gastrointestinal hemorrhage)


Code(s): K92.2 - GASTROINTESTINAL HEMORRHAGE, UNSPECIFIED   Qualifiers: 


     GI bleed type/associated pathology: melena     Qualified Code(s): K92.1 - 

Melena  





(3) BO (acute kidney injury)


Code(s): N17.9 - ACUTE KIDNEY FAILURE, UNSPECIFIED





(4) CAD (coronary artery disease)


Code(s): I25.10 - ATHSCL HEART DISEASE OF NATIVE CORONARY ARTERY W/O ANG PCTRS 

  Qualifiers: 


     Coronary Disease-Associated Artery/Lesion type: unspecified vessel or 

lesion type     Native vs. transplanted heart: native heart     Associated 

angina: without angina        Qualified Code(s): I25.10 - Atherosclerotic heart 

disease of native coronary artery without angina pectoris  





(5) CKD (chronic kidney disease)


Code(s): N18.9 - CHRONIC KIDNEY DISEASE, UNSPECIFIED   Qualifiers: 


     Chronic kidney disease stage: stage 3 (moderate)        Qualified Code(s): 

N18.3 - Chronic kidney disease, stage 3 (moderate)  








Assessment/Plan


 Current Medications











Generic Name Dose Route Start Last Admin





  Trade Name Freq  PRN Reason Stop Dose Admin


 


Acetaminophen  650 mg 09/26/17 20:20 09/26/17 21:51





  Tylenol -  PO   650 mg





  Q6H PRN   Administration





  FEVER OR PAIN   


 


Atorvastatin Calcium  10 mg 09/26/17 22:00 09/26/17 21:41





  Lipitor -  PO   10 mg





  HS ALCIDES   Administration


 


Carvedilol  3.125 mg 09/26/17 22:00 09/27/17 09:18





  Coreg -  PO   3.125 mg





  BID ALCIDES   Administration


 


Dextrose/Sodium Chloride  1,000 mls @ 40 mls/hr 09/26/17 15:51 09/27/17 13:02





  D5-1/2ns -  IV   40 mls/hr





  ASDIR ALCIDES   Administration


 


Pantoprazole Sodium  40 mg 09/26/17 22:00 09/27/17 09:18





  Protonix -  PO   40 mg





  BID ALCIDES   Administration

















Impression


1. hx CKD


2. BO improving


3. choledocholithiasis


4. CAD


5. hx of Renal Cell Cancer s/p nephroectomy


6. aortic stenosis


7. GI bleed


8. anemia





Plan


- renal function is stable


- pt tolerating diet


- will stop fluids


- pt has a baseline crt of about 1.7


- will follow


Dr Rodriguez

## 2017-09-27 NOTE — PN
Physical Exam: 


SUBJECTIVE: Patient seen and examined at bedside. No acute events over night. 

He is asking to go home. He denies  fever, chills, N/V/D/C, fatigue, CP, SOB, 

lightheadedness, dizziness. Had 2 bowel movement dark stool, non bloody.











OBJECTIVE:





 Vital Signs











 Period  Temp  Pulse  Resp  BP Sys/Badillo  Pulse Ox


 


 Last 24 Hr  97.5 F-98.6 F  52-65  16-20  /45-64  100








GENERAL: The patient is awake, alert, and fully oriented, in no acute distress.


HEAD: Normal with no signs of trauma.


EYES:  sclera anicteric, conjunctiva clear. No ptosis. 


ENT:  moist mucous membranes.


LUNGS: Breath sounds equal, clear to auscultation bilaterally, no wheezes, no 

crackles, no 


accessory muscle use. 


HEART: Regular rate and rhythm, S1, S2 with 2/6 sytolic murmur,no rub or gallop.


ABDOMEN: Soft, nontender, nondistended, normoactive bowel sounds, no guarding, 

no 


rebound


EXTREMITIES:  warm, well-perfused, no edema. 


NEUROLOGICAL: good mentation


PSYCH: Normal mood, normal affect.


SKIN: Warm, dry,  no rashes or lesions noted

















 Laboratory Results - last 24 hr











  09/23/17 09/26/17 09/27/17





  05:15 16:00 06:30


 


WBC   5.1  5.3


 


RBC   3.11 L  2.94 L


 


Hgb   9.5 L D  9.0 L


 


Hct   28.3 L  26.7 L


 


MCV   91.1  90.8


 


MCH   30.6  30.8


 


MCHC   33.6  33.9


 


RDW   15.4  15.9


 


Plt Count   96 L  89 L


 


MPV   8.9  9.0


 


Sodium   


 


Potassium   


 


Chloride   


 


Carbon Dioxide   


 


Anion Gap   


 


BUN   


 


Creatinine   


 


Creat Clearance w eGFR   


 


Random Glucose   


 


Calcium   


 


Phosphorus   


 


Magnesium   


 


Total Bilirubin   


 


AST   


 


ALT   


 


Alkaline Phosphatase   


 


Total Protein   


 


Albumin   


 


Blood Type  A POSITIVE  


 


Antibody Screen  Negative  


 


Crossmatch  See Detail  














  09/27/17





  06:30


 


WBC 


 


RBC 


 


Hgb 


 


Hct 


 


MCV 


 


MCH 


 


MCHC 


 


RDW 


 


Plt Count 


 


MPV 


 


Sodium  138


 


Potassium  3.5


 


Chloride  104


 


Carbon Dioxide  26


 


Anion Gap  8


 


BUN  22 H D


 


Creatinine  1.3


 


Creat Clearance w eGFR  52.10


 


Random Glucose  96


 


Calcium  7.5 L


 


Phosphorus  2.0 L


 


Magnesium  1.8


 


Total Bilirubin  0.5  D


 


AST  21


 


ALT  14


 


Alkaline Phosphatase  70


 


Total Protein  4.6 L


 


Albumin  2.4 L


 


Blood Type 


 


Antibody Screen 


 


Crossmatch 








Active Medications











Generic Name Dose Route Start Last Admin





  Trade Name Freq  PRN Reason Stop Dose Admin


 


Acetaminophen  650 mg 09/26/17 20:20 09/26/17 21:51





  Tylenol -  PO   650 mg





  Q6H PRN   Administration





  FEVER OR PAIN   


 


Atorvastatin Calcium  10 mg 09/26/17 22:00 09/26/17 21:41





  Lipitor -  PO   10 mg





  HS ALCIDES   Administration


 


Carvedilol  3.125 mg 09/26/17 22:00 09/27/17 09:18





  Coreg -  PO   3.125 mg





  BID ALCIDES   Administration


 


Dextrose/Sodium Chloride  1,000 mls @ 40 mls/hr 09/26/17 15:51 09/26/17 16:34





  D5-1/2ns -  IV   40 mls/hr





  ASDIR ALCIDES   Administration


 


Pantoprazole Sodium  40 mg 09/26/17 22:00 09/27/17 09:18





  Protonix -  PO   40 mg





  BID ALCIDES   Administration








 CBC, BMP





 09/27/17 06:30 





 09/27/17 06:30 








ASSESSMENT/PLAN:


88M w/ extensive PMH CAD, S/P stent , CKD, AS, choledocholithiasis and 

cholangitis, most recently 09/02 s/p ERCP and CBD stent removal, who presented 

with mechanical  fall, found to have a Hgb of 6.3 and melena in the ED, 

admitted to the ICU for suspected upper GI bleed.





#GI bleed- likely upper, r/o lower(unlikely)


* total of 10  units of PRBCs has been transfused 


* H/H stable over the last 24 hour 9.0/26.7


* lasix 20mg IV  daily 


* vitk , DDAVP,protonix  40 BID


* GI on board- Dr. Terrazas,  EGD showed 2 gastric ulcers in the fundus with no 

active bleeding or visible vessel. bleeding scan is deffered for now due to 

stable H/H 


*  maintain 2 large bore 


*  advance diet to soft food


* surgery on board- Dr. Swann


* cardiology on board- Dr. Botello


* hematology on board- Dr. Dye  


* f/u CBC, CMP, Mg, Ph, PT/PTT/INR in am


* f/u UCx, Bcx, CXR


*  CTA did not localize bleed


* continue conservative management as patient is high risk for surgery 


* nuclear red blood cell scan differed for now 


* Transfuse if Hgb < 8 





# thrombocytopenia


*  continuing to trend down. 


* hematology consulted 


*  S/p 3 unit of platlets transfusion as pt is actively bleeding and source 

unknown. 


* HIT assay and autoimmune workup . 


* goal for platelets >90. 


   


#fall on buttocks, most likely mechanical, vs hypotension vs  acute anemia 


* complain of pain in the left hip


*  denies any dizzines or loss of consciousness before and after the fall, 

denies to heat his head


*  pelvic XR  negative for fracture


*  consider CT if pain persist


* pain control 


* PT eval: walk 100 feet, will benefit from DESIRE 





#HTN


* currently hypotensive 


* will monitor closely 


* will give IV fluids as needed


* start carvedilol,   (hold if systolic BP < 130 OR HR < 60)





# CKD, stable 


* at his base line 


#CAD


* hold atorvastatin


* Hold blavix for now due to bleeding 


* Cardiology recommend to use ASA instead of plavix when he is stable and 

hemoglobin are stable.2 weeks after GI follow up 


* 


#Pulmonary nodule


*  incidental 5mm LLL pulmonary nodule.


*  will need CT scan to further evaluate


* F/U as out patient 





#Urethral Strictures s/p Dilation


* hold finasteride and flomax





#Hx of choledocholithiasis


* hold ursodiol





#constipation, resolved 


* Hold stool softeners





#AS,


*  f/u as out patient 


   


#Porcine Valve Replacement


* F/U as out patient 








#FEN/PPx


*  on no fluids,


* electrolytes wnl


*  advanced to soft diet  


* protonix 40 BID


* SCDs, no pharmacology anticoagulation for now 





#Dispo


* Admitted to ICU due to upper GI bleed


* Transferred to med surg as Hgb is stable 


* Possible discharge tomorrow to [facility] or home with visiting nurse will discuss 

with the family








Visit type





- Emergency Visit


Emergency Visit: Yes


ED Registration Date: 09/19/17


Care time: The patient presented to the Emergency Department on the above date 

and was hospitalized for further evaluation of their emergent condition.





- New Patient


This patient is new to me today: No





- Critical Care


Critical Care patient: No





- Discharge Referral


Referred to [facility] No

## 2017-09-27 NOTE — PN
Progress Note, Physician


History of Present Illness: 


No further bleeding, Hgb stable.











- Current Medication List


Current Medications: 


Active Medications





Acetaminophen (Tylenol -)  650 mg PO Q6H PRN


   PRN Reason: FEVER OR PAIN


   Last Admin: 09/26/17 21:51 Dose:  650 mg


Atorvastatin Calcium (Lipitor -)  10 mg PO HS [facility]


   Last Admin: 09/26/17 21:41 Dose:  10 mg


Carvedilol (Coreg -)  3.125 mg PO BID [facility]


   Last Admin: 09/27/17 09:18 Dose:  3.125 mg


Pantoprazole Sodium (Protonix -)  40 mg PO BID [facility]


   Last Admin: 09/27/17 09:18 Dose:  40 mg











- Objective


Vital Signs: 


 Vital Signs











Temperature  97.3 F L  09/27/17 14:00


 


Pulse Rate  58 L  09/27/17 14:00


 


Respiratory Rate  18   09/27/17 14:00


 


Blood Pressure  117/57   09/27/17 14:00


 


O2 Sat by Pulse Oximetry (%)  96   09/27/17 10:40











Constitutional: Yes: No Distress, Calm


Neck: Yes: Supple


Cardiovascular: Yes: Regular Rate and Rhythm


Respiratory: Yes: Regular, Diminished


Gastrointestinal: Yes: Normal Bowel Sounds, Soft


Edema: No


Labs: 


 CBC, BMP





 09/27/17 06:30 





 09/27/17 06:30 





 INR, PTT











INR  1.17  (0.82-1.09)  H  09/26/17  05:00    


 


Fibrinogen  170.0 mg/dL (238-498)  L  09/23/17  05:15    














Problem List





- Problems


(1) Anemia


Code(s): D64.9 - ANEMIA, UNSPECIFIED   Qualifiers: 


     Anemia type: unspecified type        Qualified Code(s): D64.9 - Anemia, 

unspecified  





(2) GI (gastrointestinal hemorrhage)


Code(s): K92.2 - GASTROINTESTINAL HEMORRHAGE, UNSPECIFIED   Qualifiers: 


     GI bleed type/associated pathology: melena     Qualified Code(s): K92.1 - 

Melena  





(3) CAD (coronary artery disease)


Code(s): I25.10 - ATHSCL HEART DISEASE OF NATIVE CORONARY ARTERY W/O ANG PCTRS 

  Qualifiers: 


     Coronary Disease-Associated Artery/Lesion type: unspecified vessel or 

lesion type     Native vs. transplanted heart: native heart     Associated 

angina: without angina        Qualified Code(s): I25.10 - Atherosclerotic heart 

disease of native coronary artery without angina pectoris  





(4) CKD (chronic kidney disease)


Code(s): N18.9 - CHRONIC KIDNEY DISEASE, UNSPECIFIED   Qualifiers: 


     Chronic kidney disease stage: stage 3 (moderate)        Qualified Code(s): 

N18.3 - Chronic kidney disease, stage 3 (moderate)  





(5) History of percutaneous coronary intervention


Code(s): Z98.890 - OTHER SPECIFIED POSTPROCEDURAL STATES





(6) Hypercholesterolemia


Code(s): E78.00 - PURE HYPERCHOLESTEROLEMIA, UNSPECIFIED





(7) Left bundle branch block


Code(s): I44.7 - LEFT BUNDLE-BRANCH BLOCK, UNSPECIFIED





(8) Renal cancer


Code(s): C64.9 - MALIGNANT NEOPLASM OF UNSP KIDNEY, EXCEPT RENAL PELVIS   

Qualifiers: 


     Laterality: right     Qualified Code(s): C64.1 - Malignant neoplasm of 

right kidney, except renal pelvis  





(9) S/P ERCP


Code(s): Z98.890 - OTHER SPECIFIED POSTPROCEDURAL STATES





(10) S/P TAVR (transcatheter aortic valve replacement)


Code(s): Z95.2 - PRESENCE OF PROSTHETIC HEART VALVE





(11) S/P laparoscopic cholecystectomy


Code(s): Z90.49 - ACQUIRED ABSENCE OF OTHER SPECIFIED PARTS OF DIGESTIVE TRACT





(12) Hypertension


Code(s): I10 - ESSENTIAL (PRIMARY) HYPERTENSION   Qualifiers: 


     Hypertension type: essential hypertension        Qualified Code(s): I10 - 

Essential (primary) hypertension  





(13) Gastric ulcer due to nonsteroidal anti-inflammatory drug (NSAID)


Code(s): K25.9 - GASTRIC ULCER, UNSP AS ACUTE OR CHRONIC, W/O HEMOR OR PERF


T39.395A - ADVERSE EFFECT OF NONSTEROIDAL ANTI-INFLAMMATORY DRUGS, INIT





(14) Thrombocytopenia


Code(s): D69.6 - THROMBOCYTOPENIA, UNSPECIFIED








Assessment/Plan


5/17/2017 Echo: Normal LV and RV size with severely decreased LV systolic fxn, 

mod-severe decrease RV fxn, mod MR, mild TR





1. Recurrent gastrointestinal bleed, most likely related to AVM's, no further 

bleed 


2. Coronary artery disease, post PCI/stent, angina pectoris


3. LV systolic dysfunction with chronic class I NYHA classification LV failure, 

compensated/euvolemic


4. Aortic stenosis Post TAVR


5. Hypertension


6. Anemia and thrombocytopenia


7. CKD with history of renal cancer post right nephrectomy


8. History of ERCP, biliary stent implant/removal and stone retrieval for 

ascending cholangitis








PLAN:





1. Continue Protonix 40 bid


2. Continue Carvedilol 3.125 bid hemodynamics permitting


3. Continue Lipitor 10 qd


4. Prn PRBC and platelet transfusions, maintain Hg equal or > 8.0 and platelet 

equal or > 80

## 2017-09-28 VITALS — SYSTOLIC BLOOD PRESSURE: 105 MMHG | TEMPERATURE: 97.4 F | DIASTOLIC BLOOD PRESSURE: 54 MMHG | HEART RATE: 56 BPM

## 2017-09-28 LAB
ANION GAP SERPL CALC-SCNC: 7 MMOL/L (ref 8–16)
CALCIUM SERPL-MCNC: 8 MG/DL (ref 8.5–10.1)
CO2 SERPL-SCNC: 27 MMOL/L (ref 21–32)
CREAT SERPL-MCNC: 1.4 MG/DL (ref 0.7–1.3)
DEPRECATED RDW RBC AUTO: 16.6 % (ref 11.9–15.9)
GLUCOSE SERPL-MCNC: 99 MG/DL (ref 74–106)
MCH RBC QN AUTO: 30.8 PG (ref 25.7–33.7)
MCHC RBC AUTO-ENTMCNC: 33.7 G/DL (ref 32–35.9)
MCV RBC: 91.4 FL (ref 80–96)
PLATELET # BLD AUTO: 109 K/MM3 (ref 134–434)
PMV BLD: 9 FL (ref 7.5–11.1)
WBC # BLD AUTO: 7 K/MM3 (ref 4–10)

## 2017-09-28 RX ADMIN — ACETAMINOPHEN PRN MG: 325 TABLET ORAL at 05:21

## 2017-09-28 RX ADMIN — CARVEDILOL SCH MG: 3.12 TABLET, FILM COATED ORAL at 09:04

## 2017-09-28 RX ADMIN — PANTOPRAZOLE SODIUM SCH MG: 40 TABLET, DELAYED RELEASE ORAL at 09:04

## 2017-09-28 NOTE — PN
Progress Note (short form)





- Note


Progress Note: 


Ambulating in the hallway.  


No occult bleeding overnight. 


No CP or SOB.





 Intake & Output











 09/25/17 09/26/17 09/27/17 09/28/17





 23:59 23:59 23:59 23:59


 


Intake Total 2473.6 2500 1630 


 


Output Total  200  


 


Balance 2473.6 2300 1630 


 


Weight 167 lb 11.2 oz 168 lb 12.8 oz 179 lb 14.4 oz 181 lb








 Last Vital Signs











Temp Pulse Resp BP Pulse Ox


 


 98.0 F   60   16   122/60   97 


 


 09/28/17 08:00  09/28/17 08:00  09/28/17 08:00  09/28/17 08:00  09/28/17 08:50








Active Medications





Acetaminophen (Tylenol -)  650 mg PO Q6H PRN


   PRN Reason: FEVER OR PAIN


   Last Admin: 09/28/17 05:21 Dose:  650 mg


Atorvastatin Calcium (Lipitor -)  10 mg PO HS [facility]


   Last Admin: 09/27/17 21:02 Dose:  10 mg


Carvedilol (Coreg -)  3.125 mg PO BID [facility]


   Last Admin: 09/28/17 09:04 Dose:  3.125 mg


Pantoprazole Sodium (Protonix -)  40 mg PO BID [facility]


   Last Admin: 09/28/17 09:04 Dose:  40 mg








Gen:  NAD at rest


Heart: RRR


Lung: decreased breath sounds at the bases


Abd: soft, nontender


Ext: no edema





 


 Laboratory Results - last 24 hr











  09/28/17 09/28/17





  08:45 08:45


 


WBC  7.0  D 


 


RBC  3.34 L 


 


Hgb  10.3 L D 


 


Hct  30.5 L 


 


MCV  91.4 


 


MCH  30.8 


 


MCHC  33.7 


 


RDW  16.6 H 


 


Plt Count  109 L D 


 


MPV  9.0 


 


Sodium   138


 


Potassium   3.5


 


Chloride   104


 


Carbon Dioxide   27


 


Anion Gap   7 L


 


BUN   18


 


Creatinine   1.4 H


 


Random Glucose   99


 


Calcium   8.0 L














ASSESSMENT AND PLAN:


GI Bleed 


Gastric Ulcers


Acute Blood Loss Anemia


Thrombocytopenia


CAD s/p stent


Aortic Stenosis s/p TAVR


LV Systolic Dysfunction


h/o Cholangitis


Acute on CKD


h/o R Nephrectomy





-  monitor H/H, platelets


-  transfuse as needed


-  protonix


-  PO per GI


-  hold all anticoagulation, antiplatelets


-  Mechanical VTE prophylaxis





Dr Sidhu

## 2017-09-28 NOTE — DS
Physical Exam: 


SUBJECTIVE: Patient seen and examined at bed side. No acute events over night. 

no bloody BM, He denies fever, chills, N/V/D/.C. No CP, OB, palpitation , 

lightheadedness , fatigue was reported. asking to go home. 








OBJECTIVE:





 Vital Signs











 Period  Temp  Pulse  Resp  BP Sys/Badillo  Pulse Ox


 


 Last 24 Hr  97.3 F-98.0 F  57-60  16-19  117-128/56-61  96-97








PHYSICAL EXAM


GENERAL: The patient is awake, alert, and fully oriented, in no acute distress.


HEAD: Normal with no signs of trauma.


EYES:  sclera anicteric, conjunctiva clear. No ptosis. 


ENT:  moist mucous membranes.


LUNGS: Breath sounds equal, clear to auscultation bilaterally, no wheezes, no 

crackles, no 


accessory muscle use. 


HEART: Regular rate and rhythm, S1, S2 with 2/6 sytolic murmur,no rub or gallop.


ABDOMEN: Soft, nontender, nondistended, normoactive bowel sounds, no guarding, 

no 


rebound


EXTREMITIES:  warm, well-perfused, no edema. 


NEUROLOGICAL: good mentation


PSYCH: Normal mood, normal affect.


SKIN: Warm, dry,  no rashes or lesions noted








LABS


 Laboratory Results - last 24 hr











  09/28/17 09/28/17





  08:45 08:45


 


WBC  7.0  D 


 


RBC  3.34 L 


 


Hgb  10.3 L D 


 


Hct  30.5 L 


 


MCV  91.4 


 


MCH  30.8 


 


MCHC  33.7 


 


RDW  16.6 H 


 


Plt Count  109 L D 


 


MPV  9.0 


 


Sodium   138


 


Potassium   3.5


 


Chloride   104


 


Carbon Dioxide   27


 


Anion Gap   7 L


 


BUN   18


 


Creatinine   1.4 H


 


Random Glucose   99


 


Calcium   8.0 L








 CBC, BMP





 09/28/17 08:45 





 09/28/17 08:45 








HOSPITAL COURSE:





Date of Admission:09/19/17





Date of Discharge: 09/28/17








88M w/ extensive PMH CAD, S/P stent , CKD, AS, choledocholithiasis and 

cholangitis, most recently 09/02 s/p ERCP and CBD stent removal, who presented 

with mechanical  fall, found to have a Hgb of 6.3 and melena in the ED, 

admitted to the ICU for suspected upper GI bleed. Total of 10 units of PRBC, 2 

FFP and 4 units of platlets  were transfused during the hospital course.  

Abdominal CTA was negative for bleeding . GI was consulted  and  EGD 

was done that  showed 2 gastric ulcers in the fundus with no active bleeding or 

visible vessel. bleeding scan is differed when  H/H are stable. Vit K , DDAVP, 

Protonix was given too. 


The bleeding most likely secondary to AVM or diverticulosis in the colon.





In the term of the mechanical fall the images are negative for fractures and 

able to walk wit PT up to 100 feet. pain has been improved and tolerating well 

with Tylenol. 


Patient will be discharged home with visiting nurse. 





In term of Hypertension it is well controlled and his Carvidilol has been 

decreased to 3.125 mg BID . 


No Aspirine , antiplatelets , Ibuprofen, advil, anticoagulation are allowed 

till he see his PCP or His GI doctor Irina.Explained  risk of bleeding to the 

patient.


will use Tylenol only for pain as needed. 


will avoid constipation with stool softeners





Minutes to complete discharge: 30





Discharge Summary


Reason For Visit: GASTROINTESTINAL BLEEDING; ANEMIA; WEAKNESS


Current Active Problems





GI (gastrointestinal hemorrhage) (Acute) 


Anemia (Chronic) 


Gastric ulcer due to nonsteroidal anti-inflammatory drug (NSAID) (Chronic) 








Condition: Stable





- Instructions


Diet, Activity, Other Instructions: 


You have been admitted to the hospital after mechanical fall, images were 

negative for any fractures . You can use Tylenol for pain as needed. 





In the emergency you were found to have low blood count and total 10 units of 

blood, 3 units of platelets, 1 unit of fresh frozen plasma was transfused 

during the hospitalisation. You could be blkeeding from the gastro enterolgy 

system. the bleeding has stopped for now


Your blood count has been stable for more than 24 hours , you tolerated the 

regular diet. you will be send home with nursing visit.





You were found to have small nodule on your lung scan please follow as out 

patient with your lung doctor.




















Please take your medicine as prescribed 


Please take the following medicine 


Your dose of carvedilol has been decreased to 3.125 twice daily . for blood 

pressure


Atorvastatin 10 mg daily 


Protonix 40 twice a day for 2 weeks and then once a day per  


Tylenol 650 mg for pain every 8 hours as needed 

















Please stop the following medicine 


 Aspirin, Advil, motrin, Ibuprofen, any blood thinner or anticoagulation like 

clopidogrel.














Please use fall precaution at home to avoid any fall, 


Please keep your self hydrated to avoid any drop in your blood pressure 


Please resume your daily activity as tolerated





Please advance your diet as tolerated. Please follow high fiber diet and avoid 

constipation , use stool softeners as tolerated.


 


Please follow with your primary care physician within a week .to follow up on 

your blood count.


Please follow up with  the gastro enteroloy doctor within 2 weeks. 


Please follow up with your cardiologist DR.Martin Sanabria within 2 weeks. 


Please if you notice any blood in the stool, blood in urine, or any other 

bleeding come back to the emergency department as soon as possible. 





If you develop any  fever,chills, bleeding in the stool, urine, or your 

symptoms worsen please come back to emergency department ASAP. 








Referrals: 


Clint Sanabria [Non Staff, Medical] - 1 Week


Raul Terrazas MD [Staff Physician] - 2 Weeks


Brandyn Mckay MD [Primary Care Provider] - 1 Week


Dell Castillo MD [Staff Physician] - 


Disposition: VNS/HOME HEALTH CARE





- Home Medications


Comprehensive Discharge Medication List: 


Ambulatory Orders





Finasteride 5 mg PO DAILY 02/19/17 


Ursodiol 300 mg PO BID  capsule 07/12/17 


Montelukast Sodium [Singulair] 10 mg PO DAILY  tablet 08/16/17 


Atorvastatin Calcium 10 mg PO DAILY  tablet 08/18/17 


Tamsulosin HCl [Flomax -] 0.4 mg PO DAILY 09/01/17 


Ursodiol [Actigall] 300 mg PO BID #180 capsule 09/01/17 


Phenol [Chloraseptic -] 1 spray MM Q6HPO PRN #1 bottle 09/08/17 


Carvedilol [Coreg -] 3.125 mg PO BID #60 tablet 09/28/17 


Docusate Sodium [Colace -] 300 mg PO HS PRN #21 cap 09/28/17 


Pantoprazole Sodium [Protonix -] 40 mg PO BID #60 tab 09/28/17 


Polyethylene Glycol 3350 [Miralax 119 gm Btl -] 17 gm PO DAILY PRN #1 bottle 09/ 28/17 








This patient is new to me today: No


Emergency Visit: Yes


ED Registration Date: 09/19/17


Care time: The patient presented to the Emergency Department on the above date 

and was hospitalized for further evaluation of their emergent condition.


Critical Care patient: No





- Discharge Referral


Referred to [facility] No

## 2017-09-28 NOTE — PN
Teaching Attending Note


Name of Resident: Jr Rogers


ATTENDING PHYSICIAN STATEMENT





I saw and evaluated the patient.


I reviewed the resident's note and discussed the case with the resident.


I agree with the resident's findings and plan as documented.








SUBJECTIVE:








OBJECTIVE:


 Vital Signs











 Period  Temp  Pulse  Resp  BP Sys/Badillo  Pulse Ox


 


 Last 24 Hr  97.4 F-98.0 F  56-60  16-18  105-122/54-60  96-97




















ASSESSMENT AND PLAN:

## 2017-09-28 NOTE — PN
Progress Note, Physician


History of Present Illness: 


Pt seen and examined at bedside. He is awake and alert. He denies any blood in 

the stool. 





- Current Medication List


Current Medications: 


Active Medications





Acetaminophen (Tylenol -)  650 mg PO Q6H PRN


   PRN Reason: FEVER OR PAIN


   Last Admin: 09/28/17 05:21 Dose:  650 mg


Atorvastatin Calcium (Lipitor -)  10 mg PO HS Pending sale to [facility]


   Last Admin: 09/27/17 21:02 Dose:  10 mg


Carvedilol (Coreg -)  3.125 mg PO BID Pending sale to [facility]


   Last Admin: 09/28/17 09:04 Dose:  3.125 mg


Pantoprazole Sodium (Protonix -)  40 mg PO BID Pending sale to [facility]


   Last Admin: 09/28/17 09:04 Dose:  40 mg











- Objective


Vital Signs: 


 Vital Signs











Temperature  97.4 F L  09/28/17 14:00


 


Pulse Rate  56 L  09/28/17 14:00


 


Respiratory Rate  17   09/28/17 14:00


 


Blood Pressure  105/54   09/28/17 14:00


 


O2 Sat by Pulse Oximetry (%)  97   09/28/17 08:50











Constitutional: Yes: Calm


Eyes: Yes: Conjunctiva Clear


HENT: Yes: Atraumatic


Neck: Yes: Supple


Cardiovascular: Yes: S1, S2


Respiratory: Yes: CTA Bilaterally


Gastrointestinal: Yes: Soft


Genitourinary: Yes: WNL


Musculoskeletal: Yes: Muscle Weakness


Edema: Yes


Edema: LLE: Trace, RLE: Trace


Neurological: Yes: Oriented


Psychiatric: Yes: Oriented


Labs: 


 CBC, BMP





 09/28/17 08:45 





 09/28/17 08:45 





 INR, PTT











INR  1.17  (0.82-1.09)  H  09/26/17  05:00    


 


Fibrinogen  170.0 mg/dL (238-498)  L  09/23/17  05:15    














Problem List





- Problems


(1) Anemia


Code(s): D64.9 - ANEMIA, UNSPECIFIED   Qualifiers: 


     Anemia type: unspecified type        Qualified Code(s): D64.9 - Anemia, 

unspecified  





(2) GI (gastrointestinal hemorrhage)


Code(s): K92.2 - GASTROINTESTINAL HEMORRHAGE, UNSPECIFIED   Qualifiers: 


     GI bleed type/associated pathology: melena     Qualified Code(s): K92.1 - 

Melena  





(3) BO (acute kidney injury)


Code(s): N17.9 - ACUTE KIDNEY FAILURE, UNSPECIFIED





(4) CAD (coronary artery disease)


Code(s): I25.10 - ATHSCL HEART DISEASE OF NATIVE CORONARY ARTERY W/O ANG PCTRS 

  Qualifiers: 


     Coronary Disease-Associated Artery/Lesion type: unspecified vessel or 

lesion type     Native vs. transplanted heart: native heart     Associated 

angina: without angina        Qualified Code(s): I25.10 - Atherosclerotic heart 

disease of native coronary artery without angina pectoris  





(5) CKD (chronic kidney disease)


Code(s): N18.9 - CHRONIC KIDNEY DISEASE, UNSPECIFIED   Qualifiers: 


     Chronic kidney disease stage: stage 3 (moderate)        Qualified Code(s): 

N18.3 - Chronic kidney disease, stage 3 (moderate)  








Assessment/Plan


 Current Medications











Generic Name Dose Route Start Last Admin





  Trade Name Freq  PRN Reason Stop Dose Admin


 


Acetaminophen  650 mg 09/26/17 20:20 09/28/17 05:21





  Tylenol -  PO   650 mg





  Q6H PRN   Administration





  FEVER OR PAIN   


 


Atorvastatin Calcium  10 mg 09/26/17 22:00 09/27/17 21:02





  Lipitor -  PO   10 mg





  HS ALCIDES   Administration


 


Carvedilol  3.125 mg 09/26/17 22:00 09/28/17 09:04





  Coreg -  PO   3.125 mg





  BID ALCIDES   Administration


 


Pantoprazole Sodium  40 mg 09/26/17 22:00 09/28/17 09:04





  Protonix -  PO   40 mg





  BID ALCIDES   Administration

















Impression


1. hx CKD


2. BO improving


3. choledocholithiasis


4. CAD


5. hx of Renal Cell Cancer s/p nephroectomy


6. aortic stenosis


7. GI bleed


8. anemia





Plan


- bmp reviewed and renal function is stale


- hg is stable


- can see as outpt


- no acute change in management


- pt tolerating diet


- will follow


Dr Rodriguez

## 2017-09-28 NOTE — PN
Progress Note, Physician


History of Present Illness: 


No further bleeding, Hgb stable.











- Current Medication List


Current Medications: 


Active Medications





Acetaminophen (Tylenol -)  650 mg PO Q6H PRN


   PRN Reason: FEVER OR PAIN


   Last Admin: 09/28/17 05:21 Dose:  650 mg


Atorvastatin Calcium (Lipitor -)  10 mg PO HS [facility]


   Last Admin: 09/27/17 21:02 Dose:  10 mg


Carvedilol (Coreg -)  3.125 mg PO BID [facility]


   Last Admin: 09/28/17 09:04 Dose:  3.125 mg


Pantoprazole Sodium (Protonix -)  40 mg PO BID [facility]


   Last Admin: 09/28/17 09:04 Dose:  40 mg











- Objective


Vital Signs: 


 Vital Signs











Temperature  98.0 F   09/28/17 08:00


 


Pulse Rate  60   09/28/17 08:00


 


Respiratory Rate  16   09/28/17 08:00


 


Blood Pressure  122/60   09/28/17 08:00


 


O2 Sat by Pulse Oximetry (%)  97   09/28/17 08:50











Constitutional: Yes: No Distress, Calm


Neck: Yes: Supple


Cardiovascular: Yes: Regular Rate and Rhythm


Respiratory: Yes: Regular, Diminished


Gastrointestinal: Yes: Normal Bowel Sounds, Soft


Edema: No


Labs: 


 CBC, BMP





 09/28/17 08:45 





 09/28/17 08:45 





 INR, PTT











INR  1.17  (0.82-1.09)  H  09/26/17  05:00    


 


Fibrinogen  170.0 mg/dL (238-498)  L  09/23/17  05:15    














Problem List





- Problems


(1) Anemia


Code(s): D64.9 - ANEMIA, UNSPECIFIED   Qualifiers: 


     Anemia type: unspecified type        Qualified Code(s): D64.9 - Anemia, 

unspecified  





(2) GI (gastrointestinal hemorrhage)


Code(s): K92.2 - GASTROINTESTINAL HEMORRHAGE, UNSPECIFIED   Qualifiers: 


     GI bleed type/associated pathology: melena     Qualified Code(s): K92.1 - 

Melena  





(3) CAD (coronary artery disease)


Code(s): I25.10 - ATHSCL HEART DISEASE OF NATIVE CORONARY ARTERY W/O ANG PCTRS 

  Qualifiers: 


     Coronary Disease-Associated Artery/Lesion type: unspecified vessel or 

lesion type     Native vs. transplanted heart: native heart     Associated 

angina: without angina        Qualified Code(s): I25.10 - Atherosclerotic heart 

disease of native coronary artery without angina pectoris  





(4) CKD (chronic kidney disease)


Code(s): N18.9 - CHRONIC KIDNEY DISEASE, UNSPECIFIED   Qualifiers: 


     Chronic kidney disease stage: stage 3 (moderate)        Qualified Code(s): 

N18.3 - Chronic kidney disease, stage 3 (moderate)  





(5) History of percutaneous coronary intervention


Code(s): Z98.890 - OTHER SPECIFIED POSTPROCEDURAL STATES





(6) Hypercholesterolemia


Code(s): E78.00 - PURE HYPERCHOLESTEROLEMIA, UNSPECIFIED





(7) Left bundle branch block


Code(s): I44.7 - LEFT BUNDLE-BRANCH BLOCK, UNSPECIFIED





(8) Renal cancer


Code(s): C64.9 - MALIGNANT NEOPLASM OF UNSP KIDNEY, EXCEPT RENAL PELVIS   

Qualifiers: 


     Laterality: right     Qualified Code(s): C64.1 - Malignant neoplasm of 

right kidney, except renal pelvis  





(9) S/P ERCP


Code(s): Z98.890 - OTHER SPECIFIED POSTPROCEDURAL STATES





(10) S/P TAVR (transcatheter aortic valve replacement)


Code(s): Z95.2 - PRESENCE OF PROSTHETIC HEART VALVE





(11) S/P laparoscopic cholecystectomy


Code(s): Z90.49 - ACQUIRED ABSENCE OF OTHER SPECIFIED PARTS OF DIGESTIVE TRACT





(12) Hypertension


Code(s): I10 - ESSENTIAL (PRIMARY) HYPERTENSION   Qualifiers: 


     Hypertension type: essential hypertension        Qualified Code(s): I10 - 

Essential (primary) hypertension  





(13) Gastric ulcer due to nonsteroidal anti-inflammatory drug (NSAID)


Code(s): K25.9 - GASTRIC ULCER, UNSP AS ACUTE OR CHRONIC, W/O HEMOR OR PERF


T39.395A - ADVERSE EFFECT OF NONSTEROIDAL ANTI-INFLAMMATORY DRUGS, INIT





(14) Thrombocytopenia


Code(s): D69.6 - THROMBOCYTOPENIA, UNSPECIFIED








Assessment/Plan


5/17/2017 Echo: Normal LV and RV size with severely decreased LV systolic fxn, 

mod-severe decrease RV fxn, mod MR, mild TR





1. Recurrent gastrointestinal bleed, most likely related to AVM's, no further 

bleed 


2. Coronary artery disease, post PCI/stent, angina pectoris


3. LV systolic dysfunction with chronic class I NYHA classification LV failure, 

compensated/euvolemic


4. Aortic stenosis Post TAVR


5. Hypertension


6. Anemia and thrombocytopenia


7. CKD with history of renal cancer post right nephrectomy


8. History of ERCP, biliary stent implant/removal and stone retrieval for 

ascending cholangitis








PLAN:





1. Continue Protonix 40 bid


2. Continue Carvedilol 3.125 bid hemodynamics permitting


3. Continue Lipitor 10 qd


4. Prn PRBC and platelet transfusions, maintain Hg equal or > 8.0 and platelet 

equal or > 80


5. D/c planning with CV f/u with Dr. Clint Sanabria at [facility].

## 2018-01-26 NOTE — EKG
Test Reason : 

Blood Pressure : ***/*** mmHG

Vent. Rate : 095 BPM     Atrial Rate : 095 BPM

   P-R Int : 152 ms          QRS Dur : 148 ms

    QT Int : 388 ms       P-R-T Axes : -25 [phone] degrees

   QTc Int : 487 ms

 

NORMAL SINUS RHYTHM

LEFT BUNDLE BRANCH BLOCK

ABNORMAL ECG

WHEN COMPARED WITH ECG OF 15-MAY-2017 18:49,

NO SIGNIFICANT CHANGE WAS FOUND

Confirmed by MONET HERNANDEZ MD ([id number]) on 5/16/2017 5:09:54 PM

 

Referred By:             Confirmed By:MONET HERNANDEZ MD No

## 2018-12-12 PROBLEM — Z00.00 ENCOUNTER FOR PREVENTIVE HEALTH EXAMINATION: Status: ACTIVE | Noted: 2018-12-12

## 2018-12-18 ENCOUNTER — RX RENEWAL (OUTPATIENT)
Age: 83
End: 2018-12-18

## 2019-04-30 ENCOUNTER — RX RENEWAL (OUTPATIENT)
Age: 84
End: 2019-04-30

## 2019-05-29 ENCOUNTER — HOSPITAL ENCOUNTER (INPATIENT)
Dept: HOSPITAL 74 - FER | Age: 84
LOS: 5 days | Discharge: HOME | DRG: 872 | End: 2019-06-03
Attending: NURSE PRACTITIONER | Admitting: INTERNAL MEDICINE
Payer: COMMERCIAL

## 2019-05-29 VITALS — BODY MASS INDEX: 23.9 KG/M2

## 2019-05-29 DIAGNOSIS — I25.10: ICD-10-CM

## 2019-05-29 DIAGNOSIS — Z85.528: ICD-10-CM

## 2019-05-29 DIAGNOSIS — Z98.61: ICD-10-CM

## 2019-05-29 DIAGNOSIS — Z90.5: ICD-10-CM

## 2019-05-29 DIAGNOSIS — E78.5: ICD-10-CM

## 2019-05-29 DIAGNOSIS — R65.20: ICD-10-CM

## 2019-05-29 DIAGNOSIS — J44.9: ICD-10-CM

## 2019-05-29 DIAGNOSIS — I35.0: ICD-10-CM

## 2019-05-29 DIAGNOSIS — N40.1: ICD-10-CM

## 2019-05-29 DIAGNOSIS — I12.9: ICD-10-CM

## 2019-05-29 DIAGNOSIS — K21.9: ICD-10-CM

## 2019-05-29 DIAGNOSIS — N39.0: ICD-10-CM

## 2019-05-29 DIAGNOSIS — A41.9: Primary | ICD-10-CM

## 2019-05-29 DIAGNOSIS — R33.9: ICD-10-CM

## 2019-05-29 DIAGNOSIS — N18.9: ICD-10-CM

## 2019-05-29 LAB
ALBUMIN SERPL-MCNC: 3.1 G/DL (ref 3.4–5)
ALP SERPL-CCNC: 78 U/L (ref 45–117)
ALT SERPL-CCNC: 14 U/L (ref 13–61)
ANION GAP SERPL CALC-SCNC: 14 MMOL/L (ref 8–16)
ANISOCYTOSIS BLD QL: (no result)
AST SERPL-CCNC: 28 U/L (ref 15–37)
BASOPHILS # BLD: 0.6 % (ref 0–2)
BILIRUB SERPL-MCNC: 1.4 MG/DL (ref 0.2–1)
BUN SERPL-MCNC: 35 MG/DL (ref 7–18)
CALCIUM SERPL-MCNC: 8.7 MG/DL (ref 8.5–10)
CHLORIDE SERPL-SCNC: 107 MMOL/L (ref 98–107)
CO2 SERPL-SCNC: 19 MMOL/L (ref 21–32)
CREAT SERPL-MCNC: 1.8 MG/DL (ref 0.55–1.3)
DEPRECATED RDW RBC AUTO: 15.1 % (ref 11.9–15.9)
EOSINOPHIL # BLD: 0.7 % (ref 0–4.5)
EPITH CASTS URNS QL MICRO: (no result) /HPF
GLUCOSE SERPL-MCNC: 150 MG/DL (ref 74–106)
HCT VFR BLD CALC: 31.6 % (ref 35.4–49)
HGB BLD-MCNC: 10.3 GM/DL (ref 11.7–16.9)
LYMPHOCYTES # BLD: 5.2 % (ref 8–40)
MACROCYTES BLD QL: (no result)
MCH RBC QN AUTO: 30.9 PG (ref 25.7–33.7)
MCHC RBC AUTO-ENTMCNC: 32.6 G/DL (ref 32–35.9)
MCV RBC: 94.9 FL (ref 80–96)
MONOCYTES # BLD AUTO: 0.7 % (ref 3.8–10.2)
NEUTROPHILS # BLD: 92.8 % (ref 42.8–82.8)
OVALOCYTES BLD QL SMEAR: (no result)
PLATELET # BLD AUTO: 73 K/MM3 (ref 134–434)
PLATELET BLD QL SMEAR: (no result)
PMV BLD: 9.8 FL (ref 7.5–11.1)
POTASSIUM SERPLBLD-SCNC: 4.6 MMOL/L (ref 3.5–5.1)
PROT SERPL-MCNC: 5.8 G/DL (ref 6.4–8.2)
RBC # BLD AUTO: 3.33 M/MM3 (ref 4–5.6)
SODIUM SERPL-SCNC: 140 MMOL/L (ref 136–145)
WBC # BLD AUTO: 3.9 K/MM3 (ref 4–10)

## 2019-05-29 RX ADMIN — MOMETASONE FUROATE SCH PUFF: 110 INHALANT RESPIRATORY (INHALATION) at 23:23

## 2019-05-29 RX ADMIN — MONTELUKAST SODIUM SCH MG: 10 TABLET, COATED ORAL at 21:26

## 2019-05-29 RX ADMIN — SODIUM CHLORIDE SCH MLS/HR: 4.5 INJECTION, SOLUTION INTRAVENOUS at 12:23

## 2019-05-29 RX ADMIN — ATORVASTATIN CALCIUM SCH MG: 40 TABLET, FILM COATED ORAL at 21:26

## 2019-05-29 RX ADMIN — MEROPENEM SCH MLS/HR: 1 INJECTION, POWDER, FOR SOLUTION INTRAVENOUS at 17:42

## 2019-05-29 NOTE — PDOC
History of Present Illness





- General


Chief Complaint: SIRS, Suspected/Possible


Stated Complaint: FEELS COLD


Time Seen by Provider: 05/29/19 09:41





- History of Present Illness


Initial Comments: 





05/29/19 11:10


Chief complaint: Chills





History of present illness: Patient states that he has been experiencing chills 

approximately once daily for the last 3 or 4 days. He has had no other 

symptoms. When this has occurred in the past, he has had a UTI.





Review of systems: Denies abdominal pain, nausea, vomiting, diarrhea. He 

appears to be urinating and defecating normally. No chest pain, shortness of 

breath, visual or focal neurologic symptoms, unsteadiness of gait.





Past medical history: Extensive past medical history including recurrent UTIs, 

elevated cholesterol, coronary artery disease, valvular heart disease, COPD, BPH

, and GERD.





Medications include Lipitor, finasteride, Singulair, Protonix, tamsulosin.





Social history: Lives with wife, ambulates with walker, no tobacco alcohol or 

nonprescription drugs. Moderately active and cares for self





Family history: Reviewed and noncontributory including early coronary artery 

disease, metabolic disease including diabetes, and cancer





Physical exam: Alert, oriented, cheerful and cooperative, no acute distress


Temperature 102.1, remainder vital signs normal


Pale, nonicteric.


PERRLA, ENT clear


Neck supple without bruit mass or nodes


Chest clear, full breath sounds bilaterally. Hyperresonance. Flattened 

diaphragms to percussion


CV S1 and S2 distant, 2/6 systolic ejection murmur left sternal border without 

radiation, pulses full. One plus pedal edema.


Abdomen nondistended. Bowel sounds normal. Soft without mass tenderness 

organomegaly


Neurological C2 to 12 intact. Generalized weakness but no focal sensory or 

motor deficits.


Skin clear, no rash, adequate turgor and wet membranes


Extremities no cyanosis or clubbing





Impression: Shaking chills, possible occult infection, rule out UTI, pneumonia, 

or other occult source.





Plan: CBC and chemistries, blood cultures and urinalysis, urine culture, 

judicious fluids and observation. Further treatment depending on results





Past History





- Past Medical History


Allergies/Adverse Reactions: 


 Allergies











Allergy/AdvReac Type Severity Reaction Status Date / Time


 


piperacillin [From Zosyn] Allergy Intermediate Rash Verified 05/29/19 13:17


 


tazobactam [From Zosyn] Allergy Intermediate Rash Verified 05/29/19 13:17











Home Medications: 


Ambulatory Orders





Atorvastatin Ca [Lipitor] 40 mg PO HS 05/29/19 


Finasteride 5 mg PO DAILY 05/29/19 


Fluticasone Propionate [Flovent Diskus] 50 mcg IH PRN 05/29/19 


Montelukast Na [Singulair -] 10 mg PO HS 05/29/19 


Pantoprazole Sodium 40 mg PO DAILY 05/29/19 


Tamsulosin HCl 0.4 mg PO DAILY 05/29/19 


Tiotropium Bromide [Spiriva] 1 inh PO DAILY 05/29/19 








Anemia: No


Cancer: Yes (R.KIDNEY, WITH RIGHT NEPHRECTOMY)


Cardiac Disorders: Yes (valve disease)


COPD: Yes


 Disorders: Yes (URINARY RETENTION,BPH)


HTN: Yes


Hypercholesterolemia: Yes


Other medical history: GERD





- Surgical History


Cardiac Surgery: Yes (Angioplasty with stent,AORTIC VALVE SX)





- Immunization History


Immunization Up to Date: Yes





- Suicide/Smoking/Psychosocial Hx


Smoking History: Never smoked


Have you smoked in the past 12 months: No


Information on smoking cessation initiated: No


Hx Alcohol Use: No


Drug/Substance Use Hx: No


Substance Use Type: None


Hx Substance Use Treatment: No





*Physical Exam





- Vital Signs


 Last Vital Signs











Temp Pulse Resp BP Pulse Ox


 


 102.1 F H  92 H  20   135/57 L  96 


 


 05/29/19 09:21  05/29/19 09:21  05/29/19 09:21  05/29/19 09:21  05/29/19 09:21














ED Treatment Course





- LABORATORY


CBC & Chemistry Diagram: 


 05/29/19 09:50





 05/29/19 09:50





Medical Decision Making





- Medical Decision Making





05/29/19 12:20


White blood count 3.9 with a left shift





Lactic acid is elevated.





Urinalysis shows sign of UTI.





Most likely diagnosis is urosepsis. Cultures are pending. Antibiotics 

administered. Blood pressure is stabilized area and admit for further 

evaluation and treatment to the hospitalist service. Patient remains clinically 

and hemodynamically stable.





Because of extensive cardiac history, judicious administration of fluids. 

Probably would not tolerate full full sepsis protocol without risk of severe 

volume overload.


05/29/19 13:13


After the infusion of Zosyn, patient developed splotchy erythema of the face 

and neck. No swelling or irritation in the throat, tongue, lips, or face. No 

wheezing or chest tightness. No abdominal pain or tenderness. Benadryl was 

administered. Patient remains stable.


05/29/19 14:49


Patient remains stable. ALLERGIC reaction has subsided. Erythema resolving. 

Oropharynx, lungs remain clear. Abdomen benign. Transfer to the floor for 

further evaluation and treatment. Hemodynamically and clinically stable.





*DC/Admit/Observation/Transfer


Diagnosis at time of Disposition: 


Sepsis


Qualifiers:


 Sepsis type: sepsis due to unspecified organism Qualified Code(s): A41.9 - 

Sepsis, unspecified organism








- Discharge Dispostion


Condition at time of disposition: Stable


Decision to Admit order: Yes





- Referrals





- Patient Instructions





- Post Discharge Activity

## 2019-05-30 LAB
ALBUMIN SERPL-MCNC: 2.6 G/DL (ref 3.4–5)
ALP SERPL-CCNC: 58 U/L (ref 45–117)
ALT SERPL-CCNC: 16 U/L (ref 13–61)
ANION GAP SERPL CALC-SCNC: 10 MMOL/L (ref 8–16)
ANISOCYTOSIS BLD QL: (no result)
AST SERPL-CCNC: 23 U/L (ref 15–37)
BASOPHILS # BLD: 0.8 % (ref 0–2)
BILIRUB SERPL-MCNC: 0.9 MG/DL (ref 0.2–1)
BUN SERPL-MCNC: 30 MG/DL (ref 7–18)
CALCIUM SERPL-MCNC: 8.1 MG/DL (ref 8.5–10)
CHLORIDE SERPL-SCNC: 108 MMOL/L (ref 98–107)
CO2 SERPL-SCNC: 21 MMOL/L (ref 21–32)
CREAT SERPL-MCNC: 1.7 MG/DL (ref 0.55–1.3)
DEPRECATED RDW RBC AUTO: 15 % (ref 11.9–15.9)
EOSINOPHIL # BLD: 3.2 % (ref 0–4.5)
GLUCOSE SERPL-MCNC: 94 MG/DL (ref 74–106)
HCT VFR BLD CALC: 27.6 % (ref 35.4–49)
HGB BLD-MCNC: 9.1 GM/DL (ref 11.7–16.9)
LYMPHOCYTES # BLD: 16.4 % (ref 8–40)
MACROCYTES BLD QL: (no result)
MAGNESIUM SERPL-MCNC: 1.9 MG/DL (ref 1.8–2.4)
MCH RBC QN AUTO: 31 PG (ref 25.7–33.7)
MCHC RBC AUTO-ENTMCNC: 33.1 G/DL (ref 32–35.9)
MCV RBC: 93.8 FL (ref 80–96)
MONOCYTES # BLD AUTO: 14 % (ref 3.8–10.2)
NEUTROPHILS # BLD: 65.6 % (ref 42.8–82.8)
OVALOCYTES BLD QL SMEAR: (no result)
PLATELET # BLD AUTO: 67 K/MM3 (ref 134–434)
PLATELET BLD QL SMEAR: (no result)
PMV BLD: 10.2 FL (ref 7.5–11.1)
POTASSIUM SERPLBLD-SCNC: 4.4 MMOL/L (ref 3.5–5.1)
PROT SERPL-MCNC: 5 G/DL (ref 6.4–8.2)
RBC # BLD AUTO: 2.94 M/MM3 (ref 4–5.6)
SODIUM SERPL-SCNC: 139 MMOL/L (ref 136–145)
WBC # BLD AUTO: 3.4 K/MM3 (ref 4–10)

## 2019-05-30 RX ADMIN — ATORVASTATIN CALCIUM SCH MG: 40 TABLET, FILM COATED ORAL at 21:54

## 2019-05-30 RX ADMIN — MEROPENEM SCH MLS/HR: 1 INJECTION, POWDER, FOR SOLUTION INTRAVENOUS at 17:32

## 2019-05-30 RX ADMIN — SODIUM CHLORIDE SCH MLS/HR: 4.5 INJECTION, SOLUTION INTRAVENOUS at 12:15

## 2019-05-30 RX ADMIN — FINASTERIDE SCH MG: 5 TABLET, FILM COATED ORAL at 09:28

## 2019-05-30 RX ADMIN — HEPARIN SODIUM SCH UNIT: 5000 INJECTION, SOLUTION INTRAVENOUS; SUBCUTANEOUS at 15:58

## 2019-05-30 RX ADMIN — HEPARIN SODIUM SCH UNIT: 5000 INJECTION, SOLUTION INTRAVENOUS; SUBCUTANEOUS at 22:00

## 2019-05-30 RX ADMIN — PANTOPRAZOLE SODIUM SCH MG: 40 TABLET, DELAYED RELEASE ORAL at 09:28

## 2019-05-30 RX ADMIN — MONTELUKAST SODIUM SCH MG: 10 TABLET, COATED ORAL at 21:54

## 2019-05-30 RX ADMIN — MOMETASONE FUROATE SCH PUFF: 110 INHALANT RESPIRATORY (INHALATION) at 22:28

## 2019-05-30 RX ADMIN — MEROPENEM SCH MLS/HR: 1 INJECTION, POWDER, FOR SOLUTION INTRAVENOUS at 06:08

## 2019-05-30 RX ADMIN — TIOTROPIUM BROMIDE INHALATION SPRAY SCH PUFF: 3.12 SPRAY, METERED RESPIRATORY (INHALATION) at 09:29

## 2019-05-30 RX ADMIN — TAMSULOSIN HYDROCHLORIDE SCH MG: 0.4 CAPSULE ORAL at 09:28

## 2019-05-30 NOTE — CON.ID
Consult





- Past Medical History


Cardio/Vascular: Yes: Aortic Stenosis (Post TAVR at [facility] in ), CAD (s/p 

cardiac stent/ PCI ), HTN, Hyperlipdemia


Gastrointestinal: Yes: Diverticulosis, Peptic Ulcer Disease


Hepatobiliary: Yes: Cholelithiasis, Choledocholithiasis (ERCP x 3 with stent 

insertion and removal)


Renal/: Yes: Renal Failure (required dialysis in  following sepsis with 

ATN), Renal Inusuff, Cancer (Renal CA with right nephrectomy), Renal Calculi, 

Other (urethral stricture s/p cystoscopy, nephrectomy for RCC)


Musculoskeletal: Yes: Other (DJD)





- Past Surgical History


Past Surgical History: Yes: Cholecystectomy, Nephrectomy (right nephrectomy for 

cancer ), Stent (biliary stent , removed ), Valve Replacement (TAVR)





- Alcohol/Substance Use


Hx Alcohol Use: No


History of Substance Use: reports: None





- Smoking History


Smoking history: Never smoked


Have you smoked in the past 12 months: No


Aproximately how many cigarettes per day: 1


If you are a former smoker, when did you quit?: 60 years ago





- Social History


Usual Living Arrangement: With Spouse


ADL: Independent


Occupation: CPA: still working


History of Recent Travel: No





Home Medications





- Allergies


Allergies/Adverse Reactions: 


 Allergies











Allergy/AdvReac Type Severity Reaction Status Date / Time


 


piperacillin [From Zosyn] Allergy Intermediate Rash Verified 19 13:17


 


tazobactam [From Zosyn] Allergy Intermediate Rash Verified 19 13:17














- Home Medications


Home Medications: 


Ambulatory Orders





Atorvastatin Ca [Lipitor] 40 mg PO HS 19 


Finasteride 5 mg PO DAILY 19 


Fluticasone Propionate [Flovent Diskus] 50 mcg IH PRN 19 


Montelukast Na [Singulair -] 10 mg PO HS 19 


Pantoprazole Sodium 40 mg PO DAILY 19 


Tamsulosin HCl 0.4 mg PO DAILY 19 


Tiotropium Bromide [Spiriva] 1 inh PO DAILY 19 











Family Disease History





- Family Disease History


Family Disease History: Other: Father ( 80's unclear cause), Mother ( 80

's CVA/MI), Brother (1 brother  CHF, 1 brother  stomach cancer)





Physical Exam


Vital Signs: 


 Vital Signs











Temperature  97.8 F   19 10:00


 


Pulse Rate  63   19 10:00


 


Respiratory Rate  19   19 10:00


 


Blood Pressure  118/46 L  19 10:00


 


O2 Sat by Pulse Oximetry (%)  95   19 10:00











Labs: 


 CBC, BMP





 19 07:10 





 19 07:10

## 2019-05-30 NOTE — PN
04/03/20        María Chao [address] [facility] [address]      Dear Selene Ge records indicate that you have outstanding lab work and or testing that was ordered for you and has not yet been completed:  Orders Placed T Physical Exam: 


SUBJECTIVE: Patient seen and examined oob to chair. 








OBJECTIVE:





 Vital Signs











 Period  Temp  Pulse  Resp  BP Sys/Badillo  Pulse Ox


 


 Last 24 Hr  97.6 F-98.9 F  56-80  16-20  105-129/46-64  94-98











GENERAL: Awake, alert, and fully oriented, in no acute distress.


LUNGS: Breath sounds equal, clear to auscultation bilaterally. No wheezes, and 

no crackles. No accessory muscle use.


HEART: Regular rate and rhythm, S1 and S2 


ABDOMEN: Soft, nontender, not distended


UPPER EXTREMITIES: 2+ pulses, warm, well-perfused. No cyanosis. No clubbing. No 

peripheral edema.


LOWER EXTREMITIES: 2+ pulses, warm, well-perfused. No calf tenderness. No 

peripheral edema. 


NEUROLOGICAL:  Cranial nerves II-XII intact. Normal speech.








 Laboratory Results - last 24 hr











  05/29/19 05/29/19 05/29/19





  09:50 09:50 13:30


 


WBC   


 


RBC   


 


Hgb   


 


Hct   


 


MCV   


 


MCH   


 


MCHC   


 


RDW   


 


Plt Count  73 L D  


 


MPV  9.8  


 


Absolute Neuts (auto)   


 


Neutrophils %   


 


Neutrophils % (Manual)  86.1 H  


 


Band Neutrophils %  6.0  


 


Lymphocytes %   


 


Lymphocytes % (Manual)  6.9 L  


 


Monocytes %   


 


Monocytes % (Manual)  0 L  


 


Eosinophils %   


 


Eosinophils % (Manual)  1.0  


 


Basophils %   


 


Basophils % (Manual)  0.0  


 


Myelocytes % (Man)  0  


 


Promyelocytes % (Man)  0  


 


Blast Cells % (Manual)  0  


 


Nucleated RBC %   


 


Metamyelocytes  0  


 


Hypochromia  0  


 


Platelet Estimate  Decreased  


 


Platelet Comment  No clumping noted  


 


Polychromasia  0  


 


Poikilocytosis  3+  


 


Anisocytosis  1+  


 


Microcytosis  0  


 


Macrocytosis  1+  


 


Ovalocytes  1+  


 


Acanthocytes (Spur)   


 


Schistocytes  1+  


 


Sodium   


 


Potassium   


 


Chloride   


 


Carbon Dioxide   


 


Anion Gap   


 


BUN   


 


Creatinine   


 


Est GFR (CKD-EPI)AfAm   


 


Est GFR (CKD-EPI)NonAf   


 


Random Glucose   


 


Lactic Acid   4.6 H*  0.8


 


Calcium   


 


Magnesium   


 


Total Bilirubin   


 


AST   


 


ALT   


 


Alkaline Phosphatase   


 


Total Protein   


 


Albumin   














  05/30/19 05/30/19





  07:10 07:10


 


WBC  3.4 L 


 


RBC  2.94 L 


 


Hgb  9.1 L 


 


Hct  27.6 L 


 


MCV  93.8 


 


MCH  31.0 


 


MCHC  33.1 


 


RDW  15.0 


 


Plt Count  67 L 


 


MPV  10.2 


 


Absolute Neuts (auto)  2.2 


 


Neutrophils %  65.6  D 


 


Neutrophils % (Manual)  67.7  D 


 


Band Neutrophils %  0.0 


 


Lymphocytes %  16.4  D 


 


Lymphocytes % (Manual)  15.2  D 


 


Monocytes %  14.0 H D 


 


Monocytes % (Manual)  12 H D 


 


Eosinophils %  3.2  D 


 


Eosinophils % (Manual)  4.0  D 


 


Basophils %  0.8 


 


Basophils % (Manual)  1.0  D 


 


Myelocytes % (Man)  0 


 


Promyelocytes % (Man)  0 


 


Blast Cells % (Manual)  0 


 


Nucleated RBC %  0 


 


Metamyelocytes  0 


 


Hypochromia  0 


 


Platelet Estimate  Decreased 


 


Platelet Comment  


 


Polychromasia  0 


 


Poikilocytosis  2+ 


 


Anisocytosis  1+ 


 


Microcytosis  1+ 


 


Macrocytosis  1+ 


 


Ovalocytes  1+ 


 


Acanthocytes (Spur)  1+ 


 


Schistocytes  1+ 


 


Sodium   139


 


Potassium   4.4


 


Chloride   108 H


 


Carbon Dioxide   21


 


Anion Gap   10


 


BUN   30 H


 


Creatinine   1.7 H


 


Est GFR (CKD-EPI)AfAm   40.54


 


Est GFR (CKD-EPI)NonAf   34.98


 


Random Glucose   94


 


Lactic Acid  


 


Calcium   8.1 L


 


Magnesium   1.9


 


Total Bilirubin   0.9


 


AST   23


 


ALT   16


 


Alkaline Phosphatase   58  D


 


Total Protein   5.0 L


 


Albumin   2.6 L








                           Active Medications











Generic Name Dose Route Start Last Admin





  Trade Name Freq  PRN Reason Stop Dose Admin


 


Atorvastatin Calcium  40 mg  05/29/19 22:00  05/29/19 21:26





  Lipitor -  PO   40 mg





  HS ALCIDES   Administration





     





     





     





     


 


Finasteride  5 mg  05/30/19 10:00  05/30/19 09:28





  Proscar -  PO   5 mg





  DAILY ALCIDES   Administration





     





     





     





     


 


Sodium Chloride  1,000 mls @ 125 mls/hr  05/29/19 12:30  05/30/19 12:15





  1/2 Normal Saline  IV   125 mls/hr





  ASDIR ALCIDES   Administration





     





     





     





     


 


Meropenem 1 gm/ Dextrose  100 mls @ 200 mls/hr  05/29/19 18:00  05/30/19 06:08





  IVPB   200 mls/hr





  Q12H ALCIDES   Administration





     





     





     





     


 


Mometasone Furoate  1 puff  05/29/19 22:00  05/29/19 23:23





  Asmanex 110mcg -  IH   1 puff





  HS ALCIDES   Administration





     





     





     





     


 


Montelukast Sodium  10 mg  05/29/19 22:00  05/29/19 21:26





  Singulair -  PO   10 mg





  HS ALCIDES   Administration





     





     





     





     


 


Pantoprazole Sodium  40 mg  05/30/19 10:00  05/30/19 09:28





  Protonix -  PO   40 mg





  DAILY ALCIDES   Administration





     





     





     





     


 


Tamsulosin HCl  0.4 mg  05/30/19 08:30  05/30/19 09:28





  Flomax -  PO   0.4 mg





  DAILY@0830 ALCIDES   Administration





     





     





     





     


 


Tiotropium Bromide  2 puff  05/30/19 10:00  05/30/19 09:29





  Spiriva Respimat  IH   2 puff





  DAILY ALCIDES   Administration





     





     





     





     











ASSESSMENT/PLAN:


87 year-old male with a significant PMH of HTN, HLD, CAD s/p stent placement, 

aortic stenosis s/p TAVR, COPD, BPH, urinary retention, recurrent UTIs, renal 

cell cancer s/p right nephrectomy, and GERD. Admitted for severe sepsis 

secondary to UTI.





Severe sepsis secondary to LFGNB UTI


   --on admission, WBC 3.9, T 102.1, p 92, lactic acid 4.6, +pyuria


   --now afebrile, hemodynamically stable


   --rash reaction to Zosyn in ED


   --continue meropenem (day #2)


   --ID following





Hypertension


   --BP stable, not on antihypertensives





Hyperlipidemia


   --continue Lipitor





Coronary artery disease


   --not on daily ASA, beta blocker





Aortic stenosis s/p TAVR


   --cautious IV fluids





COPD


   --stable


   --continue Spiriva





BPH


Urinary retention


   --continue finasteride





Renal cell carcinoma s/p right nephrectomy


Chronic kidney disease


   --Cr 1.8 on admission, 1.7 today which is baseline (see Dr. Rodriguez's note 9 /22/17)





GERD


   --protonix





FEN


   Fluids: PO intake adequate


   Electrolytes: replete as indicated


   Nutrition: low sodium





DVT prophylaxis: subq heparin





Physical therapy





Dispo: continues to require inpatient care. Full code.











Visit type





- Emergency Visit


Emergency Visit: Yes


ED Registration Date: 05/29/19


Care time: The patient presented to the Emergency Department on the above date 

and was hospitalized for further evaluation of their emergent condition.





- New Patient


This patient is new to me today: No





- Critical Care


Critical Care patient: No

## 2019-05-31 RX ADMIN — TIOTROPIUM BROMIDE INHALATION SPRAY SCH PUFF: 3.12 SPRAY, METERED RESPIRATORY (INHALATION) at 09:39

## 2019-05-31 RX ADMIN — ATORVASTATIN CALCIUM SCH MG: 40 TABLET, FILM COATED ORAL at 21:24

## 2019-05-31 RX ADMIN — MEROPENEM SCH MLS/HR: 1 INJECTION, POWDER, FOR SOLUTION INTRAVENOUS at 17:28

## 2019-05-31 RX ADMIN — HEPARIN SODIUM SCH UNIT: 5000 INJECTION, SOLUTION INTRAVENOUS; SUBCUTANEOUS at 21:24

## 2019-05-31 RX ADMIN — TAMSULOSIN HYDROCHLORIDE SCH MG: 0.4 CAPSULE ORAL at 09:00

## 2019-05-31 RX ADMIN — FINASTERIDE SCH MG: 5 TABLET, FILM COATED ORAL at 09:38

## 2019-05-31 RX ADMIN — PANTOPRAZOLE SODIUM SCH MG: 40 TABLET, DELAYED RELEASE ORAL at 09:38

## 2019-05-31 RX ADMIN — MONTELUKAST SODIUM SCH MG: 10 TABLET, COATED ORAL at 21:24

## 2019-05-31 RX ADMIN — HEPARIN SODIUM SCH UNIT: 5000 INJECTION, SOLUTION INTRAVENOUS; SUBCUTANEOUS at 14:00

## 2019-05-31 RX ADMIN — HEPARIN SODIUM SCH UNIT: 5000 INJECTION, SOLUTION INTRAVENOUS; SUBCUTANEOUS at 05:32

## 2019-05-31 RX ADMIN — MEROPENEM SCH MLS/HR: 1 INJECTION, POWDER, FOR SOLUTION INTRAVENOUS at 05:09

## 2019-05-31 RX ADMIN — MOMETASONE FUROATE SCH PUFF: 110 INHALANT RESPIRATORY (INHALATION) at 21:24

## 2019-05-31 NOTE — PN
Physical Exam: 


SUBJECTIVE: Patient seen and examined at bedside. Wants to get better, accepts 

he cannot leave the hospital. 








OBJECTIVE:





 Vital Signs











 Period  Temp  Pulse  Resp  BP Sys/Badillo  Pulse Ox


 


 Last 24 Hr  97.7 F-98.8 F  60-70  18-19  118-131/42-63  94-97














GENERAL: Awake, alert, and fully oriented, in no acute distress.


LUNGS: Breath sounds equal, clear to auscultation bilaterally. No wheezes, and 

no crackles. No accessory muscle use.


HEART: Regular rate and rhythm, S1 and S2 


ABDOMEN: Soft, nontender, not distended


UPPER EXTREMITIES: 2+ pulses, warm, well-perfused. No cyanosis. No clubbing. No 

peripheral edema.


LOWER EXTREMITIES: 2+ pulses, warm, well-perfused. No calf tenderness. No 

peripheral edema. 


NEUROLOGICAL:  Cranial nerves II-XII intact. Normal speech.





 Laboratory Results - last 24 hr











  05/30/19





  07:10


 


WBC  3.4 L


 


RBC  2.94 L


 


Hgb  9.1 L


 


Hct  27.6 L


 


MCV  93.8


 


MCH  31.0


 


MCHC  33.1


 


RDW  15.0


 


Plt Count  67 L


 


MPV  10.2


 


Absolute Neuts (auto)  2.2


 


Neutrophils %  65.6  D


 


Neutrophils % (Manual)  67.7  D


 


Band Neutrophils %  0.0


 


Lymphocytes %  16.4  D


 


Lymphocytes % (Manual)  15.2  D


 


Monocytes %  14.0 H D


 


Monocytes % (Manual)  12 H D


 


Eosinophils %  3.2  D


 


Eosinophils % (Manual)  4.0  D


 


Basophils %  0.8


 


Basophils % (Manual)  1.0  D


 


Myelocytes % (Man)  0


 


Promyelocytes % (Man)  0


 


Blast Cells % (Manual)  0


 


Nucleated RBC %  0


 


Metamyelocytes  0


 


Hypochromia  0


 


Platelet Estimate  Decreased


 


Polychromasia  0


 


Poikilocytosis  2+


 


Anisocytosis  1+


 


Microcytosis  1+


 


Macrocytosis  1+


 


Ovalocytes  1+


 


Acanthocytes (Spur)  1+


 


Schistocytes  1+








                           Active Medications











Generic Name Dose Route Start Last Admin





  Trade Name Freq  PRN Reason Stop Dose Admin


 


Atorvastatin Calcium  40 mg  05/29/19 22:00  05/30/19 21:54





  Lipitor -  PO   40 mg





  HS ALCIDES   Administration





     





     





     





     


 


Finasteride  5 mg  05/30/19 10:00  05/30/19 09:28





  Proscar -  PO   5 mg





  DAILY ALCIDES   Administration





     





     





     





     


 


Heparin Sodium (Porcine)  5,000 unit  05/30/19 15:45  05/31/19 05:32





  Heparin -  SQ   5,000 unit





  TID ALCIDES   Administration





     





     





     





     


 


Sodium Chloride  1,000 mls @ 125 mls/hr  05/29/19 12:30  05/30/19 12:15





  1/2 Normal Saline  IV   125 mls/hr





  ASDIR ALCIDES   Administration





     





     





     





     


 


Meropenem 1 gm/ Dextrose  100 mls @ 200 mls/hr  05/29/19 18:00  05/31/19 05:09





  IVPB   200 mls/hr





  Q12H ALCIDES   Administration





     





     





     





     


 


Mometasone Furoate  1 puff  05/29/19 22:00  05/30/19 22:28





  Asmanex 110mcg -  IH   1 puff





  HS ALCIDES   Administration





     





     





     





     


 


Montelukast Sodium  10 mg  05/29/19 22:00  05/30/19 21:54





  Singulair -  PO   10 mg





  HS ALCIDES   Administration





     





     





     





     


 


Pantoprazole Sodium  40 mg  05/30/19 10:00  05/30/19 09:28





  Protonix -  PO   40 mg





  DAILY ALCIDES   Administration





     





     





     





     


 


Tamsulosin HCl  0.4 mg  05/30/19 08:30  05/30/19 09:28





  Flomax -  PO   0.4 mg





  DAILY@0830 ALCIDES   Administration





     





     





     





     


 


Tiotropium Bromide  2 puff  05/30/19 10:00  05/30/19 09:29





  Spiriva Respimat  IH   2 puff





  DAILY ALCIDES   Administration





     





     





     





     











ASSESSMENT/PLAN


87 year-old male with a significant PMH of HTN, HLD, CAD s/p stent placement, 

aortic stenosis s/p TAVR, COPD, BPH, urinary retention, recurrent UTIs, renal 

cell cancer s/p right nephrectomy, and GERD. Admitted for severe sepsis 

secondary to UTI.





Severe sepsis secondary to LFGNB UTI


   --on admission, WBC 3.9, T 102.1, p 92, lactic acid 4.6, +pyuria


   --afebrile >48 hours, hemodynamically stable


   --rash reaction to Zosyn in ED


   --continue meropenem (day #3)


   --ID following





Hypertension


   --BP stable, not on antihypertensives





Hyperlipidemia


   --continue Lipitor





Coronary artery disease


   --not on daily ASA, beta blocker





Aortic stenosis s/p TAVR


   --cautious IV fluids





COPD


   --stable


   --continue Spiriva





BPH


Urinary retention


   --continue finasteride





Renal cell carcinoma s/p right nephrectomy


Chronic kidney disease


   --Cr 1.7 which is baseline (see Dr. Rodriguez's note 9/22/17)





GERD


   --protonix





FEN


   Fluids: PO intake adequate


   Electrolytes: replete as indicated


   Nutrition: low sodium





DVT prophylaxis: subq heparin





Physical therapy





Dispo: continues to require inpatient care. Full code.











Visit type





- Emergency Visit


Emergency Visit: Yes


ED Registration Date: 05/29/19


Care time: The patient presented to the Emergency Department on the above date 

and was hospitalized for further evaluation of their emergent condition.





- New Patient


This patient is new to me today: No





- Critical Care


Critical Care patient: No

## 2019-05-31 NOTE — PN
Progress Note, Physician


History of Present Illness: 





stable


improving





- Current Medication List


Current Medications: 


Active Medications





Atorvastatin Calcium (Lipitor -)  40 mg PO [facility]


   Last Admin: 05/30/19 21:54 Dose:  40 mg


Finasteride (Proscar -)  5 mg PO DAILY [facility]


   Last Admin: 05/31/19 09:38 Dose:  5 mg


Heparin Sodium (Porcine) (Heparin -)  5,000 unit SQ TID [facility]


   Last Admin: 05/31/19 05:32 Dose:  5,000 unit


Meropenem 1 gm/ Dextrose  100 mls @ 200 mls/hr IVPB Q12H [facility]


   Last Admin: 05/31/19 05:09 Dose:  200 mls/hr


Mometasone Furoate (Asmanex 110mcg -)  1 puff [facility]


   Last Admin: 05/30/19 22:28 Dose:  1 puff


Montelukast Sodium (Singulair -)  10 mg PO [facility]


   Last Admin: 05/30/19 21:54 Dose:  10 mg


Pantoprazole Sodium (Protonix -)  40 mg PO DAILY [facility]


   Last Admin: 05/31/19 09:38 Dose:  40 mg


Tamsulosin HCl (Flomax -)  0.4 mg PO DAILY@0830 [facility]


   Last Admin: 05/31/19 09:00 Dose:  0.4 mg


Tiotropium Bromide (Spiriva Respimat)  2 puff I[facility] DAILY [facility]


   Last Admin: 05/31/19 09:39 Dose:  2 puff











- Objective


Vital Signs: 


 Vital Signs











Temperature  98.1 F   05/31/19 13:52


 


Pulse Rate  63   05/31/19 13:52


 


Respiratory Rate  18   05/31/19 13:52


 


Blood Pressure  126/60   05/31/19 13:52


 


O2 Sat by Pulse Oximetry (%)  98   05/31/19 13:52











Constitutional: Yes: No Distress, Calm


Cardiovascular: Yes: S1, S2


Respiratory: Yes: Regular, CTA Bilaterally


Gastrointestinal: Yes: Normal Bowel Sounds, Soft


Musculoskeletal: Yes: WNL


Extremities: Yes: WNL


Neurological: Yes: Alert, Oriented


Psychiatric: Yes: Alert, Oriented


Labs: 


 CBC, BMP





 05/30/19 07:10 





 05/30/19 07:10 











Assessment/Plan





87 year-old male with a significant PMH of HTN, HLD, CAD s/p stent placement, 

aortic stenosis s/p TAVR, COPD, BPH, urinary retention, recurrent UTIs, renal 

cell cancer s/p right nephrectomy, and GERD. Admitted for severe sepsis 

secondary to UTI.








sepsis


uti


bph


htn


hld





plan


cx results noted


patient to continue abx and he only waited so long to come to the ED due to not having a ride. When he arrived at the ED, he was given an EKG which showed deep inversions in the anteroseptal leads, with new inversions present in the lateral leads. Labs were drawn, which showed a nonsignificant trop level of 0.01. CXR did not reveal focal masses or other acute processes, but did display characteristics of COPD. Due to the EKG findings, it was decided by Dr. Victoria Leiva (Cardiology) to admit the patient for acute coronary syndrome, to begin a low intensity Heparin drop without bolus, and to administer  mg and Lipitor 80 mg. Xarelto was not given, and the patient was started NPO at midnight. Trop level was drawn again to show an increase from 0.01 to 0.02. At this [facility] This morning, the patient states that he still has some shortness of breath, but not as bad as it was yesterday. He still denies any chest pain or palpitations since admission. He denies any syncope, dizziness, headache, or balance issues. He denies any GI symptoms. Labs were significant for an elevated BNP of 532 and an elevated total cholesterol of 268 with an LDL of 196. At this time, Pt will remain on current medications and fluids. He has an order for an Echo and stress test from cardiology to determine any cardiac abnormality. Patient is being admitted for acute coronary syndrome. However, he has had 2 recent stress tests in one That were all normal and cardiology advised to continue medical management. His troponins also have been negative. Patient lives alone and all his life has worked as a  and previous CT showed pleural plaques.     Past Medical History:     Past Medical History:   Diagnosis Date    Arthritis     Asthma     Atrial fibrillation ([facility] [address]     CAD (coronary artery disease)     CHF (congestive heart failure) (HCC)     COPD (chronic obstructive pulmonary disease) ([facility] [address]     Diabetes mellitus ([facility] [address]     Gout     Hx of blood clots     Hyperkalemia 3/19/2014    Hyperlipidemia     Hypertension     MI, old     Pulmonary embolism (HCC)     s/p GFF    Unspecified cerebral artery occlusion with cerebral infarction         Past Surgical History:     Past Surgical History:   Procedure Laterality Date    ANKLE SURGERY      CARDIAC CATHETERIZATION  2014    x2    CARDIAC SURGERY      bypass    COLONOSCOPY      CORONARY ARTERY BYPASS GRAFT      UPPER GASTROINTESTINAL ENDOSCOPY  12/29/2016        Medications Prior to Admission:     Prior to Admission medications    Medication Sig Start Date End Date Taking? Authorizing Provider   predniSONE (DELTASONE) 10 MG tablet Tapered dose 40mg x 3 days. .. 30mg x 3 days. .. 20mg x 3 days . Clarine Koo .10mg x3days . ..then off 10/26/17   Kaitlynn Ruff MD   albuterol sulfate HFA (PROAIR HFA) 108 (90 Base) MCG/ACT inhaler Inhale 2 puffs into the lungs every 6 hours as needed for Wheezing 10/26/17   Kaitlynn Ruff MD   metoprolol tartrate (LOPRESSOR) 50 MG tablet Take 1 tablet by mouth 2 times daily 10/25/17   Margi Go MD   simvastatin (ZOCOR) 40 MG tablet Take 1 tablet by mouth nightly 10/25/17   Margi Go MD   buPROPion [facility] SR) 150 MG extended release tablet Take 1 tablet by mouth nightly 10/25/17   Margi Go MD   NIFEdipine (ADALAT CC) 30 MG extended release tablet Take 1 tablet by mouth daily 10/25/17   Margi Go MD   ranolazine (RANEXA) 1000 MG extended release tablet Take 1 tablet by mouth 2 times daily 10/25/17   Margi Go MD   Roflumilast (DALIRESP) 500 MCG tablet Take 500 mcg by mouth daily 10/25/17   Margi Go MD   tiZANidine (ZANAFLEX) 4 MG tablet Take 0.5 tablets by mouth every 6 hours as needed (Spasms) 10/25/17   Margi Go MD   albuterol (PROVENTIL) (2.5 MG/3ML) 0.083% nebulizer solution Take 1.5 mLs by nebulization 3 times daily as needed for Wheezing or Shortness of Breath >60 >60 mL/min    GFR Comment          GFR Staging NOT REPORTED    Brain natriuretic peptide    Collection Time: 10/31/17  7:00 AM   Result Value Ref Range    Pro- (H) <300 pg/mL    BNP Interpretation         Lipid panel - fasting    Collection Time: 10/31/17  7:00 AM   Result Value Ref Range    Cholesterol 268 (H) <200 mg/dL    HDL 63 >40 mg/dL    LDL Cholesterol 196 (H) 0 - 130 mg/dL    Chol/HDL Ratio 4.3 <5    Triglycerides 46 <150 mg/dL    VLDL NOT REPORTED 1 - 30 mg/dL   CBC auto differential    Collection Time: 10/31/17  7:00 AM   Result Value Ref Range    WBC 3.3 (L) 3.5 - 11.3 k/uL    RBC 5.48 4.21 - 5.77 m/uL    Hemoglobin 13.4 13.0 - 17.0 g/dL    Hematocrit 44.3 40.7 - 50.3 %    MCV 80.8 (L) 82.6 - 102.9 fL    MCH 24.5 (L) 25.2 - 33.5 pg    MCHC 30.2 29.9 - 34.7 g/dL    RDW 14.4 11.8 - 14.4 %    Platelets 934 928 - 393 k/uL    MPV 10.1 8.1 - 13.5 fL    Differential Type NOT REPORTED     WBC Morphology NOT REPORTED     RBC Morphology NOT REPORTED     Platelet Estimate NOT REPORTED     Immature Granulocytes 0 0 %    Seg Neutrophils 82 (H) 36 - 65 %    Lymphocytes 16 (L) 24 - 43 %    Monocytes 2 (L) 3 - 12 %    Eosinophils % 0 (L) 1 - 4 %    Basophils 0 0 - 2 %    Absolute Immature Granulocyte 0.00 0.00 - 0.30 k/uL    Segs Absolute 2.70 1.50 - 8.10 k/uL    Absolute Lymph # 0.53 (L) 1.10 - 3.70 k/uL    Absolute Mono # 0.07 (L) 0.10 - 1.20 k/uL    Absolute Eos # 0.00 0.00 - 0.44 k/uL    Basophils # 0.00 0.00 - 0.20 k/uL    Morphology MICROCYTOSIS PRESENT    POC Glucose Fingerstick    Collection Time: 10/31/17  7:10 AM   Result Value Ref Range    POC Glucose 254 (H) 75 - 110 mg/dL   POC Glucose Fingerstick    Collection Time: 10/31/17 11:10 AM   Result Value Ref Range    POC Glucose 187 (H) 75 - 110 mg/dL   POC Glucose Fingerstick    Collection Time: 10/31/17  5:12 PM   Result Value Ref Range    POC Glucose 119 (H) 75 - 110 mg/dL   PREALBUMIN    Collection Time: 10/31/17  5:44 PM   Result Value Ref Range

## 2019-06-01 LAB
ALBUMIN SERPL-MCNC: 2.5 G/DL (ref 3.4–5)
ALP SERPL-CCNC: 57 U/L (ref 45–117)
ALT SERPL-CCNC: 16 U/L (ref 13–61)
ANION GAP SERPL CALC-SCNC: 6 MMOL/L (ref 8–16)
AST SERPL-CCNC: 19 U/L (ref 15–37)
BASOPHILS # BLD: 1 % (ref 0–2)
BILIRUB SERPL-MCNC: 1 MG/DL (ref 0.2–1)
BUN SERPL-MCNC: 19 MG/DL (ref 7–18)
CALCIUM SERPL-MCNC: 8.2 MG/DL (ref 8.5–10)
CHLORIDE SERPL-SCNC: 113 MMOL/L (ref 98–107)
CO2 SERPL-SCNC: 21 MMOL/L (ref 21–32)
CREAT SERPL-MCNC: 1.4 MG/DL (ref 0.55–1.3)
DEPRECATED RDW RBC AUTO: 14.5 % (ref 11.9–15.9)
EOSINOPHIL # BLD: 9 % (ref 0–4.5)
GLUCOSE SERPL-MCNC: 88 MG/DL (ref 74–106)
HCT VFR BLD CALC: 28.3 % (ref 35.4–49)
HGB BLD-MCNC: 9.5 GM/DL (ref 11.7–16.9)
LYMPHOCYTES # BLD: 24.2 % (ref 8–40)
MAGNESIUM SERPL-MCNC: 1.9 MG/DL (ref 1.8–2.4)
MCH RBC QN AUTO: 31.1 PG (ref 25.7–33.7)
MCHC RBC AUTO-ENTMCNC: 33.5 G/DL (ref 32–35.9)
MCV RBC: 92.8 FL (ref 80–96)
MONOCYTES # BLD AUTO: 8.7 % (ref 3.8–10.2)
NEUTROPHILS # BLD: 57.1 % (ref 42.8–82.8)
PLATELET # BLD AUTO: 80 K/MM3 (ref 134–434)
PMV BLD: 9.7 FL (ref 7.5–11.1)
POTASSIUM SERPLBLD-SCNC: 3.9 MMOL/L (ref 3.5–5.1)
PROT SERPL-MCNC: 4.8 G/DL (ref 6.4–8.2)
RBC # BLD AUTO: 3.05 M/MM3 (ref 4–5.6)
SODIUM SERPL-SCNC: 140 MMOL/L (ref 136–145)
WBC # BLD AUTO: 3.2 K/MM3 (ref 4–10)

## 2019-06-01 RX ADMIN — MEROPENEM SCH MLS/HR: 1 INJECTION, POWDER, FOR SOLUTION INTRAVENOUS at 05:40

## 2019-06-01 RX ADMIN — HEPARIN SODIUM SCH UNIT: 5000 INJECTION, SOLUTION INTRAVENOUS; SUBCUTANEOUS at 05:40

## 2019-06-01 RX ADMIN — TAMSULOSIN HYDROCHLORIDE SCH MG: 0.4 CAPSULE ORAL at 09:21

## 2019-06-01 RX ADMIN — TIOTROPIUM BROMIDE INHALATION SPRAY SCH PUFF: 3.12 SPRAY, METERED RESPIRATORY (INHALATION) at 09:21

## 2019-06-01 RX ADMIN — MONTELUKAST SODIUM SCH MG: 10 TABLET, COATED ORAL at 21:51

## 2019-06-01 RX ADMIN — MEROPENEM SCH: 1 INJECTION, POWDER, FOR SOLUTION INTRAVENOUS at 21:49

## 2019-06-01 RX ADMIN — HEPARIN SODIUM SCH UNIT: 5000 INJECTION, SOLUTION INTRAVENOUS; SUBCUTANEOUS at 13:00

## 2019-06-01 RX ADMIN — MOMETASONE FUROATE SCH PUFF: 110 INHALANT RESPIRATORY (INHALATION) at 21:51

## 2019-06-01 RX ADMIN — HEPARIN SODIUM SCH UNIT: 5000 INJECTION, SOLUTION INTRAVENOUS; SUBCUTANEOUS at 22:10

## 2019-06-01 RX ADMIN — FINASTERIDE SCH MG: 5 TABLET, FILM COATED ORAL at 09:21

## 2019-06-01 RX ADMIN — ATORVASTATIN CALCIUM SCH MG: 40 TABLET, FILM COATED ORAL at 21:51

## 2019-06-01 RX ADMIN — PANTOPRAZOLE SODIUM SCH MG: 40 TABLET, DELAYED RELEASE ORAL at 09:21

## 2019-06-01 NOTE — PN
Physical Exam: 


SUBJECTIVE: Patient seen and examined at bedside. Finishing dinner. Friend 

present. In good spirits.








OBJECTIVE:





 Vital Signs











 Period  Temp  Pulse  Resp  BP Sys/Badillo  Pulse Ox


 


 Last 24 Hr  98 F-98.4 F  61-66  18-18  126-141/43-67  96-98











GENERAL: Awake, alert, and fully oriented, in no acute distress.


LUNGS: Breath sounds equal, clear to auscultation bilaterally. No wheezes, and 

no crackles. No accessory muscle use.


HEART: Regular rate and rhythm, S1 and S2 


ABDOMEN: Soft, nontender, not distended


UPPER EXTREMITIES: 2+ pulses, warm, well-perfused. No cyanosis. No clubbing. No 

peripheral edema.


LOWER EXTREMITIES: 2+ pulses, warm, well-perfused. No calf tenderness. No 

peripheral edema. 


NEUROLOGICAL:  Cranial nerves II-XII intact. Normal speech.











 Laboratory Results - last 24 hr











  06/01/19 06/01/19





  07:10 07:10


 


WBC  3.2 L 


 


RBC  3.05 L 


 


Hgb  9.5 L 


 


Hct  28.3 L 


 


MCV  92.8 


 


MCH  31.1 


 


MCHC  33.5 


 


RDW  14.5 


 


Plt Count  80 L 


 


MPV  9.7 


 


Absolute Neuts (auto)  1.8 


 


Neutrophils %  57.1 


 


Lymphocytes %  24.2  D 


 


Monocytes %  8.7 


 


Eosinophils %  9.0 H D 


 


Basophils %  1.0 


 


Nucleated RBC %  0 


 


Sodium   140


 


Potassium   3.9


 


Chloride   113 H


 


Carbon Dioxide   21


 


Anion Gap   6 L


 


BUN   19 H


 


Creatinine   1.4 H


 


Est GFR (CKD-EPI)AfAm   51.26


 


Est GFR (CKD-EPI)NonAf   44.23


 


Random Glucose   88


 


Calcium   8.2 L


 


Magnesium   1.9


 


Total Bilirubin   1.0


 


AST   19


 


ALT   16


 


Alkaline Phosphatase   57


 


Total Protein   4.8 L


 


Albumin   2.5 L








                           Active Medications











Generic Name Dose Route Start Last Admin





  Trade Name Freq  PRN Reason Stop Dose Admin


 


Atorvastatin Calcium  40 mg  05/29/19 22:00  05/31/19 21:24





  Lipitor -  PO   40 mg





  HS ALCIDES   Administration





     





     





     





     


 


Finasteride  5 mg  05/30/19 10:00  06/01/19 09:21





  Proscar -  PO   5 mg





  DAILY ALCIDES   Administration





     





     





     





     


 


Heparin Sodium (Porcine)  5,000 unit  05/30/19 15:45  06/01/19 05:40





  Heparin -  SQ   5,000 unit





  TID ALCIDES   Administration





     





     





     





     


 


Meropenem 1 gm/ Dextrose  100 mls @ 200 mls/hr  05/29/19 18:00  06/01/19 05:40





  IVPB   200 mls/hr





  Q12H ALCIDES   Administration





     





     





     





     


 


Mometasone Furoate  1 puff  05/29/19 22:00  05/31/19 21:24





  Asmanex 110mcg -  IH   1 puff





  HS ALCIDES   Administration





     





     





     





     


 


Montelukast Sodium  10 mg  05/29/19 22:00  05/31/19 21:24





  Singulair -  PO   10 mg





  HS ALCIDES   Administration





     





     





     





     


 


Pantoprazole Sodium  40 mg  05/30/19 10:00  06/01/19 09:21





  Protonix -  PO   40 mg





  DAILY ALCIDES   Administration





     





     





     





     


 


Tamsulosin HCl  0.4 mg  05/30/19 08:30  06/01/19 09:21





  Flomax -  PO   0.4 mg





  DAILY@0830 ALCIDES   Administration





     





     





     





     


 


Tiotropium Bromide  2 puff  05/30/19 10:00  06/01/19 09:21





  Spiriva Respimat  IH   2 puff





  DAILY ALCIDES   Administration





     





     





     





     











ASSESSMENT/PLAN


87 year-old male with a significant PMH of HTN, HLD, CAD s/p stent placement, 

aortic stenosis s/p TAVR, COPD, BPH, urinary retention, recurrent UTIs, renal 

cell cancer s/p right nephrectomy, and GERD. Admitted for severe sepsis 

secondary to UTI.





Severe sepsis secondary to LFGNB UTI


   --on admission, WBC 3.9, T 102.1, p 92, lactic acid 4.6, +pyuria


   --defervesced on 5/29, has remained hemodynamically stable


   --rash reaction to Zosyn in ED


   --continue meropenem (day #4) 


   --ID following





Hypertension


   --BP stable, not on antihypertensives





Hyperlipidemia


   --continue Lipitor





Coronary artery disease


   --not on daily ASA, beta blocker





Aortic stenosis s/p TAVR


   --stable





COPD


   --stable


   --continue Spiriva





BPH


Urinary retention


   --continue finasteride





Renal cell carcinoma s/p right nephrectomy


Chronic kidney disease


   --Cr 1.4 which is baseline





GERD


   --protonix





FEN


   Fluids: PO intake adequate


   Electrolytes: replete as indicated


   Nutrition: low sodium





DVT prophylaxis: subq heparin





Physical therapy





Dispo: continues to require inpatient care. Full code.








Visit type





- Emergency Visit


Emergency Visit: Yes


ED Registration Date: 05/29/19


Care time: The patient presented to the Emergency Department on the above date 

and was hospitalized for further evaluation of their emergent condition.





- New Patient


This patient is new to me today: No





- Critical Care


Critical Care patient: No

## 2019-06-02 VITALS — HEART RATE: 70 BPM

## 2019-06-02 LAB
ALBUMIN SERPL-MCNC: 2.5 G/DL (ref 3.4–5)
ALP SERPL-CCNC: 68 U/L (ref 45–117)
ALT SERPL-CCNC: 42 U/L (ref 13–61)
ANION GAP SERPL CALC-SCNC: 8 MMOL/L (ref 8–16)
AST SERPL-CCNC: 48 U/L (ref 15–37)
BASOPHILS # BLD: 0.9 % (ref 0–2)
BILIRUB SERPL-MCNC: 0.7 MG/DL (ref 0.2–1)
BUN SERPL-MCNC: 18 MG/DL (ref 7–18)
CALCIUM SERPL-MCNC: 8.3 MG/DL (ref 8.5–10)
CHLORIDE SERPL-SCNC: 110 MMOL/L (ref 98–107)
CO2 SERPL-SCNC: 24 MMOL/L (ref 21–32)
CREAT SERPL-MCNC: 1.4 MG/DL (ref 0.55–1.3)
DEPRECATED RDW RBC AUTO: 14.2 % (ref 11.9–15.9)
EOSINOPHIL # BLD: 8.1 % (ref 0–4.5)
GLUCOSE SERPL-MCNC: 108 MG/DL (ref 74–106)
HCT VFR BLD CALC: 30.2 % (ref 35.4–49)
HGB BLD-MCNC: 9.9 GM/DL (ref 11.7–16.9)
LYMPHOCYTES # BLD: 19.2 % (ref 8–40)
MCH RBC QN AUTO: 30.7 PG (ref 25.7–33.7)
MCHC RBC AUTO-ENTMCNC: 32.8 G/DL (ref 32–35.9)
MCV RBC: 93.5 FL (ref 80–96)
MONOCYTES # BLD AUTO: 8.4 % (ref 3.8–10.2)
NEUTROPHILS # BLD: 63.4 % (ref 42.8–82.8)
PLATELET # BLD AUTO: 86 K/MM3 (ref 134–434)
PMV BLD: 10 FL (ref 7.5–11.1)
POTASSIUM SERPLBLD-SCNC: 4.5 MMOL/L (ref 3.5–5.1)
PROT SERPL-MCNC: 5 G/DL (ref 6.4–8.2)
RBC # BLD AUTO: 3.23 M/MM3 (ref 4–5.6)
SODIUM SERPL-SCNC: 142 MMOL/L (ref 136–145)
WBC # BLD AUTO: 3.8 K/MM3 (ref 4–10)

## 2019-06-02 RX ADMIN — HEPARIN SODIUM SCH UNIT: 5000 INJECTION, SOLUTION INTRAVENOUS; SUBCUTANEOUS at 14:00

## 2019-06-02 RX ADMIN — MONTELUKAST SODIUM SCH MG: 10 TABLET, COATED ORAL at 21:22

## 2019-06-02 RX ADMIN — TIOTROPIUM BROMIDE INHALATION SPRAY SCH PUFF: 3.12 SPRAY, METERED RESPIRATORY (INHALATION) at 10:31

## 2019-06-02 RX ADMIN — MEROPENEM SCH MLS/HR: 1 INJECTION, POWDER, FOR SOLUTION INTRAVENOUS at 06:34

## 2019-06-02 RX ADMIN — ATORVASTATIN CALCIUM SCH MG: 40 TABLET, FILM COATED ORAL at 21:22

## 2019-06-02 RX ADMIN — HEPARIN SODIUM SCH UNIT: 5000 INJECTION, SOLUTION INTRAVENOUS; SUBCUTANEOUS at 21:22

## 2019-06-02 RX ADMIN — PANTOPRAZOLE SODIUM SCH MG: 40 TABLET, DELAYED RELEASE ORAL at 10:31

## 2019-06-02 RX ADMIN — HEPARIN SODIUM SCH UNIT: 5000 INJECTION, SOLUTION INTRAVENOUS; SUBCUTANEOUS at 06:34

## 2019-06-02 RX ADMIN — FINASTERIDE SCH MG: 5 TABLET, FILM COATED ORAL at 10:31

## 2019-06-02 RX ADMIN — MOMETASONE FUROATE SCH PUFF: 110 INHALANT RESPIRATORY (INHALATION) at 21:24

## 2019-06-02 RX ADMIN — MEROPENEM SCH MLS/HR: 1 INJECTION, POWDER, FOR SOLUTION INTRAVENOUS at 17:09

## 2019-06-02 RX ADMIN — TAMSULOSIN HYDROCHLORIDE SCH MG: 0.4 CAPSULE ORAL at 08:31

## 2019-06-02 NOTE — PN
Progress Note, Physician


History of Present Illness: 





stable


improving





- Current Medication List


Current Medications: 


Active Medications





Atorvastatin Calcium (Lipitor -)  40 mg PO HS [facility]


   Last Admin: 06/01/19 21:51 Dose:  40 mg


Finasteride (Proscar -)  5 mg PO DAILY [facility]


   Last Admin: 06/02/19 10:31 Dose:  5 mg


Heparin Sodium (Porcine) (Heparin -)  5,000 unit SQ TID [facility]


   Last Admin: 06/02/19 06:34 Dose:  5,000 unit


Meropenem 1 gm/ Dextrose  100 mls @ 200 mls/hr IVPB Q12H [facility]


   Last Admin: 06/02/19 06:34 Dose:  200 mls/hr


Mometasone Furoate (Asmanex 110mcg -)  1 puff [facility]


   Last Admin: 06/01/19 21:51 Dose:  1 puff


Montelukast Sodium (Singulair -)  10 mg PO [facility]


   Last Admin: 06/01/19 21:51 Dose:  10 mg


Pantoprazole Sodium (Protonix -)  40 mg PO DAILY [facility]


   Last Admin: 06/02/19 10:31 Dose:  40 mg


Tamsulosin HCl (Flomax -)  0.4 mg PO DAILY@0830 [facility]


   Last Admin: 06/02/19 08:31 Dose:  0.4 mg


Tiotropium Bromide (Spiriva Respimat)  2 puff IH DAILY [facility]


   Last Admin: 06/02/19 10:31 Dose:  2 puff











- Objective


Vital Signs: 


 Vital Signs











Temperature  98.5 F   06/02/19 06:00


 


Pulse Rate  64   06/02/19 06:00


 


Respiratory Rate  19   06/02/19 08:00


 


Blood Pressure  127/57 L  06/02/19 06:00


 


O2 Sat by Pulse Oximetry (%)  96   06/02/19 08:00











Constitutional: Yes: No Distress, Calm


Cardiovascular: Yes: S1, S2


Respiratory: Yes: Regular, CTA Bilaterally


Gastrointestinal: Yes: Normal Bowel Sounds, Soft


Musculoskeletal: Yes: WNL


Extremities: Yes: WNL


Neurological: Yes: Alert, Oriented


Psychiatric: Yes: Alert, Oriented





Assessment/Plan





87 year-old male with a significant PMH of HTN, HLD, CAD s/p stent placement, 

aortic stenosis s/p TAVR, COPD, BPH, urinary retention, recurrent UTIs, renal 

cell cancer s/p right nephrectomy, and GERD. Admitted for severe sepsis 

secondary to UTI.








sepsis


uti


bph


htn


hld





plan


cx results noted


patient to continue abx


improving


rest as per th eteam

## 2019-06-02 NOTE — PN
Physical Exam: 


SUBJECTIVE: Patient seen and examined, denies pain, 


afebrile, 








OBJECTIVE:





 Vital Signs











 Period  Temp  Pulse  Resp  BP Sys/Badillo  Pulse Ox


 


 Last 24 Hr  98.3 F-98.8 F  57-70  16-19  107-132/52-88  95-96











GENERAL: The patient is awake, alert, and fully oriented, in no acute distress.


HEAD: Normal with no signs of trauma.


EYES: PERRL, extraocular movements intact, sclera anicteric, conjunctiva clear. 

No ptosis. 


ENT: Ears normal, nares patent, oropharynx clear without exudates, moist mucous 


membranes.


NECK: Trachea midline, full range of motion, supple. 


LUNGS: Breath sounds equal, clear to auscultation bilaterally, no wheezes, no 

crackles, no 


accessory muscle use. 


HEART: Regular rate and rhythm, S1, S2 without murmur, rub or gallop.


ABDOMEN: Soft, nontender, nondistended, normoactive bowel sounds, no guarding, 

no 


rebound, no hepatosplenomegaly, no masses.


EXTREMITIES: 2+ pulses, warm, well-perfused, no edema. 


NEUROLOGICAL: Cranial nerves II through XII grossly intact. Normal speech, gait 

not 


observed.


PSYCH: Normal mood, normal affect.


SKIN: Warm, dry, normal turgor, no rashes or lesions noted














Active Medications











Generic Name Dose Route Start Last Admin





  Trade Name Freq  PRN Reason Stop Dose Admin


 


Atorvastatin Calcium  40 mg  05/29/19 22:00  06/01/19 21:51





  Lipitor -  PO   40 mg





  HS ALCIDES   Administration





     





     





     





     


 


Finasteride  5 mg  05/30/19 10:00  06/02/19 10:31





  Proscar -  PO   5 mg





  DAILY ALCIDES   Administration





     





     





     





     


 


Heparin Sodium (Porcine)  5,000 unit  05/30/19 15:45  06/02/19 06:34





  Heparin -  SQ   5,000 unit





  TID ALCIDES   Administration





     





     





     





     


 


Meropenem 1 gm/ Dextrose  100 mls @ 200 mls/hr  05/29/19 18:00  06/02/19 06:34





  IVPB   200 mls/hr





  Q12H ALCIDES   Administration





     





     





     





     


 


Mometasone Furoate  1 puff  05/29/19 22:00  06/01/19 21:51





  Asmanex 110mcg -  IH   1 puff





  HS ALCIDES   Administration





     





     





     





     


 


Montelukast Sodium  10 mg  05/29/19 22:00  06/01/19 21:51





  Singulair -  PO   10 mg





  HS ALCIDES   Administration





     





     





     





     


 


Pantoprazole Sodium  40 mg  05/30/19 10:00  06/02/19 10:31





  Protonix -  PO   40 mg





  DAILY ALCIDES   Administration





     





     





     





     


 


Tamsulosin HCl  0.4 mg  05/30/19 08:30  06/02/19 08:31





  Flomax -  PO   0.4 mg





  DAILY@0830 ALCIDES   Administration





     





     





     





     


 


Tiotropium Bromide  2 puff  05/30/19 10:00  06/02/19 10:31





  Spiriva Respimat  IH   2 puff





  DAILY ALCIDES   Administration





     





     





     





     











ASSESSMENT/PLAN:


87 year-old male with a significant PMH of HTN, HLD, CAD s/p stent placement, 

aortic stenosis s/p TAVR, COPD, BPH, urinary retention, recurrent UTIs, renal 

cell cancer s/p right nephrectomy, and GERD. Admitted for severe sepsis 

secondary to UTI.





#Severe sepsis secondary to LFGNB UTI


   --on admission, WBC 3.9, T 102.1, p 92, lactic acid 4.6, +pyuria


   --defervesced on 5/29, has remained hemodynamically stable


   --rash reaction to Zosyn in ED


   --continue meropenem (day #5) 


   --ID following





#Hypertension


   --BP stable, not on antihypertensives





#Hyperlipidemia


   --continue Lipitor





#Coronary artery disease


   --not on daily ASA, beta blocker





#Aortic stenosis s/p TAVR


   --stable





#COPD


   --stable


   --continue Spiriva





#BPH


Urinary retention


   --continue finasteride





#Renal cell carcinoma s/p right nephrectomy


Chronic kidney disease


   --Cr 1.4 which is baseline





#GERD


   --protonix





FEN


   Fluids: PO intake adequate


   Electrolytes: replete as indicated


   Nutrition: low sodium





DVT prophylaxis: subq heparin





Physical therapy





Dispo: continues to require inpatient care. Full code.





Visit type





- Emergency Visit


Emergency Visit: Yes


ED Registration Date: 05/29/19


Care time: The patient presented to the Emergency Department on the above date 

and was hospitalized for further evaluation of their emergent condition.





- New Patient


This patient is new to me today: Yes


Date on this admission: 06/02/19





- Critical Care


Critical Care patient: No

## 2019-06-03 VITALS — SYSTOLIC BLOOD PRESSURE: 120 MMHG | TEMPERATURE: 98.3 F | DIASTOLIC BLOOD PRESSURE: 49 MMHG

## 2019-06-03 LAB
ALBUMIN SERPL-MCNC: 2.5 G/DL (ref 3.4–5)
ALP SERPL-CCNC: 62 U/L (ref 45–117)
ALT SERPL-CCNC: 31 U/L (ref 13–61)
ANION GAP SERPL CALC-SCNC: 6 MMOL/L (ref 8–16)
AST SERPL-CCNC: 29 U/L (ref 15–37)
BASOPHILS # BLD: 1 % (ref 0–2)
BILIRUB SERPL-MCNC: 1 MG/DL (ref 0.2–1)
BUN SERPL-MCNC: 16 MG/DL (ref 7–18)
CALCIUM SERPL-MCNC: 8.5 MG/DL (ref 8.5–10)
CHLORIDE SERPL-SCNC: 111 MMOL/L (ref 98–107)
CO2 SERPL-SCNC: 23 MMOL/L (ref 21–32)
CREAT SERPL-MCNC: 1.3 MG/DL (ref 0.55–1.3)
DEPRECATED RDW RBC AUTO: 14.6 % (ref 11.9–15.9)
EOSINOPHIL # BLD: 8.9 % (ref 0–4.5)
GLUCOSE SERPL-MCNC: 91 MG/DL (ref 74–106)
HCT VFR BLD CALC: 29.5 % (ref 35.4–49)
HGB BLD-MCNC: 9.9 GM/DL (ref 11.7–16.9)
LYMPHOCYTES # BLD: 24.3 % (ref 8–40)
MCH RBC QN AUTO: 30.9 PG (ref 25.7–33.7)
MCHC RBC AUTO-ENTMCNC: 33.4 G/DL (ref 32–35.9)
MCV RBC: 92.6 FL (ref 80–96)
MONOCYTES # BLD AUTO: 8.7 % (ref 3.8–10.2)
NEUTROPHILS # BLD: 57.1 % (ref 42.8–82.8)
PLATELET # BLD AUTO: 94 K/MM3 (ref 134–434)
PMV BLD: 9.4 FL (ref 7.5–11.1)
POTASSIUM SERPLBLD-SCNC: 4.2 MMOL/L (ref 3.5–5.1)
PROT SERPL-MCNC: 5 G/DL (ref 6.4–8.2)
RBC # BLD AUTO: 3.19 M/MM3 (ref 4–5.6)
SODIUM SERPL-SCNC: 140 MMOL/L (ref 136–145)
WBC # BLD AUTO: 3.7 K/MM3 (ref 4–10)

## 2019-06-03 RX ADMIN — HEPARIN SODIUM SCH UNIT: 5000 INJECTION, SOLUTION INTRAVENOUS; SUBCUTANEOUS at 06:25

## 2019-06-03 RX ADMIN — MEROPENEM SCH MLS/HR: 1 INJECTION, POWDER, FOR SOLUTION INTRAVENOUS at 06:25

## 2019-06-03 RX ADMIN — PANTOPRAZOLE SODIUM SCH MG: 40 TABLET, DELAYED RELEASE ORAL at 09:13

## 2019-06-03 RX ADMIN — FINASTERIDE SCH MG: 5 TABLET, FILM COATED ORAL at 09:13

## 2019-06-03 RX ADMIN — TAMSULOSIN HYDROCHLORIDE SCH MG: 0.4 CAPSULE ORAL at 09:14

## 2019-06-03 NOTE — PN
Progress Note, Physician


History of Present Illness: 





patient stable


doing well


no complaints





- Current Medication List


Current Medications: 


Active Medications





Atorvastatin Calcium (Lipitor -)  40 mg PO HS [facility]


   Last Admin: 06/02/19 21:22 Dose:  40 mg


Finasteride (Proscar -)  5 mg PO DAILY [facility]


   Last Admin: 06/02/19 10:31 Dose:  5 mg


Heparin Sodium (Porcine) (Heparin -)  5,000 unit SQ TID [facility]


   Last Admin: 06/03/19 06:25 Dose:  5,000 unit


Meropenem 1 gm/ Dextrose  100 mls @ 200 mls/hr IVPB Q12H [facility]


   Last Admin: 06/03/19 06:25 Dose:  200 mls/hr


Mometasone Furoate (Asmanex 110mcg -)  1 puff [facility]


   Last Admin: 06/02/19 21:24 Dose:  1 puff


Montelukast Sodium (Singulair -)  10 mg PO [facility]


   Last Admin: 06/02/19 21:22 Dose:  10 mg


Pantoprazole Sodium (Protonix -)  40 mg PO DAILY [facility]


   Last Admin: 06/02/19 10:31 Dose:  40 mg


Tamsulosin HCl (Flomax -)  0.4 mg PO DAILY@0830 [facility]


   Last Admin: 06/02/19 08:31 Dose:  0.4 mg


Tiotropium Bromide (Spiriva Respimat)  2 puff IH DAILY [facility]


   Last Admin: 06/02/19 10:31 Dose:  2 puff











- Objective


Vital Signs: 


 Vital Signs











Temperature  98.3 F   06/03/19 06:00


 


Pulse Rate  70   06/03/19 06:00


 


Respiratory Rate  18   06/03/19 07:51


 


Blood Pressure  120/49 L  06/03/19 06:00


 


O2 Sat by Pulse Oximetry (%)  97   06/03/19 07:51











Constitutional: Yes: No Distress, Calm


Cardiovascular: Yes: S1, S2


Respiratory: Yes: Regular, CTA Bilaterally


Gastrointestinal: Yes: Normal Bowel Sounds, Soft


Musculoskeletal: Yes: WNL


Extremities: Yes: Other


Neurological: Yes: Alert, Oriented


Psychiatric: Yes: Alert, Oriented





Assessment/Plan





87 year-old male with a significant PMH of HTN, HLD, CAD s/p stent placement, 

aortic stenosis s/p TAVR, COPD, BPH, urinary retention, recurrent UTIs, renal 

cell cancer s/p right nephrectomy, and GERD. Admitted for severe sepsis 

secondary to UTI.








sepsis


uti


bph


htn


hld





plan


we can switch to oral bactrim bid for 5 more days


monitor closely rest as per the team

## 2019-06-03 NOTE — DS
Physical Exam: 


SUBJECTIVE: Patient seen and examined. Feeling well. Very pleased to be going 

home. Understands need to take PO antibiotics for 5 more days. 








OBJECTIVE:





 Vital Signs











 Period  Temp  Pulse  Resp  BP Sys/Badillo  Pulse Ox


 


 Last 24 Hr  98.1 F-98.8 F  66-70  17-19  117-124/49-59  96-97








PHYSICAL EXAM





GENERAL: Awake, alert, and fully oriented, in no acute distress.


LUNGS: Breath sounds equal, clear to auscultation bilaterally. No wheezes, and 

no crackles. No accessory muscle use.


HEART: Regular rate and rhythm, S1 and S2 


ABDOMEN: Soft, nontender, not distended


UPPER EXTREMITIES: 2+ pulses, warm, well-perfused. No cyanosis. No clubbing. No 

peripheral edema.


LOWER EXTREMITIES: 2+ pulses, warm, well-perfused. No calf tenderness. No 

peripheral edema. 


NEUROLOGICAL:  Cranial nerves II-XII intact. Normal speech.





LABS


 Laboratory Results - last 24 hr











  06/02/19 06/02/19





  11:50 11:50


 


WBC  3.8 L 


 


RBC  3.23 L 


 


Hgb  9.9 L 


 


Hct  30.2 L 


 


MCV  93.5 


 


MCH  30.7 


 


MCHC  32.8 


 


RDW  14.2 


 


Plt Count  86 L 


 


MPV  10.0 


 


Absolute Neuts (auto)  2.4 


 


Neutrophils %  63.4 


 


Lymphocytes %  19.2  D 


 


Monocytes %  8.4 


 


Eosinophils %  8.1 H 


 


Basophils %  0.9 


 


Nucleated RBC %  0 


 


Sodium   142


 


Potassium   4.5


 


Chloride   110 H


 


Carbon Dioxide   24


 


Anion Gap   8


 


BUN   18


 


Creatinine   1.4 H


 


Est GFR (CKD-EPI)AfAm   51.26


 


Est GFR (CKD-EPI)NonAf   44.23


 


Random Glucose   108 H


 


Calcium   8.3 L


 


Total Bilirubin   0.7


 


AST   48 H


 


ALT   42


 


Alkaline Phosphatase   68  D


 


Total Protein   5.0 L


 


Albumin   2.5 L











HOSPITAL COURSE:





Date of Admission:05/29/19





Date of Discharge: 06/03/19





Pre hospital course


87 year-old male with a significant PMH of HTN, HLD, CAD s/p stent placement, 

aortic stenosis s/p TAVR, COPD, BPH, urinary retention, recurrent UTIs, renal 

cell cancer s/p right nephrectomy, and GERD. Patient presented to the ED with a 

complaint of chills x 3-4 days. When this has occurred in the past, he has had 

a UTI. Denies dysuria, frequency, urgency, hematuria.





ER course 


(1) WBC 3.9, T 102.1, p 92, lactic acid 4.6, +pyuria


(2) Given Zosyn x 1 dose, developed splotchy erythema of face and neck, 

benadryl administered with resolution of symptoms; allergies updated in medical 

record





Subseuquent hospital course


87 year-old male with a significant PMH of HTN, HLD, CAD s/p stent placement, 

aortic stenosis s/p TAVR, COPD, BPH, urinary retention, recurrent UTIs, renal 

cell cancer s/p right nephrectomy, and GERD. Admitted for severe sepsis 

secondary to UTI.





Severe sepsis secondary to UTI


   --on admission, WBC 3.9, T 102.1, p 92, lactic acid 4.6, +pyuria


   --defervesced on HOD#1, remained mildly leukopenic 


   --had rash reaction to Zosyn in ED, allergies noted in EMR


   --switched to meropenem and treated for 5 days


   --discharged on Bactrim x 5 days





BPH


Urinary retention


   --continued finasteride


   --has followup next week with urologist





Hypertension


   --BP remained stable, not on antihypertensives





Hyperlipidemia


   --continued Lipitor





Coronary artery disease


   --not on daily ASA, beta blocker





Aortic stenosis s/p TAVR


   --stable





COPD


   --stable


   --continued Spiriva





Renal cell carcinoma s/p right nephrectomy























Minutes to complete discharge: 35





Discharge Summary


Reason For Visit: UROSEPSIS


Current Active Problems





Sepsis (Acute)








Condition: Improved





- Instructions


Diet, Activity, Other Instructions: 


You were treated for sepsis secondary to a urinary tract infection with 

intravenous antibiotics. 





During your hospital stay you had an allergic reaction to a medication called 

Zosyn (piperacillin/tazobactam). 





You are ALLERGIC to penicillin-based drugs and beta-lactams. Please let your 

health care providers know this. 





You have an appointment to see Dr. Dominguez on Tuesday, June 11, 2019. 





Return to the emergency department for any new or worsening symptoms. 


Referrals: 


Dell Lynch MD [Staff Physician] - 06/11/19


Nhan Terrazas MD [Staff Physician] - 


Disposition: HOME





- Home Medications


Comprehensive Discharge Medication List: 


Ambulatory Orders





Atorvastatin Ca [Lipitor] 40 mg PO HS 05/29/19 


Finasteride 5 mg PO DAILY 05/29/19 


Fluticasone Propionate [Flovent Diskus] 50 mcg IH PRN 05/29/19 


Montelukast Na [Singulair -] 10 mg PO HS 05/29/19 


Pantoprazole Sodium 40 mg PO DAILY 05/29/19 


Tamsulosin HCl 0.4 mg PO DAILY 05/29/19 


Tiotropium Bromide [Spiriva] 1 inh PO DAILY 05/29/19 








This patient is new to me today: No


Emergency Visit: Yes


ED Registration Date: 05/29/19


Care time: The patient presented to the Emergency Department on the above date 

and was hospitalized for further evaluation of their emergent condition.


Critical Care patient: No





- Discharge Referral


Referred to [facility] No

## 2019-06-04 ENCOUNTER — RECORD ABSTRACTING (OUTPATIENT)
Age: 84
End: 2019-06-04

## 2019-06-04 DIAGNOSIS — K21.9 GASTRO-ESOPHAGEAL REFLUX DISEASE W/OUT ESOPHAGITIS: ICD-10-CM

## 2019-06-04 DIAGNOSIS — Z87.898 PERSONAL HISTORY OF OTHER SPECIFIED CONDITIONS: ICD-10-CM

## 2019-06-04 DIAGNOSIS — Z82.3 FAMILY HISTORY OF STROKE: ICD-10-CM

## 2019-06-04 DIAGNOSIS — K43.2 INCISIONAL HERNIA W/OUT OBSTRUCTION OR GANGRENE: ICD-10-CM

## 2019-06-04 DIAGNOSIS — Z85.528 PERSONAL HISTORY OF OTHER MALIGNANT NEOPLASM OF KIDNEY: ICD-10-CM

## 2019-06-04 DIAGNOSIS — Z82.49 FAMILY HISTORY OF ISCHEMIC HEART DISEASE AND OTHER DISEASES OF THE CIRCULATORY SYSTEM: ICD-10-CM

## 2019-06-04 RX ORDER — FINASTERIDE 5 MG/1
5 TABLET, FILM COATED ORAL
Refills: 0 | Status: ACTIVE | COMMUNITY

## 2019-06-04 RX ORDER — PANTOPRAZOLE 40 MG/1
40 TABLET, DELAYED RELEASE ORAL DAILY
Refills: 0 | Status: ACTIVE | COMMUNITY

## 2019-06-04 RX ORDER — TAMSULOSIN HYDROCHLORIDE 0.4 MG/1
0.4 CAPSULE ORAL
Refills: 0 | Status: ACTIVE | COMMUNITY

## 2019-06-04 RX ORDER — ATORVASTATIN CALCIUM 40 MG/1
40 TABLET, FILM COATED ORAL
Refills: 0 | Status: ACTIVE | COMMUNITY

## 2019-06-04 RX ORDER — CARVEDILOL 3.12 MG/1
3.12 TABLET, FILM COATED ORAL
Refills: 0 | Status: ACTIVE | COMMUNITY

## 2019-06-04 RX ORDER — MONTELUKAST 10 MG/1
10 TABLET, FILM COATED ORAL
Refills: 0 | Status: ACTIVE | COMMUNITY

## 2019-06-04 NOTE — PN
Progress Note, Physician


History of Present Illness: 





stable


improving





- Objective


Vital Signs: 


 Vital Signs











Temperature  98.3 F   06/03/19 06:00


 


Pulse Rate  70   06/03/19 06:00


 


Respiratory Rate  18   06/03/19 07:51


 


Blood Pressure  120/49 L  06/03/19 06:00


 


O2 Sat by Pulse Oximetry (%)  97   06/03/19 07:51











Constitutional: Yes: No Distress, Calm


Cardiovascular: Yes: S1, S2


Respiratory: Yes: Regular, CTA Bilaterally


Gastrointestinal: Yes: Normal Bowel Sounds, Soft


Musculoskeletal: Yes: WNL


Extremities: Yes: Other


Neurological: Yes: Alert, Oriented


Labs: 


 CBC, BMP





 06/03/19 07:14 





 06/03/19 07:14 











Assessment/Plan





87 year-old male with a significant PMH of HTN, HLD, CAD s/p stent placement, 

aortic stenosis s/p TAVR, COPD, BPH, urinary retention, recurrent UTIs, renal 

cell cancer s/p right nephrectomy, and GERD. Admitted for severe sepsis 

secondary to UTI.








sepsis


uti


bph


htn


hld





plan


continue abx


improving

## 2019-06-05 ENCOUNTER — TRANSCRIPTION ENCOUNTER (OUTPATIENT)
Age: 84
End: 2019-06-05

## 2019-06-05 ENCOUNTER — APPOINTMENT (OUTPATIENT)
Dept: FAMILY MEDICINE | Facility: CLINIC | Age: 84
End: 2019-06-05
Payer: MEDICARE

## 2019-06-05 VITALS
HEIGHT: 69 IN | WEIGHT: 169 LBS | DIASTOLIC BLOOD PRESSURE: 70 MMHG | BODY MASS INDEX: 25.03 KG/M2 | SYSTOLIC BLOOD PRESSURE: 118 MMHG | HEART RATE: 67 BPM | OXYGEN SATURATION: 98 %

## 2019-06-05 PROCEDURE — 99212 OFFICE O/P EST SF 10 MIN: CPT

## 2019-06-05 PROCEDURE — 99214 OFFICE O/P EST MOD 30 MIN: CPT

## 2019-06-05 PROCEDURE — 99496 TRANSJ CARE MGMT HIGH F2F 7D: CPT

## 2019-06-05 RX ORDER — TIOTROPIUM BROMIDE 18 UG/1
CAPSULE ORAL; RESPIRATORY (INHALATION)
Refills: 0 | Status: ACTIVE | COMMUNITY

## 2019-06-05 NOTE — HEALTH RISK ASSESSMENT
[0] : 1) Little interest or pleasure doing things: Not at all (0) [] : No [de-identified] : no [[id number]] : 0

## 2019-06-05 NOTE — HISTORY OF PRESENT ILLNESS
[Post-hospitalization from ___ Hospital] : Post-hospitalization from [unfilled] Hospital [Admitted on: ___] : The patient was admitted on [unfilled] [Discharged on ___] : discharged on [unfilled] [Discharge Med List] : discharge medication list [FreeTextEntry2] : The patient was in [facility] with urinary sepsis\par Treated with IV antibiotics\par discharged on Bactrim DS\par Doing well now, at his baseline

## 2019-06-05 NOTE — PLAN
[FreeTextEntry1] : complete antibiotic treatment\par To see his urologist Dr Horn\par Call anytime if needed\par F/U in 4weeks or PRN

## 2019-06-05 NOTE — REVIEW OF SYSTEMS
[Hearing Loss] : hearing loss [Frequency] : frequency [Dysuria] : dysuria [Muscle Pain] : muscle pain [Joint Stiffness] : joint stiffness [Joint Swelling] : joint swelling [Negative] : Respiratory [Vision Problems] : no vision problems [Back Pain] : no back pain

## 2019-06-30 ENCOUNTER — HOSPITAL ENCOUNTER (EMERGENCY)
Dept: HOSPITAL 74 - JER | Age: 84
Discharge: HOME | End: 2019-06-30
Payer: COMMERCIAL

## 2019-06-30 VITALS — BODY MASS INDEX: 24.3 KG/M2

## 2019-06-30 VITALS — DIASTOLIC BLOOD PRESSURE: 72 MMHG | HEART RATE: 72 BPM | SYSTOLIC BLOOD PRESSURE: 108 MMHG | TEMPERATURE: 97.2 F

## 2019-06-30 DIAGNOSIS — Z95.2: ICD-10-CM

## 2019-06-30 DIAGNOSIS — J44.9: ICD-10-CM

## 2019-06-30 DIAGNOSIS — R33.9: ICD-10-CM

## 2019-06-30 DIAGNOSIS — Z88.8: ICD-10-CM

## 2019-06-30 DIAGNOSIS — Z85.528: ICD-10-CM

## 2019-06-30 DIAGNOSIS — Z95.5: ICD-10-CM

## 2019-06-30 DIAGNOSIS — Z90.5: ICD-10-CM

## 2019-06-30 DIAGNOSIS — K21.9: ICD-10-CM

## 2019-06-30 DIAGNOSIS — I25.10: ICD-10-CM

## 2019-06-30 DIAGNOSIS — I10: ICD-10-CM

## 2019-06-30 DIAGNOSIS — N39.0: Primary | ICD-10-CM

## 2019-06-30 DIAGNOSIS — Z87.440: ICD-10-CM

## 2019-06-30 DIAGNOSIS — N40.0: ICD-10-CM

## 2019-06-30 DIAGNOSIS — E78.5: ICD-10-CM

## 2019-06-30 LAB
ALBUMIN SERPL-MCNC: 3.6 G/DL (ref 3.4–5)
ALP SERPL-CCNC: 91 U/L (ref 45–117)
ALT SERPL-CCNC: 15 U/L (ref 13–61)
ANION GAP SERPL CALC-SCNC: 4 MMOL/L (ref 8–16)
APPEARANCE UR: CLEAR
AST SERPL-CCNC: 13 U/L (ref 15–37)
BACTERIA # UR AUTO: 340.6 /HPF
BASOPHILS # BLD: 0.9 % (ref 0–2)
BILIRUB SERPL-MCNC: 0.9 MG/DL (ref 0.2–1)
BILIRUB UR STRIP.AUTO-MCNC: NEGATIVE MG/DL
BUN SERPL-MCNC: 29 MG/DL (ref 7–18)
CALCIUM SERPL-MCNC: 9.3 MG/DL (ref 8.5–10.1)
CASTS URNS QL MICRO: 1 /LPF (ref 0–8)
CHLORIDE SERPL-SCNC: 108 MMOL/L (ref 98–107)
CO2 SERPL-SCNC: 28 MMOL/L (ref 21–32)
COLOR UR: YELLOW
CREAT SERPL-MCNC: 1.8 MG/DL (ref 0.55–1.3)
DEPRECATED RDW RBC AUTO: 14.8 % (ref 11.9–15.9)
EOSINOPHIL # BLD: 2.2 % (ref 0–4.5)
EPITH CASTS URNS QL MICRO: 4.8 /HPF
GLUCOSE SERPL-MCNC: 90 MG/DL (ref 74–106)
HCT VFR BLD CALC: 33.3 % (ref 35.4–49)
HGB BLD-MCNC: 11.2 GM/DL (ref 11.7–16.9)
KETONES UR QL STRIP: NEGATIVE
LEUKOCYTE ESTERASE UR QL STRIP.AUTO: (no result)
LYMPHOCYTES # BLD: 18.7 % (ref 8–40)
MCH RBC QN AUTO: 31 PG (ref 25.7–33.7)
MCHC RBC AUTO-ENTMCNC: 33.5 G/DL (ref 32–35.9)
MCV RBC: 92.3 FL (ref 80–96)
MONOCYTES # BLD AUTO: 7.2 % (ref 3.8–10.2)
NEUTROPHILS # BLD: 71 % (ref 42.8–82.8)
NITRITE UR QL STRIP: NEGATIVE
PH UR: 6 [PH] (ref 5–8)
PLATELET # BLD AUTO: 101 K/MM3 (ref 134–434)
PMV BLD: 9.7 FL (ref 7.5–11.1)
POTASSIUM SERPLBLD-SCNC: 4.4 MMOL/L (ref 3.5–5.1)
PROT SERPL-MCNC: 6.7 G/DL (ref 6.4–8.2)
PROT UR QL STRIP: NEGATIVE
PROT UR QL STRIP: NEGATIVE
RBC # BLD AUTO: 3.61 M/MM3 (ref 4–5.6)
RBC # BLD AUTO: 32 /HPF (ref 0–4)
SODIUM SERPL-SCNC: 140 MMOL/L (ref 136–145)
SP GR UR: 1.01 (ref 1.01–1.03)
UROBILINOGEN UR STRIP-MCNC: 0.2 MG/DL (ref 0.2–1)
WBC # BLD AUTO: 4.1 K/MM3 (ref 4–10)
WBC # UR AUTO: 14 /HPF (ref 0–5)

## 2019-06-30 PROCEDURE — 0T9B70Z DRAINAGE OF BLADDER WITH DRAINAGE DEVICE, VIA NATURAL OR ARTIFICIAL OPENING: ICD-10-PCS

## 2019-06-30 PROCEDURE — BT4JZZZ ULTRASONOGRAPHY OF KIDNEYS AND BLADDER: ICD-10-PCS

## 2019-06-30 NOTE — PDOC
History of Present Illness





- General


Chief Complaint: Urinary Problem


Stated Complaint: UNABLE TO URINATE


Time Seen by Provider: 06/30/19 07:29


History Source: Patient


Exam Limitations: No Limitations





- History of Present Illness


Initial Comments: 





06/30/19 07:40





89 year-old male with a significant PMH of HTN, HLD, CAD s/p stent placement, 

aortic stenosis s/p TAVR, COPD, BPH, urinary retention, recurrent UTIs, renal 

cell cancer s/p right nephrectomy, and GERD presenting with urinary retention 

beginning yesterday. he notes dribbling of urine. no abdominal pain, n/v/d, 

fever or chills


has history of urethral dilation for stricture/bladder outlet obstruction, last 

time in June 2019 with Dr Lynch.


last admission about 1 month ago for sepsis/UTI, completed bactrim course for 

E. coli








06/30/19 10:44








Past History





- Past Medical History


Allergies/Adverse Reactions: 


 Allergies











Allergy/AdvReac Type Severity Reaction Status Date / Time


 


piperacillin [From Zosyn] Allergy Intermediate Rash Verified 06/30/19 07:17


 


tazobactam [From Zosyn] Allergy Intermediate Rash Verified 06/30/19 07:17











Home Medications: 


Ambulatory Orders





Atorvastatin Ca [Lipitor] 40 mg PO HS 05/29/19 


Finasteride 5 mg PO DAILY 05/29/19 


Fluticasone Propionate [Flovent Diskus] 50 mcg IH PRN 05/29/19 


Montelukast Na [Singulair -] 10 mg PO HS 05/29/19 


Pantoprazole Sodium 40 mg PO DAILY 05/29/19 


Tamsulosin HCl 0.4 mg PO DAILY 05/29/19 


Tiotropium Bromide [Spiriva] 1 inh PO DAILY 05/29/19 


Sulfamethoxazole/Trimethoprim [Bactrim Ds -] 1 tab PO BID #12 tablet 06/30/19 








Anemia: No


Cancer: Yes (kidney cancer)


Cardiac Disorders: Yes (valve disease)


COPD: No


 Disorders: Yes (URINARY RETENTION,BPH)


HTN: Yes


Hypercholesterolemia: Yes





- Surgical History


Cardiac Surgery: Yes (valve replacement)


Cholecystectomy: Yes





- Immunization History


Immunization Up to Date: Yes





- Suicide/Smoking/Psychosocial Hx


Smoking History: Never smoked


Have you smoked in the past 12 months: No


Number of Cigarettes Smoked Daily: 1


If you are a former smoker, when did you quit?: 60 years ago


Hx Alcohol Use: No


Drug/Substance Use Hx: No


Substance Use Type: None


Hx Substance Use Treatment: No





**Review of Systems





- Review of Systems


Able to Perform ROS?: Yes


Comments:: 





06/30/19 07:41





Constitutional: no fevers or chills.


HEENT: no headache or dizziness. No congestion. No visual/hearing disturbances.


CVS:  no cp or syncope.


Resp: no sob. No cough. 


Gastrointestinal: no abdominal pain, nausea or vomiting. 


Genitourinary: no dysuria, frequency, hematuria. +urinary retention, +urgency.


MUSCULOSKELETAL: No joint pain and swelling. No neck or back pain.


SKIN: no redness or skin changes, no discharge, no rash. No wounds.


NEUROLOGIC: No headache, dizziness, LOC or altered mental status. No weakness, 

numbness or tingling. 


Allergic/Immunologic: abx allergies


All other systems reviewed and negative, or as documented in HPI. 











*Physical Exam





- Vital Signs


 Last Vital Signs











Temp Pulse Resp BP Pulse Ox


 


 97.4 F L  74   18   104/41 L  99 


 


 06/30/19 07:12  06/30/19 07:12  06/30/19 07:12  06/30/19 07:12  06/30/19 07:12














- Physical Exam


Comments: 





06/30/19 07:42





General:   Well appearing, awake and alert, NAD. 


HEENT:  NCAT, PERRL, EOMI, clear conjunctiva, anicteric, moist mucus membranes, 

clear oropharynx, no oral lesions.. 


Neck:  neck supple, FROM


Resp:  CTAB, normal and even respirations, no respiratory distress


CVS: RRR, soft murmur, 2+ peripheral pulses throughout, 4+ peripheral edema


Abdomen: soft, NTND


Genitourinary: no CVAT, no testicular or scrotal tenderness; penis inverted/

retracted into base


Back: nontender, normal inspection and ROM


MSK:  +chronic LE edema, MURGUIA x4, ROM intact. No clubbing or cyanosis. normal 

bulk and tone.


Neuro: alert


Skin: warm and well perfused, cap refill <2 sec, normal color











06/30/19 07:44





06/30/19 09:00








ED Treatment Course





- LABORATORY


CBC & Chemistry Diagram: 


 06/30/19 09:10





 06/30/19 09:10





Medical Decision Making





- Medical Decision Making





06/30/19 07:44


hpi as documented


VS reviewed wnl. no systemic findings


no fever rectally and HD appropriate. NAD and remains well appearing


urinary retention, checked on bedside bladder sono


ware unsuccessful due to stricture/obstruction at the prostate. performed by 

RN and me, with Coude catheter 16-18 Samoan.





06/30/19 08:58


while in the ED, preparing for catheter placement, pt did dribble urine at 

bedside.


bedside sono with urinary retention, post dribbling at 300cc on multiple views.


straight cath for urine. with output of 300cc 


UA/culture to check for infection, +WBCs 14, with hematuria, c/w straight cath 

and ware attempt.


prior cultures checked, +e coli with relative pan sensitivity, resistant to 

quinolones; pcn allergic to zosyn, hold on cephalosporin.. previously on Bactrim

, so will given 1 week course.


labs and lytes reviewed, Cr at baseline, which has fluctuated and been higher 

so Cr 1.8 approx his baseline per his records. no wbc ct, H/H wnl, higher than 

previously. 


defer further ware attempt, as pt able to void, and not symptomatic without 

abdominal distension or tenderness; defer further trauma and risk of infection


urology followup, cs Dr Lynch. called to office, awaiting call back but pt 

aware and will call to followup. 





Pt to be discharged in stable condition. Patient made aware of clinical 

impression, treatment recommendations and disposition plan, return precautions 

discussed (including but not limited to new or persistent/worsening symptoms, 

pain, fevers, or signs of infection, chest pain, respiratory distress, 

inability to tolerate oral intake, dehydration, syncope, or neurologic changes)

. Follow up with PMD and/or specialist as recommended, follow up information 

provided, take medications as instructed for duration of time. continue with 

supportive care, avoid triggers and precipitants. All questions answered to 

patient's satisfaction and expressed understanding and comfort with this. At 

the time of discharge, the patient is alert, clinically improved, tolerating po 

and verbalizes understanding of instructions, satisfied with the care received 

and felt comfortable with the plan. Patient does not suffer from an acute life-

threatening medical condition at this time and  is safe for outpatient follow-

up. 














06/30/19 08:59





06/30/19 10:45





06/30/19 10:47





06/30/19 10:50





06/30/19 10:51








*DC/Admit/Observation/Transfer


Diagnosis at time of Disposition: 


 UTI (urinary tract infection), Urinary retention








- Discharge Dispostion


Disposition: HOME


Condition at time of disposition: Improved


Decision to Admit order: No





- Prescriptions


Prescriptions: 


Sulfamethoxazole/Trimethoprim [Bactrim Ds -] 1 tab PO BID #12 tablet





- Referrals


Referrals: 


Brandyn Mckay MD [Staff Physician] - 





- Patient Instructions


Printed Discharge Instructions:  DI for Urinary Retention in Men, DI for 

Urinary Tract Infection (UTI)


Additional Instructions: 


1) Please follow-up with your primary care doctor in the next 1-2 days. Please 

call tomorrow for for any urgent issues. 


you are to follow up with your urologist, Dr Lynch, you have seen Dr Marshall previously as well


urologists will be able to perform cystoscopy and urethral dilation causing 

your urinary retention.





2) You were given a copy of the tests performed today. Please bring the results 

with you and review them with your primary care doctor. Your laboratory / 

imaging results were normal, with baseline kidney function and no significant 

abnormalities from your baseline





3) If you have any worsening of symptoms or any other concerns please return to 

the ED immediately. Return if worsening symptoms including fevers, headache, 

vomiting, visual or hearing disturbances, abdominal pain, chest pain, shortness 

of breath, syncope, dehydration, inability to take things by mouth/vomiting, 

altered mental status, or worsening concerning symptoms. 





4) Please continue taking your home medications as directed. your medications 

on discharge include bactrim twice a day x 1 week . side effects may include 

upset stomach, abdominal pain, vomiting, or diarrhea. do not drink alcohol with 

your medications. take with food. prior cultures revealed Ecoli infection. 


Stay well hydrated and rest adequately. Make an appointment with your doctors. 

If you cannot follow-up with your primary care doctor and urologist, please 

return to the ED 








- Post Discharge Activity

## 2019-09-05 ENCOUNTER — LABORATORY RESULT (OUTPATIENT)
Age: 84
End: 2019-09-05

## 2019-09-05 ENCOUNTER — APPOINTMENT (OUTPATIENT)
Dept: FAMILY MEDICINE | Facility: CLINIC | Age: 84
End: 2019-09-05
Payer: MEDICARE

## 2019-09-05 VITALS
WEIGHT: 168 LBS | BODY MASS INDEX: 25.46 KG/M2 | DIASTOLIC BLOOD PRESSURE: 80 MMHG | HEART RATE: 68 BPM | HEIGHT: 68 IN | RESPIRATION RATE: 15 BRPM | SYSTOLIC BLOOD PRESSURE: 122 MMHG

## 2019-09-05 PROCEDURE — 99214 OFFICE O/P EST MOD 30 MIN: CPT | Mod: 25

## 2019-09-05 PROCEDURE — 36415 COLL VENOUS BLD VENIPUNCTURE: CPT

## 2019-09-05 RX ORDER — SULFAMETHOXAZOLE AND TRIMETHOPRIM 800; 160 MG/1; MG/1
800-160 TABLET ORAL
Refills: 0 | Status: DISCONTINUED | COMMUNITY
End: 2019-09-05

## 2019-09-05 RX ORDER — PNEUMOCOCCAL 23-VAL P-SAC VAC 25MCG/0.5
25 VIAL (ML) INJECTION
Qty: 5 | Refills: 0 | Status: COMPLETED | COMMUNITY
Start: 2019-03-13

## 2019-09-05 RX ORDER — LEVOFLOXACIN 250 MG/1
250 TABLET, FILM COATED ORAL
Qty: 3 | Refills: 0 | Status: COMPLETED | COMMUNITY
Start: 2019-04-16

## 2019-09-05 RX ORDER — FUROSEMIDE 20 MG/1
20 TABLET ORAL
Qty: 90 | Refills: 0 | Status: COMPLETED | COMMUNITY
Start: 2019-05-03

## 2019-09-05 RX ORDER — AMOXICILLIN AND CLAVULANATE POTASSIUM 875; 125 MG/1; MG/1
875-125 TABLET, COATED ORAL
Qty: 14 | Refills: 0 | Status: COMPLETED | COMMUNITY
Start: 2019-07-03

## 2019-09-05 NOTE — REVIEW OF SYSTEMS
[Fatigue] : fatigue [Negative] : Respiratory [FreeTextEntry2] : weakness [FreeTextEntry7] : 2-3 BM daily

## 2019-09-05 NOTE — HEALTH RISK ASSESSMENT
[] : No [No] : No [No falls in past year] : Patient reported no falls in the past year [0] : 2) Feeling down, depressed, or hopeless: Not at all (0) [[id number]] : 1

## 2019-09-05 NOTE — PLAN
[FreeTextEntry1] : Recommended PT TIW x 4 weeks\par Repeated blood work as well UA/UC\par Will contact the patient with results

## 2019-09-05 NOTE — HISTORY OF PRESENT ILLNESS
[FreeTextEntry1] : Feeling weak\par back pain\par walking difficulties\par Concern nof "digestion" problems [de-identified] : Accompanied by a frienD, here with the above complaints

## 2019-09-05 NOTE — HEALTH RISK ASSESSMENT
[] : No [No] : No [No falls in past year] : Patient reported no falls in the past year [0] : 2) Feeling down, depressed, or hopeless: Not at all (0) [[id number]] : 1

## 2019-09-05 NOTE — HISTORY OF PRESENT ILLNESS
[FreeTextEntry1] : Feeling weak\par back pain\par walking difficulties\par Concern nof "digestion" problems [de-identified] : Accompanied by a frienD, here with the above complaints

## 2019-09-06 ENCOUNTER — RX RENEWAL (OUTPATIENT)
Age: 84
End: 2019-09-06

## 2019-09-06 LAB
25(OH)D3 SERPL-MCNC: 17 NG/ML
ALBUMIN SERPL ELPH-MCNC: 3.7 G/DL
ALP BLD-CCNC: 75 U/L
ALT SERPL-CCNC: 11 U/L
ANION GAP SERPL CALC-SCNC: 13 MMOL/L
AST SERPL-CCNC: 20 U/L
BASOPHILS # BLD AUTO: 0.02 K/UL
BASOPHILS NFR BLD AUTO: 0.3 %
BILIRUB SERPL-MCNC: 0.7 MG/DL
BUN SERPL-MCNC: 31 MG/DL
CALCIUM SERPL-MCNC: 9 MG/DL
CHLORIDE SERPL-SCNC: 105 MMOL/L
CHOLEST SERPL-MCNC: 87 MG/DL
CHOLEST/HDLC SERPL: 1.7 RATIO
CO2 SERPL-SCNC: 20 MMOL/L
CREAT SERPL-MCNC: 1.63 MG/DL
EOSINOPHIL # BLD AUTO: 0.34 K/UL
EOSINOPHIL NFR BLD AUTO: 4.9 %
ESTIMATED AVERAGE GLUCOSE: 108 MG/DL
GLUCOSE SERPL-MCNC: 97 MG/DL
HBA1C MFR BLD HPLC: 5.4 %
HCT VFR BLD CALC: 34.4 %
HDLC SERPL-MCNC: 50 MG/DL
HGB BLD-MCNC: 10.8 G/DL
IMM GRANULOCYTES NFR BLD AUTO: 0.4 %
LDLC SERPL CALC-MCNC: 28 MG/DL
LYMPHOCYTES # BLD AUTO: 0.88 K/UL
LYMPHOCYTES NFR BLD AUTO: 12.8 %
MAN DIFF?: NORMAL
MCHC RBC-ENTMCNC: 30.4 PG
MCHC RBC-ENTMCNC: 31.4 GM/DL
MCV RBC AUTO: 96.9 FL
MONOCYTES # BLD AUTO: 0.5 K/UL
MONOCYTES NFR BLD AUTO: 7.3 %
NEUTROPHILS # BLD AUTO: 5.1 K/UL
NEUTROPHILS NFR BLD AUTO: 74.3 %
PLATELET # BLD AUTO: 113 K/UL
POTASSIUM SERPL-SCNC: 4.5 MMOL/L
PROT SERPL-MCNC: 6.4 G/DL
RBC # BLD: 3.55 M/UL
RBC # FLD: 15.9 %
SODIUM SERPL-SCNC: 138 MMOL/L
TRIGL SERPL-MCNC: 46 MG/DL
TSH SERPL-ACNC: 3.22 UIU/ML
VIT B12 SERPL-MCNC: 271 PG/ML
WBC # FLD AUTO: 6.87 K/UL

## 2019-09-06 RX ORDER — SULFAMETHOXAZOLE AND TRIMETHOPRIM 800; 160 MG/1; MG/1
800-160 TABLET ORAL TWICE DAILY
Qty: 20 | Refills: 0 | Status: ACTIVE | COMMUNITY
Start: 2019-09-06 | End: 1900-01-01

## 2019-10-29 ENCOUNTER — RX RENEWAL (OUTPATIENT)
Age: 84
End: 2019-10-29

## 2019-12-02 ENCOUNTER — RESULT REVIEW (OUTPATIENT)
Age: 84
End: 2019-12-02

## 2020-01-30 ENCOUNTER — APPOINTMENT (OUTPATIENT)
Dept: FAMILY MEDICINE | Facility: CLINIC | Age: 85
End: 2020-01-30
Payer: MEDICARE

## 2020-01-30 VITALS
DIASTOLIC BLOOD PRESSURE: 58 MMHG | RESPIRATION RATE: 16 BRPM | WEIGHT: 168 LBS | HEIGHT: 68 IN | BODY MASS INDEX: 25.46 KG/M2 | HEART RATE: 76 BPM | OXYGEN SATURATION: 95 % | SYSTOLIC BLOOD PRESSURE: 122 MMHG

## 2020-01-30 DIAGNOSIS — M15.9 POLYOSTEOARTHRITIS, UNSPECIFIED: ICD-10-CM

## 2020-01-30 DIAGNOSIS — R35.0 FREQUENCY OF MICTURITION: ICD-10-CM

## 2020-01-30 PROCEDURE — 99214 OFFICE O/P EST MOD 30 MIN: CPT

## 2020-01-30 NOTE — REVIEW OF SYSTEMS
[Fatigue] : fatigue [Orthopena] : orthopnea [Joint Pain] : joint pain [Shortness Of Breath] : shortness of breath [Joint Stiffness] : joint stiffness [Back Pain] : back pain [Negative] : Eyes

## 2020-01-30 NOTE — HEALTH RISK ASSESSMENT
[0] : 1) Little interest or pleasure doing things: Not at all (0) [No falls in past year] : Patient reported no falls in the past year [[id number]] : 0

## 2020-01-30 NOTE — PHYSICAL EXAM
[de-identified] : Frail elderly [Normal] : normal rate, regular rhythm, normal S1 and S2 and no murmur heard [de-identified] : Decreased air entry [de-identified] : walking difficulties [de-identified] : Decreased ROM

## 2020-01-30 NOTE — HISTORY OF PRESENT ILLNESS
[de-identified] : Accompanied by wife, here with the above complaints\par Need waiver for jury duties [FreeTextEntry1] : Feeling weak\par back pain\par walking difficulties\par Urinary urgency\par Concern nof "digestion" problems

## 2020-02-20 ENCOUNTER — APPOINTMENT (OUTPATIENT)
Dept: FAMILY MEDICINE | Facility: CLINIC | Age: 85
End: 2020-02-20
Payer: MEDICARE

## 2020-02-20 VITALS
HEART RATE: 75 BPM | BODY MASS INDEX: 27.32 KG/M2 | WEIGHT: 170 LBS | SYSTOLIC BLOOD PRESSURE: 128 MMHG | HEIGHT: 66 IN | DIASTOLIC BLOOD PRESSURE: 80 MMHG | RESPIRATION RATE: 16 BRPM

## 2020-02-20 DIAGNOSIS — N40.1 BENIGN PROSTATIC HYPERPLASIA WITH LOWER URINARY TRACT SYMPMS: ICD-10-CM

## 2020-02-20 PROCEDURE — 99214 OFFICE O/P EST MOD 30 MIN: CPT | Mod: 25

## 2020-02-20 PROCEDURE — 36415 COLL VENOUS BLD VENIPUNCTURE: CPT

## 2020-02-20 NOTE — PHYSICAL EXAM
[Normal] : soft, non-tender, non-distended, no masses palpated, no HSM and normal bowel sounds [de-identified] : Frail elderly [de-identified] : Decreased air entry [de-identified] : Decreased ROM [de-identified] : walking difficulties

## 2020-02-20 NOTE — PLAN
[FreeTextEntry1] : WIll contact the patient and his cardiologist with blood work results\par Continue PT at home\par Recommended PT\par F/U urology

## 2020-02-20 NOTE — REVIEW OF SYSTEMS
[Fatigue] : fatigue [Orthopena] : orthopnea [Shortness Of Breath] : shortness of breath [Joint Pain] : joint pain [Joint Stiffness] : joint stiffness [Back Pain] : back pain [Negative] : Gastrointestinal

## 2020-02-20 NOTE — HISTORY OF PRESENT ILLNESS
[FreeTextEntry1] : Feeling weak\par back pain\par walking difficulties\par Blood work recommended by cardiologist\par Urinary urgency\par Concern nof "digestion" problems [de-identified] :  here with the above complaints\par Recently seen by cardiologist, need additional blood work

## 2020-02-21 LAB
ALBUMIN SERPL ELPH-MCNC: 3.9 G/DL
ALP BLD-CCNC: 69 U/L
ALT SERPL-CCNC: 11 U/L
ANION GAP SERPL CALC-SCNC: 13 MMOL/L
AST SERPL-CCNC: 19 U/L
BASOPHILS # BLD AUTO: 0.01 K/UL
BASOPHILS NFR BLD AUTO: 0.3 %
BILIRUB SERPL-MCNC: 0.5 MG/DL
BUN SERPL-MCNC: 32 MG/DL
CALCIUM SERPL-MCNC: 9.5 MG/DL
CHLORIDE SERPL-SCNC: 104 MMOL/L
CHOLEST SERPL-MCNC: 106 MG/DL
CHOLEST/HDLC SERPL: 2.4 RATIO
CO2 SERPL-SCNC: 22 MMOL/L
CREAT SERPL-MCNC: 1.51 MG/DL
EOSINOPHIL # BLD AUTO: 0.17 K/UL
EOSINOPHIL NFR BLD AUTO: 4.6 %
GLUCOSE SERPL-MCNC: 93 MG/DL
HCT VFR BLD CALC: 34.3 %
HDLC SERPL-MCNC: 44 MG/DL
HGB BLD-MCNC: 10.8 G/DL
IMM GRANULOCYTES NFR BLD AUTO: 0.3 %
LDLC SERPL CALC-MCNC: 46 MG/DL
LYMPHOCYTES # BLD AUTO: 0.88 K/UL
LYMPHOCYTES NFR BLD AUTO: 23.7 %
MAN DIFF?: NORMAL
MCHC RBC-ENTMCNC: 31.5 GM/DL
MCHC RBC-ENTMCNC: 32.1 PG
MCV RBC AUTO: 102.1 FL
MONOCYTES # BLD AUTO: 0.32 K/UL
MONOCYTES NFR BLD AUTO: 8.6 %
NEUTROPHILS # BLD AUTO: 2.32 K/UL
NEUTROPHILS NFR BLD AUTO: 62.5 %
NT-PROBNP SERPL-MCNC: 2106 PG/ML
PLATELET # BLD AUTO: 105 K/UL
POTASSIUM SERPL-SCNC: 5.2 MMOL/L
PROT SERPL-MCNC: 7.5 G/DL
RBC # BLD: 3.36 M/UL
RBC # FLD: 15.7 %
SODIUM SERPL-SCNC: 138 MMOL/L
TRIGL SERPL-MCNC: 82 MG/DL
TSH SERPL-ACNC: 3.31 UIU/ML
WBC # FLD AUTO: 3.71 K/UL

## 2020-02-23 NOTE — PDOC
History of Present Illness





- General


Chief Complaint: Pain


Stated Complaint: (PCP SENT) STONE/ GALLBLADDER


Time Seen by Provider: 05/15/17 16:48


History Source: Patient


Exam Limitations: No Limitations





- History of Present Illness


Initial Comments: 


05/15/17 17:24


CHIEF COMPLAINT: Abdominal pain





HISTORY OF PRESENT ILLNESS: This is an 87-year-old male with a history of CAD s/

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 

urethral strictures s/p dilation, right nephrectomy secondary to renal ca, and 

HTN who was sent by his PCP because an abnormal MRI. Patient underwent an MRI 

on 4/28 as part of a workup for pancreatic head density. An obstructing CBD 

stone measuring at least 1 cm with marked proximal CBD dilatation up to 1.4 cm 

and intrahepatic biliary ductal dilatation was incidentally noted.





The patient presents today complaining of abdominal pain. He had 1 episode of 

"clear" vomiting on Thursday and then again on Sunday. He is complaining of 

chills.





Vital signs on arrival are unremarkable.





PCP is Dr. Mckay





Social history: Working as , 


Smoking: None


EtOH: None





REVIEW OF SYSTEMS:


GENERAL/CONSTITUTIONAL: No fever or chills. No weakness. No weight change.


HEAD, EYES, EARS, NOSE AND THROAT: No change in vision. No ear pain or 

discharge. No sore throat.


CARDIOVASCULAR: No chest pain or palpitations.


RESPIRATORY: No cough, wheezing, or shortness of breath.


GASTROINTESTINAL: See HPI. 


GENITOURINARY: No dysuria, frequency, or change in urination.


MUSCULOSKELETAL: No joint or muscle swelling or pain. No neck or back pain.


SKIN: No rash or easy bruising.


NEUROLOGIC: No headache, vertigo, loss of consciousness, or loss of sensation.


PSYCHIATRIC: No depression or anxiety.


ENDOCRINE: No increased thirst. No abnormal weight change.


HEMATOLOGIC/LYMPHATIC: No anemia, easy bleeding, or history of blood clots.


ALLERGIC/IMMUNOLOGIC: No hives or skin allergy. No latex allergy.





PHYSICAL EXAM:


GENERAL: The patient is awake, alert, and fully oriented, in no acute distress.


HEAD: Normal with no signs of trauma.


ENT: Pupils equal, round and reactive to light, extraocular movements intact, 

sclera anicteric, conjunctiva clear. Neck supple.


LUNGS: Clear to auscultation bilaterally. Normal excursion. No respiratory 

distress or use of accessory muscles.


CV: RRR, S1/S2, no MRG. Cap refill < 2 sec.


ABDOMEN: Soft, non-distended, RUQ tenderness. Periumbilical erythema and 

purulent, malodorous discharge.


EXTREMITIES: Normal range of motion, no edema.


NEUROLOGICAL: Normal speech, normal gait. CN II-XII grossly intact.


PSYCH: Normal mood, normal affect.


SKIN: Warm, dry, normal turgor, no rashes or lesions noted.
























































Past History





- Past Medical History


Allergies/Adverse Reactions: 


 Allergies











Allergy/AdvReac Type Severity Reaction Status Date / Time


 


Penicillins Allergy   Verified 05/15/17 15:20











Home Medications: 


Ambulatory Orders





Aspirin Coated [Ecotrin -] 81 mg PO DAILY 01/26/15 


Montelukast Na [Singulair -] 10 mg PO HS 09/09/16 


Atorvastatin Ca [Lipitor] 40 mg PO HS 02/19/17 


Finasteride 5 mg PO DAILY 02/19/17 








Anemia: No


Asthma: No


Cancer: Yes (R.KIDNEY)


Cardiac Disorders: Yes (valve disease)


CVA: No


COPD: Yes


CHF: No


Dementia: No


Diabetes: No


GI Disorders: No


 Disorders: Yes (URINARY RETENTION,BPH)


HTN: Yes


Hypercholesterolemia: Yes


Liver Disease: No


Seizures: No


Thyroid Disease: No





- Surgical History


Abdominal Surgery: No


Appendectomy: No


Cardiac Surgery: Yes (Angioplasty with stent,AORTIC VALVE SX)


Cholecystectomy: No


Lung Surgery: No


Neurologic Surgery: No


Orthopedic Surgery: No





- Immunization History


Immunization Up to Date: Yes





- Psycho/Social/Smoking Cessation Hx


Anxiety: No


Suicidal Ideation: No


Smoking History: Never smoked


Have you smoked in the past 12 months: No


Information on smoking cessation initiated: No


Hx Alcohol Use: No


Drug/Substance Use Hx: No


Substance Use Type: None


Hx Substance Use Treatment: No





*Physical Exam





- Vital Signs


 Last Vital Signs











Temp Pulse Resp BP Pulse Ox


 


 97.4 F L  87   17   140/59   97 


 


 05/15/17 15:18  05/15/17 15:18  05/15/17 15:18  05/15/17 15:18  05/15/17 15:18














ED Treatment Course





- LABORATORY


CBC & Chemistry Diagram: 


 05/15/17 16:51





 05/15/17 16:51





- ADDITIONAL ORDERS


Additional order review: 


 











  05/15/17





  16:51


 


RBC  3.66 L


 


MCV  87.9


 


MCHC  33.2


 


RDW  17.4 H D


 


MPV  9.8  D


 


Neutrophils %  85.8 H


 


Lymphocytes %  5.8 L D


 


Monocytes %  7.1


 


Eosinophils %  0.8


 


Basophils %  0.5














- RADIOLOGY


Radiology Studies Ordered: 














 Category Date Time Status


 


 CHEST X-RAY PORTABLE* [RAD] Stat Radiology  05/15/17 17:00 Ordered


 


 GALLBLADDER US [US] Stat Ultrasound  05/15/17 16:52 Ordered














Medical Decision Making





- Medical Decision Making


05/15/17 17:54


A/P: 87 year old male with abdominal pain, vomiting, and chills; large, 

obstructing CBD stone on outpatient MRI two weeks ago.





1. EKG


2. Abdominal labs


3. Abd u/s


4. GI consultation


5. Culture of umbilical discharge


6. Antibiotic coverage with Levaquin/Flagyl


7. Anticipate admission





05/15/17 18:18


-WBC is 11.9





05/15/17 18:52


-Total/direct Bili 9.7/8


-AST//87


-Alk Phos 1048





-Cr 3.3 (1.6 on prior visit in April)





Will request admission.























*DC/Admit/Observation/Transfer


Diagnosis at time of Disposition: 


 Calculus of common duct with obstruction





- Discharge Dispostion


Admit: Yes





- Referrals


Referrals: 


Brandyn Mckay MD [Primary Care Provider] - Name band;

## 2020-05-01 ENCOUNTER — RX RENEWAL (OUTPATIENT)
Age: 85
End: 2020-05-01

## 2020-07-24 ENCOUNTER — LABORATORY RESULT (OUTPATIENT)
Age: 85
End: 2020-07-24

## 2020-07-24 ENCOUNTER — APPOINTMENT (OUTPATIENT)
Dept: FAMILY MEDICINE | Facility: CLINIC | Age: 85
End: 2020-07-24
Payer: MEDICARE

## 2020-07-24 VITALS
BODY MASS INDEX: 25.71 KG/M2 | DIASTOLIC BLOOD PRESSURE: 60 MMHG | WEIGHT: 160 LBS | TEMPERATURE: 99.2 F | SYSTOLIC BLOOD PRESSURE: 130 MMHG | OXYGEN SATURATION: 95 % | HEIGHT: 66 IN | HEART RATE: 80 BPM | RESPIRATION RATE: 14 BRPM

## 2020-07-24 DIAGNOSIS — I10 ESSENTIAL (PRIMARY) HYPERTENSION: ICD-10-CM

## 2020-07-24 DIAGNOSIS — Z86.19 PERSONAL HISTORY OF OTHER INFECTIOUS AND PARASITIC DISEASES: ICD-10-CM

## 2020-07-24 DIAGNOSIS — N39.0 SEPSIS, UNSPECIFIED ORGANISM: ICD-10-CM

## 2020-07-24 DIAGNOSIS — N39.0 URINARY TRACT INFECTION, SITE NOT SPECIFIED: ICD-10-CM

## 2020-07-24 DIAGNOSIS — A41.9 SEPSIS, UNSPECIFIED ORGANISM: ICD-10-CM

## 2020-07-24 PROCEDURE — 36415 COLL VENOUS BLD VENIPUNCTURE: CPT

## 2020-07-24 PROCEDURE — 99214 OFFICE O/P EST MOD 30 MIN: CPT | Mod: 25

## 2020-07-24 PROCEDURE — 99496 TRANSJ CARE MGMT HIGH F2F 7D: CPT | Mod: 25

## 2020-07-24 PROCEDURE — 96372 THER/PROPH/DIAG INJ SC/IM: CPT

## 2020-07-24 RX ORDER — ZOSTER VACCINE RECOMBINANT, ADJUVANTED 50 MCG/0.5
50 KIT INTRAMUSCULAR
Qty: 2 | Refills: 0 | Status: ACTIVE | COMMUNITY
Start: 2020-07-24 | End: 1900-01-01

## 2020-07-24 RX ORDER — CYANOCOBALAMIN 1000 UG/ML
1000 INJECTION INTRAMUSCULAR; SUBCUTANEOUS
Qty: 0 | Refills: 0 | Status: COMPLETED | OUTPATIENT
Start: 2020-07-24

## 2020-07-24 RX ADMIN — CYANOCOBALAMIN 1000 MCG/ML: 1000 INJECTION INTRAMUSCULAR; SUBCUTANEOUS at 00:00

## 2020-07-24 NOTE — PHYSICAL EXAM
[Normal] : normal rate, regular rhythm, normal S1 and S2 and no murmur heard [de-identified] : Frail elderly [de-identified] : Decreased air entry [de-identified] : Decreased ROM [de-identified] : walking difficulties

## 2020-07-24 NOTE — REVIEW OF SYSTEMS
[Shortness Of Breath] : shortness of breath [Fatigue] : fatigue [Orthopena] : orthopnea [Joint Stiffness] : joint stiffness [Joint Pain] : joint pain [Back Pain] : back pain [Negative] : Gastrointestinal

## 2020-07-24 NOTE — HISTORY OF PRESENT ILLNESS
[Post-hospitalization from ___ Hospital] : Post-hospitalization from [unfilled] Hospital [Discharged on ___] : discharged on [unfilled] [Admitted on: ___] : The patient was admitted on [unfilled] [Discharge Summary] : discharge summary [Pertinent Labs] : pertinent labs [Discharge Med List] : discharge medication list [FreeTextEntry2] : Repeatedly admitted to [facility] with recurrent UTI and sepsis\par This time treated with vancomycin\par Appear weak\par was also found with anemia\par requesting also immunization for shingle

## 2020-07-24 NOTE — HEALTH RISK ASSESSMENT
[No falls in past year] : Patient reported no falls in the past year [1] : 2) Feeling down, depressed, or hopeless for several days (1) [] : No [[id number]] : 2

## 2020-07-24 NOTE — PLAN
[FreeTextEntry1] : To address with Urology recurrence UTI/sepsis\par appear weak ppale, received B12 1000 mcg im\par Rx for shingrix\par call if not well\par Will call with blood test results

## 2020-07-26 LAB
ALBUMIN SERPL ELPH-MCNC: 3.2 G/DL
ALP BLD-CCNC: 47 U/L
ALT SERPL-CCNC: 18 U/L
ANION GAP SERPL CALC-SCNC: 8 MMOL/L
AST SERPL-CCNC: 26 U/L
BASOPHILS # BLD AUTO: 0 K/UL
BASOPHILS NFR BLD AUTO: 0 %
BILIRUB SERPL-MCNC: 0.5 MG/DL
BUN SERPL-MCNC: 29 MG/DL
CALCIUM SERPL-MCNC: 9.2 MG/DL
CHLORIDE SERPL-SCNC: 103 MMOL/L
CHOLEST SERPL-MCNC: 94 MG/DL
CHOLEST/HDLC SERPL: 2.4 RATIO
CO2 SERPL-SCNC: 23 MMOL/L
CREAT SERPL-MCNC: 1.77 MG/DL
EOSINOPHIL # BLD AUTO: 0.08 K/UL
EOSINOPHIL NFR BLD AUTO: 1.8 %
GLUCOSE SERPL-MCNC: 99 MG/DL
HCT VFR BLD CALC: 27.6 %
HDLC SERPL-MCNC: 40 MG/DL
HGB BLD-MCNC: 8.4 G/DL
IRON SATN MFR SERPL: 50 %
IRON SERPL-MCNC: 94 UG/DL
LDLC SERPL CALC-MCNC: 37 MG/DL
LYMPHOCYTES # BLD AUTO: 1.43 K/UL
LYMPHOCYTES NFR BLD AUTO: 32.7 %
MAN DIFF?: NORMAL
MCHC RBC-ENTMCNC: 30.4 GM/DL
MCHC RBC-ENTMCNC: 33.9 PG
MCV RBC AUTO: 111.3 FL
MONOCYTES # BLD AUTO: 0.44 K/UL
MONOCYTES NFR BLD AUTO: 10 %
NEUTROPHILS # BLD AUTO: 2.35 K/UL
NEUTROPHILS NFR BLD AUTO: 53.7 %
NT-PROBNP SERPL-MCNC: ABNORMAL PG/ML
PLATELET # BLD AUTO: 141 K/UL
POTASSIUM SERPL-SCNC: 4.8 MMOL/L
PROT SERPL-MCNC: 9.5 G/DL
RBC # BLD: 2.48 M/UL
RBC # FLD: 17.7 %
SODIUM SERPL-SCNC: 134 MMOL/L
TIBC SERPL-MCNC: 188 UG/DL
TRIGL SERPL-MCNC: 83 MG/DL
TSH SERPL-ACNC: 10.6 UIU/ML
UIBC SERPL-MCNC: 93 UG/DL
VIT B12 SERPL-MCNC: 428 PG/ML
WBC # FLD AUTO: 4.38 K/UL

## 2020-08-18 RX ORDER — METHYLPREDNISOLONE 4 MG/1
4 TABLET ORAL
Qty: 1 | Refills: 1 | Status: ACTIVE | COMMUNITY
Start: 2020-08-18 | End: 1900-01-01

## 2020-08-20 ENCOUNTER — APPOINTMENT (OUTPATIENT)
Dept: FAMILY MEDICINE | Facility: CLINIC | Age: 85
End: 2020-08-20
Payer: MEDICARE

## 2020-08-20 DIAGNOSIS — M54.5 LOW BACK PAIN: ICD-10-CM

## 2020-08-20 DIAGNOSIS — R26.2 DIFFICULTY IN WALKING, NOT ELSEWHERE CLASSIFIED: ICD-10-CM

## 2020-08-20 DIAGNOSIS — K59.09 OTHER CONSTIPATION: ICD-10-CM

## 2020-08-20 PROCEDURE — 99213 OFFICE O/P EST LOW 20 MIN: CPT

## 2020-08-20 RX ORDER — GLYCERIN 2 G/1
2 SUPPOSITORY RECTAL
Qty: 60 | Refills: 3 | Status: ACTIVE | COMMUNITY
Start: 2020-08-20 | End: 1900-01-01

## 2020-08-20 RX ORDER — DOCUSATE SODIUM 100 MG/1
100 CAPSULE ORAL DAILY
Qty: 60 | Refills: 3 | Status: ACTIVE | COMMUNITY
Start: 2020-08-20 | End: 1900-01-01

## 2020-08-20 RX ORDER — LORATADINE 5 MG
17 TABLET,CHEWABLE ORAL DAILY
Qty: 30 | Refills: 11 | Status: ACTIVE | COMMUNITY
Start: 2020-08-20 | End: 1900-01-01

## 2020-08-20 NOTE — ASSESSMENT
[FreeTextEntry1] : 89 yo male patient w/ a significant PMH for kyphosis of the spine, anemia, and osteoarthritis, c/o lower back pain, fatigue, and constipation. He appears elderly and frail, hunched over forward while seated during the examination. He is SOB and in pain during the examination. No CVA tenderness, just pain along the spine. Pain in the lower back due to severe kyphosis of the spine and osteoarthritis.

## 2020-08-20 NOTE — PLAN
[FreeTextEntry1] : patient has severe osteoarthritis and kyphosis of the spine\par advised to take advil and tylenol for pain as needed\par Ordered medication to take for constipation\par Patient told to f/u on Monday if medication for constipation is not working\par \par

## 2020-08-20 NOTE — HISTORY OF PRESENT ILLNESS
[FreeTextEntry1] : Lower back pain\par Constipation\par Weakness/SOB/fatigue [de-identified] : 89 yo male patient w/ a significant PMH for anemia, kyphosis of the spine, osteoarthritis, c/o lower back pain, constipation, and immense fatigue. He is in a large amount of pain, lying, and sitting down. Nothing has made the pain better, he thinks it may be due to his kidney. Presents along the entire column but specifically the lower back. He is short of breath and becomes incredibly tired w/ movement. He has also been constipated the past few weeks.

## 2020-08-20 NOTE — PHYSICAL EXAM
[Normal] : soft, non-tender, non-distended, no masses palpated, no HSM and normal bowel sounds [No CVA Tenderness] : no CVA  tenderness [Kyphosis] : kyphosis [Clear to Auscultation] : lungs were clear to auscultation bilaterally [Normal Percussion] : the chest was normal to percussion [de-identified] : Frail elderly [de-identified] : Decreased ROM [de-identified] : Decreased air entry [de-identified] : spinal tenderness, advanced kyphosis of the spine, pain w/ spinal movement [de-identified] : walking difficulties

## 2020-08-20 NOTE — REVIEW OF SYSTEMS
[Fatigue] : fatigue [Shortness Of Breath] : shortness of breath [Dyspnea on Exertion] : dyspnea on exertion [Constipation] : constipation [Abdominal Pain] : abdominal pain [Joint Stiffness] : joint stiffness [Muscle Weakness] : muscle weakness [Back Pain] : back pain [Negative] : Heme/Lymph [FreeTextEntry2] : shortness of breath, fatigue w/ any type of activity  [FreeTextEntry7] : constipation [FreeTextEntry9] : severe lower back pain that limits activity

## 2020-08-31 ENCOUNTER — RX RENEWAL (OUTPATIENT)
Age: 85
End: 2020-08-31

## 2020-08-31 RX ORDER — CYCLOBENZAPRINE HYDROCHLORIDE 5 MG/1
5 TABLET, FILM COATED ORAL 3 TIMES DAILY
Qty: 180 | Refills: 1 | Status: ACTIVE | COMMUNITY
Start: 2020-08-18 | End: 1900-01-01

## 2020-10-05 ENCOUNTER — LABORATORY RESULT (OUTPATIENT)
Age: 85
End: 2020-10-05

## 2020-10-05 ENCOUNTER — APPOINTMENT (OUTPATIENT)
Dept: FAMILY MEDICINE | Facility: CLINIC | Age: 85
End: 2020-10-05
Payer: MEDICARE

## 2020-10-05 VITALS
RESPIRATION RATE: 14 BRPM | OXYGEN SATURATION: 98 % | SYSTOLIC BLOOD PRESSURE: 126 MMHG | HEART RATE: 86 BPM | BODY MASS INDEX: 25.99 KG/M2 | DIASTOLIC BLOOD PRESSURE: 60 MMHG | HEIGHT: 65 IN | WEIGHT: 156 LBS | TEMPERATURE: 98 F

## 2020-10-05 DIAGNOSIS — Q60.0 RENAL AGENESIS, UNILATERAL: ICD-10-CM

## 2020-10-05 DIAGNOSIS — M40.204 UNSPECIFIED KYPHOSIS, THORACIC REGION: ICD-10-CM

## 2020-10-05 DIAGNOSIS — E78.00 PURE HYPERCHOLESTEROLEMIA, UNSPECIFIED: ICD-10-CM

## 2020-10-05 DIAGNOSIS — Z23 ENCOUNTER FOR IMMUNIZATION: ICD-10-CM

## 2020-10-05 PROCEDURE — 96372 THER/PROPH/DIAG INJ SC/IM: CPT

## 2020-10-05 PROCEDURE — 99214 OFFICE O/P EST MOD 30 MIN: CPT | Mod: 25

## 2020-10-05 PROCEDURE — 90662 IIV NO PRSV INCREASED AG IM: CPT

## 2020-10-05 PROCEDURE — G0008: CPT

## 2020-10-05 RX ADMIN — CYANOCOBALAMIN 1000 MCG/ML: 1000 INJECTION INTRAMUSCULAR; SUBCUTANEOUS at 00:00

## 2020-10-05 NOTE — PHYSICAL EXAM
[No JVD] : no jugular venous distention [Clear to Auscultation] : lungs were clear to auscultation bilaterally [Normal Percussion] : the chest was normal to percussion [Normal] : soft, non-tender, non-distended, no masses palpated, no HSM and normal bowel sounds [No CVA Tenderness] : no CVA  tenderness [Kyphosis] : kyphosis [de-identified] : Frail elderly [de-identified] : Decreased ROM [de-identified] : Decreased air entry [de-identified] : spinal tenderness, advanced kyphosis of the spine, pain w/ spinal movement [de-identified] : walking difficulties

## 2020-10-05 NOTE — HEALTH RISK ASSESSMENT
[No falls in past year] : Patient reported no falls in the past year [1] : 2) Feeling down, depressed, or hopeless for several days (1) [] : No [[id number]] : 2

## 2020-10-05 NOTE — PLAN
[FreeTextEntry1] : Need home aid, the patient is at high risk of fall\par Received the flu shot and B12 IM\par If Ht is below 26 will need transfusion\par F/U 4 weeks\par \par \par

## 2020-10-05 NOTE — HISTORY OF PRESENT ILLNESS
[FreeTextEntry1] : Very weak\par Need help with transfer\par F/U heart failure, anemia\par flu shot [de-identified] : The patient physical strength very much diminished singe last time seen\par Need help with transfer and ambulation\par known with anemia and heart failure

## 2020-10-05 NOTE — REVIEW OF SYSTEMS
[Fatigue] : fatigue [Shortness Of Breath] : shortness of breath [Dyspnea on Exertion] : dyspnea on exertion [Constipation] : constipation [Joint Stiffness] : joint stiffness [Muscle Weakness] : muscle weakness [Back Pain] : back pain [Negative] : Heme/Lymph [Abdominal Pain] : no abdominal pain [FreeTextEntry2] : shortness of breath, fatigue w/ any type of activity  [FreeTextEntry7] : constipation [FreeTextEntry9] : severe lower back pain that limits activity

## 2020-10-06 LAB
ALBUMIN SERPL ELPH-MCNC: 3.2 G/DL
ALP BLD-CCNC: 69 U/L
ALT SERPL-CCNC: 30 U/L
ANION GAP SERPL CALC-SCNC: 11 MMOL/L
AST SERPL-CCNC: 53 U/L
BASOPHILS # BLD AUTO: 0.07 K/UL
BASOPHILS NFR BLD AUTO: 0.9 %
BILIRUB SERPL-MCNC: 0.9 MG/DL
BUN SERPL-MCNC: 59 MG/DL
CALCIUM SERPL-MCNC: 9.1 MG/DL
CHLORIDE SERPL-SCNC: 108 MMOL/L
CO2 SERPL-SCNC: 15 MMOL/L
CREAT SERPL-MCNC: 2.22 MG/DL
EOSINOPHIL # BLD AUTO: 0 K/UL
EOSINOPHIL NFR BLD AUTO: 0 %
FERRITIN SERPL-MCNC: 435 NG/ML
GLUCOSE SERPL-MCNC: 95 MG/DL
HCT VFR BLD CALC: 25.7 %
HGB BLD-MCNC: 7.9 G/DL
IRON SATN MFR SERPL: 55 %
IRON SERPL-MCNC: 103 UG/DL
LYMPHOCYTES # BLD AUTO: 2.43 K/UL
LYMPHOCYTES NFR BLD AUTO: 33.3 %
MAN DIFF?: NORMAL
MCHC RBC-ENTMCNC: 30.7 GM/DL
MCHC RBC-ENTMCNC: 36.2 PG
MCV RBC AUTO: 117.9 FL
MONOCYTES # BLD AUTO: 0.58 K/UL
MONOCYTES NFR BLD AUTO: 7.9 %
NEUTROPHILS # BLD AUTO: 4.16 K/UL
NEUTROPHILS NFR BLD AUTO: 57 %
PLATELET # BLD AUTO: 162 K/UL
POTASSIUM SERPL-SCNC: 4.6 MMOL/L
PROT SERPL-MCNC: 10.5 G/DL
RBC # BLD: 2.18 M/UL
RBC # FLD: 22.3 %
SODIUM SERPL-SCNC: 134 MMOL/L
TIBC SERPL-MCNC: 186 UG/DL
TSH SERPL-ACNC: 44.3 UIU/ML
UIBC SERPL-MCNC: 84 UG/DL
VIT B12 SERPL-MCNC: 613 PG/ML
WBC # FLD AUTO: 7.3 K/UL

## 2020-10-06 RX ORDER — LEVOTHYROXINE SODIUM 0.12 MG/1
125 TABLET ORAL DAILY
Qty: 30 | Refills: 5 | Status: ACTIVE | COMMUNITY
Start: 2019-04-30 | End: 1900-01-01

## 2020-10-07 LAB — NT-PROBNP SERPL-MCNC: ABNORMAL PG/ML

## 2020-10-19 ENCOUNTER — APPOINTMENT (OUTPATIENT)
Dept: FAMILY MEDICINE | Facility: CLINIC | Age: 85
End: 2020-10-19
Payer: MEDICARE

## 2020-10-19 VITALS — HEART RATE: 80 BPM | SYSTOLIC BLOOD PRESSURE: 111 MMHG | DIASTOLIC BLOOD PRESSURE: 60 MMHG

## 2020-10-19 DIAGNOSIS — E03.9 HYPOTHYROIDISM, UNSPECIFIED: ICD-10-CM

## 2020-10-19 DIAGNOSIS — N18.9 CHRONIC KIDNEY DISEASE, UNSPECIFIED: ICD-10-CM

## 2020-10-19 DIAGNOSIS — R26.2 DIFFICULTY IN WALKING, NOT ELSEWHERE CLASSIFIED: ICD-10-CM

## 2020-10-19 DIAGNOSIS — I50.9 HEART FAILURE, UNSPECIFIED: ICD-10-CM

## 2020-10-19 DIAGNOSIS — I25.10 ATHEROSCLEROTIC HEART DISEASE OF NATIVE CORONARY ARTERY W/OUT ANGINA PECTORIS: ICD-10-CM

## 2020-10-19 DIAGNOSIS — R53.1 WEAKNESS: ICD-10-CM

## 2020-10-19 DIAGNOSIS — D63.8 ANEMIA IN OTHER CHRONIC DISEASES CLASSIFIED ELSEWHERE: ICD-10-CM

## 2020-10-19 PROCEDURE — 99349 HOME/RES VST EST MOD MDM 40: CPT | Mod: 25

## 2020-10-19 PROCEDURE — 99496 TRANSJ CARE MGMT HIGH F2F 7D: CPT

## 2020-10-19 NOTE — PHYSICAL EXAM
[No JVD] : no jugular venous distention [Clear to Auscultation] : lungs were clear to auscultation bilaterally [Normal Percussion] : the chest was normal to percussion [Normal] : soft, non-tender, non-distended, no masses palpated, no HSM and normal bowel sounds [No CVA Tenderness] : no CVA  tenderness [Kyphosis] : kyphosis [de-identified] : Frail elderly [de-identified] : Decreased ROM [de-identified] : spinal tenderness, advanced kyphosis of the spine, pain w/ spinal movement [de-identified] : Decreased air entry [de-identified] : walking difficulties

## 2020-10-19 NOTE — PLAN
[FreeTextEntry1] : The patient was seen at home at family request due advanced weakness and difficulty in ambulation.\par The patient condition is getting worse Hospice care offered\par The patient and his family will decide

## 2020-10-19 NOTE — HISTORY OF PRESENT ILLNESS
[Admitted on: ___] : The patient was admitted on [unfilled] [Post-hospitalization from ___ Hospital] : Post-hospitalization from [unfilled] Hospital [Discharged on ___] : discharged on [unfilled] [Discharge Summary] : discharge summary [Pertinent Labs] : pertinent labs [Discharge Med List] : discharge medication list [Radiology Findings] : radiology findings [Med Reconciliation] : medication reconciliation has been completed [FreeTextEntry2] : The patient who significantly declining his condition was admitted with anemia, CHF exacerbation and exacerbation of renal failure\par At home with VNS and home aid services\par Non ambulator

## 2020-10-19 NOTE — REVIEW OF SYSTEMS
[Fatigue] : fatigue [Shortness Of Breath] : shortness of breath [Dyspnea on Exertion] : dyspnea on exertion [Abdominal Pain] : no abdominal pain [Constipation] : constipation [Joint Stiffness] : joint stiffness [Muscle Weakness] : muscle weakness [Back Pain] : back pain [Negative] : Heme/Lymph [FreeTextEntry2] : shortness of breath, fatigue w/ any type of activity  [FreeTextEntry7] : constipation [FreeTextEntry9] : severe lower back pain that limits activity

## 2020-10-19 NOTE — HEALTH RISK ASSESSMENT
[Intercurrent ED visits] : went to ED [Intercurrent hospitalizations] : was admitted to the hospital  [No falls in past year] : Patient reported no falls in the past year [1] : 2) Feeling down, depressed, or hopeless for several days (1) [[id number]] : 2

## 2021-06-06 NOTE — PN
Physical Exam: 


SUBJECTIVE: Patient seen and examined.  He states he feels well.


Denies pain, shortness of breath or discomfort.  





OBJECTIVE:


For ERCP in a.m.


NPO @ midnight


                  Vital Signs











 Period  Temp  Pulse  Resp  BP Sys/Badillo  Pulse Ox


 


 Last 24 Hr  97.5 F-98.7 F  57-60  18-20  122-144/56-65  97











GENERAL: Patient is awake, alert and in no acute distress


HEAD: Normal with no signs of trauma.


NECK: Normal range of motion, supple without lymphadenopathy, JVD, or masses.


LUNGS: anterior/posterior lung sounds diminished, on 2 liters of nasal cannula


HEART: Regular rate and rhythm


ABDOMEN: very mild redness to periumbilical region. 


UPPER EXTREMITIES: lower ext with non pitting edema


NEUROLOGICAL:  Awake, alert


SKIN: abscess without any drainage or odor








 Laboratory Results - last 24 hr











  06/01/17 06/01/17 06/01/17





  06:30 06:30 07:00


 


WBC  4.7  


 


RBC  2.95 L  


 


Hgb  8.8 L  


 


Hct  26.2 L  


 


MCV  88.8  


 


MCHC  33.5  


 


RDW  17.0 H  


 


Plt Count  117 L  


 


MPV  10.1  


 


Neutrophils %  Y  


 


Lymphocytes %  Y  


 


Sodium   145 


 


Potassium   3.9  D 


 


Chloride   105 


 


Carbon Dioxide   33 H 


 


Anion Gap   7 L 


 


BUN   36 H 


 


Creatinine   3.4 H 


 


Creat Clearance w eGFR   17.22 


 


Random Glucose   85 


 


Calcium   8.0 L 


 


Magnesium   1.7 L 


 


Total Bilirubin   1.7 H 


 


AST   27 


 


ALT   11 L 


 


Alkaline Phosphatase   325 H 


 


Total Protein   5.1 L 


 


Albumin   2.3 L 


 


Blood Type    A POSITIVE


 


Antibody Screen    Negative








Active Medications











Generic Name Dose Route Start Last Admin





  Trade Name Freq  PRN Reason Stop Dose Admin


 


Atorvastatin Calcium  20 mg 05/25/17 22:00 05/31/17 22:02





  Lipitor -  PO   20 mg





  HS ALCIDES   Administration


 


Carvedilol  6.25 mg 05/26/17 12:21 06/01/17 09:42





  Coreg -  PO   6.25 mg





  BID ALCIDES   Administration


 


Ursodiol  300 mg 05/24/17 22:00 06/01/17 09:42





  Actigal -  PO   300 mg





  BID ALCIDES   Administration











ASSESSMENT/PLAN:





Patient is a 87 year old male with a significant past medical history of 

hypertension, CAD s/p stent placement, aortic stenosis, porcine valve 

replacement (takes both ASA 81mg and Plavix 75mg), urtheral strictures s/p 

dilatation and right nephrectomy. 





He was admitted on 5/15/2017 for abdominal pain, acute choledocholithiasis, 

acute transaminitis, jaundice and leukocytosis with shaking chills.  





ID:


Septic Shock likely secondary to cholangitis with respiratory failure - resolved


Assessment/Plan: Extubated 5/23, lactic acidosis resolved


Acute Choledocholithiasis with acute cholangitis, ERCP w/ biliary stent on 5/15


Patient for a repeat ERCP tomorrow 


On Ursodiol 300mg BID





GI:


Acute Choledocholithiasis/abdominal pain/generalized jaundice/acute 

transaminitis - resolving


Assessment/Plan: Cholangitis s/p ERCP with biliary stent placement on 5/16


Trend LFT's


On Ursodiol





:


Chronic Kidney Disease - acute renal failure/has solitary kidney 


due to renal cancer with s/p right nephrectomy


Assessment/Plan: Shijanina removed on 5/27


Has ware catheter


Renal following





Cardiology:


Hypertension - chronic


Assessment/Plan: On Coreq 6.25ng BID


ASA/Plavix on hold





CAD s/p stent placement


On ASA 81mg and Plavix 75mg - on hold





Hematology:


Thrombocytopenia - resolved


Assessment/Plan:  s/p 1 unit of platelets on 5/23


Thrombocytopenia likely secondary to sepsis


HIT panel slightly elevated





Prophylaxis:


DVT:  SCDs


GI: Protonix





Code Status:  Requires inpatient hospitalization.   DNR/DNI.














Visit type





- Emergency Visit


Emergency Visit: Yes


ED Registration Date: 05/15/17


Care time: The patient presented to the Emergency Department on the above date 

and was hospitalized for further evaluation of their emergent condition.





- New Patient


This patient is new to me today: No





- Critical Care


Critical Care patient: No





- Discharge Referral


Referred to [facility] No Allergic reaction

## 2022-12-16 NOTE — PN
Physical Exam: 


SUBJECTIVE: Patient seen and examined in ICU. He is lethargic, per RN did not 

sleep well last night. He is making urine, afebrile 





Events: 


- Hgb 7.6


- Extubated yesterday 5/23


- s/p lasix 40mg iv x1 


- HD yesterday 





OBJECTIVE:





 Vital Signs











 Period  Temp  Pulse  Resp  BP Sys/Badillo  Pulse Ox


 


 Last 24 Hr  96.9 F-98.1 F  63-80  16-22  /43-61  








PE


Neuro: lethargic, arousable, opens eyes


HEENT: + shiley [facility], glasses 


Pulm: b/l rhonchi, diminished 


CV: s1 s2 rrr no mrg 


Abd: s nt nd + bs


: + ware + yellow clear output 


Ext: +2 le edema, warm 


 Laboratory Results - last 24 hr











  05/22/17 05/24/17 05/24/17





  16:56 05:25 05:25


 


WBC   10.5 H 


 


RBC   2.59 L 


 


Hgb   7.6 L 


 


Hct   22.4 L 


 


MCV   86.5 


 


MCHC   33.8 


 


RDW   15.6 


 


Plt Count   69 L D 


 


MPV   11.6 H 


 


Neutrophils %   78.8 


 


Lymphocytes %   6.6 L 


 


Monocytes %   7.4 


 


Eosinophils %   6.7 H 


 


Basophils %   0.5 


 


Sodium    136


 


Potassium    3.9


 


Chloride    101


 


Carbon Dioxide    24


 


Anion Gap    11


 


BUN    93 H


 


Creatinine    6.1 H


 


Creat Clearance w eGFR    8.77


 


Random Glucose    79


 


Calcium    8.1 L


 


Total Bilirubin    2.0 H


 


AST    47 H


 


ALT    11 L D


 


Alkaline Phosphatase    422 H


 


Total Protein    4.3 L


 


Albumin    1.9 L


 


Hepatitis A Ab Total  Positive  


 


Hep Bs Antigen  Negatve  


 


Hep Bs Antibody  Non reactive  


 


Hep B Core Total Ab  Negative  


 


Hepatitis C Antibody  <0.1  


 


Blood Type   


 


Antibody Screen   


 


Crossmatch   








Active Medications











Generic Name Dose Route Start Last Admin





  Trade Name Freq  PRN Reason Stop Dose Admin


 


Albuterol/Ipratropium  1 amp 05/23/17 14:30  





  Duoneb -  NEB   





  Q6H PRN   





  SHORTNESS OF BREATH   


 


Chlorhexidine Gluconate  1 applic 05/19/17 22:00 05/23/17 21:39





  Hibiclens For Decolonization -  TP   1 applic





  HS ALCIDES   Administration


 


Cefazolin Sodium 0.5 gm/  50 mls @ 100 mls/hr 05/19/17 10:00 05/24/17 10:00





  Dextrose  IVPB   100 mls/hr





  BID ALCIDES   Administration


 


Famotidine/Sodium Chloride  50 mls @ 100 mls/hr 05/24/17 22:00  





  Pepcid 20 Mg Premixed Ivpb -  IVPB   





  BID ALCIDES   


 


Nystatin  1 applic 05/17/17 22:00 05/24/17 10:00





  Nystop Powder -  TP   1 applic





  BID ALCIDES   Administration


 


Ursodiol  300 mg 05/19/17 22:00 05/24/17 10:00





  Actigal -  PO   Not Given





  BID ALCIDES   








 Microbiology











 05/19/17 19:20 Blood Culture - Preliminary





 Blood - Peripheral Venous    NO GROWTH OBTAINED AFTER 96 HOURS, INCUBATION TO 

CONTINUE





    FOR 1 DAYS.


 


 05/19/17 18:50 Blood Culture - Preliminary





 Blood - Peripheral Venous    NO GROWTH OBTAINED AFTER 96 HOURS, INCUBATION TO 

CONTINUE





    FOR 1 DAYS.











Assessment: 87 year old male with HTN, CAD s/p stent placement, aortic stenosis

, porcine valve replacement (ASA 81mg and Plavix 75mg), urtheral strictures s/p 

dilatation and right nephrectomy admitted with abdominal pain, acute 

transaminitis, jaundice, found to have sepsis due to acute cholangitis. 





Plan: 


1. Septic shock 2/2 acute cholangitis 


- ERCP s/p balloon sphincteroplasty and biliary stent placement 5/16


- repeat blood cx negative 


- continue cefazolin 2 more days (8/10)





2. Acute Choledocholithiasis with acute cholangitis 


- s/p ERCP w/ biliary stent 


- Will need repeat ERCP to remove remaining stones once stable 


- Continue Ursodiol BID 





3. 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 


- HD today 


- Transfuse 2 units PRBC


- Hold PO bicarb 


- Hold repeat lasix 





4. Thrombocytopenia 


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


- 1Uplts 5/23


- Hold chemical AC


- Trend plts 





5. CAD s/p stent placement and demand ischemia, s/p TAVR for AS 


- Hold ASA/plavix for above 





6. Acute respiratory failure 


- s/p extubation 5/23 


- Stable on RA, goal spo2 <92%





7. Lactic acidosis 


- Resolved 





8. Hypotension 


- Off pressors 


- Hold antihypertensives for now, BP stable 





9. Nutrition 


- per speech and swallow will hold on PO, will like resolve swallow in few days 


- hold PO meds, will convert to IV, OT removed w/ extubation yesteryda 





Visit type





- Emergency Visit


Emergency Visit: Yes


ED Registration Date: 05/15/17


Care time: The patient presented to the Emergency Department on the above date 

and was hospitalized for further evaluation of their emergent condition.





- New Patient


This patient is new to me today: Yes


Date on this admission: 05/24/17





- Critical Care


Critical Care patient: No [facility]      Chief Complaint: Abdominal pain    HPI:  Akin Parnell is a 39 y o   male who presents with several different GI issues  Peri Putnam has severe GERD and abdominal pain with recurrent mild dysphagia  Patient had an EGD a year ago which showed severe esophagitis  However he only took his medications and then for some reason stopped  He said he did not feel it was helping but was only on them for about a month according to him  He has no hematemesis or melena  Every couple of months the patient's umbilicus starts to swell and bleed and become very painful  He goes to the emergency room but nothing is ever found  Usually gets better spontaneously  The patient has a change in bowel habits with diarrhea  He has no history of Crohn's disease  He has no fever or chills  He has no melena hematochezia or other lower GI issues  He has occasional low cramping prostatitis and is having difficulty urinating  He also has an abnormal liver CT  MRI is pending  Patient's liver functions are normal on #12  Lipase was 60  Be done on December 2021  Severe erosive esophagitis with stricture 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  Patient does continue to smoke but has cut down         Review of Systems:   Constitutional: No fever or chills, feels poorly, positive tiredness, no recent weight gain or weight loss  HENT: No complaints of earache, no hearing loss, no nosebleeds, no nasal discharge, no sore throat, no hoarseness  Eyes: No complaints of eye pain, no red eyes, no discharge from eyes, no itchy eyes  Cardiovascular: No complaints of slow heart rate, no fast heart rate, no chest pain, no palpitations, no leg claudication, no lower extremity edema  Respiratory: No complaints of shortness of breath, no wheezing, no cough, no SOB on exertion, no orthopnea     Gastrointestinal: As noted in HPI  Genitourinary: As per HPI  Musculoskeletal: No complaints of arthralgia, no myalgias, no joint swelling or stiffness, no limb pain or swelling  Neurological: No complaints of headache, no confusion, no convulsions, no numbness or tingling, no dizziness or fainting, no limb weakness, no difficulty walking  Skin: No complaints of skin rash or skin lesions, no itching, no skin wound, no dry skin  Hematological/Lymphatic: No complaints of swollen glands, does not bleed easy  Allergic/Immunologic: No immunocompromised state  Endocrine:  No complaints of polyuria, no polydipsia  Psychiatric/Behavioral: is not suicidal, no sleep disturbances, no anxiety or depression, no change in personality, no emotional problems  Historical Information   Past Medical History:   Diagnosis Date   • Anal abscess 3/12/2019   • 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   • GERD (gastroesophageal reflux disease)      Past Surgical History:   Procedure Laterality Date   • ABCESS DRAINAGE  10/29/2018    carlos anal   • ANAL FISTULOTOMY N/A 3/14/2019    Procedure: FISTULOTOMY;  Surgeon: Irving King MD;  Location: [facility];  Service: Colorectal   • COLONOSCOPY     • GA COLONOSCOPY FLX DX W/COLLJ SPEC WHEN PFRMD N/A 11/21/2018    Procedure: COLONOSCOPY;  Surgeon: Aysha Potts MD;  Location: [facility]; Service: Gastroenterology   • GA ESOPHAGOGASTRODUODENOSCOPY TRANSORAL DIAGNOSTIC N/A 11/21/2018    Procedure: ESOPHAGOGASTRODUODENOSCOPY (EGD); Surgeon: Aysha Potts MD;  Location: [facility]; Service: Gastroenterology   • GA ESOPHAGOGASTRODUODENOSCOPY TRANSORAL DIAGNOSTIC N/A 2/7/2019    Procedure: ESOPHAGOGASTRODUODENOSCOPY (EGD); Surgeon: Aysha Potts MD;  Location: [facility];   Service: Gastroenterology   • GA LAMNOTMY INCL W/DCMPRSN NRV ROOT 1 INTRSPC LUMBR Left 11/22/2019    Procedure: LAMINECTOMY LUMBAR/THORACIC; L4/5 microdiscectomy;  Surgeon: Galilea Lewis MD;  Location:  MAIN OR;  Service: Neurosurgery   • TONSILLECTOMY       Social History   Social History     Substance and Sexual Activity   Alcohol Use Yes    Comment: very rarely     Social History     Substance and Sexual Activity   Drug Use Not Currently   • Frequency: 2 0 times per week   • Types: Marijuana    Comment: none recently     Social History     Tobacco Use   Smoking Status Every Day   • Packs/day: 1 00   • Years: 15 00   • Pack years: 15 00   • Types: Cigarettes   Smokeless Tobacco Never     Family History   Problem Relation Age of Onset   • Diabetes Father          Current Medications: has a current medication list which includes the following prescription(s): ciprofloxacin, fluticasone, naproxen, and sucralfate  Vital Signs: /72   Resp 18   Ht 5' 11" (1 803 m)   Wt 94 8 kg (209 lb)   BMI 29 15 kg/m²       Physical Exam:   Constitutional  General Appearance: No acute distress, well appearing and well nourished  Head  Normocephalic  Eyes  Conjunctivae and lids: No swelling, erythema, or discharge  Pupils and irises: Equal, round and reactive to light  Ears, Nose, Mouth, and Throat  External inspection of ears and nose: Normal  Nasal mucosa, septum and turbinates: Normal without edema or erythema/   Oropharynx: Normal with no erythema, edema, exudate or lesions  Neck  Normal range of motion  Neck supple  Cardiovascular  Auscultation of the heart: Normal rate and rhythm, normal S1 and S2 without murmurs  Examination of the extremities for edema and/or varicosities: Normal  Pulmonary/Chest  Respiratory effort: No increased work of breathing or signs of respiratory distress  Auscultation of lungs: Clear to auscultation, equal breath sounds bilaterally, no wheezes, rales, no rhonchi  Abdomen  Abdomen: Non-tender, no masses  Liver and spleen: No hepatomegaly or splenomegaly  Musculoskeletal  Gait and station: normal   Digits and Nails: normal without clubbing or cyanosis  Inspection/palpation of joints, bones, and muscles: Normal  Neurological  No nystagmus or asterixis     Skin  Skin and subcutaneous tissue: Normal without rashes or lesions  Lymphatic  Palpation of the lymph nodes in neck: No lymphadenopathy  Psychiatric  Orientation to person, place and time: Normal   Mood and affect: Normal          Labs:  Lab Results   Component Value Date    ALT 29 11/12/2022    AST 17 11/12/2022    BUN 11 11/12/2022    CALCIUM 9 3 11/12/2022     11/12/2022    CO2 26 11/12/2022    CREATININE 0 76 11/12/2022    HCT 43 6 11/12/2022    HGB 14 9 11/12/2022    HGBA1C 5 4 10/28/2019     11/12/2022    K 4 3 11/12/2022    WBC 10 36 (H) 11/12/2022         X-Rays & Procedures:   CT small bowel enterography    (Results Pending)           ______________________________________________________________________      Assessment & Plan:     Diagnoses and all orders for this visit:    Gastroesophageal reflux disease with esophagitis without hemorrhage  -     EGD; Future    Dysphagia, unspecified type  -     Ambulatory referral to Gastroenterology  -     EGD; Future    Change in bowel habits  -     Colonoscopy; Future    Diarrhea, unspecified type  -     CT small bowel enterography; Future  -     Colonoscopy; Future    Rectal bleeding  -     Colonoscopy; Future    Periumbilical abdominal pain  -     CT small bowel enterography; Future  -     Colonoscopy; Future  -     EGD; Future    Umbilical bleeding  -     CT small bowel enterography; Future  -     Colonoscopy; Future      Patient will be scheduled for EGD and colonoscopy as well as CT enterography  Further recommendations will depend study results

## 2024-09-19 NOTE — PN
Tasneem is a 54 year old who is being evaluated via a billable video visit.    How would you like to obtain your AVS? MyChart  If the video visit is dropped, the invitation should be resent by: Text to cell phone: [phone]  Will anyone else be joining your video visit? No      Assessment & Plan     Change in stool  She feels she may have seen wormlike things in her stool a couple days ago.  Her stool was different but abnormal looking 1 day ago with some yellow mucus inside.  She does have a dog and a cat and the dog has had some diarrhea recently and has not had his dewormer recently.  Which she will be giving him a dewormer.  She has not seen any worm like things in their stool  Discussed doing stool test to see if there was anything there and checking ova and parasite tests.  If there is something abnormal on the test that we would treat it.  If there is nothing on the tests then we would just watch and see what happens with her stools    Decreased appetite  She just did not eat today but she has been eating up till now she feels well and has no issue when she does eat            Patient Instructions   Stool testing    Once stools are back we will evaluate what is seen and then treat you if needed    Subjective   Tasneem is a 54 year old, presenting for the following health issues:  No chief complaint on file.    History of Present Illness       Reason for visit:  Possible intestinal parasite?  Symptom onset:  1-3 days ago  Symptoms include:  Anemia, suspicious poop  Symptom intensity:  Severe  Symptom progression:  Improving  Had these symptoms before:  No  What makes it worse:  N/a  What makes it better:  Not eating   She is taking medications regularly.       She is worried she may have an intestinal parasite-thinks she might have hookworms because she looked online and thought that might be at  2 nights ago she had a poop that had white worm me looking things and it is it was a weird color with white around  Teaching Attending Note


Name of Resident: Jr Rogers


ATTENDING PHYSICIAN STATEMENT





I saw and evaluated the patient.


I reviewed the resident's note and discussed the case with the resident.


I agree with the resident's findings and plan as documented.








SUBJECTIVE:asymptomatic. tolerating diet. reports no dysphagia or odynophagia. 








OBJECTIVE:








General NAD


CV S1 S2 RRR no murmur/rub/gallop


Lungs CTA B/L no wheezing/rales/rhonchi


ABdomen soft NT/ND no rebound or guarding. normoactive BS





ASSESSMENT AND PLAN:


88 year old man with a history of HTN, CAD, MI, cardiac stent, AS, TAVR, right 

nephrectomy, BPH, urethral strictures who presents to the ER with abdominal pain

, vomiting and fever which started after CBD removal 9/1.


1. Choledocholithiasis with acute cholangitis and E.coli bacteremia- s/p ERCP, 

stone removal, CBD stent removal 9/1. MRCP inconclusive. CT done. advanced to 

regular diet and tolerating. LFT continue to trend down. repeat Bcx sent 

yesterday and NGTD. on Cefipime. day 5 of abx therapy. as per ID plan is to 

complete 7 day course of IV ABx. ID and GI on board. 


2. Acute Hgb drop- concern for GI bleeding as pt has had hx vs spincterectomy. s

/p 1 unit PRBC this admission. Hgb remains stable. 


3. Hoarse throat-now improved. evaluated by swallow pathologist and on soft 

diet.


4. Acute kidney injury on stage 3 vs 4 CKD- improving. d/c IVF. nephrology on 

board. 


5. CAD, history of MI, stent-cont to hold plavix. Continue Coreg, Lipitor


6. AS, history of TAVR


7. HTN- Continue Coreg


8. History of renal cancer, right nephrectomy


9. BPH- Continue Flomax, Proscar


10. Thrombocytopenia-likley due to bacteremia. stable. HIT pending. hematology 

consulted. Monitor platelets


11. DVT ppx- SCD due to GI bleed. it.  And the stool was hard  1 day ago she had a different kind of stool but it was different for her.  It was soft but it was black.  Again she started her iron about a week ago- it looked more normal but inside had some yellow mucus    The only additional symptom that she is complaining of is that her appetite is decreased.  But she feels well.  When she has eaten, she has no issue with it.  Discussed that if she had a parasite, she would more likely be more hungry because of parasite would be eating her energy up and she would want to replenish it    She has a dog and a cat and the dog has had some diarrhea recently.  He is not up-to-date on his dewormer.  She plans on giving him a dewormer    She has a history of anemia for the past 2 years -she was taking iron and by her report the anemia improved and she gradually took her iron away but then the anemia came back.  She is currently taking iron daily again and she feels improved after taking for only a week.  This was done because she was told her ferritin was low and she should take the iron every other day but she is choosing to take it every day    She is to have a colonoscopy and she has it scheduled 10/30/2024   The last time they attempted a colonoscopy they could only go custodial because of pain.  She did have it scheduled earlier but had to cancel it because she could not get transportation              Review of Systems  Constitutional, neuro, ENT, endocrine, pulmonary, cardiac, gastrointestinal, genitourinary, musculoskeletal, integument and psychiatric systems are negative, except as otherwise noted.      Objective           Vitals:  No vitals were obtained today due to virtual visit.    Physical Exam   GENERAL: alert and no distress  EYES: Eyes grossly normal to inspection.  No discharge or erythema, or obvious scleral/conjunctival abnormalities.  RESP: No audible wheeze, cough, or visible cyanosis.    SKIN: Visible skin clear. No significant rash,  abnormal pigmentation or lesions.  NEURO: Cranial nerves grossly intact.  Mentation and speech appropriate for age.  PSYCH: Appropriate affect, tone, and pace of words    Stool test      Video-Visit Details    Type of service:  Video Visit   Originating Location (pt. Location): Home    Distant Location (provider location):  On-site  Platform used for Video Visit: [employer]  Signed Electronically by: EDDIE Hodgson CNP

## 2024-12-17 NOTE — PN
HISTORY OF PRESENT ILLNESS     Cough  This is a new problem. Episode onset: 3 weeks ago. The problem has been unchanged. The cough is Non-productive. Associated symptoms include chills and a fever. Pertinent negatives include no chest pain, ear pain, headaches, nasal congestion, rash, rhinorrhea, sore throat, shortness of breath, sweats or wheezing. He has tried OTC cough suppressant for the symptoms. The treatment provided no relief.       PAST MEDICAL, FAMILY AND SOCIAL HISTORY     Patient Active Problem List   Diagnosis    Iron malabsorption (CMD)    Iron deficiency anemia due to chronic blood loss    Malignant neoplasm of rectum  (CMD)    Encounter for central line care    Adjustment disorder with depressed mood    Anemia    Dehydration    Rectal adenocarcinoma  (CMD)    Post-op pain    Encounter for antineoplastic chemotherapy    Perirectal abscess    Acute pyelonephritis    Primary malignant neoplasm of sigmoid colon  (CMD)       Current Outpatient Medications   Medication Sig Dispense Refill    predniSONE (DELTASONE) 20 MG tablet Take 2 tablets by mouth daily for 5 days. 10 tablet 0    ISOtretinoin 20 MG capsule Take 20 mg by mouth every 72 hours. (Patient not taking: Reported on 12/17/2024)      prochlorperazine (COMPAZINE) 10 MG tablet Take 1 tablet by mouth every 6 hours as needed for Nausea. Indications: Nausea and Vomiting 30 tablet 0    loperamide (Anti-Diarrheal) 2 MG tablet Take 2 tablets by mouth at the first sign of diarrhea, followed by 1 tablet after each loose stool. 24 tablet 0    methylpheniDATE (RITALIN) 10 MG tablet Take 1 tablet by mouth daily. (Patient not taking: Reported on 12/17/2024) 30 tablet 0    ondansetron (ZOFRAN ODT) 8 MG disintegrating tablet Place 1 tablet onto the tongue in the morning and 1 tablet in the evening. To prevent nausea, take 8 mg (1 tablet) 30 minutes prior to each dose of Lonsurf. 56 tablet 5    trifluridine-tipiracil (LONSURF) 15-6.14 MG tablet Take 5 tablets by  Progress Note (short form)





- Note


Progress Note: 


GI Procedure NOte: Please see scanned ERCP report. After a stone was found in 

the proximal common bile duct a balloon sphincteroplasty was performed and a 

7Fr x 10cm stent was placed reaching proximal to the stone. Dried pus was 

emanating from the papilla at the start of the procedure. The patient was 

extubated following the procedure but had to be reintubated. I discussed the 

case with the family and Dr Mckay. mouth in the morning and 5 tablets in the evening. Take on days 1-5 and 8-12 of 28 day cycle. 100 tablet 5    mupirocin (BACTROBAN) 2 % ointment Apply to fingernails twice daily until healed up to 1 week. (Patient not taking: Reported on 12/17/2024) 22 g 0    ISOtretinoin 20 MG capsule Take 1 capsule by mouth every 3 days. (Patient not taking: Reported on 12/17/2024) 10 capsule 0    triamcinolone (ARISTOCORT) 0.1 % ointment Apply to affected area as needed twice daily (Patient not taking: Reported on 12/17/2024) 30 g 0    custom magic mouthwash oral suspension Swish and swallow 15 mLs 4 times daily (before meals and nightly). 300 mL 1    naLOXone (NARCAN) 4 MG/0.1ML nasal spray Spray the content of 1 device into 1 nostril. Call 911. May repeat with 2nd device in alternate nostril if no response in 2-3 minutes. (Patient not taking: Reported on 12/17/2024) 2 each 1     No current facility-administered medications for this visit.       I have reviewed the patient's medications and allergies, past medical, surgical, social and family history, updating these as appropriate.  See histories section of the electronic medical record for a display of this information.    REVIEW OF SYSTEMS     Review of Systems   Constitutional:  Positive for chills and fever. Negative for diaphoresis.   HENT:  Negative for congestion, ear pain, rhinorrhea, sinus pressure and sore throat.    Respiratory:  Positive for cough. Negative for shortness of breath and wheezing.    Cardiovascular:  Negative for chest pain.   Gastrointestinal:  Negative for diarrhea, nausea and vomiting.   Skin:  Negative for rash.   Neurological:  Negative for headaches.       PHYSICAL EXAM     Visit Vitals  /72 (BP Location: LUE - Left upper extremity, Patient Position: Sitting, Cuff Size: Regular)   Pulse (!) 104   Temp 97.8 °F (36.6 °C) (Temporal)   Resp 20   SpO2 100%       Physical Exam  Constitutional:       General: He is not in acute distress.  HENT:       Head: Normocephalic and atraumatic.      Right Ear: Hearing, tympanic membrane and ear canal normal. Tympanic membrane is not erythematous or bulging.      Left Ear: Hearing, tympanic membrane and ear canal normal. Tympanic membrane is not erythematous or bulging.      Nose: No rhinorrhea.      Right Sinus: No maxillary sinus tenderness or frontal sinus tenderness.      Left Sinus: No maxillary sinus tenderness or frontal sinus tenderness.      Mouth/Throat:      Mouth: No oral lesions.      Pharynx: No oropharyngeal exudate.   Eyes:      Conjunctiva/sclera: Conjunctivae normal.   Cardiovascular:      Rate and Rhythm: Normal rate and regular rhythm.      Heart sounds: No murmur heard.  Pulmonary:      Effort: Pulmonary effort is normal.      Breath sounds: No wheezing, rhonchi or rales.   Lymphadenopathy:      Head:      Right side of head: No submental, submandibular, preauricular or posterior auricular adenopathy.      Left side of head: No submental, submandibular, preauricular or posterior auricular adenopathy.      Cervical: No cervical adenopathy.   Skin:     General: Skin is warm and dry.      Findings: No rash.   Neurological:      Mental Status: He is alert.   Psychiatric:         Behavior: Behavior normal. Behavior is cooperative.         ASSESSMENT/PLAN     ASSESSMENT:  1. Bronchitis        PLAN:  Orders Placed This Encounter    predniSONE (DELTASONE) 20 MG tablet     I have recommended symptomatic treatment  with burst dose steroid for his complaints today.  He should get rest and drink plenty of fluids.  He may use saline rinses, a neti pot, or decongestant for congestion.  He may use an antitussive for coughing.  He may gargle with warm salt water or use a lozenge for sore throat.  He may use acetaminophen for fever, aches, and pains.  He understands that an antibiotic is not indicated at this time, as the risk outweighs the benefits.        Return if symptoms worsen or fail to improve.    He will  call or return to the office for any persistent, worsening, or concerning symptoms.  For any emergencies, he will present to the emergency room or call 911.    The patient understands, agrees, and will comply with today's plan.